# Patient Record
Sex: MALE | Race: WHITE | Employment: OTHER | URBAN - METROPOLITAN AREA
[De-identification: names, ages, dates, MRNs, and addresses within clinical notes are randomized per-mention and may not be internally consistent; named-entity substitution may affect disease eponyms.]

---

## 2017-03-03 RX ORDER — RANOLAZINE 500 MG/1
TABLET, FILM COATED, EXTENDED RELEASE ORAL
Qty: 60 TAB | Refills: 3 | Status: SHIPPED | OUTPATIENT
Start: 2017-03-03 | End: 2017-09-28 | Stop reason: SDUPTHER

## 2017-05-19 DIAGNOSIS — I10 ESSENTIAL HYPERTENSION, MALIGNANT: ICD-10-CM

## 2017-05-19 RX ORDER — CARVEDILOL 12.5 MG/1
TABLET ORAL
Qty: 90 TAB | Refills: 3 | Status: SHIPPED | OUTPATIENT
Start: 2017-05-19 | End: 2018-07-16 | Stop reason: SDUPTHER

## 2017-09-28 RX ORDER — RANOLAZINE 500 MG/1
TABLET, FILM COATED, EXTENDED RELEASE ORAL
Qty: 60 TAB | Refills: 3 | Status: SHIPPED | OUTPATIENT
Start: 2017-09-28 | End: 2017-12-02

## 2017-11-29 ENCOUNTER — HOSPITAL ENCOUNTER (INPATIENT)
Age: 67
LOS: 3 days | Discharge: HOME | DRG: 247 | End: 2017-12-02
Attending: EMERGENCY MEDICINE | Admitting: INTERNAL MEDICINE
Payer: MEDICARE

## 2017-11-29 ENCOUNTER — APPOINTMENT (OUTPATIENT)
Dept: GENERAL RADIOLOGY | Age: 67
DRG: 247 | End: 2017-11-29
Attending: EMERGENCY MEDICINE
Payer: MEDICARE

## 2017-11-29 DIAGNOSIS — R06.09 DYSPNEA ON EXERTION: ICD-10-CM

## 2017-11-29 DIAGNOSIS — R73.9 HYPERGLYCEMIA: ICD-10-CM

## 2017-11-29 DIAGNOSIS — R07.9 ACUTE CHEST PAIN: Primary | ICD-10-CM

## 2017-11-29 LAB
ALBUMIN SERPL-MCNC: 4.1 G/DL (ref 3.4–5)
ALBUMIN/GLOB SERPL: 1.2 {RATIO} (ref 0.8–1.7)
ALP SERPL-CCNC: 105 U/L (ref 45–117)
ALT SERPL-CCNC: 42 U/L (ref 16–61)
ANION GAP SERPL CALC-SCNC: 11 MMOL/L (ref 3–18)
AST SERPL-CCNC: 23 U/L (ref 15–37)
ATRIAL RATE: 80 BPM
ATRIAL RATE: 87 BPM
BASOPHILS # BLD: 0 K/UL (ref 0–0.06)
BASOPHILS NFR BLD: 1 % (ref 0–2)
BILIRUB SERPL-MCNC: 0.4 MG/DL (ref 0.2–1)
BUN SERPL-MCNC: 13 MG/DL (ref 7–18)
BUN/CREAT SERPL: 15 (ref 12–20)
CALCIUM SERPL-MCNC: 9.2 MG/DL (ref 8.5–10.1)
CALCULATED P AXIS, ECG09: 36 DEGREES
CALCULATED P AXIS, ECG09: 50 DEGREES
CALCULATED R AXIS, ECG10: 45 DEGREES
CALCULATED R AXIS, ECG10: 54 DEGREES
CALCULATED T AXIS, ECG11: 22 DEGREES
CALCULATED T AXIS, ECG11: 45 DEGREES
CHLORIDE SERPL-SCNC: 102 MMOL/L (ref 100–108)
CK MB CFR SERPL CALC: NORMAL % (ref 0–4)
CK MB SERPL-MCNC: <1 NG/ML (ref 5–25)
CK SERPL-CCNC: 51 U/L (ref 39–308)
CO2 SERPL-SCNC: 24 MMOL/L (ref 21–32)
CREAT SERPL-MCNC: 0.89 MG/DL (ref 0.6–1.3)
DIAGNOSIS, 93000: NORMAL
DIAGNOSIS, 93000: NORMAL
DIFFERENTIAL METHOD BLD: NORMAL
EOSINOPHIL # BLD: 0.1 K/UL (ref 0–0.4)
EOSINOPHIL NFR BLD: 1 % (ref 0–5)
ERYTHROCYTE [DISTWIDTH] IN BLOOD BY AUTOMATED COUNT: 13.8 % (ref 11.6–14.5)
GLOBULIN SER CALC-MCNC: 3.4 G/DL (ref 2–4)
GLUCOSE BLD STRIP.AUTO-MCNC: 280 MG/DL (ref 70–110)
GLUCOSE SERPL-MCNC: 286 MG/DL (ref 74–99)
HCT VFR BLD AUTO: 42.5 % (ref 36–48)
HGB BLD-MCNC: 14.9 G/DL (ref 13–16)
LYMPHOCYTES # BLD: 2.6 K/UL (ref 0.9–3.6)
LYMPHOCYTES NFR BLD: 33 % (ref 21–52)
MCH RBC QN AUTO: 30.4 PG (ref 24–34)
MCHC RBC AUTO-ENTMCNC: 35.1 G/DL (ref 31–37)
MCV RBC AUTO: 86.7 FL (ref 74–97)
MONOCYTES # BLD: 0.6 K/UL (ref 0.05–1.2)
MONOCYTES NFR BLD: 8 % (ref 3–10)
NEUTS SEG # BLD: 4.5 K/UL (ref 1.8–8)
NEUTS SEG NFR BLD: 57 % (ref 40–73)
P-R INTERVAL, ECG05: 144 MS
P-R INTERVAL, ECG05: 146 MS
PLATELET # BLD AUTO: 201 K/UL (ref 135–420)
PMV BLD AUTO: 9.8 FL (ref 9.2–11.8)
POTASSIUM SERPL-SCNC: 4.1 MMOL/L (ref 3.5–5.5)
PROT SERPL-MCNC: 7.5 G/DL (ref 6.4–8.2)
Q-T INTERVAL, ECG07: 396 MS
Q-T INTERVAL, ECG07: 408 MS
QRS DURATION, ECG06: 90 MS
QRS DURATION, ECG06: 92 MS
QTC CALCULATION (BEZET), ECG08: 456 MS
QTC CALCULATION (BEZET), ECG08: 490 MS
RBC # BLD AUTO: 4.9 M/UL (ref 4.7–5.5)
SODIUM SERPL-SCNC: 137 MMOL/L (ref 136–145)
TROPONIN I SERPL-MCNC: 0.06 NG/ML (ref 0–0.06)
VENTRICULAR RATE, ECG03: 80 BPM
VENTRICULAR RATE, ECG03: 87 BPM
WBC # BLD AUTO: 7.9 K/UL (ref 4.6–13.2)

## 2017-11-29 PROCEDURE — 74011250636 HC RX REV CODE- 250/636: Performed by: INTERNAL MEDICINE

## 2017-11-29 PROCEDURE — 85025 COMPLETE CBC W/AUTO DIFF WBC: CPT | Performed by: EMERGENCY MEDICINE

## 2017-11-29 PROCEDURE — 93005 ELECTROCARDIOGRAM TRACING: CPT

## 2017-11-29 PROCEDURE — 80053 COMPREHEN METABOLIC PANEL: CPT | Performed by: EMERGENCY MEDICINE

## 2017-11-29 PROCEDURE — 93017 CV STRESS TEST TRACING ONLY: CPT | Performed by: INTERNAL MEDICINE

## 2017-11-29 PROCEDURE — 78452 HT MUSCLE IMAGE SPECT MULT: CPT | Performed by: INTERNAL MEDICINE

## 2017-11-29 PROCEDURE — 71010 XR CHEST PORT: CPT

## 2017-11-29 PROCEDURE — 74011250637 HC RX REV CODE- 250/637: Performed by: INTERNAL MEDICINE

## 2017-11-29 PROCEDURE — 74011250636 HC RX REV CODE- 250/636: Performed by: EMERGENCY MEDICINE

## 2017-11-29 PROCEDURE — 65660000000 HC RM CCU STEPDOWN

## 2017-11-29 PROCEDURE — 82550 ASSAY OF CK (CPK): CPT | Performed by: EMERGENCY MEDICINE

## 2017-11-29 PROCEDURE — 74011636637 HC RX REV CODE- 636/637: Performed by: INTERNAL MEDICINE

## 2017-11-29 PROCEDURE — 99284 EMERGENCY DEPT VISIT MOD MDM: CPT

## 2017-11-29 PROCEDURE — 82962 GLUCOSE BLOOD TEST: CPT

## 2017-11-29 PROCEDURE — 74011250637 HC RX REV CODE- 250/637: Performed by: EMERGENCY MEDICINE

## 2017-11-29 RX ORDER — CARVEDILOL 6.25 MG/1
12.5 TABLET ORAL
Status: COMPLETED | OUTPATIENT
Start: 2017-11-29 | End: 2017-11-29

## 2017-11-29 RX ORDER — NITROGLYCERIN 0.4 MG/1
0.4 TABLET SUBLINGUAL
Status: COMPLETED | OUTPATIENT
Start: 2017-11-29 | End: 2017-11-29

## 2017-11-29 RX ORDER — RAMIPRIL 5 MG/1
10 CAPSULE ORAL DAILY
Status: DISCONTINUED | OUTPATIENT
Start: 2017-11-30 | End: 2017-12-02 | Stop reason: HOSPADM

## 2017-11-29 RX ORDER — ROSUVASTATIN CALCIUM 20 MG/1
10 TABLET, COATED ORAL
Status: DISCONTINUED | OUTPATIENT
Start: 2017-11-29 | End: 2017-12-02 | Stop reason: HOSPADM

## 2017-11-29 RX ORDER — HYDROCODONE BITARTRATE AND ACETAMINOPHEN 5; 325 MG/1; MG/1
1 TABLET ORAL
Status: DISCONTINUED | OUTPATIENT
Start: 2017-11-29 | End: 2017-12-02 | Stop reason: HOSPADM

## 2017-11-29 RX ORDER — INSULIN LISPRO 100 [IU]/ML
INJECTION, SOLUTION INTRAVENOUS; SUBCUTANEOUS
Status: DISCONTINUED | OUTPATIENT
Start: 2017-11-29 | End: 2017-12-02 | Stop reason: HOSPADM

## 2017-11-29 RX ORDER — RANOLAZINE 500 MG/1
500 TABLET, EXTENDED RELEASE ORAL EVERY 12 HOURS
Status: DISCONTINUED | OUTPATIENT
Start: 2017-11-29 | End: 2017-11-30

## 2017-11-29 RX ORDER — DEXTROSE MONOHYDRATE 25 G/50ML
25-50 INJECTION, SOLUTION INTRAVENOUS AS NEEDED
Status: DISCONTINUED | OUTPATIENT
Start: 2017-11-29 | End: 2017-12-02 | Stop reason: HOSPADM

## 2017-11-29 RX ORDER — AMLODIPINE BESYLATE 5 MG/1
5 TABLET ORAL
Status: COMPLETED | OUTPATIENT
Start: 2017-11-29 | End: 2017-11-29

## 2017-11-29 RX ORDER — ROSUVASTATIN CALCIUM 10 MG/1
10 TABLET, COATED ORAL
Status: DISCONTINUED | OUTPATIENT
Start: 2017-11-29 | End: 2017-11-29

## 2017-11-29 RX ORDER — ENOXAPARIN SODIUM 100 MG/ML
1 INJECTION SUBCUTANEOUS
Status: COMPLETED | OUTPATIENT
Start: 2017-11-29 | End: 2017-11-29

## 2017-11-29 RX ORDER — ASPIRIN 325 MG
325 TABLET ORAL
Status: COMPLETED | OUTPATIENT
Start: 2017-11-29 | End: 2017-11-29

## 2017-11-29 RX ORDER — MAGNESIUM SULFATE 100 %
16 CRYSTALS MISCELLANEOUS AS NEEDED
Status: DISCONTINUED | OUTPATIENT
Start: 2017-11-29 | End: 2017-12-02 | Stop reason: HOSPADM

## 2017-11-29 RX ORDER — CARVEDILOL 12.5 MG/1
12.5 TABLET ORAL 2 TIMES DAILY WITH MEALS
Status: DISCONTINUED | OUTPATIENT
Start: 2017-11-29 | End: 2017-12-02 | Stop reason: HOSPADM

## 2017-11-29 RX ORDER — PANTOPRAZOLE SODIUM 40 MG/1
40 TABLET, DELAYED RELEASE ORAL
Status: DISCONTINUED | OUTPATIENT
Start: 2017-11-30 | End: 2017-12-02 | Stop reason: HOSPADM

## 2017-11-29 RX ORDER — ROSUVASTATIN CALCIUM 5 MG/1
10 TABLET, COATED ORAL
Status: DISCONTINUED | OUTPATIENT
Start: 2017-11-29 | End: 2017-11-29

## 2017-11-29 RX ORDER — HEPARIN SODIUM 5000 [USP'U]/ML
5000 INJECTION, SOLUTION INTRAVENOUS; SUBCUTANEOUS EVERY 8 HOURS
Status: DISCONTINUED | OUTPATIENT
Start: 2017-11-29 | End: 2017-11-30

## 2017-11-29 RX ORDER — LORAZEPAM 1 MG/1
1 TABLET ORAL 2 TIMES DAILY
Status: DISCONTINUED | OUTPATIENT
Start: 2017-11-29 | End: 2017-12-02 | Stop reason: HOSPADM

## 2017-11-29 RX ORDER — ASPIRIN 81 MG/1
81 TABLET ORAL DAILY
Status: DISCONTINUED | OUTPATIENT
Start: 2017-11-30 | End: 2017-12-02 | Stop reason: HOSPADM

## 2017-11-29 RX ORDER — RANOLAZINE 500 MG/1
500 TABLET, EXTENDED RELEASE ORAL 2 TIMES DAILY
Status: DISCONTINUED | OUTPATIENT
Start: 2017-11-29 | End: 2017-11-29

## 2017-11-29 RX ORDER — AMLODIPINE BESYLATE 5 MG/1
5 TABLET ORAL DAILY
Status: DISCONTINUED | OUTPATIENT
Start: 2017-11-30 | End: 2017-12-02 | Stop reason: HOSPADM

## 2017-11-29 RX ORDER — ACETAMINOPHEN 325 MG/1
650 TABLET ORAL
Status: DISCONTINUED | OUTPATIENT
Start: 2017-11-29 | End: 2017-12-02 | Stop reason: HOSPADM

## 2017-11-29 RX ORDER — ONDANSETRON 2 MG/ML
4 INJECTION INTRAMUSCULAR; INTRAVENOUS
Status: DISCONTINUED | OUTPATIENT
Start: 2017-11-29 | End: 2017-12-02 | Stop reason: HOSPADM

## 2017-11-29 RX ADMIN — INSULIN LISPRO 6 UNITS: 100 INJECTION, SOLUTION INTRAVENOUS; SUBCUTANEOUS at 21:21

## 2017-11-29 RX ADMIN — CARVEDILOL 12.5 MG: 12.5 TABLET, FILM COATED ORAL at 18:30

## 2017-11-29 RX ADMIN — CARVEDILOL 12.5 MG: 6.25 TABLET, FILM COATED ORAL at 15:31

## 2017-11-29 RX ADMIN — RANOLAZINE 500 MG: 500 TABLET, FILM COATED, EXTENDED RELEASE ORAL at 20:33

## 2017-11-29 RX ADMIN — ASPIRIN 325 MG ORAL TABLET 325 MG: 325 PILL ORAL at 14:13

## 2017-11-29 RX ADMIN — NITROGLYCERIN 1 INCH: 20 OINTMENT TOPICAL at 15:35

## 2017-11-29 RX ADMIN — ENOXAPARIN SODIUM 90 MG: 100 INJECTION SUBCUTANEOUS at 15:33

## 2017-11-29 RX ADMIN — AMLODIPINE BESYLATE 5 MG: 5 TABLET ORAL at 15:31

## 2017-11-29 RX ADMIN — ROSUVASTATIN CALCIUM 10 MG: 20 TABLET ORAL at 21:21

## 2017-11-29 RX ADMIN — HEPARIN SODIUM 5000 UNITS: 5000 INJECTION, SOLUTION INTRAVENOUS; SUBCUTANEOUS at 18:28

## 2017-11-29 RX ADMIN — NITROGLYCERIN 0.4 MG: 0.4 TABLET SUBLINGUAL at 14:13

## 2017-11-29 RX ADMIN — LORAZEPAM 1 MG: 1 TABLET ORAL at 20:32

## 2017-11-29 NOTE — IP AVS SNAPSHOT
[address] [place] [address] 
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), Thyroid Goiter, Type 2 Diabetes Mellitus Without Complication (Hcc), Chronic Pain Syndrome, Cad (Coronary Artery Disease) Things You Need To Do (next 8 weeks) Follow up with Edd Saeed MD in 1 week(s) We will call you with an appointment on Monday Phone:  [phone] Where:  [address] [place], [place] [address]Henry[address], [address] [address] Follow up with Solomon Treviño MD in 2 week(s) We will call you with an appointment on Monday Phone:  [phone] Where:  [address] [place] [address] LillianSelena limon [address], [address] [address] Discharge Orders None A check stephanie indicates which time of day the medication should be taken. My Medications STOP taking these medications RANEXA 500 mg SR tablet Generic drug:  ranolazine ER  
   
  
  
TAKE these medications as instructed Instructions Each Dose to Equal  
 Morning Noon Evening Bedtime  
 acetaminophen 325 mg tablet Commonly known as:  TYLENOL Take  by mouth every four (4) hours as needed for Pain. aspirin delayed-release 325 mg tablet Notes to Patient:  81 mg (not 325mg) Take 1 Tab by mouth daily. 81 mg  
    
  
   
   
   
  
 ATIVAN 1 mg tablet Generic drug:  LORazepam  
   
 Take 1 mg by mouth two (2) times a day. 1 mg  
    
  
   
   
  
   
  
 carvedilol 12.5 mg tablet Commonly known as:  COREG  
   
 TAKE 1 TAB BY MOUTH TWO (2) TIMES DAILY (WITH MEALS). CRESTOR 10 mg tablet Generic drug:  rosuvastatin Take 10 mg by mouth nightly. 10 mg  
    
   
   
   
  
  
 fenofibrate nanocrystallized 48 mg tablet Commonly known as:  [employer] Take 1 Tab by mouth nightly. 48 mg JANUVIA 100 mg tablet Generic drug:  SITagliptin Take 100 mg by mouth daily. 100 mg  
    
  
   
   
   
  
 metFORMIN 500 mg tablet Commonly known as:  GLUCOPHAGE Notes to Patient:  48 hours after procedure. Next dose on Monday morning. Take  by mouth two (2) times daily (with meals). Start on Monday 12/4  
   
   
  
   
  
 nitroglycerin 0.4 mg SL tablet Commonly known as:  NITROSTAT  
   
 1 Tab by SubLINGual route every five (5) minutes as needed for Chest Pain for up to 3 doses. 0.4 mg  
    
   
   
   
  
 NORVASC 5 mg tablet Generic drug:  amLODIPine Take 5 mg by mouth daily. 5 mg Omeprazole delayed release 20 mg tablet Commonly known as:  PRILOSEC D/R Take 20 mg by mouth daily. 20 mg  
    
  
   
   
   
  
 promethazine 25 mg tablet Commonly known as:  PHENERGAN Take 25 mg by mouth every six (6) hours as needed for Nausea. 25 mg  
    
   
   
   
  
 ramipril 10 mg capsule Commonly known as:  ALTACE Take 10 mg by mouth daily. 10 mg  
    
  
   
   
   
  
 ticagrelor 90 mg tablet Commonly known as:  [employer] Take 1 Tab by mouth two (2) times a day. 90 mg  
    
  
   
   
  
   
  
 VICODIN 5-300 mg tablet Generic drug:  HYDROcodone-acetaminophen Take  by mouth. ASK your physician about these medications  Instructions Each Dose to Equal  
 Morning Noon Evening Bedtime VASCEPA 1 gram capsule Generic drug:  icosapent ethyl Take  by mouth two (2) times a day. Where to Get Your Medications Information on where to get these meds will be given to you by the nurse or doctor. ! Ask your nurse or doctor about these medications  
  aspirin delayed-release 325 mg tablet  
 fenofibrate nanocrystallized 48 mg tablet  
 nitroglycerin 0.4 mg SL tablet  
 ticagrelor 90 mg tablet Discharge Instructions Learning About Coronary Artery Disease (CAD) What is coronary artery disease? Coronary artery disease (CAD) occurs when plaque builds up in the arteries that bring oxygen-rich blood to your heart. Plaque is a fatty substance made of cholesterol, calcium, and other substances in the blood. This process is called hardening of the arteries, or atherosclerosis. What happens when you have coronary artery disease? · Plaque may narrow the coronary arteries. Narrowed arteries cause poor blood flow. This can lead to angina symptoms such as chest pain or discomfort. If blood flow is completely blocked, you could have a heart attack. · You can slow CAD and reduce the risk of future problems by making changes in your lifestyle. These include quitting smoking and eating heart-healthy foods. · Treatments for CAD, along with changes in your lifestyle, can help you live a longer and healthier life. How can you prevent coronary artery disease? · Do not smoke. It may be the best thing you can do to prevent heart disease. If you need help quitting, talk to your doctor about stop-smoking programs and medicines. These can increase your chances of quitting for good. · Be active. Get at least 30 minutes of exercise on most days of the week. Walking is a good choice. You also may want to do other activities, such as running, swimming, cycling, or playing tennis or team sports. · Eat heart-healthy foods. Eat more fruits and vegetables and less foods that contain saturated and trans fats. Limit alcohol, sodium, and sweets. · Stay at a healthy weight. Lose weight if you need to. · Manage other health problems such as diabetes, high blood pressure, and high cholesterol. · Manage stress. Stress can hurt your heart. To keep stress low, talk about your problems and feelings. Don't keep your feelings hidden. · If you have talked about it with your doctor, take a low-dose aspirin every day. Aspirin can help certain people lower their risk of a heart attack or stroke. But taking aspirin isn't right for everyone, because it can cause serious bleeding. Do not start taking daily aspirin unless your doctor knows about it. How is coronary artery disease treated? · Your doctor will suggest that you make lifestyle changes. For example, your doctor may ask you to eat healthy foods, quit smoking, lose extra weight, and be more active. · You will have to take medicines. · Your doctor may suggest a procedure to open narrowed or blocked arteries. This is called angioplasty. Or your doctor may suggest using healthy blood vessels to create detours around narrowed or blocked arteries. This is called bypass surgery. Follow-up care is a key part of your treatment and safety. Be sure to make and go to all appointments, and call your doctor if you are having problems. It's also a good idea to know your test results and keep a list of the medicines you take. Percutaneous Coronary Intervention: What to Expect at [facility] Your Recovery Percutaneous coronary intervention (PCI) is the name for procedures that are used to open a narrowed or blocked coronary artery. The two most common PCI procedures are coronary angioplasty and coronary stent placement. Your groin or arm may have a bruise and feel sore for a day or two after a percutaneous coronary intervention (PCI).  You can do light activities around the house, but nothing strenuous for several days. This care sheet gives you a general idea about how long it will take for you to recover. But each person recovers at a different pace. Follow the steps below to get better as quickly as possible. How can you care for yourself at home? Activity ? · If the doctor gave you a sedative: ¨ For 24 hours, don't do anything that requires attention to detail. It takes time for the medicine's effects to completely wear off. ¨ For your safety, do not drive or operate any machinery that could be dangerous. Wait until the medicine wears off and you can think clearly and react easily. ? · Do not do strenuous exercise and do not lift, pull, or push anything heavy until your doctor says it is okay. This may be for a day or two. You can walk around the house and do light activity, such as cooking. ? · If the catheter was placed in your groin, try not to walk up stairs for the first couple of days. ? · If the catheter was placed in your arm near your wrist, do not bend your wrist deeply for the first couple of days. Be careful using your hand to get into and out of a chair or bed. ? · Carry your stent identification card with you at all times. ? · If your doctor recommends it, get more exercise. Walking is a good choice. Bit by bit, increase the amount you walk every day. Try for at least 30 minutes on most days of the week. Diet ? · Drink plenty of fluids to help your body flush out the dye. If you have kidney, heart, or liver disease and have to limit fluids, talk with your doctor before you increase the amount of fluids you drink. ? · Keep eating a heart-healthy diet that has lots of fruits, vegetables, and whole grains. If you have not been eating this way, talk to your doctor. You also may want to talk to a dietitian. This expert can help you to learn about healthy foods and plan meals. Medicines ? · Your doctor will tell you if and when you can restart your medicines. He or she will also give you instructions about taking any new medicines. ? · If you take blood thinners, such as warfarin (Coumadin), clopidogrel (Plavix), or aspirin, be sure to talk to your doctor. He or she will tell you if and when to start taking those medicines again. Make sure that you understand exactly what your doctor wants you to do.  
? · Your doctor will prescribe blood-thinning medicines. You will likely take aspirin plus another antiplatelet, such as clopidogrel (Plavix). It is very important that you take these medicines exactly as directed. These medicines help keep the coronary artery open and reduce your risk of a heart attack. ? · Call your doctor if you think you are having a problem with your medicine. ?Care of the catheter site ? · For 1 or 2 days, keep a bandage over the spot where the catheter was inserted. The bandage probably will fall off in this time. ? · Put ice or a cold pack on the area for 10 to 20 minutes at a time to help with soreness or swelling. Put a thin cloth between the ice and your skin. ? · You may shower 24 to 48 hours after the procedure, if your doctor okays it. Pat the incision dry. ? · Do not soak the catheter site until it is healed. Don't take a bath for 1 week, or until your doctor tells you it isokay. Follow-up care is a key part of your treatment and safety. Be sure to make and go to all appointments, and call your doctor if you are having problems. It's also a good idea to know your test results and keep a list of the medicines you take. When should you call for help? Call 911 anytime you think you may need emergency care. For example, call if: 
? · You passed out (lost consciousness). ? · You have severe trouble breathing. ? · You have sudden chest pain and shortness of breath, or you cough up blood. ? · You have symptoms of a heart attack, such as: ¨ Chest pain or pressure. ¨ Sweating. ¨ Shortness of breath. ¨ Nausea or vomiting. ¨ Pain that spreads from the chest to the neck, jaw, or one or both shoulders or arms. ¨ Dizziness or lightheadedness. ¨ A fast or uneven pulse. After calling 911, chew 1 adult-strength aspirin. Wait for an ambulance. Do not try to drive yourself. ? · You have been diagnosed with angina, and you have angina symptoms that do not go away with rest or are not getting better within 5 minutes after you take one dose of nitroglycerin. ?Call your doctor now or seek immediate medical care if: 
? · You are bleeding from the area where the catheter was put in your artery. ? · You have a fast-growing, painful lump at the catheter site. ? · You have signs of infection, such as: 
¨ Increased pain, swelling, warmth, or redness. ¨ Red streaks leading from the catheter site. ¨ Pus draining from the catheter site. ¨ A fever. ? · Your leg or arm looks blue or feels cold, numb, or tingly. ? Watch closely for changes in your health, and be sure to contact your doctor if you have any problems. DISCHARGE SUMMARY from Nurse PATIENT INSTRUCTIONS: 
 
 
F-face looks uneven A-arms unable to move or move unevenly S-speech slurred or non-existent T-time-call 911 as soon as signs and symptoms begin-DO NOT go Back to bed or wait to see if you get better-TIME IS BRAIN. Warning Signs of HEART ATTACK Call 911 if you have these symptoms: 
? Chest discomfort. Most heart attacks involve discomfort in the center of the chest that lasts more than a few minutes, or that goes away and comes back. It can feel like uncomfortable pressure, squeezing, fullness, or pain. ? Discomfort in other areas of the upper body. Symptoms can include pain or discomfort in one or both arms, the back, neck, jaw, or stomach. ? Shortness of breath with or without chest discomfort. ? Other signs may include breaking out in a cold sweat, nausea, or lightheadedness. Don't wait more than five minutes to call [address]! Fast action can save your life. Calling 911 is almost always the fastest way to get lifesaving treatment. Emergency Medical Services staff can begin treatment when they arrive  up to an hour sooner than if someone gets to the hospital by car. The discharge information has been reviewed with the patient and spouse. The patient and spouse verbalized understanding. Discharge medications reviewed with the patient and spouse and appropriate educational materials and side effects teaching were provided. ___________________________________________________________________________________________________________________________________ Pin [employer]hart Announcement We are excited to announce that we are making your provider's discharge notes available to you in [employer]. You will see these notes when they are completed and signed by the physician that discharged you from your recent hospital stay. If you have any questions or concerns about any information you see in [employer], please call the Health Information Department where you were seen or reach out to your Primary Care Provider for more information about your plan of care. Introducing [facility]! Agnes Hernandez introduces [employer] patient portal. Now you can access parts of your medical record, email your doctor's office, and request medication refills online. 1. In your internet browser, go to [email or web address] 2. Click on the First Time User? Click Here link in the Sign In box. You will see the New Member Sign Up page. 3. Enter your [employer] Access Code exactly as it appears below. You will not need to use this code after youve completed the sign-up process. If you do not sign up before the expiration date, you must request a new code. · [employer] Access Code: [email or web address] Expires: 2/27/2018  1:21 PM 
 
4. Enter the last four digits of your Social Security Number (xxxx) and Date of Birth (mm/dd/yyyy) as indicated and click Submit. You will be taken to the next sign-up page. 5. Create a [employer] ID. This will be your [employer] login ID and cannot be changed, so think of one that is secure and easy to remember. 6. Create a [employer] password. You can change your password at any time. 7. Enter your Password Reset Question and Answer. This can be used at a later time if you forget your password. 8. Enter your e-mail address. You will receive e-mail notification when new information is available in [address] 9. Click Sign Up. You can now view and download portions of your medical record. 10. Click the Download Summary menu link to download a portable copy of your medical information. If you have questions, please visit the Frequently Asked Questions section of the [employer] website. Remember, [employer] is NOT to be used for urgent needs. For medical emergencies, dial 911. Now available from your iPhone and Android! Unresulted Labs-Please follow up with your PCP about these lab tests Order Current Status CARDIAC SPECT REST AND STRESS Preliminary result EKG, 12 LEAD, INITIAL Preliminary result NUCLEAR STRESS TEST Preliminary result Providers Seen During Your Hospitalization Provider Specialty Primary office phone Nisha Montano DO Emergency Medicine [phone] Cooper Enrique MD Internal Medicine [phone] Nani Campbell MD [facility] [phone] Adriano Perkins MD Internal Medicine [phone] Your Primary Care Physician (PCP) Primary Care Physician Office Phone Office Fax WINIFRED BARRY JR ** None ** ** None ** You are allergic to the following No active allergies Recent Documentation Height Weight BMI Smoking Status 1.803 m 90.1 kg 27.71 kg/m2 Never Smoker Emergency Contacts Name Discharge Info Relation Home Work Mobile [address]  CAREGIVER [3] Spouse [3] [phone] Patient Belongings The following personal items are in your possession at time of discharge: 
  Dental Appliances: None  Visual Aid: Glasses, With patient      Home Medications: None   Jewelry: None  Clothing: Undergarments, With patient    Other Valuables: None  Personal Items Sent to Safe: none Discharge Instructions Attachments/References TICAGRELOR (BY MOUTH) (ENGLISH) FENOFIBRATE (BY MOUTH) (ENGLISH) Patient Handouts Ticagrelor (By mouth) Ticagrelor (rdr-MI-flas-or) Helps prevent stroke, heart attack, and other heart problems. This medicine is a blood thinner. Brand Name(s): Frankie There may be other brand names for this medicine. When This Medicine Should Not Be Used: This medicine is not right for everyone. Do not use it if you had an allergic reaction to ticagrelor, or if you have bleeding problems (such as a bleeding stomach ulcer) or a history of bleeding in your brain. How to Use This Medicine:  
Tablet · Your doctor will tell you how much medicine to use. Do not use more than directed. Take this medicine at the same time every day. · Your doctor may tell you to take aspirin with this medicine. Do not use more than 100 milligrams of aspirin per day. Check the labels of other medicines to make sure they do not contain aspirin. · If you cannot swallow the tablet, you may do this: ¨ Crush the tablet and mix it in a glass of water. Drink it right away. Rinse the glass with more water and drink that too, so you get all the medicine. ¨ You may give the tablet and water mixture through a nasogastric tube. Flush the tube with more water so you receive all the medicine. · This medicine should come with a Medication Guide. Ask your pharmacist for a copy if you do not have one. · Missed dose: Skip the missed dose and take your next dose as usual. Do not take extra medicine to make up for a missed dose. · Store the medicine in a closed container at room temperature, away from heat, moisture, and direct light. Drugs and Foods to Avoid: Ask your doctor or pharmacist before using any other medicine, including over-the-counter medicines, vitamins, and herbal products. · Some medicines can affect how ticagrelor works. Tell your doctor if you are using any of the following: ¨ Atazanavir, carbamazepine, clarithromycin, digoxin, indinavir, itraconazole, ketoconazole, lovastatin, nefazodone, nelfinavir, phenobarbital, phenytoin, rifampin, ritonavir, saquinavir, simvastatin, telithromycin, or voriconazole ¨ Blood thinner (including warfarin or heparin) ¨ NSAID pain or arthritis medicine (including celecoxib, diclofenac, ibuprofen, naproxen) Warnings While Using This Medicine: · Tell your doctor if you are pregnant or breastfeeding, or if you have liver disease, heart rhythm problems (including slow heartbeat), lung or breathing problems (such as asthma or COPD), or a history of bleeding problems. · This medicine may cause you to bleed and bruise more easily, and it may take longer than usual for bleeding to stop. Be careful to avoid injuries. · Do not stop using this medicine unless your doctor tells you to. To stop it may increase your risk of a heart attack, blood clot, or other serious problem. · Tell any doctor or dentist who treats you that you are using this medicine.  With your doctor's permission, you may need to stop using this medicine several days before you have surgery to reduce the risk of bleeding problems. Follow your doctor's instructions carefully. · Your doctor will do lab tests at regular visits to check on the effects of this medicine. Keep all appointments. · Keep all medicine out of the reach of children. Never share your medicine with anyone. Possible Side Effects While Using This Medicine:  
Call your doctor right away if you notice any of these side effects: · Allergic reaction: Itching or hives, swelling in your face or hands, swelling or tingling in your mouth or throat, chest tightness, trouble breathing · Bloody or black, tarry stools, red or dark brown urine · Fast, slow, or pounding heartbeat · Trouble breathing · Unusual bleeding, bruising, or weakness · Vomiting of blood or material that looks like coffee grounds If you notice other side effects that you think are caused by this medicine, tell your doctor. Call your doctor for medical advice about side effects. You may report side effects to FDA at 8-594-FDA-9983 © 2017 [address]  Information is for End User's use only and may not be sold, redistributed or otherwise used for commercial purposes. The above information is an  only. It is not intended as medical advice for individual conditions or treatments. Talk to your doctor, nurse or pharmacist before following any medical regimen to see if it is safe and effective for you. Fenofibrate (By mouth) Fenofibrate (oxq-dn-ULS-brate) Lowers high cholesterol and triglyceride levels. Brand Name(s): Ani Nim, Fenoglide, Lipofen, Rozetta Paling, Triglide There may be other brand names for this medicine. When This Medicine Should Not Be Used: This medicine is not right for everyone. Do not use it if you had an allergic reaction to fenofibrate or fenofibric acid, or you are breastfeeding. Do not use it if you receive kidney dialysis or have active liver disease or gallbladder disease. How to Use This Medicine:  
Capsule, Tablet · Take your medicine as directed. Your dose may need to be changed several times to find what works best for you. · Take Fenoglide®, Lipofen®, or Tricor® with a meal. Antara® and Triglide® can be taken with or without a meal. 
· Swallow Antara® capsules, Triglide® tablets, or Tricor® tablets whole. Do not open, crush, break, chew, or dissolve them. · Missed dose: Take a dose as soon as you remember. If it is almost time for your next dose, wait until then and take a regular dose. Do not take extra medicine to make up for a missed dose. · Store the medicine in a closed container at room temperature, away from heat, moisture, and direct light. Drugs and Foods to Avoid: Ask your doctor or pharmacist before using any other medicine, including over-the-counter medicines, vitamins, and herbal products. · Some medicines can affect how fenofibrate works. Tell your doctor if you are using any of the following: ¨ Colchicine ¨ Cyclosporine ¨ Tacrolimus ¨ A blood thinner (such as warfarin) ¨ A statin medicine to lower cholesterol, such as atorvastatin, pravastatin, or simvastatin · If you are also using cholestyramine, colesevelam, or colestipol, you must take fenofibrate at least 1 hour before or 4 to 6 hours after you take these medicines. Warnings While Using This Medicine: · Tell your doctor if you are pregnant, or if you have kidney disease, blood clotting problems, diabetes, heart disease, or thyroid problems. · This medicine may cause the following problems: ¨ Myopathy or rhabdomyolysis (serious muscle problems) ¨ Pancreatitis ¨ Increased risk of gallstones · Your doctor will do lab tests at regular visits to check on the effects of this medicine. Keep all appointments. · Keep all medicine out of the reach of children. Never share your medicine with anyone. Possible Side Effects While Using This Medicine:  
Call your doctor right away if you notice any of these side effects: · Allergic reaction: Itching or hives, swelling in your face or hands, swelling or tingling in your mouth or throat, chest tightness, trouble breathing · Blistering, peeling, red skin rash · Chest pain, trouble breathing, coughing up blood · Dark urine or pale stools, yellow skin or eyes · Muscle pain, tenderness, or weakness · Sudden and severe stomach pain, nausea, vomiting, fever, or loss of appetite · Unusual bleeding, bruising, or weakness If you notice other side effects that you think are caused by this medicine, tell your doctor. Call your doctor for medical advice about side effects. You may report side effects to FDA at 9-408-FDA-8595 © 2017 [address] Information is for End User's use only and may not be sold, redistributed or otherwise used for commercial purposes. The above information is an  only. It is not intended as medical advice for individual conditions or treatments. Talk to your doctor, nurse or pharmacist before following any medical regimen to see if it is safe and effective for you. Please provide this summary of care documentation to your next provider. Signatures-by signing, you are acknowledging that this After Visit Summary has been reviewed with you and you have received a copy. Patient Signature:  ____________________________________________________________ Date:  ____________________________________________________________  
  
Althia Kras Provider Signature:  ____________________________________________________________ Date:  ____________________________________________________________

## 2017-11-29 NOTE — ED TRIAGE NOTES
Patient states chest pain and shortness of breath x one week. He states worsening of symptoms today. Advises of coronary blockage hx.

## 2017-11-29 NOTE — ROUTINE PROCESS
Patient admitted from [facility] ER  Awake alert still with chest pain 4 out of 10  easing off per patient hospitalist informed of admission came and saw patient no order at this time except for a diet and npo after MN tonight.

## 2017-11-29 NOTE — H&P
History and Physical      NAME:  Eva Barnett   :   1950   MRN:   [id number]     Date/Time:  2017     CHIEF COMPLAINT: chest pain     HISTORY OF PRESENT ILLNESS:     Mr. Alondra Miller is a 79 y.o.   male with a PMH of CAD 2 vessel disease, HTN, DM-2 who presents with c/c of chest pain. Hiss chest pain is located on retrosternal area, non radiating. Chest pain off and on going on for about a month. Occasionally associated with SOB and diaphoresis. He denies palpitation. Patient had cardiac cath in 2016 that showed 2 vessel CAD in the form of 80% osteal and 90% proximal RCA. He had no intervention and was decided to treat him medically. He is on ASA, statin, nitrate, BB and ranexa. He had episode of this chest pain today that was relieved by nitro. He went to [facility] ED. Cardiac enzymes and EKG were unremarkable. Cardiology has been consulted and recommended admission and stress test tommorow. Currently he is chest pain free. Past Medical History:   Diagnosis Date    Arthritis      vicodin    Diabetes ([address]  metformin    Hypertension  norvasc        History reviewed. No pertinent surgical history. Social History   Substance Use Topics    Smoking status: Never Smoker    Smokeless tobacco: Never Used    Alcohol use No        Family History   Problem Relation Age of Onset    Stroke Mother     Headache Mother     Hypertension Mother     Heart Disease Father     Heart Attack Father     Hypertension Father     Diabetes Father         No Known Allergies     Prior to Admission medications    Medication Sig Start Date End Date Taking? Authorizing Provider   RANEXA 500 mg SR tablet TAKE 1 TABLET BY MOUTH TWICE A DAY 17  Yes Mini Tinajero NP   carvedilol (COREG) 12.5 mg tablet TAKE 1 TAB BY MOUTH TWO (2) TIMES DAILY (WITH MEALS).  17  Yes Mini Tinajero NP   aspirin delayed-release 325 mg tablet Take 81 mg by mouth daily. Yes Historical Provider   Omeprazole delayed release (PRILOSEC D/R) 20 mg tablet Take 20 mg by mouth daily. Yes Historical Provider   nitroglycerin (NITROSTAT) 0.4 mg SL tablet by SubLINGual route every five (5) minutes as needed for Chest Pain. Yes Historical Provider   acetaminophen (TYLENOL) 325 mg tablet Take  by mouth every four (4) hours as needed for Pain. Yes Historical Provider   amLODIPine (NORVASC) 5 mg tablet Take 5 mg by mouth daily. Yes Historical Provider   rosuvastatin (CRESTOR) 10 mg tablet Take 10 mg by mouth nightly. Yes Historical Provider   metFORMIN (GLUCOPHAGE) 500 mg tablet Take  by mouth two (2) times daily (with meals). Yes Historical Provider   LORazepam (ATIVAN) 1 mg tablet Take 1 mg by mouth two (2) times a day. Yes Historical Provider   promethazine (PHENERGAN) 25 mg tablet Take 25 mg by mouth every six (6) hours as needed for Nausea. Yes Historical Provider   ramipril (ALTACE) 10 mg capsule Take 10 mg by mouth daily. Yes Historical Provider   sitaGLIPtin (JANUVIA) 100 mg tablet Take 100 mg by mouth daily. Yes Historical Provider   icosapent ethyl (VASCEPA) 1 gram capsule Take  by mouth two (2) times a day. Yes Historical Provider   HYDROcodone-acetaminophen (VICODIN) 5-300 mg tablet Take  by mouth.    Yes Historical Provider       REVIEW OF SYSTEMS:     CONSTITUTIONAL: No Fever, No chills, No weight loss, No Night sweats  HEENT:  No epistaxis, No diff in swallowing  CVS: No palpitations, No syncope, No peripheral edema, No PND, No orthopnea  RS: No shortness of breath, No cough, No hemoptysis, No pleuritic chest pain  GI: No abd pain, No vomitting, No diarrhea, No hematemesis, No rectal bleeding, No acid reflux or heartburn  NEURO: No focal weakness, No headaches, No seizures  PSYCH: No anxiety, No depression  MUSCULOSKLETAL: No joint pain or swelling  : No hematuria or dysuria  SKIN: No rash      Physical Exam:    VITALS:    Vital signs reviewed; most recent are:    Visit Vitals    /74 (BP 1 Location: Right arm, BP Patient Position: At rest)    Pulse 67    Temp 97.8 °F (36.6 °C)    Resp 18    Ht 5' 11\" (1.803 m)    Wt 91.9 kg (202 lb 9.6 oz)    SpO2 95%    BMI 28.26 kg/m2     SpO2 Readings from Last 6 Encounters:   11/29/17 95%        No intake or output data in the 24 hours ending 11/29/17 3499       GENERAL: Not in acute distress  HEENT: pink conjunctiva, un icteric sclera,   NECK: No lymphadenopthy or thyroid swelling, JVD not seen  LYMPH: No supraclavicular or cervical or axillary nodes on both sides  CVS: S1S2, No murmurs, No gallop or rub  RS: CTA, No wheezing or crackles  Abd: Soft, non tender, not distended, No guarding, No rigidity  NEURO:  No focal neurologic deficits   Extrm: no leg edema or swelling   Skin: No rash      Labs:  Recent Results (from the past 24 hour(s))   EKG, 12 LEAD, INITIAL    Collection Time: 11/29/17  1:02 PM   Result Value Ref Range    Ventricular Rate 80 BPM    Atrial Rate 80 BPM    P-R Interval 144 ms    QRS Duration 92 ms    Q-T Interval 396 ms    QTC Calculation (Bezet) 456 ms    Calculated P Axis 50 degrees    Calculated R Axis 54 degrees    Calculated T Axis 45 degrees    Diagnosis       Normal sinus rhythm  Nonspecific ST and T wave abnormality  Abnormal ECG  No previous ECGs available     CBC WITH AUTOMATED DIFF    Collection Time: 11/29/17  1:20 PM   Result Value Ref Range    WBC 7.9 4.6 - 13.2 K/uL    RBC 4.90 4.70 - 5.50 M/uL    HGB 14.9 13.0 - 16.0 g/dL    HCT 42.5 36.0 - 48.0 %    MCV 86.7 74.0 - 97.0 FL    MCH 30.4 24.0 - 34.0 PG    MCHC 35.1 31.0 - 37.0 g/dL    RDW 13.8 11.6 - 14.5 %    PLATELET 860 266 - 328 K/uL    MPV 9.8 9.2 - 11.8 FL    NEUTROPHILS 57 40 - 73 %    LYMPHOCYTES 33 21 - 52 %    MONOCYTES 8 3 - 10 %    EOSINOPHILS 1 0 - 5 %    BASOPHILS 1 0 - 2 %    ABS. NEUTROPHILS 4.5 1.8 - 8.0 K/UL    ABS. LYMPHOCYTES 2.6 0.9 - 3.6 K/UL    ABS. MONOCYTES 0.6 0.05 - 1.2 K/UL    ABS.  EOSINOPHILS 0.1 0.0 - 0.4 K/UL    ABS. BASOPHILS 0.0 0.0 - 0.06 K/UL    DF AUTOMATED     METABOLIC PANEL, COMPREHENSIVE    Collection Time: 11/29/17  1:20 PM   Result Value Ref Range    Sodium 137 136 - 145 mmol/L    Potassium 4.1 3.5 - 5.5 mmol/L    Chloride 102 100 - 108 mmol/L    CO2 24 21 - 32 mmol/L    Anion gap 11 3.0 - 18 mmol/L    Glucose 286 (H) 74 - 99 mg/dL    BUN 13 7.0 - 18 MG/DL    Creatinine 0.89 0.6 - 1.3 MG/DL    BUN/Creatinine ratio 15 12 - 20      GFR est AA >60 >60 ml/min/1.73m2    GFR est non-AA >60 >60 ml/min/1.73m2    Calcium 9.2 8.5 - 10.1 MG/DL    Bilirubin, total 0.4 0.2 - 1.0 MG/DL    ALT (SGPT) 42 16 - 61 U/L    AST (SGOT) 23 15 - 37 U/L    Alk. phosphatase 105 45 - 117 U/L    Protein, total 7.5 6.4 - 8.2 g/dL    Albumin 4.1 3.4 - 5.0 g/dL    Globulin 3.4 2.0 - 4.0 g/dL    A-G Ratio 1.2 0.8 - 1.7     CARDIAC PANEL,(CK, CKMB & TROPONIN)    Collection Time: 11/29/17  1:20 PM   Result Value Ref Range    CK 51 39 - 308 U/L    CK - MB <1.0 <3.6 ng/ml    CK-MB Index  0.0 - 4.0 %     CALCULATION NOT PERFORMED WHEN RESULT IS BELOW LINEAR LIMIT    Troponin-I, Qt. 0.06 0.00 - 0.06 NG/ML   EKG, 12 LEAD, SUBSEQUENT    Collection Time: 11/29/17  2:09 PM   Result Value Ref Range    Ventricular Rate 87 BPM    Atrial Rate 87 BPM    P-R Interval 146 ms    QRS Duration 90 ms    Q-T Interval 408 ms    QTC Calculation (Bezet) 490 ms    Calculated P Axis 36 degrees    Calculated R Axis 45 degrees    Calculated T Axis 22 degrees    Diagnosis       Normal sinus rhythm  ST abnormality, possible digitalis effect  Prolonged QT  Abnormal ECG  When compared with ECG of 29-NOV-2017 13:02,  No significant change was found           Active Problems:    Dyspnea on exertion (11/29/2017)      Hyperglycemia (11/29/2017)      Intermittent chest pain (11/29/2017)        Assessment:       1. Chest pain  2. CAD 2 vessel disease, 80% osteal and 90% proximal RCA. 3. HTN, controlled  4.  DM-2, uncontrolled with hyperglycemia    Plan: · Admit to telemetry unit  · Serial cardiac enzymes and EKG  · Continue PRN nitro  · Continue ASA, statin, BB, ranexa  · Cardiology already been consulted  · Will get NST in am  · Keep NPO  · Will hold off metformin and oral hypoglycemics  · Will monitor blood sugar, add SSI coverage  · Full code  · DVT prophylaxsis    Total time:  63 minutes             _______________________________________________________________________        Attending Physician:  Cynthia Jerome MD

## 2017-11-29 NOTE — ED PROVIDER NOTES
EMERGENCY DEPARTMENT HISTORY AND PHYSICAL EXAM    1:53 PM      Date: 11/29/2017  Patient Name: Rudy Appiah    History of Presenting Illness     Chief Complaint   Patient presents with    Chest Pain    Shortness of Breath         History Provided By: Patient    Chief Complaint: Chest pain   Duration:  Weeks  Timing:  Intermittent and Worsening  Location: Center of chest   Quality: Sharp  Severity: 4 out of 10  Modifying Factors: SOB on exertion   Associated Symptoms: SOB, chest tightness, diaphoresis, nausea and vomiting      Additional History (Context): Rudy Appiah is a 79 y.o. male with hx of DM and HTN presenting to the ED with c/o intermittent, worsening CP that began 1 week ago. Pt reports he has had episodes of CP throughout the week. Describes sx as sharp pains in the center of chest that radiates down his left arm. Pt denies headache, dizziness, diarrhea or any urinary sx. Associated sx include SOB, chest tightness, diaphoresis, nausea and vomiting. Reports SOB on exertion. Severity is 4/10. Notes he took a NTG today in the morning with relief. CP reoccurred during exam. Pt is followed by Dr. Gee Francisco, cardiology. States he did not take ASA. No other sx or complaints given at this time. PCP: Edd Saeed MD    Current Facility-Administered Medications   Medication Dose Route Frequency Provider Last Rate Last Dose    enoxaparin (LOVENOX) injection 90 mg  1 mg/kg SubCUTAneous NOW [employer], DO         Current Outpatient Prescriptions   Medication Sig Dispense Refill    RANEXA 500 mg SR tablet TAKE 1 TABLET BY MOUTH TWICE A DAY 60 Tab 3    carvedilol (COREG) 12.5 mg tablet TAKE 1 TAB BY MOUTH TWO (2) TIMES DAILY (WITH MEALS). 90 Tab 3    aspirin delayed-release 325 mg tablet Take 81 mg by mouth daily.  Omeprazole delayed release (PRILOSEC D/R) 20 mg tablet Take 20 mg by mouth daily.       nitroglycerin (NITROSTAT) 0.4 mg SL tablet by SubLINGual route every five (5) minutes as needed for Chest Pain.  acetaminophen (TYLENOL) 325 mg tablet Take  by mouth every four (4) hours as needed for Pain.  amLODIPine (NORVASC) 5 mg tablet Take 5 mg by mouth daily.  rosuvastatin (CRESTOR) 10 mg tablet Take 10 mg by mouth nightly.  metFORMIN (GLUCOPHAGE) 500 mg tablet Take  by mouth two (2) times daily (with meals).  LORazepam (ATIVAN) 1 mg tablet Take 1 mg by mouth two (2) times a day.  promethazine (PHENERGAN) 25 mg tablet Take 25 mg by mouth every six (6) hours as needed for Nausea.  ramipril (ALTACE) 10 mg capsule Take 10 mg by mouth daily.  sitaGLIPtin (JANUVIA) 100 mg tablet Take 100 mg by mouth daily.  icosapent ethyl (VASCEPA) 1 gram capsule Take  by mouth two (2) times a day.  HYDROcodone-acetaminophen (VICODIN) 5-300 mg tablet Take  by mouth. Past History     Past Medical History:  Past Medical History:   Diagnosis Date    Arthritis     1999 vicodin    Diabetes ([address] 1998 metformin    Hypertension 1996 norvasc       Past Surgical History:  History reviewed. No pertinent surgical history. Family History:  Family History   Problem Relation Age of Onset    Stroke Mother     Headache Mother     Hypertension Mother     Heart Disease Father     Heart Attack Father     Hypertension Father     Diabetes Father        Social History:  Social History   Substance Use Topics    Smoking status: Never Smoker    Smokeless tobacco: Never Used    Alcohol use No       Allergies:  No Known Allergies      Review of Systems       Review of Systems   Constitutional: Positive for diaphoresis. Negative for fever. HENT: Negative for sore throat. Eyes: Negative for redness and visual disturbance. Respiratory: Positive for chest tightness and shortness of breath. Negative for wheezing. Cardiovascular: Positive for chest pain. Negative for leg swelling. Gastrointestinal: Positive for nausea and vomiting.  Negative for abdominal pain and diarrhea. Endocrine: Negative for polyuria. Genitourinary: Negative for dysuria. Musculoskeletal: Negative for arthralgias and neck stiffness. Skin: Negative for rash. Neurological: Negative for dizziness and headaches. All other systems reviewed and are negative. Physical Exam     Visit Vitals    /87    Pulse 80    Temp 97.7 °F (36.5 °C)    Resp 15    Ht 5' 11\" (1.803 m)    Wt 91.9 kg (202 lb 9.6 oz)    SpO2 100%    BMI 28.26 kg/m2         Physical Exam   Constitutional: He is oriented to person, place, and time. He appears well-developed and well-nourished. No distress. HENT:   Head: Normocephalic and atraumatic. Mouth/Throat: Oropharynx is clear and moist.   Eyes: Conjunctivae are normal. Pupils are equal, round, and reactive to light. No scleral icterus. Neck: Normal range of motion. Neck supple. Cardiovascular: Normal rate and intact distal pulses. Capillary refill < 3 seconds   Pulmonary/Chest: Effort normal and breath sounds normal. No respiratory distress. He has no wheezes. He exhibits no tenderness. CP returned as we were doing exam    Abdominal: Soft. Bowel sounds are normal. He exhibits no distension. There is no tenderness. Musculoskeletal: Normal range of motion. He exhibits no edema. No bilal LE edema   No calf tenderness    Lymphadenopathy:     He has no cervical adenopathy. Neurological: He is alert and oriented to person, place, and time. No cranial nerve deficit. Skin: Skin is warm and dry. He is not diaphoretic. Nursing note and vitals reviewed.         Diagnostic Study Results     Labs -  Recent Results (from the past 12 hour(s))   EKG, 12 LEAD, INITIAL    Collection Time: 11/29/17  1:02 PM   Result Value Ref Range    Ventricular Rate 80 BPM    Atrial Rate 80 BPM    P-R Interval 144 ms    QRS Duration 92 ms    Q-T Interval 396 ms    QTC Calculation (Bezet) 456 ms    Calculated P Axis 50 degrees    Calculated R Axis 54 degrees    Calculated T Axis 45 degrees    Diagnosis       Normal sinus rhythm  Nonspecific ST and T wave abnormality  Abnormal ECG  No previous ECGs available     CBC WITH AUTOMATED DIFF    Collection Time: 11/29/17  1:20 PM   Result Value Ref Range    WBC 7.9 4.6 - 13.2 K/uL    RBC 4.90 4.70 - 5.50 M/uL    HGB 14.9 13.0 - 16.0 g/dL    HCT 42.5 36.0 - 48.0 %    MCV 86.7 74.0 - 97.0 FL    MCH 30.4 24.0 - 34.0 PG    MCHC 35.1 31.0 - 37.0 g/dL    RDW 13.8 11.6 - 14.5 %    PLATELET 567 691 - 839 K/uL    MPV 9.8 9.2 - 11.8 FL    NEUTROPHILS 57 40 - 73 %    LYMPHOCYTES 33 21 - 52 %    MONOCYTES 8 3 - 10 %    EOSINOPHILS 1 0 - 5 %    BASOPHILS 1 0 - 2 %    ABS. NEUTROPHILS 4.5 1.8 - 8.0 K/UL    ABS. LYMPHOCYTES 2.6 0.9 - 3.6 K/UL    ABS. MONOCYTES 0.6 0.05 - 1.2 K/UL    ABS. EOSINOPHILS 0.1 0.0 - 0.4 K/UL    ABS. BASOPHILS 0.0 0.0 - 0.06 K/UL    DF AUTOMATED     METABOLIC PANEL, COMPREHENSIVE    Collection Time: 11/29/17  1:20 PM   Result Value Ref Range    Sodium 137 136 - 145 mmol/L    Potassium 4.1 3.5 - 5.5 mmol/L    Chloride 102 100 - 108 mmol/L    CO2 24 21 - 32 mmol/L    Anion gap 11 3.0 - 18 mmol/L    Glucose 286 (H) 74 - 99 mg/dL    BUN 13 7.0 - 18 MG/DL    Creatinine 0.89 0.6 - 1.3 MG/DL    BUN/Creatinine ratio 15 12 - 20      GFR est AA >60 >60 ml/min/1.73m2    GFR est non-AA >60 >60 ml/min/1.73m2    Calcium 9.2 8.5 - 10.1 MG/DL    Bilirubin, total 0.4 0.2 - 1.0 MG/DL    ALT (SGPT) 42 16 - 61 U/L    AST (SGOT) 23 15 - 37 U/L    Alk.  phosphatase 105 45 - 117 U/L    Protein, total 7.5 6.4 - 8.2 g/dL    Albumin 4.1 3.4 - 5.0 g/dL    Globulin 3.4 2.0 - 4.0 g/dL    A-G Ratio 1.2 0.8 - 1.7     CARDIAC PANEL,(CK, CKMB & TROPONIN)    Collection Time: 11/29/17  1:20 PM   Result Value Ref Range    CK 51 39 - 308 U/L    CK - MB <1.0 <3.6 ng/ml    CK-MB Index  0.0 - 4.0 %     CALCULATION NOT PERFORMED WHEN RESULT IS BELOW LINEAR LIMIT    Troponin-I, Qt. 0.06 0.00 - 0.06 NG/ML   EKG, 12 LEAD, SUBSEQUENT    Collection Time: 11/29/17  2:09 PM Result Value Ref Range    Ventricular Rate 87 BPM    Atrial Rate 87 BPM    P-R Interval 146 ms    QRS Duration 90 ms    Q-T Interval 408 ms    QTC Calculation (Bezet) 490 ms    Calculated P Axis 36 degrees    Calculated R Axis 45 degrees    Calculated T Axis 22 degrees    Diagnosis       Normal sinus rhythm  ST abnormality, possible digitalis effect  Prolonged QT  Abnormal ECG  When compared with ECG of 2017 13:02,  No significant change was found         Radiologic Studies -   XR CHEST PORT   Impression:  1. No acute process. Medical Decision Making   I am the first provider for this patient. I reviewed the vital signs, available nursing notes, past medical history, past surgical history, family history and social history. Vital Signs-Reviewed the patient's vital signs. EK:06PM: NSR. Rate of 80 bpm. Normal QRS duration. No ST elevations or depressions. No STEMI. Repeat EK:09PM: NSR. Rate of 87 bpm. Normal QRS duration. Normal axis. No ST elevations or depressions. No STEMI. ED Course: Progress Notes, Reevaluation, and Consults:  Pt's cp resolved with ntg SL, will apply NTG paste. 2:50 PM Consult: I discussed care with Dr. Omar Patel (Cardiology). It was a standard discussion including patient history, chief complaint, available diagnostic results, and predicted treatment course. Agrees pt should be admitted to hospitalist. Will consult pt. Recommends a dose of Coreg, Crestor and Norvasc now in the ED. States he agrees pt should receive Lovenox dose now, 1 mg per kg, they can stop if second troponin is normal. Likely will perform stress test tomorrow in office. I discussed plan, dx and admission with pt and wife; pt and wife agree. 3:07 PM Consult: I discussed care with Dr. Dex Montenegro (Hospitalist). It was a standard discussion including patient history, chief complaint, available diagnostic results, and predicted treatment course. Agrees with admission. Critical care time:   I have spent 35 minutes of critical care time involved in lab review, consultations with specialist, family decision making, and documentation. During this entire length of time I was immediately available to the patient. Critical care: The reason for providing this level of medical care for this critically ill patient was due to a critical illness that impaired one or more vital organ systems such that there was a high probability of imminent or life threatening deterioration in the patients condition. This care involved high complexity decision making to assess, manipulate and support vital system functions, to treat this degree vital organ system failure and to prevent further life threatening deterioration of the patient's condition. Provider Notes (Medical Decision Making):     DDX: ACS, GERD, infectious, musculoskeletal, anxiety, metabolic. Check labs, XR, EKG, on monitor. Give ASA, NTG and will consult Dr. Robby Webb. For Hospitalized Patients:    1. Hospitalization Decision Time:  The decision to hospitalize the patient was made by Dr. Gildardo Bliss at [facility] on 11/29/2017    2. Aspirin: Aspirin was given    Diagnosis     Clinical Impression:   1. Acute chest pain    2. Dyspnea on exertion    3. Hyperglycemia        Disposition: Admit    Follow-up Information     None           _______________________________    Attestations:  [facility] [place] acting as a scribe for and in the presence of Akilah Reed DO      November 29, 2017 at 2:04 PM       Provider Attestation:      I personally performed the services described in the documentation, reviewed the documentation, as recorded by the scribe in my presence, and it accurately and completely records my words and actions.  November 29, 2017 at 2:04 PM - Akilah Reed DO    _______________________________

## 2017-11-29 NOTE — IP AVS SNAPSHOT
Kiran Prado 
 
 
 [address] [address] 
[phone] Patient: Romayne Putty MRN: [id number] ONX:3/73/[id number] My Medications STOP taking these medications RANEXA 500 mg SR tablet Generic drug:  ranolazine ER  
   
  
  
TAKE these medications as instructed Instructions Each Dose to Equal  
 Morning Noon Evening Bedtime  
 acetaminophen 325 mg tablet Commonly known as:  TYLENOL Take  by mouth every four (4) hours as needed for Pain. aspirin delayed-release 325 mg tablet Notes to Patient:  81 mg (not 325mg) Take 1 Tab by mouth daily. 81 mg  
    
  
   
   
   
  
 ATIVAN 1 mg tablet Generic drug:  LORazepam  
   
 Take 1 mg by mouth two (2) times a day. 1 mg  
    
  
   
   
  
   
  
 carvedilol 12.5 mg tablet Commonly known as:  COREG  
   
 TAKE 1 TAB BY MOUTH TWO (2) TIMES DAILY (WITH MEALS). CRESTOR 10 mg tablet Generic drug:  rosuvastatin Take 10 mg by mouth nightly. 10 mg  
    
   
   
   
  
  
 fenofibrate nanocrystallized 48 mg tablet Commonly known as:  [employer] Take 1 Tab by mouth nightly. 48 mg JANUVIA 100 mg tablet Generic drug:  SITagliptin Take 100 mg by mouth daily. 100 mg  
    
  
   
   
   
  
 metFORMIN 500 mg tablet Commonly known as:  GLUCOPHAGE Notes to Patient:  48 hours after procedure. Next dose on Monday morning. Take  by mouth two (2) times daily (with meals). Start on Monday 12/4  
   
   
  
   
  
 nitroglycerin 0.4 mg SL tablet Commonly known as:  NITROSTAT  
   
 1 Tab by SubLINGual route every five (5) minutes as needed for Chest Pain for up to 3 doses. 0.4 mg  
    
   
   
   
  
 NORVASC 5 mg tablet Generic drug:  amLODIPine Take 5 mg by mouth daily. 5 mg Omeprazole delayed release 20 mg tablet Commonly known as:  PRILOSEC D/R Take 20 mg by mouth daily. 20 mg  
    
  
   
   
   
  
 promethazine 25 mg tablet Commonly known as:  PHENERGAN Take 25 mg by mouth every six (6) hours as needed for Nausea. 25 mg  
    
   
   
   
  
 ramipril 10 mg capsule Commonly known as:  ALTACE Take 10 mg by mouth daily. 10 mg  
    
  
   
   
   
  
 ticagrelor 90 mg tablet Commonly known as:  [employer] Take 1 Tab by mouth two (2) times a day. 90 mg  
    
  
   
   
  
   
  
 VICODIN 5-300 mg tablet Generic drug:  HYDROcodone-acetaminophen Take  by mouth. ASK your physician about these medications Instructions Each Dose to Equal  
 Morning Noon Evening Bedtime VASCEPA 1 gram capsule Generic drug:  icosapent ethyl Take  by mouth two (2) times a day. Where to Get Your Medications Information on where to get these meds will be given to you by the nurse or doctor. ! Ask your nurse or doctor about these medications  
  aspirin delayed-release 325 mg tablet  
 fenofibrate nanocrystallized 48 mg tablet  
 nitroglycerin 0.4 mg SL tablet  
 ticagrelor 90 mg tablet

## 2017-11-30 LAB
ANION GAP SERPL CALC-SCNC: 9 MMOL/L (ref 3–18)
APTT PPP: 143.2 SEC (ref 23–36.4)
APTT PPP: 53.3 SEC (ref 23–36.4)
ATTENDING PHYSICIAN, CST07: NORMAL
BASOPHILS # BLD: 0 K/UL (ref 0–0.06)
BASOPHILS # BLD: 0 K/UL (ref 0–0.1)
BASOPHILS NFR BLD: 0 % (ref 0–2)
BASOPHILS NFR BLD: 0 % (ref 0–2)
BUN SERPL-MCNC: 15 MG/DL (ref 7–18)
BUN/CREAT SERPL: 17 (ref 12–20)
CALCIUM SERPL-MCNC: 8.5 MG/DL (ref 8.5–10.1)
CHLORIDE SERPL-SCNC: 103 MMOL/L (ref 100–108)
CHOLEST SERPL-MCNC: 153 MG/DL
CK MB CFR SERPL CALC: ABNORMAL % (ref 0–4)
CK MB SERPL-MCNC: <1 NG/ML (ref 5–25)
CK SERPL-CCNC: 45 U/L (ref 39–308)
CK SERPL-CCNC: 47 U/L (ref 39–308)
CK SERPL-CCNC: 49 U/L (ref 39–308)
CO2 SERPL-SCNC: 24 MMOL/L (ref 21–32)
CREAT SERPL-MCNC: 0.86 MG/DL (ref 0.6–1.3)
DIAGNOSIS, 93000: NORMAL
DIFFERENTIAL METHOD BLD: ABNORMAL
DIFFERENTIAL METHOD BLD: ABNORMAL
DUKE TM SCORE RESULT, CST14: NORMAL
DUKE TREADMILL SCORE, CST13: NORMAL
ECG INTERP BEFORE EX, CST11: NORMAL
ECG INTERP DURING EX, CST12: NORMAL
EOSINOPHIL # BLD: 0.1 K/UL (ref 0–0.4)
EOSINOPHIL # BLD: 0.1 K/UL (ref 0–0.4)
EOSINOPHIL NFR BLD: 1 % (ref 0–5)
EOSINOPHIL NFR BLD: 1 % (ref 0–5)
ERYTHROCYTE [DISTWIDTH] IN BLOOD BY AUTOMATED COUNT: 13.7 % (ref 11.6–14.5)
ERYTHROCYTE [DISTWIDTH] IN BLOOD BY AUTOMATED COUNT: 13.7 % (ref 11.6–14.5)
FUNCTIONAL CAPACITY, CST17: NORMAL
GLUCOSE BLD STRIP.AUTO-MCNC: 182 MG/DL (ref 70–110)
GLUCOSE BLD STRIP.AUTO-MCNC: 240 MG/DL (ref 70–110)
GLUCOSE BLD STRIP.AUTO-MCNC: 267 MG/DL (ref 70–110)
GLUCOSE BLD STRIP.AUTO-MCNC: 277 MG/DL (ref 70–110)
GLUCOSE SERPL-MCNC: 236 MG/DL (ref 74–99)
HBA1C MFR BLD: 8.2 % (ref 4.2–5.6)
HCT VFR BLD AUTO: 38.2 % (ref 36–48)
HCT VFR BLD AUTO: 38.4 % (ref 36–48)
HDLC SERPL-MCNC: 37 MG/DL (ref 40–60)
HDLC SERPL: 4.1 {RATIO} (ref 0–5)
HGB BLD-MCNC: 13.5 G/DL (ref 13–16)
HGB BLD-MCNC: 13.7 G/DL (ref 13–16)
KNOWN CARDIAC CONDITION, CST08: NORMAL
LDLC SERPL CALC-MCNC: ABNORMAL MG/DL (ref 0–100)
LIPID PROFILE,FLP: ABNORMAL
LYMPHOCYTES # BLD: 2.2 K/UL (ref 0.9–3.6)
LYMPHOCYTES # BLD: 2.5 K/UL (ref 0.9–3.6)
LYMPHOCYTES NFR BLD: 23 % (ref 21–52)
LYMPHOCYTES NFR BLD: 27 % (ref 21–52)
MAX. DIASTOLIC BP, CST04: 80 MMHG
MAX. HEART RATE, CST05: 107 BPM
MAX. SYSTOLIC BP, CST03: 144 MMHG
MCH RBC QN AUTO: 31 PG (ref 24–34)
MCH RBC QN AUTO: 31.6 PG (ref 24–34)
MCHC RBC AUTO-ENTMCNC: 35.2 G/DL (ref 31–37)
MCHC RBC AUTO-ENTMCNC: 35.9 G/DL (ref 31–37)
MCV RBC AUTO: 88 FL (ref 74–97)
MCV RBC AUTO: 88.1 FL (ref 74–97)
MONOCYTES # BLD: 0.7 K/UL (ref 0.05–1.2)
MONOCYTES # BLD: 0.7 K/UL (ref 0.05–1.2)
MONOCYTES NFR BLD: 7 % (ref 3–10)
MONOCYTES NFR BLD: 8 % (ref 3–10)
NEUTS SEG # BLD: 6 K/UL (ref 1.8–8)
NEUTS SEG # BLD: 6.6 K/UL (ref 1.8–8)
NEUTS SEG NFR BLD: 64 % (ref 40–73)
NEUTS SEG NFR BLD: 69 % (ref 40–73)
OVERALL BP RESPONSE TO EXERCISE, CST16: NORMAL
OVERALL HR RESPONSE TO EXERCISE, CST15: NORMAL
PEAK EX METS, CST10: 1 METS
PLATELET # BLD AUTO: 171 K/UL (ref 135–420)
PLATELET # BLD AUTO: 174 K/UL (ref 135–420)
PMV BLD AUTO: 10.5 FL (ref 9.2–11.8)
PMV BLD AUTO: 11 FL (ref 9.2–11.8)
POTASSIUM SERPL-SCNC: 4 MMOL/L (ref 3.5–5.5)
PROTOCOL NAME, CST01: NORMAL
RBC # BLD AUTO: 4.34 M/UL (ref 4.7–5.5)
RBC # BLD AUTO: 4.36 M/UL (ref 4.7–5.5)
SODIUM SERPL-SCNC: 136 MMOL/L (ref 136–145)
TEST INDICATION, CST09: NORMAL
TRIGL SERPL-MCNC: 473 MG/DL (ref ?–150)
TROPONIN I SERPL-MCNC: 0.05 NG/ML (ref 0–0.04)
TROPONIN I SERPL-MCNC: 0.07 NG/ML (ref 0–0.04)
TROPONIN I SERPL-MCNC: 0.07 NG/ML (ref 0–0.04)
VLDLC SERPL CALC-MCNC: ABNORMAL MG/DL
WBC # BLD AUTO: 9.2 K/UL (ref 4.6–13.2)
WBC # BLD AUTO: 9.5 K/UL (ref 4.6–13.2)

## 2017-11-30 PROCEDURE — 82550 ASSAY OF CK (CPK): CPT | Performed by: NURSE PRACTITIONER

## 2017-11-30 PROCEDURE — 74011636637 HC RX REV CODE- 636/637: Performed by: FAMILY MEDICINE

## 2017-11-30 PROCEDURE — 74011250637 HC RX REV CODE- 250/637: Performed by: NURSE PRACTITIONER

## 2017-11-30 PROCEDURE — 74011250636 HC RX REV CODE- 250/636: Performed by: INTERNAL MEDICINE

## 2017-11-30 PROCEDURE — 80061 LIPID PANEL: CPT | Performed by: NURSE PRACTITIONER

## 2017-11-30 PROCEDURE — 74011250637 HC RX REV CODE- 250/637: Performed by: INTERNAL MEDICINE

## 2017-11-30 PROCEDURE — 82962 GLUCOSE BLOOD TEST: CPT

## 2017-11-30 PROCEDURE — 36415 COLL VENOUS BLD VENIPUNCTURE: CPT | Performed by: INTERNAL MEDICINE

## 2017-11-30 PROCEDURE — 74011250636 HC RX REV CODE- 250/636

## 2017-11-30 PROCEDURE — 65660000000 HC RM CCU STEPDOWN

## 2017-11-30 PROCEDURE — 83036 HEMOGLOBIN GLYCOSYLATED A1C: CPT | Performed by: FAMILY MEDICINE

## 2017-11-30 PROCEDURE — A9500 TC99M SESTAMIBI: HCPCS

## 2017-11-30 PROCEDURE — 74011636637 HC RX REV CODE- 636/637: Performed by: INTERNAL MEDICINE

## 2017-11-30 PROCEDURE — 85730 THROMBOPLASTIN TIME PARTIAL: CPT | Performed by: INTERNAL MEDICINE

## 2017-11-30 PROCEDURE — 85730 THROMBOPLASTIN TIME PARTIAL: CPT | Performed by: NURSE PRACTITIONER

## 2017-11-30 PROCEDURE — 80048 BASIC METABOLIC PNL TOTAL CA: CPT | Performed by: INTERNAL MEDICINE

## 2017-11-30 PROCEDURE — 85025 COMPLETE CBC W/AUTO DIFF WBC: CPT | Performed by: INTERNAL MEDICINE

## 2017-11-30 PROCEDURE — 74011250636 HC RX REV CODE- 250/636: Performed by: NURSE PRACTITIONER

## 2017-11-30 RX ORDER — HEPARIN SODIUM 1000 [USP'U]/ML
44.8 INJECTION, SOLUTION INTRAVENOUS; SUBCUTANEOUS ONCE
Status: COMPLETED | OUTPATIENT
Start: 2017-11-30 | End: 2017-11-30

## 2017-11-30 RX ORDER — RANOLAZINE 500 MG/1
1000 TABLET, EXTENDED RELEASE ORAL EVERY 12 HOURS
Status: DISCONTINUED | OUTPATIENT
Start: 2017-11-30 | End: 2017-12-02 | Stop reason: HOSPADM

## 2017-11-30 RX ORDER — AMINOPHYLLINE 25 MG/ML
INJECTION, SOLUTION INTRAVENOUS
Status: COMPLETED
Start: 2017-11-30 | End: 2017-11-30

## 2017-11-30 RX ORDER — HEPARIN SODIUM 10000 [USP'U]/100ML
11-25 INJECTION, SOLUTION INTRAVENOUS
Status: DISCONTINUED | OUTPATIENT
Start: 2017-11-30 | End: 2017-12-01

## 2017-11-30 RX ORDER — FENOFIBRATE 48 MG/1
48 TABLET, COATED ORAL
Status: DISCONTINUED | OUTPATIENT
Start: 2017-11-30 | End: 2017-12-02 | Stop reason: HOSPADM

## 2017-11-30 RX ORDER — HEPARIN SODIUM 1000 [USP'U]/ML
3000 INJECTION, SOLUTION INTRAVENOUS; SUBCUTANEOUS ONCE
Status: COMPLETED | OUTPATIENT
Start: 2017-11-30 | End: 2017-11-30

## 2017-11-30 RX ADMIN — LORAZEPAM 1 MG: 1 TABLET ORAL at 17:52

## 2017-11-30 RX ADMIN — REGADENOSON 0.4 MG: 0.08 INJECTION, SOLUTION INTRAVENOUS at 09:30

## 2017-11-30 RX ADMIN — RAMIPRIL 10 MG: 5 CAPSULE ORAL at 12:55

## 2017-11-30 RX ADMIN — HEPARIN SODIUM 4000 UNITS: 1000 INJECTION, SOLUTION INTRAVENOUS; SUBCUTANEOUS at 12:56

## 2017-11-30 RX ADMIN — HYDROCODONE BITARTRATE AND ACETAMINOPHEN 1 TABLET: 5; 325 TABLET ORAL at 19:16

## 2017-11-30 RX ADMIN — HEPARIN SODIUM AND DEXTROSE 1182 UNITS/HR: 10000; 5 INJECTION INTRAVENOUS at 22:11

## 2017-11-30 RX ADMIN — AMLODIPINE BESYLATE 5 MG: 5 TABLET ORAL at 12:55

## 2017-11-30 RX ADMIN — NITROGLYCERIN 0.5 INCH: 20 OINTMENT TOPICAL at 22:07

## 2017-11-30 RX ADMIN — ROSUVASTATIN CALCIUM 10 MG: 20 TABLET ORAL at 22:01

## 2017-11-30 RX ADMIN — NITROGLYCERIN 0.5 INCH: 20 OINTMENT TOPICAL at 17:52

## 2017-11-30 RX ADMIN — FENOFIBRATE 48 MG: 48 TABLET, FILM COATED ORAL at 22:01

## 2017-11-30 RX ADMIN — INSULIN LISPRO 3 UNITS: 100 INJECTION, SOLUTION INTRAVENOUS; SUBCUTANEOUS at 22:03

## 2017-11-30 RX ADMIN — ASPIRIN 81 MG: 81 TABLET, COATED ORAL at 12:55

## 2017-11-30 RX ADMIN — CARVEDILOL 12.5 MG: 12.5 TABLET, FILM COATED ORAL at 17:52

## 2017-11-30 RX ADMIN — LORAZEPAM 1 MG: 1 TABLET ORAL at 12:56

## 2017-11-30 RX ADMIN — RANOLAZINE 1000 MG: 500 TABLET, FILM COATED, EXTENDED RELEASE ORAL at 22:01

## 2017-11-30 RX ADMIN — AMINOPHYLLINE 100 MG: 25 INJECTION, SOLUTION INTRAVENOUS at 09:30

## 2017-11-30 RX ADMIN — INSULIN LISPRO 4 UNITS: 100 INJECTION, SOLUTION INTRAVENOUS; SUBCUTANEOUS at 13:07

## 2017-11-30 RX ADMIN — HYDROCODONE BITARTRATE AND ACETAMINOPHEN 1 TABLET: 5; 325 TABLET ORAL at 23:35

## 2017-11-30 RX ADMIN — HEPARIN SODIUM 3000 UNITS: 1000 INJECTION, SOLUTION INTRAVENOUS; SUBCUTANEOUS at 22:18

## 2017-11-30 RX ADMIN — NITROGLYCERIN 0.5 INCH: 20 OINTMENT TOPICAL at 10:00

## 2017-11-30 RX ADMIN — RANOLAZINE 500 MG: 500 TABLET, FILM COATED, EXTENDED RELEASE ORAL at 09:40

## 2017-11-30 RX ADMIN — HEPARIN SODIUM AND DEXTROSE 11 UNITS/KG/HR: 10000; 5 INJECTION INTRAVENOUS at 12:58

## 2017-11-30 RX ADMIN — PANTOPRAZOLE SODIUM 40 MG: 40 TABLET, DELAYED RELEASE ORAL at 12:55

## 2017-11-30 RX ADMIN — INSULIN LISPRO 9 UNITS: 100 INJECTION, SOLUTION INTRAVENOUS; SUBCUTANEOUS at 17:55

## 2017-11-30 RX ADMIN — CARVEDILOL 12.5 MG: 12.5 TABLET, FILM COATED ORAL at 09:45

## 2017-11-30 NOTE — PROCEDURES
[address] STRESS    Name:  Marco A Calabrese  MR#:  [id number]  :  1950  Account #:  [de-identified]  Date of Adm:  2017  Date of Service:  2017      ORDERING PHYSICIAN: Dr. Camelia Garibay. INTERPRETING PHYSICIAN: Yoshi Bush MD.    INDICATION: Chest pain. INTRAVENOUS SITE: Right antecubital vein. BASELINE DATA: Baseline EKG reveals normal sinus rhythm with  mild ST-segment depression in inferolateral leads. Baseline heart rate  was 81 beats per minute. Baseline blood pressure was 130/77 mmHg. Following this, 0.4 mg of Lexiscan was injected over a 30-second  period. Heart rate increased to maximum of 100 beats per minute. Blood pressure dropped initially to 121/80 mmHg and subsequently  improved to 144/80 mmHg. The patient complained of chest pain with  ST-segment depression along the inferior and anterolateral leads. ST-  segment depression was more than 1-2 mm. Following this, the patient  was administered intravenous aminophylline, following which chest  pain resolved and EKG normalized. No ventricular arrhythmias were  noted. Test was stopped due to completion of protocol. INTERPRETATION  1. Positive EKG changes with 1-2 mm ST-segment depression in the  inferior and anterolateral leads. 2. Nuclear imaging report to follow. NUCLEAR IMAGING REPORT: Following IV Lexiscan, 32.1 mCi of  sestamibi was injected. Stress images were obtained. Rest images  were obtained using 10.59 mCi of sestamibi. Images were compared. FINDINGS  1. Post-stress images in short axis, horizontal, vertical long axis  reveals poor isotope uptake in the inferior wall. 2. Resting images show poor isotope uptake in the inferior wall. 3. Left ventricular cavity appears to be normal size with normal wall  motion and ejection fraction of 63%. CONCLUSIONS  1.  Moderate size fixed defect involving the inferior wall, likely from prior  infarction versus diaphragmatic attenuation in a male patient. 2. Normal ventricular cavity with normal wall motion and ejection  fraction of 63%. 3. Intermediate risk scan.     Thank you for this referral.        MD Neela Cabrera  D:  11/30/2017   11:37  T:  11/30/2017   14:08  Job #:  [id number]

## 2017-11-30 NOTE — PROGRESS NOTES
[facility]    Patient: Jess Cheema Age: 79 y.o. : 1950 MR#: [id number] SSN: [id number]  Date: 2017     Subjective:     Still with some L-sided CP, 4/10, better than before. No F/C, N/V, SOB. Seen with wife @ bedside. Case d/w cardiology. Assessment/Plan:   1. Unstable angina with hx PCI/CADz - continue current med tx with ASA, BBlocker, NTP, statin. Heparin gtt. Cath tomorrow. 2. HTN - BPs wnl.  3. Hyperlipidemia - statin, fibrate. 4. DM - SSI. Additional Notes:      Case discussed with:  [x]Patient  [x]Family  []Nursing  []Case Management  DVT Prophylaxis:  []Lovenox  []Hep SQ  []SCDs  []Coumadin   [x]On Heparin gtt    Objective:   VS:   Visit Vitals    /69 (BP 1 Location: Left arm, BP Patient Position: Sitting)    Pulse 94    Temp 98.5 °F (36.9 °C)    Resp 17    Ht 5' 11\" (1.803 m)    Wt 89.3 kg (196 lb 12.8 oz)    SpO2 94%    BMI 27.45 kg/m2      Tmax/24hrs: Temp (24hrs), Av.8 °F (36.6 °C), Min:97.2 °F (36.2 °C), Max:98.5 °F (36.9 °C)    Intake/Output Summary (Last 24 hours) at 17 1755  Last data filed at 17 0600   Gross per 24 hour   Intake              560 ml   Output                0 ml   Net              560 ml       General:  Awake, alert, NAD. Cardiovascular:  RRR. Pulmonary:  CTA B.  GI:  Soft, NT/ND, NABS. Extremities:  No CT or edema.    Additional:      Labs:    Recent Results (from the past 24 hour(s))   GLUCOSE, POC    Collection Time: 17  8:37 PM   Result Value Ref Range    Glucose (POC) 280 (H) 70 - 180 mg/dL   METABOLIC PANEL, BASIC    Collection Time: 17  6:00 AM   Result Value Ref Range    Sodium 136 136 - 145 mmol/L    Potassium 4.0 3.5 - 5.5 mmol/L    Chloride 103 100 - 108 mmol/L    CO2 24 21 - 32 mmol/L    Anion gap 9 3.0 - 18 mmol/L    Glucose 236 (H) 74 - 99 mg/dL    BUN 15 7.0 - 18 MG/DL    Creatinine 0.86 0.6 - 1.3 MG/DL    BUN/Creatinine ratio 17 12 - 20 GFR est AA >60 >60 ml/min/1.73m2    GFR est non-AA >60 >60 ml/min/1.73m2    Calcium 8.5 8.5 - 10.1 MG/DL   CBC WITH AUTOMATED DIFF    Collection Time: 11/30/17  6:00 AM   Result Value Ref Range    WBC 9.5 4.6 - 13.2 K/uL    RBC 4.36 (L) 4.70 - 5.50 M/uL    HGB 13.5 13.0 - 16.0 g/dL    HCT 38.4 36.0 - 48.0 %    MCV 88.1 74.0 - 97.0 FL    MCH 31.0 24.0 - 34.0 PG    MCHC 35.2 31.0 - 37.0 g/dL    RDW 13.7 11.6 - 14.5 %    PLATELET 471 983 - 152 K/uL    MPV 11.0 9.2 - 11.8 FL    NEUTROPHILS 69 40 - 73 %    LYMPHOCYTES 23 21 - 52 %    MONOCYTES 7 3 - 10 %    EOSINOPHILS 1 0 - 5 %    BASOPHILS 0 0 - 2 %    ABS. NEUTROPHILS 6.6 1.8 - 8.0 K/UL    ABS. LYMPHOCYTES 2.2 0.9 - 3.6 K/UL    ABS. MONOCYTES 0.7 0.05 - 1.2 K/UL    ABS. EOSINOPHILS 0.1 0.0 - 0.4 K/UL    ABS. BASOPHILS 0.0 0.0 - 0.1 K/UL    DF AUTOMATED     CARDIAC PANEL,(CK, CKMB & TROPONIN)    Collection Time: 11/30/17  6:00 AM   Result Value Ref Range    CK 47 39 - 308 U/L    CK - MB <1.0 <3.6 ng/ml    CK-MB Index  0.0 - 4.0 %     CALCULATION NOT PERFORMED WHEN RESULT IS BELOW LINEAR LIMIT    Troponin-I, Qt. 0.05 (H) 0.0 - 0.045 NG/ML   GLUCOSE, POC    Collection Time: 11/30/17  7:46 AM   Result Value Ref Range    Glucose (POC) 240 (H) 70 - 110 mg/dL   GLUCOSE, POC    Collection Time: 11/30/17 11:22 AM   Result Value Ref Range    Glucose (POC) 277 (H) 70 - 110 mg/dL   CBC WITH AUTOMATED DIFF    Collection Time: 11/30/17  1:10 PM   Result Value Ref Range    WBC 9.2 4.6 - 13.2 K/uL    RBC 4.34 (L) 4.70 - 5.50 M/uL    HGB 13.7 13.0 - 16.0 g/dL    HCT 38.2 36.0 - 48.0 %    MCV 88.0 74.0 - 97.0 FL    MCH 31.6 24.0 - 34.0 PG    MCHC 35.9 31.0 - 37.0 g/dL    RDW 13.7 11.6 - 14.5 %    PLATELET 550 458 - 211 K/uL    MPV 10.5 9.2 - 11.8 FL    NEUTROPHILS 64 40 - 73 %    LYMPHOCYTES 27 21 - 52 %    MONOCYTES 8 3 - 10 %    EOSINOPHILS 1 0 - 5 %    BASOPHILS 0 0 - 2 %    ABS. NEUTROPHILS 6.0 1.8 - 8.0 K/UL    ABS. LYMPHOCYTES 2.5 0.9 - 3.6 K/UL    ABS.  MONOCYTES 0.7 0.05 - 1.2 K/UL    ABS. EOSINOPHILS 0.1 0.0 - 0.4 K/UL    ABS. BASOPHILS 0.0 0.0 - 0.06 K/UL    DF AUTOMATED     PTT    Collection Time: 11/30/17  1:10 PM   Result Value Ref Range    aPTT 143.2 (H) 23.0 - 36.4 SEC   GLUCOSE, POC    Collection Time: 11/30/17  3:11 PM   Result Value Ref Range    Glucose (POC) 267 (H) 70 - 110 mg/dL   CARDIAC PANEL,(CK, CKMB & TROPONIN)    Collection Time: 11/30/17  4:24 PM   Result Value Ref Range    CK 45 39 - 308 U/L    CK - MB <1.0 <3.6 ng/ml    CK-MB Index  0.0 - 4.0 %     CALCULATION NOT PERFORMED WHEN RESULT IS BELOW LINEAR LIMIT    Troponin-I, Qt. 0.07 (H) 0.0 - 0.045 NG/ML   LIPID PANEL    Collection Time: 11/30/17  4:24 PM   Result Value Ref Range    LIPID PROFILE          Cholesterol, total 153 <200 MG/DL    Triglyceride 473 (H) <150 MG/DL    HDL Cholesterol 37 (L) 40 - 60 MG/DL    LDL, calculated  0 - 100 MG/DL     LDL AND VLDL CHOLESTEROL NOT CALCULATED WHEN TRIGLYCERIDES >400 MG/DL OR HDL CHOLESTEROL <20 MG/DL    VLDL, calculated  MG/DL     Calculation not valid with this patient's other Lipid values. CHOL/HDL Ratio 4.1 0 - 5.0     HEMOGLOBIN A1C W/O EAG    Collection Time: 11/30/17  4:24 PM   Result Value Ref Range    Hemoglobin A1c 8.2 (H) 4.2 - 5.6 %   NUCLEAR STRESS TEST    Collection Time: 11/30/17  8:38 PM   Result Value Ref Range    Diagnosis      Test indication Chest Discomfort     Functional capacity      ECG Interp. Before Exercise      ECG Interp. During Exercise      Overall HR response to exercise      Overall BP response to exercise      Max. Systolic  mmHg    Max. Diastolic BP 80 mmHg    Max.  Heart rate 107 BPM    [facility] treadmill score      Xavier TM score result      Peak Ex METs 1.0 METS    Protocol name Jose De Andaippo         Known cardiac condition      Attending physician         Signed By: Linsey Benton MD     November 30, 2017 5:55 PM

## 2017-11-30 NOTE — ROUTINE PROCESS
Bedside and Verbal shift change report given to Berenice Solis RN (oncoming nurse) by Kathy Espinoza (offgoing nurse). Report included the following information SBAR, Kardex, MAR and Recent Results.     SITUATION:  Code Status: Full Code  Reason for Admission: Intermittent chest pain  Dyspnea on exertion  Hyperglycemia  Hospital day: 1  Problem List:       Hospital Problems  Date Reviewed: 4/7/2016          Codes Class Noted POA    Dyspnea on exertion ICD-10-CM: R06.09  ICD-9-CM: 786.09  11/29/2017 Unknown        Hyperglycemia ICD-10-CM: R73.9  ICD-9-CM: 790.29  11/29/2017 Unknown        Intermittent chest pain ICD-10-CM: R07.9  ICD-9-CM: 786.50  11/29/2017 Unknown              BACKGROUND:   Past Medical History:   Past Medical History:   Diagnosis Date    Arthritis     1999 vicodin    Diabetes ([facility] [address] 1998 metformin    Hypertension 1996 norvasc      Patient taking anticoagulants yes    Patient has a defibrillator: no    History of shots YES for example, flu, pneumonia, tetanus   Isolation History NO for example, MRSA, CDiff    ASSESSMENT:  Changes in Assessment Throughout Shift: none  Significant Changes in 24 hours (for example, RR/code, fall)  Patient has Central Line: no   Patient has Boss Cath: no Reasons if yes: no     Pending Tests  Stress test  Last Vitals:  Vitals w/ MEWS Score (last day)     Date/Time MEWS Score Pulse Resp Temp BP Level of Consciousness SpO2    11/29/17 2327 2 76 20 98 °F (36.7 °C) 100/63 Alert 97 %    11/29/17 1925 1 93 18 97.6 °F (36.4 °C) 132/68 Alert 93 %    11/29/17 1701 1 67 18 97.8 °F (36.6 °C) 126/74 Alert 95 %    11/29/17 1531 -- 67 -- -- 147/76 -- --    11/29/17 1515 -- 70 11 -- 147/76 -- 99 %    11/29/17 1445 -- 69 18 -- 134/62 -- 99 %    11/29/17 1415 -- 77 11 -- 139/75 -- 97 %    11/29/17 1413 -- 80 -- -- 167/87 -- --    11/29/17 1345 -- 73 15 -- 152/74 -- 100 %    11/29/17 1306 1 90 18 97.7 °F (36.5 °C) 155/90 Alert 97 %            PAIN    Pain Assessment    Pain Intensity 1: 0 (11/30/17 0004)    Pain Location 1: Chest    Pain Intervention(s) 1: Repositioned, Rest    Patient Stated Pain Goal: 0    Last 3 Weights:  Last 3 Recorded Weights in this Encounter    11/29/17 1306   Weight: 91.9 kg (202 lb 9.6 oz)   Weight change:     INTAKE/OUPUT    Current Shift: 11/29 1901 - 11/30 0700  In: 320 [P.O.:320]  Out: -     Last three shifts:      RECOMMENDATIONS AND DISCHARGE PLANNING  Patient needs and requests: none    Pending tests/procedures: stress test     Discharge plan for patient: home    Discharge planning Needs or Barriers: none    Estimated Discharge Date: 12/2/17 Posted on Whiteboard in [facility]: yes       \"HEALS\" SAFETY CHECK  A safety check occurred in the patient's room between off going nurse and oncoming nurse listed above. The safety check included the below items:    H  High Alert Medications Verify all high alert medication drips (heparin, PCA, etc.)  E  Equipment Suction is set up for ALL patients (with vick)  Red plugs utilized for all equipment (IV pumps, etc.)  WOWs wiped down at end of shift. Room stocked with oxygen, suction, and other unit-specific supplies  A  Alarms Bed alarm is set for fall risk patients  Ensure chair alarm is in place and activated if patient is up in a chair  L  Lines Check IV for any infiltration  Boss bag is empty if patient has a Boss   Tubing and IV bags are labeled  S  Safety  Room is clean, patient is clean, and equipment is clean. Hallways are clear from equipment besides carts. Fall bracelet on for fall risk patients  Ensure room is clear and free of clutter  Suction is set up for ALL patients (with vick)  Hallways are clear from equipment besides carts.    Isolation precautions followed, supplies available outside room, sign posted    Kathy Espinoza

## 2017-11-30 NOTE — ROUTINE PROCESS
IDR/SLIDR Summary          Patient: German Mancia MRN: [id number]    Age: 79 y.o. YOB: 1950 Room/Bed: [facility]/   Admit Diagnosis: Intermittent chest pain  Dyspnea on exertion  Hyperglycemia  Principal Diagnosis: <principal problem not specified>   Goals: stress test today  Readmission: NO  Quality Measure: Not applicable  VTE Prophylaxis: Not needed  Influenza Vaccine screening completed? YES  Pneumococcal Vaccine screening completed? YES  Mobility needs: No   Nutrition plan:No  Consults:Case Management    Financial concerns:No  Escalated to CM? NO  RRAT Score: 8   Interventions:H2H  Testing due for pt today?  YES  LOS: 1 days Expected length of stay 3 days  Discharge plan: home   PCP: Loli Scott MD  Transportation needs: No    Days before discharge:two or more days before discharge   Discharge disposition: Home    Signed:     Scottie Foley  11/30/2017  1:00 AM

## 2017-11-30 NOTE — PROGRESS NOTES
Problem: Falls - Risk of  Goal: *Absence of Falls  Document Jamey Fall Risk and appropriate interventions in the flowsheet.    Outcome: Progressing Towards Goal  Fall Risk Interventions:            Medication Interventions: Teach patient to arise slowly

## 2017-11-30 NOTE — DIABETES MGMT
GLYCEMIC CONTROL PLAN OF CARE    Assessment:  Pt is 79 yr old male admitted on 11/29/17 for evaluation and treatment of chest pain. Pt with past medical history significant for CAD, T2DM, HTN, HLD, neuralgia. Recommendations:  Pt BG above target range. Recommend add 10 units lantus and advance to very insulin resistant scale lispro ACHS. Diabetic education. Will continue to monitor inpatient for intervention. Most recent blood glucose values:     Ref.  Range 11/29/2017 20:37 11/30/2017 07:46 11/30/2017 11:22 11/30/2017 15:11   GLUCOSE,FAST - POC Latest Ref Range: 70 - 110 mg/dL 280 (H) 240 (H) 277 (H) 267 (H)     Current A1C:  New one pending last one from October 2016 of 6%    Current hospital diabetes medications:  Correctional lispro ACHS- normal insulin sensitivity scale    Previous day Insulin TDD:  11/29: 6 units lispro    Diet:   Diabetic consistent carbohydrate, aha low chol fat    Home diabetes medications:  Pt reports taking 500 mg metformin two times daily with meals    Goals:  Pt BG will be within target range by _12/03/17____    Education:  _x__  Refer to Diabetes Education Record             ___  Education not indicated at this time    Valeria Mclean [address], [address] [place] [address], [facility]  Pager: [phone]

## 2017-11-30 NOTE — DIABETES MGMT
Diabetes Patient/Family Education Record    Factors That  May Influence Patients Ability  to Learn or  Comply with Recommendations   []   Language barrier    []   Cultural needs   [x]   Motivation    []   Cognitive limitation    []   Physical   [x]   Education    []   Physiological factors   []   Hearing/vision/speaking impairment   []   Evangelical beliefs    []   Financial factors   []  Other:   []  No factors identified at this time.      Person Instructed:   [x]   Patient   [x]   Family   []  Other     Preference for Learning:   [x]   Verbal   [x]   Written   []  Demonstration     Level of Comprehension & Competence:    []  Good                                      [x] Fair                                     []  Poor                             [x]  Needs Reinforcement   [x]  Teachback completed    Education Component:   [x]  Medication management,     [x]  Nutritional management    []  Exercise   []  Signs, symptoms, and treatment of hyperglycemia and hypoglycemia   [] Prevention, recognition and treatment of hyperglycemia and hypoglycemia   [x]  Importance of blood glucose monitoring and how to obtain a blood glucose meter    []  Instruction on use of the blood glucose meter   [x]  Discuss the importance of HbA1C monitoring    []  Sick day guidelines   []  Proper use and disposal of lancets, needles, syringes or insulin pens (if appropriate)   [x]  Potential long-term complications (retinopathy, kidney disease, neuropathy, foot care)   [x] Information about whom to contact in case of emergency or for more information    [x]  Goal:  Patient/family will demonstrate understanding of Diabetes Self Management Skills by: (date) _12/07/17______  Plan for post-discharge education or self-management support:    [x] Outpatient class schedule provided            [] Patient Declined    [] Scheduled for outpatient classes (date) _______     Hiren Victoria MS, [address]  Pager: [phone]

## 2017-11-30 NOTE — PROGRESS NOTES
Patient injected with 39.64 millicuries of [id number] for Nuclear resting images. Patient injected with 96.5 millicuries of [id number] for Nuclear stress images. Injected with 0.4mg Lexiscan. Due to complaints of chest pain/pressure following lexiscan injection, Ampi NP administered 100 mg of aminophylline. Patient's armband was left on.

## 2017-11-30 NOTE — ROUTINE PROCESS
Bedside and Verbal shift change report given to Shruti Ash (oncoming nurse) by Elda Fernandez (offgoing nurse). Report included the following information Kardex, Intake/Output and MAR.

## 2017-12-01 ENCOUNTER — APPOINTMENT (OUTPATIENT)
Dept: CARDIAC CATH/INVASIVE PROCEDURES | Age: 67
DRG: 247 | End: 2017-12-01
Payer: MEDICARE

## 2017-12-01 PROBLEM — I25.10 CAD (CORONARY ARTERY DISEASE): Status: ACTIVE | Noted: 2017-12-01

## 2017-12-01 LAB
ACT BLD: 312 SECS (ref 79–138)
ANION GAP SERPL CALC-SCNC: 5 MMOL/L (ref 3–18)
APTT PPP: 78.9 SEC (ref 23–36.4)
BASOPHILS # BLD: 0.1 K/UL (ref 0–0.1)
BASOPHILS NFR BLD: 1 % (ref 0–2)
BUN SERPL-MCNC: 25 MG/DL (ref 7–18)
BUN/CREAT SERPL: 24 (ref 12–20)
CALCIUM SERPL-MCNC: 8.7 MG/DL (ref 8.5–10.1)
CHLORIDE SERPL-SCNC: 102 MMOL/L (ref 100–108)
CO2 SERPL-SCNC: 27 MMOL/L (ref 21–32)
CREAT SERPL-MCNC: 1.06 MG/DL (ref 0.6–1.3)
DIFFERENTIAL METHOD BLD: ABNORMAL
EOSINOPHIL # BLD: 0.1 K/UL (ref 0–0.4)
EOSINOPHIL NFR BLD: 1 % (ref 0–5)
ERYTHROCYTE [DISTWIDTH] IN BLOOD BY AUTOMATED COUNT: 13.9 % (ref 11.6–14.5)
GLUCOSE BLD STRIP.AUTO-MCNC: 197 MG/DL (ref 70–110)
GLUCOSE BLD STRIP.AUTO-MCNC: 227 MG/DL (ref 70–110)
GLUCOSE BLD STRIP.AUTO-MCNC: 245 MG/DL (ref 70–110)
GLUCOSE SERPL-MCNC: 208 MG/DL (ref 74–99)
HCT VFR BLD AUTO: 38.4 % (ref 36–48)
HGB BLD-MCNC: 13.6 G/DL (ref 13–16)
LYMPHOCYTES # BLD: 3.6 K/UL (ref 0.9–3.6)
LYMPHOCYTES NFR BLD: 41 % (ref 21–52)
MCH RBC QN AUTO: 31.3 PG (ref 24–34)
MCHC RBC AUTO-ENTMCNC: 35.4 G/DL (ref 31–37)
MCV RBC AUTO: 88.5 FL (ref 74–97)
MONOCYTES # BLD: 0.7 K/UL (ref 0.05–1.2)
MONOCYTES NFR BLD: 8 % (ref 3–10)
NEUTS SEG # BLD: 4.2 K/UL (ref 1.8–8)
NEUTS SEG NFR BLD: 49 % (ref 40–73)
PLATELET # BLD AUTO: 177 K/UL (ref 135–420)
PMV BLD AUTO: 10.4 FL (ref 9.2–11.8)
POTASSIUM SERPL-SCNC: 3.8 MMOL/L (ref 3.5–5.5)
RBC # BLD AUTO: 4.34 M/UL (ref 4.7–5.5)
SODIUM SERPL-SCNC: 134 MMOL/L (ref 136–145)
WBC # BLD AUTO: 8.6 K/UL (ref 4.6–13.2)

## 2017-12-01 PROCEDURE — 99218 HC RM OBSERVATION: CPT

## 2017-12-01 PROCEDURE — 65660000004 HC RM CVT STEPDOWN

## 2017-12-01 PROCEDURE — 027035Z DILATION OF CORONARY ARTERY, ONE ARTERY WITH TWO DRUG-ELUTING INTRALUMINAL DEVICES, PERCUTANEOUS APPROACH: ICD-10-PCS | Performed by: INTERNAL MEDICINE

## 2017-12-01 PROCEDURE — 74011250636 HC RX REV CODE- 250/636: Performed by: INTERNAL MEDICINE

## 2017-12-01 PROCEDURE — 85025 COMPLETE CBC W/AUTO DIFF WBC: CPT | Performed by: FAMILY MEDICINE

## 2017-12-01 PROCEDURE — B2111ZZ FLUOROSCOPY OF MULTIPLE CORONARY ARTERIES USING LOW OSMOLAR CONTRAST: ICD-10-PCS | Performed by: INTERNAL MEDICINE

## 2017-12-01 PROCEDURE — 4A023N7 MEASUREMENT OF CARDIAC SAMPLING AND PRESSURE, LEFT HEART, PERCUTANEOUS APPROACH: ICD-10-PCS | Performed by: INTERNAL MEDICINE

## 2017-12-01 PROCEDURE — 74011250636 HC RX REV CODE- 250/636

## 2017-12-01 PROCEDURE — 93306 TTE W/DOPPLER COMPLETE: CPT

## 2017-12-01 PROCEDURE — 85730 THROMBOPLASTIN TIME PARTIAL: CPT | Performed by: FAMILY MEDICINE

## 2017-12-01 PROCEDURE — 82962 GLUCOSE BLOOD TEST: CPT

## 2017-12-01 PROCEDURE — 74011250637 HC RX REV CODE- 250/637: Performed by: INTERNAL MEDICINE

## 2017-12-01 PROCEDURE — 80048 BASIC METABOLIC PNL TOTAL CA: CPT | Performed by: FAMILY MEDICINE

## 2017-12-01 PROCEDURE — 74011636320 HC RX REV CODE- 636/320: Performed by: INTERNAL MEDICINE

## 2017-12-01 PROCEDURE — 74011636637 HC RX REV CODE- 636/637: Performed by: FAMILY MEDICINE

## 2017-12-01 PROCEDURE — 85347 COAGULATION TIME ACTIVATED: CPT

## 2017-12-01 PROCEDURE — 77030013797 CARDIAC CATHETERIZATION

## 2017-12-01 PROCEDURE — 74011250637 HC RX REV CODE- 250/637: Performed by: NURSE PRACTITIONER

## 2017-12-01 PROCEDURE — 36415 COLL VENOUS BLD VENIPUNCTURE: CPT | Performed by: FAMILY MEDICINE

## 2017-12-01 PROCEDURE — 93005 ELECTROCARDIOGRAM TRACING: CPT

## 2017-12-01 PROCEDURE — 74011000250 HC RX REV CODE- 250: Performed by: INTERNAL MEDICINE

## 2017-12-01 RX ORDER — SODIUM CHLORIDE 0.9 % (FLUSH) 0.9 %
5-10 SYRINGE (ML) INJECTION AS NEEDED
Status: DISCONTINUED | OUTPATIENT
Start: 2017-12-01 | End: 2017-12-02 | Stop reason: HOSPADM

## 2017-12-01 RX ORDER — NITROGLYCERIN 40 MG/100ML
INJECTION INTRAVENOUS
Status: DISCONTINUED
Start: 2017-12-01 | End: 2017-12-01

## 2017-12-01 RX ORDER — EPTIFIBATIDE 2 MG/ML
180 INJECTION, SOLUTION INTRAVENOUS ONCE
Status: COMPLETED | OUTPATIENT
Start: 2017-12-01 | End: 2017-12-01

## 2017-12-01 RX ORDER — NITROGLYCERIN 40 MG/100ML
5 INJECTION INTRAVENOUS
Status: DISCONTINUED | OUTPATIENT
Start: 2017-12-01 | End: 2017-12-01

## 2017-12-01 RX ORDER — HEPARIN SODIUM 200 [USP'U]/100ML
500 INJECTION, SOLUTION INTRAVENOUS ONCE
Status: COMPLETED | OUTPATIENT
Start: 2017-12-01 | End: 2017-12-01

## 2017-12-01 RX ORDER — HEPARIN SODIUM 1000 [USP'U]/ML
1000-10000 INJECTION, SOLUTION INTRAVENOUS; SUBCUTANEOUS
Status: DISCONTINUED | OUTPATIENT
Start: 2017-12-01 | End: 2017-12-01 | Stop reason: HOSPADM

## 2017-12-01 RX ORDER — VERAPAMIL HYDROCHLORIDE 2.5 MG/ML
2.5-5 INJECTION, SOLUTION INTRAVENOUS ONCE
Status: COMPLETED | OUTPATIENT
Start: 2017-12-01 | End: 2017-12-01

## 2017-12-01 RX ORDER — MIDAZOLAM HYDROCHLORIDE 1 MG/ML
.5-2 INJECTION, SOLUTION INTRAMUSCULAR; INTRAVENOUS
Status: DISCONTINUED | OUTPATIENT
Start: 2017-12-01 | End: 2017-12-01 | Stop reason: HOSPADM

## 2017-12-01 RX ORDER — LIDOCAINE HYDROCHLORIDE 10 MG/ML
1-30 INJECTION, SOLUTION EPIDURAL; INFILTRATION; INTRACAUDAL; PERINEURAL
Status: DISCONTINUED | OUTPATIENT
Start: 2017-12-01 | End: 2017-12-01 | Stop reason: HOSPADM

## 2017-12-01 RX ORDER — EPTIFIBATIDE 2 MG/ML
INJECTION, SOLUTION INTRAVENOUS
Status: COMPLETED
Start: 2017-12-01 | End: 2017-12-01

## 2017-12-01 RX ORDER — FENTANYL CITRATE 50 UG/ML
12.5-1 INJECTION, SOLUTION INTRAMUSCULAR; INTRAVENOUS
Status: DISCONTINUED | OUTPATIENT
Start: 2017-12-01 | End: 2017-12-01 | Stop reason: HOSPADM

## 2017-12-01 RX ORDER — INSULIN GLARGINE 100 [IU]/ML
10 INJECTION, SOLUTION SUBCUTANEOUS
Status: DISCONTINUED | OUTPATIENT
Start: 2017-12-01 | End: 2017-12-02 | Stop reason: HOSPADM

## 2017-12-01 RX ORDER — SODIUM CHLORIDE 0.9 % (FLUSH) 0.9 %
5-10 SYRINGE (ML) INJECTION EVERY 8 HOURS
Status: DISCONTINUED | OUTPATIENT
Start: 2017-12-01 | End: 2017-12-02 | Stop reason: HOSPADM

## 2017-12-01 RX ADMIN — AMLODIPINE BESYLATE 5 MG: 5 TABLET ORAL at 08:50

## 2017-12-01 RX ADMIN — INSULIN LISPRO 6 UNITS: 100 INJECTION, SOLUTION INTRAVENOUS; SUBCUTANEOUS at 08:53

## 2017-12-01 RX ADMIN — INSULIN LISPRO 6 UNITS: 100 INJECTION, SOLUTION INTRAVENOUS; SUBCUTANEOUS at 18:11

## 2017-12-01 RX ADMIN — LORAZEPAM 1 MG: 1 TABLET ORAL at 08:50

## 2017-12-01 RX ADMIN — PANTOPRAZOLE SODIUM 40 MG: 40 TABLET, DELAYED RELEASE ORAL at 08:49

## 2017-12-01 RX ADMIN — RANOLAZINE 1000 MG: 500 TABLET, FILM COATED, EXTENDED RELEASE ORAL at 08:49

## 2017-12-01 RX ADMIN — FENOFIBRATE 48 MG: 48 TABLET, FILM COATED ORAL at 22:21

## 2017-12-01 RX ADMIN — IOPAMIDOL 185 ML: 612 INJECTION, SOLUTION INTRAVENOUS at 14:33

## 2017-12-01 RX ADMIN — ASPIRIN 81 MG: 81 TABLET, COATED ORAL at 08:50

## 2017-12-01 RX ADMIN — INSULIN LISPRO 3 UNITS: 100 INJECTION, SOLUTION INTRAVENOUS; SUBCUTANEOUS at 22:22

## 2017-12-01 RX ADMIN — LIDOCAINE HYDROCHLORIDE 3 ML: 10 INJECTION, SOLUTION EPIDURAL; INFILTRATION; INTRACAUDAL; PERINEURAL at 13:20

## 2017-12-01 RX ADMIN — TICAGRELOR 90 MG: 90 TABLET ORAL at 22:21

## 2017-12-01 RX ADMIN — Medication 10 ML: at 20:42

## 2017-12-01 RX ADMIN — EPTIFIBATIDE 16 MG: 2 INJECTION, SOLUTION INTRAVENOUS at 13:49

## 2017-12-01 RX ADMIN — EPTIFIBATIDE 16 MG: 2 INJECTION, SOLUTION INTRAVENOUS at 13:42

## 2017-12-01 RX ADMIN — ROSUVASTATIN CALCIUM 10 MG: 20 TABLET ORAL at 22:21

## 2017-12-01 RX ADMIN — VERAPAMIL HYDROCHLORIDE 2.5 MG: 2.5 INJECTION INTRAVENOUS at 13:22

## 2017-12-01 RX ADMIN — RANOLAZINE 1000 MG: 500 TABLET, FILM COATED, EXTENDED RELEASE ORAL at 20:41

## 2017-12-01 RX ADMIN — FENTANYL CITRATE 25 MCG: 50 INJECTION INTRAMUSCULAR; INTRAVENOUS at 13:19

## 2017-12-01 RX ADMIN — LORAZEPAM 1 MG: 1 TABLET ORAL at 20:41

## 2017-12-01 RX ADMIN — RAMIPRIL 10 MG: 5 CAPSULE ORAL at 08:50

## 2017-12-01 RX ADMIN — HEPARIN SODIUM IN SODIUM CHLORIDE 1000 UNITS: 200 INJECTION INTRAVENOUS at 13:16

## 2017-12-01 RX ADMIN — HEPARIN SODIUM 1000 UNITS: 1000 INJECTION, SOLUTION INTRAVENOUS; SUBCUTANEOUS at 13:25

## 2017-12-01 RX ADMIN — NITROGLYCERIN 0.5 INCH: 20 OINTMENT TOPICAL at 08:53

## 2017-12-01 RX ADMIN — TICAGRELOR 180 MG: 90 TABLET ORAL at 14:30

## 2017-12-01 RX ADMIN — NITROGLYCERIN 200 MCG: 5 INJECTION, SOLUTION INTRAVENOUS at 13:22

## 2017-12-01 RX ADMIN — CARVEDILOL 12.5 MG: 12.5 TABLET, FILM COATED ORAL at 20:41

## 2017-12-01 RX ADMIN — MIDAZOLAM HYDROCHLORIDE 1 MG: 1 INJECTION, SOLUTION INTRAMUSCULAR; INTRAVENOUS at 13:19

## 2017-12-01 RX ADMIN — INSULIN GLARGINE 10 UNITS: 100 INJECTION, SOLUTION SUBCUTANEOUS at 22:22

## 2017-12-01 RX ADMIN — CARVEDILOL 12.5 MG: 12.5 TABLET, FILM COATED ORAL at 08:49

## 2017-12-01 RX ADMIN — NITROGLYCERIN 0.5 INCH: 20 OINTMENT TOPICAL at 22:24

## 2017-12-01 RX ADMIN — HEPARIN SODIUM 4000 UNITS: 1000 INJECTION, SOLUTION INTRAVENOUS; SUBCUTANEOUS at 13:35

## 2017-12-01 NOTE — ROUTINE PROCESS
Bedside and Verbal shift change report given to Debbie Blanco RN (oncoming nurse) by German Valencia RN   (offgoing nurse). Report included the following information SBAR, Kardex, Intake/Output and MAR.

## 2017-12-01 NOTE — PROGRESS NOTES
[facility]      CARDIOLOGY PROGRESS NOTE  RECS:    1. Unstable angina- still with ongoing chest discomfort no TURNER. Continue with Nitro paste and Heparin drip. Abnormal NUC stress test. withhad moderate sized fixed defect involving inferior wall with dynamic ekg changes and chest pain Plans for [facility] today to evaluate CAD - symptoms likely from mid LCX lesion  2. CAD s/p cardiac cath 1/16 that revealed two vessel CAD. Heart cath Today  3. Hypertension- stable continue with bb, ACEi and CCB  4. Hyperlipidemia- lipid Panel 10/17 elevated Triglycerides and low HDL. Continue with Crestor  20 mg and  Tricor. Follow Lipid panel today   5. Diabetes-  Medical team will follow.      The benefits and risks of cardiac catheterization have been discussed in detail with the patient present at the time. Patient understands risk of potential cath complications including but not limited to bleeding, infection, difficulty healing the arteriotomy access site(2 chances in 100) which may require surgical repair, potential thromboembolic complications which could result in stroke, myocardial infarction, loss of limb or organ function and/or death( 1 chance in 1000)and potential allergic reaction to contrast dye or other medication used during the procedure. Patient is also aware of the therapeutic implications for medical management vs coronary artery bypass surgery vs percutaneous coronary intervention in treatment of coronary artery disease. The additional risks for percutaneous coronary artery intervention include the need for emergent bypass surgery at 1 chance in 100 and for restenosis which may range from 35% for plain balloon angioplasty, 15% for bare metal stent, and 5% for drug eluting stent implants.  The need for mandatory uninterrupted dual antiplatelet therapy with lifelong Aspirin combined with Plavix for up to 12 months following drug eluting stents, and minimum 1 month following bare metal stents to prevent stent thrombosis which is the equivalent of acute heart attack has been reviewed in detail. No contraindications to use of drug eluting stents has been discovered.        NUC stress test 11/17  FINDINGS  1. Post-stress images in short axis, horizontal, vertical long axis  reveals poor isotope uptake in the inferior wall. 2. Resting images show poor isotope uptake in the inferior wall. 3. Left ventricular cavity appears to be normal size with normal wall  motion and ejection fraction of 63%.      CONCLUSIONS  1. Moderate size fixed defect involving the inferior wall, likely from prior  infarction versus diaphragmatic attenuation in a male patient. 2. Normal ventricular cavity with normal wall motion and ejection  fraction of 63%. 3. Intermediate risk scan.       IMPRESSION:cath:1/20161. TWO VESSEL CORONARY ARTERY DISEASE IN THE FORM OF 80% OSTIAL AND 90% PROXIMAL NON-DOMINANT RIGHT CORONARY STENOSIS WHICH IS A MODERATE SIZED VESSEL OF ABOUT 1.5 TO 2 MM IN DIAMETER, AND 40% PROXIMAL CIRCUMFLEX, AND 70% MID CIRCUMFLEX STENOSIS WHICH IS A DOMINANT VESSEL. THERE ARE DIFFUSE LUMINAL IRREGULARITIES SEEN THROUGHOUT THE CORONARIES. 2. NO NORMAL OVERALL LV EJECTION FRACTION AT REST. 3. EVIDENCE OF CHEST PAIN DURING THE CASE AS WELL AS BEFORE THE CASE WAS STARTED. PARTIAL RELIEF WITH SUBLINGUAL NITROGLYCERIN AND NO ACUTE EKG CHANGES SEEN IN THE SIX MONITORING LEADS IN THE CATH LAB. DISCUSSION AND RECOMMENDATIONS: PATIENT IS LIKELY HAVING CHEST PAIN FROM THE NON-DOMINANT RIGHT CORONARY ARTERY. THE LEFT CIRCUMFLEX IS DOMINANT AND APPEARS TO HAVE BORDERLINE MID STENOSIS WHICH DOES NOT HAVE ANY APPEARANCE OF ANY ACUTE UNSTABLE LESION. I THINK HE SHOULD BE TREATED MEDICALLY FOR NOW, AND IF THE SYMPTOMS PERSIST, LEFT CIRCUMFLEX ARTERY WILL NEED TO BE INTERROGATED BY INTRACORONARY DOPPLER WITH FFR DETERMINATION.  RIGHT CORONARY, THOUGH APPEARS TO BE CRITICAL IS A SMALL VESSEL OF ABOUT 1.5 TO A MAXIMUM OF 2 MM IN DIAMETER AND PROBABLY SHOULD BE LEFT TO MEDICAL TREATMENT.  AGGRESSIVE RISK FACTOR MODIFICATION SHOULD BE FOLLOWED    ASSESSMENT:  Hospital Problems  Date Reviewed: 4/7/2016          Codes Class Noted POA    Dyspnea on exertion ICD-10-CM: R06.09  ICD-9-CM: 786.09  11/29/2017 Unknown        Hyperglycemia ICD-10-CM: R73.9  ICD-9-CM: 790.29  11/29/2017 Unknown        Intermittent chest pain ICD-10-CM: R07.9  ICD-9-CM: 786.50  11/29/2017 Unknown        Coronary artery disease involving native coronary artery with unstable angina pectoris [facility]) ICD-10-CM: I25.110  ICD-9-CM: 414.01, 411.1  1/27/2016 Yes    Overview Signed 1/27/2016 11:41 AM by Mahesh Amaya MD     recent admission angina stable on medical managment  unable to tolerate isordil             Essential hypertension ICD-10-CM: I10  ICD-9-CM: 401.9  1/27/2016 Yes    Overview Signed 1/27/2016 11:41 AM by Mahesh Amaya MD     controlled             Hyperlipidemia ICD-10-CM: E78.5  ICD-9-CM: 272.4  1/27/2016 Yes    Overview Signed 1/27/2016 11:41 AM by Mahesh Amaya MD     follow up labs             Type 2 diabetes mellitus without complication ([facility] LEV-00-YL: E11.9  ICD-9-CM: 250.00  1/27/2016 Yes    Overview Signed 1/27/2016 11:41 AM by Mahesh Amaya MD     stable  f/u per pcp             Thyroid goiter ICD-10-CM: E04.9  ICD-9-CM: 240.9  1/27/2016 Yes    Overview Signed 1/27/2016 11:47 AM by Mahesh Amaya MD     Impression:Multinodular goiter with complex cystic nodules bilaterally measuring up to 1.6 cm on left and 1.5 cm on right.  1/2016             Left thigh pain ICD-10-CM: J82.969  ICD-9-CM: 729.5  11/17/2014 Yes        Chronic pain syndrome ICD-10-CM: G89.4  ICD-9-CM: 338.4  11/17/2014 Yes                SUBJECTIVE:    Intermittent  CP no diaphoresis no nausea  No SOB    OBJECTIVE:    VS:   Visit Vitals    /69 (BP 1 Location: Right arm, BP Patient Position: At rest)    Pulse 78    Temp 98.8 °F (37.1 °C)    Resp 18    Ht 5' 11\" (1.803 m)    Wt 89.4 kg (197 lb 3.2 oz)    SpO2 94%    BMI 27.5 kg/m2         Intake/Output Summary (Last 24 hours) at 12/01/17 0954  Last data filed at 12/01/17 0606   Gross per 24 hour   Intake                0 ml   Output             1000 ml   Net            -1000 ml     TELE: normal sinus rhythm    General: alert, well developed, pleasant and in no apparent distress  HENT: Normocephalic, atraumatic. Normal external eye.   Neck :  no bruit, no JVD  Cardiac:  regular rate and rhythm, S1, S2 normal, no click, no rub  Lungs: clear to auscultation bilaterally  Abdomen: Soft, nontender, no masses  Extremities:  No c/c/e, peripheral pulses present      Labs: Results:       Chemistry Recent Labs      12/01/17 0318 11/30/17   0600  11/29/17   1320   GLU  208*  236*  286*   NA  134*  136  137   K  3.8  4.0  4.1   CL  102  103  102   CO2  27  24  24   BUN  25*  15  13   CREA  1.06  0.86  0.89   CA  8.7  8.5  9.2   AGAP  5  9  11   BUCR  24*  17  15   AP   --    --   105   TP   --    --   7.5   ALB   --    --   4.1   GLOB   --    --   3.4   AGRAT   --    --   1.2      CBC w/Diff Recent Labs      12/01/17 0318 11/30/17   1310  11/30/17   0600   WBC  8.6  9.2  9.5   RBC  4.34*  4.34*  4.36*   HGB  13.6  13.7  13.5   HCT  38.4  38.2  38.4   PLT  177  174  171   GRANS  49  64  69   LYMPH  41  27  23   EOS  1  1  1      Cardiac Enzymes Recent Labs      11/30/17   2130  11/30/17   1624   CPK  49  45   CKND1  CALCULATION NOT PERFORMED WHEN RESULT IS BELOW LINEAR LIMIT  CALCULATION NOT PERFORMED WHEN RESULT IS BELOW LINEAR LIMIT      Coagulation Recent Labs      12/01/17 0318 11/30/17   1845   APTT  78.9*  53.3*       Lipid Panel Lab Results   Component Value Date/Time    Cholesterol, total 153 11/30/2017 04:24 PM    HDL Cholesterol 37 11/30/2017 04:24 PM    LDL, calculated  11/30/2017 04:24 PM     LDL AND VLDL CHOLESTEROL NOT CALCULATED WHEN TRIGLYCERIDES >400 MG/DL OR HDL CHOLESTEROL <20 MG/DL    VLDL, calculated  11/30/2017 04:24 PM     Calculation not valid with this patient's other Lipid values. Triglyceride 473 11/30/2017 04:24 PM    CHOL/HDL Ratio 4.1 11/30/2017 04:24 PM      BNP No results for input(s): BNPP in the last 72 hours. Liver Enzymes Recent Labs      11/29/17   1320   TP  7.5   ALB  4.1   AP  105   SGOT  23      Thyroid Studies No results found for: T4, T3U, TSH, TSHEXT           Mini Tinajero Verito:[phone] supervised. I have independently evaluated and examined the patient. All relevant labs and testing data's are reviewed. Care plan discussed and updated after review.     Nora Marie MD

## 2017-12-01 NOTE — ROUTINE PROCESS
TRANSFER - IN REPORT:    Verbal report received from [address] [place] [address],  [address], [facility]. (name) on Nadira Pierre  being received from [facility] ([facility]) for ordered procedure      Report consisted of patients Situation, Background, Assessment and   Recommendations(SBAR). Information from the following report(s) SBAR and MAR was reviewed with the receiving nurse. Opportunity for questions and clarification was provided. Assessment completed upon patients arrival to unit and care assumed.

## 2017-12-01 NOTE — PROGRESS NOTES
Bedside and Verbal shift change report given to Wyonia Ahumada (oncoming nurse) by adwoa (offgoing nurse). Report included the following information Kardex, Intake/Output and MAR.

## 2017-12-01 NOTE — ROUTINE PROCESS
TRANSFER - OUT REPORT:    Verbal report given to VINITA Garber(name) on Romayne Putty  being transferred to [facility](unit) for routine progression of care       Report consisted of patients Situation, Background, Assessment and   Recommendations(SBAR). Information from the following report(s) SBAR, Kardex, Procedure Summary and MAR was reviewed with the receiving nurse. Lines:   Peripheral IV 11/29/17 Right Antecubital (Active)   Site Assessment Clean, dry, & intact 11/29/2017  8:29 PM   Phlebitis Assessment 0 11/29/2017  8:29 PM   Infiltration Assessment 0 11/29/2017  8:29 PM   Dressing Status Clean, dry, & intact 11/29/2017  8:29 PM   Dressing Type Transparent 11/29/2017  8:29 PM   Hub Color/Line Status Pink 11/29/2017  8:29 PM        Opportunity for questions and clarification was provided.       Patient transported with:   Gamador

## 2017-12-01 NOTE — DIABETES MGMT
GLYCEMIC CONTROL PLAN OF CARE    Assessment/Recommendations:  Pt BG above target. Recommend begin 10 units lantus tonight. Adjust based on BG trends. Continue very insulin resistant scale lispro ACHS. Will continue to monitor inpatient for intervention. Most recent blood glucose values:     Ref.  Range 11/30/2017 11:22 11/30/2017 15:11 11/30/2017 21:11 12/1/2017 07:59   GLUCOSE,FAST - POC Latest Ref Range: 70 - 110 mg/dL 277 (H) 267 (H) 182 (H) 227 (H)     Current A1C:  8.2%-11/30/17 estimated average blood glucose of 189mg/dl over the past 2-3 months    Current hospital diabetes medications:  Correctional lispro ACHS- very insulin resistant scale    Previous day Insulin TDD:  11/30: 16 units lispro    Diet:   NPO except meds    Home diabetes medications:  Pt reports taking 500 mg metformin two times daily with meals    Goals:  Pt BG will be within target range by _12/4/17____    Education:  _x__  Refer to Diabetes Education Record             ___  Education not indicated at this time    Valeria Rincon [address], [address] [place] [address], [facility]  Pager: [phone]

## 2017-12-02 VITALS
BODY MASS INDEX: 27.82 KG/M2 | WEIGHT: 198.7 LBS | DIASTOLIC BLOOD PRESSURE: 68 MMHG | HEART RATE: 78 BPM | OXYGEN SATURATION: 95 % | RESPIRATION RATE: 16 BRPM | SYSTOLIC BLOOD PRESSURE: 119 MMHG | TEMPERATURE: 97.5 F | HEIGHT: 71 IN

## 2017-12-02 LAB
ATRIAL RATE: 75 BPM
ATRIAL RATE: 76 BPM
CALCULATED P AXIS, ECG09: 22 DEGREES
CALCULATED P AXIS, ECG09: 50 DEGREES
CALCULATED R AXIS, ECG10: 37 DEGREES
CALCULATED R AXIS, ECG10: 52 DEGREES
CALCULATED T AXIS, ECG11: 17 DEGREES
CALCULATED T AXIS, ECG11: 28 DEGREES
DIAGNOSIS, 93000: NORMAL
DIAGNOSIS, 93000: NORMAL
GLUCOSE BLD STRIP.AUTO-MCNC: 225 MG/DL (ref 70–110)
GLUCOSE BLD STRIP.AUTO-MCNC: 271 MG/DL (ref 70–110)
P-R INTERVAL, ECG05: 158 MS
P-R INTERVAL, ECG05: 164 MS
Q-T INTERVAL, ECG07: 400 MS
Q-T INTERVAL, ECG07: 410 MS
QRS DURATION, ECG06: 92 MS
QRS DURATION, ECG06: 94 MS
QTC CALCULATION (BEZET), ECG08: 450 MS
QTC CALCULATION (BEZET), ECG08: 457 MS
VENTRICULAR RATE, ECG03: 75 BPM
VENTRICULAR RATE, ECG03: 76 BPM

## 2017-12-02 PROCEDURE — 74011250637 HC RX REV CODE- 250/637: Performed by: INTERNAL MEDICINE

## 2017-12-02 PROCEDURE — 93005 ELECTROCARDIOGRAM TRACING: CPT

## 2017-12-02 PROCEDURE — 82962 GLUCOSE BLOOD TEST: CPT

## 2017-12-02 PROCEDURE — 74011636637 HC RX REV CODE- 636/637: Performed by: FAMILY MEDICINE

## 2017-12-02 PROCEDURE — 74011250637 HC RX REV CODE- 250/637: Performed by: NURSE PRACTITIONER

## 2017-12-02 RX ORDER — NITROGLYCERIN 0.4 MG/1
0.4 TABLET SUBLINGUAL
Qty: 25 TAB | Refills: 0 | Status: SHIPPED | OUTPATIENT
Start: 2017-12-02 | End: 2021-01-07 | Stop reason: SDUPTHER

## 2017-12-02 RX ORDER — FENOFIBRATE 48 MG/1
48 TABLET, COATED ORAL
Qty: 30 TAB | Refills: 3 | Status: SHIPPED | OUTPATIENT
Start: 2017-12-02 | End: 2018-04-28 | Stop reason: SDUPTHER

## 2017-12-02 RX ORDER — ASPIRIN 325 MG
81 TABLET, DELAYED RELEASE (ENTERIC COATED) ORAL DAILY
Qty: 30 TAB | Refills: 1 | Status: SHIPPED | OUTPATIENT
Start: 2017-12-02 | End: 2018-01-25 | Stop reason: SDUPTHER

## 2017-12-02 RX ADMIN — LORAZEPAM 1 MG: 1 TABLET ORAL at 09:19

## 2017-12-02 RX ADMIN — RAMIPRIL 10 MG: 5 CAPSULE ORAL at 09:19

## 2017-12-02 RX ADMIN — TICAGRELOR 90 MG: 90 TABLET ORAL at 09:19

## 2017-12-02 RX ADMIN — AMLODIPINE BESYLATE 5 MG: 5 TABLET ORAL at 09:20

## 2017-12-02 RX ADMIN — INSULIN LISPRO 9 UNITS: 100 INJECTION, SOLUTION INTRAVENOUS; SUBCUTANEOUS at 14:07

## 2017-12-02 RX ADMIN — RANOLAZINE 1000 MG: 500 TABLET, FILM COATED, EXTENDED RELEASE ORAL at 09:19

## 2017-12-02 RX ADMIN — PANTOPRAZOLE SODIUM 40 MG: 40 TABLET, DELAYED RELEASE ORAL at 09:20

## 2017-12-02 RX ADMIN — ASPIRIN 81 MG: 81 TABLET, COATED ORAL at 09:20

## 2017-12-02 RX ADMIN — CARVEDILOL 12.5 MG: 12.5 TABLET, FILM COATED ORAL at 09:20

## 2017-12-02 RX ADMIN — Medication 10 ML: at 14:08

## 2017-12-02 RX ADMIN — Medication 10 ML: at 09:27

## 2017-12-02 RX ADMIN — INSULIN LISPRO 6 UNITS: 100 INJECTION, SOLUTION INTRAVENOUS; SUBCUTANEOUS at 09:25

## 2017-12-02 NOTE — PROGRESS NOTES
I have reviewed discharge instructions and prescriptions with the patient and spouse. The patient and spouse verbalized understanding. PIV removed. Pt transported via wheelchair to private vehicle.

## 2017-12-02 NOTE — DISCHARGE INSTRUCTIONS
Learning About Coronary Artery Disease (CAD)  What is coronary artery disease? Coronary artery disease (CAD) occurs when plaque builds up in the arteries that bring oxygen-rich blood to your heart. Plaque is a fatty substance made of cholesterol, calcium, and other substances in the blood. This process is called hardening of the arteries, or atherosclerosis. What happens when you have coronary artery disease? · Plaque may narrow the coronary arteries. Narrowed arteries cause poor blood flow. This can lead to angina symptoms such as chest pain or discomfort. If blood flow is completely blocked, you could have a heart attack. · You can slow CAD and reduce the risk of future problems by making changes in your lifestyle. These include quitting smoking and eating heart-healthy foods. · Treatments for CAD, along with changes in your lifestyle, can help you live a longer and healthier life. How can you prevent coronary artery disease? · Do not smoke. It may be the best thing you can do to prevent heart disease. If you need help quitting, talk to your doctor about stop-smoking programs and medicines. These can increase your chances of quitting for good. · Be active. Get at least 30 minutes of exercise on most days of the week. Walking is a good choice. You also may want to do other activities, such as running, swimming, cycling, or playing tennis or team sports. · Eat heart-healthy foods. Eat more fruits and vegetables and less foods that contain saturated and trans fats. Limit alcohol, sodium, and sweets. · Stay at a healthy weight. Lose weight if you need to. · Manage other health problems such as diabetes, high blood pressure, and high cholesterol. · Manage stress. Stress can hurt your heart. To keep stress low, talk about your problems and feelings. Don't keep your feelings hidden. · If you have talked about it with your doctor, take a low-dose aspirin every day.  Aspirin can help certain people lower their risk of a heart attack or stroke. But taking aspirin isn't right for everyone, because it can cause serious bleeding. Do not start taking daily aspirin unless your doctor knows about it. How is coronary artery disease treated? · Your doctor will suggest that you make lifestyle changes. For example, your doctor may ask you to eat healthy foods, quit smoking, lose extra weight, and be more active. · You will have to take medicines. · Your doctor may suggest a procedure to open narrowed or blocked arteries. This is called angioplasty. Or your doctor may suggest using healthy blood vessels to create detours around narrowed or blocked arteries. This is called bypass surgery. Follow-up care is a key part of your treatment and safety. Be sure to make and go to all appointments, and call your doctor if you are having problems. It's also a good idea to know your test results and keep a list of the medicines you take. Percutaneous Coronary Intervention: What to Expect at [facility]    Percutaneous coronary intervention (PCI) is the name for procedures that are used to open a narrowed or blocked coronary artery. The two most common PCI procedures are coronary angioplasty and coronary stent placement. Your groin or arm may have a bruise and feel sore for a day or two after a percutaneous coronary intervention (PCI). You can do light activities around the house, but nothing strenuous for several days. This care sheet gives you a general idea about how long it will take for you to recover. But each person recovers at a different pace. Follow the steps below to get better as quickly as possible. How can you care for yourself at home? Activity  ? · If the doctor gave you a sedative:  ¨ For 24 hours, don't do anything that requires attention to detail. It takes time for the medicine's effects to completely wear off.   ¨ For your safety, do not drive or operate any machinery that could be dangerous. Wait until the medicine wears off and you can think clearly and react easily. ? · Do not do strenuous exercise and do not lift, pull, or push anything heavy until your doctor says it is okay. This may be for a day or two. You can walk around the house and do light activity, such as cooking. ? · If the catheter was placed in your groin, try not to walk up stairs for the first couple of days. ? · If the catheter was placed in your arm near your wrist, do not bend your wrist deeply for the first couple of days. Be careful using your hand to get into and out of a chair or bed. ? · Carry your stent identification card with you at all times. ? · If your doctor recommends it, get more exercise. Walking is a good choice. Bit by bit, increase the amount you walk every day. Try for at least 30 minutes on most days of the week. Diet  ? · Drink plenty of fluids to help your body flush out the dye. If you have kidney, heart, or liver disease and have to limit fluids, talk with your doctor before you increase the amount of fluids you drink. ? · Keep eating a heart-healthy diet that has lots of fruits, vegetables, and whole grains. If you have not been eating this way, talk to your doctor. You also may want to talk to a dietitian. This expert can help you to learn about healthy foods and plan meals. Medicines  ? · Your doctor will tell you if and when you can restart your medicines. He or she will also give you instructions about taking any new medicines. ? · If you take blood thinners, such as warfarin (Coumadin), clopidogrel (Plavix), or aspirin, be sure to talk to your doctor. He or she will tell you if and when to start taking those medicines again. Make sure that you understand exactly what your doctor wants you to do.   ? · Your doctor will prescribe blood-thinning medicines. You will likely take aspirin plus another antiplatelet, such as clopidogrel (Plavix).  It is very important that you take these medicines exactly as directed. These medicines help keep the coronary artery open and reduce your risk of a heart attack. ? · Call your doctor if you think you are having a problem with your medicine. ?Care of the catheter site  ? · For 1 or 2 days, keep a bandage over the spot where the catheter was inserted. The bandage probably will fall off in this time. ? · Put ice or a cold pack on the area for 10 to 20 minutes at a time to help with soreness or swelling. Put a thin cloth between the ice and your skin. ? · You may shower 24 to 48 hours after the procedure, if your doctor okays it. Pat the incision dry. ? · Do not soak the catheter site until it is healed. Don't take a bath for 1 week, or until your doctor tells you it isokay. Follow-up care is a key part of your treatment and safety. Be sure to make and go to all appointments, and call your doctor if you are having problems. It's also a good idea to know your test results and keep a list of the medicines you take. When should you call for help? Call 911 anytime you think you may need emergency care. For example, call if:  ? · You passed out (lost consciousness). ? · You have severe trouble breathing. ? · You have sudden chest pain and shortness of breath, or you cough up blood. ? · You have symptoms of a heart attack, such as:  ¨ Chest pain or pressure. ¨ Sweating. ¨ Shortness of breath. ¨ Nausea or vomiting. ¨ Pain that spreads from the chest to the neck, jaw, or one or both shoulders or arms. ¨ Dizziness or lightheadedness. ¨ A fast or uneven pulse. After calling 911, chew 1 adult-strength aspirin. Wait for an ambulance. Do not try to drive yourself. ? · You have been diagnosed with angina, and you have angina symptoms that do not go away with rest or are not getting better within 5 minutes after you take one dose of nitroglycerin.    ?Call your doctor now or seek immediate medical care if:  ? · You are bleeding from the area where the catheter was put in your artery. ? · You have a fast-growing, painful lump at the catheter site. ? · You have signs of infection, such as:  ¨ Increased pain, swelling, warmth, or redness. ¨ Red streaks leading from the catheter site. ¨ Pus draining from the catheter site. ¨ A fever. ? · Your leg or arm looks blue or feels cold, numb, or tingly. ? Watch closely for changes in your health, and be sure to contact your doctor if you have any problems. DISCHARGE SUMMARY from Nurse    PATIENT INSTRUCTIONS:    After general anesthesia or intravenous sedation, for 24 hours or while taking prescription Narcotics:  · Limit your activities  · Do not drive and operate hazardous machinery  · Do not make important personal or business decisions  · Do  not drink alcoholic beverages  · If you have not urinated within 8 hours after discharge, please contact your surgeon on call. Report the following to your surgeon:  · Excessive pain, swelling, redness or odor of or around the surgical area  · Temperature over 100.5  · Nausea and vomiting lasting longer than 4 hours or if unable to take medications  · Any signs of decreased circulation or nerve impairment to extremity: change in color, persistent  numbness, tingling, coldness or increase pain  · Any questions      What to do at Home:    Return to ED with worsening chest pain, shortness of breath or other concerns. Recommended activity: Activity as tolerated and No lifting, Driving, or Strenuous exercise for 2 days. *  Please give a list of your current medications to your Primary Care Provider. *  Please update this list whenever your medications are discontinued, doses are      changed, or new medications (including over-the-counter products) are added. *  Please carry medication information at all times in case of emergency situations.     These are general instructions for a healthy lifestyle:    No smoking/ No tobacco products/ Avoid exposure to second hand smoke  Surgeon General's Warning:  Quitting smoking now greatly reduces serious risk to your health. Obesity, smoking, and sedentary lifestyle greatly increases your risk for illness    A healthy diet, regular physical exercise & weight monitoring are important for maintaining a healthy lifestyle    You may be retaining fluid if you have a history of heart failure or if you experience any of the following symptoms:  Weight gain of 3 pounds or more overnight or 5 pounds in a week, increased swelling in our hands or feet or shortness of breath while lying flat in bed. Please call your doctor as soon as you notice any of these symptoms; do not wait until your next office visit. Recognize signs and symptoms of STROKE:    F-face looks uneven  A-arms unable to move or move unevenly  S-speech slurred or non-existent  T-time-call 911 as soon as signs and symptoms begin-DO NOT go       Back to bed or wait to see if you get better-TIME IS BRAIN. Warning Signs of HEART ATTACK     Call 911 if you have these symptoms:   Chest discomfort. Most heart attacks involve discomfort in the center of the chest that lasts more than a few minutes, or that goes away and comes back. It can feel like uncomfortable pressure, squeezing, fullness, or pain.  Discomfort in other areas of the upper body. Symptoms can include pain or discomfort in one or both arms, the back, neck, jaw, or stomach.  Shortness of breath with or without chest discomfort.  Other signs may include breaking out in a cold sweat, nausea, or lightheadedness. Don't wait more than five minutes to call 911 - MINUTES MATTER! Fast action can save your life. Calling 911 is almost always the fastest way to get lifesaving treatment. Emergency Medical Services staff can begin treatment when they arrive -- up to an hour sooner than if someone gets to the hospital by car.      The discharge information has been reviewed with the patient and spouse. The patient and spouse verbalized understanding. Discharge medications reviewed with the patient and spouse and appropriate educational materials and side effects teaching were provided.   ___________________________________________________________________________________________________________________________________

## 2017-12-02 NOTE — PROGRESS NOTES
[facility]      CARDIOLOGY PROGRESS NOTE  RECS:    1. Unstable angina-s/p LCx stent; stable. OK to d/c ranexa  2. Hypertension- stable continue with bb, ACEi and CCB  3. Hyperlipidemia- lipid Panel 10/17 elevated Triglycerides and low HDL. Continue with Crestor & tricor. Follow Lipid panel today   4. Diabetes-  Medical team will follow.            NUC stress test 11/17  FINDINGS  1. Post-stress images in short axis, horizontal, vertical long axis  reveals poor isotope uptake in the inferior wall. 2. Resting images show poor isotope uptake in the inferior wall. 3. Left ventricular cavity appears to be normal size with normal wall  motion and ejection fraction of 63%.      CONCLUSIONS  1. Moderate size fixed defect involving the inferior wall, likely from prior  infarction versus diaphragmatic attenuation in a male patient. 2. Normal ventricular cavity with normal wall motion and ejection  fraction of 63%. 3. Intermediate risk scan.       IMPRESSION:cath:1/20161. TWO VESSEL CORONARY ARTERY DISEASE IN THE FORM OF 80% OSTIAL AND 90% PROXIMAL NON-DOMINANT RIGHT CORONARY STENOSIS WHICH IS A MODERATE SIZED VESSEL OF ABOUT 1.5 TO 2 MM IN DIAMETER, AND 40% PROXIMAL CIRCUMFLEX, AND 70% MID CIRCUMFLEX STENOSIS WHICH IS A DOMINANT VESSEL. THERE ARE DIFFUSE LUMINAL IRREGULARITIES SEEN THROUGHOUT THE CORONARIES. 2. NO NORMAL OVERALL LV EJECTION FRACTION AT REST. 3. EVIDENCE OF CHEST PAIN DURING THE CASE AS WELL AS BEFORE THE CASE WAS STARTED. PARTIAL RELIEF WITH SUBLINGUAL NITROGLYCERIN AND NO ACUTE EKG CHANGES SEEN IN THE SIX MONITORING LEADS IN THE CATH LAB. DISCUSSION AND RECOMMENDATIONS: PATIENT IS LIKELY HAVING CHEST PAIN FROM THE NON-DOMINANT RIGHT CORONARY ARTERY. THE LEFT CIRCUMFLEX IS DOMINANT AND APPEARS TO HAVE BORDERLINE MID STENOSIS WHICH DOES NOT HAVE ANY APPEARANCE OF ANY ACUTE UNSTABLE LESION.  I THINK HE SHOULD BE TREATED MEDICALLY FOR NOW, AND IF THE SYMPTOMS PERSIST, LEFT CIRCUMFLEX ARTERY WILL NEED TO BE INTERROGATED BY INTRACORONARY DOPPLER WITH FFR DETERMINATION. RIGHT CORONARY, THOUGH APPEARS TO BE CRITICAL IS A SMALL VESSEL OF ABOUT 1.5 TO A MAXIMUM OF 2 MM IN DIAMETER AND PROBABLY SHOULD BE LEFT TO MEDICAL TREATMENT.  AGGRESSIVE RISK FACTOR MODIFICATION SHOULD BE FOLLOWED    ASSESSMENT:  Hospital Problems  Date Reviewed: 4/7/2016          Codes Class Noted POA    CAD (coronary artery disease) ICD-10-CM: I25.10  ICD-9-CM: 414.00  12/1/2017 Unknown        Dyspnea on exertion ICD-10-CM: R06.09  ICD-9-CM: 786.09  11/29/2017 Unknown        Hyperglycemia ICD-10-CM: R73.9  ICD-9-CM: 790.29  11/29/2017 Unknown        Intermittent chest pain ICD-10-CM: R07.9  ICD-9-CM: 786.50  11/29/2017 Unknown        Coronary artery disease involving native coronary artery with unstable angina pectoris ([facility] ICD-10-CM: I25.110  ICD-9-CM: 414.01, 411.1  1/27/2016 Yes    Overview Signed 1/27/2016 11:41 AM by Marcelo Villatroo MD     recent admission angina stable on medical managment  unable to tolerate isordil             Essential hypertension ICD-10-CM: I10  ICD-9-CM: 401.9  1/27/2016 Yes    Overview Signed 1/27/2016 11:41 AM by Marcelo Villatoro MD     controlled             Hyperlipidemia ICD-10-CM: E78.5  ICD-9-CM: 272.4  1/27/2016 Yes    Overview Signed 1/27/2016 11:41 AM by Marcelo Villatoro MD     follow up labs             Type 2 diabetes mellitus without complication ([facility] VML-51-PU: E11.9  ICD-9-CM: 250.00  1/27/2016 Yes    Overview Signed 1/27/2016 11:41 AM by Marcelo Villatoro MD     stable  f/u per pcp             Thyroid goiter ICD-10-CM: E04.9  ICD-9-CM: 240.9  1/27/2016 Yes    Overview Signed 1/27/2016 11:47 AM by Marcelo Villatoro MD     Impression:Multinodular goiter with complex cystic nodules bilaterally measuring up to 1.6 cm on left and 1.5 cm on right.  1/2016             Left thigh pain ICD-10-CM: Z55.707  ICD-9-CM: 729.5  11/17/2014 Yes        Chronic pain syndrome ICD-10-CM: G89.4  ICD-9-CM: 338.4  11/17/2014 Yes                SUBJECTIVE:    Intermittent  CP no diaphoresis no nausea  No SOB    OBJECTIVE:    VS:   Visit Vitals    /68    Pulse 78    Temp 97.5 °F (36.4 °C)    Resp 16    Ht 5' 11\" (1.803 m)    Wt 90.1 kg (198 lb 11.2 oz)    SpO2 95%    BMI 27.71 kg/m2         Intake/Output Summary (Last 24 hours) at 12/02/17 1252  Last data filed at 12/02/17 1152   Gross per 24 hour   Intake             1100 ml   Output             1575 ml   Net             -475 ml     TELE: normal sinus rhythm    General: alert, well developed, pleasant and in no apparent distress  HENT: Normocephalic, atraumatic. Normal external eye.   Neck :  no bruit, no JVD  Cardiac:  regular rate and rhythm, S1, S2 normal, no click, no rub  Lungs: clear to auscultation bilaterally  Abdomen: Soft, nontender, no masses  Extremities:  No c/c/e, peripheral pulses present      Labs: Results:       Chemistry Recent Labs      12/01/17 0318 11/30/17   0600  11/29/17   1320   GLU  208*  236*  286*   NA  134*  136  137   K  3.8  4.0  4.1   CL  102  103  102   CO2  27  24  24   BUN  25*  15  13   CREA  1.06  0.86  0.89   CA  8.7  8.5  9.2   AGAP  5  9  11   BUCR  24*  17  15   AP   --    --   105   TP   --    --   7.5   ALB   --    --   4.1   GLOB   --    --   3.4   AGRAT   --    --   1.2      CBC w/Diff Recent Labs      12/01/17 0318 11/30/17   1310  11/30/17   0600   WBC  8.6  9.2  9.5   RBC  4.34*  4.34*  4.36*   HGB  13.6  13.7  13.5   HCT  38.4  38.2  38.4   PLT  177  174  171   GRANS  49  64  69   LYMPH  41  27  23   EOS  1  1  1      Cardiac Enzymes Recent Labs      11/30/17   2130  11/30/17   1624   CPK  49  45   CKND1  CALCULATION NOT PERFORMED WHEN RESULT IS BELOW LINEAR LIMIT  CALCULATION NOT PERFORMED WHEN RESULT IS BELOW LINEAR LIMIT      Coagulation Recent Labs      12/01/17 0318 11/30/17   1845   APTT  78.9*  53.3*       Lipid Panel Lab Results   Component Value Date/Time    Cholesterol, total 153 11/30/2017 04:24 PM    HDL Cholesterol 37 11/30/2017 04:24 PM    LDL, calculated  11/30/2017 04:24 PM     LDL AND VLDL CHOLESTEROL NOT CALCULATED WHEN TRIGLYCERIDES >400 MG/DL OR HDL CHOLESTEROL <20 MG/DL    VLDL, calculated  11/30/2017 04:24 PM     Calculation not valid with this patient's other Lipid values. Triglyceride 473 11/30/2017 04:24 PM    CHOL/HDL Ratio 4.1 11/30/2017 04:24 PM      BNP No results for input(s): BNPP in the last 72 hours.    Liver Enzymes Recent Labs      11/29/17   1320   TP  7.5   ALB  4.1   AP  105   SGOT  23      Thyroid Studies No results found for: T4, T3U, TSH, TSHEXT, TSHEXT           Skip Harris MD

## 2017-12-02 NOTE — ROUTINE PROCESS
0600 Pt denies chest pain. Right radial dressing intact/dry and radial pulses palpable and present. No hematoma or bruising noted to right radial.    Bedside shift change report given to [address] [place] [address] (oncoming nurse) by David Ivory (offgoing nurse). Report given with SBAR, Kardex, Intake/Output, MAR and Recent Results.  Cardiac Rhythm NSR

## 2017-12-02 NOTE — PROCEDURES
[facility] CATH LAB    Name:  Faustino Gregg  MR#:  [id number]  :  1950  Account #:  [de-identified]  Date of Adm:  2017  Date of Service:  2017      ESTIMATED BLOOD LOSS: none    SPECIMENS REMOVED: none    PROCEDURE PERFORMED BY:  Elio Fernandez MD    NAME OF PROCEDURE:  Coronary angiogram. Status post  percutaneous transluminal coronary angioplasty/stent to proximal and  mid circumflex artery. ENTRY:  Right radial artery. COMPLICATIONS:  None. SEDATION:  Moderate sedation was obtained using 1 mg of Versed  and 25 mcg of fentanyl. Total sedation time was 68 minutes. PROCEDURE IN DETAIL:  [facility]. Subsequent stress test  revealed a moderate-sized fixed defect involving the inferior wall. The  patient continued to have ongoing rest pain. Due to recurrent  symptoms, we decided to proceed with intervention. Written witnessed  informed consent was obtained from the patient. The patient was  prepped and draped in a sterile fashion. Sedation obtained using IV  Versed and fentanyl. 1% lidocaine was injected around the right radial  artery and the right radial artery was accessed using 6-Maltese arterial  sheath without any complication. Following this, 2.5 mg of verapamil  and 200 mcg of nitroglycerin were administered intraarterial to relieve  vasospasm. A right coronary angiogram was performed using a JR  catheter. Left coronary angiogram was performed using an EBU 4  guide. Following diagnostic angiogram, we noted the patient to have  critical stenosis of proximal circumflex artery and high-grade stenosis  of mid circumflex artery. Due to recurrent symptoms, we decided to  proceed with intervention. At this time, heparin was administered to achieve ACT greater than  200. Two boluses of Integrilin were administered.  A Runthrough wire  was carefully wired across the lesion and the wire was positioned in  the distal portion of the left posterior descending artery. PTCA of the  proximal circumflex artery was performed using a Trek 1.5 x 12 mm  balloon. Appeared to be very calcific stenosis. Following this, further  PTCA was performed using a 2.5 mm x 12 mm balloon to the proximal  and midportion. We then deployed a Synergy 2.75 mm x 12 mm stent  to the mid circumflex artery at about 14 atmospheres. Post-PCI PTCA  was performed via Trek 3.0 mm x 8 mm balloon. Balloon was inflated  about 16 atmospheres. Lesion was reduced from 80% to 0%. Synergy  2.75 mm x 15 mm stent was deployed to the proximal portion of the  circumflex artery. Predilatation before stenting was performed using a  noncompliant balloon. The stent was deployed about 14 atmospheres. Post-PCI PTCA was performed using a noncompliant 3.0 mm x 8 mm  balloon. The balloon was inflated about 18 atmospheres. Lesion was  reduced from 99% to 10%. Following this, supporting catheter and wire  were removed. A Rad-Band was applied to right radial artery to  achieve hemostasis. FINDING  1. Left main is patent, bifurcates into left anterior descending artery  and circumflex artery. 2. Left anterior descending artery has ostial 30% to 40% stenosis  followed by mid diffuse 30-40% stenosis. The vessel appears to be  moderately calcified. Diagonal 1 artery has diffuse 30-40% stenosis. Mid to distal left anterior descending artery is patent. 3. Circumflex artery is dominant with proximal 95% calcific stenosis. Status post PTCA using a Trek 1.5 x 8 mm balloon followed by a 2.5  mm x 12 mm balloon. The stent was a Synergy 2.75 mm x 15 mm  stent was deployed at about 14 atmospheres. Post-PCI PTCA was  performed using a noncompliant 3.0 mm x 8 mm balloon inflated about  16-18 atmospheres. Lesion reduced to 10%. 4. Obtuse marginal 1 artery was a small-caliber vessel with ostial 70%  to 80% stenosis.   5. Obtuse marginal 2 artery is a small- to medium-caliber vessel with  diffuse 30-40% stenosis. Mid circumflex artery has focal 80% stenosis. Status post PTCA using a Trek 2.5 mm x 12 mm balloon followed by a  Synergy 2.75 mm x 12 mm stent deployed about 14 atmospheres. Post-PCI PTCA was performed using a 3.0 mm x 8 mm  balloon deployed about 16 atmospheres. Lesion reduced to 0%. 6. Left posterior descending artery is patent. 7. Right coronary artery is nondominant, has ostial calcific 90%  followed by mid 99% stenosis. CONCLUSION:  Triple-vessel coronary artery disease. Status post  percutaneous transluminal coronary angioplasty/stent to proximal and  mid circumflex artery using drug-eluting stents. The patient will be on  aspirin and Brilinta at this time. Intense medical management and risk  factor modification is advised.         MD PRESTON Brooks / Damien Hickman  D:  12/01/2017   14:43  T:  12/01/2017   19:30  Job #:  [id number]

## 2017-12-02 NOTE — PROGRESS NOTES
[facility]  Progress Note    Patient: Kris Day Age: 79 y.o. : 1950 MR#: [id number] SSN: [id number]  Date: 2017     Subjective:     Pt s/p cath, no reports of cp at the time of my eval.    Assessment/Plan:   1. Unstable angina with hx PCI/CADz - s/p cath and stent placement to proximal and mid circumflex artery using LIZ. 2. HTN - BPs wnl.  3. Hyperlipidemia - statin, fibrate. 4. DM - SSI. Dispo: dc tomorrow if okay with cardiology team.    Additional Notes:      Case discussed with:  [x]Patient  [x]Family  []Nursing  []Case Management  DVT Prophylaxis:  []Lovenox  []Hep SQ  []SCDs  []Coumadin   [x]On Heparin gtt    Objective:   VS:   Visit Vitals    /79 (BP 1 Location: Right arm, BP Patient Position: At rest)    Pulse 73    Temp 97.7 °F (36.5 °C)    Resp 18    Ht 5' 11\" (1.803 m)    Wt 89.1 kg (196 lb 6.4 oz)    SpO2 95%    BMI 27.39 kg/m2      Tmax/24hrs: Temp (24hrs), Av.4 °F (36.9 °C), Min:97.7 °F (36.5 °C), Max:99 °F (37.2 °C)      Intake/Output Summary (Last 24 hours) at 17  Last data filed at 17   Gross per 24 hour   Intake              380 ml   Output             1000 ml   Net             -620 ml       General:  Awake, alert, NAD. Cardiovascular:  RRR. Pulmonary:  CTA B.  GI:  Soft, NT/ND, NABS. Extremities:  No CT or edema. Additional:      Labs:    Recent Results (from the past 24 hour(s))   NUCLEAR STRESS TEST    Collection Time: 17  8:38 PM   Result Value Ref Range    Diagnosis      Test indication Chest Discomfort     Functional capacity      ECG Interp. Before Exercise      ECG Interp. During Exercise      Overall HR response to exercise      Overall BP response to exercise      Max. Systolic  mmHg    Max. Diastolic BP 80 mmHg    Max.  Heart rate 107 BPM    [facility] treadmill score      Xavier TM score result      Peak Ex METs 1.0 METS    Protocol name LEXISCAN N/E         Known cardiac condition      Attending physician     GLUCOSE, POC    Collection Time: 11/30/17  9:11 PM   Result Value Ref Range    Glucose (POC) 182 (H) 70 - 110 mg/dL   CARDIAC PANEL,(CK, CKMB & TROPONIN)    Collection Time: 11/30/17  9:30 PM   Result Value Ref Range    CK 49 39 - 308 U/L    CK - MB <1.0 <3.6 ng/ml    CK-MB Index  0.0 - 4.0 %     CALCULATION NOT PERFORMED WHEN RESULT IS BELOW LINEAR LIMIT    Troponin-I, Qt. 0.07 (H) 0.0 - 0.045 NG/ML   CBC WITH AUTOMATED DIFF    Collection Time: 12/01/17  3:18 AM   Result Value Ref Range    WBC 8.6 4.6 - 13.2 K/uL    RBC 4.34 (L) 4.70 - 5.50 M/uL    HGB 13.6 13.0 - 16.0 g/dL    HCT 38.4 36.0 - 48.0 %    MCV 88.5 74.0 - 97.0 FL    MCH 31.3 24.0 - 34.0 PG    MCHC 35.4 31.0 - 37.0 g/dL    RDW 13.9 11.6 - 14.5 %    PLATELET 476 592 - 750 K/uL    MPV 10.4 9.2 - 11.8 FL    NEUTROPHILS 49 40 - 73 %    LYMPHOCYTES 41 21 - 52 %    MONOCYTES 8 3 - 10 %    EOSINOPHILS 1 0 - 5 %    BASOPHILS 1 0 - 2 %    ABS. NEUTROPHILS 4.2 1.8 - 8.0 K/UL    ABS. LYMPHOCYTES 3.6 0.9 - 3.6 K/UL    ABS. MONOCYTES 0.7 0.05 - 1.2 K/UL    ABS. EOSINOPHILS 0.1 0.0 - 0.4 K/UL    ABS.  BASOPHILS 0.1 0.0 - 0.1 K/UL    DF AUTOMATED     METABOLIC PANEL, BASIC    Collection Time: 12/01/17  3:18 AM   Result Value Ref Range    Sodium 134 (L) 136 - 145 mmol/L    Potassium 3.8 3.5 - 5.5 mmol/L    Chloride 102 100 - 108 mmol/L    CO2 27 21 - 32 mmol/L    Anion gap 5 3.0 - 18 mmol/L    Glucose 208 (H) 74 - 99 mg/dL    BUN 25 (H) 7.0 - 18 MG/DL    Creatinine 1.06 0.6 - 1.3 MG/DL    BUN/Creatinine ratio 24 (H) 12 - 20      GFR est AA >60 >60 ml/min/1.73m2    GFR est non-AA >60 >60 ml/min/1.73m2    Calcium 8.7 8.5 - 10.1 MG/DL   PTT    Collection Time: 12/01/17  3:18 AM   Result Value Ref Range    aPTT 78.9 (H) 23.0 - 36.4 SEC   GLUCOSE, POC    Collection Time: 12/01/17  7:59 AM   Result Value Ref Range    Glucose (POC) 227 (H) 70 - 110 mg/dL   POC ACTIVATED CLOTTING TIME    Collection Time: 12/01/17  1:43 PM   Result Value Ref Range    Activated Clotting Time (POC) 312 (H) 79 - 138 SECS   GLUCOSE, POC    Collection Time: 12/01/17  6:02 PM   Result Value Ref Range    Glucose (POC) 245 (H) 70 - 110 mg/dL       Signed By: Cindy Mcclelland MD     December 1, 2017 5:55 PM

## 2017-12-04 ENCOUNTER — TELEPHONE (OUTPATIENT)
Dept: OTHER | Age: 67
End: 2017-12-04

## 2017-12-04 NOTE — DISCHARGE SUMMARY
[facility] [address] [place] [place] SUMMARY    Name:  Torey Kay  MR#:  [id number]  :  1950  Account #:  [de-identified]  Date of Adm:  2017  Date of Discharge:  2017      ADMISSION DIAGNOSES  1. Unstable angina. 2. Coronary artery disease. 3. Hypertension. 4. Dyslipidemia. 5. Diabetes mellitus. PROCEDURES DONE: Cardiac catheterization done on 2017. Conclusion - triple-vessel coronary artery disease, status post  percutaneous transluminal coronary angioplasty, stent to proximal and  mid circumflex artery using drug-eluting stents. The patient to be on  aspirin and Brilinta. CONSULTS: Dr. Yulisa Thomas from Cardiology. REASON FOR HOSPITALIZATION: The patient is a 54-year-old male  with history of coronary artery disease, who presented with complaints  of chest pain. HOSPITALIZATION COURSE: The patient was admitted and  monitored on a telemetry bed. His diagnosis was unstable angina. His  troponin peaked at 0.07. He underwent cardiac catheterization with  intervention described above. The patient tolerated the procedure well  and had no complaints of chest pain, in fact he reported having  improved ability to mobilize himself without shortness of breath, fatigue  or chest pain. The day after his procedure, he continued to report  improvement in his symptoms. Denies chest pain. He was seen by the  cardiologist who agreed with the discharge home. The patient will be  discharged home on dual antiplatelet therapy. DISCHARGE MEDICATIONS  1. Norvasc 5 mg daily. 2. Aspirin 81 mg daily. 3. Coreg 12.5 mg twice a day. 4. Tricor 48 mg every night. 5. Altace 10 mg daily. 6. Crestor 10 mg daily. NEW MEDICATIONS: Brilinta 90 mg twice a day. The patient has been given a card for free 1 month supply and if  eligible to get discount on his copayment. Stop taking Ranexa. Hold  metformin until 48 hours after his cardiac catheterization.  This has  been recommended to wife. PHYSICAL EXAMINATION  VITAL SIGNS: Date of discharge, he is afebrile with stable vital signs. GENERAL: He is alert, awake, oriented. He has appropriate affect. HEART: Regular rate and rhythm. He has a soft grade 1/6 systolic  ejection murmur. PULMONARY: Clear to auscultation bilaterally. ABDOMEN: Soft. EXTREMITIES: No peripheral edema. Followup with his primary care physician in 1 week. Followup with  cardiologist in 1-2 weeks. Time taken for discharge greater than 30 minutes.         MD VALENCIA Ely / Pilar Jenkins  D:  12/02/2017   14:21  T:  12/03/2017   15:36  Job #:  [id number]

## 2017-12-04 NOTE — TELEPHONE ENCOUNTER
Called pt to follow up on recent hospitalization here at [facility]. Patient was discharged home on Saturday and states he is doing well except that he has some dizziness when he is up walking and some very mild chest pain. He states he had both of these while in hospital and they have not changed. Instructed pt to make sure walking where he could hold on to wall or with someone as long as dizziness continues. Pt got all his Rx filled and is taking as directed. He is waiting on someone to call him with his appt's but states that if he doesn't hear from them today that he will call himself.     Lisandro Fung RN

## 2018-01-25 ENCOUNTER — OFFICE VISIT (OUTPATIENT)
Dept: CARDIOLOGY CLINIC | Age: 68
End: 2018-01-25

## 2018-01-25 VITALS
DIASTOLIC BLOOD PRESSURE: 74 MMHG | HEIGHT: 71 IN | HEART RATE: 77 BPM | BODY MASS INDEX: 28 KG/M2 | SYSTOLIC BLOOD PRESSURE: 143 MMHG | WEIGHT: 200 LBS

## 2018-01-25 DIAGNOSIS — Z79.4 TYPE 2 DIABETES MELLITUS WITHOUT COMPLICATION, WITH LONG-TERM CURRENT USE OF INSULIN (HCC): ICD-10-CM

## 2018-01-25 DIAGNOSIS — I10 ESSENTIAL HYPERTENSION: ICD-10-CM

## 2018-01-25 DIAGNOSIS — E78.5 HYPERLIPIDEMIA, UNSPECIFIED HYPERLIPIDEMIA TYPE: ICD-10-CM

## 2018-01-25 DIAGNOSIS — E11.9 TYPE 2 DIABETES MELLITUS WITHOUT COMPLICATION, WITH LONG-TERM CURRENT USE OF INSULIN (HCC): ICD-10-CM

## 2018-01-25 DIAGNOSIS — Z95.5 S/P CORONARY ARTERY STENT PLACEMENT: ICD-10-CM

## 2018-01-25 DIAGNOSIS — I25.10 CORONARY ARTERY DISEASE INVOLVING NATIVE CORONARY ARTERY OF NATIVE HEART WITHOUT ANGINA PECTORIS: Primary | ICD-10-CM

## 2018-01-25 RX ORDER — ASPIRIN 325 MG
325 TABLET, DELAYED RELEASE (ENTERIC COATED) ORAL DAILY
Qty: 100 TAB | Refills: 1 | Status: SHIPPED | OUTPATIENT
Start: 2018-01-25 | End: 2018-05-31 | Stop reason: SDUPTHER

## 2018-01-25 RX ORDER — CLOPIDOGREL BISULFATE 75 MG/1
75 TABLET ORAL DAILY
Qty: 90 TAB | Refills: 3 | Status: SHIPPED | OUTPATIENT
Start: 2018-01-25 | End: 2019-01-17 | Stop reason: SDUPTHER

## 2018-01-25 NOTE — MR AVS SNAPSHOT
[address] [facility] 
 
 
 [address] [facility], [address] [facility] [address] 
[phone] Patient: Jonathan Priest MRN: [id number]:[id number] Visit Information Date & Time Provider Department Dept. Phone Encounter #  
 1/25/2018  8:30 AM Garfield aMlik MD [facility] [address] [place] [address] [id number] Follow-up Instructions Return in about 3 months (around 4/25/2018). Your Appointments 5/2/2018  9:15 AM  
ESTABLISHED PATIENT with Garfield Malik MD  
[facility]) Appt Note: 3 months [address] [facility], [address] [facility] [address]  
[address], [address] Upcoming Health Maintenance Date Due Hepatitis C Screening 1950 FOOT EXAM Q1 1/28/1960 EYE EXAM RETINAL OR DILATED Q1 1/28/1960 DTaP/Tdap/Td series (1 - Tdap) 1/28/1971 FOBT Q 1 YEAR AGE 50-75 1/28/2000 ZOSTER VACCINE AGE 60> 11/28/2009 GLAUCOMA SCREENING Q2Y 1/28/2015 Pneumococcal 65+ Low/Medium Risk (1 of 2 - PCV13) 1/28/2015 MEDICARE YEARLY EXAM 1/28/2015 Influenza Age 5 to Adult 8/1/2017 MICROALBUMIN Q1 10/11/2017 HEMOGLOBIN A1C Q6M 5/30/2018 LIPID PANEL Q1 11/30/2018 Allergies as of 1/25/2018  Review Complete On: 1/25/2018 By: Garfield Malik MD  
 No Known Allergies Current Immunizations  Never Reviewed No immunizations on file. Not reviewed this visit You Were Diagnosed With   
  
 Codes Comments Coronary artery disease involving native coronary artery of native heart without angina pectoris    -  Primary ICD-10-CM: I25.10 ICD-9-CM: 414.01 stable Essential hypertension     ICD-10-CM: I10 
ICD-9-CM: 401.9 stable Hyperlipidemia, unspecified hyperlipidemia type     ICD-10-CM: E78.5 ICD-9-CM: 272.4 on statin 
stable  Type 2 diabetes mellitus without complication, with long-term current use of insulin ([facility] [address]     ICD-10-CM: E11.9, Z79.4 ICD-9-CM: 250.00, V58.67 S/P coronary artery stent placement     ICD-10-CM: Z95.5 ICD-9-CM: V45.82 lcx/om 12/2017 Vitals BP Pulse Height(growth percentile) Weight(growth percentile) BMI Smoking Status 143/74 77 5' 11\" (1.803 m) 200 lb (90.7 kg) 27.89 kg/m2 Never Smoker Vitals History BMI and BSA Data Body Mass Index Body Surface Area  
 27.89 kg/m 2 2.13 m 2 Preferred Pharmacy Pharmacy Name Phone [facility], [address] [place] [address] [phone] Your Updated Medication List  
  
   
This list is accurate as of: 1/25/18  8:53 AM.  Always use your most recent med list.  
  
  
  
  
 acetaminophen 325 mg tablet Commonly known as:  TYLENOL Take  by mouth every four (4) hours as needed for Pain. aspirin delayed-release 325 mg tablet Take 1 Tab by mouth daily. carvedilol 12.5 mg tablet Commonly known as:  COREG  
TAKE 1 TAB BY MOUTH TWO (2) TIMES DAILY (WITH MEALS). clopidogrel 75 mg Tab Commonly known as:  PLAVIX Take 1 Tab by mouth daily. CRESTOR 10 mg tablet Generic drug:  rosuvastatin Take 10 mg by mouth nightly. fenofibrate nanocrystallized 48 mg tablet Commonly known as:  [employer] Take 1 Tab by mouth nightly. metFORMIN 500 mg tablet Commonly known as:  GLUCOPHAGE Take  by mouth two (2) times daily (with meals). nitroglycerin 0.4 mg SL tablet Commonly known as:  NITROSTAT  
1 Tab by SubLINGual route every five (5) minutes as needed for Chest Pain for up to 3 doses. NORVASC 5 mg tablet Generic drug:  amLODIPine Take 5 mg by mouth daily. Omeprazole delayed release 20 mg tablet Commonly known as:  PRILOSEC D/R Take 20 mg by mouth daily. promethazine 25 mg tablet Commonly known as:  PHENERGAN Take 25 mg by mouth every six (6) hours as needed for Nausea. ramipril 10 mg capsule Commonly known as:  ALTACE Take 10 mg by mouth daily. Prescriptions Sent to Pharmacy Refills  
 clopidogrel (PLAVIX) 75 mg tab 3 Sig: Take 1 Tab by mouth daily. Class: Normal  
 Pharmacy: [address] #: [phone] Route: Oral  
 aspirin delayed-release 325 mg tablet 1 Sig: Take 1 Tab by mouth daily. Class: Normal  
 Pharmacy: [address] #: [phone] Route: Oral  
  
Follow-up Instructions Return in about 3 months (around 4/25/2018). Introducing [facility] SERVICES! [employer] introduces [employer] patient portal. Now you can access parts of your medical record, email your doctor's office, and request medication refills online. 1. In your internet browser, go to [email or web address] 2. Click on the First Time User? Click Here link in the Sign In box. You will see the New Member Sign Up page. 3. Enter your [employer] Access Code exactly as it appears below. You will not need to use this code after youve completed the sign-up process. If you do not sign up before the expiration date, you must request a new code. · [employer] Access Code: [email or web address] Expires: 2/27/2018  1:21 PM 
 
4. Enter the last four digits of your Social Security Number (xxxx) and Date of Birth (mm/dd/yyyy) as indicated and click Submit. You will be taken to the next sign-up page. 5. Create a Novastt ID. This will be your [employer] login ID and cannot be changed, so think of one that is secure and easy to remember. 6. Create a [employer] password. You can change your password at any time. 7. Enter your Password Reset Question and Answer. This can be used at a later time if you forget your password. 8. Enter your e-mail address. You will receive e-mail notification when new information is available in [address] 9. Click Sign Up. You can now view and download portions of your medical record. 10. Click the Download Summary menu link to download a portable copy of your medical information. If you have questions, please visit the Frequently Asked Questions section of the [email or web address] website. Remember, [email or web address] is NOT to be used for urgent needs. For medical emergencies, dial 911. Now available from your iPhone and Android! Please provide this summary of care documentation to your next provider. Your primary care clinician is listed as NONE. If you have any questions after today's visit, please call [phone].

## 2018-01-25 NOTE — PROGRESS NOTES
HISTORY OF PRESENT ILLNESS  Radha Dawn is a 79 y.o. male. HPI Comments: Patient with cad,htn,hyperlipidemia,dm. On follow up patient denies any chest pains,sob, palpitation or other significant symptoms. 12/2017  Admitted with unstable [facility] Follow Up   The history is provided by the patient. Pertinent negatives include no chest pain and no shortness of breath. Chest Pain (Angina)    The history is provided by the patient. This is a recurrent problem. The problem has been resolved. The problem occurs rarely. The pain is associated with exertion. The pain is present in the substernal region. The quality of the pain is described as pressure-like. The pain does not radiate. Pertinent negatives include no claudication, no cough, no dizziness, no fever, no hemoptysis, no nausea, no orthopnea, no palpitations, no PND, no shortness of breath, no sputum production, no vomiting and no weakness. Review of Systems   Constitutional: Negative for chills and fever. HENT: Negative for nosebleeds. Eyes: Negative for blurred vision and double vision. Respiratory: Negative for cough, hemoptysis, sputum production, shortness of breath and wheezing. Cardiovascular: Negative for chest pain, palpitations, orthopnea, claudication, leg swelling and PND. Gastrointestinal: Negative for heartburn, nausea and vomiting. Musculoskeletal: Negative for myalgias. Skin: Negative for rash. Neurological: Negative for dizziness and weakness. Endo/Heme/Allergies: Does not bruise/bleed easily. Family History   Problem Relation Age of Onset    Stroke Mother     Headache Mother     Hypertension Mother     Heart Disease Father     Heart Attack Father     Hypertension Father     Diabetes Father        Past Medical History:   Diagnosis Date    Arthritis     1999 vicodin    Diabetes ([address] 1998 metformin    Hypertension 1996 norvasc       No past surgical history on file.     Social History Substance Use Topics    Smoking status: Never Smoker    Smokeless tobacco: Never Used    Alcohol use No       No Known Allergies    Outpatient Prescriptions Marked as Taking for the 1/25/18 encounter (Office Visit) with Vida Ryan MD   Medication Sig Dispense Refill    clopidogrel (PLAVIX) 75 mg tab Take 1 Tab by mouth daily. 90 Tab 3    aspirin delayed-release 325 mg tablet Take 1 Tab by mouth daily. 100 Tab 1    nitroglycerin (NITROSTAT) 0.4 mg SL tablet 1 Tab by SubLINGual route every five (5) minutes as needed for Chest Pain for up to 3 doses. 25 Tab 0    fenofibrate nanocrystallized (TRICOR) 48 mg tablet Take 1 Tab by mouth nightly. 30 Tab 3    carvedilol (COREG) 12.5 mg tablet TAKE 1 TAB BY MOUTH TWO (2) TIMES DAILY (WITH MEALS). 90 Tab 3    Omeprazole delayed release (PRILOSEC D/R) 20 mg tablet Take 20 mg by mouth daily.  acetaminophen (TYLENOL) 325 mg tablet Take  by mouth every four (4) hours as needed for Pain.  amLODIPine (NORVASC) 5 mg tablet Take 5 mg by mouth daily.  rosuvastatin (CRESTOR) 10 mg tablet Take 10 mg by mouth nightly.  metFORMIN (GLUCOPHAGE) 500 mg tablet Take  by mouth two (2) times daily (with meals).  promethazine (PHENERGAN) 25 mg tablet Take 25 mg by mouth every six (6) hours as needed for Nausea.  ramipril (ALTACE) 10 mg capsule Take 10 mg by mouth daily. Visit Vitals    /74    Pulse 77    Ht 5' 11\" (1.803 m)    Wt 90.7 kg (200 lb)    BMI 27.89 kg/m2        Physical Exam   Constitutional: He is oriented to person, place, and time. He appears well-developed and well-nourished. HENT:   Head: Normocephalic and atraumatic. Eyes: Conjunctivae are normal.   Neck: Neck supple. No JVD present. No tracheal deviation present. No thyromegaly present. Cardiovascular: Normal rate, regular rhythm and normal heart sounds. Exam reveals no gallop and no friction rub. No murmur heard.   Pulmonary/Chest: Breath sounds normal. No respiratory distress. He has no wheezes. He has no rales. He exhibits no tenderness. Abdominal: Soft. There is no tenderness. Musculoskeletal: He exhibits no edema. Neurological: He is alert and oriented to person, place, and time. Skin: Skin is warm and dry. Psychiatric: He has a normal mood and affect. Mr. Kemal Marinelli has a reminder for a \"due or due soon\" health maintenance. I have asked that he contact his primary care provider for follow-up on this health maintenance. CONCLUSION:1/2016-  ABNORMAL STRESS ECHO WITH EKG AND WALL MOTION ABNORMALITIES SUGGESTIVE OF LAD  DISTRIBUTION DISEASE    IMPRESSION:cath:1/2016    1. TWO VESSEL CORONARY ARTERY DISEASE IN THE FORM OF 80% OSTIAL AND 90% PROXIMAL NON-DOMINANT RIGHT CORONARY STENOSIS WHICH IS A MODERATE SIZED VESSEL OF ABOUT 1.5 TO 2 MM IN DIAMETER, AND 40% PROXIMAL CIRCUMFLEX, AND 70% MID CIRCUMFLEX STENOSIS WHICH IS A DOMINANT VESSEL. THERE ARE DIFFUSE LUMINAL IRREGULARITIES SEEN THROUGHOUT THE CORONARIES. 2. NO NORMAL OVERALL LV EJECTION FRACTION AT REST. 3. EVIDENCE OF CHEST PAIN DURING THE CASE AS WELL AS BEFORE THE CASE WAS STARTED. PARTIAL RELIEF WITH SUBLINGUAL NITROGLYCERIN AND NO ACUTE EKG CHANGES SEEN IN THE SIX MONITORING LEADS IN THE CATH LAB. DISCUSSION AND RECOMMENDATIONS: PATIENT IS LIKELY HAVING CHEST PAIN FROM THE NON-DOMINANT RIGHT CORONARY ARTERY. THE LEFT CIRCUMFLEX IS DOMINANT AND APPEARS TO HAVE BORDERLINE MID STENOSIS WHICH DOES NOT HAVE ANY APPEARANCE OF ANY ACUTE UNSTABLE LESION. I THINK HE SHOULD BE TREATED MEDICALLY FOR NOW, AND IF THE SYMPTOMS PERSIST, LEFT CIRCUMFLEX ARTERY WILL NEED TO BE INTERROGATED BY INTRACORONARY DOPPLER WITH FFR DETERMINATION. RIGHT CORONARY, THOUGH APPEARS TO BE CRITICAL IS A SMALL VESSEL OF ABOUT 1.5 TO A MAXIMUM OF 2 MM IN DIAMETER AND PROBABLY SHOULD BE LEFT TO MEDICAL TREATMENT. AGGRESSIVE RISK FACTOR MODIFICATION SHOULD BE FOLLOWED.     SUMMARY:12/2017-echo  Left ventricle: Systolic function was normal. Ejection fraction was estimated   in the range of 55 % to 60 %. No obvious  wall motion abnormalities identified in the views obtained. Wall thickness   was mildly increased. Doppler parameters  were consistent with abnormal left ventricular relaxation (grade 1 diastolic   dysfunction). Right ventricle: The size was at the upper limits of normal.    Left atrium: The atrium was mildly to moderately dilated. FINDING-12/2017-cath  1. Left main is patent, bifurcates into left anterior descending artery  and circumflex artery. 2. Left anterior descending artery has ostial 30% to 40% stenosis  followed by mid diffuse 30-40% stenosis. The vessel appears to be  moderately calcified. Diagonal 1 artery has diffuse 30-40% stenosis. Mid to distal left anterior descending artery is patent. 3. Circumflex artery is dominant with proximal 95% calcific stenosis. Status post PTCA using a Trek 1.5 x 8 mm balloon followed by a 2.5  mm x 12 mm balloon. The stent was a Synergy 2.75 mm x 15 mm  stent was deployed at about 14 atmospheres. Post-PCI PTCA was  performed using a noncompliant 3.0 mm x 8 mm balloon inflated about  16-18 atmospheres. Lesion reduced to 10%. 4. Obtuse marginal 1 artery was a small-caliber vessel with ostial 70%  to 80% stenosis. 5. Obtuse marginal 2 artery is a small- to medium-caliber vessel with  diffuse 30-40% stenosis. Mid circumflex artery has focal 80% stenosis. Status post PTCA using a Trek 2.5 mm x 12 mm balloon followed by a  Synergy 2.75 mm x 12 mm stent deployed about 14 atmospheres. Post-PCI PTCA was performed using a 3.0 mm x 8 mm  balloon deployed about 16 atmospheres. Lesion reduced to 0%. 6. Left posterior descending artery is patent. 7. Right coronary artery is nondominant, has ostial calcific 90%  followed by mid 99% stenosis. CONCLUSION:  Triple-vessel coronary artery disease.  Status post  percutaneous transluminal coronary angioplasty/stent to proximal and  mid circumflex artery using drug-eluting stents. The patient will be on  aspirin and Brilinta at this time. Intense medical management and risk  factor modification is advised. Assessment         ICD-10-CM ICD-9-CM    1. Coronary artery disease involving native coronary artery of native heart without angina pectoris I25.10 414.01     stable     2. Essential hypertension I10 401.9     stable   3. Hyperlipidemia, unspecified hyperlipidemia type E78.5 272.4     on statin  stable   4. Type 2 diabetes mellitus without complication, with long-term current use of insulin (HCC) E11.9 250.00     Z79.4 V58.67    5. S/P coronary artery stent placement Z95.5 V45.82     lcx/om 12/2017 1/2018  Out of brillinta for 2 weeks-unable to afford  Changed to plavix-discussed compliance    Medications Discontinued During This Encounter   Medication Reason    HYDROcodone-acetaminophen (VICODIN) 5-300 mg tablet Not A Current Medication    icosapent ethyl (VASCEPA) 1 gram capsule Not A Current Medication    sitaGLIPtin (JANUVIA) 100 mg tablet Not A Current Medication    LORazepam (ATIVAN) 1 mg tablet Not A Current Medication    ticagrelor (BRILINTA) 90 mg tablet Alternate Therapy    aspirin delayed-release 325 mg tablet Reorder       Orders Placed This Encounter    clopidogrel (PLAVIX) 75 mg tab     Sig: Take 1 Tab by mouth daily. Dispense:  90 Tab     Refill:  3    aspirin delayed-release 325 mg tablet     Sig: Take 1 Tab by mouth daily. Dispense:  100 Tab     Refill:  1       Follow-up Disposition:  Return in about 3 months (around 4/25/2018).

## 2018-04-30 RX ORDER — FENOFIBRATE 48 MG/1
TABLET, COATED ORAL
Qty: 30 TAB | Refills: 3 | Status: SHIPPED | OUTPATIENT
Start: 2018-04-30 | End: 2018-05-02 | Stop reason: SDUPTHER

## 2018-05-02 ENCOUNTER — OFFICE VISIT (OUTPATIENT)
Dept: CARDIOLOGY CLINIC | Age: 68
End: 2018-05-02

## 2018-05-02 VITALS
DIASTOLIC BLOOD PRESSURE: 85 MMHG | BODY MASS INDEX: 28.56 KG/M2 | HEIGHT: 71 IN | HEART RATE: 75 BPM | WEIGHT: 204 LBS | SYSTOLIC BLOOD PRESSURE: 167 MMHG

## 2018-05-02 DIAGNOSIS — E78.5 HYPERLIPIDEMIA, UNSPECIFIED HYPERLIPIDEMIA TYPE: ICD-10-CM

## 2018-05-02 DIAGNOSIS — I10 ESSENTIAL HYPERTENSION: ICD-10-CM

## 2018-05-02 DIAGNOSIS — I25.119 CORONARY ARTERY DISEASE INVOLVING NATIVE CORONARY ARTERY OF NATIVE HEART WITH ANGINA PECTORIS (HCC): Primary | ICD-10-CM

## 2018-05-02 DIAGNOSIS — Z95.5 S/P CORONARY ARTERY STENT PLACEMENT: ICD-10-CM

## 2018-05-02 RX ORDER — FENOFIBRATE 48 MG/1
48 TABLET, COATED ORAL DAILY
Qty: 90 TAB | Refills: 2 | Status: SHIPPED | OUTPATIENT
Start: 2018-05-02 | End: 2018-11-03 | Stop reason: SDUPTHER

## 2018-05-02 RX ORDER — AMLODIPINE BESYLATE 5 MG/1
5 TABLET ORAL DAILY
Qty: 90 TAB | Refills: 2 | Status: SHIPPED | OUTPATIENT
Start: 2018-05-02 | End: 2018-07-23 | Stop reason: SDUPTHER

## 2018-05-02 RX ORDER — CETIRIZINE HCL 10 MG
10 TABLET ORAL
COMMUNITY
End: 2020-04-14

## 2018-05-02 RX ORDER — ROSUVASTATIN CALCIUM 10 MG/1
10 TABLET, COATED ORAL
Qty: 90 TAB | Refills: 2 | Status: SHIPPED | OUTPATIENT
Start: 2018-05-02 | End: 2019-01-26 | Stop reason: SDUPTHER

## 2018-05-02 NOTE — MR AVS SNAPSHOT
[address] [facility] 
 
 
 [address] [facility] [address] [facility] [address] 
[phone] Patient: Harlan Garrido MRN: [id number] MPN:[id number] Visit Information Date & Time Provider Department Dept. Phone Encounter #  
 5/2/2018  9:15 AM Jessica Rascon MD [facility] [address] [place] [address] [id number] Follow-up Instructions Return in about 6 months (around 11/2/2018). Your Appointments 11/7/2018  8:15 AM  
Office Visit with Jessica Rascon MD  
[facility]) Appt Note: 6 months post labs [address] [facility], [address] [facility] [address] [address], [address] Upcoming Health Maintenance Date Due Hepatitis C Screening 1950 FOOT EXAM Q1 1/28/1960 EYE EXAM RETINAL OR DILATED Q1 1/28/1960 DTaP/Tdap/Td series (1 - Tdap) 1/28/1971 FOBT Q 1 YEAR AGE 50-75 1/28/2000 ZOSTER VACCINE AGE 60> 11/28/2009 GLAUCOMA SCREENING Q2Y 1/28/2015 Pneumococcal 65+ Low/Medium Risk (1 of 2 - PCV13) 1/28/2015 MICROALBUMIN Q1 10/11/2017 MEDICARE YEARLY EXAM 3/14/2018 HEMOGLOBIN A1C Q6M 5/30/2018 Influenza Age 5 to Adult 8/1/2018 LIPID PANEL Q1 11/30/2018 Allergies as of 5/2/2018  Review Complete On: 5/2/2018 By: Jessica Rascon MD  
 No Known Allergies Current Immunizations  Never Reviewed No immunizations on file. Not reviewed this visit You Were Diagnosed With   
  
 Codes Comments Coronary artery disease involving native coronary artery of native heart with angina pectoris ([facility] [address]    -  Primary ICD-10-CM: I25.119 ICD-9-CM: 414.01, 413.9 occ episodes of chest pains 
? angina/chest wall Essential hypertension     ICD-10-CM: I10 
ICD-9-CM: 401.9 mildly elevated 
out of norvasc S/P coronary artery stent placement     ICD-10-CM: Z95.5 ICD-9-CM: V45.82 stable Hyperlipidemia, unspecified hyperlipidemia type     ICD-10-CM: E78.5 ICD-9-CM: 272.4 stable 
on statin Vitals BP Pulse Height(growth percentile) Weight(growth percentile) BMI Smoking Status 167/85 75 5' 11\" (1.803 m) 204 lb (92.5 kg) 28.45 kg/m2 Never Smoker Vitals History BMI and BSA Data Body Mass Index Body Surface Area  
 28.45 kg/m 2 2.15 m 2 Preferred Pharmacy Pharmacy Name Phone [facility], [address] [place] [address] [phone] Your Updated Medication List  
  
   
This list is accurate as of 5/2/18  9:38 AM.  Always use your most recent med list.  
  
  
  
  
 acetaminophen 325 mg tablet Commonly known as:  TYLENOL Take  by mouth every four (4) hours as needed for Pain. amLODIPine 5 mg tablet Commonly known as:  [employer] [place] Take 1 Tab by mouth daily. aspirin delayed-release 325 mg tablet Take 1 Tab by mouth daily. carvedilol 12.5 mg tablet Commonly known as:  COREG  
TAKE 1 TAB BY MOUTH TWO (2) TIMES DAILY (WITH MEALS). clopidogrel 75 mg Tab Commonly known as:  PLAVIX Take 1 Tab by mouth daily. fenofibrate nanocrystallized 48 mg tablet Commonly known as:  [employer] Take 1 Tab by mouth daily. metFORMIN 500 mg tablet Commonly known as:  GLUCOPHAGE Take  by mouth two (2) times daily (with meals). nitroglycerin 0.4 mg SL tablet Commonly known as:  NITROSTAT  
1 Tab by SubLINGual route every five (5) minutes as needed for Chest Pain for up to 3 doses. Omeprazole delayed release 20 mg tablet Commonly known as:  PRILOSEC D/R Take 20 mg by mouth daily. promethazine 25 mg tablet Commonly known as:  PHENERGAN Take 25 mg by mouth every six (6) hours as needed for Nausea. ramipril 10 mg capsule Commonly known as:  ALTACE Take 10 mg by mouth daily. rosuvastatin 10 mg tablet Commonly known as:  CRESTOR Take 1 Tab by mouth nightly. ZyrTEC 10 mg tablet Generic drug:  cetirizine Take  by mouth. Prescriptions Sent to Pharmacy Refills  
 fenofibrate nanocrystallized (TRICOR) 48 mg tablet 2 Sig: Take 1 Tab by mouth daily. Class: Normal  
 Pharmacy: [address] #: [phone] Route: Oral  
 rosuvastatin (CRESTOR) 10 mg tablet 2 Sig: Take 1 Tab by mouth nightly. Class: Normal  
 Pharmacy: [address] #: [phone] Route: Oral  
 amLODIPine (NORVASC) 5 mg tablet 2 Sig: Take 1 Tab by mouth daily. Class: Normal  
 Pharmacy: [address] #: [phone] Route: Oral  
  
Follow-up Instructions Return in about 6 months (around 11/2/2018). To-Do List   
 05/02/2018 Lab:  HEPATIC FUNCTION PANEL   
  
 05/02/2018 Lab:  LIPID PANEL   
  
 05/09/2018 Lab:  CBC WITH AUTOMATED DIFF   
  
 05/09/2018 Lab:  METABOLIC PANEL, BASIC Introducing [facility]! [employer] introduces [employer] patient portal. Now you can access parts of your medical record, email your doctor's office, and request medication refills online. 1. In your internet browser, go to [email or web address] 2. Click on the First Time User? Click Here link in the Sign In box. You will see the New Member Sign Up page. 3. Enter your [employer] Access Code exactly as it appears below. You will not need to use this code after youve completed the sign-up process. If you do not sign up before the expiration date, you must request a new code. · [employer] Access Code: [email or web address] Expires: 7/31/2018  9:13 AM 
 
4. Enter the last four digits of your Social Security Number (xxxx) and Date of Birth (mm/dd/yyyy) as indicated and click Submit. You will be taken to the next sign-up page. 5. Create a [employer] ID. This will be your [employer] login ID and cannot be changed, so think of one that is secure and easy to remember. 6. Create a [employer] password. You can change your password at any time. 7. Enter your Password Reset Question and Answer. This can be used at a later time if you forget your password. 8. Enter your e-mail address. You will receive e-mail notification when new information is available in [address] 9. Click Sign Up. You can now view and download portions of your medical record. 10. Click the Download Summary menu link to download a portable copy of your medical information. If you have questions, please visit the Frequently Asked Questions section of the [employer] website. Remember, [employer] is NOT to be used for urgent needs. For medical emergencies, dial 911. Now available from your iPhone and Android! Please provide this summary of care documentation to your next provider. Your primary care clinician is listed as NONE. If you have any questions after today's visit, please call [phone].

## 2018-05-02 NOTE — PROGRESS NOTES
HISTORY OF PRESENT ILLNESS  Santiago Cormier is a 76 y.o. male. HPI Comments: Patient with cad,htn,hyperlipidemia,dm. On follow up patient denies any chest pains,sob, palpitation or other significant symptoms. 12/2017  Admitted with unstable angina-had pci      Chest Pain (Angina)    The history is provided by the patient. This is a recurrent problem. The problem has not changed since onset. The problem occurs rarely. The pain is associated with exertion. The pain is present in the substernal region. The quality of the pain is described as pressure-like. The pain does not radiate. Pertinent negatives include no abdominal pain, no claudication, no cough, no dizziness, no fever, no headaches, no hemoptysis, no nausea, no orthopnea, no palpitations, no PND, no shortness of breath, no sputum production, no vomiting and no weakness. Review of Systems   Constitutional: Negative for chills and fever. HENT: Negative for nosebleeds. Eyes: Negative for blurred vision and double vision. Respiratory: Negative for cough, hemoptysis, sputum production, shortness of breath and wheezing. Cardiovascular: Positive for chest pain. Negative for palpitations, orthopnea, claudication, leg swelling and PND. Gastrointestinal: Negative for abdominal pain, heartburn, nausea and vomiting. Musculoskeletal: Negative for myalgias. Skin: Negative for rash. Neurological: Negative for dizziness, weakness and headaches. Endo/Heme/Allergies: Does not bruise/bleed easily. Family History   Problem Relation Age of Onset    Stroke Mother     Headache Mother     Hypertension Mother     Heart Disease Father     Heart Attack Father     Hypertension Father     Diabetes Father        Past Medical History:   Diagnosis Date    Arthritis     1999 vicodin    Diabetes ([facility] [address] 1998 metformin    Hypertension 1996 norvasc       No past surgical history on file.     Social History   Substance Use Topics    Smoking status: Never Smoker    Smokeless tobacco: Never Used    Alcohol use No       No Known Allergies    Outpatient Prescriptions Marked as Taking for the 5/2/18 encounter (Office Visit) with Yousif Marques MD   Medication Sig Dispense Refill    cetirizine (ZYRTEC) 10 mg tablet Take  by mouth.  fenofibrate nanocrystallized (TRICOR) 48 mg tablet Take 1 Tab by mouth daily. 90 Tab 2    rosuvastatin (CRESTOR) 10 mg tablet Take 1 Tab by mouth nightly. 90 Tab 2    amLODIPine (NORVASC) 5 mg tablet Take 1 Tab by mouth daily. 90 Tab 2    clopidogrel (PLAVIX) 75 mg tab Take 1 Tab by mouth daily. 90 Tab 3    aspirin delayed-release 325 mg tablet Take 1 Tab by mouth daily. 100 Tab 1    nitroglycerin (NITROSTAT) 0.4 mg SL tablet 1 Tab by SubLINGual route every five (5) minutes as needed for Chest Pain for up to 3 doses. 25 Tab 0    carvedilol (COREG) 12.5 mg tablet TAKE 1 TAB BY MOUTH TWO (2) TIMES DAILY (WITH MEALS). 90 Tab 3    Omeprazole delayed release (PRILOSEC D/R) 20 mg tablet Take 20 mg by mouth daily.  acetaminophen (TYLENOL) 325 mg tablet Take  by mouth every four (4) hours as needed for Pain.  metFORMIN (GLUCOPHAGE) 500 mg tablet Take  by mouth two (2) times daily (with meals).  ramipril (ALTACE) 10 mg capsule Take 10 mg by mouth daily. Visit Vitals    /85    Pulse 75    Ht 5' 11\" (1.803 m)    Wt 92.5 kg (204 lb)    BMI 28.45 kg/m2        Physical Exam   Constitutional: He is oriented to person, place, and time. He appears well-developed and well-nourished. HENT:   Head: Normocephalic and atraumatic. Eyes: Conjunctivae are normal.   Neck: Neck supple. No JVD present. No tracheal deviation present. No thyromegaly present. Cardiovascular: Normal rate, regular rhythm and normal heart sounds. Exam reveals no gallop and no friction rub. No murmur heard. Pulmonary/Chest: Breath sounds normal. No respiratory distress. He has no wheezes. He has no rales. He exhibits no tenderness. Abdominal: Soft. There is no tenderness. Musculoskeletal: He exhibits no edema. Neurological: He is alert and oriented to person, place, and time. Skin: Skin is warm and dry. Psychiatric: He has a normal mood and affect. Mr. Kevon Beckman has a reminder for a \"due or due soon\" health maintenance. I have asked that he contact his primary care provider for follow-up on this health maintenance. CONCLUSION:1/2016-  ABNORMAL STRESS ECHO WITH EKG AND WALL MOTION ABNORMALITIES SUGGESTIVE OF LAD  DISTRIBUTION DISEASE    IMPRESSION:cath:1/2016    1. TWO VESSEL CORONARY ARTERY DISEASE IN THE FORM OF 80% OSTIAL AND 90% PROXIMAL NON-DOMINANT RIGHT CORONARY STENOSIS WHICH IS A MODERATE SIZED VESSEL OF ABOUT 1.5 TO 2 MM IN DIAMETER, AND 40% PROXIMAL CIRCUMFLEX, AND 70% MID CIRCUMFLEX STENOSIS WHICH IS A DOMINANT VESSEL. THERE ARE DIFFUSE LUMINAL IRREGULARITIES SEEN THROUGHOUT THE CORONARIES. 2. NO NORMAL OVERALL LV EJECTION FRACTION AT REST. 3. EVIDENCE OF CHEST PAIN DURING THE CASE AS WELL AS BEFORE THE CASE WAS STARTED. PARTIAL RELIEF WITH SUBLINGUAL NITROGLYCERIN AND NO ACUTE EKG CHANGES SEEN IN THE SIX MONITORING LEADS IN THE CATH LAB. DISCUSSION AND RECOMMENDATIONS: PATIENT IS LIKELY HAVING CHEST PAIN FROM THE NON-DOMINANT RIGHT CORONARY ARTERY. THE LEFT CIRCUMFLEX IS DOMINANT AND APPEARS TO HAVE BORDERLINE MID STENOSIS WHICH DOES NOT HAVE ANY APPEARANCE OF ANY ACUTE UNSTABLE LESION. I THINK HE SHOULD BE TREATED MEDICALLY FOR NOW, AND IF THE SYMPTOMS PERSIST, LEFT CIRCUMFLEX ARTERY WILL NEED TO BE INTERROGATED BY INTRACORONARY DOPPLER WITH FFR DETERMINATION. RIGHT CORONARY, THOUGH APPEARS TO BE CRITICAL IS A SMALL VESSEL OF ABOUT 1.5 TO A MAXIMUM OF 2 MM IN DIAMETER AND PROBABLY SHOULD BE LEFT TO MEDICAL TREATMENT. AGGRESSIVE RISK FACTOR MODIFICATION SHOULD BE FOLLOWED. SUMMARY:12/2017-echo  Left ventricle: Systolic function was normal. Ejection fraction was estimated   in the range of 55 % to 60 %.  No obvious  wall motion abnormalities identified in the views obtained. Wall thickness   was mildly increased. Doppler parameters  were consistent with abnormal left ventricular relaxation (grade 1 diastolic   dysfunction). Right ventricle: The size was at the upper limits of normal.    Left atrium: The atrium was mildly to moderately dilated. FINDING-12/2017-cath  1. Left main is patent, bifurcates into left anterior descending artery  and circumflex artery. 2. Left anterior descending artery has ostial 30% to 40% stenosis  followed by mid diffuse 30-40% stenosis. The vessel appears to be  moderately calcified. Diagonal 1 artery has diffuse 30-40% stenosis. Mid to distal left anterior descending artery is patent. 3. Circumflex artery is dominant with proximal 95% calcific stenosis. Status post PTCA using a Trek 1.5 x 8 mm balloon followed by a 2.5  mm x 12 mm balloon. The stent was a Synergy 2.75 mm x 15 mm  stent was deployed at about 14 atmospheres. Post-PCI PTCA was  performed using a noncompliant 3.0 mm x 8 mm balloon inflated about  16-18 atmospheres. Lesion reduced to 10%. 4. Obtuse marginal 1 artery was a small-caliber vessel with ostial 70%  to 80% stenosis. 5. Obtuse marginal 2 artery is a small- to medium-caliber vessel with  diffuse 30-40% stenosis. Mid circumflex artery has focal 80% stenosis. Status post PTCA using a Trek 2.5 mm x 12 mm balloon followed by a  Synergy 2.75 mm x 12 mm stent deployed about 14 atmospheres. Post-PCI PTCA was performed using a 3.0 mm x 8 mm  balloon deployed about 16 atmospheres. Lesion reduced to 0%. 6. Left posterior descending artery is patent. 7. Right coronary artery is nondominant, has ostial calcific 90%  followed by mid 99% stenosis. CONCLUSION:  Triple-vessel coronary artery disease. Status post  percutaneous transluminal coronary angioplasty/stent to proximal and  mid circumflex artery using drug-eluting stents.  The patient will be on  aspirin and Brilinta at this time. Intense medical management and risk  factor modification is advised. Assessment         ICD-10-CM ICD-9-CM    1. Coronary artery disease involving native coronary artery of native heart with angina pectoris (HCC) F29.318 684.74 METABOLIC PANEL, BASIC     413.9 CBC WITH AUTOMATED DIFF      HEPATIC FUNCTION PANEL      LIPID PANEL    occ episodes of chest pains  ? angina/chest wall   2. Essential hypertension I10 401.9     mildly elevated  out of norvasc   3. S/P coronary artery stent placement V30.5 U52.04 METABOLIC PANEL, BASIC      CBC WITH AUTOMATED DIFF      HEPATIC FUNCTION PANEL      LIPID PANEL    stable   4. Hyperlipidemia, unspecified hyperlipidemia type E78.5 272.4     stable  on statin   1/2018  Out of brillinta for 2 weeks-unable to afford  Changed to plavix-discussed compliance  5/2018  Stable mild cp  Out of norvasc 3 months  refilled  Medications Discontinued During This Encounter   Medication Reason    fenofibrate nanocrystallized (TRICOR) 48 mg tablet Reorder    rosuvastatin (CRESTOR) 10 mg tablet Reorder    amLODIPine (NORVASC) 5 mg tablet Reorder       Orders Placed This Encounter    METABOLIC PANEL, BASIC     Standing Status:   Future     Standing Expiration Date:   6/1/2018    CBC WITH AUTOMATED DIFF     Standing Status:   Future     Standing Expiration Date:   6/1/2018    HEPATIC FUNCTION PANEL     Standing Status:   Future     Standing Expiration Date:   10/31/2018    LIPID PANEL     Standing Status:   Future     Standing Expiration Date:   10/31/2018    fenofibrate nanocrystallized (TRICOR) 48 mg tablet     Sig: Take 1 Tab by mouth daily. Dispense:  90 Tab     Refill:  2    rosuvastatin (CRESTOR) 10 mg tablet     Sig: Take 1 Tab by mouth nightly. Dispense:  90 Tab     Refill:  2    amLODIPine (NORVASC) 5 mg tablet     Sig: Take 1 Tab by mouth daily.      Dispense:  90 Tab     Refill:  2       Follow-up Disposition:  Return in about 6 months (around 11/2/2018).

## 2018-05-02 NOTE — PROGRESS NOTES
1. Have you been to the ER, urgent care clinic since your last visit? Hospitalized since your last visit? No    2. Have you seen or consulted any other health care providers outside of the [address] [place] [address] since your last visit? Include any pap smears or colon screening.  No

## 2018-05-21 ENCOUNTER — HOSPITAL ENCOUNTER (OUTPATIENT)
Dept: LAB | Age: 68
Discharge: HOME | End: 2018-05-21
Payer: MEDICARE

## 2018-05-21 ENCOUNTER — TELEPHONE (OUTPATIENT)
Dept: CARDIOLOGY CLINIC | Age: 68
End: 2018-05-21

## 2018-05-21 DIAGNOSIS — Z95.5 S/P CORONARY ARTERY STENT PLACEMENT: ICD-10-CM

## 2018-05-21 DIAGNOSIS — I25.119 CORONARY ARTERY DISEASE INVOLVING NATIVE CORONARY ARTERY OF NATIVE HEART WITH ANGINA PECTORIS (HCC): ICD-10-CM

## 2018-05-21 LAB
ALBUMIN SERPL-MCNC: 3.8 G/DL (ref 3.4–5)
ALBUMIN/GLOB SERPL: 1.2 {RATIO} (ref 0.8–1.7)
ALP SERPL-CCNC: 94 U/L (ref 45–117)
ALT SERPL-CCNC: 26 U/L (ref 16–61)
ANION GAP SERPL CALC-SCNC: 7 MMOL/L (ref 3–18)
AST SERPL-CCNC: 14 U/L (ref 15–37)
BASOPHILS # BLD: 0 K/UL (ref 0–0.1)
BASOPHILS NFR BLD: 1 % (ref 0–2)
BILIRUB DIRECT SERPL-MCNC: <0.1 MG/DL (ref 0–0.2)
BILIRUB SERPL-MCNC: 0.3 MG/DL (ref 0.2–1)
BUN SERPL-MCNC: 18 MG/DL (ref 7–18)
BUN/CREAT SERPL: 20 (ref 12–20)
CALCIUM SERPL-MCNC: 8.6 MG/DL (ref 8.5–10.1)
CHLORIDE SERPL-SCNC: 106 MMOL/L (ref 100–108)
CHOLEST SERPL-MCNC: 156 MG/DL
CO2 SERPL-SCNC: 27 MMOL/L (ref 21–32)
CREAT SERPL-MCNC: 0.88 MG/DL (ref 0.6–1.3)
DIFFERENTIAL METHOD BLD: ABNORMAL
EOSINOPHIL # BLD: 0.2 K/UL (ref 0–0.4)
EOSINOPHIL NFR BLD: 3 % (ref 0–5)
ERYTHROCYTE [DISTWIDTH] IN BLOOD BY AUTOMATED COUNT: 14.1 % (ref 11.6–14.5)
GLOBULIN SER CALC-MCNC: 3.2 G/DL (ref 2–4)
GLUCOSE SERPL-MCNC: 196 MG/DL (ref 74–99)
HCT VFR BLD AUTO: 39 % (ref 36–48)
HDLC SERPL-MCNC: 30 MG/DL (ref 40–60)
HDLC SERPL: 5.2 {RATIO} (ref 0–5)
HGB BLD-MCNC: 13.7 G/DL (ref 13–16)
LDLC SERPL CALC-MCNC: ABNORMAL MG/DL (ref 0–100)
LIPID PROFILE,FLP: ABNORMAL
LYMPHOCYTES # BLD: 2.2 K/UL (ref 0.9–3.6)
LYMPHOCYTES NFR BLD: 36 % (ref 21–52)
MCH RBC QN AUTO: 30.6 PG (ref 24–34)
MCHC RBC AUTO-ENTMCNC: 35.1 G/DL (ref 31–37)
MCV RBC AUTO: 87.2 FL (ref 74–97)
MONOCYTES # BLD: 0.6 K/UL (ref 0.05–1.2)
MONOCYTES NFR BLD: 10 % (ref 3–10)
NEUTS SEG # BLD: 3.1 K/UL (ref 1.8–8)
NEUTS SEG NFR BLD: 50 % (ref 40–73)
PLATELET # BLD AUTO: 189 K/UL (ref 135–420)
PMV BLD AUTO: 10.6 FL (ref 9.2–11.8)
POTASSIUM SERPL-SCNC: 3.7 MMOL/L (ref 3.5–5.5)
PROT SERPL-MCNC: 7 G/DL (ref 6.4–8.2)
RBC # BLD AUTO: 4.47 M/UL (ref 4.7–5.5)
SODIUM SERPL-SCNC: 140 MMOL/L (ref 136–145)
TRIGL SERPL-MCNC: 469 MG/DL (ref ?–150)
VLDLC SERPL CALC-MCNC: ABNORMAL MG/DL
WBC # BLD AUTO: 6.1 K/UL (ref 4.6–13.2)

## 2018-05-21 PROCEDURE — 80076 HEPATIC FUNCTION PANEL: CPT | Performed by: INTERNAL MEDICINE

## 2018-05-21 PROCEDURE — 80048 BASIC METABOLIC PNL TOTAL CA: CPT | Performed by: INTERNAL MEDICINE

## 2018-05-21 PROCEDURE — 80061 LIPID PANEL: CPT | Performed by: INTERNAL MEDICINE

## 2018-05-21 PROCEDURE — 85025 COMPLETE CBC W/AUTO DIFF WBC: CPT | Performed by: INTERNAL MEDICINE

## 2018-05-21 PROCEDURE — 36415 COLL VENOUS BLD VENIPUNCTURE: CPT | Performed by: INTERNAL MEDICINE

## 2018-05-21 NOTE — TELEPHONE ENCOUNTER
Called and left message with patient concerning lab/test results, Per Dr. Ju Polo High Tg, low carb diet and fish oil tripple strength dose. Patient returned call and understands instructions and He voices understanding and acceptance of this advice and will call back if any further questions or concerns.

## 2018-05-21 NOTE — TELEPHONE ENCOUNTER
----- Message from Marsha Villa MD sent at 5/21/2018 11:24 AM EDT -----  High tg  Low carb diet  Fish oil aljeandro edge

## 2018-05-31 RX ORDER — ASPIRIN 325 MG
325 TABLET, DELAYED RELEASE (ENTERIC COATED) ORAL DAILY
Qty: 100 TAB | Refills: 3 | Status: SHIPPED | OUTPATIENT
Start: 2018-05-31 | End: 2018-10-15

## 2018-06-20 ENCOUNTER — TELEPHONE (OUTPATIENT)
Dept: INTERNAL MEDICINE CLINIC | Age: 68
End: 2018-06-20

## 2018-06-20 ENCOUNTER — OFFICE VISIT (OUTPATIENT)
Dept: INTERNAL MEDICINE CLINIC | Age: 68
End: 2018-06-20

## 2018-06-20 VITALS
SYSTOLIC BLOOD PRESSURE: 130 MMHG | BODY MASS INDEX: 26.64 KG/M2 | TEMPERATURE: 97.7 F | WEIGHT: 196.7 LBS | HEART RATE: 66 BPM | DIASTOLIC BLOOD PRESSURE: 80 MMHG | HEIGHT: 72 IN | RESPIRATION RATE: 14 BRPM | OXYGEN SATURATION: 98 %

## 2018-06-20 DIAGNOSIS — E11.9 TYPE 2 DIABETES MELLITUS WITHOUT COMPLICATION, WITHOUT LONG-TERM CURRENT USE OF INSULIN (HCC): ICD-10-CM

## 2018-06-20 DIAGNOSIS — G47.00 INSOMNIA, UNSPECIFIED TYPE: Primary | ICD-10-CM

## 2018-06-20 DIAGNOSIS — R00.1 BRADYCARDIA: ICD-10-CM

## 2018-06-20 DIAGNOSIS — Z23 NEED FOR SHINGLES VACCINE: ICD-10-CM

## 2018-06-20 DIAGNOSIS — K21.9 GASTROESOPHAGEAL REFLUX DISEASE WITHOUT ESOPHAGITIS: ICD-10-CM

## 2018-06-20 RX ORDER — ALPRAZOLAM 0.5 MG/1
0.5 TABLET ORAL
Qty: 30 TAB | Refills: 0 | OUTPATIENT
Start: 2018-06-20 | End: 2018-07-19 | Stop reason: SDUPTHER

## 2018-06-20 RX ORDER — ONDANSETRON 4 MG/1
4 TABLET, ORALLY DISINTEGRATING ORAL
Qty: 30 TAB | Refills: 0 | Status: SHIPPED | OUTPATIENT
Start: 2018-06-20 | End: 2018-10-30

## 2018-06-20 RX ORDER — PHENOL/SODIUM PHENOLATE
20 AEROSOL, SPRAY (ML) MUCOUS MEMBRANE DAILY
Qty: 90 TAB | Refills: 3 | Status: SHIPPED | OUTPATIENT
Start: 2018-06-20 | End: 2019-06-07 | Stop reason: SDUPTHER

## 2018-06-20 RX ORDER — PROMETHAZINE HYDROCHLORIDE 25 MG/1
25 TABLET ORAL
Status: CANCELLED | OUTPATIENT
Start: 2018-06-20

## 2018-06-20 NOTE — MR AVS SNAPSHOT
[facility] 
 
 
 [address], [address] [facility] 
[phone] Patient: Domenica Martino MRN: [id number] KFP: Visit Information Date & Time Provider Department Dept. Phone Encounter #  
 2018  2:15 PM Roc Gilliland NP [facility]  Your Appointments 2018  8:15 AM  
Office Visit with Rommel Apple MD  
[facility]) Appt Note: 6 months post labs [address] [facility], [facility] [address] [id number]  
[address], [address] [facility] [address] Upcoming Health Maintenance Date Due Hepatitis C Screening 1950 FOOT EXAM Q1 1960 EYE EXAM RETINAL OR DILATED Q1 1960 DTaP/Tdap/Td series (1 - Tdap) 1971 FOBT Q 1 YEAR AGE 50-75 2000 ZOSTER VACCINE AGE 60> 2009 GLAUCOMA SCREENING Q2Y 2015 Pneumococcal 65+ Low/Medium Risk (1 of 2 - PCV13) 2015 MICROALBUMIN Q1 10/11/2017 MEDICARE YEARLY EXAM 3/14/2018 HEMOGLOBIN A1C Q6M 2018 Influenza Age 5 to Adult 2018 LIPID PANEL Q1 2019 Allergies as of 2018  Review Complete On: 2018 By: Christina Fonseca No Known Allergies Current Immunizations  Never Reviewed Name Date Influenza Vaccine 2015 12:00 AM  
 Pneumococcal Polysaccharide (PPSV-23) 2016 12:00 AM  
  
 Not reviewed this visit You Were Diagnosed With   
  
 Codes Comments Insomnia, unspecified type    -  Primary ICD-10-CM: G47.00 ICD-9-CM: 780.52 Type 2 diabetes mellitus without complication, without long-term current use of insulin (HCC)     ICD-10-CM: E11.9 ICD-9-CM: 250.00 Bradycardia     ICD-10-CM: R00.1 ICD-9-CM: 427.89 Need for shingles vaccine     ICD-10-CM: T63 ICD-9-CM: V04.89 Gastroesophageal reflux disease without esophagitis     ICD-10-CM: K21.9 ICD-9-CM: 530.81 Vitals BP Pulse Temp Resp Height(growth percentile) Weight(growth percentile) 140/80 (BP 1 Location: Left arm, BP Patient Position: Sitting) (!) 52 97.7 °F (36.5 °C) (Oral) 14 6' (1.829 m) 196 lb 11.2 oz (89.2 kg) SpO2 BMI Smoking Status 98% 26.68 kg/m2 Never Smoker Vitals History BMI and BSA Data Body Mass Index Body Surface Area  
 26.68 kg/m 2 2.13 m 2 Preferred Pharmacy Pharmacy Name Phone [facility], [address] [place] [address] [phone] Your Updated Medication List  
  
   
This list is accurate as of 6/20/18  3:46 PM.  Always use your most recent med list.  
  
  
  
  
 acetaminophen 325 mg tablet Commonly known as:  TYLENOL Take  by mouth every four (4) hours as needed for Pain. ALPRAZolam 0.5 mg tablet Commonly known as:  Maggie Peppers Take 1 Tab by mouth nightly as needed for Anxiety. Max Daily Amount: 0.5 mg.  
  
 amLODIPine 5 mg tablet Commonly known as:  Parul Merna Take 1 Tab by mouth daily. aspirin delayed-release 325 mg tablet Take 1 Tab by mouth daily. carvedilol 12.5 mg tablet Commonly known as:  COREG  
TAKE 1 TAB BY MOUTH TWO (2) TIMES DAILY (WITH MEALS). clopidogrel 75 mg Tab Commonly known as:  PLAVIX Take 1 Tab by mouth daily. fenofibrate nanocrystallized 48 mg tablet Commonly known as:  [employer] Take 1 Tab by mouth daily. metFORMIN 500 mg tablet Commonly known as:  GLUCOPHAGE Take  by mouth two (2) times daily (with meals). nitroglycerin 0.4 mg SL tablet Commonly known as:  NITROSTAT  
1 Tab by SubLINGual route every five (5) minutes as needed for Chest Pain for up to 3 doses. Omeprazole delayed release 20 mg tablet Commonly known as:  PRILOSEC D/R Take 1 Tab by mouth daily. Indications: gastroesophageal reflux disease  
  
 ondansetron 4 mg disintegrating tablet Commonly known as:  ZOFRAN ODT  
 Take 1 Tab by mouth every eight (8) hours as needed for Nausea. promethazine 25 mg tablet Commonly known as:  PHENERGAN Take 25 mg by mouth every six (6) hours as needed for Nausea. ramipril 10 mg capsule Commonly known as:  ALTACE Take 10 mg by mouth daily. rosuvastatin 10 mg tablet Commonly known as:  CRESTOR Take 1 Tab by mouth nightly. varicella-zoster recombinant (PF) 50 mcg/0.5 mL Susr injection Commonly known as:  SHINGRIX  
0.5 mL by IntraMUSCular route once for 1 dose. Repeat x 1 dose in 2-6 months  Indications: PREVENTION OF HERPES ZOSTER  
  
 ZyrTEC 10 mg tablet Generic drug:  cetirizine Take  by mouth. Prescriptions Sent to Pharmacy Refills Omeprazole delayed release (PRILOSEC D/R) 20 mg tablet 3 Sig: Take 1 Tab by mouth daily. Indications: gastroesophageal reflux disease Class: Normal  
 Pharmacy: [address] #: [phone] Route: Oral  
 ondansetron (ZOFRAN ODT) 4 mg disintegrating tablet 0 Sig: Take 1 Tab by mouth every eight (8) hours as needed for Nausea. Class: Normal  
 Pharmacy: [address] #: [phone] Route: Oral  
 varicella-zoster recombinant, PF, (SHINGRIX) 50 mcg/0.5 mL susr injection 1 Si.5 mL by IntraMUSCular route once for 1 dose. Repeat x 1 dose in 2-6 months  Indications: PREVENTION OF HERPES ZOSTER Class: Normal  
 Pharmacy: [address] #: [phone] Route: IntraMUSCular To-Do List   
 2018 Lab:  HEMOGLOBIN A1C WITH EAG   
  
 2018 Lab:  MICROALBUMIN, UR, RAND W/ MICROALB/CREAT RATIO Introducing [facility]! Meena Simpson introduces [employer] patient portal. Now you can access parts of your medical record, email your doctor's office, and request medication refills online. 1. In your internet browser, go to [email or web address] 2. Click on the First Time User? Click Here link in the Sign In box. You will see the New Member Sign Up page. 3. Enter your [employer] Access Code exactly as it appears below. You will not need to use this code after youve completed the sign-up process. If you do not sign up before the expiration date, you must request a new code. · [employer] Access Code: [email or web address] Expires: 7/31/2018  9:13 AM 
 
4. Enter the last four digits of your Social Security Number (xxxx) and Date of Birth (mm/dd/yyyy) as indicated and click Submit. You will be taken to the next sign-up page. 5. Create a [employer] ID. This will be your [employer] login ID and cannot be changed, so think of one that is secure and easy to remember. 6. Create a [employer] password. You can change your password at any time. 7. Enter your Password Reset Question and Answer. This can be used at a later time if you forget your password. 8. Enter your e-mail address. You will receive e-mail notification when new information is available in [address] 9. Click Sign Up. You can now view and download portions of your medical record. 10. Click the Download Summary menu link to download a portable copy of your medical information. If you have questions, please visit the Frequently Asked Questions section of the [employer] website. Remember, [employer] is NOT to be used for urgent needs. For medical emergencies, dial 911. Now available from your iPhone and Android! Please provide this summary of care documentation to your next provider. Your primary care clinician is listed as Cooper Steen. If you have any questions after today's visit, please call [phone].

## 2018-06-20 NOTE — PROGRESS NOTES
1. Have you been to the ER, urgent care clinic or hospitalized since your last visit? YES.     2. Have you seen or consulted any other health care providers outside of the [address] [place] [address] since your last visit (Include any pap smears or colon screening)? NO      Do you have an Advanced Directive? NO    Would you like information on Advanced Directives?  NO

## 2018-06-20 NOTE — PROGRESS NOTES
Vladimir Mata is a 76 y.o.  male and presents with    Chief Complaint   Patient presents with    Establish Care    Medication Refill    Request For New Medication     Patient requesting a new scrip for Lorazapam.  Patient reports having a hard time staying asleep. x 6 -8 months it has been increasing.  Back Pain     Patient reports lower back pain. Patient reports that it comes and goes and it is too the point where he cant even get out of bed. Patient would like a medication to have on hand for the pain. Subjective:  HPI   Mr. Ankush Rodríguez presents today to establish care. He has a history of hypertension, hyperlipidemia, Type 2 DM, CAD with angina pectoris with coronary stent placement, osteoarthritis of left knee, chronic low back pain. Mr. Ankush Rodríguez presents today to establish care. He needs medication refills for Lorazepam 1 mg BID, as he reports chronic history of insomnia. Currently with difficulty staying asleep over the last 6-8 months. He is also with complaints of chronic low back pain. The pain is described as intermittent and he reports unable to get out of bed. He would like to have medication on hand for pain, in the past he has used Vicodin with good relief. Mr. Ankush Rodríguez has a history of hyperlipidemia and hypertriglyceridemia, CAD with angina pectoris, and hypertension. He is followed by Dr. Michael Mota, cardiology. 1/2016 with abnormal stress echo with ekg and wall motion abnormalities suggestive of LAD disease. He underwent a nuclear stress test and cardiac catheterization and found 2 vessel disease. He was hospitalized 11/2018 for unstable angina. Cardiac catheterization done on 12/01/2017 and conclusion - triple-vessel coronary artery disease, status post percutaneous transluminal coronary angioplasty, stent to proximal and mid circumflex artery using drug-eluting stents. The patient to be onaspirin and Brilinta. He is currently on Plavix and  mg.   ECHO 2/2017 with EF of 55 % to 60 %. No obvious wall motion abnormalities, wall thickness was mildly increased. Side note Imdur in the past with hypotension. He is a Type 2 DM on Metformin BID. CTA was completed 2/2016 and incidentally found nontoxic multinodular goiter was found incidentally, measuring 1.6 on left and 1.5 cm on right. He was followed by Dr. Aron Romero, unsure if still following. He has a history of GERD without esophagitis. Relief with Omeprazole 20 mg. Additional Concerns: none     ROS   Review of Systems   Constitutional: Negative. HENT: Negative. Eyes: Negative. Respiratory: Negative. Cardiovascular: Negative. Gastrointestinal: Negative. Genitourinary: Negative. Musculoskeletal: Positive for back pain. Negative for falls. Skin: Negative. Neurological: Negative. Endo/Heme/Allergies: Negative for polydipsia. Psychiatric/Behavioral: The patient has insomnia. No Known Allergies    Current Outpatient Prescriptions   Medication Sig Dispense Refill    Omeprazole delayed release (PRILOSEC D/R) 20 mg tablet Take 1 Tab by mouth daily. Indications: gastroesophageal reflux disease 90 Tab 3    ondansetron (ZOFRAN ODT) 4 mg disintegrating tablet Take 1 Tab by mouth every eight (8) hours as needed for Nausea. 30 Tab 0    ALPRAZolam (XANAX) 0.5 mg tablet Take 1 Tab by mouth nightly as needed for Anxiety. Max Daily Amount: 0.5 mg. 30 Tab 0    aspirin delayed-release 325 mg tablet Take 1 Tab by mouth daily. 100 Tab 3    cetirizine (ZYRTEC) 10 mg tablet Take  by mouth.  fenofibrate nanocrystallized (TRICOR) 48 mg tablet Take 1 Tab by mouth daily. 90 Tab 2    rosuvastatin (CRESTOR) 10 mg tablet Take 1 Tab by mouth nightly. 90 Tab 2    amLODIPine (NORVASC) 5 mg tablet Take 1 Tab by mouth daily. 90 Tab 2    clopidogrel (PLAVIX) 75 mg tab Take 1 Tab by mouth daily.  90 Tab 3    nitroglycerin (NITROSTAT) 0.4 mg SL tablet 1 Tab by SubLINGual route every five (5) minutes as needed for Chest Pain for up to 3 doses. 25 Tab 0    carvedilol (COREG) 12.5 mg tablet TAKE 1 TAB BY MOUTH TWO (2) TIMES DAILY (WITH MEALS). 90 Tab 3    metFORMIN (GLUCOPHAGE) 500 mg tablet Take  by mouth two (2) times daily (with meals).  ramipril (ALTACE) 10 mg capsule Take 10 mg by mouth daily.  acetaminophen (TYLENOL) 325 mg tablet Take  by mouth every four (4) hours as needed for Pain.  promethazine (PHENERGAN) 25 mg tablet Take 25 mg by mouth every six (6) hours as needed for Nausea. Social History     Social History    Marital status:      Spouse name: N/A    Number of children: N/A    Years of education: N/A     Occupational History    Not on file.      Social History Main Topics    Smoking status: Never Smoker    Smokeless tobacco: Never Used    Alcohol use No    Drug use: No    Sexual activity: Yes     Partners: Female     Birth control/ protection: None     Other Topics Concern    Not on file     Social History Narrative       Past Medical History:   Diagnosis Date    Arthritis     1999 vicodin    Diabetes ([facility] [address] 1998 metformin    Hypercholesterolemia     Hypertension 1996 norvasc       Past Surgical History:   Procedure Laterality Date    CARDIAC SURG PROCEDURE UNLIST  12/2017    stints    HX GI  2010    gallbladder       Family History   Problem Relation Age of Onset    Stroke Mother     Headache Mother     Hypertension Mother    Bailey Rinne Mother     Heart Disease Father     Heart Attack Father     Hypertension Father     Diabetes Father     Lung Cancer Father     Ovarian Cancer Sister     Heart Attack Brother     Breast Cancer Sister     Diabetes Sister     Cancer Maternal Aunt      lung cancer    Cancer Maternal Uncle     Cancer Paternal Aunt     Cancer Paternal Uncle        Objective:  Vitals:    06/20/18 1357 06/20/18 1554   BP: 140/80 130/80   Pulse: (!) 52 66   Resp: 14    Temp: 97.7 °F (36.5 °C)    TempSrc: Oral    SpO2: 98% Weight: 196 lb 11.2 oz (89.2 kg)    Height: 6' (1.829 m)    PainSc:   0 - No pain        LABS   Results for orders placed or performed during the hospital encounter of 79/03/58   METABOLIC PANEL, BASIC   Result Value Ref Range    Sodium 140 136 - 145 mmol/L    Potassium 3.7 3.5 - 5.5 mmol/L    Chloride 106 100 - 108 mmol/L    CO2 27 21 - 32 mmol/L    Anion gap 7 3.0 - 18 mmol/L    Glucose 196 (H) 74 - 99 mg/dL    BUN 18 7.0 - 18 MG/DL    Creatinine 0.88 0.6 - 1.3 MG/DL    BUN/Creatinine ratio 20 12 - 20      GFR est AA >60 >60 ml/min/1.73m2    GFR est non-AA >60 >60 ml/min/1.73m2    Calcium 8.6 8.5 - 10.1 MG/DL   CBC WITH AUTOMATED DIFF   Result Value Ref Range    WBC 6.1 4.6 - 13.2 K/uL    RBC 4.47 (L) 4.70 - 5.50 M/uL    HGB 13.7 13.0 - 16.0 g/dL    HCT 39.0 36.0 - 48.0 %    MCV 87.2 74.0 - 97.0 FL    MCH 30.6 24.0 - 34.0 PG    MCHC 35.1 31.0 - 37.0 g/dL    RDW 14.1 11.6 - 14.5 %    PLATELET 003 076 - 298 K/uL    MPV 10.6 9.2 - 11.8 FL    NEUTROPHILS 50 40 - 73 %    LYMPHOCYTES 36 21 - 52 %    MONOCYTES 10 3 - 10 %    EOSINOPHILS 3 0 - 5 %    BASOPHILS 1 0 - 2 %    ABS. NEUTROPHILS 3.1 1.8 - 8.0 K/UL    ABS. LYMPHOCYTES 2.2 0.9 - 3.6 K/UL    ABS. MONOCYTES 0.6 0.05 - 1.2 K/UL    ABS. EOSINOPHILS 0.2 0.0 - 0.4 K/UL    ABS. BASOPHILS 0.0 0.0 - 0.1 K/UL    DF AUTOMATED     HEPATIC FUNCTION PANEL   Result Value Ref Range    Protein, total 7.0 6.4 - 8.2 g/dL    Albumin 3.8 3.4 - 5.0 g/dL    Globulin 3.2 2.0 - 4.0 g/dL    A-G Ratio 1.2 0.8 - 1.7      Bilirubin, total 0.3 0.2 - 1.0 MG/DL    Bilirubin, direct <0.1 0.0 - 0.2 MG/DL    Alk.  phosphatase 94 45 - 117 U/L    AST (SGOT) 14 (L) 15 - 37 U/L    ALT (SGPT) 26 16 - 61 U/L   LIPID PANEL   Result Value Ref Range    LIPID PROFILE          Cholesterol, total 156 <200 MG/DL    Triglyceride 469 (H) <150 MG/DL    HDL Cholesterol 30 (L) 40 - 60 MG/DL    LDL, calculated  0 - 100 MG/DL     LDL AND VLDL CHOLESTEROL NOT CALCULATED WHEN TRIGLYCERIDES >400 MG/DL OR HDL CHOLESTEROL <20 MG/DL    VLDL, calculated  MG/DL     Calculation not valid with this patient's other Lipid values. CHOL/HDL Ratio 5.2 (H) 0 - 5.0         TESTS  Cardiac catheterization done on 12/01/2017. Conclusion - triple-vessel coronary artery disease, status post  percutaneous transluminal coronary angioplasty, stent to proximal and  mid circumflex artery using drug-eluting stents. ECHO 2/2017  SUMMARY:  Left ventricle: Systolic function was normal. Ejection fraction was estimated   in the range of 55 % to 60 %. No obvious  wall motion abnormalities identified in the views obtained. Wall thickness   was mildly increased. Doppler parameters  were consistent with abnormal left ventricular relaxation (grade 1 diastolic   dysfunction). Right ventricle: The size was at the upper limits of normal.    Left atrium: The atrium was mildly to moderately dilated. INDICATIONS: Chest pain. HISTORY: Prior history: CAD/HLD Risk factors: hypertension and oral   hypoglycemic-treated diabetes. PROCEDURE: This was a routine study. The study included complete 2D imaging,   M-mode, complete spectral Doppler, and  color Doppler. The heart rate was 76 bpm, at the start of the study. Systolic   blood pressure was 122 mmHg, at the start  of the study. Diastolic blood pressure was 69 mmHg, at the start of the   study. Images were obtained from the  parasternal, apical, and subcostal acoustic windows. Image quality was   adequate. LEFT VENTRICLE: Size was normal. Systolic function was normal. Ejection   fraction was estimated in the range of 55 % to  60 %. No obvious wall motion abnormalities identified in the views obtained. Wall thickness was mildly increased. DOPPLER: Doppler parameters were consistent with abnormal left ventricular   relaxation (grade 1 diastolic dysfunction).     RIGHT VENTRICLE: The size was at the upper limits of normal. Systolic   function was normal. DOPPLER: Systolic pressure  was within the normal range. LEFT ATRIUM: The atrium was mildly to moderately dilated. LA volume index was   23 ml/m-sq. RIGHT ATRIUM: Size was normal.    MITRAL VALVE: Normal valve structure. DOPPLER: There was no evidence for   stenosis. There was no regurgitation. AORTIC VALVE: The valve was trileaflet. Leaflets exhibited normal thickness   and normal cuspal separation. DOPPLER:  There was no stenosis. There was no regurgitation. TRICUSPID VALVE: Normal valve structure. DOPPLER: There was no evidence for   tricuspid stenosis. There was no  significant regurgitation. PULMONIC VALVE: Leaflets exhibited normal thickness, no calcification, and   normal cuspal separation. DOPPLER: There was  no regurgitation. AORTA: The root exhibited normal size. PULMONARY ARTERY: The size was normal. DOPPLER: Systolic pressure was within   the normal range. SYSTEMIC VEINS: IVC: The inferior vena cava was normal in size. PERICARDIUM: There was no pericardial effusion. PE  Physical Exam   Constitutional: He is oriented to person, place, and time. He appears well-developed and well-nourished. No distress. HENT:   Head: Normocephalic and atraumatic. Eyes: Conjunctivae and EOM are normal.   Cardiovascular: Normal rate, regular rhythm, normal heart sounds and intact distal pulses. Pulmonary/Chest: Effort normal and breath sounds normal. No respiratory distress. He has no wheezes. He has no rales. He exhibits no tenderness. Abdominal: Soft. Bowel sounds are normal.   Musculoskeletal: Normal range of motion. Neurological: He is alert and oriented to person, place, and time. Skin: Skin is warm and dry. He is not diaphoretic. Psychiatric: He has a normal mood and affect. His behavior is normal. Judgment and thought content normal.   Vitals reviewed. Assessment/Plan:    1. Establish care- Labs today. Follow up in 2 weeks. 2. Insomnia- Xanax 0.5 mg PRN. GAD7 on follow up and PHQ2=0 today.     3. Type 2 DM- Continue Metformin. Labs today. 4. Bradycardia- rechecked and found in normal limits, asymptomatic. 5. GERD/nausea- Continue Prilosec, refilled today. Zofran prescribed. 6. Hypertension/Hyperlipidemia/CAD with angina- Continue Ramipril 10 mg daily, Amlodipine 10 mg daily, Coreg 12.5. Continue Crestor 10 mg, and Tricor, Plavix, ASA as prescribed. Continue to follow with Dr. Kat Patrick, cardiology. 7. Chronic low back pain- Discussed Vicodin. Did not refill script today. Will discuss further at the follow up and has UDS and pain contract signed. Lab review: orders written for new lab studies as appropriate; see orders    Today's Visit:   Diagnoses and all orders for this visit:    1. Insomnia, unspecified type  -     ALPRAZolam (XANAX) 0.5 mg tablet; Take 1 Tab by mouth nightly as needed for Anxiety. Max Daily Amount: 0.5 mg.    2. Type 2 diabetes mellitus without complication, without long-term current use of insulin ([facility])  -     HEMOGLOBIN A1C WITH EAG; Future  -     MICROALBUMIN, UR, RAND W/ MICROALB/CREAT RATIO; Future    3. Bradycardia    4. Need for shingles vaccine  -     varicella-zoster recombinant, PF, (SHINGRIX) 50 mcg/0.5 mL susr injection; 0.5 mL by IntraMUSCular route once for 1 dose. Repeat x 1 dose in 2-6 months  Indications: PREVENTION OF HERPES ZOSTER    5. Gastroesophageal reflux disease without esophagitis  -     Omeprazole delayed release (PRILOSEC D/R) 20 mg tablet; Take 1 Tab by mouth daily. Indications: gastroesophageal reflux disease    Other orders  -     ondansetron (ZOFRAN ODT) 4 mg disintegrating tablet; Take 1 Tab by mouth every eight (8) hours as needed for Nausea. Health Maintenance: To be discussed at follow up appointments. Shingrix vaccine script provided to patient today. I have discussed the diagnosis with the patient and the intended plan as seen in the above orders.   The patient has received an after-visit summary and questions were answered concerning future plans. I have discussed medication side effects and warnings with the patient as well. I have reviewed the plan of care with the patient, accepted their input and they are in agreement with the treatment goals. Follow-up Disposition: Not on File   More than 1/2 of this 60 minute visit was spent in counseling and coordination of care, as described above.     SUSAN Moreira  Internist of [address], [address] [place] [address].  Phone: [phone]  Fax: [phone]

## 2018-06-21 ENCOUNTER — HOSPITAL ENCOUNTER (OUTPATIENT)
Dept: LAB | Age: 68
Discharge: HOME | End: 2018-06-21
Payer: MEDICARE

## 2018-06-21 DIAGNOSIS — E11.9 TYPE 2 DIABETES MELLITUS WITHOUT COMPLICATION, WITHOUT LONG-TERM CURRENT USE OF INSULIN (HCC): ICD-10-CM

## 2018-06-21 LAB
CREAT UR-MCNC: 158 MG/DL (ref 30–125)
EST. AVERAGE GLUCOSE BLD GHB EST-MCNC: 154 MG/DL
HBA1C MFR BLD: 7 % (ref 4.2–5.6)
MICROALBUMIN UR-MCNC: 0.9 MG/DL (ref 0–3)
MICROALBUMIN/CREAT UR-RTO: 6 MG/G (ref 0–30)

## 2018-06-21 PROCEDURE — 82043 UR ALBUMIN QUANTITATIVE: CPT | Performed by: NURSE PRACTITIONER

## 2018-06-21 PROCEDURE — 83036 HEMOGLOBIN GLYCOSYLATED A1C: CPT | Performed by: NURSE PRACTITIONER

## 2018-06-21 PROCEDURE — 36415 COLL VENOUS BLD VENIPUNCTURE: CPT | Performed by: NURSE PRACTITIONER

## 2018-06-27 ENCOUNTER — TELEPHONE (OUTPATIENT)
Dept: INTERNAL MEDICINE CLINIC | Age: 68
End: 2018-06-27

## 2018-06-27 NOTE — TELEPHONE ENCOUNTER
----- Message from Piyush Galeana NP sent at 6/27/2018  1:51 PM EDT -----  HgbA1c is 7.0, this is good, continue to modify diet and try to walk 2-3 miles most days of the week. I know the low back pain can be limiting and we will discuss that further on follow up.

## 2018-06-27 NOTE — PROGRESS NOTES
HgbA1c is 7.0, this is good, continue to modify diet and try to walk 2-3 miles most days of the week. I know the low back pain can be limiting and we will discuss that further on follow up.

## 2018-07-16 DIAGNOSIS — I10 ESSENTIAL HYPERTENSION, MALIGNANT: ICD-10-CM

## 2018-07-16 RX ORDER — CARVEDILOL 12.5 MG/1
TABLET ORAL
Qty: 180 TAB | Refills: 1 | Status: SHIPPED | OUTPATIENT
Start: 2018-07-16 | End: 2019-01-06 | Stop reason: SDUPTHER

## 2018-07-19 ENCOUNTER — OFFICE VISIT (OUTPATIENT)
Dept: INTERNAL MEDICINE CLINIC | Age: 68
End: 2018-07-19

## 2018-07-19 ENCOUNTER — APPOINTMENT (OUTPATIENT)
Dept: GENERAL RADIOLOGY | Age: 68
End: 2018-07-19
Attending: EMERGENCY MEDICINE
Payer: MEDICARE

## 2018-07-19 ENCOUNTER — APPOINTMENT (OUTPATIENT)
Dept: CT IMAGING | Age: 68
End: 2018-07-19
Attending: EMERGENCY MEDICINE
Payer: MEDICARE

## 2018-07-19 ENCOUNTER — HOSPITAL ENCOUNTER (EMERGENCY)
Age: 68
Discharge: HOME | End: 2018-07-19
Attending: EMERGENCY MEDICINE | Admitting: EMERGENCY MEDICINE
Payer: MEDICARE

## 2018-07-19 ENCOUNTER — TELEPHONE (OUTPATIENT)
Dept: INTERNAL MEDICINE CLINIC | Age: 68
End: 2018-07-19

## 2018-07-19 VITALS
TEMPERATURE: 98.1 F | OXYGEN SATURATION: 100 % | SYSTOLIC BLOOD PRESSURE: 135 MMHG | HEART RATE: 69 BPM | BODY MASS INDEX: 26.01 KG/M2 | DIASTOLIC BLOOD PRESSURE: 70 MMHG | RESPIRATION RATE: 18 BRPM | WEIGHT: 192 LBS | HEIGHT: 72 IN

## 2018-07-19 VITALS
OXYGEN SATURATION: 95 % | TEMPERATURE: 98.4 F | HEIGHT: 72 IN | SYSTOLIC BLOOD PRESSURE: 130 MMHG | DIASTOLIC BLOOD PRESSURE: 66 MMHG | BODY MASS INDEX: 26.36 KG/M2 | RESPIRATION RATE: 14 BRPM | WEIGHT: 194.6 LBS | HEART RATE: 85 BPM

## 2018-07-19 DIAGNOSIS — E04.1 THYROID NODULE: ICD-10-CM

## 2018-07-19 DIAGNOSIS — E78.1 HIGH BLOOD TRIGLYCERIDES: ICD-10-CM

## 2018-07-19 DIAGNOSIS — G47.00 INSOMNIA, UNSPECIFIED TYPE: ICD-10-CM

## 2018-07-19 DIAGNOSIS — E04.9 THYROID GOITER: ICD-10-CM

## 2018-07-19 DIAGNOSIS — I10 ESSENTIAL HYPERTENSION: ICD-10-CM

## 2018-07-19 DIAGNOSIS — G89.4 CHRONIC PAIN SYNDROME: Primary | ICD-10-CM

## 2018-07-19 DIAGNOSIS — Z11.59 ENCOUNTER FOR HEPATITIS C SCREENING TEST FOR LOW RISK PATIENT: ICD-10-CM

## 2018-07-19 DIAGNOSIS — E11.9 TYPE 2 DIABETES MELLITUS WITHOUT COMPLICATION, WITHOUT LONG-TERM CURRENT USE OF INSULIN (HCC): ICD-10-CM

## 2018-07-19 DIAGNOSIS — R06.02 SHORTNESS OF BREATH: ICD-10-CM

## 2018-07-19 DIAGNOSIS — E78.2 MIXED HYPERLIPIDEMIA: ICD-10-CM

## 2018-07-19 DIAGNOSIS — Z12.5 PROSTATE CANCER SCREENING: ICD-10-CM

## 2018-07-19 DIAGNOSIS — R07.9 CHEST PAIN, UNSPECIFIED TYPE: Primary | ICD-10-CM

## 2018-07-19 DIAGNOSIS — R07.9 CHEST PAIN AT REST: ICD-10-CM

## 2018-07-19 DIAGNOSIS — Z12.11 SCREENING FOR COLON CANCER: ICD-10-CM

## 2018-07-19 LAB
ALBUMIN SERPL-MCNC: 4.6 G/DL (ref 3.4–5)
ALBUMIN/GLOB SERPL: 1.4 {RATIO} (ref 0.8–1.7)
ALP SERPL-CCNC: 84 U/L (ref 45–117)
ALT SERPL-CCNC: 36 U/L (ref 16–61)
ANION GAP SERPL CALC-SCNC: 5 MMOL/L (ref 3–18)
APTT PPP: 26.2 SEC (ref 23–36.4)
AST SERPL-CCNC: 26 U/L (ref 15–37)
ATRIAL RATE: 76 BPM
BASOPHILS # BLD: 0 K/UL (ref 0–0.1)
BASOPHILS NFR BLD: 0 % (ref 0–2)
BILIRUB SERPL-MCNC: 0.5 MG/DL (ref 0.2–1)
BUN SERPL-MCNC: 22 MG/DL (ref 7–18)
BUN/CREAT SERPL: 21 (ref 12–20)
CALCIUM SERPL-MCNC: 9.5 MG/DL (ref 8.5–10.1)
CALCULATED P AXIS, ECG09: 26 DEGREES
CALCULATED R AXIS, ECG10: 48 DEGREES
CALCULATED T AXIS, ECG11: 64 DEGREES
CHLORIDE SERPL-SCNC: 104 MMOL/L (ref 100–108)
CK MB CFR SERPL CALC: NORMAL % (ref 0–4)
CK MB CFR SERPL CALC: NORMAL % (ref 0–4)
CK MB SERPL-MCNC: <1 NG/ML (ref 5–25)
CK MB SERPL-MCNC: <1 NG/ML (ref 5–25)
CK SERPL-CCNC: 66 U/L (ref 39–308)
CK SERPL-CCNC: 72 U/L (ref 39–308)
CO2 SERPL-SCNC: 30 MMOL/L (ref 21–32)
CREAT SERPL-MCNC: 1.05 MG/DL (ref 0.6–1.3)
D DIMER PPP FEU-MCNC: 0.74 UG/ML(FEU)
DIAGNOSIS, 93000: NORMAL
DIFFERENTIAL METHOD BLD: ABNORMAL
EOSINOPHIL # BLD: 0.1 K/UL (ref 0–0.4)
EOSINOPHIL NFR BLD: 1 % (ref 0–5)
ERYTHROCYTE [DISTWIDTH] IN BLOOD BY AUTOMATED COUNT: 14.3 % (ref 11.6–14.5)
GLOBULIN SER CALC-MCNC: 3.3 G/DL (ref 2–4)
GLUCOSE SERPL-MCNC: 149 MG/DL (ref 74–99)
HCT VFR BLD AUTO: 41.4 % (ref 36–48)
HGB BLD-MCNC: 14.7 G/DL (ref 13–16)
INR PPP: 0.9 (ref 0.8–1.2)
LYMPHOCYTES # BLD: 2 K/UL (ref 0.9–3.6)
LYMPHOCYTES NFR BLD: 17 % (ref 21–52)
MCH RBC QN AUTO: 31.5 PG (ref 24–34)
MCHC RBC AUTO-ENTMCNC: 35.5 G/DL (ref 31–37)
MCV RBC AUTO: 88.8 FL (ref 74–97)
MONOCYTES # BLD: 1.1 K/UL (ref 0.05–1.2)
MONOCYTES NFR BLD: 10 % (ref 3–10)
NEUTS SEG # BLD: 8.6 K/UL (ref 1.8–8)
NEUTS SEG NFR BLD: 72 % (ref 40–73)
P-R INTERVAL, ECG05: 142 MS
PLATELET # BLD AUTO: 173 K/UL (ref 135–420)
PMV BLD AUTO: 10.6 FL (ref 9.2–11.8)
POTASSIUM SERPL-SCNC: 4.6 MMOL/L (ref 3.5–5.5)
PROT SERPL-MCNC: 7.9 G/DL (ref 6.4–8.2)
PROTHROMBIN TIME: 12 SEC (ref 11.5–15.2)
Q-T INTERVAL, ECG07: 372 MS
QRS DURATION, ECG06: 92 MS
QTC CALCULATION (BEZET), ECG08: 418 MS
RBC # BLD AUTO: 4.66 M/UL (ref 4.7–5.5)
SODIUM SERPL-SCNC: 139 MMOL/L (ref 136–145)
TROPONIN I SERPL-MCNC: <0.02 NG/ML (ref 0–0.06)
TROPONIN I SERPL-MCNC: <0.02 NG/ML (ref 0–0.06)
VENTRICULAR RATE, ECG03: 76 BPM
WBC # BLD AUTO: 11.9 K/UL (ref 4.6–13.2)

## 2018-07-19 PROCEDURE — 71275 CT ANGIOGRAPHY CHEST: CPT

## 2018-07-19 PROCEDURE — 99285 EMERGENCY DEPT VISIT HI MDM: CPT

## 2018-07-19 PROCEDURE — 71045 X-RAY EXAM CHEST 1 VIEW: CPT

## 2018-07-19 PROCEDURE — 80053 COMPREHEN METABOLIC PANEL: CPT | Performed by: EMERGENCY MEDICINE

## 2018-07-19 PROCEDURE — 85025 COMPLETE CBC W/AUTO DIFF WBC: CPT | Performed by: EMERGENCY MEDICINE

## 2018-07-19 PROCEDURE — 85730 THROMBOPLASTIN TIME PARTIAL: CPT | Performed by: EMERGENCY MEDICINE

## 2018-07-19 PROCEDURE — 74011250637 HC RX REV CODE- 250/637: Performed by: EMERGENCY MEDICINE

## 2018-07-19 PROCEDURE — 85610 PROTHROMBIN TIME: CPT | Performed by: EMERGENCY MEDICINE

## 2018-07-19 PROCEDURE — 93005 ELECTROCARDIOGRAM TRACING: CPT

## 2018-07-19 PROCEDURE — 85379 FIBRIN DEGRADATION QUANT: CPT | Performed by: EMERGENCY MEDICINE

## 2018-07-19 PROCEDURE — 74011636320 HC RX REV CODE- 636/320: Performed by: EMERGENCY MEDICINE

## 2018-07-19 PROCEDURE — 82550 ASSAY OF CK (CPK): CPT | Performed by: EMERGENCY MEDICINE

## 2018-07-19 RX ORDER — NITROGLYCERIN 0.4 MG/1
0.4 TABLET SUBLINGUAL
Status: COMPLETED | OUTPATIENT
Start: 2018-07-19 | End: 2018-07-19

## 2018-07-19 RX ORDER — ALPRAZOLAM 0.5 MG/1
0.5 TABLET ORAL
Qty: 30 TAB | Refills: 0 | Status: SHIPPED | OUTPATIENT
Start: 2018-07-19 | End: 2018-08-22 | Stop reason: SDUPTHER

## 2018-07-19 RX ADMIN — NITROGLYCERIN 0.4 MG: 0.4 TABLET SUBLINGUAL at 17:14

## 2018-07-19 RX ADMIN — IOPAMIDOL 100 ML: 755 INJECTION, SOLUTION INTRAVENOUS at 17:54

## 2018-07-19 NOTE — PROGRESS NOTES
Marylene Noon is a 76 y.o.  male and presents with    Chief Complaint   Patient presents with    Back Pain     Patient here for 1 month follow up. Subjective:  HPI   He has a history of hypertension, hyperlipidemia, Type 2 DM, CAD with angina pectoris with coronary stent placement, osteoarthritis of left knee, chronic low back pain. Mr. Negro Coon presents today for CPE and follow up to labs. He does report continuous chest pain at rest and with exertion felt over the left side of the chest with history of CAD. He denies nausea, vomiting, fatigue, palpitations. Present x 1 week, not reproducible. He reports chronic low back pain 4/10 which is tolerable and chooses not to retrieve script for Norco at this time. He reports will call if needed. He reports Xanax helped with insomnia at first as Ativan has helped in the past but states recently unable to have full nights sleep. He would like to continue the Xanax at current dose for a while longer. Mr. Negro Coon has a history of hyperlipidemia and hypertriglyceridemia, CAD with angina pectoris, and hypertension. He is followed by Dr. Anjali Quintero, cardiology. 1/2016 with abnormal stress echo with ekg and wall motion abnormalities suggestive of LAD disease. He underwent a nuclear stress test and cardiac catheterization and found 2 vessel disease. He was hospitalized 11/2018 for unstable angina. Cardiac catheterization done on 12/01/2017 and conclusion - triple-vessel coronary artery disease, status post percutaneous transluminal coronary angioplasty, stent to proximal and mid circumflex artery using drug-eluting stents. The patient to be onaspirin and Brilinta. He is currently on Plavix and  mg. ECHO 2/2017 with EF of 55 % to 60 %. No obvious wall motion abnormalities, wall thickness was mildly increased. Side note Imdur in the past with hypotension.      He is a Type 2 DM on Metformin BID.  CTA was completed 2/2016 and incidentally found nontoxic multinodular goiter was found incidentally, measuring 1.6 on left and 1.5 cm on right. He was followed by Dr. August Cruz, unsure if still following.      He has a history of GERD without esophagitis. Relief with Omeprazole 20 mg. Additional Concerns: none     ROS   Review of Systems   Constitutional: Negative. Eyes: Negative. Respiratory: Positive for shortness of breath. Cardiovascular: Positive for chest pain. Negative for palpitations, orthopnea, claudication, leg swelling and PND. Gastrointestinal: Negative. Genitourinary: Negative. Musculoskeletal: Positive for back pain. Neurological: Negative. Psychiatric/Behavioral: The patient has insomnia. No Known Allergies    Current Outpatient Prescriptions   Medication Sig Dispense Refill    ALPRAZolam (XANAX) 0.5 mg tablet Take 1 Tab by mouth nightly as needed for Anxiety. Max Daily Amount: 0.5 mg. 30 Tab 0    carvedilol (COREG) 12.5 mg tablet TAKE 1 TAB BY MOUTH TWO (2) TIMES DAILY (WITH MEALS). 180 Tab 01    Omeprazole delayed release (PRILOSEC D/R) 20 mg tablet Take 1 Tab by mouth daily. Indications: gastroesophageal reflux disease 90 Tab 3    ondansetron (ZOFRAN ODT) 4 mg disintegrating tablet Take 1 Tab by mouth every eight (8) hours as needed for Nausea. 30 Tab 0    aspirin delayed-release 325 mg tablet Take 1 Tab by mouth daily. 100 Tab 3    cetirizine (ZYRTEC) 10 mg tablet Take  by mouth.  fenofibrate nanocrystallized (TRICOR) 48 mg tablet Take 1 Tab by mouth daily. 90 Tab 2    rosuvastatin (CRESTOR) 10 mg tablet Take 1 Tab by mouth nightly. 90 Tab 2    amLODIPine (NORVASC) 5 mg tablet Take 1 Tab by mouth daily. 90 Tab 2    clopidogrel (PLAVIX) 75 mg tab Take 1 Tab by mouth daily. 90 Tab 3    nitroglycerin (NITROSTAT) 0.4 mg SL tablet 1 Tab by SubLINGual route every five (5) minutes as needed for Chest Pain for up to 3 doses.  25 Tab 0    acetaminophen (TYLENOL) 325 mg tablet Take  by mouth every four (4) hours as needed for Pain.  metFORMIN (GLUCOPHAGE) 500 mg tablet Take  by mouth two (2) times daily (with meals).  ramipril (ALTACE) 10 mg capsule Take 10 mg by mouth daily.  promethazine (PHENERGAN) 25 mg tablet Take 25 mg by mouth every six (6) hours as needed for Nausea. Social History     Social History    Marital status:      Spouse name: N/A    Number of children: N/A    Years of education: N/A     Occupational History    Not on file.      Social History Main Topics    Smoking status: Never Smoker    Smokeless tobacco: Never Used    Alcohol use No    Drug use: No    Sexual activity: Yes     Partners: Female     Birth control/ protection: None     Other Topics Concern    Not on file     Social History Narrative       Past Medical History:   Diagnosis Date    Arthritis     1999 vicodin    Diabetes ([address] 1998 metformin    Hypercholesterolemia     Hypertension 1996 norvasc       Past Surgical History:   Procedure Laterality Date    CARDIAC SURG PROCEDURE UNLIST  12/2017    stints    HX GI  2010    gallbladder       Family History   Problem Relation Age of Onset    Stroke Mother     Headache Mother     Hypertension Mother     Lung Cancer Mother     Heart Disease Father     Heart Attack Father     Hypertension Father     Diabetes Father     Lung Cancer Father     Ovarian Cancer Sister     Heart Attack Brother     Breast Cancer Sister     Diabetes Sister     Cancer Maternal Aunt      lung cancer    Cancer Maternal Uncle     Cancer Paternal Aunt     Cancer Paternal Uncle        Objective:  Vitals:    07/19/18 1346   BP: 130/66   Pulse: 85   Resp: 14   Temp: 98.4 °F (36.9 °C)   TempSrc: Oral   SpO2: 95%   Weight: 194 lb 9.6 oz (88.3 kg)   Height: 6' (1.829 m)   PainSc:   4   PainLoc: Back       LABS   Results for orders placed or performed during the hospital encounter of 06/21/18   HEMOGLOBIN A1C WITH EAG   Result Value Ref Range    Hemoglobin A1c 7.0 (H) 4.2 - 5.6 %    Est. average glucose 154 mg/dL   MICROALBUMIN, UR, RAND W/ MICROALB/CREAT RATIO   Result Value Ref Range    Microalbumin,urine random 0.90 0 - 3.0 MG/DL    Creatinine, urine 158.00 (H) 30 - 125 mg/dL    Microalbumin/Creat ratio (mg/g creat) 6 0 - 30 mg/g       TESTS  none    PE  Physical Exam   Constitutional: He is oriented to person, place, and time. He appears well-developed and well-nourished. No distress. HENT:   Head: Normocephalic and atraumatic. Nose: Nose normal.   Mouth/Throat: Oropharynx is clear and moist. No oropharyngeal exudate. Cerumen bilateral, unable to visualize TM   Eyes: Conjunctivae and EOM are normal. Pupils are equal, round, and reactive to light. Neck: Normal range of motion. Neck supple. No thyromegaly present. Cardiovascular: Normal rate, regular rhythm, normal heart sounds and intact distal pulses. Pulmonary/Chest: Effort normal and breath sounds normal. No respiratory distress. He has no wheezes. He has no rales. He exhibits no tenderness. Abdominal: Soft. Bowel sounds are normal. He exhibits no distension. There is no tenderness. Musculoskeletal: Normal range of motion. Lymphadenopathy:     He has no cervical adenopathy. Neurological: He is alert and oriented to person, place, and time. No cranial nerve deficit. Coordination normal.   Sensory exam of the foot is normal, tested with the monofilament. Good pulses, no lesions or ulcers, good peripheral pulses. With tinea pedis bilaterally     Skin: Skin is warm and dry. He is not diaphoretic. Psychiatric: He has a normal mood and affect. His behavior is normal. Judgment and thought content normal.   Vitals reviewed. Assessment/Plan:    1. Routine annual physical- CPE today, labs reviewed. Follow up in 3 months.     2. Chest pain- EKG- unremarkable, shows in sinus rhythm, old infarction, nonspecific T abnormality, VS unremarkable, with complaints of shortness of breath with exertion, continuous chest pain w/wo rest, sent to [facility] ED, wife with patient and drove to ED. He is followed by Dr. Cassandra Steiner, cardiology, instructed to be seen by Dr. Cassandra Steiner asa if no acute diagnosis found. 3. Overweight/ Type 2 DM- A1c 7.0, continue lifestyle management. He is golfing and walks the course. 4. Multinodular goiter- Labs, he is unsure if following with Dr. Queenie Bond. Will need to discuss repeat US thyroid next visit. Lab review: labs are reviewed, up to date and normal    Today's Visit:   Diagnoses and all orders for this visit:    1. Chronic pain syndrome    2. Insomnia, unspecified type  -     ALPRAZolam (XANAX) 0.5 mg tablet; Take 1 Tab by mouth nightly as needed for Anxiety. Max Daily Amount: 0.5 mg.    3. Type 2 diabetes mellitus without complication, without long-term current use of insulin (HCC)  -      DIABETES FOOT EXAM    4. Essential hypertension    5. Chest pain at rest  -     AMB POC EKG ROUTINE W/ 12 LEADS, INTER & REP    6. Shortness of breath  -     AMB POC EKG ROUTINE W/ 12 LEADS, INTER & REP    7. High blood triglycerides  -     HEPATIC FUNCTION PANEL; Future    8. Encounter for hepatitis C screening test for low risk patient  -     HEPATITIS C AB; Future    9. Prostate cancer screening  -     PROSTATE SPECIFIC AG; Future      Health Maintenance:   Hepatitis C screening  Referred to Dr. Karen Rausch as prior with him, every 5 year screen according to patient and overdue. Records requested. DM foot exam completed today. Eye Exam up to date, records requested  Shingrix script given last visit, patient waiting for [employer] to get supply. ACP discussed with wife prior and handout provided to her to discuss as a family. I have discussed the diagnosis with the patient and the intended plan as seen in the above orders. The patient has received an after-visit summary and questions were answered concerning future plans. I have discussed medication side effects and warnings with the patient as well.  I have reviewed the plan of care with the patient, accepted their input and they are in agreement with the treatment goals. Follow-up Disposition: Not on File   More than 1/2 of this 45 minute visit was spent in counseling and coordination of care, as described above.     SUSAN Swain  Internist of [address], [address] [place] [address].  Phone: [phone]  Fax: [phone]

## 2018-07-19 NOTE — PROGRESS NOTES
1. Have you been to the ER, urgent care clinic or hospitalized since your last visit? NO.     2. Have you seen or consulted any other health care providers outside of the [address] [place] [address] since your last visit (Include any pap smears or colon screening)? NO      Do you have an Advanced Directive? NO    Would you like information on Advanced Directives?  NO

## 2018-07-19 NOTE — ED PROVIDER NOTES
EMERGENCY DEPARTMENT HISTORY AND PHYSICAL EXAM    4:07 PM      Date: 7/19/2018  Patient Name: Jess Cheema    History of Presenting Illness     Chief Complaint   Patient presents with    Chest Pain         History Provided By: Patient    Chief Complaint: Chest pain  Duration:  1 week ago  Timing:  Intermittent  Location: left side  Quality: Sore  Severity: 4 out of 10  Modifying Factors: nothing makes the Sx better or worse   Associated Symptoms: Shortness of breath      Additional History (Context): 5:07 PM Jess Cheema is a 76 y.o. male with h/o HTN, MI and arthritis who presents to ED complaining of sore, constant left sided chest pain, rated 4/10 in severity, onset 1 week ago. The pain is non-radiating and pt states it \"stays in the same area\". He was in his PCP office Steffen Rivas NP) when pt developed diaphoresis and a skin color change, per pt wife. He also admits to playing golf today. Associated symptoms include shortness of breath. He has not recently traveled. Patient denies swelling in legs and smoking. He claims having heart problems, stents placed by Dr. Anika Kimbrough in x 8 months ago for cardiac blockages. Cardiologist is Dr. Harriet Manzano. Patient is regularly taking his medications and took Plavix today. No PMHx of blood clots. Denies nausea, dizziness, leg swelling or pain. Denies any further complaints or symptoms at the moment.        PCP: Unique Carson NP        Past History     Past Medical History:  Past Medical History:   Diagnosis Date    Arthritis     1999 vicodin    Diabetes ([address] 1998 metformin    Hypercholesterolemia     Hypertension 1996 norvasc    [address] ([address]) [address] [address]        Past Surgical History:  Past Surgical History:   Procedure Laterality Date    CARDIAC SURG PROCEDURE UNLIST  12/2017    stints    HX GI  2010    gallbladder       Family History:  Family History   Problem Relation Age of Onset    Stroke Mother     Headache Mother     Hypertension Mother  Lung Cancer Mother     Heart Disease Father     Heart Attack Father     Hypertension Father     Diabetes Father     Lung Cancer Father     Ovarian Cancer Sister     Heart Attack Brother     Breast Cancer Sister     Diabetes Sister     Cancer Maternal Aunt      lung cancer    Cancer Maternal Uncle     Cancer Paternal Aunt     Cancer Paternal Uncle        Social History:  Social History   Substance Use Topics    Smoking status: Never Smoker    Smokeless tobacco: Never Used    Alcohol use No       Allergies:  No Known Allergies      Review of Systems       Review of Systems   Constitutional: Negative for fever. HENT: Negative for sore throat. Eyes: Negative for redness and visual disturbance. Respiratory: Positive for shortness of breath. Negative for wheezing. Cardiovascular: Positive for chest pain. Negative for leg swelling. Gastrointestinal: Negative for nausea and vomiting. Endocrine: Negative for polyuria. Genitourinary: Negative for dysuria and flank pain. Musculoskeletal: Negative for arthralgias, neck pain and neck stiffness. Skin: Negative for rash. Neurological: Negative for dizziness, syncope, weakness, numbness and headaches. All other systems reviewed and are negative. Physical Exam     Visit Vitals    /69    Pulse 76    Temp 98.1 °F (36.7 °C)    Resp 16    Ht 6' (1.829 m)    Wt 87.1 kg (192 lb)    SpO2 100%    BMI 26.04 kg/m2         Physical Exam   Constitutional: He is oriented to person, place, and time. He appears well-developed and well-nourished. No distress. HENT:   Head: Normocephalic and atraumatic. Mouth/Throat: Oropharynx is clear and moist.   Eyes: Conjunctivae are normal. Pupils are equal, round, and reactive to light. No scleral icterus. Neck: Normal range of motion. Neck supple. Cardiovascular: Intact distal pulses. Exam reveals no gallop and no friction rub. No murmur heard.   Capillary refill < 3 seconds Pulmonary/Chest: Effort normal and breath sounds normal. No respiratory distress. He has no wheezes. He exhibits no tenderness. Abdominal: Soft. Bowel sounds are normal. He exhibits no distension. There is no tenderness. Musculoskeletal: Normal range of motion. He exhibits no edema. Negative for lower extremity edema  Negative for calf tenderness  Negative for leg edema   Lymphadenopathy:     He has no cervical adenopathy. Neurological: He is alert and oriented to person, place, and time. No cranial nerve deficit. He exhibits normal muscle tone. Coordination normal.   Skin: Skin is warm and dry. No rash noted. He is not diaphoretic. Psychiatric: His behavior is normal.   Nursing note and vitals reviewed. Diagnostic Study Results     Labs -  Recent Results (from the past 12 hour(s))   EKG, 12 LEAD, INITIAL    Collection Time: 07/19/18  4:01 PM   Result Value Ref Range    Ventricular Rate 76 BPM    Atrial Rate 76 BPM    P-R Interval 142 ms    QRS Duration 92 ms    Q-T Interval 372 ms    QTC Calculation (Bezet) 418 ms    Calculated P Axis 26 degrees    Calculated R Axis 48 degrees    Calculated T Axis 64 degrees    Diagnosis       Normal sinus rhythm  Nonspecific T wave abnormality  Abnormal ECG  When compared with ECG of 02-DEC-2017 04:45,  Nonspecific T wave abnormality, worse in Lateral leads     CBC WITH AUTOMATED DIFF    Collection Time: 07/19/18  4:10 PM   Result Value Ref Range    WBC 11.9 4.6 - 13.2 K/uL    RBC 4.66 (L) 4.70 - 5.50 M/uL    HGB 14.7 13.0 - 16.0 g/dL    HCT 41.4 36.0 - 48.0 %    MCV 88.8 74.0 - 97.0 FL    MCH 31.5 24.0 - 34.0 PG    MCHC 35.5 31.0 - 37.0 g/dL    RDW 14.3 11.6 - 14.5 %    PLATELET 397 370 - 955 K/uL    MPV 10.6 9.2 - 11.8 FL    NEUTROPHILS 72 40 - 73 %    LYMPHOCYTES 17 (L) 21 - 52 %    MONOCYTES 10 3 - 10 %    EOSINOPHILS 1 0 - 5 %    BASOPHILS 0 0 - 2 %    ABS. NEUTROPHILS 8.6 (H) 1.8 - 8.0 K/UL    ABS. LYMPHOCYTES 2.0 0.9 - 3.6 K/UL    ABS.  MONOCYTES 1.1 0.05 - 1.2 K/UL    ABS. EOSINOPHILS 0.1 0.0 - 0.4 K/UL    ABS. BASOPHILS 0.0 0.0 - 0.1 K/UL    DF AUTOMATED     METABOLIC PANEL, COMPREHENSIVE    Collection Time: 07/19/18  4:10 PM   Result Value Ref Range    Sodium 139 136 - 145 mmol/L    Potassium 4.6 3.5 - 5.5 mmol/L    Chloride 104 100 - 108 mmol/L    CO2 30 21 - 32 mmol/L    Anion gap 5 3.0 - 18 mmol/L    Glucose 149 (H) 74 - 99 mg/dL    BUN 22 (H) 7.0 - 18 MG/DL    Creatinine 1.05 0.6 - 1.3 MG/DL    BUN/Creatinine ratio 21 (H) 12 - 20      GFR est AA >60 >60 ml/min/1.73m2    GFR est non-AA >60 >60 ml/min/1.73m2    Calcium 9.5 8.5 - 10.1 MG/DL    Bilirubin, total 0.5 0.2 - 1.0 MG/DL    ALT (SGPT) 36 16 - 61 U/L    AST (SGOT) 26 15 - 37 U/L    Alk. phosphatase 84 45 - 117 U/L    Protein, total 7.9 6.4 - 8.2 g/dL    Albumin 4.6 3.4 - 5.0 g/dL    Globulin 3.3 2.0 - 4.0 g/dL    A-G Ratio 1.4 0.8 - 1.7     CARDIAC PANEL,(CK, CKMB & TROPONIN)    Collection Time: 07/19/18  4:10 PM   Result Value Ref Range    CK 72 39 - 308 U/L    CK - MB <1.0 <3.6 ng/ml    CK-MB Index  0.0 - 4.0 %     CALCULATION NOT PERFORMED WHEN RESULT IS BELOW LINEAR LIMIT    Troponin-I, Qt. <0.02 0.00 - 0.06 NG/ML   D DIMER    Collection Time: 07/19/18  4:10 PM   Result Value Ref Range    D DIMER 0.74 (H) <0.46 ug/ml(FEU)   PROTHROMBIN TIME + INR    Collection Time: 07/19/18  4:10 PM   Result Value Ref Range    Prothrombin time 12.0 11.5 - 15.2 sec    INR 0.9 0.8 - 1.2     PTT    Collection Time: 07/19/18  4:10 PM   Result Value Ref Range    aPTT 26.2 23.0 - 36.4 SEC   CARDIAC PANEL,(CK, CKMB & TROPONIN)    Collection Time: 07/19/18  7:00 PM   Result Value Ref Range    CK 66 39 - 308 U/L    CK - MB <1.0 <3.6 ng/ml    CK-MB Index  0.0 - 4.0 %     CALCULATION NOT PERFORMED WHEN RESULT IS BELOW LINEAR LIMIT    Troponin-I, Qt. <0.02 0.00 - 0.06 NG/ML       Radiologic Studies -   XR CHEST SNGL V   Final Result     IMPRESSION:    1. No clearly acute findings.  Nonspecific left basilar streaky density CTA CHEST W OR W WO CONT    (Results Pending)   IMPRESSION[de-identified]    1.  No evidence of pulmonary embolism or thoracic aortic dissection. No  alternative explanation for the patient's clinical symptoms. 2. Small left thyroid nodule. Consider nonemergent thyroid ultrasound follow-up. Medical Decision Making   I am the first provider for this patient. I reviewed the vital signs, available nursing notes, past medical history, past surgical history, family history and social history. Vital Signs-Reviewed the patient's vital signs. Pulse Oximetry Analysis -  95% on room air, normal     EKG: Interpreted by the EP. Time Interpreted: 4:05PM   Rate: 76 bpm   Rhythm: Normal Sinus Rhythm   Interpretation: Normal QTC, Normal Axis   Comparison: Non specific ST wave change, No ST elevation      Records Reviewed: Nursing Notes (Time of Review: 4:07 PM)    Provider Notes (Medical Decision Making): ddx gerd, musculoskeletal, cardiac, PE, metabolic, dissection    Labs, ekg, cxr    MDM  Number of Diagnoses or Management Options  Chest pain, unspecified type:   Thyroid nodule:       Medications   nitroglycerin (NITROSTAT) tablet 0.4 mg (0.4 mg SubLINGual Given 7/19/18 1714)   iopamidol (ISOVUE-370) 76 % injection 100 mL (100 mL IntraVENous Given 7/19/18 1754)       ED Course: Progress Notes, Reevaluation, and Consults:  WBC wnl  Cr wnl  Trop (-)    ddimer elevated, will get CTA    7:02 PM: Awaiting call back from patient's cardiologist. Also waiting for repeat troponin. Will determine dispo after talk with cardiologist.      WBC wnl  Initial trop (-)  ddimer elevated, will get CTA      CTA nothing acute    I plan to dc home is repeat trop (-).    7:14 PM Consult: I discussed care with Lori Henriquez (Cardiology). It was a standard discussion including patient history, chief complaint, available diagnostic results, and predicted treatment course.  States Dr. Garcia Watson is in a procedure and unable to come to the phone at this time, will call back. Repeat trop <0.02    I have reassessed the patient. I have discussed the workup, results and plan with the patient and patient is in agreement. Patient with no new complaint. Patient  Has not been taking his ntg as prescribed, told him to take for cp. Patient was discharge in stable condition. Patient was given outpatient follow up. Patient is to return to emergency department if any new or worsening condition. 8:01 PM July 19, 2018    Diagnosis     Clinical Impression:   1. Chest pain, unspecified type    2. Thyroid nodule        Disposition: d/c home    Follow-up Information     Follow up With Details Comments Contact Info    Karely Alvarez MD Schedule an appointment as soon as possible for a visit in 1 day  [address], NP Schedule an appointment as soon as possible for a visit in 3 days  [address] [facility] [address] [facility]      [address] [facility] EMERGENCY DEPT  As needed, If symptoms worsen [address] [facility]  [phone]           Patient's Medications   Start Taking    No medications on file   Continue Taking    ACETAMINOPHEN (TYLENOL) 325 MG TABLET    Take  by mouth every four (4) hours as needed for Pain. ALPRAZOLAM (XANAX) 0.5 MG TABLET    Take 1 Tab by mouth nightly as needed for Anxiety. Max Daily Amount: 0.5 mg.    AMLODIPINE (NORVASC) 5 MG TABLET    Take 1 Tab by mouth daily. ASPIRIN DELAYED-RELEASE 325 MG TABLET    Take 1 Tab by mouth daily. CARVEDILOL (COREG) 12.5 MG TABLET    TAKE 1 TAB BY MOUTH TWO (2) TIMES DAILY (WITH MEALS). CETIRIZINE (ZYRTEC) 10 MG TABLET    Take  by mouth. CLOPIDOGREL (PLAVIX) 75 MG TAB    Take 1 Tab by mouth daily. FENOFIBRATE NANOCRYSTALLIZED (TRICOR) 48 MG TABLET    Take 1 Tab by mouth daily. METFORMIN (GLUCOPHAGE) 500 MG TABLET    Take  by mouth two (2) times daily (with meals). NITROGLYCERIN (NITROSTAT) 0.4 MG SL TABLET    1 Tab by SubLINGual route every five (5) minutes as needed for Chest Pain for up to 3 doses. OMEPRAZOLE DELAYED RELEASE (PRILOSEC D/R) 20 MG TABLET    Take 1 Tab by mouth daily. Indications: gastroesophageal reflux disease    ONDANSETRON (ZOFRAN ODT) 4 MG DISINTEGRATING TABLET    Take 1 Tab by mouth every eight (8) hours as needed for Nausea. RAMIPRIL (ALTACE) 10 MG CAPSULE    Take 10 mg by mouth daily. ROSUVASTATIN (CRESTOR) 10 MG TABLET    Take 1 Tab by mouth nightly. These Medications have changed    No medications on file   Stop Taking    PROMETHAZINE (PHENERGAN) 25 MG TABLET    Take 25 mg by mouth every six (6) hours as needed for Nausea.     _______________________________    Attestations:  Scribe Attestation     Sixto Gray and Pipo Kang acting as a scribe for and in the presence of Jesenia Christy DO      July 19, 2018 at 4:07 PM       Provider Attestation:      I personally performed the services described in the documentation, reviewed the documentation, as recorded by the scribe in my presence, and it accurately and completely records my words and actions.  July 19, 2018 at 4:07 PM - Jesenia Christy DO    _______________________________

## 2018-07-19 NOTE — MR AVS SNAPSHOT
Bulmaro Meyers 
 
 
 [address], [address] [facility] 
[phone] Patient: Patrick Bey MRN: [id number] NCN:[id number] Visit Information Date & Time Provider Department Dept. Phone Encounter #  
 7/19/2018  2:15 PM Codie Stephen NP [facility] [phone] Your Appointments 10/25/2018 12:45 PM  
Office Visit with Codie Stephen NP [facility] ([facility]) Appt Note: 3 month follow up  
 [address], [address] [place] [address] [place]  
  
   
 [address], [address]  
  
    
 11/7/2018  8:15 AM  
Office Visit with Cooper Justin MD  
[facility]) Appt Note: 6 months post labs [address] [facility], [address] [place] [address]  
[address], [address] [facility] [address] [facility] Upcoming Health Maintenance Date Due Hepatitis C Screening 1950 EYE EXAM RETINAL OR DILATED Q1 1/28/1960 DTaP/Tdap/Td series (1 - Tdap) 1/28/1971 FOBT Q 1 YEAR AGE 50-75 1/28/2000 ZOSTER VACCINE AGE 60> 11/28/2009 GLAUCOMA SCREENING Q2Y 1/28/2015 Pneumococcal 65+ Low/Medium Risk (2 of 2 - PCV13) 1/18/2017 MEDICARE YEARLY EXAM 3/14/2018 Influenza Age 5 to Adult 8/1/2018 HEMOGLOBIN A1C Q6M 12/21/2018 LIPID PANEL Q1 5/21/2019 MICROALBUMIN Q1 6/21/2019 FOOT EXAM Q1 7/19/2019 Allergies as of 7/19/2018  Review Complete On: 7/19/2018 By: Shyanne Purvis No Known Allergies Current Immunizations  Never Reviewed Name Date Influenza Vaccine 9/30/2015 12:00 AM  
 Pneumococcal Polysaccharide (PPSV-23) 1/18/2016 12:00 AM  
  
 Not reviewed this visit You Were Diagnosed With   
  
 Codes Comments Chronic pain syndrome    -  Primary ICD-10-CM: G89.4 ICD-9-CM: 338.4 Insomnia, unspecified type     ICD-10-CM: G47.00 ICD-9-CM: 780.52   
 Type 2 diabetes mellitus without complication, without long-term current use of insulin (HCC)     ICD-10-CM: E11.9 ICD-9-CM: 250.00 Essential hypertension     ICD-10-CM: I10 
ICD-9-CM: 401.9 Chest pain at rest     ICD-10-CM: R07.9 ICD-9-CM: 786.50 Shortness of breath     ICD-10-CM: R06.02 
ICD-9-CM: 786.05 High blood triglycerides     ICD-10-CM: E78.1 ICD-9-CM: 272.1 Encounter for hepatitis C screening test for low risk patient     ICD-10-CM: Z11.59 
ICD-9-CM: V73.89 Prostate cancer screening     ICD-10-CM: Z12.5 ICD-9-CM: V76.44 Screening for colon cancer     ICD-10-CM: Z12.11 ICD-9-CM: V76.51 Vitals BP Pulse Temp Resp Height(growth percentile) Weight(growth percentile) 130/66 (BP 1 Location: Left arm, BP Patient Position: Sitting) 85 98.4 °F (36.9 °C) (Oral) 14 6' (1.829 m) 194 lb 9.6 oz (88.3 kg) SpO2 BMI Smoking Status 95% 26.39 kg/m2 Never Smoker Vitals History BMI and BSA Data Body Mass Index Body Surface Area  
 26.39 kg/m 2 2.12 m 2 Preferred Pharmacy Pharmacy Name Phone [facility], [address] [place] [address] [phone] Your Updated Medication List  
  
   
This list is accurate as of 7/19/18  3:46 PM.  Always use your most recent med list.  
  
  
  
  
 acetaminophen 325 mg tablet Commonly known as:  TYLENOL Take  by mouth every four (4) hours as needed for Pain. ALPRAZolam 0.5 mg tablet Commonly known as:  Patricia Guillermo Take 1 Tab by mouth nightly as needed for Anxiety. Max Daily Amount: 0.5 mg.  
  
 amLODIPine 5 mg tablet Commonly known as:  [place]o [place] Take 1 Tab by mouth daily. aspirin delayed-release 325 mg tablet Take 1 Tab by mouth daily. carvedilol 12.5 mg tablet Commonly known as:  COREG  
TAKE 1 TAB BY MOUTH TWO (2) TIMES DAILY (WITH MEALS). clopidogrel 75 mg Tab Commonly known as:  PLAVIX Take 1 Tab by mouth daily. fenofibrate nanocrystallized 48 mg tablet Commonly known as:  [employer] Take 1 Tab by mouth daily. metFORMIN 500 mg tablet Commonly known as:  GLUCOPHAGE Take  by mouth two (2) times daily (with meals). nitroglycerin 0.4 mg SL tablet Commonly known as:  NITROSTAT  
1 Tab by SubLINGual route every five (5) minutes as needed for Chest Pain for up to 3 doses. Omeprazole delayed release 20 mg tablet Commonly known as:  PRILOSEC D/R Take 1 Tab by mouth daily. Indications: gastroesophageal reflux disease  
  
 ondansetron 4 mg disintegrating tablet Commonly known as:  ZOFRAN ODT Take 1 Tab by mouth every eight (8) hours as needed for Nausea. promethazine 25 mg tablet Commonly known as:  PHENERGAN Take 25 mg by mouth every six (6) hours as needed for Nausea. ramipril 10 mg capsule Commonly known as:  ALTACE Take 10 mg by mouth daily. rosuvastatin 10 mg tablet Commonly known as:  CRESTOR Take 1 Tab by mouth nightly. ZyrTEC 10 mg tablet Generic drug:  cetirizine Take  by mouth. Prescriptions Printed Refills ALPRAZolam (XANAX) 0.5 mg tablet 0 Sig: Take 1 Tab by mouth nightly as needed for Anxiety. Max Daily Amount: 0.5 mg.  
 Class: Print Route: Oral  
  
We Performed the Following AMB POC EKG ROUTINE W/ 12 LEADS, INTER & REP [12588 CPT(R)]  DIABETES FOOT EXAM [HM7 Custom] REFERRAL TO GASTROENTEROLOGY [QFP44 Custom] Comments:  
 Screening colonoscopy, last completed with Dr. Dell Holcomb To-Do List   
 07/19/2018 Lab:  HEPATIC FUNCTION PANEL   
  
 07/19/2018 Lab:  HEPATITIS C AB   
  
 01/16/2019 Lab:  PSA, DIAGNOSTIC (PROSTATE SPECIFIC AG) Referral Information Referral ID Referred By Referred To  
  
 [id number] Petra Garret Birtha Schilder, MD   
   [address] [place] [address] Drive [address] [place], [facility] Phone: [phone] Fax: [phone] Visits Status Start Date End Date 1 New Request 7/19/18 7/19/19 If your referral has a status of pending review or denied, additional information will be sent to support the outcome of this decision. Please provide this summary of care documentation to your next provider. Your primary care clinician is listed as Seble Hutson. If you have any questions after today's visit, please call [phone].

## 2018-07-19 NOTE — ED NOTES
Received \"hand-off\" bedside report from off-going-shift RN (Sanna),and assumed care of patient. Patient greeted / introduced myself as their primary nurse. Encouraged to voice any concerns, and all questions/concerns addressed. Patient updated on all care, including any pending orders or procedures. Call bell with reach. Awaiting further MD orders at this time. Patient fall risk assessed, with prevention measures in place, to include bed rail up, bed in lowest position with casters locked,call bell within reach, frequent toileting in progress, lights on, pathway to bathroom free from obstacles,  and patient instructed to call for assist OOB at all times, floor dry, slip resistant socks offered if needed. Instructed that staff will make hourly rounds to provide reassessments in pain control, concerns, and any other updates in care. Hand hygiene maintained prior to and after patient/staff interaction.

## 2018-07-19 NOTE — DISCHARGE INSTRUCTIONS
Chest Pain: Care Instructions  Your Care Instructions    There are many things that can cause chest pain. Some are not serious and will get better on their own in a few days. But some kinds of chest pain need more testing and treatment. Your doctor may have recommended a follow-up visit in the next 8 to 12 hours. If you are not getting better, you may need more tests or treatment. Even though your doctor has released you, you still need to watch for any problems. The doctor carefully checked you, but sometimes problems can develop later. If you have new symptoms or if your symptoms do not get better, get medical care right away. If you have worse or different chest pain or pressure that lasts more than 5 minutes or you passed out (lost consciousness), call 911 or seek other emergency help right away. A medical visit is only one step in your treatment. Even if you feel better, you still need to do what your doctor recommends, such as going to all suggested follow-up appointments and taking medicines exactly as directed. This will help you recover and help prevent future problems. How can you care for yourself at home? · Rest until you feel better. · Take your medicine exactly as prescribed. Call your doctor if you think you are having a problem with your medicine. · Do not drive after taking a prescription pain medicine. When should you call for help? Call 911 if:    · You passed out (lost consciousness).     · You have severe difficulty breathing.     · You have symptoms of a heart attack. These may include:  ¨ Chest pain or pressure, or a strange feeling in your chest.  ¨ Sweating. ¨ Shortness of breath. ¨ Nausea or vomiting. ¨ Pain, pressure, or a strange feeling in your back, neck, jaw, or upper belly or in one or both shoulders or arms. ¨ Lightheadedness or sudden weakness. ¨ A fast or irregular heartbeat.   After you call 911, the  may tell you to chew 1 adult-strength or 2 to 4 low-dose aspirin. Wait for an ambulance. Do not try to drive yourself.    Call your doctor today if:    · You have any trouble breathing.     · Your chest pain gets worse.     · You are dizzy or lightheaded, or you feel like you may faint.     · You are not getting better as expected.     · You are having new or different chest pain. Where can you learn more? Go to http://kenneth-renuka.info/. Enter A120 in the search box to learn more about \"Chest Pain: Care Instructions. \"  Current as of: November 20, 2017  Content Version: 11.7  © 9058-6708 [employer]. Care instructions adapted under license by [employer] (which disclaims liability or warranty for this information). If you have questions about a medical condition or this instruction, always ask your healthcare professional. [address] any warranty or liability for your use of this information. Thyroid Nodules: Care Instructions  Your Care Instructions  Thyroid nodules are growths or lumps in the thyroid gland. Your thyroid is in the front of your neck. It controls how your body uses energy. You may have tests to see if the nodule is caused by cancer. Most nodules aren't cancer and don't cause problems. Many don't even need treatment. If you do have cancer, it can usually be cured. Treatment will probably include surgery. You may also get radioactive iodine treatment. If your thyroid can't make thyroid hormone after treatment, you can take a pill every day to replace the hormone. Follow-up care is a key part of your treatment and safety. Be sure to make and go to all appointments, and call your doctor if you are having problems. It's also a good idea to know your test results and keep a list of the medicines you take. How can you care for yourself at home? · Be safe with medicines. If you take thyroid hormone medicine:  ¨ Take it exactly as prescribed.  Call your doctor if you think you are having a problem with your medicine. If you take the right amount and don't skip doses, you probably won't have side effects. ¨ Do not take it with calcium, vitamins, or iron. ¨ Try not to miss a dose. ¨ Do not take extra doses. This will not help you get better any faster. It may also cause side effects. ¨ Tell your doctor about any medicines you take. This includes over-the-counter medicines. ¨ Wear a medical alert bracelet or necklace that says you take thyroid hormones. You can buy these at most [employer]. When should you call for help? Call 911 anytime you think you may need emergency care. For example, call if:    · You lose consciousness.    Call your doctor now or seek immediate medical care if:    · You have shortness of breath.    Watch closely for changes in your health, and be sure to contact your doctor if:    · You have pain in your neck, jaw, or ear.     · You have problems swallowing.     · You feel weak and tired.     · You have nervousness, a fast heartbeat, hand tremors, problems sleeping, increased sweating, and weight loss.     · You do not feel better even though you are taking your medicine. Where can you learn more? Go to http://kenneth-renuka.info/. Enter J799 in the search box to learn more about \"Thyroid Nodules: Care Instructions. \"  Current as of: June 22, 2017  Content Version: 11.7  © 7346-1861 [employer]. Care instructions adapted under license by [employer] (which disclaims liability or warranty for this information). If you have questions about a medical condition or this instruction, always ask your healthcare professional. [address] any warranty or liability for your use of this information.

## 2018-07-20 ENCOUNTER — PATIENT OUTREACH (OUTPATIENT)
Dept: INTERNAL MEDICINE CLINIC | Age: 68
End: 2018-07-20

## 2018-07-20 ENCOUNTER — TELEPHONE (OUTPATIENT)
Dept: INTERNAL MEDICINE CLINIC | Age: 68
End: 2018-07-20

## 2018-07-20 NOTE — PROGRESS NOTES
ED Discharge Follow-Up    Date/Time:  2018 1:43 PM    Patient was admitted to [address] [facility] ED on 2018 and discharged on 2018 for left sided chest pain. Presenting symptoms: left sided chest pain, sweating, and SOB    Nurse Navigator(NN) contacted the patient  by telephone to perform post ED discharge assessment. Verified name and  with patient as identifiers. Provided introduction to self, and explanation of the Nurse Navigator role. Patient contacted within 1 business day(s) of discharge. Patient denied the following:  SOB  Sweating  Dizziness  Numbness  Lightheadedness  Tingling  Palpitations  Fever  Chills  Vision changes  Nausea  Vomiting   Swelling     Patient reported the following:  Chest pain tolerable at 3/10 on a scale of 0 to 10  \"it does not prevent be from doing anything\"  Was given Nitro in the ED yesterday and pain became sharp and slight worse  Have Nitro on hand in needed  Follow up with Dr. Cristina Mendes on 2018  Never achieved total relief     Medication:   New medications at discharge include:  None  Taking medication(s) prescribed at discharge: N/A    Reviewed discharge instructions and red flags with  patient who provided teach back. Patient given an opportunity to ask questions and does not have any further questions or concerns at this time. The patient agrees to contact the PCP office for questions related to their healthcare. Patient reminded that there are physicians on call 24 hours a day / 7 days a week (M-F 5 pm to 8 am and from Friday 5 pm until Monday 8 am for the weekend) should the patient have questions or concerns. NN provided contact information for future reference.     Future Appointments  Date Time Provider Stephanie Terry   2018 9:30 AM Beka Goncalves MD [facility] [place] [address]   10/25/2018 12:45 PM Ariel Rachel NP [facility]   2018 8:15 AM Beka Goncalves MD  [address] ED     Coordinate Pain Management Plan for Patient. Maintain an acceptable pain level  Use Nitro  7/20/2018: reporting pain 3/10 on a scale of 0 to 10 as tolerable and Nitro on hand if needed       Reduce ED Utilization            No admissions within 30 days post discharge, 7/19/2018         Post Hospitalization     Attends follow-up appointments as directed.             Follow up with Cardiology  7/20/2018: appointment with Dr. Bertrand Saldaña 7/23/2018

## 2018-07-23 ENCOUNTER — PATIENT OUTREACH (OUTPATIENT)
Dept: INTERNAL MEDICINE CLINIC | Age: 68
End: 2018-07-23

## 2018-07-23 ENCOUNTER — OFFICE VISIT (OUTPATIENT)
Dept: CARDIOLOGY CLINIC | Age: 68
End: 2018-07-23

## 2018-07-23 VITALS
BODY MASS INDEX: 26.68 KG/M2 | WEIGHT: 197 LBS | HEIGHT: 72 IN | DIASTOLIC BLOOD PRESSURE: 85 MMHG | SYSTOLIC BLOOD PRESSURE: 152 MMHG | HEART RATE: 60 BPM

## 2018-07-23 DIAGNOSIS — Z95.5 S/P CORONARY ARTERY STENT PLACEMENT: ICD-10-CM

## 2018-07-23 DIAGNOSIS — E11.9 TYPE 2 DIABETES MELLITUS WITHOUT COMPLICATION, WITHOUT LONG-TERM CURRENT USE OF INSULIN (HCC): ICD-10-CM

## 2018-07-23 DIAGNOSIS — I10 ESSENTIAL HYPERTENSION: ICD-10-CM

## 2018-07-23 DIAGNOSIS — I25.10 CORONARY ARTERY DISEASE INVOLVING NATIVE CORONARY ARTERY OF NATIVE HEART WITHOUT ANGINA PECTORIS: Primary | ICD-10-CM

## 2018-07-23 DIAGNOSIS — E78.2 MIXED HYPERLIPIDEMIA: ICD-10-CM

## 2018-07-23 RX ORDER — ASPIRIN 325 MG
TABLET, DELAYED RELEASE (ENTERIC COATED) ORAL
Qty: 100 TAB | Refills: 0 | Status: SHIPPED | OUTPATIENT
Start: 2018-07-23 | End: 2018-10-13 | Stop reason: SDUPTHER

## 2018-07-23 RX ORDER — AMLODIPINE BESYLATE 10 MG/1
10 TABLET ORAL DAILY
Qty: 90 TAB | Refills: 3 | Status: SHIPPED | OUTPATIENT
Start: 2018-07-23 | End: 2019-07-12 | Stop reason: SDUPTHER

## 2018-07-23 NOTE — PROGRESS NOTES
1. Have you been to the ER, urgent care clinic since your last visit? Hospitalized since your last visit? Yes [facility]  2. Have you seen or consulted any other health care providers outside of the [facility] since your last visit? Include any pap smears or colon screening. Yes Where: pcp     3. Since your last visit, have you had any of the following symptoms? chest pains and shortness of breath.

## 2018-07-23 NOTE — PROGRESS NOTES
Goals Addressed             Most Recent       Post Hospitalization     COMPLETED: Attends follow-up appointments as directed. On track (7/23/2018)             Follow up with Cardiology  7/20/2018: appointment with Dr. Verena Vargas 7/23/2018 7/23/2018: Patient attended their post discharge follow up appointment with Dr. Verena Vargas as scheduled.

## 2018-07-23 NOTE — PROGRESS NOTES
HISTORY OF PRESENT ILLNESS  Marylene Noon is a 76 y.o. male. HPI Comments: Patient with cad,htn,hyperlipidemia,dm. On follow up patient denies any chest pains,sob, palpitation or other significant symptoms. 12/2017  Admitted with unstable angina-had pci  7/2018. Patient was in emergency room with atypical chest pain. No acute EKG changes cardiac enzymes negative CTA and chest x-ray reports reviewed. Labs are negative for cardiac workup    Hospital Follow Up   Associated symptoms include chest pain. Pertinent negatives include no abdominal pain, no headaches and no shortness of breath. Chest Pain (Angina)    The history is provided by the patient. This is a recurrent problem. The problem has not changed since onset. The problem occurs rarely. The pain is associated with exertion. The pain is present in the substernal region. The quality of the pain is described as pressure-like. The pain does not radiate. Pertinent negatives include no abdominal pain, no claudication, no cough, no dizziness, no fever, no headaches, no hemoptysis, no nausea, no orthopnea, no palpitations, no PND, no shortness of breath, no sputum production, no vomiting and no weakness. Review of Systems   Constitutional: Negative for chills and fever. HENT: Negative for nosebleeds. Eyes: Negative for blurred vision and double vision. Respiratory: Negative for cough, hemoptysis, sputum production, shortness of breath and wheezing. Cardiovascular: Positive for chest pain. Negative for palpitations, orthopnea, claudication, leg swelling and PND. Gastrointestinal: Negative for abdominal pain, heartburn, nausea and vomiting. Musculoskeletal: Negative for myalgias. Skin: Negative for rash. Neurological: Negative for dizziness, weakness and headaches. Endo/Heme/Allergies: Does not bruise/bleed easily.      Family History   Problem Relation Age of Onset    Stroke Mother     Headache Mother     Hypertension Mother    [facility] Lung Cancer Mother     Heart Disease Father     Heart Attack Father     Hypertension Father     Diabetes Father     Lung Cancer Father     Ovarian Cancer Sister     Heart Attack Brother     Breast Cancer Sister     Diabetes Sister     Cancer Maternal Aunt      lung cancer    Cancer Maternal Uncle     Cancer Paternal Aunt     Cancer Paternal Uncle        Past Medical History:   Diagnosis Date    Arthritis     1999 vicodin    Diabetes ([facility] [address] 1998 metformin    Hypercholesterolemia     Hypertension 1996 norvasc    MI (myocardial infarction) ([facility] [address]        Past Surgical History:   Procedure Laterality Date    CARDIAC SURG PROCEDURE UNLIST  12/2017    stints    HX GI  2010    gallbladder       Social History   Substance Use Topics    Smoking status: Never Smoker    Smokeless tobacco: Never Used    Alcohol use No       No Known Allergies    Outpatient Prescriptions Marked as Taking for the 7/23/18 encounter (Office Visit) with Jay Skaggs MD   Medication Sig Dispense Refill    amLODIPine (NORVASC) 10 mg tablet Take 1 Tab by mouth daily. 90 Tab 3    ALPRAZolam (XANAX) 0.5 mg tablet Take 1 Tab by mouth nightly as needed for Anxiety. Max Daily Amount: 0.5 mg. 30 Tab 0    carvedilol (COREG) 12.5 mg tablet TAKE 1 TAB BY MOUTH TWO (2) TIMES DAILY (WITH MEALS). 180 Tab 01    Omeprazole delayed release (PRILOSEC D/R) 20 mg tablet Take 1 Tab by mouth daily. Indications: gastroesophageal reflux disease 90 Tab 3    ondansetron (ZOFRAN ODT) 4 mg disintegrating tablet Take 1 Tab by mouth every eight (8) hours as needed for Nausea. 30 Tab 0    aspirin delayed-release 325 mg tablet Take 1 Tab by mouth daily. 100 Tab 3    cetirizine (ZYRTEC) 10 mg tablet Take  by mouth.  fenofibrate nanocrystallized (TRICOR) 48 mg tablet Take 1 Tab by mouth daily. 90 Tab 2    rosuvastatin (CRESTOR) 10 mg tablet Take 1 Tab by mouth nightly. 90 Tab 2    clopidogrel (PLAVIX) 75 mg tab Take 1 Tab by mouth daily.  90 Tab 3  nitroglycerin (NITROSTAT) 0.4 mg SL tablet 1 Tab by SubLINGual route every five (5) minutes as needed for Chest Pain for up to 3 doses. 25 Tab 0    acetaminophen (TYLENOL) 325 mg tablet Take  by mouth every four (4) hours as needed for Pain.  metFORMIN (GLUCOPHAGE) 500 mg tablet Take  by mouth two (2) times daily (with meals).  ramipril (ALTACE) 10 mg capsule Take 10 mg by mouth daily. Visit Vitals    /85    Pulse 60    Ht 6' (1.829 m)    Wt 89.4 kg (197 lb)    BMI 26.72 kg/m2        Physical Exam   Constitutional: He is oriented to person, place, and time. He appears well-developed and well-nourished. HENT:   Head: Normocephalic and atraumatic. Eyes: Conjunctivae are normal.   Neck: Neck supple. No JVD present. No tracheal deviation present. No thyromegaly present. Cardiovascular: Normal rate, regular rhythm and normal heart sounds. Exam reveals no gallop and no friction rub. No murmur heard. Pulmonary/Chest: Breath sounds normal. No respiratory distress. He has no wheezes. He has no rales. He exhibits no tenderness. Abdominal: Soft. There is no tenderness. Musculoskeletal: He exhibits no edema. Neurological: He is alert and oriented to person, place, and time. Skin: Skin is warm and dry. Psychiatric: He has a normal mood and affect. Mr. Rose Hernandez has a reminder for a \"due or due soon\" health maintenance. I have asked that he contact his primary care provider for follow-up on this health maintenance. CONCLUSION:1/2016-  ABNORMAL STRESS ECHO WITH EKG AND WALL MOTION ABNORMALITIES SUGGESTIVE OF LAD  DISTRIBUTION DISEASE    IMPRESSION:cath:1/2016    1. TWO VESSEL CORONARY ARTERY DISEASE IN THE FORM OF 80% OSTIAL AND 90% PROXIMAL NON-DOMINANT RIGHT CORONARY STENOSIS WHICH IS A MODERATE SIZED VESSEL OF ABOUT 1.5 TO 2 MM IN DIAMETER, AND 40% PROXIMAL CIRCUMFLEX, AND 70% MID CIRCUMFLEX STENOSIS WHICH IS A DOMINANT VESSEL.  THERE ARE DIFFUSE LUMINAL IRREGULARITIES SEEN THROUGHOUT THE CORONARIES. 2. NO NORMAL OVERALL LV EJECTION FRACTION AT REST. 3. EVIDENCE OF CHEST PAIN DURING THE CASE AS WELL AS BEFORE THE CASE WAS STARTED. PARTIAL RELIEF WITH SUBLINGUAL NITROGLYCERIN AND NO ACUTE EKG CHANGES SEEN IN THE SIX MONITORING LEADS IN THE CATH LAB. DISCUSSION AND RECOMMENDATIONS: PATIENT IS LIKELY HAVING CHEST PAIN FROM THE NON-DOMINANT RIGHT CORONARY ARTERY. THE LEFT CIRCUMFLEX IS DOMINANT AND APPEARS TO HAVE BORDERLINE MID STENOSIS WHICH DOES NOT HAVE ANY APPEARANCE OF ANY ACUTE UNSTABLE LESION. I THINK HE SHOULD BE TREATED MEDICALLY FOR NOW, AND IF THE SYMPTOMS PERSIST, LEFT CIRCUMFLEX ARTERY WILL NEED TO BE INTERROGATED BY INTRACORONARY DOPPLER WITH FFR DETERMINATION. RIGHT CORONARY, THOUGH APPEARS TO BE CRITICAL IS A SMALL VESSEL OF ABOUT 1.5 TO A MAXIMUM OF 2 MM IN DIAMETER AND PROBABLY SHOULD BE LEFT TO MEDICAL TREATMENT. AGGRESSIVE RISK FACTOR MODIFICATION SHOULD BE FOLLOWED. SUMMARY:12/2017-echo  Left ventricle: Systolic function was normal. Ejection fraction was estimated   in the range of 55 % to 60 %. No obvious  wall motion abnormalities identified in the views obtained. Wall thickness   was mildly increased. Doppler parameters  were consistent with abnormal left ventricular relaxation (grade 1 diastolic   dysfunction). Right ventricle: The size was at the upper limits of normal.    Left atrium: The atrium was mildly to moderately dilated. FINDING-12/2017-cath  1. Left main is patent, bifurcates into left anterior descending artery  and circumflex artery. 2. Left anterior descending artery has ostial 30% to 40% stenosis  followed by mid diffuse 30-40% stenosis. The vessel appears to be  moderately calcified. Diagonal 1 artery has diffuse 30-40% stenosis. Mid to distal left anterior descending artery is patent. 3. Circumflex artery is dominant with proximal 95% calcific stenosis.   Status post PTCA using a Trek 1.5 x 8 mm balloon followed by a 2.5  mm x 12 mm balloon. The stent was a Synergy 2.75 mm x 15 mm  stent was deployed at about 14 atmospheres. Post-PCI PTCA was  performed using a noncompliant 3.0 mm x 8 mm balloon inflated about  16-18 atmospheres. Lesion reduced to 10%. 4. Obtuse marginal 1 artery was a small-caliber vessel with ostial 70%  to 80% stenosis. 5. Obtuse marginal 2 artery is a small- to medium-caliber vessel with  diffuse 30-40% stenosis. Mid circumflex artery has focal 80% stenosis. Status post PTCA using a Trek 2.5 mm x 12 mm balloon followed by a  Synergy 2.75 mm x 12 mm stent deployed about 14 atmospheres. Post-PCI PTCA was performed using a 3.0 mm x 8 mm  balloon deployed about 16 atmospheres. Lesion reduced to 0%. 6. Left posterior descending artery is patent. 7. Right coronary artery is nondominant, has ostial calcific 90%  followed by mid 99% stenosis. CONCLUSION:  Triple-vessel coronary artery disease. Status post  percutaneous transluminal coronary angioplasty/stent to proximal and  mid circumflex artery using drug-eluting stents. The patient will be on  aspirin and Brilinta at this time. Intense medical management and risk  factor modification is advised. Assessment         ICD-10-CM ICD-9-CM    1. Coronary artery disease involving native coronary artery of native heart without angina pectoris I25.10 414.01     Typical chest pain. Recent emergency room visit. Atypical chest pain cardiac workup negative and ER   2. Essential hypertension I10 401.9     Elevated. Will increase amlodipine to 10 mg a day. 3. Mixed hyperlipidemia E78.2 272.2     Continue statin. Lab with PCP   4. S/P coronary artery stent placement Z95.5 V45.82     Stable   5. Type 2 diabetes mellitus without complication, without long-term current use of insulin (HCC) E11.9 250.00     Continue treatment.   Follow-up of lab with PCP   1/2018  Out of brillinta for 2 weeks-unable to afford  Changed to plavix-discussed compliance  5/2018  Stable mild cp  Out of norvasc 3 months  refilled  07/23/18  Atypical chest pain. Clinically noncardiac. Will continue dual antiplatelet therapy. Norvasc increased for hypertension  Medications Discontinued During This Encounter   Medication Reason    amLODIPine (NORVASC) 5 mg tablet Reorder       Orders Placed This Encounter    amLODIPine (NORVASC) 10 mg tablet     Sig: Take 1 Tab by mouth daily. Dispense:  90 Tab     Refill:  3       Follow-up Disposition:  Return in about 3 months (around 10/23/2018).

## 2018-07-23 NOTE — LETTER
[facility] 1950 7/23/2018 Dear Loman Phalen, NP I had the pleasure of evaluating  Mr. Gill Carrion in office today. Below are the relevant portions of my assessment and plan of care. ICD-10-CM ICD-9-CM 1. Coronary artery disease involving native coronary artery of native heart without angina pectoris I25.10 414.01 Typical chest pain. Recent emergency room visit. Atypical chest pain cardiac workup negative and ER 2. Essential hypertension I10 401.9 Elevated. Will increase amlodipine to 10 mg a day. 3. Mixed hyperlipidemia E78.2 272.2 Continue statin. Lab with PCP 4. S/P coronary artery stent placement Z95.5 V45.82 Stable 5. Type 2 diabetes mellitus without complication, without long-term current use of insulin (HCC) E11.9 250.00 Continue treatment. Follow-up of lab with PCP Current Outpatient Prescriptions Medication Sig Dispense Refill  amLODIPine (NORVASC) 10 mg tablet Take 1 Tab by mouth daily. 90 Tab 3  ALPRAZolam (XANAX) 0.5 mg tablet Take 1 Tab by mouth nightly as needed for Anxiety. Max Daily Amount: 0.5 mg. 30 Tab 0  carvedilol (COREG) 12.5 mg tablet TAKE 1 TAB BY MOUTH TWO (2) TIMES DAILY (WITH MEALS). 180 Tab 01  
 Omeprazole delayed release (PRILOSEC D/R) 20 mg tablet Take 1 Tab by mouth daily. Indications: gastroesophageal reflux disease 90 Tab 3  
 ondansetron (ZOFRAN ODT) 4 mg disintegrating tablet Take 1 Tab by mouth every eight (8) hours as needed for Nausea. 30 Tab 0  
 aspirin delayed-release 325 mg tablet Take 1 Tab by mouth daily. 100 Tab 3  cetirizine (ZYRTEC) 10 mg tablet Take  by mouth.  fenofibrate nanocrystallized (TRICOR) 48 mg tablet Take 1 Tab by mouth daily. 90 Tab 2  
 rosuvastatin (CRESTOR) 10 mg tablet Take 1 Tab by mouth nightly. 90 Tab 2  clopidogrel (PLAVIX) 75 mg tab Take 1 Tab by mouth daily.  90 Tab 3  
 nitroglycerin (NITROSTAT) 0.4 mg SL tablet 1 Tab by SubLINGual route every five (5) minutes as needed for Chest Pain for up to 3 doses. 25 Tab 0  
 acetaminophen (TYLENOL) 325 mg tablet Take  by mouth every four (4) hours as needed for Pain.  metFORMIN (GLUCOPHAGE) 500 mg tablet Take  by mouth two (2) times daily (with meals).  ramipril (ALTACE) 10 mg capsule Take 10 mg by mouth daily. Orders Placed This Encounter  amLODIPine (NORVASC) 10 mg tablet Sig: Take 1 Tab by mouth daily. Dispense:  90 Tab Refill:  3 If you have questions, please do not hesitate to call me. I look forward to following Mr. Kristan Herring along with you. Sincerely, Sharon Slater MD

## 2018-07-23 NOTE — MR AVS SNAPSHOT
[facility] 
 
 
 [address], [address] [facility] [address] 
[phone] Patient: Kris Day MRN: [id number] TJO:[id number] Visit Information Date & Time Provider Department Dept. Phone Encounter #  
 7/23/2018  9:30 AM Ajith Mejia MD [facility] [address] [place] [address] [id number] Follow-up Instructions Return in about 3 months (around 10/23/2018). Your Appointments 10/25/2018 12:45 PM  
Office Visit with Lisa Mcgee NP [facility] ([facility]) Appt Note: 3 month follow up  
 [address], [address] [address] [address]  
  
   
 [address] [facility]  
  
    
 10/29/2018  8:30 AM  
Office Visit with Ajith Mejia MD  
[facility]) Appt Note: 3 m [address], [address] [facility] [address]  
[address] [facility] [address]  [address] Upcoming Health Maintenance Date Due Hepatitis C Screening 1950 EYE EXAM RETINAL OR DILATED Q1 1/28/1960 ZOSTER VACCINE AGE 60> 11/28/2009 COLONOSCOPY 8/8/2014 GLAUCOMA SCREENING Q2Y 1/28/2015 Pneumococcal 65+ Low/Medium Risk (2 of 2 - PCV13) 1/18/2017 MEDICARE YEARLY EXAM 3/14/2018 Influenza Age 5 to Adult 8/1/2018 HEMOGLOBIN A1C Q6M 12/21/2018 LIPID PANEL Q1 5/21/2019 MICROALBUMIN Q1 6/21/2019 FOOT EXAM Q1 7/19/2019 DTaP/Tdap/Td series (2 - Td) 10/4/2024 Allergies as of 7/23/2018  Review Complete On: 7/23/2018 By: Meli Palacios No Known Allergies Current Immunizations  Never Reviewed Name Date Influenza Vaccine 9/30/2015 12:00 AM  
 Pneumococcal Polysaccharide (PPSV-23) 1/18/2016 12:00 AM  
 Tdap 10/4/2014 Not reviewed this visit You Were Diagnosed With   
  
 Codes Comments  Coronary artery disease involving native coronary artery of native heart without angina pectoris    -  Primary ICD-10-CM: I25.10 ICD-9-CM: 414.01 Typical chest pain. Recent emergency room visit. Atypical chest pain cardiac workup negative and ER Essential hypertension     ICD-10-CM: I10 
ICD-9-CM: 401.9 Elevated. Will increase amlodipine to 10 mg a day. Mixed hyperlipidemia     ICD-10-CM: E78.2 ICD-9-CM: 272.2 Continue statin. Lab with PCP  
 S/P coronary artery stent placement     ICD-10-CM: Z95.5 ICD-9-CM: V45.82 Stable Type 2 diabetes mellitus without complication, without long-term current use of insulin (HCC)     ICD-10-CM: E11.9 ICD-9-CM: 250.00 Continue treatment. Follow-up of lab with PCP Vitals BP Pulse Height(growth percentile) Weight(growth percentile) BMI Smoking Status 152/85 60 6' (1.829 m) 197 lb (89.4 kg) 26.72 kg/m2 Never Smoker Vitals History BMI and BSA Data Body Mass Index Body Surface Area  
 26.72 kg/m 2 2.13 m 2 Preferred Pharmacy Pharmacy Name Phone [facility], [address] [place] [address] [phone] Your Updated Medication List  
  
   
This list is accurate as of 7/23/18  9:34 AM.  Always use your most recent med list.  
  
  
  
  
 acetaminophen 325 mg tablet Commonly known as:  TYLENOL Take  by mouth every four (4) hours as needed for Pain. ALPRAZolam 0.5 mg tablet Commonly known as:  Volanda Pronto Take 1 Tab by mouth nightly as needed for Anxiety. Max Daily Amount: 0.5 mg.  
  
 amLODIPine 10 mg tablet Commonly known as:  Horris Bills Take 1 Tab by mouth daily. * aspirin delayed-release 325 mg tablet Take 1 Tab by mouth daily. * aspirin delayed-release 325 mg tablet TAKE 1 TABLET BY MOUTH EVERY DAY  
  
 carvedilol 12.5 mg tablet Commonly known as:  COREG  
TAKE 1 TAB BY MOUTH TWO (2) TIMES DAILY (WITH MEALS). clopidogrel 75 mg Tab Commonly known as:  PLAVIX Take 1 Tab by mouth daily. fenofibrate nanocrystallized 48 mg tablet Commonly known as:  [employer] Take 1 Tab by mouth daily. metFORMIN 500 mg tablet Commonly known as:  GLUCOPHAGE Take  by mouth two (2) times daily (with meals). nitroglycerin 0.4 mg SL tablet Commonly known as:  NITROSTAT  
1 Tab by SubLINGual route every five (5) minutes as needed for Chest Pain for up to 3 doses. Omeprazole delayed release 20 mg tablet Commonly known as:  PRILOSEC D/R Take 1 Tab by mouth daily. Indications: gastroesophageal reflux disease  
  
 ondansetron 4 mg disintegrating tablet Commonly known as:  ZOFRAN ODT Take 1 Tab by mouth every eight (8) hours as needed for Nausea. ramipril 10 mg capsule Commonly known as:  ALTACE Take 10 mg by mouth daily. rosuvastatin 10 mg tablet Commonly known as:  CRESTOR Take 1 Tab by mouth nightly. ZyrTEC 10 mg tablet Generic drug:  cetirizine Take  by mouth. * Notice: This list has 2 medication(s) that are the same as other medications prescribed for you. Read the directions carefully, and ask your doctor or other care provider to review them with you. Prescriptions Sent to Pharmacy Refills  
 amLODIPine (NORVASC) 10 mg tablet 3 Sig: Take 1 Tab by mouth daily. Class: Normal  
 Pharmacy: [address] #: [phone] Route: Oral  
  
Follow-up Instructions Return in about 3 months (around 10/23/2018). Please provide this summary of care documentation to your next provider. Your primary care clinician is listed as Roxane Dominguez. If you have any questions after today's visit, please call [phone].

## 2018-08-01 ENCOUNTER — TELEPHONE (OUTPATIENT)
Dept: INTERNAL MEDICINE CLINIC | Age: 68
End: 2018-08-01

## 2018-08-01 NOTE — TELEPHONE ENCOUNTER
Called patient spouse to advise we have received a fax from [facility] and they have been unsuccessful in reaching the patient. Advised Mrs. Rowan Hopkins to have Mr. Rowan Hopkins contact GI to schedule an appointment.

## 2018-08-20 ENCOUNTER — PATIENT OUTREACH (OUTPATIENT)
Dept: INTERNAL MEDICINE CLINIC | Age: 68
End: 2018-08-20

## 2018-08-20 NOTE — PROGRESS NOTES
Patient has graduated from the Transitions of Care Coordination  program on 8/20/2018. Patient's symptoms are stable at this time. Patient/family has the ability to self-manage. Care management goals have been completed at this time. No further nurse navigator follow up scheduled. Goals Addressed             Most Recent       Post ED     COMPLETED: Coordinate Pain Management Plan for Patient. On track (7/20/2018)             Maintain an acceptable pain level  Use Nitro  7/20/2018: reporting pain 3/10 on a scale of 0 to 10 as tolerable and Nitro on hand if needed       COMPLETED: Reduce ED Utilization   On track (8/20/2018)             No admissions within 30 days post discharge, 7/19/2018 8/20/2018: Episode closed: no hospitalization or ED admission post 30 days from discharge. Pt has nurse navigator's contact information for any further questions, concerns, or needs.   Patients upcoming visits:  Future Appointments  Date Time Provider Stephanie Terry   10/25/2018 12:45 PM Merline Benton NP [address]   10/29/2018 8:30 AM Alexandre Hinkle MD [facility].

## 2018-08-22 DIAGNOSIS — G47.00 INSOMNIA, UNSPECIFIED TYPE: ICD-10-CM

## 2018-08-22 RX ORDER — ALPRAZOLAM 0.5 MG/1
0.5 TABLET ORAL
Qty: 30 TAB | Refills: 0 | OUTPATIENT
Start: 2018-08-22 | End: 2018-09-21 | Stop reason: SDUPTHER

## 2018-08-22 NOTE — TELEPHONE ENCOUNTER
PHONE IN RX    Last Visit: 07/19/2018 with THUY Ardon    Next Appointment: 10/25/2018 with THUY Plascencia   Previous Refill Encounters: 07/19/2018 per THUY Ardon #[id number]     Requested Prescriptions     Pending Prescriptions Disp Refills    ALPRAZolam (XANAX) 0.5 mg tablet 30 Tab 0     Sig: Take 1 Tab by mouth nightly as needed for Anxiety.  Max Daily Amount: 0.5 mg.

## 2018-09-21 DIAGNOSIS — G47.00 INSOMNIA, UNSPECIFIED TYPE: ICD-10-CM

## 2018-09-21 RX ORDER — ALPRAZOLAM 0.5 MG/1
0.5 TABLET ORAL
Qty: 30 TAB | Refills: 0 | OUTPATIENT
Start: 2018-09-21 | End: 2018-10-24 | Stop reason: SDUPTHER

## 2018-09-21 NOTE — TELEPHONE ENCOUNTER
PHONE IN [facility] reports the last fill date for Xanax as 08/23/2018. There appears to be no inconsistencies in regards to the prescribing of this medication. Last Visit: 07/19/2018 with THUY Plascencia    Next Appointment: 10/25/2018 with THUY Plascencia   Previous Refill Encounters: 08/22/2018 per THUY Fuller Poll #30    Requested Prescriptions     Pending Prescriptions Disp Refills    ALPRAZolam (XANAX) 0.5 mg tablet 30 Tab 0     Sig: Take 1 Tab by mouth nightly as needed for Anxiety.  Max Daily Amount: 0.5 mg.

## 2018-10-15 RX ORDER — ASPIRIN 325 MG
TABLET, DELAYED RELEASE (ENTERIC COATED) ORAL
Qty: 100 TAB | Refills: 0 | Status: SHIPPED | OUTPATIENT
Start: 2018-10-15 | End: 2019-09-09 | Stop reason: SDUPTHER

## 2018-10-24 ENCOUNTER — OFFICE VISIT (OUTPATIENT)
Dept: CARDIOLOGY CLINIC | Age: 68
End: 2018-10-24

## 2018-10-24 VITALS
HEIGHT: 72 IN | DIASTOLIC BLOOD PRESSURE: 63 MMHG | SYSTOLIC BLOOD PRESSURE: 135 MMHG | BODY MASS INDEX: 27.36 KG/M2 | HEART RATE: 68 BPM | WEIGHT: 202 LBS

## 2018-10-24 DIAGNOSIS — E11.9 TYPE 2 DIABETES MELLITUS WITHOUT COMPLICATION, WITHOUT LONG-TERM CURRENT USE OF INSULIN (HCC): ICD-10-CM

## 2018-10-24 DIAGNOSIS — I25.10 CORONARY ARTERY DISEASE INVOLVING NATIVE CORONARY ARTERY OF NATIVE HEART WITHOUT ANGINA PECTORIS: Primary | ICD-10-CM

## 2018-10-24 DIAGNOSIS — G47.00 INSOMNIA, UNSPECIFIED TYPE: ICD-10-CM

## 2018-10-24 DIAGNOSIS — Z95.5 S/P CORONARY ARTERY STENT PLACEMENT: ICD-10-CM

## 2018-10-24 DIAGNOSIS — I10 ESSENTIAL HYPERTENSION: ICD-10-CM

## 2018-10-24 DIAGNOSIS — E78.2 MIXED HYPERLIPIDEMIA: ICD-10-CM

## 2018-10-24 RX ORDER — ALPRAZOLAM 0.5 MG/1
0.5 TABLET ORAL
Qty: 30 TAB | Refills: 0 | OUTPATIENT
Start: 2018-10-24 | End: 2018-11-28 | Stop reason: SDUPTHER

## 2018-10-24 RX ORDER — ALPRAZOLAM 0.5 MG/1
0.5 TABLET ORAL
Qty: 30 TAB | Refills: 0 | Status: CANCELLED | OUTPATIENT
Start: 2018-10-24

## 2018-10-24 NOTE — TELEPHONE ENCOUNTER
Pt needs refill. Was going to get tomorrow but we had to cancel appt since Trudiarmando Mason is out of office. He is having wife call to norma appt since they come together.

## 2018-10-24 NOTE — TELEPHONE ENCOUNTER
PHONE IN [facility] reports the last fill date for Xanax as 09/24/2018. There appears to be no inconsistencies in regards to the prescribing of this medication. Last Visit: 07/19/2018 with THUY Plascencia    Next Appointment: 10/25/2018 with THUY Plascencia   Previous Refill Encounters: 09/21/2018 per NP Paula Saeed #30    Requested Prescriptions     Pending Prescriptions Disp Refills    ALPRAZolam (XANAX) 0.5 mg tablet 30 Tab 0     Sig: Take 1 Tab by mouth nightly as needed for Anxiety.  Max Daily Amount: 0.5 mg.

## 2018-10-24 NOTE — PATIENT INSTRUCTIONS
Continue current medications    Follow up in 6 months or sooner if needed. Heart-Healthy Diet: Care Instructions  Your Care Instructions    A heart-healthy diet has lots of vegetables, fruits, nuts, beans, and whole grains, and is low in salt. It limits foods that are high in saturated fat, such as meats, cheeses, and fried foods. It may be hard to change your diet, but even small changes can lower your risk of heart attack and heart disease. Follow-up care is a key part of your treatment and safety. Be sure to make and go to all appointments, and call your doctor if you are having problems. It's also a good idea to know your test results and keep a list of the medicines you take. How can you care for yourself at home? Watch your portions  · Learn what a serving is. A \"serving\" and a \"portion\" are not always the same thing. Make sure that you are not eating larger portions than are recommended. For example, a serving of pasta is ½ cup. A serving size of meat is 2 to 3 ounces. A 3-ounce serving is about the size of a deck of cards. Measure serving sizes until you are good at [place]" them. Keep in mind that restaurants often serve portions that are 2 or 3 times the size of one serving. · To keep your energy level up and keep you from feeling hungry, eat often but in smaller portions. · Eat only the number of calories you need to stay at a healthy weight. If you need to lose weight, eat fewer calories than your body burns (through exercise and other physical activity). Eat more fruits and vegetables  · Eat a variety of fruit and vegetables every day. Dark green, deep orange, red, or yellow fruits and vegetables are especially good for you. Examples include spinach, carrots, peaches, and berries. · Keep carrots, celery, and other veggies handy for snacks. Buy fruit that is in season and store it where you can see it so that you will be tempted to eat it.   · Cook dishes that have a lot of veggies in them, such as stir-fries and soups. Limit saturated and trans fat  · Read food labels, and try to avoid saturated and trans fats. They increase your risk of heart disease. Trans fat is found in many processed foods such as cookies and crackers. · Use olive or canola oil when you cook. Try cholesterol-lowering spreads, such as Benecol or Take Control. · Bake, broil, grill, or steam foods instead of frying them. · Choose lean meats instead of high-fat meats such as hot dogs and sausages. Cut off all visible fat when you prepare meat. · Eat fish, skinless poultry, and meat alternatives such as soy products instead of high-fat meats. Soy products, such as tofu, may be especially good for your heart. · Choose low-fat or fat-free milk and dairy products. Eat fish  · Eat at least two servings of fish a week. Certain fish, such as salmon and tuna, contain omega-3 fatty acids, which may help reduce your risk of heart attack. Eat foods high in fiber  · Eat a variety of grain products every day. Include whole-grain foods that have lots of fiber and nutrients. Examples of whole-grain foods include oats, whole wheat bread, and brown rice. · Buy whole-grain breads and cereals, instead of white bread or pastries. Limit salt and sodium  · Limit how much salt and sodium you eat to help lower your blood pressure. · Taste food before you salt it. Add only a little salt when you think you need it. With time, your taste buds will adjust to less salt. · Eat fewer snack items, fast foods, and other high-salt, processed foods. Check food labels for the amount of sodium in packaged foods. · Choose low-sodium versions of canned goods (such as soups, vegetables, and beans). Limit sugar  · Limit drinks and foods with added sugar. These include candy, desserts, and soda pop. Limit alcohol  · Limit alcohol to no more than 2 drinks a day for men and 1 drink a day for women. Too much alcohol can cause health problems.   When should you call for help? Watch closely for changes in your health, and be sure to contact your doctor if:    · You would like help planning heart-healthy meals. Where can you learn more? Go to http://kenneth-renuka.info/. Enter V137 in the search box to learn more about \"Heart-Healthy Diet: Care Instructions. \"  Current as of: December 6, 2017  Content Version: 11.8  © 4393-5959 [employer]. Care instructions adapted under license by [employer] (which disclaims liability or warranty for this information). If you have questions about a medical condition or this instruction, always ask your healthcare professional. [address] any warranty or liability for your use of this information.

## 2018-10-24 NOTE — LETTER
10/24/2018 9:56 AM 
 
Luis Angel Gutierrez 1950 
 
10/24/2018 Dear Luba Estrada NP I had the pleasure of evaluating  Mr. Thai Castelan in office today. Below are the relevant portions of my assessment and plan of care. ICD-10-CM ICD-9-CM 1. Coronary artery disease involving native coronary artery of native heart without angina pectoris I25.10 414.01   
2. Essential hypertension I10 401.9 3. Mixed hyperlipidemia E78.2 272.2 4. S/P coronary artery stent placement Z95.5 V45.82   
5. Type 2 diabetes mellitus without complication, without long-term current use of insulin (HCC) E11.9 250.00 Current Outpatient Medications Medication Sig Dispense Refill  aspirin delayed-release 325 mg tablet TAKE 1 TABLET BY MOUTH EVERY  Tab 0  ALPRAZolam (XANAX) 0.5 mg tablet Take 1 Tab by mouth nightly as needed for Anxiety. Max Daily Amount: 0.5 mg. 30 Tab 0  
 amLODIPine (NORVASC) 10 mg tablet Take 1 Tab by mouth daily. 90 Tab 3  carvedilol (COREG) 12.5 mg tablet TAKE 1 TAB BY MOUTH TWO (2) TIMES DAILY (WITH MEALS). 180 Tab 01  
 Omeprazole delayed release (PRILOSEC D/R) 20 mg tablet Take 1 Tab by mouth daily. Indications: gastroesophageal reflux disease 90 Tab 3  
 ondansetron (ZOFRAN ODT) 4 mg disintegrating tablet Take 1 Tab by mouth every eight (8) hours as needed for Nausea. 30 Tab 0  
 cetirizine (ZYRTEC) 10 mg tablet Take  by mouth.  fenofibrate nanocrystallized (TRICOR) 48 mg tablet Take 1 Tab by mouth daily. 90 Tab 2  
 rosuvastatin (CRESTOR) 10 mg tablet Take 1 Tab by mouth nightly. 90 Tab 2  clopidogrel (PLAVIX) 75 mg tab Take 1 Tab by mouth daily. 90 Tab 3  
 nitroglycerin (NITROSTAT) 0.4 mg SL tablet 1 Tab by SubLINGual route every five (5) minutes as needed for Chest Pain for up to 3 doses. 25 Tab 0  
 acetaminophen (TYLENOL) 325 mg tablet Take  by mouth every four (4) hours as needed for Pain.  metFORMIN (GLUCOPHAGE) 500 mg tablet Take  by mouth two (2) times daily (with meals).  ramipril (ALTACE) 10 mg capsule Take 10 mg by mouth daily. No orders of the defined types were placed in this encounter. If you have questions, please do not hesitate to call me. I look forward to following Mr. Sharmin Gallegos along with you. Sincerely, Jessica Rodriguez MD 
 
 
 
 
 
 
Sincerely, Jessica Rodriguez MD

## 2018-10-24 NOTE — PROGRESS NOTES
Patient didn't bring medications, verbally reviewed    1. Have you been to the ER, urgent care clinic since your last visit? Hospitalized since your last visit? No    2. Have you seen or consulted any other health care providers outside of the [address] [place] [address] since your last visit? Include any pap smears or colon screening.  No

## 2018-10-24 NOTE — PROGRESS NOTES
HISTORY OF PRESENT ILLNESS  Carolyne Cushing is a 76 y.o. male. Patient with cad,htn,hyperlipidemia,dm. On follow up patient denies any chest pains,sob, palpitation or other significant symptoms. 12/2017  Admitted with unstable angina-had pci  7/2018. Patient was in emergency room with atypical chest pain. No acute EKG changes cardiac enzymes negative CTA and chest x-ray reports reviewed. Labs are negative for cardiac workup      Hospital Follow Up   Associated symptoms include chest pain. Pertinent negatives include no abdominal pain, no headaches and no shortness of breath. Chest Pain (Angina)    The history is provided by the patient. This is a recurrent problem. The problem has not changed since onset. The problem occurs rarely. The pain is associated with exertion. The pain is present in the substernal region. The quality of the pain is described as pressure-like. The pain does not radiate. Pertinent negatives include no abdominal pain, no claudication, no cough, no dizziness, no fever, no headaches, no hemoptysis, no malaise/fatigue, no nausea, no orthopnea, no palpitations, no PND, no shortness of breath, no sputum production, no vomiting and no weakness. Hypertension   Associated symptoms include chest pain. Pertinent negatives include no abdominal pain, no headaches and no shortness of breath. Review of Systems   Constitutional: Negative for chills, fever and malaise/fatigue. HENT: Negative for nosebleeds. Eyes: Negative for blurred vision and double vision. Respiratory: Negative for cough, hemoptysis, sputum production, shortness of breath and wheezing. Cardiovascular: Positive for chest pain. Negative for palpitations, orthopnea, claudication, leg swelling and PND. Gastrointestinal: Negative for abdominal pain, heartburn, nausea and vomiting. Musculoskeletal: Negative for falls and myalgias. Skin: Negative for rash.    Neurological: Negative for dizziness, weakness and headaches. Endo/Heme/Allergies: Does not bruise/bleed easily. Family History   Problem Relation Age of Onset    Stroke Mother     Headache Mother     Hypertension Mother    Bea Perking Mother     Heart Disease Father     Heart Attack Father     Hypertension Father     Diabetes Father     Lung Cancer Father     Ovarian Cancer Sister     Heart Attack Brother     Breast Cancer Sister     Diabetes Sister     Cancer Maternal Aunt         lung cancer    Cancer Maternal Uncle     Cancer Paternal Aunt     Cancer Paternal Uncle        Past Medical History:   Diagnosis Date    Arthritis     1999 vicodin    Diabetes ([facility] [address] 1998 metformin    Hypercholesterolemia     Hypertension 1996 norvasc    MI (myocardial infarction) ([facility] [address]        Past Surgical History:   Procedure Laterality Date    CARDIAC SURG PROCEDURE UNLIST  12/2017    stints    HX GI  2010    gallbladder       Social History     Tobacco Use    Smoking status: Never Smoker    Smokeless tobacco: Never Used   Substance Use Topics    Alcohol use: No       No Known Allergies    Outpatient Medications Marked as Taking for the 10/24/18 encounter (Office Visit) with Negrita Son MD   Medication Sig Dispense Refill    aspirin delayed-release 325 mg tablet TAKE 1 TABLET BY MOUTH EVERY  Tab 0    ALPRAZolam (XANAX) 0.5 mg tablet Take 1 Tab by mouth nightly as needed for Anxiety. Max Daily Amount: 0.5 mg. 30 Tab 0    amLODIPine (NORVASC) 10 mg tablet Take 1 Tab by mouth daily. 90 Tab 3    carvedilol (COREG) 12.5 mg tablet TAKE 1 TAB BY MOUTH TWO (2) TIMES DAILY (WITH MEALS). 180 Tab 01    Omeprazole delayed release (PRILOSEC D/R) 20 mg tablet Take 1 Tab by mouth daily. Indications: gastroesophageal reflux disease 90 Tab 3    ondansetron (ZOFRAN ODT) 4 mg disintegrating tablet Take 1 Tab by mouth every eight (8) hours as needed for Nausea. 30 Tab 0    cetirizine (ZYRTEC) 10 mg tablet Take  by mouth.       fenofibrate nanocrystallized (TRICOR) 48 mg tablet Take 1 Tab by mouth daily. 90 Tab 2    rosuvastatin (CRESTOR) 10 mg tablet Take 1 Tab by mouth nightly. 90 Tab 2    clopidogrel (PLAVIX) 75 mg tab Take 1 Tab by mouth daily. 90 Tab 3    nitroglycerin (NITROSTAT) 0.4 mg SL tablet 1 Tab by SubLINGual route every five (5) minutes as needed for Chest Pain for up to 3 doses. 25 Tab 0    acetaminophen (TYLENOL) 325 mg tablet Take  by mouth every four (4) hours as needed for Pain.  metFORMIN (GLUCOPHAGE) 500 mg tablet Take  by mouth two (2) times daily (with meals).  ramipril (ALTACE) 10 mg capsule Take 10 mg by mouth daily. Visit Vitals  /63   Pulse 68   Ht 6' (1.829 m)   Wt 91.6 kg (202 lb)   BMI 27.40 kg/m²        Physical Exam   Constitutional: He is oriented to person, place, and time. He appears well-developed and well-nourished. HENT:   Head: Normocephalic and atraumatic. Eyes: Conjunctivae are normal.   Neck: Neck supple. No JVD present. No tracheal deviation present. No thyromegaly present. Cardiovascular: Normal rate, regular rhythm and normal heart sounds. Exam reveals no gallop and no friction rub. No murmur heard. Pulmonary/Chest: Breath sounds normal. No respiratory distress. He has no wheezes. He has no rales. He exhibits no tenderness. Abdominal: Soft. There is no tenderness. Musculoskeletal: He exhibits no edema. Neurological: He is alert and oriented to person, place, and time. Skin: Skin is warm and dry. Psychiatric: He has a normal mood and affect. Mr. Nabil Calderon has a reminder for a \"due or due soon\" health maintenance. I have asked that he contact his primary care provider for follow-up on this health maintenance. CONCLUSION:1/2016-  ABNORMAL STRESS ECHO WITH EKG AND WALL MOTION ABNORMALITIES SUGGESTIVE OF LAD  DISTRIBUTION DISEASE    IMPRESSION:cath:1/2016    1.  TWO VESSEL CORONARY ARTERY DISEASE IN THE FORM OF 80% OSTIAL AND 90% PROXIMAL NON-DOMINANT RIGHT CORONARY STENOSIS WHICH IS A MODERATE SIZED VESSEL OF ABOUT 1.5 TO 2 MM IN DIAMETER, AND 40% PROXIMAL CIRCUMFLEX, AND 70% MID CIRCUMFLEX STENOSIS WHICH IS A DOMINANT VESSEL. THERE ARE DIFFUSE LUMINAL IRREGULARITIES SEEN THROUGHOUT THE CORONARIES. 2. NO NORMAL OVERALL LV EJECTION FRACTION AT REST. 3. EVIDENCE OF CHEST PAIN DURING THE CASE AS WELL AS BEFORE THE CASE WAS STARTED. PARTIAL RELIEF WITH SUBLINGUAL NITROGLYCERIN AND NO ACUTE EKG CHANGES SEEN IN THE SIX MONITORING LEADS IN THE CATH LAB. DISCUSSION AND RECOMMENDATIONS: PATIENT IS LIKELY HAVING CHEST PAIN FROM THE NON-DOMINANT RIGHT CORONARY ARTERY. THE LEFT CIRCUMFLEX IS DOMINANT AND APPEARS TO HAVE BORDERLINE MID STENOSIS WHICH DOES NOT HAVE ANY APPEARANCE OF ANY ACUTE UNSTABLE LESION. I THINK HE SHOULD BE TREATED MEDICALLY FOR NOW, AND IF THE SYMPTOMS PERSIST, LEFT CIRCUMFLEX ARTERY WILL NEED TO BE INTERROGATED BY INTRACORONARY DOPPLER WITH FFR DETERMINATION. RIGHT CORONARY, THOUGH APPEARS TO BE CRITICAL IS A SMALL VESSEL OF ABOUT 1.5 TO A MAXIMUM OF 2 MM IN DIAMETER AND PROBABLY SHOULD BE LEFT TO MEDICAL TREATMENT. AGGRESSIVE RISK FACTOR MODIFICATION SHOULD BE FOLLOWED. SUMMARY:12/2017-echo  Left ventricle: Systolic function was normal. Ejection fraction was estimated   in the range of 55 % to 60 %. No obvious  wall motion abnormalities identified in the views obtained. Wall thickness   was mildly increased. Doppler parameters  were consistent with abnormal left ventricular relaxation (grade 1 diastolic   dysfunction). Right ventricle: The size was at the upper limits of normal.    Left atrium: The atrium was mildly to moderately dilated. FINDING-12/2017-cath  1. Left main is patent, bifurcates into left anterior descending artery  and circumflex artery. 2. Left anterior descending artery has ostial 30% to 40% stenosis  followed by mid diffuse 30-40% stenosis. The vessel appears to be  moderately calcified.  Diagonal 1 artery has diffuse 30-40% stenosis. Mid to distal left anterior descending artery is patent. 3. Circumflex artery is dominant with proximal 95% calcific stenosis. Status post PTCA using a Trek 1.5 x 8 mm balloon followed by a 2.5  mm x 12 mm balloon. The stent was a Synergy 2.75 mm x 15 mm  stent was deployed at about 14 atmospheres. Post-PCI PTCA was  performed using a noncompliant 3.0 mm x 8 mm balloon inflated about  16-18 atmospheres. Lesion reduced to 10%. 4. Obtuse marginal 1 artery was a small-caliber vessel with ostial 70%  to 80% stenosis. 5. Obtuse marginal 2 artery is a small- to medium-caliber vessel with  diffuse 30-40% stenosis. Mid circumflex artery has focal 80% stenosis. Status post PTCA using a Trek 2.5 mm x 12 mm balloon followed by a  Synergy 2.75 mm x 12 mm stent deployed about 14 atmospheres. Post-PCI PTCA was performed using a 3.0 mm x 8 mm  balloon deployed about 16 atmospheres. Lesion reduced to 0%. 6. Left posterior descending artery is patent. 7. Right coronary artery is nondominant, has ostial calcific 90%  followed by mid 99% stenosis. CONCLUSION:  Triple-vessel coronary artery disease. Status post  percutaneous transluminal coronary angioplasty/stent to proximal and  mid circumflex artery using drug-eluting stents. The patient will be on  aspirin and Brilinta at this time. Intense medical management and risk  factor modification is advised. Assessment         ICD-10-CM ICD-9-CM    1. Coronary artery disease involving native coronary artery of native heart without angina pectoris I25.10 414.01    2. Essential hypertension I10 401.9    3. Mixed hyperlipidemia E78.2 272.2    4. S/P coronary artery stent placement Z95.5 V45.82    5.  Type 2 diabetes mellitus without complication, without long-term current use of insulin (HCC) E11.9 250.00    1/2018  Out of brillinta for 2 weeks-unable to afford  Changed to plavix-discussed compliance  5/2018  Stable mild cp  Out of norvasc 3 months  refilled  10/24/18  Atypical chest pain. Clinically noncardiac. Will continue dual antiplatelet therapy. Norvasc increased for hypertension  There are no discontinued medications. No orders of the defined types were placed in this encounter. Follow-up Disposition:  Return in about 6 months (around 4/24/2019), or if symptoms worsen or fail to improve. I have independently evaluated taken history and examined the patient. All relevant labs and testing data's are reviewed. Care plan discussed and updated after review.   Jakub Patterson MD

## 2018-11-01 ENCOUNTER — ANESTHESIA EVENT (OUTPATIENT)
Dept: ENDOSCOPY | Age: 68
End: 2018-11-01
Payer: MEDICARE

## 2018-11-02 ENCOUNTER — HOSPITAL ENCOUNTER (OUTPATIENT)
Age: 68
Setting detail: OUTPATIENT SURGERY
Discharge: HOME | End: 2018-11-02
Attending: INTERNAL MEDICINE | Admitting: INTERNAL MEDICINE
Payer: MEDICARE

## 2018-11-02 ENCOUNTER — ANESTHESIA (OUTPATIENT)
Dept: ENDOSCOPY | Age: 68
End: 2018-11-02
Payer: MEDICARE

## 2018-11-02 VITALS
HEIGHT: 72 IN | RESPIRATION RATE: 18 BRPM | WEIGHT: 196.2 LBS | BODY MASS INDEX: 26.57 KG/M2 | SYSTOLIC BLOOD PRESSURE: 123 MMHG | HEART RATE: 63 BPM | TEMPERATURE: 96.7 F | DIASTOLIC BLOOD PRESSURE: 75 MMHG | OXYGEN SATURATION: 99 %

## 2018-11-02 LAB
GLUCOSE BLD STRIP.AUTO-MCNC: 172 MG/DL (ref 70–110)
GLUCOSE BLD STRIP.AUTO-MCNC: 217 MG/DL (ref 70–110)

## 2018-11-02 PROCEDURE — 74011250636 HC RX REV CODE- 250/636: Performed by: NURSE ANESTHETIST, CERTIFIED REGISTERED

## 2018-11-02 PROCEDURE — 74011636637 HC RX REV CODE- 636/637: Performed by: NURSE ANESTHETIST, CERTIFIED REGISTERED

## 2018-11-02 PROCEDURE — 76040000019: Performed by: INTERNAL MEDICINE

## 2018-11-02 PROCEDURE — 77030038604 HC SNR ENDO EXACTO USEN -B: Performed by: INTERNAL MEDICINE

## 2018-11-02 PROCEDURE — 88305 TISSUE EXAM BY PATHOLOGIST: CPT | Performed by: INTERNAL MEDICINE

## 2018-11-02 PROCEDURE — 82962 GLUCOSE BLOOD TEST: CPT

## 2018-11-02 PROCEDURE — 76060000031 HC ANESTHESIA FIRST 0.5 HR: Performed by: INTERNAL MEDICINE

## 2018-11-02 PROCEDURE — 77030008565 HC TBNG SUC IRR ERBE -B: Performed by: INTERNAL MEDICINE

## 2018-11-02 PROCEDURE — 74011250636 HC RX REV CODE- 250/636

## 2018-11-02 PROCEDURE — 77030018846 HC SOL IRR STRL H20 ICUM -A: Performed by: INTERNAL MEDICINE

## 2018-11-02 PROCEDURE — 77030009426 HC FCPS BIOP ENDOSC BSC -B: Performed by: INTERNAL MEDICINE

## 2018-11-02 PROCEDURE — 77030013992 HC SNR POLYP ENDOSC BSC -B: Performed by: INTERNAL MEDICINE

## 2018-11-02 PROCEDURE — 74011250637 HC RX REV CODE- 250/637: Performed by: NURSE ANESTHETIST, CERTIFIED REGISTERED

## 2018-11-02 RX ORDER — MAGNESIUM SULFATE 100 %
4 CRYSTALS MISCELLANEOUS AS NEEDED
Status: DISCONTINUED | OUTPATIENT
Start: 2018-11-02 | End: 2018-11-02 | Stop reason: HOSPADM

## 2018-11-02 RX ORDER — INSULIN LISPRO 100 [IU]/ML
INJECTION, SOLUTION INTRAVENOUS; SUBCUTANEOUS ONCE
Status: DISCONTINUED | OUTPATIENT
Start: 2018-11-02 | End: 2018-11-02 | Stop reason: HOSPADM

## 2018-11-02 RX ORDER — LIDOCAINE HYDROCHLORIDE 10 MG/ML
0.1 INJECTION, SOLUTION EPIDURAL; INFILTRATION; INTRACAUDAL; PERINEURAL AS NEEDED
Status: DISCONTINUED | OUTPATIENT
Start: 2018-11-02 | End: 2018-11-02 | Stop reason: HOSPADM

## 2018-11-02 RX ORDER — INSULIN LISPRO 100 [IU]/ML
INJECTION, SOLUTION INTRAVENOUS; SUBCUTANEOUS ONCE
Status: COMPLETED | OUTPATIENT
Start: 2018-11-02 | End: 2018-11-02

## 2018-11-02 RX ORDER — PROPOFOL 10 MG/ML
INJECTION, EMULSION INTRAVENOUS AS NEEDED
Status: DISCONTINUED | OUTPATIENT
Start: 2018-11-02 | End: 2018-11-02 | Stop reason: HOSPADM

## 2018-11-02 RX ORDER — NALOXONE HYDROCHLORIDE 0.4 MG/ML
0.1 INJECTION, SOLUTION INTRAMUSCULAR; INTRAVENOUS; SUBCUTANEOUS ONCE
Status: DISCONTINUED | OUTPATIENT
Start: 2018-11-02 | End: 2018-11-02 | Stop reason: HOSPADM

## 2018-11-02 RX ORDER — SODIUM CHLORIDE, SODIUM LACTATE, POTASSIUM CHLORIDE, CALCIUM CHLORIDE 600; 310; 30; 20 MG/100ML; MG/100ML; MG/100ML; MG/100ML
50 INJECTION, SOLUTION INTRAVENOUS CONTINUOUS
Status: DISCONTINUED | OUTPATIENT
Start: 2018-11-02 | End: 2018-11-02 | Stop reason: HOSPADM

## 2018-11-02 RX ORDER — FAMOTIDINE 20 MG/1
20 TABLET, FILM COATED ORAL ONCE
Status: COMPLETED | OUTPATIENT
Start: 2018-11-02 | End: 2018-11-02

## 2018-11-02 RX ORDER — DEXTROSE 50 % IN WATER (D50W) INTRAVENOUS SYRINGE
25-50 AS NEEDED
Status: DISCONTINUED | OUTPATIENT
Start: 2018-11-02 | End: 2018-11-02 | Stop reason: HOSPADM

## 2018-11-02 RX ORDER — SODIUM CHLORIDE 0.9 % (FLUSH) 0.9 %
5-10 SYRINGE (ML) INJECTION AS NEEDED
Status: DISCONTINUED | OUTPATIENT
Start: 2018-11-02 | End: 2018-11-02 | Stop reason: HOSPADM

## 2018-11-02 RX ADMIN — PROPOFOL 50 MG: 10 INJECTION, EMULSION INTRAVENOUS at 08:20

## 2018-11-02 RX ADMIN — PROPOFOL 50 MG: 10 INJECTION, EMULSION INTRAVENOUS at 08:28

## 2018-11-02 RX ADMIN — INSULIN LISPRO 6 UNITS: 100 INJECTION, SOLUTION INTRAVENOUS; SUBCUTANEOUS at 07:27

## 2018-11-02 RX ADMIN — PROPOFOL 50 MG: 10 INJECTION, EMULSION INTRAVENOUS at 08:31

## 2018-11-02 RX ADMIN — SODIUM CHLORIDE, SODIUM LACTATE, POTASSIUM CHLORIDE, AND CALCIUM CHLORIDE: 600; 310; 30; 20 INJECTION, SOLUTION INTRAVENOUS at 08:15

## 2018-11-02 RX ADMIN — SODIUM CHLORIDE, SODIUM LACTATE, POTASSIUM CHLORIDE, AND CALCIUM CHLORIDE 50 ML/HR: 600; 310; 30; 20 INJECTION, SOLUTION INTRAVENOUS at 07:27

## 2018-11-02 RX ADMIN — FAMOTIDINE 20 MG: 20 TABLET ORAL at 07:27

## 2018-11-02 RX ADMIN — PROPOFOL 50 MG: 10 INJECTION, EMULSION INTRAVENOUS at 08:24

## 2018-11-02 NOTE — ANESTHESIA POSTPROCEDURE EVALUATION
Procedure(s): 
COLONOSCOPY with Polypectomies. Anesthesia Post Evaluation Multimodal analgesia: multimodal analgesia used between 6 hours prior to anesthesia start to PACU discharge Patient location during evaluation: PACU Patient participation: complete - patient participated Level of consciousness: awake Pain management: satisfactory to patient Airway patency: patent Anesthetic complications: no 
Cardiovascular status: acceptable Respiratory status: acceptable Hydration status: acceptable Visit Vitals /75 Pulse 63 Temp 35.9 °C (96.7 °F) Resp 18 Ht 6' (1.829 m) Wt 89 kg (196 lb 3.2 oz) SpO2 99% BMI 26.61 kg/m²

## 2018-11-02 NOTE — ANESTHESIA PREPROCEDURE EVALUATION
Anesthetic History No history of anesthetic complications Review of Systems / Medical History Patient summary reviewed and pertinent labs reviewed Pulmonary Within defined limits Neuro/Psych Within defined limits Cardiovascular Hypertension CAD Exercise tolerance: <4 METS 
  
GI/Hepatic/Renal 
  
GERD Endo/Other Diabetes Hyperthyroidism Arthritis Other Findings Physical Exam 
 
Airway Mallampati: II 
TM Distance: 4 - 6 cm Neck ROM: normal range of motion Mouth opening: Normal 
 
 Cardiovascular Regular rate and rhythm,  S1 and S2 normal,  no murmur, click, rub, or gallop Dental 
 
Dentition: Poor dentition Pulmonary Breath sounds clear to auscultation Abdominal 
GI exam deferred Other Findings Anesthetic Plan ASA: 3 Anesthesia type: MAC Induction: Intravenous Anesthetic plan and risks discussed with: Patient and Family

## 2018-11-02 NOTE — H&P
[facility]    [email or web address]      Impression:   1.hx of polyps      Plan:     1. [place] all rissks benfits and alt discussed     Chief Complaint: surveillance       HPI:  Carmen Araujo is a 76 y.o. male who is being seen on consult forhx polyps .     PMH:   Past Medical History:   Diagnosis Date    Arthritis     1999 vicodin    Diabetes ([facility] [address] 1998 metformin    GERD (gastroesophageal reflux disease)     Hypercholesterolemia     Hypertension 1996 norvasc    MI (myocardial infarction) ([facility] [address]        PSH:   Past Surgical History:   Procedure Laterality Date    CARDIAC SURG PROCEDURE UNLIST  12/2017    stints    HX GI  2010    gallbladder       Social HX:   Social History     Socioeconomic History    Marital status:      Spouse name: Not on file    Number of children: Not on file    Years of education: Not on file    Highest education level: Not on file   Social Needs    Financial resource strain: Not on file    Food insecurity - worry: Not on file    Food insecurity - inability: Not on file    Transportation needs - medical: Not on file   [employer] needs - non-medical: Not on file   Occupational History    Not on file   Tobacco Use    Smoking status: Never Smoker    Smokeless tobacco: Never Used   Substance and Sexual Activity    Alcohol use: No    Drug use: No    Sexual activity: Yes     Partners: Female     Birth control/protection: None   Other Topics Concern    Not on file   Social History Narrative    Not on file       FHX:   Family History   Problem Relation Age of Onset    Stroke Mother     Headache Mother     Hypertension Mother     Lung Cancer Mother     Heart Disease Father     Heart Attack Father     Hypertension Father     Diabetes Father    Connor Matas Father     Ovarian Cancer Sister     Heart Attack Brother     Breast Cancer Sister     Diabetes Sister     Cancer Maternal Aunt         lung cancer    Cancer Maternal Uncle     Cancer Paternal Aunt     Cancer Paternal Uncle        Allergy:   No Known Allergies    Home Medications:     Medications Prior to Admission   Medication Sig    ALPRAZolam (XANAX) 0.5 mg tablet Take 1 Tab by mouth nightly as needed for Anxiety. Max Daily Amount: 0.5 mg.    aspirin delayed-release 325 mg tablet TAKE 1 TABLET BY MOUTH EVERY DAY    amLODIPine (NORVASC) 10 mg tablet Take 1 Tab by mouth daily.  carvedilol (COREG) 12.5 mg tablet TAKE 1 TAB BY MOUTH TWO (2) TIMES DAILY (WITH MEALS).  Omeprazole delayed release (PRILOSEC D/R) 20 mg tablet Take 1 Tab by mouth daily. Indications: gastroesophageal reflux disease    cetirizine (ZYRTEC) 10 mg tablet Take  by mouth daily.  fenofibrate nanocrystallized (TRICOR) 48 mg tablet Take 1 Tab by mouth daily.  rosuvastatin (CRESTOR) 10 mg tablet Take 1 Tab by mouth nightly.  acetaminophen (TYLENOL) 325 mg tablet Take  by mouth every four (4) hours as needed for Pain.  metFORMIN (GLUCOPHAGE) 500 mg tablet Take  by mouth two (2) times daily (with meals).  ramipril (ALTACE) 10 mg capsule Take 10 mg by mouth daily.  clopidogrel (PLAVIX) 75 mg tab Take 1 Tab by mouth daily.  nitroglycerin (NITROSTAT) 0.4 mg SL tablet 1 Tab by SubLINGual route every five (5) minutes as needed for Chest Pain for up to 3 doses. Review of Systems:     Constitutional: No fevers, chills, weight loss, fatigue. Skin: No rashes, pruritis, jaundice, ulcerations, erythema. HENT: No headaches, nosebleeds, sinus pressure, rhinorrhea, sore throat. Eyes: No visual changes, blurred vision, eye pain, photophobia, jaundice. Cardiovascular: No chest pain, heart palpitations. Respiratory: No cough, SOB, wheezing, chest discomfort, orthopnea. Gastrointestinal: neg   Genitourinary: No dysuria, bleeding, discharge, pyuria. Musculoskeletal: No weakness, arthralgias, wasting. Endo: No sweats. Heme: No bruising, easy bleeding. Allergies: As noted. Neurological: Cranial nerves intact. Alert and oriented. Gait not assessed. Psychiatric:  No anxiety, depression, hallucinations. Visit Vitals  /78   Pulse 71   Temp 97.9 °F (36.6 °C)   Resp 18   Ht 6' (1.829 m)   Wt 89 kg (196 lb 3.2 oz)   SpO2 96%   BMI 26.61 kg/m²       Physical Assessment:     constitutional: appearance: well developed, well nourished, normal habitus, no deformities, in no acute distress. skin: inspection: no rashes, ulcers, icterus or other lesions; no clubbing or telangiectasias. palpation: no induration or subcutaneos nodules. eyes: inspection: normal conjunctivae and lids; no jaundice pupils: symmetrical, normoreactive to light, normal accommodation and size. ENMT: mouth: normal oral mucosa,lips and gums; good dentition. oropharynx: normal tongue, hard and soft palate; posterior pharynx without erythema, exudate or lesions. neck: no masses organomegaly or tenderness. respiratory: effort: normal chest excursion; no intercostal retraction or accessory muscle use. cardiovascular: abdominal aorta: normal size and position; no bruits. palpation: PMI of normal size and position; normal rhythm; no thrill or murmurs. abdominal: abdomen: normal consistency; no tenderness or masses. hernias: no hernias appreciated. liver: normal size and consistency. spleen: not palpable. rectal: hemoccult/guaiac: not performed. musculoskeletal: no deformities or muscle wasting   lymphatic: axilae: not palpable. groin: not palpable. neck: within normal limits. other: not palpable. neurologic: cranial nerves: II-XII normal.   psychiatric: judgement/insight: within normal limits. memory: within normal limits for recent and remote events. mood and affect: no evidence of depression, anxiety or agitation. orientation: oriented to time, space and person.         Basic Metabolic Profile   No results for input(s): NA, K, CL, CO2, BUN, GLU, CA, MG, PHOS in the last 72 hours. No lab exists for component: CREAT      CBC w/Diff    No results for input(s): WBC, RBC, HGB, HCT, MCV, MCH, MCHC, RDW, PLT, HGBEXT, HCTEXT, PLTEXT in the last 72 hours. No lab exists for component: MPV No results for input(s): GRANS, LYMPH, EOS, PRO, MYELO, METAS, BLAST in the last 72 hours. No lab exists for component: MONO, BASO     Hepatic Function   No results for input(s): ALB, TP, TBILI, GPT, SGOT, AP, AML, LPSE in the last 72 hours. No lab exists for component: Jaye Rehman MD, M.D. [facility] 1947, [address] [facility]  [email or web address]

## 2018-11-02 NOTE — DISCHARGE INSTRUCTIONS
Colon Polyps: Care Instructions  Your Care Instructions    Colon polyps are growths in the colon or the rectum. The cause of most colon polyps is not known, and most people who get them do not have any problems. But a certain kind can turn into cancer. For this reason, regular testing for colon polyps is important for people age 48 and older and anyone who has an increased risk for colon cancer. Polyps are usually found through routine colon cancer screening tests. Although most colon polyps are not cancerous, they are usually removed and then tested for cancer. Screening for colon cancer saves lives because the cancer can usually be cured if it is caught early. If you have a polyp that is the type that can turn into cancer, you may need more tests to examine your entire colon. The doctor will remove any other polyps that he or she finds, and you will be tested more often. Follow-up care is a key part of your treatment and safety. Be sure to make and go to all appointments, and call your doctor if you are having problems. It's also a good idea to know your test results and keep a list of the medicines you take. How can you care for yourself at home? Regular exams to look for colon polyps are the best way to prevent polyps from turning into colon cancer. These can include stool tests, sigmoidoscopy, colonoscopy, and CT colonography. Talk with your doctor about a testing schedule that is right for you. To prevent polyps  There is no home treatment that can prevent colon polyps. But these steps may help lower your risk for cancer. · Stay active. Being active can help you get to and stay at a healthy weight. Try to exercise on most days of the week. Walking is a good choice. · Eat well. Choose a variety of vegetables, fruits, legumes (such as peas and beans), fish, poultry, and whole grains. · Do not smoke. If you need help quitting, talk to your doctor about stop-smoking programs and medicines.  These can increase your chances of quitting for good. · If you drink alcohol, limit how much you drink. Limit alcohol to 2 drinks a day for men and 1 drink a day for women. When should you call for help? Call your doctor now or seek immediate medical care if:    · You have severe belly pain.     · Your stools are maroon or very bloody.    Watch closely for changes in your health, and be sure to contact your doctor if:    · You have a fever.     · You have nausea or vomiting.     · You have a change in bowel habits (new constipation or diarrhea).     · Your symptoms get worse or are not improving as expected. Where can you learn more? Go to http://kenneth-renuka.info/. Enter 95 [id number] in the search box to learn more about \"Colon Polyps: Care Instructions. \"  Current as of: March 28, 2018  Content Version: 11.8  © 5773-4951 [employer]. Care instructions adapted under license by [employer] (which disclaims liability or warranty for this information). If you have questions about a medical condition or this instruction, always ask your healthcare professional. [place] [address] any warranty or liability for your use of this information. DISCHARGE SUMMARY from Nurse    PATIENT INSTRUCTIONS:    After general anesthesia or intravenous sedation, for 24 hours or while taking prescription Narcotics:  · Limit your activities  · Do not drive and operate hazardous machinery  · Do not make important personal or business decisions  · Do  not drink alcoholic beverages  · If you have not urinated within 8 hours after discharge, please contact your surgeon on call.     Report the following to your surgeon:  · Excessive pain, swelling, redness or odor of or around the surgical area  · Temperature over 100.5  · Nausea and vomiting lasting longer than 4 hours or if unable to take medications  · Any signs of decreased circulation or nerve impairment to extremity: change in color, persistent  numbness, tingling, coldness or increase pain  · Any questions    What to do at Home:  Recommended activity: Activity as tolerated and no driving for today    These are general instructions for a healthy lifestyle:    No smoking/ No tobacco products/ Avoid exposure to second hand smoke  Surgeon General's Warning:  Quitting smoking now greatly reduces serious risk to your health. Obesity, smoking, and sedentary lifestyle greatly increases your risk for illness    A healthy diet, regular physical exercise & weight monitoring are important for maintaining a healthy lifestyle    You may be retaining fluid if you have a history of heart failure or if you experience any of the following symptoms:  Weight gain of 3 pounds or more overnight or 5 pounds in a week, increased swelling in our hands or feet or shortness of breath while lying flat in bed. Please call your doctor as soon as you notice any of these symptoms; do not wait until your next office visit. Recognize signs and symptoms of STROKE:    F-face looks uneven    A-arms unable to move or move unevenly    S-speech slurred or non-existent    T-time-call 911 as soon as signs and symptoms begin-DO NOT go       Back to bed or wait to see if you get better-TIME IS BRAIN. Warning Signs of HEART ATTACK     Call 911 if you have these symptoms:   Chest discomfort. Most heart attacks involve discomfort in the center of the chest that lasts more than a few minutes, or that goes away and comes back. It can feel like uncomfortable pressure, squeezing, fullness, or pain.  Discomfort in other areas of the upper body. Symptoms can include pain or discomfort in one or both arms, the back, neck, jaw, or stomach.  Shortness of breath with or without chest discomfort.  Other signs may include breaking out in a cold sweat, nausea, or lightheadedness. Don't wait more than five minutes to call 911 - MINUTES MATTER! Fast action can save your life.  Calling 911 is almost always the fastest way to get lifesaving treatment. Emergency Medical Services staff can begin treatment when they arrive -- up to an hour sooner than if someone gets to the hospital by car. The discharge information has been reviewed with the patient and spouse. The patient and spouse verbalized understanding. Discharge medications reviewed with the patient and spouse and appropriate educational materials and side effects teaching were provided.   ___________________________________________________________________________________________________________________________________

## 2018-11-05 RX ORDER — FENOFIBRATE 48 MG/1
TABLET, COATED ORAL
Qty: 90 TAB | Refills: 0 | Status: SHIPPED | OUTPATIENT
Start: 2018-11-05 | End: 2019-02-01 | Stop reason: SDUPTHER

## 2018-11-07 RX ORDER — ONDANSETRON 4 MG/1
TABLET, ORALLY DISINTEGRATING ORAL
Qty: 30 TAB | Refills: 0 | Status: SHIPPED | OUTPATIENT
Start: 2018-11-07 | End: 2019-05-16

## 2018-11-07 NOTE — TELEPHONE ENCOUNTER
Last Visit: 07/19/2018 with THUY Salcido  Next Appointment: noted to f/u in 3 months for labs, thyroid, dm  Previous Refill Encounters: 06/20/2018 per THUY Salcido #[id number]    Requested Prescriptions     Pending Prescriptions Disp Refills    ondansetron (ZOFRAN ODT) 4 mg disintegrating tablet 30 Tab 0     Sig: Dissolve 1 tablet on the tongue every 8 hours as needed for nausea.

## 2018-11-28 DIAGNOSIS — G47.00 INSOMNIA, UNSPECIFIED TYPE: ICD-10-CM

## 2018-11-28 RX ORDER — ALPRAZOLAM 0.5 MG/1
0.5 TABLET ORAL
Qty: 30 TAB | Refills: 0 | OUTPATIENT
Start: 2018-11-28 | End: 2018-12-28 | Stop reason: SDUPTHER

## 2018-11-28 NOTE — TELEPHONE ENCOUNTER
PHONE IN [facility] reports the last fill date for Xanax as 10/24/2018. There appears to be no inconsistencies in regards to the prescribing of this medication. Last Visit: 07/19/2018 with THUY Plascencia  Next Appointment: 11/29/2018 with THUY Plascencia  Previous Refill Encounter(s): 10/24/2018 per NP Stacey Kay #[id number]    Requested Prescriptions     Pending Prescriptions Disp Refills    ALPRAZolam (XANAX) 0.5 mg tablet 30 Tab 0     Sig: Take 1 Tab by mouth nightly as needed for Anxiety.  Max Daily Amount: 0.5 mg.

## 2018-11-29 ENCOUNTER — OFFICE VISIT (OUTPATIENT)
Dept: INTERNAL MEDICINE CLINIC | Age: 68
End: 2018-11-29

## 2018-11-29 VITALS
WEIGHT: 198 LBS | RESPIRATION RATE: 16 BRPM | DIASTOLIC BLOOD PRESSURE: 70 MMHG | HEART RATE: 69 BPM | HEIGHT: 72 IN | OXYGEN SATURATION: 98 % | TEMPERATURE: 97.5 F | SYSTOLIC BLOOD PRESSURE: 110 MMHG | BODY MASS INDEX: 26.82 KG/M2

## 2018-11-29 DIAGNOSIS — Z11.59 NEED FOR HEPATITIS C SCREENING TEST: ICD-10-CM

## 2018-11-29 DIAGNOSIS — Z79.899 CONTROLLED SUBSTANCE AGREEMENT SIGNED: ICD-10-CM

## 2018-11-29 DIAGNOSIS — G47.00 INSOMNIA, UNSPECIFIED TYPE: ICD-10-CM

## 2018-11-29 DIAGNOSIS — Z79.899 HIGH RISK MEDICATION USE: ICD-10-CM

## 2018-11-29 DIAGNOSIS — G89.4 CHRONIC PAIN SYNDROME: ICD-10-CM

## 2018-11-29 DIAGNOSIS — E04.9 THYROID GOITER: ICD-10-CM

## 2018-11-29 DIAGNOSIS — E78.2 MIXED HYPERLIPIDEMIA: ICD-10-CM

## 2018-11-29 DIAGNOSIS — Z12.5 SCREENING PSA (PROSTATE SPECIFIC ANTIGEN): ICD-10-CM

## 2018-11-29 DIAGNOSIS — I10 ESSENTIAL HYPERTENSION: Primary | ICD-10-CM

## 2018-11-29 DIAGNOSIS — E11.9 CONTROLLED TYPE 2 DIABETES MELLITUS WITHOUT COMPLICATION, WITHOUT LONG-TERM CURRENT USE OF INSULIN (HCC): ICD-10-CM

## 2018-11-29 RX ORDER — VENLAFAXINE HYDROCHLORIDE 37.5 MG/1
37.5 CAPSULE, EXTENDED RELEASE ORAL DAILY
Qty: 30 CAP | Refills: 3 | Status: SHIPPED | OUTPATIENT
Start: 2018-11-29 | End: 2019-03-20 | Stop reason: SDUPTHER

## 2018-11-29 NOTE — PROGRESS NOTES
Rusty Huitron is a 76 y.o.  male and presents with    Chief Complaint   Patient presents with    Follow-up       Subjective:  HPI   Mr. Eliud Tamayo presents today for routine follow up to hypertension, hyperlipidemia, type 2 DM, CAD, OA left knee, chronic low back pain, anxiety. He reports still with lack of sleep. He continues to use the Xanax every night as he reports intermittently unable to rest his mind. He reports able to get to sleep but wakes at different hours, lately 230 am and unable to go back to sleep. He has good sleep hygiene habits. He has a history of hypertension, hyperlipidemia, Type 2 DM, CAD with angina pectoris with coronary stent placement, osteoarthritis of left knee, chronic low back pain.     Mr. Eliud Tamayo presents today for CPE and follow up to labs. He does report continuous chest pain at rest and with exertion felt over the left side of the chest with history of CAD. He denies nausea, vomiting, fatigue, palpitations. Present x 1 week, not reproducible. He reports chronic low back pain 4/10 which is tolerable and chooses not to retrieve script for Norco at this time. He reports will call if needed. He reports Xanax helped with insomnia at first as Ativan has helped in the past but states recently unable to have full nights sleep. He would like to continue the Xanax at current dose for a while longer.      Mr. Eliud Tamayo has a history of hyperlipidemia and hypertriglyceridemia, CAD with angina pectoris, and hypertension. He is followed by Dr. Dewey Orta, cardiology. 1/2016 with abnormal stress echo with ekg and wall motion abnormalities suggestive of LAD disease. He underwent a nuclear stress test and cardiac catheterization and found 2 vessel disease, he was managed medically. He was hospitalized 11/2017 for unstable angina. ECHO 12/2017 with EF of 55 % to 60 %.  No obvious wall motion abnormalities, wall thickness was mildly increased, normal valve structure, left atrium mild to moderate dilation. Cardiac catheterization done on 12/01/2017 and conclusion - triple-vessel coronary artery disease, status post percutaneous transluminal coronary angioplasty, stent to proximal and mid circumflex artery using drug-eluting stents. The patient to be on aspirin and Brilinta. He was removed off Brilinta due cost,  is currently on Plavix and  mg.  Side note Imdur in the past with hypotension. He presented to the clinic 7/2018 with complaints of left sided chest pain at rest and exertion present x 1 week with accompanying symptoms of sob, back pain. He was sent to the ED and diagnosed atypical chest pain. D dimer elevated, CTA negative, Chest xray negative, EKG without acute changes, troponin negative, repeat <0.02. He is a Type 2 DM on Metformin BID, A1C 7.0 6/2018. CTA was completed 2/2016 and incidentally found nontoxic multinodular goiter was found incidentally, measuring 1.6 on left and 1.5 cm on right. He was followed by Dr. Jono Vega, unsure if still following.      He has a history of GERD without esophagitis. Relief with Omeprazole 20 mg. Additional Concerns: none     ROS   Review of Systems   Constitutional: Negative. HENT: Negative. Eyes: Negative. Respiratory: Negative. Cardiovascular: Negative. Gastrointestinal: Negative. Genitourinary: Negative. Musculoskeletal: Negative. Skin: Negative. Neurological: Negative. Endo/Heme/Allergies: Negative. Psychiatric/Behavioral: The patient has insomnia. No Known Allergies    Current Outpatient Medications   Medication Sig Dispense Refill    FLUZONE HIGH-DOSE 2018-19, PF, syrg injection TO BE ADMINISTERED BY PHARMACIST FOR IMMUNIZATION  0    ALPRAZolam (XANAX) 0.5 mg tablet Take 1 Tab by mouth nightly as needed for Anxiety.  Max Daily Amount: 0.5 mg. 30 Tab 0    fenofibrate nanocrystallized (TRICOR) 48 mg tablet TAKE 1 TABLET BY MOUTH EVERY NIGHT AT BEDTIME 90 Tab 0    aspirin delayed-release 325 mg tablet TAKE 1 TABLET BY MOUTH EVERY  Tab 0    amLODIPine (NORVASC) 10 mg tablet Take 1 Tab by mouth daily. 90 Tab 3    carvedilol (COREG) 12.5 mg tablet TAKE 1 TAB BY MOUTH TWO (2) TIMES DAILY (WITH MEALS). 180 Tab 01    Omeprazole delayed release (PRILOSEC D/R) 20 mg tablet Take 1 Tab by mouth daily. Indications: gastroesophageal reflux disease 90 Tab 3    cetirizine (ZYRTEC) 10 mg tablet Take  by mouth daily.  rosuvastatin (CRESTOR) 10 mg tablet Take 1 Tab by mouth nightly. 90 Tab 2    clopidogrel (PLAVIX) 75 mg tab Take 1 Tab by mouth daily. 90 Tab 3    acetaminophen (TYLENOL) 325 mg tablet Take  by mouth every four (4) hours as needed for Pain.  metFORMIN (GLUCOPHAGE) 500 mg tablet Take  by mouth two (2) times daily (with meals).  ramipril (ALTACE) 10 mg capsule Take 10 mg by mouth daily.  ondansetron (ZOFRAN ODT) 4 mg disintegrating tablet Dissolve 1 tablet on the tongue every 8 hours as needed for nausea. 30 Tab 0    nitroglycerin (NITROSTAT) 0.4 mg SL tablet 1 Tab by SubLINGual route every five (5) minutes as needed for Chest Pain for up to 3 doses.  25 Tab 0       Social History     Socioeconomic History    Marital status:      Spouse name: Not on file    Number of children: Not on file    Years of education: Not on file    Highest education level: Not on file   Social Needs    Financial resource strain: Not on file    Food insecurity - worry: Not on file    Food insecurity - inability: Not on file    Transportation needs - medical: Not on file   [place] needs - non-medical: Not on file   Occupational History    Not on file   Tobacco Use    Smoking status: Never Smoker    Smokeless tobacco: Never Used   Substance and Sexual Activity    Alcohol use: No    Drug use: No    Sexual activity: Yes     Partners: Female     Birth control/protection: None   Other Topics Concern    Not on file   Social History Narrative    Not on file       Past Medical History:   Diagnosis Date    Arthritis     1999 vicodin    Diabetes ([address] 1998 metformin    GERD (gastroesophageal reflux disease)     Hypercholesterolemia     Hypertension 1996 norvasc    [facility]        Past Surgical History:   Procedure Laterality Date    CARDIAC SURG PROCEDURE UNLIST  12/2017    stints    COLONOSCOPY N/A 11/2/2018    COLONOSCOPY with Polypectomies performed by Naomi An MD at [address] [facility]  2010    gallbladder       Family History   Problem Relation Age of Onset    Stroke Mother     Headache Mother     Hypertension Mother     Lung Cancer Mother     Heart Disease Father     Heart Attack Father     Hypertension Father     Diabetes Father     Lung Cancer Father     Ovarian Cancer Sister     Heart Attack Brother     Breast Cancer Sister     Diabetes Sister     Cancer Maternal Aunt         lung cancer    Cancer Maternal Uncle     Cancer Paternal Aunt     Cancer Paternal Uncle        Objective:  Vitals:    11/29/18 1434   BP: 110/70   Pulse: 69   Resp: 16   Temp: 97.5 °F (36.4 °C)   TempSrc: Oral   SpO2: 98%   Weight: 198 lb (89.8 kg)   Height: 6' (1.829 m)   PainSc:   0 - No pain       LABS   Results for orders placed or performed during the hospital encounter of 11/02/18   GLUCOSE, POC   Result Value Ref Range    Glucose (POC) 217 (H) 70 - 110 mg/dL   GLUCOSE, POC   Result Value Ref Range    Glucose (POC) 172 (H) 70 - 110 mg/dL       TESTS   CONCLUSION:1/2016-  ABNORMAL STRESS ECHO WITH EKG AND WALL MOTION ABNORMALITIES SUGGESTIVE OF LAD  DISTRIBUTION DISEASE     IMPRESSION:cath:1/2016    1. TWO VESSEL CORONARY ARTERY DISEASE IN THE FORM OF 80% OSTIAL AND 90% PROXIMAL NON-DOMINANT RIGHT CORONARY STENOSIS WHICH IS A MODERATE SIZED VESSEL OF ABOUT 1.5 TO 2 MM IN DIAMETER, AND 40% PROXIMAL CIRCUMFLEX, AND 70% MID CIRCUMFLEX STENOSIS WHICH IS A DOMINANT VESSEL.  THERE ARE DIFFUSE LUMINAL IRREGULARITIES SEEN THROUGHOUT THE CORONARIES. 2. NO NORMAL OVERALL LV EJECTION FRACTION AT REST. 3. EVIDENCE OF CHEST PAIN DURING THE CASE AS WELL AS BEFORE THE CASE WAS STARTED. PARTIAL RELIEF WITH SUBLINGUAL NITROGLYCERIN AND NO ACUTE EKG CHANGES SEEN IN THE SIX MONITORING LEADS IN THE CATH LAB. SUMMARY:12/2017-echo  Left ventricle: Systolic function was normal. Ejection fraction was estimated   in the range of 55 % to 60 %. No obvious  wall motion abnormalities identified in the views obtained. Wall thickness   was mildly increased. Doppler parameters  were consistent with abnormal left ventricular relaxation (grade 1 diastolic   dysfunction). Right ventricle: The size was at the upper limits of normal.    Left atrium: The atrium was mildly to moderately dilated. FINDING-12/2017-cath  1. Left main is patent, bifurcates into left anterior descending artery  and circumflex artery. 2. Left anterior descending artery has ostial 30% to 40% stenosis  followed by mid diffuse 30-40% stenosis. The vessel appears to be  moderately calcified. Diagonal 1 artery has diffuse 30-40% stenosis. Mid to distal left anterior descending artery is patent. 3. Circumflex artery is dominant with proximal 95% calcific stenosis. Status post PTCA using a Trek 1.5 x 8 mm balloon followed by a 2.5  mm x 12 mm balloon. The stent was a Synergy 2.75 mm x 15 mm  stent was deployed at about 14 atmospheres. Post-PCI PTCA was  performed using a noncompliant 3.0 mm x 8 mm balloon inflated about  16-18 atmospheres. Lesion reduced to 10%. 4. Obtuse marginal 1 artery was a small-caliber vessel with ostial 70%  to 80% stenosis. 5. Obtuse marginal 2 artery is a small- to medium-caliber vessel with  diffuse 30-40% stenosis. Mid circumflex artery has focal 80% stenosis. Status post PTCA using a Trek 2.5 mm x 12 mm balloon followed by a  Synergy 2.75 mm x 12 mm stent deployed about 14 atmospheres.   Post-PCI PTCA was performed using a 3.0 mm x 8 mm  balloon deployed about 16 atmospheres. Lesion reduced to 0%. 6. Left posterior descending artery is patent. 7. Right coronary artery is nondominant, has ostial calcific 90%  followed by mid 99% stenosis.     CONCLUSION:  Triple-vessel coronary artery disease. Status post  percutaneous transluminal coronary angioplasty/stent to proximal and  mid circumflex artery using drug-eluting stents. The patient will be on  aspirin and Brilinta at this time. Intense medical management and risk  factor modification is advised. PE  Physical Exam   Constitutional: He is oriented to person, place, and time. He appears well-developed and well-nourished. HENT:   Head: Normocephalic and atraumatic. Cardiovascular: Normal rate, regular rhythm and intact distal pulses. Murmur heard. Pulmonary/Chest: Effort normal and breath sounds normal.   Musculoskeletal: Normal range of motion. Neurological: He is alert and oriented to person, place, and time. Skin: Skin is warm and dry. Psychiatric: He has a normal mood and affect. His behavior is normal. Judgment and thought content normal.   Vitals reviewed. Assessment/Plan:    1. Insomnia/chronic pain syndrome- Start Effexor 37.5 mg QHS, Xanax PRN. Follow up in 2 months. 2. Hypertension/HPL/DM- labs ordered. Continue current medications and follow with Dr. Thompson Butler as recommended. Report any new or worsening symptoms. 3. Overweight- He is not currently exercising or dieting but is not sedentary, he golfs often and walks the course. Lab review: orders written for new lab studies as appropriate; see orders    Today's Visit:   Diagnoses and all orders for this visit:    1. Essential hypertension  -     METABOLIC PANEL, COMPREHENSIVE; Future  -     HEMOGLOBIN A1C WITH EAG; Future  -     LIPID PANEL; Future    2. Mixed hyperlipidemia  -     METABOLIC PANEL, COMPREHENSIVE; Future  -     HEMOGLOBIN A1C WITH EAG; Future  -     LIPID PANEL; Future    3.  Chronic pain syndrome  -     METABOLIC PANEL, COMPREHENSIVE; Future  -     HEMOGLOBIN A1C WITH EAG; Future  -     LIPID PANEL; Future    4. Insomnia, unspecified type  -     METABOLIC PANEL, COMPREHENSIVE; Future  -     HEMOGLOBIN A1C WITH EAG; Future  -     LIPID PANEL; Future    5. Need for hepatitis C screening test  -     HEPATITIS C AB; Future    6. Screening PSA (prostate specific antigen)  -     PSA SCREENING (SCREENING); Future    7. Controlled type 2 diabetes mellitus without complication, without long-term current use of insulin ([facility] [address]    8. Controlled substance agreement signed  -     PAIN MGMT PANEL W/REFL, UR; Future    9. High risk medication use  -     PAIN MGMT PANEL W/REFL, UR; Future      Health Maintenance: Influenza up to date received at pharmacy, needs pcv 13, shingrix, hep c screen, medicare wellness exam in 2 months. I have discussed the diagnosis with the patient and the intended plan as seen in the above orders. The patient has received an after-visit summary and questions were answered concerning future plans. I have discussed medication side effects and warnings with the patient as well. I have reviewed the plan of care with the patient, accepted their input and they are in agreement with the treatment goals. Follow-up Disposition: Not on File   More than 1/2 of this 30 minute visit was spent in counseling and coordination of care, as described above.     Unknown SUSAN Meier  Internist of [address], [facility]  Phone: [phone]  Fax: [phone]

## 2018-11-29 NOTE — PROGRESS NOTES
ROOM # 12    Silvia Nguyen presents today for   Chief Complaint   Patient presents with   [address] preferred language for health care discussion is english/other. Is someone accompanying this pt? No    Is the patient using any DME equipment during OV? No    Depression Screening:  PHQ over the last two weeks 11/29/2018 7/23/2018 7/19/2018 6/20/2018 1/25/2018   Little interest or pleasure in doing things Not at all Not at all Not at all Not at all Not at all   Feeling down, depressed, irritable, or hopeless Not at all Not at all Not at all Not at all Not at all   Total Score PHQ 2 0 0 0 0 0       Learning Assessment:  Learning Assessment 6/20/2018 1/25/2018   PRIMARY LEARNER Patient Patient   HIGHEST LEVEL OF EDUCATION - PRIMARY LEARNER  SOME COLLEGE -   BARRIERS PRIMARY LEARNER NONE NONE   CO-LEARNER CAREGIVER - No   PRIMARY LANGUAGE ENGLISH ENGLISH   LEARNER PREFERENCE PRIMARY LISTENING DEMONSTRATION   ANSWERED BY patient  patient   RELATIONSHIP SELF SELF       Abuse Screening:  Abuse Screening Questionnaire 6/20/2018   Do you ever feel afraid of your partner? N   Are you in a relationship with someone who physically or mentally threatens you? N   Is it safe for you to go home? Y       Fall Risk  Fall Risk Assessment, last 12 mths 11/29/2018 7/23/2018 7/20/2018 7/19/2018 6/20/2018 1/25/2018   Able to walk? Yes Yes Yes Yes Yes Yes   Fall in past 12 months? No No No No No No       Visit Vitals  /70 (BP 1 Location: Left arm, BP Patient Position: Sitting)   Pulse 69   Temp 97.5 °F (36.4 °C) (Oral)   Resp 16   Ht 6' (1.829 m)   Wt 198 lb (89.8 kg)   SpO2 98%   BMI 26.85 kg/m²       Health Maintenance reviewed and discussed per provider.  Yes    Silvia Nguyen is due for   Health Maintenance Due   Topic Date Due    Hepatitis C Screening  1950    Shingrix Vaccine Age 50> (1 of 2) 01/28/2000    Pneumococcal 65+ Low/Medium Risk (2 of 2 - PCV13) 01/18/2017    MEDICARE YEARLY EXAM 03/14/2018    Influenza Age 9 to Adult  08/01/2018    HEMOGLOBIN A1C Q6M  12/21/2018     Please order/place referral if appropriate. Advance Directive:  1. Do you have an advance directive in place? Patient Reply: No    2. If not, would you like material regarding how to put one in place? Patient Reply: No    Coordination of Care:  1. Have you been to the ER, urgent care clinic since your last visit? Hospitalized since your last visit? No    2. Have you seen or consulted any other health care providers outside of the [address] [place] [address] since your last visit? Include any pap smears or colon screening.  No

## 2018-12-19 ENCOUNTER — TELEPHONE (OUTPATIENT)
Dept: INTERNAL MEDICINE CLINIC | Age: 68
End: 2018-12-19

## 2018-12-19 RX ORDER — RAMIPRIL 10 MG/1
10 CAPSULE ORAL DAILY
Qty: 90 CAP | Refills: 3 | Status: SHIPPED | OUTPATIENT
Start: 2018-12-19 | End: 2020-02-27 | Stop reason: SDUPTHER

## 2018-12-19 NOTE — TELEPHONE ENCOUNTER
Last Visit: 11/29/2018 with THUY Chapman  Next Appointment: 01/31/2019 with THUY Plascencia    Requested Prescriptions     Pending Prescriptions Disp Refills    ramipril (ALTACE) 10 mg capsule       Sig: Take 1 Cap by mouth daily.

## 2018-12-28 DIAGNOSIS — G47.00 INSOMNIA, UNSPECIFIED TYPE: ICD-10-CM

## 2018-12-28 RX ORDER — ALPRAZOLAM 0.5 MG/1
0.5 TABLET ORAL
Qty: 30 TAB | Refills: 0 | OUTPATIENT
Start: 2018-12-28 | End: 2019-01-30 | Stop reason: SDUPTHER

## 2019-01-06 DIAGNOSIS — I10 ESSENTIAL HYPERTENSION, MALIGNANT: ICD-10-CM

## 2019-01-07 RX ORDER — CARVEDILOL 12.5 MG/1
TABLET ORAL
Qty: 180 TAB | Refills: 1 | Status: SHIPPED | OUTPATIENT
Start: 2019-01-07 | End: 2019-06-24 | Stop reason: SDUPTHER

## 2019-01-18 RX ORDER — CLOPIDOGREL BISULFATE 75 MG/1
TABLET ORAL
Qty: 90 TAB | Refills: 3 | Status: SHIPPED | OUTPATIENT
Start: 2019-01-18 | End: 2020-01-06

## 2019-01-26 RX ORDER — ROSUVASTATIN CALCIUM 10 MG/1
TABLET, COATED ORAL
Qty: 90 TAB | Refills: 2 | Status: SHIPPED | OUTPATIENT
Start: 2019-01-26 | End: 2019-10-19 | Stop reason: SDUPTHER

## 2019-01-30 ENCOUNTER — HOSPITAL ENCOUNTER (OUTPATIENT)
Dept: LAB | Age: 69
Discharge: HOME | End: 2019-01-30
Payer: MEDICARE

## 2019-01-30 DIAGNOSIS — Z11.59 NEED FOR HEPATITIS C SCREENING TEST: ICD-10-CM

## 2019-01-30 DIAGNOSIS — G47.00 INSOMNIA, UNSPECIFIED TYPE: ICD-10-CM

## 2019-01-30 DIAGNOSIS — Z12.5 SCREENING PSA (PROSTATE SPECIFIC ANTIGEN): ICD-10-CM

## 2019-01-30 DIAGNOSIS — I10 ESSENTIAL HYPERTENSION: ICD-10-CM

## 2019-01-30 DIAGNOSIS — E04.9 THYROID GOITER: ICD-10-CM

## 2019-01-30 DIAGNOSIS — G89.4 CHRONIC PAIN SYNDROME: ICD-10-CM

## 2019-01-30 DIAGNOSIS — E11.9 TYPE II DIABETES MELLITUS (HCC): ICD-10-CM

## 2019-01-30 DIAGNOSIS — E78.2 MIXED HYPERLIPIDEMIA: ICD-10-CM

## 2019-01-30 LAB
ALBUMIN SERPL-MCNC: 4.2 G/DL (ref 3.4–5)
ALBUMIN/GLOB SERPL: 1.4 {RATIO} (ref 0.8–1.7)
ALP SERPL-CCNC: 72 U/L (ref 45–117)
ALT SERPL-CCNC: 24 U/L (ref 16–61)
ANION GAP SERPL CALC-SCNC: 3 MMOL/L (ref 3–18)
AST SERPL-CCNC: 14 U/L (ref 15–37)
BILIRUB SERPL-MCNC: 0.3 MG/DL (ref 0.2–1)
BUN SERPL-MCNC: 22 MG/DL (ref 7–18)
BUN/CREAT SERPL: 25 (ref 12–20)
CALCIUM SERPL-MCNC: 9 MG/DL (ref 8.5–10.1)
CHLORIDE SERPL-SCNC: 109 MMOL/L (ref 100–108)
CHOLEST SERPL-MCNC: 177 MG/DL
CO2 SERPL-SCNC: 29 MMOL/L (ref 21–32)
CREAT SERPL-MCNC: 0.89 MG/DL (ref 0.6–1.3)
EST. AVERAGE GLUCOSE BLD GHB EST-MCNC: 126 MG/DL
GLOBULIN SER CALC-MCNC: 3.1 G/DL (ref 2–4)
GLUCOSE SERPL-MCNC: 142 MG/DL (ref 74–99)
HBA1C MFR BLD: 6 % (ref 4.2–5.6)
HCV AB SER IA-ACNC: 0.02 INDEX
HCV AB SERPL QL IA: NEGATIVE
HCV COMMENT,HCGAC: NORMAL
HDLC SERPL-MCNC: 37 MG/DL (ref 40–60)
HDLC SERPL: 4.8 {RATIO} (ref 0–5)
LDLC SERPL CALC-MCNC: 87.4 MG/DL (ref 0–100)
LIPID PROFILE,FLP: ABNORMAL
POTASSIUM SERPL-SCNC: 3.9 MMOL/L (ref 3.5–5.5)
PROT SERPL-MCNC: 7.3 G/DL (ref 6.4–8.2)
PSA SERPL-MCNC: 1.6 NG/ML (ref 0–4)
SODIUM SERPL-SCNC: 141 MMOL/L (ref 136–145)
T4 FREE SERPL-MCNC: 1 NG/DL (ref 0.7–1.5)
TRIGL SERPL-MCNC: 263 MG/DL (ref ?–150)
TSH SERPL DL<=0.05 MIU/L-ACNC: 2.29 UIU/ML (ref 0.36–3.74)
VLDLC SERPL CALC-MCNC: 52.6 MG/DL

## 2019-01-30 PROCEDURE — 86803 HEPATITIS C AB TEST: CPT

## 2019-01-30 PROCEDURE — 80061 LIPID PANEL: CPT

## 2019-01-30 PROCEDURE — 84439 ASSAY OF FREE THYROXINE: CPT

## 2019-01-30 PROCEDURE — 80053 COMPREHEN METABOLIC PANEL: CPT

## 2019-01-30 PROCEDURE — 84443 ASSAY THYROID STIM HORMONE: CPT

## 2019-01-30 PROCEDURE — 84480 ASSAY TRIIODOTHYRONINE (T3): CPT

## 2019-01-30 PROCEDURE — 83036 HEMOGLOBIN GLYCOSYLATED A1C: CPT

## 2019-01-30 PROCEDURE — 84153 ASSAY OF PSA TOTAL: CPT

## 2019-01-30 PROCEDURE — 36415 COLL VENOUS BLD VENIPUNCTURE: CPT

## 2019-01-30 RX ORDER — ALPRAZOLAM 0.5 MG/1
0.5 TABLET ORAL
Qty: 30 TAB | Refills: 0 | OUTPATIENT
Start: 2019-01-30 | End: 2019-03-01 | Stop reason: SDUPTHER

## 2019-01-30 NOTE — TELEPHONE ENCOUNTER
/Print    Last Visit: 11/29  Next Appt: 1/31  Previous Refill Encounter: 12/28-30+0    Requested Prescriptions     Pending Prescriptions Disp Refills    ALPRAZolam (XANAX) 0.5 mg tablet 30 Tab 0     Sig: Take 1 Tab by mouth nightly as needed for Anxiety.  Max Daily Amount: 0.5 mg.

## 2019-01-31 ENCOUNTER — OFFICE VISIT (OUTPATIENT)
Dept: INTERNAL MEDICINE CLINIC | Age: 69
End: 2019-01-31

## 2019-01-31 VITALS
OXYGEN SATURATION: 99 % | RESPIRATION RATE: 16 BRPM | HEART RATE: 69 BPM | DIASTOLIC BLOOD PRESSURE: 72 MMHG | BODY MASS INDEX: 26.68 KG/M2 | WEIGHT: 197 LBS | HEIGHT: 72 IN | SYSTOLIC BLOOD PRESSURE: 128 MMHG | TEMPERATURE: 97.8 F

## 2019-01-31 DIAGNOSIS — I25.10 CORONARY ARTERY DISEASE INVOLVING NATIVE CORONARY ARTERY OF NATIVE HEART WITHOUT ANGINA PECTORIS: Primary | ICD-10-CM

## 2019-01-31 DIAGNOSIS — Z79.899 HIGH RISK MEDICATION USE: ICD-10-CM

## 2019-01-31 DIAGNOSIS — E78.2 MIXED HYPERLIPIDEMIA: ICD-10-CM

## 2019-01-31 DIAGNOSIS — I10 ESSENTIAL HYPERTENSION: ICD-10-CM

## 2019-01-31 DIAGNOSIS — E11.9 TYPE 2 DIABETES MELLITUS WITHOUT COMPLICATION, WITHOUT LONG-TERM CURRENT USE OF INSULIN (HCC): ICD-10-CM

## 2019-01-31 LAB — T3 SERPL-MCNC: 122 NG/DL (ref 71–180)

## 2019-01-31 NOTE — PROGRESS NOTES
Bonnie Garza is a 71 y.o.  male and presents with Chief Complaint Patient presents with  Follow-up Subjective: HPI Mr. Esthela Neal presents today for routine follow up to hypertension, hyperlipidemia, type 2 DM, CAD, OA left knee, chronic low back pain, anxiety/insomnia. 
   
He has a history of hypertension, hyperlipidemia, Type 2 DM, CAD with angina pectoris with coronary stent placement, osteoarthritis of left knee, chronic low back pain, anxiety/insomnia. 
   
Mr. Esthela Neal has a history of hyperlipidemia and hypertriglyceridemia, CAD with angina pectoris, and hypertension. He is followed by Dr. Joyce Lieberman, cardiology. 1/2016 with abnormal stress echo with ekg and wall motion abnormalities suggestive of LAD disease. He underwent a nuclear stress test and cardiac catheterization and found 2 vessel disease, he was managed medically. He was hospitalized 11/2017 for unstable angina. ECHO 12/2017 with EF of 55 % to 60 %. No obvious wall motion abnormalities, wall thickness was mildly increased, normal valve structure, left atrium mild to moderate dilation. Cardiac catheterization done on 12/01/2017 and conclusion - triple-vessel coronary artery disease, status post percutaneous transluminal coronary angioplasty, stent to proximal and mid circumflex artery using drug-eluting stents. The patient to be on aspirin and Brilinta. He was removed off Brilinta due cost,  is currently on Plavix and  mg.  Side note Imdur in the past with hypotension. He presented to the clinic 7/2018 with complaints of left sided chest pain at rest and exertion present x 1 week with accompanying symptoms of sob, back pain. He was sent to the ED and diagnosed atypical chest pain. D dimer elevated, CTA negative, Chest xray negative, EKG without acute changes, troponin negative, repeat <0.02.  
  
He is a Type 2 DM on Metformin BID, A1C 7.0 6/2018.1/2019 6.0.  CTA was completed 1/2016 and incidentally found nontoxic multinodular goiter was found incidentally, measuring 1.6 on left and 1.5 cm on right. US thyroid completed 1/18/2016. He was followed by Dr. Abhijeet Jeong. 
  
He has a history of GERD without esophagitis. Relief with Omeprazole 20 mg.  He is with anxiety and insomnia. He has been taking Xanax nightly for a length of time now prescribed by prior provider. He was started on Effexor 37.5mg and reports today with improved sleep, no longer waking in the middle of the night and waking later in the AM. He continues to take the Xanax every night as well.  
  
Additional Concerns: none ROS Review of Systems Constitutional: Negative. HENT: Negative. Eyes: Negative. Respiratory: Negative. Cardiovascular: Negative. Gastrointestinal: Negative. Genitourinary: Negative. Musculoskeletal: Negative. Skin: Negative. Neurological: Negative. Endo/Heme/Allergies: Negative. Psychiatric/Behavioral: Negative. No Known Allergies Current Outpatient Medications Medication Sig Dispense Refill  ALPRAZolam (XANAX) 0.5 mg tablet Take 1 Tab by mouth nightly as needed for Anxiety. Max Daily Amount: 0.5 mg. 30 Tab 0  
 rosuvastatin (CRESTOR) 10 mg tablet TAKE 1 TABLET BY MOUTH AT BEDTIME 90 Tab 2  clopidogrel (PLAVIX) 75 mg tab TAKE 1 TABLET BY MOUTH EVERY DAY 90 Tab 3  carvedilol (COREG) 12.5 mg tablet TAKE 1 TABLET BY MOUTH TWICE A DAY WITH FOOD 180 Tab 1  ramipril (ALTACE) 10 mg capsule Take 1 Cap by mouth daily. [facility] 2018-19, PF, syrg injection TO BE ADMINISTERED BY PHARMACIST FOR IMMUNIZATION  0  
 venlafaxine-SR (EFFEXOR-XR) 37.5 mg capsule Take 1 Cap by mouth daily. 30 Cap 3  
 aspirin delayed-release 325 mg tablet TAKE 1 TABLET BY MOUTH EVERY  Tab 0  
 amLODIPine (NORVASC) 10 mg tablet Take 1 Tab by mouth daily.  90 Tab 3  
  Omeprazole delayed release (PRILOSEC D/R) 20 mg tablet Take 1 Tab by mouth daily. Indications: gastroesophageal reflux disease 90 Tab 3  cetirizine (ZYRTEC) 10 mg tablet Take  by mouth daily.  nitroglycerin (NITROSTAT) 0.4 mg SL tablet 1 Tab by SubLINGual route every five (5) minutes as needed for Chest Pain for up to 3 doses. 25 Tab 0  
 acetaminophen (TYLENOL) 325 mg tablet Take  by mouth every four (4) hours as needed for Pain.  metFORMIN (GLUCOPHAGE) 500 mg tablet Take  by mouth two (2) times daily (with meals).  fenofibrate nanocrystallized (TRICOR) 48 mg tablet TAKE 1 TABLET BY MOUTH EVERYDAY AT BEDTIME 90 Tab 0  
 ondansetron (ZOFRAN ODT) 4 mg disintegrating tablet Dissolve 1 tablet on the tongue every 8 hours as needed for nausea. 30 Tab 0 Social History Socioeconomic History  Marital status:  Spouse name: Not on file  Number of children: Not on file  Years of education: Not on file  Highest education level: Not on file Social Needs  Financial resource strain: Not on file  Food insecurity - worry: Not on file  Food insecurity - inability: Not on file  Transportation needs - medical: Not on file  Transportation needs - non-medical: Not on file Occupational History  Not on file Tobacco Use  Smoking status: Never Smoker  Smokeless tobacco: Never Used Substance and Sexual Activity  Alcohol use: No  
 Drug use: No  
 Sexual activity: Yes  
  Partners: Female Birth control/protection: None Other Topics Concern  Not on file Social History Narrative  Not on file Past Medical History:  
Diagnosis Date  Arthritis 1999 vicodin  Diabetes ([email or web address] 1998 metformin  GERD (gastroesophageal reflux disease)  Hypercholesterolemia  Hypertension 1996 norvasc  MI (myocardial infarction) ([facility] Past Surgical History:  
Procedure Laterality Date  CARDIAC SURG PROCEDURE UNLIST  12/2017 shreya  COLONOSCOPY N/A 11/2/2018 COLONOSCOPY with Polypectomies performed by Chago Brink MD at [address] [facility] GI  2010  
 gallbladder Family History Problem Relation Age of Onset  Stroke Mother  Headache Mother  Hypertension Mother  Lung Cancer Mother  Heart Disease Father  Heart Attack Father  Hypertension Father  Diabetes Father  Lung Cancer Father  Ovarian Cancer Sister  Heart Attack Brother  Breast Cancer Sister  Diabetes Sister  Cancer Maternal Aunt   
     lung cancer  Cancer Maternal Uncle  Cancer Paternal Aunt  Cancer Paternal Uncle Objective: 
Vitals:  
 01/31/19 0947 BP: 128/72 Pulse: 69 Resp: 16 Temp: 97.8 °F (36.6 °C) TempSrc: Oral  
SpO2: 99% Weight: 197 lb (89.4 kg) Height: 6' (1.829 m) PainSc:   0 - No pain LABS Results for orders placed or performed during the hospital encounter of 01/30/19 METABOLIC PANEL, COMPREHENSIVE Result Value Ref Range Sodium 141 136 - 145 mmol/L Potassium 3.9 3.5 - 5.5 mmol/L Chloride 109 (H) 100 - 108 mmol/L  
 CO2 29 21 - 32 mmol/L Anion gap 3 3.0 - 18 mmol/L Glucose 142 (H) 74 - 99 mg/dL BUN 22 (H) 7.0 - 18 MG/DL Creatinine 0.89 0.6 - 1.3 MG/DL  
 BUN/Creatinine ratio 25 (H) 12 - 20 GFR est AA >60 >60 ml/min/1.73m2 GFR est non-AA >60 >60 ml/min/1.73m2 Calcium 9.0 8.5 - 10.1 MG/DL Bilirubin, total 0.3 0.2 - 1.0 MG/DL  
 ALT (SGPT) 24 16 - 61 U/L  
 AST (SGOT) 14 (L) 15 - 37 U/L Alk. phosphatase 72 45 - 117 U/L Protein, total 7.3 6.4 - 8.2 g/dL Albumin 4.2 3.4 - 5.0 g/dL Globulin 3.1 2.0 - 4.0 g/dL A-G Ratio 1.4 0.8 - 1.7 LIPID PANEL Result Value Ref Range LIPID PROFILE Cholesterol, total 177 <200 MG/DL Triglyceride 263 (H) <150 MG/DL  
 HDL Cholesterol 37 (L) 40 - 60 MG/DL  
 LDL, calculated 87.4 0 - 100 MG/DL VLDL, calculated 52.6 MG/DL  
 CHOL/HDL Ratio 4.8 0 - 5.0 PSA SCREENING (SCREENING) Result Value Ref Range Prostate Specific Ag 1.6 0.0 - 4.0 ng/mL HEPATITIS C AB Result Value Ref Range Hepatitis C virus Ab 0.02 <0.80 Index Hep C  virus Ab Interp. NEGATIVE  NEG Hep C  virus Ab comment TSH 3RD GENERATION Result Value Ref Range TSH 2.29 0.36 - 3.74 uIU/mL  
T3 TOTAL Result Value Ref Range T3, total 122 71 - 180 ng/dL T4, FREE Result Value Ref Range T4, Free 1.0 0.7 - 1.5 NG/DL  
HEMOGLOBIN A1C WITH EAG Result Value Ref Range Hemoglobin A1c 6.0 (H) 4.2 - 5.6 % Est. average glucose 126 mg/dL TESTS Impression: 
 
Multinodular goiter with complex cystic nodules bilaterally measuring up to 1.6 cm on left and 1.5 cm on right. Result Narrative Ultrasound thyroid Indication: Thyroid nodules on CT. Findings: 
 
Right lobe: 1.6 cm x 2.4 cm x 4.3 cm.  There is a 8mm hyperechoic nodule interpolar region, interpolar complex 1.5 cm x 1.0 cm x 1.1 cm cystic nodule with septations and without significant vascularity or calcification, and 8mm nodule at the lower pole.  Additional smaller nodules noted in the right lobe. Left lobe measures 1.6 cm x 1.2 cm x 4.6 cm.  Dominant complex cystic nodule measuring 1.6 cm x 9 mm x 1.4 cm at the lower pole without significant internal color flow.  Midpole and upper pole hypoechoic nodules measuring up to 4 mm. Isthmus measures 3 mm in thickness. PE Physical Exam  
Constitutional: He is oriented to person, place, and time. He appears well-developed and well-nourished. No distress. HENT:  
Head: Normocephalic and atraumatic. Cardiovascular: Normal rate, regular rhythm, normal heart sounds and intact distal pulses. Pulmonary/Chest: Effort normal and breath sounds normal.  
Abdominal: Soft. Bowel sounds are normal.  
Musculoskeletal: Normal range of motion. Neurological: He is alert and oriented to person, place, and time. Skin: Skin is warm and dry. He is not diaphoretic. Psychiatric: He has a normal mood and affect. His behavior is normal. Judgment and thought content normal.  
Vitals reviewed. Assessment/Plan: 1. Hypertension- controlled. 2. Hyperlipidemia/type 2 DM- discussed lipid levels- LDL 87.4, HDL 37, Trig 263, total chol 177, A1C 7.0>6.0. He reports did not follow cardiac diet over holidays, reports working on improving diet. He sees cardiology 4/2019.  
 
3. Anxiety/Insomnia- He reports improved on Effexor 37.5 mg as he reports missed a few doses and noticed change in sleep pattern. He continues to use the Xanax however this seems more related to routine now instead of as needed, will continue to monitor and discuss on follow up to trial a week without taking to see if still felt if needed. 4. Follow up in June for CPE and lab recheck. Diet and exercise management. 5. Incidental multinodular thyroid nodules found 2016. TSH normal range 1/2019. I will discuss US thyroid on follow up to make sure no change. Need records. Lab review: labs reviewed, I note that glycosylated hemoglobin normal, lipids LDL result does not yet meet goal, comprehensive metabolic panel normal, PSA normal 
 
Today's Visit:  
Diagnoses and all orders for this visit: 1. Coronary artery disease involving native coronary artery of native heart without angina pectoris -     LIPID PANEL; Future 2. Type 2 diabetes mellitus without complication, without long-term current use of insulin (HCC) 
-     HEMOGLOBIN A1C WITH EAG; Future -     MICROALBUMIN, UR, RAND W/ MICROALB/CREAT RATIO; Future 3. Mixed hyperlipidemia -     LIPID PANEL; Future 4. Essential hypertension -     METABOLIC PANEL, BASIC; Future -     LIPID PANEL; Future 
-     HEMOGLOBIN A1C WITH EAG; Future -     MICROALBUMIN, UR, RAND W/ MICROALB/CREAT RATIO; Future 5. High risk medication use -     PAIN MGMT PANEL W/REFL, UR; Future Health Maintenance: Pneumococcal 13 to be given however patient checked out prior to receiving. Medicare Wellness examination completed today. Shingrix script- he is waiting to receive pharmacy out of stock. I have discussed the diagnosis with the patient and the intended plan as seen in the above orders. The patient has received an after-visit summary and questions were answered concerning future plans. I have discussed medication side effects and warnings with the patient as well. I have reviewed the plan of care with the patient, accepted their input and they are in agreement with the treatment goals. Follow-up Disposition: 
Return in about 5 months (around 6/30/2019). More than 1/2 of this 30 minute visit was spent in counseling and coordination of care, as described above. SUSAN Amaya Internist of [facility] [address] [place], [address] Phone: [phone] Fax: [phone] This is a \"Welcome to [employer]"  Initial Preventive Physical Examination (IPPE) providing Personalized Prevention Plan Services (Performed in the first 12 months of enrollment) Antwan Hawkins is a 71 y.o.  male and presents for a welcome to Medicare physical exam  
 
 
 
I have reviewed the patient's medical history in detail and updated the computerized patient record. History Past Medical History:  
Diagnosis Date  Arthritis 1999 vicodin  Diabetes ([address] 1998 metformin  GERD (gastroesophageal reflux disease)  Hypercholesterolemia  Hypertension 1996 norvasc  MI (myocardial infarction) ([facility] Past Surgical History:  
Procedure Laterality Date  CARDIAC SURG PROCEDURE UNLIST  12/2017  
 stints  COLONOSCOPY N/A 11/2/2018 COLONOSCOPY with Polypectomies performed by Julio C Almonte MD at [address] [facility] GI  2010  
 gallbladder Current Outpatient Medications Medication Sig Dispense Refill  ALPRAZolam (XANAX) 0.5 mg tablet Take 1 Tab by mouth nightly as needed for Anxiety. Max Daily Amount: 0.5 mg. 30 Tab 0  
 rosuvastatin (CRESTOR) 10 mg tablet TAKE 1 TABLET BY MOUTH AT BEDTIME 90 Tab 2  clopidogrel (PLAVIX) 75 mg tab TAKE 1 TABLET BY MOUTH EVERY DAY 90 Tab 3  carvedilol (COREG) 12.5 mg tablet TAKE 1 TABLET BY MOUTH TWICE A DAY WITH FOOD 180 Tab 1  ramipril (ALTACE) 10 mg capsule Take 1 Cap by mouth daily. [facility] 2018-19, PF, syrg injection TO BE ADMINISTERED BY PHARMACIST FOR IMMUNIZATION  0  
 venlafaxine-SR (EFFEXOR-XR) 37.5 mg capsule Take 1 Cap by mouth daily. 30 Cap 3  
 aspirin delayed-release 325 mg tablet TAKE 1 TABLET BY MOUTH EVERY  Tab 0  
 amLODIPine (NORVASC) 10 mg tablet Take 1 Tab by mouth daily. 90 Tab 3  
 Omeprazole delayed release (PRILOSEC D/R) 20 mg tablet Take 1 Tab by mouth daily. Indications: gastroesophageal reflux disease 90 Tab 3  cetirizine (ZYRTEC) 10 mg tablet Take  by mouth daily.  nitroglycerin (NITROSTAT) 0.4 mg SL tablet 1 Tab by SubLINGual route every five (5) minutes as needed for Chest Pain for up to 3 doses. 25 Tab 0  
 acetaminophen (TYLENOL) 325 mg tablet Take  by mouth every four (4) hours as needed for Pain.  metFORMIN (GLUCOPHAGE) 500 mg tablet Take  by mouth two (2) times daily (with meals).  fenofibrate nanocrystallized (TRICOR) 48 mg tablet TAKE 1 TABLET BY MOUTH EVERYDAY AT BEDTIME 90 Tab 0  
 ondansetron (ZOFRAN ODT) 4 mg disintegrating tablet Dissolve 1 tablet on the tongue every 8 hours as needed for nausea. 30 Tab 0 No Known Allergies Family History Problem Relation Age of Onset  Stroke Mother  Headache Mother  Hypertension Mother  Lung Cancer Mother  Heart Disease Father  Heart Attack Father  Hypertension Father  Diabetes Father  Lung Cancer Father  Ovarian Cancer Sister  Heart Attack Brother  Breast Cancer Sister  Diabetes Sister  Cancer Maternal Aunt   
     lung cancer  Cancer Maternal Uncle  Cancer Paternal Aunt  Cancer Paternal Uncle Social History Tobacco Use  Smoking status: Never Smoker  Smokeless tobacco: Never Used Substance Use Topics  Alcohol use: No  
 
 
generally follows a low sodium diet, does not rigorously follow a diabetic diet He is not exercising, he golfs and walks the golf course when weather is appropriate. Health Maintenance History Immunizations reviewed, dtap up to date , pneumovax needs 13, flu up to date, shingrix- pharmacy is out of stock currently Colonoscopy:up to date, Eye exam:up to date Mammo: N/A Dexascan: no indication to perform, no hx of pathological fractures or on medications that could decrease bone density Depression Risk Factor Screening:  
  
Patient Health Questionnaire (PHQ-2) Over the last 2 weeks, how often have you been bothered by any of the following problems? · Little interest or pleasure in doing things? · Not at all. [0] · Feeling down, depressed, or hopeless? · Not at all. [0] Total Score: 0/6 PHQ-2 Assessment Scoring: A score of 2 or more requires further screening with the PHQ-9 Alcohol Risk Factor Screening:  
 
Women: On any occasion during the past 3 months, have you had more than 3 drinks containing alcohol? Do you average more than 7 drinks per week? Men: On any occasion during the past 3 months, have you had more than 4 drinks containing alcohol? Do you average more than 14 drinks per week? Functional Ability and Level of Safety:  
 
Hearing Loss The patient needs further evaluation. Activities of Daily Living Self-care. Requires assistance with: no ADLs Fall Risk Secondary diagnoses (15 pts) Abuse Screen Patient is not abused None Review of Systems ROS History obtained from the patient All other systems reviewed and are negative. A comprehensive review of systems was negative except for that written in the HPI. Examination Physical Examination Vitals:  
 01/31/19 0287 BP: 128/72 Pulse: 69 Resp: 16 Temp: 97.8 °F (36.6 °C) TempSrc: Oral  
SpO2: 99% Weight: 197 lb (89.4 kg) Height: 6' (1.829 m) PainSc:   0 - No pain Body mass index is 26.72 kg/m². Visual Acuity: Not performed EKG Screening: He is followed by cardiology, he is without concerns/symptoms today. End-of-life planning Advanced Directive in the case than an injury or illness causes the patient to be unable to make health care decisions- wife LUDMILA [facility] Health Care Directive or Living Will: no 
 
Assessment/Plan:  
Education and counseling provided: 
Are appropriate based on today's review and evaluation 
lab results and schedule of future lab studies reviewed with patient 
reviewed diet, exercise and weight control 
specific diabetic recommendations: glycohemoglobin and other lab monitoring discussed. Brief written plan, checklist 
 
I have discussed the diagnosis with the patient and the intended plan as seen in the above orders. The patient has received an after-visit summary and questions were answered concerning future plans. I have discussed medication side effects and warnings with the patient as well. I have reviewed the plan of care with the patient, accepted their input and they are in agreement with the treatment goals.

## 2019-01-31 NOTE — PROGRESS NOTES
ROOM # 12 Emily Venegas presents today for Chief Complaint Patient presents with  Follow-up Emily Venegas preferred language for health care discussion is english/other. Depression Screening: PHQ over the last two weeks 1/31/2019 11/29/2018 7/23/2018 7/19/2018 6/20/2018 1/25/2018 Little interest or pleasure in doing things Not at all Not at all Not at all Not at all Not at all Not at all Feeling down, depressed, irritable, or hopeless Not at all Not at all Not at all Not at all Not at all Not at all Total Score PHQ 2 0 0 0 0 0 0 Learning Assessment: 
Learning Assessment 6/20/2018 1/25/2018 PRIMARY LEARNER Patient Patient HIGHEST LEVEL OF EDUCATION - PRIMARY LEARNER  SOME COLLEGE -  
BARRIERS PRIMARY LEARNER NONE NONE  
CO-LEARNER CAREGIVER - No  
PRIMARY LANGUAGE ENGLISH ENGLISH  
LEARNER PREFERENCE PRIMARY LISTENING DEMONSTRATION  
ANSWERED BY patient  patient RELATIONSHIP SELF SELF Abuse Screening: 
Abuse Screening Questionnaire 6/20/2018 Do you ever feel afraid of your partner? [facility] Are you in a relationship with someone who physically or mentally threatens you? Space Monkey Is it safe for you to go home? Sobia Mcgee Fall Risk Fall Risk Assessment, last 12 mths 1/31/2019 11/29/2018 7/23/2018 7/20/2018 7/19/2018 6/20/2018 1/25/2018 Able to walk? Yes Yes Yes Yes Yes Yes Yes Fall in past 12 months? No No No No No No No  
 
 
Visit Vitals /72 (BP 1 Location: Left arm, BP Patient Position: Sitting) Pulse 69 Temp 97.8 °F (36.6 °C) (Oral) Resp 16 Ht 6' (1.829 m) Wt 197 lb (89.4 kg) SpO2 99% BMI 26.72 kg/m² Health Maintenance reviewed and discussed per provider. Yes Emily Venegas is due for Health Maintenance Due Topic Date Due  Shingrix Vaccine Age 50> (1 of 2) 01/28/2000  Pneumococcal 65+ Low/Medium Risk (2 of 2 - PCV13) 01/18/2017  MEDICARE YEARLY EXAM  03/14/2018 Please order/place referral if appropriate. Advance Directive: 1. Do you have an advance directive in place? Patient Reply: No 
 
2. If not, would you like material regarding how to put one in place? Patient Reply: No 
 
Coordination of Care: 1. Have you been to the ER, urgent care clinic since your last visit? Hospitalized since your last visit?  No

## 2019-02-01 ENCOUNTER — TELEPHONE (OUTPATIENT)
Dept: INTERNAL MEDICINE CLINIC | Age: 69
End: 2019-02-01

## 2019-02-01 RX ORDER — FENOFIBRATE 48 MG/1
TABLET, COATED ORAL
Qty: 90 TAB | Refills: 0 | Status: SHIPPED | OUTPATIENT
Start: 2019-02-01 | End: 2019-04-28 | Stop reason: SDUPTHER

## 2019-03-01 DIAGNOSIS — G47.00 INSOMNIA, UNSPECIFIED TYPE: ICD-10-CM

## 2019-03-01 RX ORDER — ALPRAZOLAM 0.5 MG/1
0.5 TABLET ORAL
Qty: 30 TAB | Refills: 0 | OUTPATIENT
Start: 2019-03-01 | End: 2019-04-03 | Stop reason: SDUPTHER

## 2019-03-01 NOTE — TELEPHONE ENCOUNTER
Check /Phone in    Last Visit: 1/31/19  Next Appt: 6/24/19  Previous Refill Encounter: 1/30-30+0    Requested Prescriptions     Pending Prescriptions Disp Refills    ALPRAZolam (XANAX) 0.5 mg tablet 30 Tab 0     Sig: Take 1 Tab by mouth nightly as needed for Anxiety.  Max Daily Amount: 0.5 mg.

## 2019-03-05 DIAGNOSIS — G47.00 INSOMNIA, UNSPECIFIED TYPE: ICD-10-CM

## 2019-03-05 RX ORDER — ALPRAZOLAM 0.5 MG/1
0.5 TABLET ORAL
Qty: 30 TAB | Refills: 0 | Status: CANCELLED | OUTPATIENT
Start: 2019-03-05

## 2019-03-20 DIAGNOSIS — G89.4 CHRONIC PAIN SYNDROME: ICD-10-CM

## 2019-03-20 DIAGNOSIS — G47.00 INSOMNIA, UNSPECIFIED TYPE: ICD-10-CM

## 2019-03-20 RX ORDER — VENLAFAXINE HYDROCHLORIDE 37.5 MG/1
37.5 CAPSULE, EXTENDED RELEASE ORAL DAILY
Qty: 90 CAP | Refills: 3 | Status: SHIPPED | OUTPATIENT
Start: 2019-03-20 | End: 2020-04-14

## 2019-03-20 NOTE — TELEPHONE ENCOUNTER
Last visit:  1-31-19 with NP Todd Davis  Next appt:  6-24-19 with THUY Plascencia  Previous refill encounter:  11-29-18 #30 with 3 refills    Requested Prescriptions     Pending Prescriptions Disp Refills    venlafaxine-SR (EFFEXOR-XR) 37.5 mg capsule 90 Cap 3     Sig: Take 1 Cap by mouth daily.

## 2019-04-03 DIAGNOSIS — G47.00 INSOMNIA, UNSPECIFIED TYPE: ICD-10-CM

## 2019-04-03 RX ORDER — ALPRAZOLAM 0.5 MG/1
0.5 TABLET ORAL
Qty: 30 TAB | Refills: 0 | OUTPATIENT
Start: 2019-04-03 | End: 2019-05-06 | Stop reason: SDUPTHER

## 2019-04-03 NOTE — TELEPHONE ENCOUNTER
Last Visit: 1-31-19 with THUY Cam  Next Appointment: 6-24-19 with THUY Plascencia  Previous Refill Encounter(s): 3-1-19 #30    Requested Prescriptions     Pending Prescriptions Disp Refills    ALPRAZolam (XANAX) 0.5 mg tablet 30 Tab 0     Sig: Take 1 Tab by mouth nightly as needed for Anxiety.  Max Daily Amount: 0.5 mg.

## 2019-04-29 RX ORDER — FENOFIBRATE 48 MG/1
TABLET, COATED ORAL
Qty: 90 TAB | Refills: 0 | Status: SHIPPED | OUTPATIENT
Start: 2019-04-29 | End: 2019-07-24 | Stop reason: SDUPTHER

## 2019-04-30 DIAGNOSIS — Z79.899 HIGH RISK MEDICATION USE: ICD-10-CM

## 2019-05-06 DIAGNOSIS — G47.00 INSOMNIA, UNSPECIFIED TYPE: ICD-10-CM

## 2019-05-06 RX ORDER — ALPRAZOLAM 0.5 MG/1
0.5 TABLET ORAL
Qty: 30 TAB | Refills: 0 | Status: CANCELLED | OUTPATIENT
Start: 2019-05-06

## 2019-05-06 RX ORDER — ALPRAZOLAM 0.5 MG/1
0.5 TABLET ORAL
Qty: 30 TAB | Refills: 0 | OUTPATIENT
Start: 2019-05-06 | End: 2019-06-05 | Stop reason: SDUPTHER

## 2019-05-06 NOTE — TELEPHONE ENCOUNTER
[facility] date for X[facility]ax as 4/4/19 for a 30 d/s. Last Visit: 1/31/19 with THUY Méndez  Next Appointment: 6/24/19 with THUY Plascencia  Previous Refill Encounter(s): 4/3/19 #30    Requested Prescriptions     Pending Prescriptions Disp Refills    ALPRAZolam (XANAX) 0.5 mg tablet 30 Tab 0     Sig: Take 1 Tab by mouth nightly as needed for Anxiety.  Max Daily Amount: 0.5 mg.

## 2019-05-16 ENCOUNTER — APPOINTMENT (OUTPATIENT)
Dept: CT IMAGING | Age: 69
End: 2019-05-16
Attending: PHYSICIAN ASSISTANT
Payer: MEDICARE

## 2019-05-16 ENCOUNTER — HOSPITAL ENCOUNTER (EMERGENCY)
Age: 69
Discharge: HOME | End: 2019-05-16
Attending: EMERGENCY MEDICINE
Payer: MEDICARE

## 2019-05-16 VITALS
DIASTOLIC BLOOD PRESSURE: 70 MMHG | RESPIRATION RATE: 18 BRPM | OXYGEN SATURATION: 100 % | SYSTOLIC BLOOD PRESSURE: 118 MMHG | WEIGHT: 190 LBS | BODY MASS INDEX: 25.73 KG/M2 | TEMPERATURE: 97.8 F | HEART RATE: 72 BPM | HEIGHT: 72 IN

## 2019-05-16 DIAGNOSIS — S16.1XXA ACUTE STRAIN OF NECK MUSCLE, INITIAL ENCOUNTER: ICD-10-CM

## 2019-05-16 DIAGNOSIS — S06.0X1A CONCUSSION WITH LOSS OF CONSCIOUSNESS OF 30 MINUTES OR LESS, INITIAL ENCOUNTER: Primary | ICD-10-CM

## 2019-05-16 LAB
ALBUMIN SERPL-MCNC: 4.3 G/DL (ref 3.4–5)
ALBUMIN/GLOB SERPL: 1.3 {RATIO} (ref 0.8–1.7)
ALP SERPL-CCNC: 80 U/L (ref 45–117)
ALT SERPL-CCNC: 27 U/L (ref 16–61)
ANION GAP SERPL CALC-SCNC: 7 MMOL/L (ref 3–18)
AST SERPL-CCNC: 15 U/L (ref 15–37)
BASOPHILS # BLD: 0.1 K/UL (ref 0–0.1)
BASOPHILS NFR BLD: 1 % (ref 0–2)
BILIRUB SERPL-MCNC: 0.3 MG/DL (ref 0.2–1)
BUN SERPL-MCNC: 15 MG/DL (ref 7–18)
BUN/CREAT SERPL: 14 (ref 12–20)
CALCIUM SERPL-MCNC: 9.1 MG/DL (ref 8.5–10.1)
CHLORIDE SERPL-SCNC: 109 MMOL/L (ref 100–108)
CO2 SERPL-SCNC: 26 MMOL/L (ref 21–32)
CREAT SERPL-MCNC: 1.1 MG/DL (ref 0.6–1.3)
DIFFERENTIAL METHOD BLD: ABNORMAL
EOSINOPHIL # BLD: 0.2 K/UL (ref 0–0.4)
EOSINOPHIL NFR BLD: 2 % (ref 0–5)
ERYTHROCYTE [DISTWIDTH] IN BLOOD BY AUTOMATED COUNT: 14 % (ref 11.6–14.5)
GLOBULIN SER CALC-MCNC: 3.4 G/DL (ref 2–4)
GLUCOSE SERPL-MCNC: 127 MG/DL (ref 74–99)
HCT VFR BLD AUTO: 40.3 % (ref 36–48)
HGB BLD-MCNC: 14.3 G/DL (ref 13–16)
INR PPP: 0.9 (ref 0.8–1.2)
LYMPHOCYTES # BLD: 2.1 K/UL (ref 0.9–3.6)
LYMPHOCYTES NFR BLD: 23 % (ref 21–52)
MCH RBC QN AUTO: 31.4 PG (ref 24–34)
MCHC RBC AUTO-ENTMCNC: 35.5 G/DL (ref 31–37)
MCV RBC AUTO: 88.6 FL (ref 74–97)
MONOCYTES # BLD: 0.8 K/UL (ref 0.05–1.2)
MONOCYTES NFR BLD: 8 % (ref 3–10)
NEUTS SEG # BLD: 6 K/UL (ref 1.8–8)
NEUTS SEG NFR BLD: 66 % (ref 40–73)
PLATELET # BLD AUTO: 217 K/UL (ref 135–420)
PMV BLD AUTO: 10.1 FL (ref 9.2–11.8)
POTASSIUM SERPL-SCNC: 3.8 MMOL/L (ref 3.5–5.5)
PROT SERPL-MCNC: 7.7 G/DL (ref 6.4–8.2)
PROTHROMBIN TIME: 12.3 SEC (ref 11.5–15.2)
RBC # BLD AUTO: 4.55 M/UL (ref 4.7–5.5)
SODIUM SERPL-SCNC: 142 MMOL/L (ref 136–145)
WBC # BLD AUTO: 9.1 K/UL (ref 4.6–13.2)

## 2019-05-16 PROCEDURE — 72125 CT NECK SPINE W/O DYE: CPT

## 2019-05-16 PROCEDURE — 96375 TX/PRO/DX INJ NEW DRUG ADDON: CPT

## 2019-05-16 PROCEDURE — 80053 COMPREHEN METABOLIC PANEL: CPT

## 2019-05-16 PROCEDURE — 70450 CT HEAD/BRAIN W/O DYE: CPT

## 2019-05-16 PROCEDURE — 99283 EMERGENCY DEPT VISIT LOW MDM: CPT

## 2019-05-16 PROCEDURE — 85025 COMPLETE CBC W/AUTO DIFF WBC: CPT

## 2019-05-16 PROCEDURE — 74011250636 HC RX REV CODE- 250/636: Performed by: EMERGENCY MEDICINE

## 2019-05-16 PROCEDURE — 96374 THER/PROPH/DIAG INJ IV PUSH: CPT

## 2019-05-16 PROCEDURE — 85610 PROTHROMBIN TIME: CPT

## 2019-05-16 RX ORDER — PROCHLORPERAZINE MALEATE 5 MG
5 TABLET ORAL
Qty: 12 TAB | Refills: 0 | Status: SHIPPED | OUTPATIENT
Start: 2019-05-16 | End: 2019-05-23

## 2019-05-16 RX ORDER — ONDANSETRON 8 MG/1
8 TABLET, ORALLY DISINTEGRATING ORAL
Qty: 12 TAB | Refills: 0 | Status: SHIPPED | OUTPATIENT
Start: 2019-05-16 | End: 2019-06-24 | Stop reason: SDUPTHER

## 2019-05-16 RX ORDER — PROCHLORPERAZINE EDISYLATE 5 MG/ML
5 INJECTION INTRAMUSCULAR; INTRAVENOUS
Status: COMPLETED | OUTPATIENT
Start: 2019-05-16 | End: 2019-05-16

## 2019-05-16 RX ORDER — DIPHENHYDRAMINE HYDROCHLORIDE 50 MG/ML
12.5 INJECTION, SOLUTION INTRAMUSCULAR; INTRAVENOUS ONCE
Status: COMPLETED | OUTPATIENT
Start: 2019-05-16 | End: 2019-05-16

## 2019-05-16 RX ADMIN — DIPHENHYDRAMINE HYDROCHLORIDE 12.5 MG: 50 INJECTION INTRAMUSCULAR; INTRAVENOUS at 17:15

## 2019-05-16 RX ADMIN — PROCHLORPERAZINE EDISYLATE 5 MG: 5 INJECTION INTRAMUSCULAR; INTRAVENOUS at 17:15

## 2019-05-16 NOTE — DISCHARGE INSTRUCTIONS
Patient Education        Concussion: Care Instructions  Your Care Instructions    A concussion is a kind of injury to the brain. It happens when the head receives a hard blow. The impact can jar or shake the brain against the skull. This interrupts the brain's normal activities. Although you may have cuts or bruises on your head or face, you may have no other visible signs of a brain injury. In most cases, damage to the brain from a concussion can't be seen in tests such as a CT or MRI scan. For a few weeks, you may have low energy, dizziness, trouble sleeping, a headache, ringing in your ears, or nausea. You may also feel anxious, grumpy, or depressed. You may have problems with memory and concentration. These symptoms are common after a concussion. They should slowly improve over time. Sometimes this takes weeks or even months. Someone who lives with you should know how to care for you. Please share this and all information with a caregiver who will be available to help if needed. Follow-up care is a key part of your treatment and safety. Be sure to make and go to all appointments, and call your doctor if you are having problems. It's also a good idea to know your test results and keep a list of the medicines you take. How can you care for yourself at home? Pain control  · Put ice or a cold pack on the part of your head that hurts for 10 to 20 minutes at a time. Put a thin cloth between the ice and your skin. · Be safe with medicines. Read and follow all instructions on the label. ? If the doctor gave you a prescription medicine for pain, take it as prescribed. ? If you are not taking a prescription pain medicine, ask your doctor if you can take an over-the-counter medicine. Recovery  · Follow your doctor's instructions. He or she will tell you if you need someone to watch you closely for the next 24 hours or longer. · Rest is the best way to recover from a concussion.  You need to rest your body and your brain:  ? Get plenty of sleep at night. And take rest breaks during the day. ? Avoid activities that take a lot of physical or mental work. This includes housework, exercise, schoolwork, video games, text messaging, and using the computer. ? You may need to change your school or work schedule while you recover. ? Return to your normal activities slowly. Do not try to do too much at once. · Do not drink alcohol or use illegal drugs. Alcohol and illegal drugs can slow your recovery. And they can increase your risk of a second brain injury. · Avoid activities that could lead to another concussion. Follow your doctor's instructions for a gradual return to activity and sports. · Ask your doctor when it's okay for you to drive a car, ride a bike, or operate machinery. How should you return to activity? Your return to activity can begin after 1 to 2 days of physical and mental rest. After resting, you can gradually increase your activity as long as it does not cause new symptoms or worsen your symptoms. Doctors and concussion specialists suggest steps to follow for returning to sports after a concussion. Use these steps as a guide. You should slowly progress through the following levels of activity:  1. Limited activity. You can take part in daily activities as long as the activity doesn't increase your symptoms or cause new symptoms. 2. Light aerobic activity. This can include walking, swimming, or other exercise at less than 70% of maximum heart rate. No resistance training is included in this step. 3. Sport-specific exercise. This includes running drills or skating drills (depending on the sport), but no head impact. 4. Noncontact training drills. This includes more complex training drills such as passing. The athlete may also begin light resistance training. 5. Full-contact practice. The athlete can participate in normal training. 6. Return to normal game play.  This is the final step and allows the athlete to join in normal game play. Watch and keep track of your progress. It should take at least 6 days for you to go from light activity to normal game play. Make sure that you can stay at each new level of activity for at least 24 hours without symptoms, or as long as your doctor says, before doing more. If one or more symptoms come back, return to a lower level of activity for at least 24 hours. Don't move on until all symptoms are gone. When should you call for help? Call 911 anytime you think you may need emergency care. For example, call if:    · You have a seizure.     · You passed out (lost consciousness).     · You are confused or can't stay awake.    Call your doctor now or seek immediate medical care if:    · You have new or worse vomiting.     · You feel less alert.     · You have new weakness or numbness in any part of your body.    Watch closely for changes in your health, and be sure to contact your doctor if:    · You do not get better as expected.     · You have new symptoms, such as headaches, trouble concentrating, or changes in mood. Where can you learn more? Go to http://[employer].info/. Enter W527 in the search box to learn more about \"Concussion: Care Instructions. \"  Current as of: Thelma 3, 2018  Content Version: 11.9  © 6777-7067 [employer]. Care instructions adapted under license by [employer] (which disclaims liability or warranty for this information). If you have questions about a medical condition or this instruction, always ask your healthcare professional. [place] [address] any warranty or liability for your use of this information. Patient Education        Neck Strain: Care Instructions  Your Care Instructions    You have strained the muscles and ligaments in your neck. A sudden, awkward movement can strain the neck. This often occurs with falls or car accidents or during certain sports.  Everyday activities like working on a computer or sleeping can also cause neck strain if they force you to hold your neck in an awkward position for a long time. It is common for neck pain to get worse for a day or two after an injury, but it should start to feel better after that. You may have more pain and stiffness for several days before it gets better. This is expected. It may take a few weeks or longer for it to heal completely. Good home treatment can help you get better faster and avoid future neck problems. Follow-up care is a key part of your treatment and safety. Be sure to make and go to all appointments, and call your doctor if you are having problems. It's also a good idea to know your test results and keep a list of the medicines you take. How can you care for yourself at home? · If you were given a neck brace (cervical collar) to limit neck motion, wear it as instructed for as many days as your doctor tells you to. Do not wear it longer than you were told to. Wearing a brace for too long can make neck stiffness worse and weaken the neck muscles. · You can try using heat or ice to see if it helps. ? Try using a heating pad on a low or medium setting for 15 to 20 minutes every 2 to 3 hours. Try a warm shower in place of one session with the heating pad. You can also buy single-use heat wraps that last up to 8 hours. ? You can also try an ice pack for 10 to 15 minutes every 2 to 3 hours. · Take pain medicines exactly as directed. ? If the doctor gave you a prescription medicine for pain, take it as prescribed. ? If you are not taking a prescription pain medicine, ask your doctor if you can take an over-the-counter medicine. · Gently rub the area to relieve pain and help with blood flow. Do not massage the area if it hurts to do so. · Do not do anything that makes the pain worse. Take it easy for a couple of days.  You can do your usual activities if they do not hurt your neck or put it at risk for more stress or injury. · Try sleeping on a special neck pillow. Place it under your neck, not under your head. Placing a tightly rolled-up towel under your neck while you sleep will also work. If you use a neck pillow or rolled towel, do not use your regular pillow at the same time. · To prevent future neck pain, do exercises to stretch and strengthen your neck and back. Learn how to use good posture, safe lifting techniques, and proper body mechanics. When should you call for help? Call 911 anytime you think you may need emergency care. For example, call if:    · You are unable to move an arm or a leg at all.   [facility] your doctor now or seek immediate medical care if:    · You have new or worse symptoms in your arms, legs, chest, belly, or buttocks. Symptoms may include:  ? Numbness or tingling. ? Weakness. ? Pain.     · You lose bladder or bowel control.    Watch closely for changes in your health, and be sure to contact your doctor if:    · You are not getting better as expected. Where can you learn more? Go to http://kenneth-[employer]uka.info/. Enter M253 in the search box to learn more about \"Neck Strain: Care Instructions. \"  Current as of: September 20, 2018  Content Version: 11.9  © 4573-8153 [employer]. Care instructions adapted under license by [employer] (which disclaims liability or warranty for this information). If you have questions about a medical condition or this instruction, always ask your healthcare professional. [place] [address] any warranty or liability for your use of this information.

## 2019-05-16 NOTE — ED NOTES
Octavio Kramer is a 71 y.o. male that was discharged in stable. Pt was accompanied by spouse. Pt is not driving. The patients diagnosis, condition and treatment were explained to  patient and aftercare instructions were given. The patient verbalized understanding. Patient armband removed and shredded.

## 2019-05-16 NOTE — ED PROVIDER NOTES
EMERGENCY DEPARTMENT HISTORY AND PHYSICAL EXAM 
 
4:19 PM 
 
 
Date: 5/16/2019 Patient Name: Daquan Olivera History of Presenting Illness Chief Complaint Patient presents with  
 Head Injury  Fall History Provided By: Patient Additional History (Context): Daquan Olivera is a 71 y.o. male with hypertension, diabetes, MI, and hypercholesterolemia who presents with acute headache onset immediately PTA. Pt states he was roto-tilling and tripped on a block of wood and fell backwards, hitting his head on a cinder block. His wife reports syncopy. Pt states he still feels a throbbing pain in his head. He reports pain in his neck and back. He is currently taking Plavix. No other concerns or symptoms at this time. PCP: Ale Tidwell NP Chief Complaint: Headache Duration:  immediately PTA Timing:  Acute Location: back of head Quality: Throbbing Severity: N/A Modifying Factors: Fall, pt taking Plavix Associated Symptoms: syncopy, neck pain, back pain Current Outpatient Medications Medication Sig Dispense Refill  ondansetron (ZOFRAN ODT) 8 mg disintegrating tablet Take 1 Tab by mouth every eight (8) hours as needed for Nausea for up to 12 doses. 12 Tab 0  prochlorperazine (COMPAZINE) 5 mg tablet Take 1 Tab by mouth every eight (8) hours as needed for Nausea for up to 7 days. 12 Tab 0  ALPRAZolam (XANAX) 0.5 mg tablet Take 1 Tab by mouth nightly as needed for Anxiety. Max Daily Amount: 0.5 mg. 30 Tab 0  
 fenofibrate nanocrystallized (TRICOR) 48 mg tablet TAKE 1 TABLET BY MOUTH EVERYDAY AT BEDTIME 90 Tab 0  
 venlafaxine-SR (EFFEXOR-XR) 37.5 mg capsule Take 1 Cap by mouth daily. 90 Cap 3  
 rosuvastatin (CRESTOR) 10 mg tablet TAKE 1 TABLET BY MOUTH AT BEDTIME 90 Tab 2  clopidogrel (PLAVIX) 75 mg tab TAKE 1 TABLET BY MOUTH EVERY DAY 90 Tab 3  carvedilol (COREG) 12.5 mg tablet TAKE 1 TABLET BY MOUTH TWICE A DAY WITH FOOD 180 Tab 1  
  ramipril (ALTACE) 10 mg capsule Take 1 Cap by mouth daily. 90 Cap 3  
 aspirin delayed-release 325 mg tablet TAKE 1 TABLET BY MOUTH EVERY  Tab 0  
 amLODIPine (NORVASC) 10 mg tablet Take 1 Tab by mouth daily. 90 Tab 3  
 Omeprazole delayed release (PRILOSEC D/R) 20 mg tablet Take 1 Tab by mouth daily. Indications: gastroesophageal reflux disease 90 Tab 3  
 nitroglycerin (NITROSTAT) 0.4 mg SL tablet 1 Tab by SubLINGual route every five (5) minutes as needed for Chest Pain for up to 3 doses. 25 Tab 0  
 acetaminophen (TYLENOL) 325 mg tablet Take  by mouth every four (4) hours as needed for Pain.  metFORMIN (GLUCOPHAGE) 500 mg tablet Take  by mouth two (2) times daily (with meals).  FLUZONE HIGH-DOSE 2018-19, PF, syrg injection TO BE ADMINISTERED BY PHARMACIST FOR IMMUNIZATION  0  
 cetirizine (ZYRTEC) 10 mg tablet Take  by mouth daily. Past History Past Medical History: 
Past Medical History:  
Diagnosis Date  Arthritis 1999 vicodin  Diabetes ([facility] [address] 1998 metformin  GERD (gastroesophageal reflux disease)  Hypercholesterolemia  Hypertension 1996 norvasc  MI (myocardial infarction) ([facility] [address] Past Surgical History: 
Past Surgical History:  
Procedure Laterality Date  CARDIAC SURG PROCEDURE UNLIST  12/2017  
 stints  COLONOSCOPY N/A 11/2/2018 COLONOSCOPY with Polypectomies performed by Edison Colon MD at [facility] [place] [address] GI  2010  
 gallbladder Family History: 
Family History Problem Relation Age of Onset  Stroke Mother  Headache Mother  Hypertension Mother  Lung Cancer Mother  Heart Disease Father  Heart Attack Father  Hypertension Father  Diabetes Father  Lung Cancer Father  Ovarian Cancer Sister  Heart Attack Brother  Breast Cancer Sister  Diabetes Sister  Cancer Maternal Aunt   
     lung cancer  Cancer Maternal Uncle  Cancer Paternal Aunt  Cancer Paternal Uncle Social History: 
Social History Tobacco Use  Smoking status: Never Smoker  Smokeless tobacco: Never Used Substance Use Topics  Alcohol use: No  
 Drug use: No  
 
 
Allergies: 
No Known Allergies Review of Systems Review of Systems Constitutional: Negative for activity change, fatigue and fever. HENT: Negative for congestion and rhinorrhea. Eyes: Negative for visual disturbance. Respiratory: Negative for shortness of breath. Cardiovascular: Negative for chest pain and palpitations. Gastrointestinal: Negative for abdominal pain, diarrhea, nausea and vomiting. Genitourinary: Negative for dysuria and hematuria. Musculoskeletal: Positive for back pain and neck pain. Skin: Negative for rash. Neurological: Positive for syncope and headaches. Negative for dizziness, weakness and light-headedness. All other systems reviewed and are negative. Physical Exam  
 
Visit Vitals /71 (BP 1 Location: Left arm, BP Patient Position: Sitting) Pulse 73 Temp 97.8 °F (36.6 °C) Resp 18 Ht 6' (1.829 m) Wt 86.2 kg (190 lb) SpO2 96% BMI 25.77 kg/m² Physical Exam  
Constitutional: He is oriented to person, place, and time. He appears well-developed and well-nourished. No distress. HENT:  
Head: Normocephalic. Right Ear: External ear normal.  
Left Ear: External ear normal.  
Nose: Nose normal.  
Mouth/Throat: Oropharynx is clear and moist.  
TTP over posterior head. Eyes: Pupils are equal, round, and reactive to light. Conjunctivae and EOM are normal.  
Neck: Normal range of motion. Neck supple. No tracheal deviation present. Cardiovascular: Normal rate, regular rhythm and intact distal pulses. Pulmonary/Chest: Effort normal and breath sounds normal. He exhibits no tenderness. Abdominal: Soft. Bowel sounds are normal. He exhibits no distension. There is no tenderness. There is no rebound and no guarding. Musculoskeletal: Normal range of motion. He exhibits no edema. Paraspinal TTP over C1 - C7. Neurological: He is alert and oriented to person, place, and time. No cranial nerve deficit. Coordination normal.  
Skin: Skin is warm and dry. Psychiatric: He has a normal mood and affect. His behavior is normal. Judgment and thought content normal.  
Nursing note and vitals reviewed. Diagnostic Study Results Labs - Recent Results (from the past 12 hour(s)) CBC WITH AUTOMATED DIFF Collection Time: 05/16/19  4:25 PM  
Result Value Ref Range WBC 9.1 4.6 - 13.2 K/uL  
 RBC 4.55 (L) 4.70 - 5.50 M/uL  
 HGB 14.3 13.0 - 16.0 g/dL HCT 40.3 36.0 - 48.0 % MCV 88.6 74.0 - 97.0 FL  
 MCH 31.4 24.0 - 34.0 PG  
 MCHC 35.5 31.0 - 37.0 g/dL  
 RDW 14.0 11.6 - 14.5 % PLATELET 260 621 - 418 K/uL MPV 10.1 9.2 - 11.8 FL  
 NEUTROPHILS 66 40 - 73 % LYMPHOCYTES 23 21 - 52 % MONOCYTES 8 3 - 10 % EOSINOPHILS 2 0 - 5 % BASOPHILS 1 0 - 2 %  
 ABS. NEUTROPHILS 6.0 1.8 - 8.0 K/UL  
 ABS. LYMPHOCYTES 2.1 0.9 - 3.6 K/UL  
 ABS. MONOCYTES 0.8 0.05 - 1.2 K/UL  
 ABS. EOSINOPHILS 0.2 0.0 - 0.4 K/UL  
 ABS. BASOPHILS 0.1 0.0 - 0.1 K/UL  
 DF AUTOMATED PROTHROMBIN TIME + INR Collection Time: 05/16/19  4:25 PM  
Result Value Ref Range Prothrombin time 12.3 11.5 - 15.2 sec INR 0.9 0.8 - 1.2 METABOLIC PANEL, COMPREHENSIVE Collection Time: 05/16/19  4:25 PM  
Result Value Ref Range Sodium 142 136 - 145 mmol/L Potassium 3.8 3.5 - 5.5 mmol/L Chloride 109 (H) 100 - 108 mmol/L  
 CO2 26 21 - 32 mmol/L Anion gap 7 3.0 - 18 mmol/L Glucose 127 (H) 74 - 99 mg/dL BUN 15 7.0 - 18 MG/DL Creatinine 1.10 0.6 - 1.3 MG/DL  
 BUN/Creatinine ratio 14 12 - 20 GFR est AA >60 >60 ml/min/1.73m2 GFR est non-AA >60 >60 ml/min/1.73m2 Calcium 9.1 8.5 - 10.1 MG/DL  Bilirubin, total 0.3 0.2 - 1.0 MG/DL  
 ALT (SGPT) 27 16 - 61 U/L  
 AST (SGOT) 15 15 - 37 U/L  
 Alk. phosphatase 80 45 - 117 U/L Protein, total 7.7 6.4 - 8.2 g/dL Albumin 4.3 3.4 - 5.0 g/dL Globulin 3.4 2.0 - 4.0 g/dL A-G Ratio 1.3 0.8 - 1.7 Radiologic Studies -  
CT HEAD WO CONT Final Result IMPRESSION:  
  
Unremarkable head CT without contrast.  
  
CT SPINE CERV WO CONT Final Result IMPRESSION:   
  
1. No evidence of acute cervical spine fracture or subluxation. 2. Cervical spondylosis and mild multilevel degenerative disc disease. Medical Decision Making It should be noted that IEstefani DO will be the provider of record for this patient. I reviewed the vital signs, available nursing notes, past medical history, past surgical history, family history and social history. Vital Signs-Reviewed the patient's vital signs. Pulse Oximetry Analysis -  96% on room air , nml 
 
Cardiac Monitor: 
Rate: 73 BPM 
 
Records Reviewed: Nursing Notes and Old Medical Records (Time of Review: 4:19 PM) ED Course: Progress Notes, Reevaluation, and Consults: 
5:14 PM 
CT Head and CT neck are negative. 6:52 PM 
Patient feels better. Headache resolved patient is a 70-year-old male on Eliquis who hit his head Provider Notes (Medical Decision Making): Kodi Broach No acute findings on imaging. Patient with probable concussion. Feels better after Benadryl and Compazine. No further headache. Patient feels well would like to go home. Stable for discharge. Follow-up with PMD.  Return if any worsening concerning symptoms. Understands agrees with plan. Stable for discharge. Diagnosis Clinical Impression: 1. Concussion with loss of consciousness of 30 minutes or less, initial encounter 2. Acute strain of neck muscle, initial encounter Disposition:  
 
Follow-up Information Follow up With Specialties Details Why Contact Info  Kashif Osborne NP Internal Medicine Schedule an appointment as soon as possible for a visit  [place] [address] [facility] 
[phone] Patient's Medications Start Taking ONDANSETRON (ZOFRAN ODT) 8 MG DISINTEGRATING TABLET    Take 1 Tab by mouth every eight (8) hours as needed for Nausea for up to 12 doses. PROCHLORPERAZINE (COMPAZINE) 5 MG TABLET    Take 1 Tab by mouth every eight (8) hours as needed for Nausea for up to 7 days. Continue Taking ACETAMINOPHEN (TYLENOL) 325 MG TABLET    Take  by mouth every four (4) hours as needed for Pain. ALPRAZOLAM (XANAX) 0.5 MG TABLET    Take 1 Tab by mouth nightly as needed for Anxiety. Max Daily Amount: 0.5 mg.  
 AMLODIPINE (NORVASC) 10 MG TABLET    Take 1 Tab by mouth daily. ASPIRIN DELAYED-RELEASE 325 MG TABLET    TAKE 1 TABLET BY MOUTH EVERY DAY  
 CARVEDILOL (COREG) 12.5 MG TABLET    TAKE 1 TABLET BY MOUTH TWICE A DAY WITH FOOD CETIRIZINE (ZYRTEC) 10 MG TABLET    Take  by mouth daily. CLOPIDOGREL (PLAVIX) 75 MG TAB    TAKE 1 TABLET BY MOUTH EVERY DAY  
 FENOFIBRATE NANOCRYSTALLIZED (TRICOR) 48 MG TABLET    TAKE 1 TABLET BY MOUTH EVERYDAY AT BEDTIME FLUZONE HIGH-DOSE 2018-19, PF, SYRG INJECTION    TO BE ADMINISTERED BY PHARMACIST FOR IMMUNIZATION  
 METFORMIN (GLUCOPHAGE) 500 MG TABLET    Take  by mouth two (2) times daily (with meals). NITROGLYCERIN (NITROSTAT) 0.4 MG SL TABLET    1 Tab by SubLINGual route every five (5) minutes as needed for Chest Pain for up to 3 doses. OMEPRAZOLE DELAYED RELEASE (PRILOSEC D/R) 20 MG TABLET    Take 1 Tab by mouth daily. Indications: gastroesophageal reflux disease RAMIPRIL (ALTACE) 10 MG CAPSULE    Take 1 Cap by mouth daily. ROSUVASTATIN (CRESTOR) 10 MG TABLET    TAKE 1 TABLET BY MOUTH AT BEDTIME  
 VENLAFAXINE-SR (EFFEXOR-XR) 37.5 MG CAPSULE    Take 1 Cap by mouth daily. These Medications have changed No medications on file Stop Taking  ONDANSETRON (ZOFRAN ODT) 4 MG DISINTEGRATING TABLET    Dissolve 1 tablet on the tongue every 8 hours as needed for nausea.  
 
_______________________________ Scribe Attestation Shaheen Amaro acting as a scribe for and in the presence of [id number] [place] Anna Ash DO May 16, 2019 at 6:52 PM 
    
Provider Attestation:     
I personally performed the services described in the documentation, reviewed the documentation, as recorded by the scribe in my presence, and it accurately and completely records my words and actions. May 16, 2019 at 6:52 PM - Daniel ESPINOSA DO  
 
 
_______________________________

## 2019-05-16 NOTE — ED TRIAGE NOTES
Pt states that he tripped backwards and fell and hit his head on a cinder block approximately 1 hour PTA. Pt states that he did have a LOC for unknown amount of time. Pt states that he is currently on blood thinners. C/O HA, neck pain and back pain.

## 2019-05-22 ENCOUNTER — OFFICE VISIT (OUTPATIENT)
Dept: CARDIOLOGY CLINIC | Age: 69
End: 2019-05-22

## 2019-05-22 VITALS
HEART RATE: 73 BPM | HEIGHT: 72 IN | BODY MASS INDEX: 26.79 KG/M2 | SYSTOLIC BLOOD PRESSURE: 143 MMHG | DIASTOLIC BLOOD PRESSURE: 74 MMHG | WEIGHT: 197.8 LBS

## 2019-05-22 DIAGNOSIS — Z95.5 S/P CORONARY ARTERY STENT PLACEMENT: ICD-10-CM

## 2019-05-22 DIAGNOSIS — I10 ESSENTIAL HYPERTENSION: ICD-10-CM

## 2019-05-22 DIAGNOSIS — E78.2 MIXED HYPERLIPIDEMIA: ICD-10-CM

## 2019-05-22 DIAGNOSIS — I25.10 CORONARY ARTERY DISEASE INVOLVING NATIVE CORONARY ARTERY OF NATIVE HEART WITHOUT ANGINA PECTORIS: Primary | ICD-10-CM

## 2019-05-22 NOTE — PROGRESS NOTES
Patient didn't bring medications, verbally reviewed. 1. Have you been to the ER, urgent care clinic since your last visit? Hospitalized since your last visit? Yes When: 05/2019 Where: [facility] Reason for visit: Head Injury    2. Have you seen or consulted any other health care providers outside of the [address] [place] [address] since your last visit? Include any pap smears or colon screening.  No

## 2019-05-22 NOTE — PROGRESS NOTES
HISTORY OF PRESENT ILLNESS  Alban Luciano is a 71 y.o. male. Patient with cad,htn,hyperlipidemia,dm. On follow up patient denies any chest pains,sob, palpitation or other significant symptoms. 12/2017  Admitted with unstable angina-had pci  7/2018. Patient was in emergency room with atypical chest pain. No acute EKG changes cardiac enzymes negative CTA and chest x-ray reports reviewed. Labs are negative for cardiac workup  5/2019. Recent ER visit with fall and concussion. Records reviewed. Hypertension   Associated symptoms include chest pain. Pertinent negatives include no abdominal pain, no headaches and no shortness of breath. Hospital Follow Up   Associated symptoms include chest pain. Pertinent negatives include no abdominal pain, no headaches and no shortness of breath. Chest Pain (Angina)    The history is provided by the patient. This is a recurrent problem. The problem has not changed since onset. The problem occurs rarely. The pain is associated with exertion. The pain is present in the substernal region. The quality of the pain is described as pressure-like. The pain does not radiate. Pertinent negatives include no abdominal pain, no claudication, no cough, no dizziness, no fever, no headaches, no hemoptysis, no malaise/fatigue, no nausea, no orthopnea, no palpitations, no PND, no shortness of breath, no sputum production, no vomiting and no weakness. Review of Systems   Constitutional: Negative for chills, fever and malaise/fatigue. HENT: Negative for nosebleeds. Eyes: Negative for blurred vision and double vision. Respiratory: Negative for cough, hemoptysis, sputum production, shortness of breath and wheezing. Cardiovascular: Positive for chest pain. Negative for palpitations, orthopnea, claudication, leg swelling and PND. Gastrointestinal: Negative for abdominal pain, heartburn, nausea and vomiting. Musculoskeletal: Negative for falls and myalgias.    Skin: Negative for rash. Neurological: Negative for dizziness, weakness and headaches. Endo/Heme/Allergies: Does not bruise/bleed easily. Family History   Problem Relation Age of Onset    Stroke Mother     Headache Mother     Hypertension Mother    Jolena Ink Mother     Heart Disease Father     Heart Attack Father     Hypertension Father     Diabetes Father     Lung Cancer Father     Ovarian Cancer Sister     Heart Attack Brother     Breast Cancer Sister     Diabetes Sister     Cancer Maternal Aunt         lung cancer    Cancer Maternal Uncle     Cancer Paternal Aunt     Cancer Paternal Uncle        Past Medical History:   Diagnosis Date    Arthritis     1999 vicodin    Diabetes ([address] 1998 metformin    GERD (gastroesophageal reflux disease)     Hypercholesterolemia     Hypertension 1996 norvasc    MI (myocardial infarction) ([address]        Past Surgical History:   Procedure Laterality Date    CARDIAC SURG PROCEDURE UNLIST  12/2017    stints    COLONOSCOPY N/A 11/2/2018    COLONOSCOPY with Polypectomies performed by Kirit Andersen MD at [facility]COPY    HX GI  2010    gallbladder       Social History     Tobacco Use    Smoking status: Never Smoker    Smokeless tobacco: Never Used   Substance Use Topics    Alcohol use: No       No Known Allergies    Outpatient Medications Marked as Taking for the 5/22/19 encounter (Office Visit) with Camacho Russo MD   Medication Sig Dispense Refill    ALPRAZolam Lucho Feldman) 0.5 mg tablet Take 1 Tab by mouth nightly as needed for Anxiety. Max Daily Amount: 0.5 mg. 30 Tab 0    fenofibrate nanocrystallized (TRICOR) 48 mg tablet TAKE 1 TABLET BY MOUTH EVERYDAY AT BEDTIME 90 Tab 0    venlafaxine-SR (EFFEXOR-XR) 37.5 mg capsule Take 1 Cap by mouth daily.  90 Cap 3    rosuvastatin (CRESTOR) 10 mg tablet TAKE 1 TABLET BY MOUTH AT BEDTIME 90 Tab 2    clopidogrel (PLAVIX) 75 mg tab TAKE 1 TABLET BY MOUTH EVERY DAY 90 Tab 3    carvedilol (COREG) 12.5 mg tablet TAKE 1 TABLET BY MOUTH TWICE A DAY WITH FOOD 180 Tab 1    ramipril (ALTACE) 10 mg capsule Take 1 Cap by mouth daily. 90 Cap 3    aspirin delayed-release 325 mg tablet TAKE 1 TABLET BY MOUTH EVERY  Tab 0    amLODIPine (NORVASC) 10 mg tablet Take 1 Tab by mouth daily. 90 Tab 3    Omeprazole delayed release (PRILOSEC D/R) 20 mg tablet Take 1 Tab by mouth daily. Indications: gastroesophageal reflux disease 90 Tab 3    cetirizine (ZYRTEC) 10 mg tablet Take  by mouth daily.  nitroglycerin (NITROSTAT) 0.4 mg SL tablet 1 Tab by SubLINGual route every five (5) minutes as needed for Chest Pain for up to 3 doses. 25 Tab 0    acetaminophen (TYLENOL) 325 mg tablet Take  by mouth every four (4) hours as needed for Pain.  metFORMIN (GLUCOPHAGE) 500 mg tablet Take  by mouth two (2) times daily (with meals). Visit Vitals  /74   Pulse 73   Ht 6' (1.829 m)   Wt 89.7 kg (197 lb 12.8 oz)   BMI 26.83 kg/m²        Physical Exam   Constitutional: He is oriented to person, place, and time. He appears well-developed and well-nourished. HENT:   Head: Normocephalic and atraumatic. Eyes: Conjunctivae are normal.   Neck: Neck supple. No JVD present. No tracheal deviation present. No thyromegaly present. Cardiovascular: Normal rate, regular rhythm and normal heart sounds. Exam reveals no gallop and no friction rub. No murmur heard. Pulmonary/Chest: Breath sounds normal. No respiratory distress. He has no wheezes. He has no rales. He exhibits no tenderness. Abdominal: Soft. There is no tenderness. Musculoskeletal: He exhibits no edema. Neurological: He is alert and oriented to person, place, and time. Skin: Skin is warm and dry. Psychiatric: He has a normal mood and affect. Mr. Afia Light has a reminder for a \"due or due soon\" health maintenance. I have asked that he contact his primary care provider for follow-up on this health maintenance.   CONCLUSION:1/2016-  ABNORMAL STRESS ECHO WITH EKG AND WALL MOTION ABNORMALITIES SUGGESTIVE OF LAD  DISTRIBUTION DISEASE    IMPRESSION:cath:1/2016    1. TWO VESSEL CORONARY ARTERY DISEASE IN THE FORM OF 80% OSTIAL AND 90% PROXIMAL NON-DOMINANT RIGHT CORONARY STENOSIS WHICH IS A MODERATE SIZED VESSEL OF ABOUT 1.5 TO 2 MM IN DIAMETER, AND 40% PROXIMAL CIRCUMFLEX, AND 70% MID CIRCUMFLEX STENOSIS WHICH IS A DOMINANT VESSEL. THERE ARE DIFFUSE LUMINAL IRREGULARITIES SEEN THROUGHOUT THE CORONARIES. 2. NO NORMAL OVERALL LV EJECTION FRACTION AT REST. 3. EVIDENCE OF CHEST PAIN DURING THE CASE AS WELL AS BEFORE THE CASE WAS STARTED. PARTIAL RELIEF WITH SUBLINGUAL NITROGLYCERIN AND NO ACUTE EKG CHANGES SEEN IN THE SIX MONITORING LEADS IN THE CATH LAB. DISCUSSION AND RECOMMENDATIONS: PATIENT IS LIKELY HAVING CHEST PAIN FROM THE NON-DOMINANT RIGHT CORONARY ARTERY. THE LEFT CIRCUMFLEX IS DOMINANT AND APPEARS TO HAVE BORDERLINE MID STENOSIS WHICH DOES NOT HAVE ANY APPEARANCE OF ANY ACUTE UNSTABLE LESION. I THINK HE SHOULD BE TREATED MEDICALLY FOR NOW, AND IF THE SYMPTOMS PERSIST, LEFT CIRCUMFLEX ARTERY WILL NEED TO BE INTERROGATED BY INTRACORONARY DOPPLER WITH FFR DETERMINATION. RIGHT CORONARY, THOUGH APPEARS TO BE CRITICAL IS A SMALL VESSEL OF ABOUT 1.5 TO A MAXIMUM OF 2 MM IN DIAMETER AND PROBABLY SHOULD BE LEFT TO MEDICAL TREATMENT. AGGRESSIVE RISK FACTOR MODIFICATION SHOULD BE FOLLOWED. SUMMARY:12/2017-echo  Left ventricle: Systolic function was normal. Ejection fraction was estimated   in the range of 55 % to 60 %. No obvious  wall motion abnormalities identified in the views obtained. Wall thickness   was mildly increased. Doppler parameters  were consistent with abnormal left ventricular relaxation (grade 1 diastolic   dysfunction). Right ventricle: The size was at the upper limits of normal.    Left atrium: The atrium was mildly to moderately dilated. FINDING-12/2017-cath  1.  Left main is patent, bifurcates into left anterior descending artery  and circumflex artery. 2. Left anterior descending artery has ostial 30% to 40% stenosis  followed by mid diffuse 30-40% stenosis. The vessel appears to be  moderately calcified. Diagonal 1 artery has diffuse 30-40% stenosis. Mid to distal left anterior descending artery is patent. 3. Circumflex artery is dominant with proximal 95% calcific stenosis. Status post PTCA using a Trek 1.5 x 8 mm balloon followed by a 2.5  mm x 12 mm balloon. The stent was a Synergy 2.75 mm x 15 mm  stent was deployed at about 14 atmospheres. Post-PCI PTCA was  performed using a noncompliant 3.0 mm x 8 mm balloon inflated about  16-18 atmospheres. Lesion reduced to 10%. 4. Obtuse marginal 1 artery was a small-caliber vessel with ostial 70%  to 80% stenosis. 5. Obtuse marginal 2 artery is a small- to medium-caliber vessel with  diffuse 30-40% stenosis. Mid circumflex artery has focal 80% stenosis. Status post PTCA using a Trek 2.5 mm x 12 mm balloon followed by a  Synergy 2.75 mm x 12 mm stent deployed about 14 atmospheres. Post-PCI PTCA was performed using a 3.0 mm x 8 mm  balloon deployed about 16 atmospheres. Lesion reduced to 0%. 6. Left posterior descending artery is patent. 7. Right coronary artery is nondominant, has ostial calcific 90%  followed by mid 99% stenosis. CONCLUSION:  Triple-vessel coronary artery disease. Status post  percutaneous transluminal coronary angioplasty/stent to proximal and  mid circumflex artery using drug-eluting stents. The patient will be on  aspirin and Brilinta at this time. Intense medical management and risk  factor modification is advised. Assessment         ICD-10-CM ICD-9-CM    1. Coronary artery disease involving native coronary artery of native heart without angina pectoris I25.10 414.01     Stable continue current treatment   2. Mixed hyperlipidemia E78.2 272.2     On statin labs reviewed   3. S/P coronary artery stent placement Z95.5 V45.82     Stable   4.  Essential hypertension I10 401.9 Blood pressure stable continue medication   1/2018  Out of brillinta for 2 weeks-unable to afford  Changed to plavix-discussed compliance  5/2018  Stable mild cp  Out of norvasc 3 months  refilled  10/2018  Atypical chest pain. Clinically noncardiac. Will continue dual antiplatelet therapy. Norvasc increased for hypertension  There are no discontinued medications. No orders of the defined types were placed in this encounter. Follow-up and Dispositions    · Return in about 6 months (around 11/22/2019).          Emmett Springer MD

## 2019-06-05 DIAGNOSIS — G47.00 INSOMNIA, UNSPECIFIED TYPE: ICD-10-CM

## 2019-06-05 RX ORDER — ALPRAZOLAM 0.5 MG/1
0.5 TABLET ORAL
Qty: 30 TAB | Refills: 0 | OUTPATIENT
Start: 2019-06-05 | End: 2019-07-03 | Stop reason: SDUPTHER

## 2019-06-05 NOTE — TELEPHONE ENCOUNTER
[facility]  reports the last [facility] date for X[facility]ax as 5/6/19 for a 30 d/s. Last Visit: 1/31/19 with THUY Ashley  Next Appointment: 6/24/19 with THUY Plascencia  Previous Refill Encounter(s): 5/6/19 #30    Requested Prescriptions     Pending Prescriptions Disp Refills    ALPRAZolam (XANAX) 0.5 mg tablet 30 Tab 0     Sig: Take 1 Tab by mouth nightly as needed for Anxiety.  Max Daily Amount: 0.5 mg.

## 2019-06-07 DIAGNOSIS — K21.9 GASTROESOPHAGEAL REFLUX DISEASE WITHOUT ESOPHAGITIS: ICD-10-CM

## 2019-06-07 RX ORDER — PHENOL/SODIUM PHENOLATE
20 AEROSOL, SPRAY (ML) MUCOUS MEMBRANE DAILY
Qty: 90 TAB | Refills: 3 | Status: SHIPPED | OUTPATIENT
Start: 2019-06-07 | End: 2020-04-14 | Stop reason: SDUPTHER

## 2019-06-07 NOTE — TELEPHONE ENCOUNTER
Last Visit: 1/31/19 with THUY Thrasher  Next Appointment: 6/24/19 with THUY Plascencia  Previous Refill Encounter(s): 6/20/18 #90 with 3 refills    Requested Prescriptions     Pending Prescriptions Disp Refills    Omeprazole delayed release (PRILOSEC D/R) 20 mg tablet 90 Tab 3     Sig: Take 1 Tab by mouth daily.  Indications: gastroesophageal reflux disease

## 2019-06-24 ENCOUNTER — OFFICE VISIT (OUTPATIENT)
Dept: INTERNAL MEDICINE CLINIC | Age: 69
End: 2019-06-24

## 2019-06-24 ENCOUNTER — HOSPITAL ENCOUNTER (OUTPATIENT)
Dept: LAB | Age: 69
Discharge: HOME | End: 2019-06-24
Payer: MEDICARE

## 2019-06-24 ENCOUNTER — TELEPHONE (OUTPATIENT)
Dept: INTERNAL MEDICINE CLINIC | Age: 69
End: 2019-06-24

## 2019-06-24 VITALS
SYSTOLIC BLOOD PRESSURE: 126 MMHG | WEIGHT: 204 LBS | OXYGEN SATURATION: 97 % | BODY MASS INDEX: 27.63 KG/M2 | HEIGHT: 72 IN | HEART RATE: 62 BPM | TEMPERATURE: 97.6 F | DIASTOLIC BLOOD PRESSURE: 66 MMHG | RESPIRATION RATE: 14 BRPM

## 2019-06-24 DIAGNOSIS — E78.2 MIXED HYPERLIPIDEMIA: ICD-10-CM

## 2019-06-24 DIAGNOSIS — I10 ESSENTIAL HYPERTENSION, MALIGNANT: ICD-10-CM

## 2019-06-24 DIAGNOSIS — E11.9 CONTROLLED TYPE 2 DIABETES MELLITUS WITHOUT COMPLICATION, WITHOUT LONG-TERM CURRENT USE OF INSULIN (HCC): Primary | ICD-10-CM

## 2019-06-24 DIAGNOSIS — I10 ESSENTIAL HYPERTENSION: ICD-10-CM

## 2019-06-24 DIAGNOSIS — E11.9 TYPE 2 DIABETES MELLITUS WITHOUT COMPLICATION, WITHOUT LONG-TERM CURRENT USE OF INSULIN (HCC): ICD-10-CM

## 2019-06-24 DIAGNOSIS — I25.10 CORONARY ARTERY DISEASE INVOLVING NATIVE CORONARY ARTERY OF NATIVE HEART WITHOUT ANGINA PECTORIS: ICD-10-CM

## 2019-06-24 DIAGNOSIS — E04.9 THYROID GOITER: ICD-10-CM

## 2019-06-24 LAB
ANION GAP SERPL CALC-SCNC: 8 MMOL/L (ref 3–18)
BUN SERPL-MCNC: 20 MG/DL (ref 7–18)
BUN/CREAT SERPL: 22 (ref 12–20)
CALCIUM SERPL-MCNC: 8.9 MG/DL (ref 8.5–10.1)
CHLORIDE SERPL-SCNC: 104 MMOL/L (ref 100–108)
CHOLEST SERPL-MCNC: 180 MG/DL
CO2 SERPL-SCNC: 25 MMOL/L (ref 21–32)
CREAT SERPL-MCNC: 0.93 MG/DL (ref 0.6–1.3)
GLUCOSE SERPL-MCNC: 140 MG/DL (ref 74–99)
HDLC SERPL-MCNC: 43 MG/DL (ref 40–60)
HDLC SERPL: 4.2 {RATIO} (ref 0–5)
LDLC SERPL CALC-MCNC: 92.4 MG/DL (ref 0–100)
LIPID PROFILE,FLP: ABNORMAL
POTASSIUM SERPL-SCNC: 4 MMOL/L (ref 3.5–5.5)
SODIUM SERPL-SCNC: 137 MMOL/L (ref 136–145)
TRIGL SERPL-MCNC: 223 MG/DL (ref ?–150)
VLDLC SERPL CALC-MCNC: 44.6 MG/DL

## 2019-06-24 PROCEDURE — 80048 BASIC METABOLIC PNL TOTAL CA: CPT

## 2019-06-24 PROCEDURE — 80349 CANNABINOIDS NATURAL: CPT

## 2019-06-24 PROCEDURE — 82570 ASSAY OF URINE CREATININE: CPT

## 2019-06-24 PROCEDURE — 80061 LIPID PANEL: CPT

## 2019-06-24 PROCEDURE — 80307 DRUG TEST PRSMV CHEM ANLYZR: CPT

## 2019-06-24 PROCEDURE — 80346 BENZODIAZEPINES1-12: CPT

## 2019-06-24 RX ORDER — ONDANSETRON 8 MG/1
8 TABLET, ORALLY DISINTEGRATING ORAL
Qty: 12 TAB | Refills: 0 | Status: SHIPPED | OUTPATIENT
Start: 2019-06-24 | End: 2020-06-25

## 2019-06-24 RX ORDER — CARVEDILOL 12.5 MG/1
TABLET ORAL
Qty: 180 TAB | Refills: 1 | Status: SHIPPED | OUTPATIENT
Start: 2019-06-24 | End: 2019-11-13

## 2019-06-24 NOTE — PROGRESS NOTES
Patricia Monaco presents today for   Chief Complaint   Patient presents with    Follow-up     5 month follow up for hypertension, hyperlipidemia, type 2 DM, CAD, OA left knee, chronic low back pain, anxiety/insomnia              Depression Screening:  3 most recent PHQ Screens 1/31/2019   Little interest or pleasure in doing things Not at all   Feeling down, depressed, irritable, or hopeless Not at all   Total Score PHQ 2 0       Learning Assessment:  Learning Assessment 6/20/2018   PRIMARY LEARNER Patient   HIGHEST LEVEL OF EDUCATION - PRIMARY LEARNER  SOME COLLEGE   BARRIERS PRIMARY LEARNER NONE   CO-LEARNER CAREGIVER -   PRIMARY LANGUAGE ENGLISH   LEARNER PREFERENCE PRIMARY LISTENING   ANSWERED BY patient    RELATIONSHIP SELF       Abuse Screening:  Abuse Screening Questionnaire 6/24/2019   Do you ever feel afraid of your partner? N   Are you in a relationship with someone who physically or mentally threatens you? N   Is it safe for you to go home? Y       Fall Risk  Fall Risk Assessment, last 12 mths 1/31/2019   Able to walk? Yes   Fall in past 12 months? No           Coordination of Care:  1. Have you been to the ER, urgent care clinic since your last visit? YES 5/16/19 [facility] ED    Hospitalized since your last visit? no    2. Have you seen or consulted any other health care providers outside of the [address] [place] [address] since your last visit? Include any pap smears or colon screening.  NO

## 2019-06-24 NOTE — TELEPHONE ENCOUNTER
I would like to do a repeat US of the thyroid due to complex thyroid cysts if ok with him as last check was 2016.

## 2019-06-24 NOTE — PROGRESS NOTES
April Barkley is a 71 y.o.  male and presents with    Chief Complaint   Patient presents with    Follow-up     5 month follow up for hypertension, hyperlipidemia, type 2 DM, CAD, OA left knee, chronic low back pain, anxiety/insomnia       Subjective:  HPI  Mr. Kristan Herring presents today for routine follow up to hypertension, hyperlipidemia, type 2 DM, CAD, OA left knee, chronic low back pain, anxiety/insomnia. He had a fall on 5/16/19 and diagnosed with concussion. He reported tripped backwards and hit head on a cinder block. Assoc LOC x time. PT/INR check and normal    He was recently seen by cardiology and reports no change in therapy. He is reporting nausea and a one time episode this morning of vomiting. He reports family members with same symptoms.       He has a history of hypertension, hyperlipidemia, Type 2 DM, CAD with angina pectoris with coronary stent placement, osteoarthritis of left knee, chronic low back pain, anxiety/insomnia.      Mr. Kristan Herring has a history of hyperlipidemia and hypertriglyceridemia, CAD with angina pectoris, and hypertension. He is followed by Dr. Binnie Severance, cardiology. 1/2016 with abnormal stress echo with ekg and wall motion abnormalities suggestive of LAD disease. He underwent a nuclear stress test and cardiac catheterization and found 2 vessel disease, he was managed medically. He was hospitalized 11/2017 for unstable angina. ECHO 12/2017 with EF of 55 % to 60 %. No obvious wall motion abnormalities, wall thickness was mildly increased, normal valve structure, left atrium mild to moderate dilation. Cardiac catheterization done on 12/01/2017 and conclusion - triple-vessel coronary artery disease, status post percutaneous transluminal coronary angioplasty, stent to proximal and mid circumflex artery using drug-eluting stents. The patient to be on aspirin and Brilinta.  He was removed off Brilinta due cost,  is currently on Plavix and  mg.  Side note Imdur in the past with hypotension. He presented to the clinic 7/2018 with complaints of left sided chest pain at rest and exertion present x 1 week with accompanying symptoms of sob, back pain. He was sent to the ED and diagnosed atypical chest pain. D dimer elevated, CTA negative, Chest xray negative, EKG without acute changes, troponin negative, repeat <0.02.      He is a Type 2 DM on Metformin BID, A1C 7.0 6/2018.1/2019 6.0. CTA was completed 1/2016 and incidentally found nontoxic multinodular goiter was found incidentally, measuring 1.6 on left and 1.5 cm on right. US thyroid completed 1/18/2016. He was followed by Dr. Aj Chatman.     He has a history of GERD without esophagitis. Relief with Omeprazole 20 mg.     He is with anxiety and insomnia. He has been taking Xanax nightly for a length of time now prescribed by prior provider. He was started on Effexor 37.5mg and reports with improved sleep, no longer waking in the middle of the night and waking later in the AM. He continues to take the Xanax every night as well however.     Additional Concerns: none     ROS   Review of Systems   Constitutional: Negative. HENT: Negative. Eyes: Negative. Respiratory: Negative. Cardiovascular: Negative. Gastrointestinal: Negative. Genitourinary: Negative. Musculoskeletal: Negative. Skin: Negative. Neurological: Negative. Endo/Heme/Allergies: Negative. Psychiatric/Behavioral: Negative. No Known Allergies    Current Outpatient Medications   Medication Sig Dispense Refill    carvedilol (COREG) 12.5 mg tablet TAKE 1 TABLET BY MOUTH TWICE A DAY WITH FOOD 180 Tab 1    ondansetron (ZOFRAN ODT) 8 mg disintegrating tablet Take 1 Tab by mouth every eight (8) hours as needed for Nausea for up to 12 doses. 12 Tab 0    Omeprazole delayed release (PRILOSEC D/R) 20 mg tablet Take 1 Tab by mouth daily.  Indications: gastroesophageal reflux disease 90 Tab 3    ALPRAZolam (XANAX) 0.5 mg tablet Take 1 Tab by mouth nightly as needed for Anxiety. Max Daily Amount: 0.5 mg. 30 Tab 0    fenofibrate nanocrystallized (TRICOR) 48 mg tablet TAKE 1 TABLET BY MOUTH EVERYDAY AT BEDTIME 90 Tab 0    venlafaxine-SR (EFFEXOR-XR) 37.5 mg capsule Take 1 Cap by mouth daily. 90 Cap 3    rosuvastatin (CRESTOR) 10 mg tablet TAKE 1 TABLET BY MOUTH AT BEDTIME 90 Tab 2    clopidogrel (PLAVIX) 75 mg tab TAKE 1 TABLET BY MOUTH EVERY DAY 90 Tab 3    ramipril (ALTACE) 10 mg capsule Take 1 Cap by mouth daily. 90 Cap 3    aspirin delayed-release 325 mg tablet TAKE 1 TABLET BY MOUTH EVERY  Tab 0    amLODIPine (NORVASC) 10 mg tablet Take 1 Tab by mouth daily. 90 Tab 3    cetirizine (ZYRTEC) 10 mg tablet Take  by mouth daily.  nitroglycerin (NITROSTAT) 0.4 mg SL tablet 1 Tab by SubLINGual route every five (5) minutes as needed for Chest Pain for up to 3 doses. 25 Tab 0    acetaminophen (TYLENOL) 325 mg tablet Take  by mouth every four (4) hours as needed for Pain.  metFORMIN (GLUCOPHAGE) 500 mg tablet Take  by mouth two (2) times daily (with meals).       FLUZONE HIGH-DOSE 2018-19, PF, syrg injection TO BE ADMINISTERED BY PHARMACIST FOR IMMUNIZATION  0       Social History     Socioeconomic History    Marital status:      Spouse name: Not on file    Number of children: Not on file    Years of education: Not on file    Highest education level: Not on file   Occupational History    Not on file   Social Needs    Financial resource strain: Not on file    Food insecurity:     Worry: Not on file     Inability: Not on file    Transportation needs:     Medical: Not on file     Non-medical: Not on file   Tobacco Use    Smoking status: Never Smoker    Smokeless tobacco: Never Used   Substance and Sexual Activity    Alcohol use: No    Drug use: No    Sexual activity: Yes     Partners: Female     Birth control/protection: None   Lifestyle    Physical activity:     Days per week: Not on file     Minutes per session: Not on file    Stress: Not on file   Relationships    Social connections:     Talks on phone: Not on file     Gets together: Not on file     Attends Cheondoism service: Not on file     Active member of club or organization: Not on file     Attends meetings of clubs or organizations: Not on file     Relationship status: Not on file    Intimate partner violence:     Fear of current or ex partner: Not on file     Emotionally abused: Not on file     Physically abused: Not on file     Forced sexual activity: Not on file   Other Topics Concern    Not on file   Social History Narrative    Not on file       Past Medical History:   Diagnosis Date    Arthritis     1999 vicodin    Diabetes ([facility] 1998 metformin    GERD (gastroesophageal reflux disease)     Hypercholesterolemia     Hypertension 1996 norvasc    MI (myocardial infarction) ([facility]        Past Surgical History:   Procedure Laterality Date    CARDIAC SURG PROCEDURE UNLIST  12/2017    stints    COLONOSCOPY N/A 11/2/2018    COLONOSCOPY with Polypectomies performed by Rozina Colon MD at [address] [facility]  2010    gallbladder       Family History   Problem Relation Age of Onset    Stroke Mother     Headache Mother     Hypertension Mother    [place] Mother     Heart Disease Father     Heart Attack Father     Hypertension Father     Diabetes Father    [place] Father     Ovarian Cancer Sister     Heart Attack Brother     Breast Cancer Sister     Diabetes Sister     Cancer Maternal Aunt         lung cancer    Cancer Maternal Uncle     Cancer Paternal Aunt     Cancer Paternal Uncle        Objective:  Vitals:    06/24/19 1033   BP: 126/66   Pulse: 62   Resp: 14   Temp: 97.6 °F (36.4 °C)   TempSrc: Oral   SpO2: 97%   Weight: 204 lb (92.5 kg)   Height: 6' (1.829 m)   PainSc:   0 - No pain       LABS   Results for orders placed or performed during the hospital encounter of 05/16/19   CBC WITH AUTOMATED DIFF   Result Value Ref Range    WBC 9.1 4.6 - 13.2 K/uL    RBC 4.55 (L) 4.70 - 5.50 M/uL    HGB 14.3 13.0 - 16.0 g/dL    HCT 40.3 36.0 - 48.0 %    MCV 88.6 74.0 - 97.0 FL    MCH 31.4 24.0 - 34.0 PG    MCHC 35.5 31.0 - 37.0 g/dL    RDW 14.0 11.6 - 14.5 %    PLATELET 248 536 - 500 K/uL    MPV 10.1 9.2 - 11.8 FL    NEUTROPHILS 66 40 - 73 %    LYMPHOCYTES 23 21 - 52 %    MONOCYTES 8 3 - 10 %    EOSINOPHILS 2 0 - 5 %    BASOPHILS 1 0 - 2 %    ABS. NEUTROPHILS 6.0 1.8 - 8.0 K/UL    ABS. LYMPHOCYTES 2.1 0.9 - 3.6 K/UL    ABS. MONOCYTES 0.8 0.05 - 1.2 K/UL    ABS. EOSINOPHILS 0.2 0.0 - 0.4 K/UL    ABS. BASOPHILS 0.1 0.0 - 0.1 K/UL    DF AUTOMATED     PROTHROMBIN TIME + INR   Result Value Ref Range    Prothrombin time 12.3 11.5 - 15.2 sec    INR 0.9 0.8 - 1.2     METABOLIC PANEL, COMPREHENSIVE   Result Value Ref Range    Sodium 142 136 - 145 mmol/L    Potassium 3.8 3.5 - 5.5 mmol/L    Chloride 109 (H) 100 - 108 mmol/L    CO2 26 21 - 32 mmol/L    Anion gap 7 3.0 - 18 mmol/L    Glucose 127 (H) 74 - 99 mg/dL    BUN 15 7.0 - 18 MG/DL    Creatinine 1.10 0.6 - 1.3 MG/DL    BUN/Creatinine ratio 14 12 - 20      GFR est AA >60 >60 ml/min/1.73m2    GFR est non-AA >60 >60 ml/min/1.73m2    Calcium 9.1 8.5 - 10.1 MG/DL    Bilirubin, total 0.3 0.2 - 1.0 MG/DL    ALT (SGPT) 27 16 - 61 U/L    AST (SGOT) 15 15 - 37 U/L    Alk. phosphatase 80 45 - 117 U/L    Protein, total 7.7 6.4 - 8.2 g/dL    Albumin 4.3 3.4 - 5.0 g/dL    Globulin 3.4 2.0 - 4.0 g/dL    A-G Ratio 1.3 0.8 - 1.7         TESTS  Impression:    Multinodular goiter with complex cystic nodules bilaterally measuring up to 1.6 cm on left and 1.5 cm on right. Result Narrative   Ultrasound thyroid    Indication: Thyroid nodules on CT.     Findings:    Right lobe: 1.6 cm x 2.4 cm x 4.3 cm.  There is a 8mm hyperechoic nodule interpolar region, interpolar complex 1.5 cm x 1.0 cm x 1.1 cm cystic nodule with septations and without significant vascularity or calcification, and 8mm nodule at the lower pole.  Additional smaller nodules noted in the right lobe. Left lobe measures 1.6 cm x 1.2 cm x 4.6 cm.  Dominant complex cystic nodule measuring 1.6 cm x 9 mm x 1.4 cm at the lower pole without significant internal color flow.  Midpole and upper pole hypoechoic nodules measuring up to 4 mm. Isthmus measures 3 mm in thickness. PE  Physical Exam   Constitutional: He is oriented to person, place, and time. He appears well-developed and well-nourished. No distress. HENT:   Head: Normocephalic and atraumatic. Cardiovascular: Normal rate, regular rhythm, normal heart sounds and intact distal pulses. Pulmonary/Chest: Effort normal and breath sounds normal.   Abdominal: Soft. Bowel sounds are normal.   Musculoskeletal: Normal range of motion. Neurological: He is alert and oriented to person, place, and time. Skin: Skin is warm and dry. He is not diaphoretic. Psychiatric: He has a normal mood and affect. His behavior is normal. Judgment and thought content normal.   Vitals reviewed. Assessment/Plan:    1. Hypertension- controlled. 2. Hyperlipidemia/type 2 DM- discussed lipid levels- LDL 87.4, HDL 37, Trig 263, total chol 177, A1C 7.0>6.0. Labs ordered. 3. Anxiety/Insomnia- He reports improved on Effexor 37.5 mg and taking Xanax PRN mostly at night    4. Incidental multinodular thyroid nodules found 2016. TSH normal range 1/2019. Need records. Repeat thyroid US for comparison. Lab review: ordered. Today's Visit:   Diagnoses and all orders for this visit:    1. Controlled type 2 diabetes mellitus without complication, without long-term current use of insulin ([address]    2. Essential hypertension    3. Mixed hyperlipidemia    4. Thyroid goiter    Other orders  -     ondansetron (ZOFRAN ODT) 8 mg disintegrating tablet; Take 1 Tab by mouth every eight (8) hours as needed for Nausea for up to 12 doses. Health Maintenance: Eye exam overdue. Pneu 13 overdue.     I have discussed the diagnosis with the patient and the intended plan as seen in the above orders. The patient has received an after-visit summary and questions were answered concerning future plans. I have discussed medication side effects and warnings with the patient as well. I have reviewed the plan of care with the patient, accepted their input and they are in agreement with the treatment goals. Follow-up and Dispositions    · Return in about 6 months (around 12/24/2019) for htn, anxiety, hpl, dm, hx thyroid nodules. More than 1/2 of this 30 minute visit was spent in counseling and coordination of care, as described above. SUSAN Barth  Internist of [address], [facility] [address]  Phone: [phone]  Fax: [phone]    This is a \"Welcome to [employer]"  Initial Preventive Physical Examination (IPPE) providing Personalized Prevention Plan Services (Performed in the first 12 months of enrollment)    Yomi Flanagan is a 71 y.o.  male and presents for a welcome to Medicare physical exam         I have reviewed the patient's medical history in detail and updated the computerized patient record.      History     Past Medical History:   Diagnosis Date    Arthritis     1999 vicodin    Diabetes ([facility] [address] [facility] 1998 metformin    GERD (gastroesophageal reflux disease)     Hypercholesterolemia     Hypertension 1996 norvasc    MI (myocardial infarction) ([facility] [address]       Past Surgical History:   Procedure Laterality Date    CARDIAC SURG PROCEDURE UNLIST  12/2017    stints    COLONOSCOPY N/A 11/2/2018    COLONOSCOPY with Polypectomies performed by Jessica Yoon MD at [address] [facility]  2010    gallbladder     Current Outpatient Medications   Medication Sig Dispense Refill    carvedilol (COREG) 12.5 mg tablet TAKE 1 TABLET BY MOUTH TWICE A DAY WITH FOOD 180 Tab 1    ondansetron (ZOFRAN ODT) 8 mg disintegrating tablet Take 1 Tab by mouth every eight (8) hours as needed for Nausea for up to 12 doses. 12 Tab 0    Omeprazole delayed release (PRILOSEC D/R) 20 mg tablet Take 1 Tab by mouth daily. Indications: gastroesophageal reflux disease 90 Tab 3    ALPRAZolam (XANAX) 0.5 mg tablet Take 1 Tab by mouth nightly as needed for Anxiety. Max Daily Amount: 0.5 mg. 30 Tab 0    fenofibrate nanocrystallized (TRICOR) 48 mg tablet TAKE 1 TABLET BY MOUTH EVERYDAY AT BEDTIME 90 Tab 0    venlafaxine-SR (EFFEXOR-XR) 37.5 mg capsule Take 1 Cap by mouth daily. 90 Cap 3    rosuvastatin (CRESTOR) 10 mg tablet TAKE 1 TABLET BY MOUTH AT BEDTIME 90 Tab 2    clopidogrel (PLAVIX) 75 mg tab TAKE 1 TABLET BY MOUTH EVERY DAY 90 Tab 3    ramipril (ALTACE) 10 mg capsule Take 1 Cap by mouth daily. 90 Cap 3    aspirin delayed-release 325 mg tablet TAKE 1 TABLET BY MOUTH EVERY  Tab 0    amLODIPine (NORVASC) 10 mg tablet Take 1 Tab by mouth daily. 90 Tab 3    cetirizine (ZYRTEC) 10 mg tablet Take  by mouth daily.  nitroglycerin (NITROSTAT) 0.4 mg SL tablet 1 Tab by SubLINGual route every five (5) minutes as needed for Chest Pain for up to 3 doses. 25 Tab 0    acetaminophen (TYLENOL) 325 mg tablet Take  by mouth every four (4) hours as needed for Pain.  metFORMIN (GLUCOPHAGE) 500 mg tablet Take  by mouth two (2) times daily (with meals).       FLUZONE HIGH-DOSE 2018-19, PF, syrg injection TO BE ADMINISTERED BY PHARMACIST FOR IMMUNIZATION  0     No Known Allergies  Family History   Problem Relation Age of Onset    Stroke Mother     Headache Mother     Hypertension Mother    [place] Mother     Heart Disease Father     Heart Attack Father     Hypertension Father     Diabetes Father     Lung Cancer Father     Ovarian Cancer Sister     Heart Attack Brother     Breast Cancer Sister     Diabetes Sister     Cancer Maternal Aunt         lung cancer    Cancer Maternal Uncle     Cancer Paternal Aunt     Cancer Paternal Uncle      Social History     Tobacco Use    Smoking status: Never Smoker    Smokeless tobacco: Never Used   Substance Use Topics    Alcohol use: No       generally follows a low sodium diet, does not rigorously follow a diabetic diet    He is not exercising, he golfs and walks the golf course when weather is appropriate. Health Maintenance History  Immunizations reviewed, dtap up to date , pneumovax needs 15, flu up to date, shingrix- pharmacy is out of stock currently  Colonoscopy:up to date,   Eye exam:up to date  Mammo: N/A  Dexascan: no indication to perform, no hx of pathological fractures or on medications that could decrease bone density    Depression Risk Factor Screening:      Patient Health Questionnaire (PHQ-2)   Over the last 2 weeks, how often have you been bothered by any of the following problems? · Little interest or pleasure in doing things? · Not at all. [0]  · Feeling down, depressed, or hopeless? · Not at all. [0]    Total Score: 0/6  PHQ-2 Assessment Scoring:   A score of 2 or more requires further screening with the PHQ-9    Alcohol Risk Factor Screening:     Women: On any occasion during the past 3 months, have you had more than 3 drinks containing alcohol? Do you average more than 7 drinks per week? Men: On any occasion during the past 3 months, have you had more than 4 drinks containing alcohol? Do you average more than 14 drinks per week? Functional Ability and Level of Safety:     Hearing Loss    The patient needs further evaluation. Activities of Daily Living   Self-care. Requires assistance with: no ADLs    Fall Risk   Secondary diagnoses (15 pts)    Abuse Screen   Patient is not abused  None    Review of Systems   ROS   History obtained from the patient    All other systems reviewed and are negative. A comprehensive review of systems was negative except for that written in the HPI.         Examination   Physical Examination  Vitals:    06/24/19 1033   BP: 126/66   Pulse: 62   Resp: 14   Temp: 97.6 °F (36.4 °C)   TempSrc: Oral   SpO2: 97% Weight: 204 lb (92.5 kg)   Height: 6' (1.829 m)   PainSc:   0 - No pain      Body mass index is 27.67 kg/m². Visual Acuity: Not performed  EKG Screening: He is followed by cardiology, he is without concerns/symptoms today. End-of-life planning  Advanced Directive in the case than an injury or illness causes the patient to be unable to make health care decisions- wife [address] [facility] Directive or Living Will: no    Assessment/Plan:   Education and counseling provided:  Are appropriate based on today's review and evaluation  lab results and schedule of future lab studies reviewed with patient  reviewed diet, exercise and weight control  specific diabetic recommendations: glycohemoglobin and other lab monitoring discussed. Brief written plan, checklist    I have discussed the diagnosis with the patient and the intended plan as seen in the above orders. The patient has received an after-visit summary and questions were answered concerning future plans. I have discussed medication side effects and warnings with the patient as well. I have reviewed the plan of care with the patient, accepted their input and they are in agreement with the treatment goals.

## 2019-06-25 LAB
CREAT UR-MCNC: 95 MG/DL (ref 30–125)
MICROALBUMIN UR-MCNC: 0.81 MG/DL (ref 0–3)
MICROALBUMIN/CREAT UR-RTO: 9 MG/G (ref 0–30)

## 2019-06-28 ENCOUNTER — TELEPHONE (OUTPATIENT)
Dept: INTERNAL MEDICINE CLINIC | Age: 69
End: 2019-06-28

## 2019-06-28 ENCOUNTER — HOSPITAL ENCOUNTER (OUTPATIENT)
Dept: ULTRASOUND IMAGING | Age: 69
Discharge: HOME | End: 2019-06-28
Attending: NURSE PRACTITIONER
Payer: MEDICARE

## 2019-06-28 ENCOUNTER — HOSPITAL ENCOUNTER (OUTPATIENT)
Dept: LAB | Age: 69
Discharge: HOME | End: 2019-06-28
Attending: NURSE PRACTITIONER
Payer: MEDICARE

## 2019-06-28 DIAGNOSIS — E04.9 THYROID GOITER: ICD-10-CM

## 2019-06-28 DIAGNOSIS — E11.9 TYPE 2 DIABETES MELLITUS WITHOUT COMPLICATION, WITHOUT LONG-TERM CURRENT USE OF INSULIN (HCC): ICD-10-CM

## 2019-06-28 DIAGNOSIS — I10 ESSENTIAL HYPERTENSION: ICD-10-CM

## 2019-06-28 LAB
EST. AVERAGE GLUCOSE BLD GHB EST-MCNC: 134 MG/DL
HBA1C MFR BLD: 6.3 % (ref 4.2–5.6)

## 2019-06-28 PROCEDURE — 76536 US EXAM OF HEAD AND NECK: CPT

## 2019-06-28 PROCEDURE — 83036 HEMOGLOBIN GLYCOSYLATED A1C: CPT

## 2019-06-28 PROCEDURE — 36415 COLL VENOUS BLD VENIPUNCTURE: CPT

## 2019-06-28 NOTE — TELEPHONE ENCOUNTER
Pt aware of message below and verbalized understanding. Advised patient that the A1C was not done at last visit he stated he is going to [facility] later today for an MRI and will have the lab completed while he is there. No further questions or concerns from pt at this time.

## 2019-07-02 LAB
ALPRAZ UR QL: NEGATIVE
AMPHETAMINES UR QL SCN: NEGATIVE NG/ML
BARBITURATES UR QL SCN: NEGATIVE NG/ML
BENZODIAZ UR QL SCN: NORMAL NG/ML
BENZODIAZ UR QL: NEGATIVE NG/ML
BZE UR QL SCN: NEGATIVE NG/ML
CANNABINOID, 737736: POSITIVE
CANNABINOIDS UR QL SCN: NORMAL NG/ML
CARBOXY THC (GC/MS), 737737: >400 NG/ML
CLONAZEPAM UR QL: NEGATIVE
CREAT UR-MCNC: 96.1 MG/DL (ref 20–300)
FENTANYL+NORFENTANYL UR QL SCN: NEGATIVE PG/ML
FLURAZEPAM UR QL: NEGATIVE
LORAZEPAM UR QL: NEGATIVE
MEPERIDINE UR QL: NEGATIVE NG/ML
METHADONE UR QL SCN: NEGATIVE NG/ML
MIDAZOLAM UR QL CFM: NEGATIVE
NORDIAZEPAM UR QL: NEGATIVE
OPIATES UR QL SCN: NEGATIVE NG/ML
OXAZEPAM UR QL: NEGATIVE
OXYCODONE+OXYMORPHONE UR QL SCN: NEGATIVE NG/ML
PCP UR QL: NEGATIVE NG/ML
PH UR: 7.3 [PH] (ref 4.5–8.9)
PLEASE NOTE:, 733157: NORMAL
PROPOXYPH UR QL SCN: NEGATIVE NG/ML
SP GR UR: 1.02
TEMAZEPAM UR QL CFM: NEGATIVE
TRAMADOL UR QL SCN: NEGATIVE NG/ML
TRIAZOLAM UR QL: NEGATIVE

## 2019-07-03 DIAGNOSIS — G47.00 INSOMNIA, UNSPECIFIED TYPE: ICD-10-CM

## 2019-07-03 NOTE — TELEPHONE ENCOUNTER
[facility] date for X[facility]ax as 6/5/19 for a 30 d/s. Last Visit: 6/24/19 with NP Juvencio Luna  Next Appointment: return in 6 months  Previous Refill Encounter(s): 6/5/19 #30    Requested Prescriptions     Pending Prescriptions Disp Refills    ALPRAZolam (XANAX) 0.5 mg tablet 30 Tab 0     Sig: Take 1 Tab by mouth nightly as needed for Anxiety.  Max Daily Amount: 0.5 mg.

## 2019-07-05 RX ORDER — ALPRAZOLAM 0.5 MG/1
0.5 TABLET ORAL
Qty: 30 TAB | Refills: 0 | OUTPATIENT
Start: 2019-07-05 | End: 2019-08-05 | Stop reason: SDUPTHER

## 2019-07-12 RX ORDER — AMLODIPINE BESYLATE 10 MG/1
TABLET ORAL
Qty: 90 TAB | Refills: 3 | Status: SHIPPED | OUTPATIENT
Start: 2019-07-12 | End: 2020-07-20 | Stop reason: SDUPTHER

## 2019-07-23 DIAGNOSIS — E04.9 THYROID GOITER: Primary | ICD-10-CM

## 2019-07-23 NOTE — PROGRESS NOTES
I compared the last thyroid ultrasound from 2016 and there is growth in the nodules and size of the lobes so will send to endocrine for complete assessment of the growth.

## 2019-07-24 ENCOUNTER — TELEPHONE (OUTPATIENT)
Dept: INTERNAL MEDICINE CLINIC | Age: 69
End: 2019-07-24

## 2019-07-24 RX ORDER — FENOFIBRATE 48 MG/1
TABLET, COATED ORAL
Qty: 90 TAB | Refills: 0 | Status: SHIPPED | OUTPATIENT
Start: 2019-07-24 | End: 2019-10-19 | Stop reason: SDUPTHER

## 2019-07-24 NOTE — TELEPHONE ENCOUNTER
----- Message from Chencho Banegas NP sent at 7/23/2019  1:54 PM EDT -----  I compared the last thyroid ultrasound from 2016 and there is growth in the nodules and size of the lobes so will send to endocrine for complete assessment of the growth.

## 2019-08-05 DIAGNOSIS — G47.00 INSOMNIA, UNSPECIFIED TYPE: ICD-10-CM

## 2019-08-05 RX ORDER — ALPRAZOLAM 0.5 MG/1
0.5 TABLET ORAL
Qty: 30 TAB | Refills: 0 | OUTPATIENT
Start: 2019-08-05 | End: 2019-09-10 | Stop reason: SDUPTHER

## 2019-08-05 NOTE — TELEPHONE ENCOUNTER
Last Visit: 6/24/19 with THUY Plascencia  Next Appointment: 9/26/19 with MD Mildred Carson  Previous Refill Encounter(s): 7/5/19 #30    Requested Prescriptions     Pending Prescriptions Disp Refills    ALPRAZolam (XANAX) 0.5 mg tablet 30 Tab 0     Sig: Take 1 Tab by mouth nightly as needed for Anxiety.  Max Daily Amount: 0.5 mg.

## 2019-09-09 RX ORDER — ASPIRIN 325 MG
TABLET, DELAYED RELEASE (ENTERIC COATED) ORAL
Qty: 60 TAB | Refills: 1 | Status: SHIPPED | OUTPATIENT
Start: 2019-09-09 | End: 2020-02-27 | Stop reason: SDUPTHER

## 2019-09-10 DIAGNOSIS — G47.00 INSOMNIA, UNSPECIFIED TYPE: ICD-10-CM

## 2019-09-10 RX ORDER — ALPRAZOLAM 0.5 MG/1
0.5 TABLET ORAL
Qty: 30 TAB | Refills: 0 | Status: SHIPPED | OUTPATIENT
Start: 2019-09-10 | End: 2019-10-08

## 2019-09-10 NOTE — TELEPHONE ENCOUNTER
Last Visit: 6/24/19 with THUY Plascencia  Next Appointment: 9/26/19 with MD Usha Moore  Previous Refill Encounter(s): 8/5/19 #30    Requested Prescriptions     Pending Prescriptions Disp Refills    ALPRAZolam (XANAX) 0.5 mg tablet 30 Tab 0     Sig: Take 1 Tab by mouth nightly as needed for Anxiety.  Max Daily Amount: 0.5 mg.

## 2019-09-10 NOTE — TELEPHONE ENCOUNTER
Please let him know that I am referring him to Psychiatry for med management. I will refill his rx until his first apt with them. He has 3 months to schedule a f/u apt with them. My decision to refer him is 2/2 to him needing benzodiazepine therapy routinely in addition to Effexor. When patients require benzodiazepine therapy routinely with another medication, it is suggestive that we are under-treating him - as benzodiazepine therapy should not be needed on a daily basis.     Respectfully,  Dr. Maykel Moe  [facility], [address] [place] [address], [address] [place] [address] Str.  Phone: [phone]  Fax: [phone]

## 2019-09-11 ENCOUNTER — TELEPHONE (OUTPATIENT)
Dept: INTERNAL MEDICINE CLINIC | Age: 69
End: 2019-09-11

## 2019-09-11 NOTE — TELEPHONE ENCOUNTER
----- Message from Amena Rich MD sent at 9/10/2019  6:59 PM EDT -----  Regarding: Psychiatry referral  Please let him know that I am referring him to Psychiatry for med management. I will refill his rx until his first apt with them. He has 3 months to schedule a f/u apt with them. My decision to refer him is 2/2 to him needing benzodiazepine therapy routinely in addition to Effexor. When patients require benzodiazepine therapy routinely with another medication, it is suggestive that we are under-treating him - as benzodiazepine therapy should not be needed on a daily basis.     Respectfully,  Dr. April Proctor  [facility], [address] [place] [address], [address] [place] [address] Str.  Phone: [phone]  Fax: [phone]

## 2019-09-11 NOTE — TELEPHONE ENCOUNTER
Chief Complaint   Patient presents with    Referral Follow Up     per Dr Amaya Ashraf she will be referring the patient to Psychiatry for medication management    9-11-19 Patient reached and 2 identifiers were used: Full Name, and Date of Birth  verified. The patient understands to  the Xanax 0.5 mg, and a list of Psychiatry options we have available for him. The patient is going to be calling Psychiatrist Dr Rowan Felder today to see if he can make an appointment, due to his wife already goes to that facility. All understood.

## 2019-09-26 ENCOUNTER — OFFICE VISIT (OUTPATIENT)
Dept: INTERNAL MEDICINE CLINIC | Age: 69
End: 2019-09-26

## 2019-09-26 VITALS
OXYGEN SATURATION: 97 % | SYSTOLIC BLOOD PRESSURE: 122 MMHG | HEIGHT: 72 IN | DIASTOLIC BLOOD PRESSURE: 68 MMHG | BODY MASS INDEX: 26.3 KG/M2 | RESPIRATION RATE: 12 BRPM | HEART RATE: 71 BPM | WEIGHT: 194.2 LBS | TEMPERATURE: 97.2 F

## 2019-09-26 DIAGNOSIS — J00 COMMON COLD: Primary | ICD-10-CM

## 2019-09-26 PROBLEM — D12.6 TUBULAR ADENOMA OF COLON: Status: ACTIVE | Noted: 2019-09-26

## 2019-09-26 PROBLEM — K21.9 GERD (GASTROESOPHAGEAL REFLUX DISEASE): Status: ACTIVE | Noted: 2019-09-26

## 2019-09-26 PROBLEM — R06.09 DYSPNEA ON EXERTION: Status: RESOLVED | Noted: 2017-11-29 | Resolved: 2019-09-26

## 2019-09-26 PROBLEM — M50.90 CERVICAL DISC DISEASE: Status: ACTIVE | Noted: 2019-09-26

## 2019-09-26 PROBLEM — F12.90 CANNABIS USE, UNCOMPLICATED: Status: ACTIVE | Noted: 2019-09-26

## 2019-09-26 PROBLEM — R73.9 HYPERGLYCEMIA: Status: RESOLVED | Noted: 2017-11-29 | Resolved: 2019-09-26

## 2019-09-26 PROBLEM — R07.9 INTERMITTENT CHEST PAIN: Status: RESOLVED | Noted: 2017-11-29 | Resolved: 2019-09-26

## 2019-09-26 NOTE — PROGRESS NOTES
The patient stated that he had a cold today upon walking into the room. I asked him to wait as I grab the mask to protect myself. He had a mask in his hand. I asked him when I walked back into the room to place the mask on. The patient then stated that he is claustrophobic and will not place it on. I asked for him to try. 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 I also discussed how we can bring a \"cold\" to our families which can cause them to be sick - if we don't proceed with precautions. He then stated that he has 11 grandchildren and so he understands the importance of protecting family but then stated he will not put the mask on for our protection. I then stated that I understood his concerns but if I or a staff member ever became sick in taking care of him, I would not be able to continue seeing him - as his unwillingness to even try to wear a mask for our protection puts us at danger today and for any future infections. He then stated he was done with the appointment and did not want to establish care with me. He stormed out of the office, cursing under his breath.     Dr. Fortino Lord  [facility], [address] [place] [address], [facility] [address]  Phone: [phone]  Fax: [phone]

## 2019-09-26 NOTE — PATIENT INSTRUCTIONS
Health Maintenance Due Topic Date Due  
 EYE EXAM RETINAL OR DILATED  01/28/1960  
 FOOT EXAM Q1  07/19/2019

## 2019-09-26 NOTE — PROGRESS NOTES
[facility] [place] [facility] 
9/26/2019, MRN: [id number] Lester Mireles is a 71 y.o. male and presents to clinic for No chief complaint on file. Subjective: The patient is a 54-year-old male with history of CAD, tubular adenomatous colon polyps, cannabis use, cervical disc disease,  Type 2 DM, diverticulosis, hypertension,HLD, multinodular goiter, hip osteoarthritis, anxiety, and GERD. 
 
*** 
 
6/28/19 Thyroid ultrasound: Enlarged lobes of the thyroid gland. Heterogeneous echogenicity to the lobes and isthmus of the thyroid gland. Multiple nodules in both lobes of the thyroid gland. Patient Active Problem List  
 Diagnosis Date Noted  CAD (coronary artery disease) 12/01/2017 Priority: 1 - One  Tubular adenoma of colon 09/26/2019  Cannabis use, uncomplicated 17/73/4908  Cervical disc disease 09/26/2019  Controlled type 2 diabetes mellitus without complication, without long-term current use of insulin ([facility] 11/29/2018  Insomnia 11/29/2018  S/P coronary artery stent placement 01/25/2018  Essential hypertension 01/27/2016  Hyperlipidemia 01/27/2016  Thyroid goiter 01/27/2016  Chronic pain syndrome 11/17/2014 Current Outpatient Medications Medication Sig Dispense Refill  ALPRAZolam (XANAX) 0.5 mg tablet Take 1 Tab by mouth nightly as needed for Anxiety. Max Daily Amount: 0.5 mg. 30 Tab 0  
 aspirin delayed-release 325 mg tablet TAKE 1 TABLET BY MOUTH EVERY DAY 60 Tab 1  
 fenofibrate nanocrystallized (TRICOR) 48 mg tablet TAKE 1 TABLET BY MOUTH EVERYDAY AT BEDTIME 90 Tab 0  
 amLODIPine (NORVASC) 10 mg tablet TAKE 1 TABLET BY MOUTH EVERY DAY 90 Tab 3  carvedilol (COREG) 12.5 mg tablet TAKE 1 TABLET BY MOUTH TWICE A DAY WITH FOOD 180 Tab 1  
 ondansetron (ZOFRAN ODT) 8 mg disintegrating tablet Take 1 Tab by mouth every eight (8) hours as needed for Nausea for up to 12 doses.  12 Tab 0  
 Omeprazole delayed release (PRILOSEC D/R) 20 mg tablet Take 1 Tab by mouth daily. Indications: gastroesophageal reflux disease 90 Tab 3  
 venlafaxine-SR (EFFEXOR-XR) 37.5 mg capsule Take 1 Cap by mouth daily. 90 Cap 3  
 rosuvastatin (CRESTOR) 10 mg tablet TAKE 1 TABLET BY MOUTH AT BEDTIME 90 Tab 2  clopidogrel (PLAVIX) 75 mg tab TAKE 1 TABLET BY MOUTH EVERY DAY 90 Tab 3  
 ramipril (ALTACE) 10 mg capsule Take 1 Cap by mouth daily. [facility] 2018-19, PF, syrg injection TO BE ADMINISTERED BY PHARMACIST FOR IMMUNIZATION  0  
 cetirizine (ZYRTEC) 10 mg tablet Take  by mouth daily.  nitroglycerin (NITROSTAT) 0.4 mg SL tablet 1 Tab by SubLINGual route every five (5) minutes as needed for Chest Pain for up to 3 doses. 25 Tab 0  
 acetaminophen (TYLENOL) 325 mg tablet Take  by mouth every four (4) hours as needed for Pain.  metFORMIN (GLUCOPHAGE) 500 mg tablet Take  by mouth two (2) times daily (with meals). No Known Allergies Past Medical History:  
Diagnosis Date  Arthritis 1999 vicodin  Diabetes ([facility] 1998 metformin  GERD (gastroesophageal reflux disease)  Hypercholesterolemia  Hypertension 1996 norvasc  MI (myocardial infarction) ([facility] [facility] [address] Past Surgical History:  
Procedure Laterality Date  CARDIAC SURG PROCEDURE UNLIST  12/2017  
 stints  COLONOSCOPY N/A 11/2/2018 COLONOSCOPY with Polypectomies performed by Jordy Rai MD at [address] [place] [address] GI  2010  
 gallbladder Family History Problem Relation Age of Onset  Stroke Mother  Headache Mother  Hypertension Mother  Lung Cancer Mother  Heart Disease Father  Heart Attack Father  Hypertension Father  Diabetes Father  Lung Cancer Father  Ovarian Cancer Sister  Heart Attack Brother  Breast Cancer Sister  Diabetes Sister  Cancer Maternal Aunt   
     lung cancer  Cancer Maternal Uncle  Cancer Paternal Aunt  Cancer Paternal Uncle Social History Tobacco Use  
  Smoking status: Never Smoker  Smokeless tobacco: Never Used Substance Use Topics  Alcohol use: No  
 
 
ROS  
ROS Objective There were no vitals filed for this visit. Physical Exam 
 
LABS Data Review:  
Lab Results Component Value Date/Time WBC 9.1 05/16/2019 04:25 PM  
 HGB 14.3 05/16/2019 04:25 PM  
 HCT 40.3 05/16/2019 04:25 PM  
 PLATELET 482 20/38/4952 04:25 PM  
 MCV 88.6 05/16/2019 04:25 PM  
 
 
Lab Results Component Value Date/Time Sodium 137 06/24/2019 11:56 AM  
 Potassium 4.0 06/24/2019 11:56 AM  
 Chloride 104 06/24/2019 11:56 AM  
 CO2 25 06/24/2019 11:56 AM  
 Anion gap 8 06/24/2019 11:56 AM  
 Glucose 140 (H) 06/24/2019 11:56 AM  
 BUN 20 (H) 06/24/2019 11:56 AM  
 Creatinine 0.93 06/24/2019 11:56 AM  
 BUN/Creatinine ratio 22 (H) 06/24/2019 11:56 AM  
 GFR est AA >60 06/24/2019 11:56 AM  
 GFR est non-AA >60 06/24/2019 11:56 AM  
 Calcium 8.9 06/24/2019 11:56 AM  
 
 
Lab Results Component Value Date/Time Cholesterol, total 180 06/24/2019 11:56 AM  
 HDL Cholesterol 43 06/24/2019 11:56 AM  
 LDL, calculated 92.4 06/24/2019 11:56 AM  
 VLDL, calculated 44.6 06/24/2019 11:56 AM  
 Triglyceride 223 (H) 06/24/2019 11:56 AM  
 CHOL/HDL Ratio 4.2 06/24/2019 11:56 AM  
 
 
Lab Results Component Value Date/Time Hemoglobin A1c 6.3 (H) 06/28/2019 11:55 AM  
 
 
Assessment/Plan:  
There are no diagnoses linked to this encounter. Health Maintenance Due Topic Date Due  
 EYE EXAM RETINAL OR DILATED  01/28/1960  Pneumococcal 65+ years (2 of 2 - PCV13) 01/18/2017  
 FOOT EXAM Q1  07/19/2019  Influenza Age 5 to Adult  08/01/2019 Lab review: {lab reviewed:[id number]} I have discussed the diagnosis with the patient and the intended plan as seen in the above orders. The patient has received an after-visit summary and questions were answered concerning future plans. I have discussed medication side effects and warnings with the patient as well.  I have reviewed the plan of care with the patient, accepted their input and they are in agreement with the treatment goals. All questions were answered. The patient understands the plan of care. Handouts provided today with above information. ***Pt instructed if symptoms worsen to call the office or report to the ED for continued care. ***Greater than 50% of the visit time was spent in counseling and/or coordination of care. ***The patient was counseled on the dangers of tobacco use, and was {MU SMOKING CESSATION COUNSELIN::\"advised to quit\"}. Reviewed strategies to maximize success, including {techniques:}. 3-10 minutes were spent on smoking/tobacco cessation Voice recognition was used to generate this report, which may have resulted in some phonetic based errors in grammar and contents. Even though attempts were made to correct all the mistakes, some may have been missed, and remained in the body of the document.  
 
 
 
 
Davian Crabtree MD

## 2019-09-26 NOTE — PROGRESS NOTES
Chief Complaint   Patient presents with   [facility]  2    Hypertension    Diabetes    Cold Symptoms     x 3 weeks    Cough     light green phlegm     Patient states he is getting over his cold with recently vomiting at home, so bad his abdomen is hurting today 4/10 on the pain scale. The patient was very firm that he does not want to be seen for this today. The patient will not wear the mask I gave him, even after I explained that it would protect the staff, as well as himself from catching what he may have,  he feels it is not neccessary. 1. Have you been to the ER, urgent care clinic since your last visit? Hospitalized since your last visit? No    2. Have you seen or consulted any other health care providers outside of the [address] [place] [address] since your last visit? Include any pap smears or colon screening. No    Patient was given a copy of the Advanced Directive and understands to bring it in once completed.   Health Maintenance Due   Topic Date Due    EYE EXAM RETINAL OR DILATED  01/28/1960    FOOT EXAM Q1  07/19/2019

## 2019-09-27 ENCOUNTER — APPOINTMENT (OUTPATIENT)
Dept: GENERAL RADIOLOGY | Age: 69
End: 2019-09-27
Attending: EMERGENCY MEDICINE
Payer: MEDICARE

## 2019-09-27 ENCOUNTER — HOSPITAL ENCOUNTER (EMERGENCY)
Age: 69
Discharge: HOME | End: 2019-09-27
Attending: EMERGENCY MEDICINE
Payer: MEDICARE

## 2019-09-27 VITALS
DIASTOLIC BLOOD PRESSURE: 77 MMHG | SYSTOLIC BLOOD PRESSURE: 157 MMHG | HEART RATE: 89 BPM | BODY MASS INDEX: 26.28 KG/M2 | RESPIRATION RATE: 20 BRPM | OXYGEN SATURATION: 99 % | TEMPERATURE: 97.7 F | HEIGHT: 72 IN | WEIGHT: 194 LBS

## 2019-09-27 DIAGNOSIS — J20.9 ACUTE BRONCHITIS, UNSPECIFIED ORGANISM: Primary | ICD-10-CM

## 2019-09-27 DIAGNOSIS — J98.01 ACUTE BRONCHOSPASM: ICD-10-CM

## 2019-09-27 DIAGNOSIS — R11.10 POST-TUSSIVE EMESIS: ICD-10-CM

## 2019-09-27 LAB
ANION GAP SERPL CALC-SCNC: 7 MMOL/L (ref 3–18)
BASOPHILS # BLD: 0 K/UL (ref 0–0.1)
BASOPHILS NFR BLD: 0 % (ref 0–2)
BNP SERPL-MCNC: 84 PG/ML (ref 0–900)
BUN SERPL-MCNC: 14 MG/DL (ref 7–18)
BUN/CREAT SERPL: 18 (ref 12–20)
CALCIUM SERPL-MCNC: 9.4 MG/DL (ref 8.5–10.1)
CHLORIDE SERPL-SCNC: 109 MMOL/L (ref 100–111)
CK MB CFR SERPL CALC: 0.5 % (ref 0–4)
CK MB SERPL-MCNC: 1.1 NG/ML (ref 5–25)
CK SERPL-CCNC: 212 U/L (ref 39–308)
CO2 SERPL-SCNC: 26 MMOL/L (ref 21–32)
CREAT SERPL-MCNC: 0.79 MG/DL (ref 0.6–1.3)
DIFFERENTIAL METHOD BLD: ABNORMAL
EOSINOPHIL # BLD: 0.4 K/UL (ref 0–0.4)
EOSINOPHIL NFR BLD: 5 % (ref 0–5)
ERYTHROCYTE [DISTWIDTH] IN BLOOD BY AUTOMATED COUNT: 14.2 % (ref 11.6–14.5)
GLUCOSE SERPL-MCNC: 115 MG/DL (ref 74–99)
HCT VFR BLD AUTO: 40.8 % (ref 36–48)
HGB BLD-MCNC: 14.6 G/DL (ref 13–16)
LACTATE BLD-SCNC: 1.49 MMOL/L (ref 0.4–2)
LYMPHOCYTES # BLD: 2.7 K/UL (ref 0.9–3.6)
LYMPHOCYTES NFR BLD: 29 % (ref 21–52)
MCH RBC QN AUTO: 31.8 PG (ref 24–34)
MCHC RBC AUTO-ENTMCNC: 35.8 G/DL (ref 31–37)
MCV RBC AUTO: 88.9 FL (ref 74–97)
MONOCYTES # BLD: 0.9 K/UL (ref 0.05–1.2)
MONOCYTES NFR BLD: 9 % (ref 3–10)
NEUTS SEG # BLD: 5.2 K/UL (ref 1.8–8)
NEUTS SEG NFR BLD: 57 % (ref 40–73)
PLATELET # BLD AUTO: 234 K/UL (ref 135–420)
PMV BLD AUTO: 9.9 FL (ref 9.2–11.8)
POTASSIUM SERPL-SCNC: 3.8 MMOL/L (ref 3.5–5.5)
RBC # BLD AUTO: 4.59 M/UL (ref 4.7–5.5)
SODIUM SERPL-SCNC: 142 MMOL/L (ref 136–145)
TROPONIN I SERPL-MCNC: <0.02 NG/ML (ref 0–0.04)
WBC # BLD AUTO: 9.2 K/UL (ref 4.6–13.2)

## 2019-09-27 PROCEDURE — 80048 BASIC METABOLIC PNL TOTAL CA: CPT

## 2019-09-27 PROCEDURE — 96365 THER/PROPH/DIAG IV INF INIT: CPT

## 2019-09-27 PROCEDURE — 94640 AIRWAY INHALATION TREATMENT: CPT

## 2019-09-27 PROCEDURE — 83605 ASSAY OF LACTIC ACID: CPT

## 2019-09-27 PROCEDURE — 74011250637 HC RX REV CODE- 250/637: Performed by: EMERGENCY MEDICINE

## 2019-09-27 PROCEDURE — 96375 TX/PRO/DX INJ NEW DRUG ADDON: CPT

## 2019-09-27 PROCEDURE — 93005 ELECTROCARDIOGRAM TRACING: CPT

## 2019-09-27 PROCEDURE — 74011250636 HC RX REV CODE- 250/636: Performed by: EMERGENCY MEDICINE

## 2019-09-27 PROCEDURE — 74011000250 HC RX REV CODE- 250: Performed by: EMERGENCY MEDICINE

## 2019-09-27 PROCEDURE — 71045 X-RAY EXAM CHEST 1 VIEW: CPT

## 2019-09-27 PROCEDURE — 74011250636 HC RX REV CODE- 250/636

## 2019-09-27 PROCEDURE — 96374 THER/PROPH/DIAG INJ IV PUSH: CPT

## 2019-09-27 PROCEDURE — 99285 EMERGENCY DEPT VISIT HI MDM: CPT

## 2019-09-27 PROCEDURE — 83880 ASSAY OF NATRIURETIC PEPTIDE: CPT

## 2019-09-27 PROCEDURE — 85025 COMPLETE CBC W/AUTO DIFF WBC: CPT

## 2019-09-27 PROCEDURE — 82550 ASSAY OF CK (CPK): CPT

## 2019-09-27 RX ORDER — AZITHROMYCIN 250 MG/1
TABLET, FILM COATED ORAL
Qty: 6 TAB | Refills: 0 | Status: SHIPPED | OUTPATIENT
Start: 2019-09-27 | End: 2019-10-02

## 2019-09-27 RX ORDER — IPRATROPIUM BROMIDE AND ALBUTEROL SULFATE 2.5; .5 MG/3ML; MG/3ML
3 SOLUTION RESPIRATORY (INHALATION)
Status: COMPLETED | OUTPATIENT
Start: 2019-09-27 | End: 2019-09-27

## 2019-09-27 RX ORDER — ALBUTEROL SULFATE 0.83 MG/ML
2.5 SOLUTION RESPIRATORY (INHALATION)
Status: COMPLETED | OUTPATIENT
Start: 2019-09-27 | End: 2019-09-27

## 2019-09-27 RX ORDER — ALBUTEROL SULFATE 90 UG/1
2 AEROSOL, METERED RESPIRATORY (INHALATION)
Qty: 1 INHALER | Refills: 0 | Status: SHIPPED | OUTPATIENT
Start: 2019-09-27 | End: 2019-11-13

## 2019-09-27 RX ORDER — MAGNESIUM SULFATE HEPTAHYDRATE 40 MG/ML
2 INJECTION, SOLUTION INTRAVENOUS
Status: COMPLETED | OUTPATIENT
Start: 2019-09-27 | End: 2019-09-27

## 2019-09-27 RX ORDER — MAGNESIUM SULFATE HEPTAHYDRATE 40 MG/ML
INJECTION, SOLUTION INTRAVENOUS
Status: COMPLETED
Start: 2019-09-27 | End: 2019-09-27

## 2019-09-27 RX ORDER — PREDNISONE 20 MG/1
40 TABLET ORAL DAILY
Qty: 8 TAB | Refills: 0 | Status: SHIPPED | OUTPATIENT
Start: 2019-09-27 | End: 2019-10-01

## 2019-09-27 RX ORDER — CODEINE PHOSPHATE AND GUAIFENESIN 10; 100 MG/5ML; MG/5ML
10 SOLUTION ORAL
Status: COMPLETED | OUTPATIENT
Start: 2019-09-27 | End: 2019-09-27

## 2019-09-27 RX ORDER — PROMETHAZINE HYDROCHLORIDE AND DEXTROMETHORPHAN HYDROBROMIDE 6.25; 15 MG/5ML; MG/5ML
5 SYRUP ORAL
Qty: 118 ML | Refills: 0 | Status: SHIPPED | OUTPATIENT
Start: 2019-09-27 | End: 2019-10-04

## 2019-09-27 RX ADMIN — MAGNESIUM SULFATE 2 G: 2 INJECTION INTRAVENOUS at 18:43

## 2019-09-27 RX ADMIN — MAGNESIUM SULFATE 2 G: 2 INJECTION INTRAVENOUS at 18:45

## 2019-09-27 RX ADMIN — IPRATROPIUM BROMIDE AND ALBUTEROL SULFATE 3 ML: .5; 3 SOLUTION RESPIRATORY (INHALATION) at 20:14

## 2019-09-27 RX ADMIN — GUAIFENESIN AND CODEINE PHOSPHATE 10 ML: 100; 10 SOLUTION ORAL at 17:57

## 2019-09-27 RX ADMIN — METHYLPREDNISOLONE SODIUM SUCCINATE 125 MG: 125 INJECTION, POWDER, FOR SOLUTION INTRAMUSCULAR; INTRAVENOUS at 18:00

## 2019-09-27 RX ADMIN — IPRATROPIUM BROMIDE AND ALBUTEROL SULFATE 3 ML: .5; 3 SOLUTION RESPIRATORY (INHALATION) at 18:25

## 2019-09-27 RX ADMIN — ALBUTEROL SULFATE 2.5 MG: 2.5 SOLUTION RESPIRATORY (INHALATION) at 19:06

## 2019-09-27 RX ADMIN — IPRATROPIUM BROMIDE AND ALBUTEROL SULFATE 3 ML: .5; 3 SOLUTION RESPIRATORY (INHALATION) at 18:00

## 2019-09-27 NOTE — ED TRIAGE NOTES
Pt reports progressively worsening cough with shortness of breath and chest pain over past 2 weeks. Denies fever. Reports feeling shaky.

## 2019-09-27 NOTE — ED PROVIDER NOTES
EMERGENCY DEPARTMENT HISTORY AND PHYSICAL EXAM    5:20 PM      Date: 9/27/2019  Patient Name: Kathe Jenkins    History of Presenting Illness     Chief Complaint   Patient presents with    Chest Pain    Cough    Shortness of Breath         History Provided By: Patient and Patient's Wife    Additional History (Context): Kathe Jenkins is a 71 y.o. male with diabetes, hypertension and and CAD who presents with cc of severe cough for 2 weeks. Associated sx's are wheezing, sob, chest tightness from coughing, vomiting episodes occurring just after excessive coughing, and has some chills. Denies fever, dizziness, leg swelling or leg pain, abdominal pain, and no other complaint. Patient states that he has a follow-up with a new PCP, Dr. Jt San at [facility] on October 8. That will be his first appointment there.     PCP: Unknown, Provider        Past History     Past Medical History:  Past Medical History:   Diagnosis Date    Arthritis     1999 vicodin    Diabetes ([facility] 1998 metformin    GERD (gastroesophageal reflux disease)     Hypercholesterolemia     Hypertension 1996 norvasc    Kidney stones     MI (myocardial infarction) ([facility]        Past Surgical History:  Past Surgical History:   Procedure Laterality Date    CARDIAC SURG PROCEDURE UNLIST  12/2017    stints    COLONOSCOPY N/A 11/2/2018    COLONOSCOPY with Polypectomies performed by Crow Cantrell MD at [place] [address] HX GI  2010    gallbladder       Family History:  Family History   Problem Relation Age of Onset   [place] Stroke Mother     Headache Mother     Hypertension Mother    [place] Mother     Heart Disease Father     Heart Attack Father     Hypertension Father     Diabetes Father     Lung Cancer Father     Ovarian Cancer Sister     Heart Attack Brother     Breast Cancer Sister     Diabetes Sister     Cancer Maternal Aunt         lung cancer    Cancer Maternal Uncle     Cancer Paternal Aunt     Cancer Paternal Uncle        Social History:  Social History     Tobacco Use    Smoking status: Never Smoker    Smokeless tobacco: Never Used   Substance Use Topics    Alcohol use: No    Drug use: No       Allergies:  No Known Allergies      Review of Systems       Review of Systems   Constitutional: Positive for chills. Negative for fever. HENT: Negative for congestion, rhinorrhea, sore throat and trouble swallowing. Eyes: Negative for visual disturbance. Respiratory: Positive for cough, chest tightness, shortness of breath and wheezing. Cardiovascular: Negative for chest pain and leg swelling. Gastrointestinal: Negative for abdominal pain, nausea and vomiting. Endocrine: Negative for polyuria. Genitourinary: Negative for difficulty urinating and dysuria. Musculoskeletal: Negative for arthralgias and neck stiffness. Skin: Negative for rash. Neurological: Negative for dizziness, weakness, numbness and headaches. Hematological: Does not bruise/bleed easily. Psychiatric/Behavioral: Negative for confusion and dysphoric mood. All other systems reviewed and are negative. Physical Exam     Visit Vitals  /77 (BP 1 Location: Left arm, BP Patient Position: At rest)   Pulse 89   Temp 97.7 °F (36.5 °C)   Resp 20   Ht 6' (1.829 m)   Wt 88 kg (194 lb)   SpO2 99%   BMI 26.31 kg/m²         Physical Exam   Constitutional: He is oriented to person, place, and time. He appears well-developed and well-nourished. No distress. HENT:   Head: Normocephalic and atraumatic. Mouth/Throat: Oropharynx is clear and moist.   Eyes: Pupils are equal, round, and reactive to light. Conjunctivae are normal. No scleral icterus. Neck: Normal range of motion. Neck supple. No tracheal deviation present. Cardiovascular: Normal rate and intact distal pulses. Capillary refill < 3 seconds   Pulmonary/Chest: No stridor. He is in respiratory distress. He has wheezes.    Coughing at the bedside  Diffuse expiratory wheezes   Abdominal: Soft. Bowel sounds are normal. He exhibits no distension. There is no tenderness. Musculoskeletal: Normal range of motion. He exhibits no edema. Lymphadenopathy:     He has no cervical adenopathy. Neurological: He is alert and oriented to person, place, and time. No cranial nerve deficit. Coordination normal.   Skin: Skin is warm and dry. No rash noted. He is not diaphoretic. Psychiatric: He has a normal mood and affect. His behavior is normal.   Nursing note and vitals reviewed. Diagnostic Study Results     Labs -  Recent Results (from the past 12 hour(s))   EKG, 12 LEAD, INITIAL    Collection Time: 09/27/19  5:19 PM   Result Value Ref Range    Ventricular Rate 86 BPM    Atrial Rate 86 BPM    P-R Interval 136 ms    QRS Duration 82 ms    Q-T Interval 376 ms    QTC Calculation (Bezet) 449 ms    Calculated P Axis 89 degrees    Calculated R Axis 34 degrees    Calculated T Axis 65 degrees    Diagnosis       Normal sinus rhythm  Nonspecific T wave abnormality  Abnormal ECG  When compared with ECG of 19-JUL-2018 16:01,  No significant change was found     CBC WITH AUTOMATED DIFF    Collection Time: 09/27/19  5:40 PM   Result Value Ref Range    WBC 9.2 4.6 - 13.2 K/uL    RBC 4.59 (L) 4.70 - 5.50 M/uL    HGB 14.6 13.0 - 16.0 g/dL    HCT 40.8 36.0 - 48.0 %    MCV 88.9 74.0 - 97.0 FL    MCH 31.8 24.0 - 34.0 PG    MCHC 35.8 31.0 - 37.0 g/dL    RDW 14.2 11.6 - 14.5 %    PLATELET 164 734 - 866 K/uL    MPV 9.9 9.2 - 11.8 FL    NEUTROPHILS 57 40 - 73 %    LYMPHOCYTES 29 21 - 52 %    MONOCYTES 9 3 - 10 %    EOSINOPHILS 5 0 - 5 %    BASOPHILS 0 0 - 2 %    ABS. NEUTROPHILS 5.2 1.8 - 8.0 K/UL    ABS. LYMPHOCYTES 2.7 0.9 - 3.6 K/UL    ABS. MONOCYTES 0.9 0.05 - 1.2 K/UL    ABS. EOSINOPHILS 0.4 0.0 - 0.4 K/UL    ABS.  BASOPHILS 0.0 0.0 - 0.1 K/UL    DF AUTOMATED     METABOLIC PANEL, BASIC    Collection Time: 09/27/19  5:40 PM   Result Value Ref Range    Sodium 142 136 - 145 mmol/L    Potassium 3.8 3.5 - 5.5 mmol/L    Chloride 109 100 - 111 mmol/L    CO2 26 21 - 32 mmol/L    Anion gap 7 3.0 - 18 mmol/L    Glucose 115 (H) 74 - 99 mg/dL    BUN 14 7.0 - 18 MG/DL    Creatinine 0.79 0.6 - 1.3 MG/DL    BUN/Creatinine ratio 18 12 - 20      GFR est AA >60 >60 ml/min/1.73m2    GFR est non-AA >60 >60 ml/min/1.73m2    Calcium 9.4 8.5 - 10.1 MG/DL   CARDIAC PANEL,(CK, CKMB & TROPONIN)    Collection Time: 09/27/19  5:40 PM   Result Value Ref Range     39 - 308 U/L    CK - MB 1.1 <3.6 ng/ml    CK-MB Index 0.5 0.0 - 4.0 %    Troponin-I, QT <0.02 0.0 - 0.045 NG/ML   NT-PRO BNP    Collection Time: 09/27/19  5:40 PM   Result Value Ref Range    NT pro-BNP 84 0 - 900 PG/ML   POC LACTIC ACID    Collection Time: 09/27/19  6:10 PM   Result Value Ref Range    Lactic Acid (POC) 1.49 0.40 - 2.00 mmol/L       Radiologic Studies -   XR CHEST PORT   Final Result   IMPRESSION:       Minimal left-sided atelectasis. Lungs otherwise clear. .      Thank you for this referral.            Medical Decision Making   I am the first provider for this patient. I reviewed the vital signs, available nursing notes, past medical history, past surgical history, family history and social history. Vital Signs-Reviewed the patient's vital signs. Pulse Oximetry Analysis -  98 on room air (Interpretation)[facility]    Cardiac Monitor:  Rate: 81  Rhythm:  Normal Sinus Rhythm     EKG: Interpreted by the EP Dr Barbi Schultz. Time Interpreted: 5:20PM   Rate: 86   Rhythm: Normal Sinus Rhythm    Interpretation:Normal qrs duration, normal axis, normal qtc, no greg and no st depressions; there is some artifact       Records Reviewed: Nursing Notes and Old Medical Records (Time of Review: 5:20 PM)    Provider Notes (Medical Decision Making): DDX: COPD, acute bronchitis, bronchospasm, asthma, pneumonia, CHF    Patient with hacking cough and diffuse wheezes.     Labs, EKG, chest x-ray, DuoNeb's, steroids  MDM    Medications   albuterol-ipratropium (DUO-NEB) 2.5 MG-0.5 MG/3 ML (3 mL Nebulization Given 9/27/19 1800)   methylPREDNISolone (PF) (Solu-MEDROL) injection 125 mg (125 mg IntraVENous Given 9/27/19 1800)   albuterol-ipratropium (DUO-NEB) 2.5 MG-0.5 MG/3 ML (3 mL Nebulization Given 9/27/19 1825)   guaiFENesin-codeine (ROBITUSSIN AC) 100-10 mg/5 mL solution 10 mL (10 mL Oral Given 9/27/19 1757)   magnesium sulfate 2 g/50 ml IVPB (premix or compounded) (0 g IntraVENous IV Completed 9/27/19 1932)   albuterol (PROVENTIL VENTOLIN) nebulizer solution 2.5 mg (2.5 mg Nebulization Given 9/27/19 1906)   albuterol-ipratropium (DUO-NEB) 2.5 MG-0.5 MG/3 ML (3 mL Nebulization Given 9/27/19 2014)           ED Course: Progress Notes, Reevaluation, and Consults:  Labs reassuring    Reassessed patient still with diffuse wheezing and shortness of breath. Gave more nebs added emergent magnesium    After multiple duo nebs, steroids, magnesium, patient states he is feeling much better. On auscultation he still has some scattered wheezes but significantly improved. O2 99% on room air. I have reassessed the patient. I have discussed the workup, results and plan with the patient and patient is in agreement. Patient is feeling better. Patient will be prescribed Promethazine DM, prednisone, albuterol inhaler, Z-Sidney. Patient was discharge in stable condition. Patient was given outpatient follow up. Patient is to return to emergency department if any new or worsening condition. Diagnosis     Clinical Impression:   1. Acute bronchitis, unspecified organism    2. Acute bronchospasm    3. Post-tussive emesis        Disposition: Discharged    Follow-up Information     Follow up With Specialties Details Why Contact Info    Fara Arthur MD Family Practice  Keep your appointment on October 8 without fail.  [address] EMERGENCY DEPT Emergency Medicine  As needed, If symptoms worsen [facility] [address] [id number]  [phone]           Patient's Medications   Start Taking    ALBUTEROL (PROVENTIL HFA, VENTOLIN HFA, PROAIR HFA) 90 MCG/ACTUATION INHALER    Take 2 Puffs by inhalation every four (4) hours as needed for Wheezing or Shortness of Breath. Indications: bronchospasm prevention    AZITHROMYCIN (ZITHROMAX Z-BEE) 250 MG TABLET    Take as written    PREDNISONE (DELTASONE) 20 MG TABLET    Take 40 mg by mouth daily for 4 days. Start this medication on Saturday, 9/28/2019. Take with Breakfast    PROMETHAZINE-DEXTROMETHORPHAN (PROMETHAZINE-DM) 6.25-15 MG/5 ML SYRUP    Take 5 mL by mouth every four (4) hours as needed for Cough for up to 7 days. Indications: Cold Symptoms, cough   Continue Taking    ACETAMINOPHEN (TYLENOL) 325 MG TABLET    Take  by mouth every four (4) hours as needed for Pain. ALPRAZOLAM (XANAX) 0.5 MG TABLET    Take 1 Tab by mouth nightly as needed for Anxiety. Max Daily Amount: 0.5 mg.    AMLODIPINE (NORVASC) 10 MG TABLET    TAKE 1 TABLET BY MOUTH EVERY DAY    ASPIRIN DELAYED-RELEASE 325 MG TABLET    TAKE 1 TABLET BY MOUTH EVERY DAY    CARVEDILOL (COREG) 12.5 MG TABLET    TAKE 1 TABLET BY MOUTH TWICE A DAY WITH FOOD    CETIRIZINE (ZYRTEC) 10 MG TABLET    Take 10 mg by mouth daily as needed. CLOPIDOGREL (PLAVIX) 75 MG TAB    TAKE 1 TABLET BY MOUTH EVERY DAY    FENOFIBRATE NANOCRYSTALLIZED (TRICOR) 48 MG TABLET    TAKE 1 TABLET BY MOUTH EVERYDAY AT BEDTIME    METFORMIN (GLUCOPHAGE) 500 MG TABLET    Take  by mouth two (2) times daily (with meals). NITROGLYCERIN (NITROSTAT) 0.4 MG SL TABLET    1 Tab by SubLINGual route every five (5) minutes as needed for Chest Pain for up to 3 doses. OMEPRAZOLE DELAYED RELEASE (PRILOSEC D/R) 20 MG TABLET    Take 1 Tab by mouth daily. Indications: gastroesophageal reflux disease    ONDANSETRON (ZOFRAN ODT) 8 MG DISINTEGRATING TABLET    Take 1 Tab by mouth every eight (8) hours as needed for Nausea for up to 12 doses.     RAMIPRIL (ALTACE) 10 MG CAPSULE    Take 1 Cap by mouth daily. ROSUVASTATIN (CRESTOR) 10 MG TABLET    TAKE 1 TABLET BY MOUTH AT BEDTIME    VENLAFAXINE-SR (EFFEXOR-XR) 37.5 MG CAPSULE    Take 1 Cap by mouth daily. These Medications have changed    No medications on file   Stop Taking    No medications on file         DO Lobito Gillespie medical dictation software was used for portions of this report. Unintended transcription errors may occur. My signature above authenticates this document and my orders, the final    diagnosis (es), discharge prescription (s), and instructions in the Epic    record.

## 2019-09-28 LAB
ATRIAL RATE: 86 BPM
CALCULATED P AXIS, ECG09: 89 DEGREES
CALCULATED R AXIS, ECG10: 34 DEGREES
CALCULATED T AXIS, ECG11: 65 DEGREES
DIAGNOSIS, 93000: NORMAL
P-R INTERVAL, ECG05: 136 MS
Q-T INTERVAL, ECG07: 376 MS
QRS DURATION, ECG06: 82 MS
QTC CALCULATION (BEZET), ECG08: 449 MS
VENTRICULAR RATE, ECG03: 86 BPM

## 2019-09-28 NOTE — ED NOTES
Both written and verbal discharge instructions given to pt with verbalized understanding of home care and follow up. Prescriptions given. Pt has a ride home with his spouse.

## 2019-09-28 NOTE — ED NOTES
Pt states feeling much better. Able to speak in full complete sentences without cough or shortness of breath.

## 2019-10-08 ENCOUNTER — OFFICE VISIT (OUTPATIENT)
Dept: FAMILY MEDICINE CLINIC | Age: 69
End: 2019-10-08

## 2019-10-08 VITALS
RESPIRATION RATE: 16 BRPM | HEIGHT: 72 IN | DIASTOLIC BLOOD PRESSURE: 78 MMHG | BODY MASS INDEX: 26.28 KG/M2 | OXYGEN SATURATION: 96 % | TEMPERATURE: 98 F | SYSTOLIC BLOOD PRESSURE: 144 MMHG | HEART RATE: 68 BPM | WEIGHT: 194 LBS

## 2019-10-08 DIAGNOSIS — E04.9 THYROID GOITER: ICD-10-CM

## 2019-10-08 DIAGNOSIS — Z23 ENCOUNTER FOR IMMUNIZATION: ICD-10-CM

## 2019-10-08 DIAGNOSIS — E78.5 HYPERLIPIDEMIA, UNSPECIFIED HYPERLIPIDEMIA TYPE: ICD-10-CM

## 2019-10-08 DIAGNOSIS — Z95.5 S/P CORONARY ARTERY STENT PLACEMENT: ICD-10-CM

## 2019-10-08 DIAGNOSIS — D12.6 TUBULAR ADENOMA OF COLON: ICD-10-CM

## 2019-10-08 DIAGNOSIS — R05.9 COUGH: ICD-10-CM

## 2019-10-08 DIAGNOSIS — G47.9 SLEEP TROUBLE: ICD-10-CM

## 2019-10-08 DIAGNOSIS — E11.9 CONTROLLED TYPE 2 DIABETES MELLITUS WITHOUT COMPLICATION, WITHOUT LONG-TERM CURRENT USE OF INSULIN (HCC): Primary | ICD-10-CM

## 2019-10-08 DIAGNOSIS — I10 ESSENTIAL HYPERTENSION: ICD-10-CM

## 2019-10-08 DIAGNOSIS — B35.1 ONYCHOMYCOSIS: ICD-10-CM

## 2019-10-08 DIAGNOSIS — R09.89 CHEST CONGESTION: ICD-10-CM

## 2019-10-08 DIAGNOSIS — K21.9 GASTROESOPHAGEAL REFLUX DISEASE WITHOUT ESOPHAGITIS: ICD-10-CM

## 2019-10-08 DIAGNOSIS — G89.4 CHRONIC PAIN SYNDROME: ICD-10-CM

## 2019-10-08 DIAGNOSIS — G47.09 OTHER INSOMNIA: ICD-10-CM

## 2019-10-08 DIAGNOSIS — R06.2 WHEEZING: ICD-10-CM

## 2019-10-08 NOTE — PATIENT INSTRUCTIONS
Learning About Diabetes Food Guidelines Your Care Instructions Meal planning is important to manage diabetes. It helps keep your blood sugar at a target level (which you set with your doctor). You don't have to eat special foods. You can eat what your family eats, including sweets once in a while. But you do have to pay attention to how often you eat and how much you eat of certain foods. You may want to work with a dietitian or a certified diabetes educator (CDE) to help you plan meals and snacks. A dietitian or CDE can also help you lose weight if that is one of your goals. What should you know about eating carbs? Managing the amount of carbohydrate (carbs) you eat is an important part of healthy meals when you have diabetes. Carbohydrate is found in many foods. · Learn which foods have carbs. And learn the amounts of carbs in different foods. ? Bread, cereal, pasta, and rice have about 15 grams of carbs in a serving. A serving is 1 slice of bread (1 ounce), ½ cup of cooked cereal, or 1/3 cup of cooked pasta or rice. ? Fruits have 15 grams of carbs in a serving. A serving is 1 small fresh fruit, such as an apple or orange; ½ of a banana; ½ cup of cooked or canned fruit; ½ cup of fruit juice; 1 cup of melon or raspberries; or 2 tablespoons of dried fruit. ? Milk and no-sugar-added yogurt have 15 grams of carbs in a serving. A serving is 1 cup of milk or 2/3 cup of no-sugar-added yogurt. ? Starchy vegetables have 15 grams of carbs in a serving. A serving is ½ cup of mashed potatoes or sweet potato; 1 cup winter squash; ½ of a small baked potato; ½ cup of cooked beans; or ½ cup cooked corn or green peas. · Learn how much carbs to eat each day and at each meal. A dietitian or CDE can teach you how to keep track of the amount of carbs you eat. This is called carbohydrate counting.  
· If you are not sure how to count carbohydrate grams, use the Plate Method to plan meals. It is a good, quick way to make sure that you have a balanced meal. It also helps you spread carbs throughout the day. ? Divide your plate by types of foods. Put non-starchy vegetables on half the plate, meat or other protein food on one-quarter of the plate, and a grain or starchy vegetable in the final quarter of the plate. To this you can add a small piece of fruit and 1 cup of milk or yogurt, depending on how many carbs you are supposed to eat at a meal. 
· Try to eat about the same amount of carbs at each meal. Do not \"save up\" your daily allowance of carbs to eat at one meal. 
· Proteins have very little or no carbs per serving. Examples of proteins are beef, chicken, turkey, fish, eggs, tofu, cheese, cottage cheese, and peanut butter. A serving size of meat is 3 ounces, which is about the size of a deck of cards. Examples of meat substitute serving sizes (equal to 1 ounce of meat) are 1/4 cup of cottage cheese, 1 egg, 1 tablespoon of peanut butter, and ½ cup of tofu. How can you eat out and still eat healthy? · Learn to estimate the serving sizes of foods that have carbohydrate. If you measure food at home, it will be easier to estimate the amount in a serving of restaurant food. · If the meal you order has too much carbohydrate (such as potatoes, corn, or baked beans), ask to have a low-carbohydrate food instead. Ask for a salad or green vegetables. · If you use insulin, check your blood sugar before and after eating out to help you plan how much to eat in the future. · If you eat more carbohydrate at a meal than you had planned, take a walk or do other exercise. This will help lower your blood sugar. What else should you know? · Limit saturated fat, such as the fat from meat and dairy products. This is a healthy choice because people who have diabetes are at higher risk of heart disease.  So choose lean cuts of meat and nonfat or low-fat dairy products. Use olive or canola oil instead of butter or shortening when cooking. · Don't skip meals. Your blood sugar may drop too low if you skip meals and take insulin or certain medicines for diabetes. · Check with your doctor before you drink alcohol. Alcohol can cause your blood sugar to drop too low. Alcohol can also cause a bad reaction if you take certain diabetes medicines. Follow-up care is a key part of your treatment and safety. Be sure to make and go to all appointments, and call your doctor if you are having problems. It's also a good idea to know your test results and keep a list of the medicines you take. Where can you learn more? Go to http://kenneth-renuka.info/. Enter P707 in the search box to learn more about \"Learning About Diabetes Food Guidelines. \" Current as of: April 16, 2019 Content Version: 12.2 © 7898-8507 [employer]. Care instructions adapted under license by [employer] (which disclaims liability or warranty for this information). If you have questions about a medical condition or this instruction, always ask your healthcare professional. [address] any warranty or liability for your use of this information. Low Sodium Diet (2,000 Milligram): Care Instructions Your Care Instructions Too much sodium causes your body to hold on to extra water. This can raise your blood pressure and force your heart and kidneys to work harder. In very serious cases, this could cause you to be put in the hospital. It might even be life-threatening. By limiting sodium, you will feel better and lower your risk of serious problems. The most common source of sodium is salt. People get most of the salt in their diet from canned, prepared, and packaged foods. Fast food and restaurant meals also are very high in sodium. Your doctor will probably limit your sodium to less than 2,000 milligrams (mg) a day.  This limit counts all the sodium in prepared and packaged foods and any salt you add to your food. Follow-up care is a key part of your treatment and safety. Be sure to make and go to all appointments, and call your doctor if you are having problems. It's also a good idea to know your test results and keep a list of the medicines you take. How can you care for yourself at home? Read food labels · Read labels on cans and food packages. The labels tell you how much sodium is in each serving. Make sure that you look at the serving size. If you eat more than the serving size, you have eaten more sodium. · Food labels also tell you the Percent Daily Value for sodium. Choose products with low Percent Daily Values for sodium. · Be aware that sodium can come in forms other than salt, including monosodium glutamate (MSG), sodium citrate, and sodium bicarbonate (baking soda). MSG is often added to Asian food. When you eat out, you can sometimes ask for food without MSG or added salt. Buy low-sodium foods · Buy foods that are labeled \"unsalted\" (no salt added), \"sodium-free\" (less than 5 mg of sodium per serving), or \"low-sodium\" (less than 140 mg of sodium per serving). Foods labeled \"reduced-sodium\" and \"light sodium\" may still have too much sodium. Be sure to read the label to see how much sodium you are getting. · Buy fresh vegetables, or frozen vegetables without added sauces. Buy low-sodium versions of canned vegetables, soups, and other canned goods. Prepare low-sodium meals · Cut back on the amount of salt you use in cooking. This will help you adjust to the taste. Do not add salt after cooking. One teaspoon of salt has about 2,300 mg of sodium. · Take the salt shaker off the table. · Flavor your food with garlic, lemon juice, onion, vinegar, herbs, and spices. Do not use soy sauce, lite soy sauce, steak sauce, onion salt, garlic salt, celery salt, mustard, or ketchup on your food. · Use low-sodium salad dressings, sauces, and ketchup. Or make your own salad dressings and sauces without adding salt. · Use less salt (or none) when recipes call for it. You can often use half the salt a recipe calls for without losing flavor. Other foods such as rice, pasta, and grains do not need added salt. · Rinse canned vegetables, and cook them in fresh water. This removes somebut not allof the salt. · Avoid water that is naturally high in sodium or that has been treated with water softeners, which add sodium. Call your local water company to find out the sodium content of your water supply. If you buy bottled water, read the label and choose a sodium-free brand. Avoid high-sodium foods · Avoid eating: 
? Smoked, cured, salted, and canned meat, fish, and poultry. ? Ham, cadena, hot dogs, and luncheon meats. ? Regular, hard, and processed cheese and regular peanut butter. ? Crackers with salted tops, and other salted snack foods such as pretzels, chips, and salted popcorn. ? Frozen prepared meals, unless labeled low-sodium. ? Canned and dried soups, broths, and bouillon, unless labeled sodium-free or low-sodium. ? Canned vegetables, unless labeled sodium-free or low-sodium. ? [place] fries, pizza, tacos, and other fast foods. ? Pickles, olives, ketchup, and other condiments, especially soy sauce, unless labeled sodium-free or low-sodium. Where can you learn more? Go to http://kenneth-renuka.info/. Enter S953 in the search box to learn more about \"Low Sodium Diet (2,000 Milligram): Care Instructions. \" Current as of: November 7, 2018 Content Version: 12.2 © 8930-9903 [employer]. Care instructions adapted under license by [employer] (which disclaims liability or warranty for this information).  If you have questions about a medical condition or this instruction, always ask your healthcare professional. [employer] disclaims any warranty or liability for your use of this information. Learning About Sleeping Well What does sleeping well mean? Sleeping well means getting enough sleep. How much sleep is enough varies among people. The number of hours you sleep is not as important as how you feel when you wake up. If you do not feel refreshed, you probably need more sleep. Another sign of not getting enough sleep is feeling tired during the day. The average total nightly sleep time is 7½ to 8 hours. Healthy adults may need a little more or a little less than this. Why is getting enough sleep important? Getting enough quality sleep is a basic part of good health. When your sleep suffers, your mood and your thoughts can suffer too. You may find yourself feeling more grumpy or stressed. Not getting enough sleep also can lead to serious problems, including injury, accidents, anxiety, and depression. What might cause poor sleeping? Many things can cause sleep problems, including: · Stress. Stress can be caused by fear about a single event, such as giving a speech. Or you may have ongoing stress, such as worry about work or school. · Depression, anxiety, and other mental or emotional conditions. · Changes in your sleep habits or surroundings. This includes changes that happen where you sleep, such as noise, light, or sleeping in a different bed. It also includes changes in your sleep pattern, such as having jet lag or working a late shift. · Health problems, such as pain, breathing problems, and restless legs syndrome. · Lack of regular exercise. How can you help yourself? Here are some tips that may help you sleep more soundly and wake up feeling more refreshed. Your sleeping area · Use your bedroom only for sleeping and sex. A bit of light reading may help you fall asleep. But if it doesn't, do your reading elsewhere in the house. Don't watch TV in bed. · Be sure your bed is big enough to stretch out comfortably, especially if you have a sleep partner. · Keep your bedroom quiet, dark, and cool. Use curtains, blinds, or a sleep mask to block out light. To block out noise, use earplugs, soothing music, or a \"white noise\" machine. Your evening and bedtime routine · Create a relaxing bedtime routine. You might want to take a warm shower or bath, listen to soothing music, or drink a cup of noncaffeinated tea. · Go to bed at the same time every night. And get up at the same time every morning, even if you feel tired. What to avoid · Limit caffeine (coffee, tea, caffeinated sodas) during the day, and don't have any for at least 4 to 6 hours before bedtime. · Don't drink alcohol before bedtime. Alcohol can cause you to wake up more often during the night. · Don't smoke or use tobacco, especially in the evening. Nicotine can keep you awake. · Don't take naps during the day, especially close to bedtime. · Don't lie in bed awake for too long. If you can't fall asleep, or if you wake up in the middle of the night and can't get back to sleep within 15 minutes or so, get out of bed and go to another room until you feel sleepy. · Don't take medicine right before bed that may keep you awake or make you feel hyper or energized. Your doctor can tell you if your medicine may do this and if you can take it earlier in the day. If you can't sleep · Imagine yourself in a peaceful, pleasant scene. Focus on the details and feelings of being in a place that is relaxing. · Get up and do a quiet or boring activity until you feel sleepy. · Don't drink any liquids after 6 p.m. if you wake up often because you have to go to the bathroom. Where can you learn more? Go to http://kenneth-renuka.info/. Enter X104 in the search box to learn more about \"Learning About Sleeping Well. \" Current as of: May 28, 2019 Content Version: 12.2 © 8184-6262 [employer]. Care instructions adapted under license by [employer] (which disclaims liability or warranty for this information). If you have questions about a medical condition or this instruction, always ask your healthcare professional. [address] any warranty or liability for your use of this information. Vaccine Information Statement Pneumococcal Conjugate Vaccine (PCV13): What You Need to Know Many Vaccine Information Statements are available in Mongolian and other languages. See www.immunize.org/vis. Hojas de información Sobre Vacunas están disponibles en español y en muchos otros idiomas. Visite www.immunize.org/vis. 1. Why get vaccinated? Vaccination can protect both children and adults from pneumococcal disease. Pneumococcal disease is caused by bacteria that can spread from person to person through close contact. It can cause ear infections, and it can also lead to more serious infections of the: 
 Lungs (pneumonia),  Blood (bacteremia), and 
 Covering of the brain and spinal cord (meningitis). Pneumococcal pneumonia is most common among adults. Pneumococcal meningitis can cause deafness and brain damage, and it kills about 1 child in 10 who get it. Anyone can get pneumococcal disease, but children under 3years of age and adults 72 years and older, people with certain medical conditions, and cigarette smokers are at the highest risk. Before there was a vaccine, the [facility] saw: 
 more than 700 cases of meningitis, 
 about 13,000 blood infections, 
 about 5 million ear infections, and 
 about 200 deaths 
in children under 5 each year from pneumococcal disease. Since vaccine became available, severe pneumococcal disease in these children has fallen by 88%. About 18,000 older adults die of pneumococcal disease each year in the [place]. Treatment of pneumococcal infections with penicillin and other drugs is not as effective as it used to be, because some strains of the disease have become resistant to these drugs. This makes prevention of the disease, through vaccination, even more important. 2. PCV13 vaccine Pneumococcal conjugate vaccine (called PCV13) protects against 13 types of pneumococcal bacteria. PCV13 is routinely given to children at 2, 4, 6, and 1515 months of age. It is also recommended for children and adults 3to 59years of age with certain health conditions, and for all adults 72years of age and older. Your doctor can give you details. 3. Some people should not get this vaccine Anyone who has ever had a life-threatening allergic reaction to a dose of this vaccine, to an earlier pneumococcal vaccine called PCV7, or to any vaccine containing diphtheria toxoid (for example, DTaP), should not get PCV13. Anyone with a severe allergy to any component of PCV13 should not get the vaccine. Tell your doctor if the person being vaccinated has any severe allergies. If the person scheduled for vaccination is not feeling well, your healthcare provider might decide to reschedule the shot on another day. 4. Risks of a vaccine reaction With any medicine, including vaccines, there is a chance of reactions. These are usually mild and go away on their own, but serious reactions are also possible. Problems reported following PCV13 varied by age and dose in the series. The most common problems reported among children were:  About half became drowsy after the shot, had a temporary loss of appetite, or had redness or tenderness where the shot was given.  About 1 out of 3 had swelling where the shot was given.  About 1 out of 3 had a mild fever, and about 1 in 20 had a fever over 102.2°F. 
 Up to about 8 out of 10 became fussy or irritable. Adults have reported pain, redness, and swelling where the shot was given; also mild fever, fatigue, headache, chills, or muscle pain. Gurvinder Current children who get PCV13 along with inactivated flu vaccine at the same time may be at increased risk for seizures caused by fever. Ask your doctor for more information. Problems that could happen after any vaccine:  People sometimes faint after a medical procedure, including vaccination. Sitting or lying down for about 15 minutes can help prevent fainting, and injuries caused by a fall. Tell your doctor if you feel dizzy, or have vision changes or ringing in the ears.  Some older children and adults get severe pain in the shoulder and have difficulty moving the arm where a shot was given. This happens very rarely.  Any medication can cause a severe allergic reaction. Such reactions from a vaccine are very rare, estimated at about 1 in a million doses, and would happen within a few minutes to a few hours after the vaccination. As with any medicine, there is a very small chance of a vaccine causing a serious injury or death. The safety of vaccines is always being monitored. For more information, visit: www.cdc.gov/vaccinesafety/  
 
5. What if there is a serious reaction? What should I look for?  Look for anything that concerns you, such as signs of a severe allergic reaction, very high fever, or unusual behavior. Signs of a severe allergic reaction can include hives, swelling of the face and throat, difficulty breathing, a fast heartbeat, dizziness, and weakness  usually within a few minutes to a few hours after the vaccination. What should I do?  If you think it is a severe allergic reaction or other emergency that cant wait, call 9-1-1 or get the person to the nearest hospital. Otherwise, call your doctor.  
 
Reactions should be reported to the Vaccine Adverse Event Reporting System (VAERS). Your doctor should file this report, or you can do it yourself through the VAERS web site at www.vaers. [facility].gov, or by calling 7-561.664.3066. [facility] does not give medical advice. 6. The National Vaccine Injury Compensation Program 
 
The [facility] Vaccine Injury Compensation Program (VICP) is a federal program that was created to compensate people who may have been injured by certain vaccines. Persons who believe they may have been injured by a vaccine can learn about the program and about filing a claim by calling 0-485.614.6890 or visiting the [employer] website at [email or web address]/vaccinecompensation. There is a time limit to file a claim for compensation. 7. How can I learn more?  Ask your healthcare provider. He or she can give you the vaccine package insert or suggest other sources of information.  Call your local or state health department.  Contact the Centers for Disease Control and Prevention (CDC): 
- Call 0-296.106.4420 (1-800-CDC-INFO) or 
- Visit CDCs website at www.cdc.gov/vaccines Vaccine Information Statement PCV13 Vaccine 11/5/2015  
42 CHERYL Davis [id number] Department of Health and [employer] Centers for Disease Control and Prevention Office Use Only

## 2019-10-08 NOTE — PROGRESS NOTES
HISTORY OF PRESENT ILLNESS  Ashish An is a 71 y.o. male. HPI: New patient. Here to get establish care. Few medical problem. H/o CAD and prior stent. Following cardiology with compliance. No anginal symptoms. Noted mild elevated blood pressure. Repeat was improved some. Compliant with current medication and dosage. No side effects. Visit Vitals  /78 (BP 1 Location: Left arm, BP Patient Position: Sitting)   Pulse 68   Temp 98 °F (36.7 °C) (Oral)   Resp 16   Ht 6' (1.829 m)   Wt 194 lb (88 kg)   SpO2 96%   BMI 26.31 kg/m²     Denies any headache, dizziness, no chest pain or trouble breathing, no arm or leg weakness. No nausea or vomiting, no weight or appetite changes, no mood changes . No urine or bowel complains, no palpitation, no diaphoresis. No abdominal pain. No cold or cough. No leg swelling. No fever. Noted he is on xanax and Effexor. No mood changes. Said no h/o depression or anxiety. Was started for insomnia. Taking it at bedtime daily since long time. I have discussed that to take xanax for insomnia which is not slandered treatment. Pt also wants to come off. Discuss to taper it over 2 wks. Can consider Effexor to trazodone and also discussed sleep hygiene. He agrees with the plan. Also recent bronchitis. Went to Er and had antibiotic, oral steroid and albuterol. Now improved significantly. Mild cough, mainly dry. No wheezing or chest congestion. Will observe with albuterol as needed. Fritzi Dakins has not taken albuterol since last couple of days as it was not needed. H/o goiter. Thyroid nodule. Review ultrasound. He is following endocrinology. Dr. Segundo Van. Denies any trouble swallowing. No change in voice. No appetite or weight changes. No heat or cold intolerance. No mood changes. Has seen GI and repeat colonoscopy in 3 years. He is aware of recommendations. GERD symptoms stable on current medication and dose. No concern.    Lab Results   Component Value Date/Time    WBC 9.2 09/27/2019 05:40 PM    HGB 14.6 09/27/2019 05:40 PM    HCT 40.8 09/27/2019 05:40 PM    PLATELET 993 63/25/7162 05:40 PM    MCV 88.9 09/27/2019 05:40 PM     Lab Results   Component Value Date/Time    Sodium 142 09/27/2019 05:40 PM    Potassium 3.8 09/27/2019 05:40 PM    Chloride 109 09/27/2019 05:40 PM    CO2 26 09/27/2019 05:40 PM    Anion gap 7 09/27/2019 05:40 PM    Glucose 115 (H) 09/27/2019 05:40 PM    BUN 14 09/27/2019 05:40 PM    Creatinine 0.79 09/27/2019 05:40 PM    BUN/Creatinine ratio 18 09/27/2019 05:40 PM    GFR est AA >60 09/27/2019 05:40 PM    GFR est non-AA >60 09/27/2019 05:40 PM    Calcium 9.4 09/27/2019 05:40 PM    Bilirubin, total 0.3 05/16/2019 04:25 PM    AST (SGOT) 15 05/16/2019 04:25 PM    Alk. phosphatase 80 05/16/2019 04:25 PM    Protein, total 7.7 05/16/2019 04:25 PM    Albumin 4.3 05/16/2019 04:25 PM    Globulin 3.4 05/16/2019 04:25 PM    A-G Ratio 1.3 05/16/2019 04:25 PM    ALT (SGPT) 27 05/16/2019 04:25 PM     Lab Results   Component Value Date/Time    Cholesterol, total 180 06/24/2019 11:56 AM    HDL Cholesterol 43 06/24/2019 11:56 AM    LDL, calculated 92.4 06/24/2019 11:56 AM    VLDL, calculated 44.6 06/24/2019 11:56 AM    Triglyceride 223 (H) 06/24/2019 11:56 AM    CHOL/HDL Ratio 4.2 06/24/2019 11:56 AM     Lab Results   Component Value Date/Time    TSH 2.29 01/30/2019 07:32 AM     Lab Results   Component Value Date/Time    Hemoglobin A1c 6.3 (H) 06/28/2019 11:55 AM               ROS: see HPI     Physical Exam   Constitutional: He is oriented to person, place, and time. No distress. Neck: No thyromegaly present. Cardiovascular: Normal rate, regular rhythm and normal heart sounds. Pulmonary/Chest:   CTA   Abdominal: Soft. Bowel sounds are normal. There is no tenderness. Musculoskeletal: He exhibits no edema. Foot exam: no callus or open skin area,  Monofilament test normal.  Peripheral pulsations of dorsalis pedis palpable both lower ext.       Lymphadenopathy:     He has no cervical adenopathy. Neurological: He is oriented to person, place, and time. Skin:   Brittle nail. Dry and discoloration of toe nails bilaterally    Psychiatric: His behavior is normal.       ASSESSMENT and PLAN    ICD-10-CM ICD-9-CM    1. Controlled type 2 diabetes mellitus without complication, without long-term current use of insulin ([facility] [address]): well controlled. HBA1C at goal. Following endo. E11.9 250.00    2. Chronic pain syndrome: said currently no issue. G89.4 338.4     neck pain    3. Essential hypertension: .mild elevated. Will observe with low salt diet. I10 401.9    4. Thyroid goiter: following endo. Asymptomatic. E04.9 240.9    5. Hyperlipidemia, unspecified hyperlipidemia type: on statin. No side effects. E78.5 272.4    6. S/P coronary artery stent placement Z95.5 V45.82    7. Other insomnia: sleep hygiene discussed. He will taper himself with xanax. Might consider change effexor to trazodone and for now melatonin as needed. G47.09 780.52     taper xanax, continue effexor. start melatonin    8. Tubular adenoma of colon: follow up colonoscopy in 2020. D12.6 211.3    9. Gastroesophageal reflux disease without esophagitis: stable on current medication. K21.9 530.81    10. Sleep trouble: see above  G47.9 780.50    11. Chest congestion: for now observe. Albuterol as needed. Improving. R09.89 786.9     seen by ER. given azithromycin, prednisone and albuterol    12. Cough R05 786.2    13. Wheezing R06.2 786.07    14. Encounter for immunization Z23 V03.89 ADMIN PNEUMOCOCCAL VACCINE      PNEUMOCOCCAL CONJ VACCINE 13 VALENT IM   15. Onychomycosis: sending to podiatry. B35.1 110.1 REFERRAL TO PODIATRY       Pt understood and agree with the plan   Review HM   Will get an eye exam scheduled. Follow-up and Dispositions    · Return in about 1 month (around 11/8/2019).

## 2019-10-21 RX ORDER — ROSUVASTATIN CALCIUM 10 MG/1
TABLET, COATED ORAL
Qty: 90 TAB | Refills: 2 | Status: SHIPPED | OUTPATIENT
Start: 2019-10-21 | End: 2020-07-20 | Stop reason: SDUPTHER

## 2019-10-21 RX ORDER — FENOFIBRATE 48 MG/1
TABLET, COATED ORAL
Qty: 90 TAB | Refills: 0 | Status: SHIPPED | OUTPATIENT
Start: 2019-10-21 | End: 2020-01-14

## 2019-11-13 ENCOUNTER — OFFICE VISIT (OUTPATIENT)
Dept: CARDIOLOGY CLINIC | Age: 69
End: 2019-11-13

## 2019-11-13 VITALS
BODY MASS INDEX: 27.71 KG/M2 | WEIGHT: 204.6 LBS | SYSTOLIC BLOOD PRESSURE: 148 MMHG | HEART RATE: 73 BPM | DIASTOLIC BLOOD PRESSURE: 70 MMHG | HEIGHT: 72 IN

## 2019-11-13 DIAGNOSIS — I10 ESSENTIAL HYPERTENSION: ICD-10-CM

## 2019-11-13 DIAGNOSIS — I25.10 CORONARY ARTERY DISEASE INVOLVING NATIVE CORONARY ARTERY OF NATIVE HEART WITHOUT ANGINA PECTORIS: Primary | ICD-10-CM

## 2019-11-13 DIAGNOSIS — E78.2 MIXED HYPERLIPIDEMIA: ICD-10-CM

## 2019-11-13 DIAGNOSIS — Z95.5 S/P CORONARY ARTERY STENT PLACEMENT: ICD-10-CM

## 2019-11-13 RX ORDER — CARVEDILOL 25 MG/1
TABLET ORAL
Qty: 180 TAB | Refills: 3 | Status: SHIPPED | OUTPATIENT
Start: 2019-11-13 | End: 2020-10-30 | Stop reason: SDUPTHER

## 2019-11-13 NOTE — PROGRESS NOTES
1. Have you been to the ER, urgent care clinic since your last visit? Hospitalized since your last visit? Yes When: 09/2019 Where: [facility] Reason for visit: Bronchitis    2. Have you seen or consulted any other health care providers outside of the [address] [place] [address] since your last visit? Include any pap smears or colon screening.  No

## 2019-11-13 NOTE — PROGRESS NOTES
HISTORY OF PRESENT ILLNESS  Yareli Dowell is a 71 y.o. male. Patient with cad,htn,hyperlipidemia,dm. On follow up patient denies any chest pains,sob, palpitation or other significant symptoms. 12/2017  Admitted with unstable angina-had pci  7/2018. Patient was in emergency room with atypical chest pain. No acute EKG changes cardiac enzymes negative CTA and chest x-ray reports reviewed. Labs are negative for cardiac workup  5/2019. Recent ER visit with fall and concussion. Records reviewed. Hypertension   The history is provided by the patient. This is a chronic problem. The problem occurs constantly. The problem has not changed since onset. Pertinent negatives include no chest pain, no abdominal pain, no headaches and no shortness of breath. Chest Pain (Angina)    The history is provided by the patient. This is a recurrent problem. The problem has not changed since onset. The problem occurs rarely. The pain is associated with exertion. The pain is present in the substernal region. The quality of the pain is described as pressure-like. The pain does not radiate. Pertinent negatives include no abdominal pain, no claudication, no cough, no dizziness, no fever, no headaches, no hemoptysis, no malaise/fatigue, no nausea, no orthopnea, no palpitations, no PND, no shortness of breath, no sputum production, no vomiting and no weakness. Review of Systems   Constitutional: Negative for chills, fever and malaise/fatigue. HENT: Negative for nosebleeds. Eyes: Negative for blurred vision and double vision. Respiratory: Negative for cough, hemoptysis, sputum production, shortness of breath and wheezing. Cardiovascular: Negative for chest pain, palpitations, orthopnea, claudication, leg swelling and PND. Gastrointestinal: Negative for abdominal pain, heartburn, nausea and vomiting. Musculoskeletal: Negative for falls and myalgias. Skin: Negative for rash.    Neurological: Negative for dizziness, weakness and headaches. Endo/Heme/Allergies: Does not bruise/bleed easily. Family History   Problem Relation Age of Onset   [place] Stroke Mother     Headache Mother     Hypertension Mother    Janas Ashwini Mother     Heart Disease Father     Heart Attack Father     Hypertension Father     Diabetes Father     Lung Cancer Father     Ovarian Cancer Sister     Heart Attack Brother     Breast Cancer Sister     Diabetes Sister     Cancer Maternal Aunt         lung cancer    Cancer Maternal Uncle     Cancer Paternal Aunt     Cancer Paternal Uncle        Past Medical History:   Diagnosis Date    Arthritis     1999 vicodin    Diabetes ([facility] [address] 1998 metformin    GERD (gastroesophageal reflux disease)     Hypercholesterolemia     Hypertension 1996 norvasc    Kidney stones     MI (myocardial infarction) ([facility] [address]        Past Surgical History:   Procedure Laterality Date    CARDIAC SURG PROCEDURE UNLIST  12/2017    stints    COLONOSCOPY N/A 11/2/2018    COLONOSCOPY with Polypectomies performed by Ruben Anthony MD at [facility] ENDOSCOPY    HX CHOLECYSTECTOMY      HX GI  2010    gallbladder       Social History     Tobacco Use    Smoking status: Never Smoker    Smokeless tobacco: Never Used   Substance Use Topics    Alcohol use: No       No Known Allergies        Visit Vitals  /70   Pulse 73   Ht 6' (1.829 m)   Wt 92.8 kg (204 lb 9.6 oz)   BMI 27.75 kg/m²        Physical Exam   Constitutional: He is oriented to person, place, and time. He appears well-developed and well-nourished. HENT:   Head: Normocephalic and atraumatic. Eyes: Conjunctivae are normal.   Neck: Neck supple. No JVD present. No tracheal deviation present. No thyromegaly present. Cardiovascular: Normal rate, regular rhythm and normal heart sounds. Exam reveals no gallop and no friction rub. No murmur heard. Pulmonary/Chest: Breath sounds normal. No respiratory distress. He has no wheezes. He has no rales. He exhibits no tenderness. Abdominal: Soft. There is no tenderness. Musculoskeletal: He exhibits no edema. Neurological: He is alert and oriented to person, place, and time. Skin: Skin is warm and dry. Psychiatric: He has a normal mood and affect. Mr. Delicia Lam has a reminder for a \"due or due soon\" health maintenance. I have asked that he contact his primary care provider for follow-up on this health maintenance. CONCLUSION:1/2016-  ABNORMAL STRESS ECHO WITH EKG AND WALL MOTION ABNORMALITIES SUGGESTIVE OF LAD  DISTRIBUTION DISEASE    IMPRESSION:cath:1/2016    1. TWO VESSEL CORONARY ARTERY DISEASE IN THE FORM OF 80% OSTIAL AND 90% PROXIMAL NON-DOMINANT RIGHT CORONARY STENOSIS WHICH IS A MODERATE SIZED VESSEL OF ABOUT 1.5 TO 2 MM IN DIAMETER, AND 40% PROXIMAL CIRCUMFLEX, AND 70% MID CIRCUMFLEX STENOSIS WHICH IS A DOMINANT VESSEL. THERE ARE DIFFUSE LUMINAL IRREGULARITIES SEEN THROUGHOUT THE CORONARIES. 2. NO NORMAL OVERALL LV EJECTION FRACTION AT REST. 3. EVIDENCE OF CHEST PAIN DURING THE CASE AS WELL AS BEFORE THE CASE WAS STARTED. PARTIAL RELIEF WITH SUBLINGUAL NITROGLYCERIN AND NO ACUTE EKG CHANGES SEEN IN THE SIX MONITORING LEADS IN THE CATH LAB. DISCUSSION AND RECOMMENDATIONS: PATIENT IS LIKELY HAVING CHEST PAIN FROM THE NON-DOMINANT RIGHT CORONARY ARTERY. THE LEFT CIRCUMFLEX IS DOMINANT AND APPEARS TO HAVE BORDERLINE MID STENOSIS WHICH DOES NOT HAVE ANY APPEARANCE OF ANY ACUTE UNSTABLE LESION. I THINK HE SHOULD BE TREATED MEDICALLY FOR NOW, AND IF THE SYMPTOMS PERSIST, LEFT CIRCUMFLEX ARTERY WILL NEED TO BE INTERROGATED BY INTRACORONARY DOPPLER WITH FFR DETERMINATION. RIGHT CORONARY, THOUGH APPEARS TO BE CRITICAL IS A SMALL VESSEL OF ABOUT 1.5 TO A MAXIMUM OF 2 MM IN DIAMETER AND PROBABLY SHOULD BE LEFT TO MEDICAL TREATMENT. AGGRESSIVE RISK FACTOR MODIFICATION SHOULD BE FOLLOWED. SUMMARY:12/2017-echo  Left ventricle: Systolic function was normal. Ejection fraction was estimated   in the range of 55 % to 60 %.  No obvious  wall motion abnormalities identified in the views obtained. Wall thickness   was mildly increased. Doppler parameters  were consistent with abnormal left ventricular relaxation (grade 1 diastolic   dysfunction). Right ventricle: The size was at the upper limits of normal.    Left atrium: The atrium was mildly to moderately dilated. FINDING-12/2017-cath  1. Left main is patent, bifurcates into left anterior descending artery  and circumflex artery. 2. Left anterior descending artery has ostial 30% to 40% stenosis  followed by mid diffuse 30-40% stenosis. The vessel appears to be  moderately calcified. Diagonal 1 artery has diffuse 30-40% stenosis. Mid to distal left anterior descending artery is patent. 3. Circumflex artery is dominant with proximal 95% calcific stenosis. Status post PTCA using a Trek 1.5 x 8 mm balloon followed by a 2.5  mm x 12 mm balloon. The stent was a Synergy 2.75 mm x 15 mm  stent was deployed at about 14 atmospheres. Post-PCI PTCA was  performed using a noncompliant 3.0 mm x 8 mm balloon inflated about  16-18 atmospheres. Lesion reduced to 10%. 4. Obtuse marginal 1 artery was a small-caliber vessel with ostial 70%  to 80% stenosis. 5. Obtuse marginal 2 artery is a small- to medium-caliber vessel with  diffuse 30-40% stenosis. Mid circumflex artery has focal 80% stenosis. Status post PTCA using a Trek 2.5 mm x 12 mm balloon followed by a  Synergy 2.75 mm x 12 mm stent deployed about 14 atmospheres. Post-PCI PTCA was performed using a 3.0 mm x 8 mm  balloon deployed about 16 atmospheres. Lesion reduced to 0%. 6. Left posterior descending artery is patent. 7. Right coronary artery is nondominant, has ostial calcific 90%  followed by mid 99% stenosis. CONCLUSION:  Triple-vessel coronary artery disease. Status post  percutaneous transluminal coronary angioplasty/stent to proximal and  mid circumflex artery using drug-eluting stents.  The patient will be on  aspirin and Brilinta at this time. Intense medical management and risk  factor modification is advised. Assessment         ICD-10-CM ICD-9-CM    1. Coronary artery disease involving native coronary artery of native heart without angina pectoris I25.10 414.01     Stable continue current medical management   2. Mixed hyperlipidemia E78.2 272.2     Continues with PCP. Last LDL 92 in 6/2019   3. S/P coronary artery stent placement Z95.5 V45.82     Stable   4. Essential hypertension I10 401.9 carvedilol (COREG) 25 mg tablet    Stable continue current medication   1/2018  Out of brillinta for 2 weeks-unable to afford  Changed to plavix-discussed compliance  5/2018  Stable mild cp  Out of norvasc 3 months  refilled  10/2018  Atypical chest pain. Clinically noncardiac. Will continue dual antiplatelet therapy. Norvasc increased for hypertension  Medications Discontinued During This Encounter   Medication Reason    albuterol (PROVENTIL HFA, VENTOLIN HFA, PROAIR HFA) 90 mcg/actuation inhaler Not A Current Medication    carvedilol (COREG) 12.5 mg tablet        Orders Placed This Encounter    carvedilol (COREG) 25 mg tablet     Sig: TAKE 1 TABLET BY MOUTH TWICE A DAY WITH FOOD     Dispense:  180 Tab     Refill:  3       Follow-up and Dispositions    · Return in about 6 months (around 5/13/2020).          Amaya Soto MD

## 2020-01-06 RX ORDER — CLOPIDOGREL BISULFATE 75 MG/1
TABLET ORAL
Qty: 90 TAB | Refills: 3 | Status: SHIPPED | OUTPATIENT
Start: 2020-01-06 | End: 2020-12-18

## 2020-01-14 RX ORDER — FENOFIBRATE 48 MG/1
TABLET, COATED ORAL
Qty: 90 TAB | Refills: 0 | Status: SHIPPED | OUTPATIENT
Start: 2020-01-14 | End: 2020-04-07

## 2020-02-27 RX ORDER — ASPIRIN 325 MG
325 TABLET, DELAYED RELEASE (ENTERIC COATED) ORAL DAILY
Qty: 90 TAB | Refills: 3 | Status: SHIPPED | OUTPATIENT
Start: 2020-02-27 | End: 2021-01-07

## 2020-02-27 RX ORDER — RAMIPRIL 10 MG/1
10 CAPSULE ORAL DAILY
Qty: 90 CAP | Refills: 3 | Status: SHIPPED | OUTPATIENT
Start: 2020-02-27 | End: 2021-02-19

## 2020-04-07 RX ORDER — FENOFIBRATE 48 MG/1
TABLET, COATED ORAL
Qty: 90 TAB | Refills: 0 | Status: SHIPPED | OUTPATIENT
Start: 2020-04-07 | End: 2020-05-12 | Stop reason: ALTCHOICE

## 2020-04-14 ENCOUNTER — VIRTUAL VISIT (OUTPATIENT)
Dept: FAMILY MEDICINE CLINIC | Age: 70
End: 2020-04-14

## 2020-04-14 ENCOUNTER — TELEPHONE (OUTPATIENT)
Dept: FAMILY MEDICINE CLINIC | Age: 70
End: 2020-04-14

## 2020-04-14 VITALS — DIASTOLIC BLOOD PRESSURE: 60 MMHG | SYSTOLIC BLOOD PRESSURE: 124 MMHG

## 2020-04-14 DIAGNOSIS — Z12.5 SCREENING FOR PROSTATE CANCER: ICD-10-CM

## 2020-04-14 DIAGNOSIS — Z00.00 MEDICARE ANNUAL WELLNESS VISIT, SUBSEQUENT: ICD-10-CM

## 2020-04-14 DIAGNOSIS — E11.9 WELL CONTROLLED DIABETES MELLITUS (HCC): Primary | ICD-10-CM

## 2020-04-14 DIAGNOSIS — I10 ESSENTIAL HYPERTENSION: ICD-10-CM

## 2020-04-14 DIAGNOSIS — G47.9 SLEEP TROUBLE: ICD-10-CM

## 2020-04-14 DIAGNOSIS — E04.9 THYROID GOITER: ICD-10-CM

## 2020-04-14 DIAGNOSIS — K21.9 GASTROESOPHAGEAL REFLUX DISEASE WITHOUT ESOPHAGITIS: ICD-10-CM

## 2020-04-14 DIAGNOSIS — E78.5 HYPERLIPIDEMIA, UNSPECIFIED HYPERLIPIDEMIA TYPE: ICD-10-CM

## 2020-04-14 DIAGNOSIS — G89.4 CHRONIC PAIN SYNDROME: ICD-10-CM

## 2020-04-14 RX ORDER — LORATADINE 10 MG/1
10 TABLET ORAL
COMMUNITY
End: 2021-03-12

## 2020-04-14 RX ORDER — TRAZODONE HYDROCHLORIDE 50 MG/1
50 TABLET ORAL
Qty: 30 TAB | Refills: 0 | Status: SHIPPED | OUTPATIENT
Start: 2020-04-14 | End: 2020-05-11 | Stop reason: SDUPTHER

## 2020-04-14 RX ORDER — METFORMIN HYDROCHLORIDE 1000 MG/1
1000 TABLET ORAL 2 TIMES DAILY WITH MEALS
Qty: 180 TAB | Refills: 0 | Status: SHIPPED | OUTPATIENT
Start: 2020-04-14 | End: 2020-07-16

## 2020-04-14 RX ORDER — PHENOL/SODIUM PHENOLATE
20 AEROSOL, SPRAY (ML) MUCOUS MEMBRANE DAILY
Qty: 90 TAB | Refills: 0 | Status: SHIPPED | OUTPATIENT
Start: 2020-04-14 | End: 2020-08-03

## 2020-04-14 NOTE — PROGRESS NOTES
Consent: Belkis Cortes, who was seen by synchronous (real-time) audio-video technology, and/or his healthcare decision maker, is aware that this patient-initiated, Telehealth encounter on 4/14/2020 is a billable service, with coverage as determined by his insurance carrier. He is aware that he may receive a bill and has provided verbal consent to proceed: Yes. Assessment & Plan:     Diagnoses and all orders for this visit:    Medicare annual wellness visit, subsequent    Well controlled diabetes mellitus ([facility] [address]): elevated blood sugar above 200. Taking metformin 1000 mg bid. For now diet modification. Adding [place]. Also advised to keep blood sugar log. Discussed medication side effects. F/u next visit. Pt had an eye exam back in jan. Nurse to advise to obtain records. rpeeat labs once pandemic situation gets over. F/u with log.   -     HEMOGLOBIN A1C WITH EAG; Future  -     METABOLIC PANEL, COMPREHENSIVE; Future  -     SITagliptin (JANUVIA) 100 mg tablet; Take 1 Tab by mouth daily. , Normal, Disp-90 Tab, R-0  -     metFORMIN (GLUCOPHAGE) 1,000 mg tablet; Take 1 Tab by mouth two (2) times daily (with meals). , Normal, Disp-180 Tab, R-0    Essential hypertension: keeping home blood pressure log. It is average below 130/90./ compliant with taking medication. For now advised to keep log. Low salt diet and continue current dose of medication. Thyroid goiter: asymptomatic. Will observe. Ultrasound showed multiple nodule. Last TSH wnl. Pt has not seen endo yet. Will do a new referral.     Sleep trouble: sleep hygiene. Discussed in detail. Starting trazodone. Side effects discussed. F/u next visit . -     traZODone (DESYREL) 50 mg tablet; Take 1 Tab by mouth nightly., Normal, Disp-30 Tab, R-0    Gastroesophageal reflux disease without esophagitis: symptomatic. Diet modification.   -     Omeprazole delayed release (PRILOSEC D/R) 20 mg tablet; Take 1 Tab by mouth daily.  Indications: gastroesophageal reflux disease, Normal, Disp-90 Tab, R-0    Hyperlipidemia, unspecified hyperlipidemia type: on statin. No side effects. Chronic pain syndrome: stable. Following pain management. Comments:  neck     Screening for prostate cancer  -     PSA SCREENING (SCREENING); Future    Pt understood and agree with the plan. 712  Subjective:   Shirin Amezquita is a 79 y.o. male who was seen for No chief complaint on file. done visit with audio-video technology. Noncompliant with keeping follow-ups. Seen patient once. Discussed the importance of the compliance of the follow-up and instructions. History of diabetes. Said lately the blood sugar log at home showing elevated blood sugar around 200-300. No hypoglycemic symptoms. He was sitting comfortably during the video virtual visit and did not appear in any acute distress. Denies any nausea or vomiting, abdominal pain, urine frequency. Per wife 1 episode of blood sugar around 600s few months back. He has been taking metformin thousand milligrams 2 times a day. Denies any side effects. Said not any diet changes currently. History of hypertension. Checking blood pressure at home. Average blood pressure is under 130/90 mmHg. Recent 1 124/60 mmHg. He is asymptomatic. Taking medication with compliance. History of goiter, multiple thyroid nodules. Last ultrasound reviewed through the chart. Showed multiple thyroid nodules. His last TSH was within normal limit. Currently he denies any change in voice or trouble swallowing. No appetite or weight changes. No diaphoresis or palpitation. No unusual fatigue. Concerned with the sleep trouble. Said he tries to maintain the sleep hygiene but still making satisfactory sleep. Not taking any medication. Denies any depression or anxiety. On the depression  screening and it has been negative. Denies any snoring or any trouble breathing during sleep.   Discussed high BMI and advised him to be active and diet modification with the low-carb and low-fat diet. History of GERD. Been stable. Asking for PPI refill. Advised him to take it 30 minutes before the meal time. History of hyperlipidemia. On statin. no side effects. EXAM: Ultrasound of the thyroid gland.     CLINICAL HISTORY/INDICATION: History of thyroid nodules. .     COMPARISON: None.     TECHNIQUE: Multiple transverse and longitudinal ultrasonic planes. .     FINDINGS:     The right lobe of the thyroid gland measures 5.3 x 2.4 x 2.1 cm. The isthmus  measures 0.35 cm. The left lobe measures 5.3 x 2.5 x 2.3 cm. The lobes and  isthmus appear heterogeneous in echotexture. Multiple nodules are seen in both  lobes of the thyroid gland. In the right lobe of the thyroid gland the largest  nodule measures 1.98 cm the intermediate size nodule measures 1.54 cm and the  smallest nodule measures 1.09 cm. In the left lobe of the thyroid gland there  are 2 nodules the larger measures 1.87 cm and the smaller measures 0.69 cm. .     IMPRESSION  IMPRESSION:     Enlarged lobes of the thyroid gland. Heterogeneous echogenicity to the lobes and isthmus of the thyroid gland. Multiple nodules in both lobes of the thyroid gland. .    Reviewed labs.   Lab Results   Component Value Date/Time    WBC 9.2 09/27/2019 05:40 PM    HGB 14.6 09/27/2019 05:40 PM    HCT 40.8 09/27/2019 05:40 PM    PLATELET 012 65/12/6040 05:40 PM    MCV 88.9 09/27/2019 05:40 PM     Lab Results   Component Value Date/Time    Sodium 142 09/27/2019 05:40 PM    Potassium 3.8 09/27/2019 05:40 PM    Chloride 109 09/27/2019 05:40 PM    CO2 26 09/27/2019 05:40 PM    Anion gap 7 09/27/2019 05:40 PM    Glucose 115 (H) 09/27/2019 05:40 PM    BUN 14 09/27/2019 05:40 PM    Creatinine 0.79 09/27/2019 05:40 PM    BUN/Creatinine ratio 18 09/27/2019 05:40 PM    GFR est AA >60 09/27/2019 05:40 PM    GFR est non-AA >60 09/27/2019 05:40 PM    Calcium 9.4 09/27/2019 05:40 PM    Bilirubin, total 0.3 05/16/2019 04:25 PM    AST (SGOT) 15 05/16/2019 04:25 PM    Alk. phosphatase 80 05/16/2019 04:25 PM    Protein, total 7.7 05/16/2019 04:25 PM    Albumin 4.3 05/16/2019 04:25 PM    Globulin 3.4 05/16/2019 04:25 PM    A-G Ratio 1.3 05/16/2019 04:25 PM    ALT (SGPT) 27 05/16/2019 04:25 PM     Lab Results   Component Value Date/Time    Cholesterol, total 180 06/24/2019 11:56 AM    HDL Cholesterol 43 06/24/2019 11:56 AM    LDL, calculated 92.4 06/24/2019 11:56 AM    VLDL, calculated 44.6 06/24/2019 11:56 AM    Triglyceride 223 (H) 06/24/2019 11:56 AM    CHOL/HDL Ratio 4.2 06/24/2019 11:56 AM     Lab Results   Component Value Date/Time    TSH 2.29 01/30/2019 07:32 AM     Lab Results   Component Value Date/Time    Hemoglobin A1c 6.3 (H) 06/28/2019 11:55 AM     Lab Results   Component Value Date/Time    Microalbumin/Creat ratio (mg/g creat) 9 06/24/2019 11:56 AM    Microalbumin,urine random 0.81 06/24/2019 11:56 AM           Prior to Admission medications    Medication Sig Start Date End Date Taking? Authorizing Provider   loratadine (Claritin) 10 mg tablet Take 10 mg by mouth. Yes Provider, Historical   fenofibrate nanocrystallized (TRICOR) 48 mg tablet TAKE 1 TABLET BY MOUTH EVERYDAY AT BEDTIME 4/7/20  Yes Coby Mantilla NP   ramipril (ALTACE) 10 mg capsule Take 1 Cap by mouth daily. 2/27/20  Yes Coby Mantilla NP   aspirin delayed-release 325 mg tablet Take 1 Tab by mouth daily.  2/27/20  Yes Coby Mantilla NP   clopidogrel (PLAVIX) 75 mg tab TAKE 1 TABLET BY MOUTH EVERY DAY 1/6/20  Yes Coby Mantilla NP   carvedilol (COREG) 25 mg tablet TAKE 1 TABLET BY MOUTH TWICE A DAY WITH FOOD 11/13/19  Yes Chavez Stinson MD   rosuvastatin (CRESTOR) 10 mg tablet TAKE 1 TABLET BY MOUTH EVERYDAY AT BEDTIME 10/21/19  Yes Chavez Stinson MD   amLODIPine (NORVASC) 10 mg tablet TAKE 1 TABLET BY MOUTH EVERY DAY 7/12/19  Yes Chavez Stinson MD   ondansetron (ZOFRAN ODT) 8 mg disintegrating tablet Take 1 Tab by mouth every eight (8) hours as needed for Nausea for up to 12 doses. 6/24/19  Yes Roxi MORRIS NP   Omeprazole delayed release (PRILOSEC D/R) 20 mg tablet Take 1 Tab by mouth daily. Indications: gastroesophageal reflux disease 6/7/19  Yes Shad Plascencia NP   acetaminophen (TYLENOL) 325 mg tablet Take  by mouth every four (4) hours as needed for Pain. Yes Provider, Historical   metFORMIN (GLUCOPHAGE) 500 mg tablet Take 1,000 mg by mouth two (2) times daily (with meals). Yes Provider, Historical   venlafaxine-SR (EFFEXOR-XR) 37.5 mg capsule Take 1 Cap by mouth daily. 3/20/19 4/14/20  Shad Plascencia NP   cetirizine (ZYRTEC) 10 mg tablet Take 10 mg by mouth daily as needed. 4/14/20  Provider, Historical   nitroglycerin (NITROSTAT) 0.4 mg SL tablet 1 Tab by SubLINGual route every five (5) minutes as needed for Chest Pain for up to 3 doses. 12/2/17   Killian Quinones MD     No Known Allergies    Patient Active Problem List    Diagnosis Date Noted    CAD (coronary artery disease) 12/01/2017     Priority: 1 - One    Tubular adenoma of colon 09/26/2019    Cannabis use, uncomplicated 55/83/0963    Cervical disc disease 09/26/2019    GERD (gastroesophageal reflux disease) 09/26/2019    Controlled type 2 diabetes mellitus without complication, without long-term current use of insulin ([facility] [address] 11/29/2018    Insomnia 11/29/2018    S/P coronary artery stent placement 01/25/2018    Essential hypertension 01/27/2016    Hyperlipidemia 01/27/2016    Thyroid goiter 01/27/2016    Chronic pain syndrome 11/17/2014     Current Outpatient Medications   Medication Sig Dispense Refill    loratadine (Claritin) 10 mg tablet Take 10 mg by mouth.  SITagliptin (JANUVIA) 100 mg tablet Take 1 Tab by mouth daily. 90 Tab 0    metFORMIN (GLUCOPHAGE) 1,000 mg tablet Take 1 Tab by mouth two (2) times daily (with meals). 180 Tab 0    traZODone (DESYREL) 50 mg tablet Take 1 Tab by mouth nightly.  30 Tab 0    Omeprazole delayed release (PRILOSEC D/R) 20 mg tablet Take 1 Tab by mouth daily. Indications: gastroesophageal reflux disease 90 Tab 0    fenofibrate nanocrystallized (TRICOR) 48 mg tablet TAKE 1 TABLET BY MOUTH EVERYDAY AT BEDTIME 90 Tab 0    ramipril (ALTACE) 10 mg capsule Take 1 Cap by mouth daily. 90 Cap 3    aspirin delayed-release 325 mg tablet Take 1 Tab by mouth daily. 90 Tab 3    clopidogrel (PLAVIX) 75 mg tab TAKE 1 TABLET BY MOUTH EVERY DAY 90 Tab 3    carvedilol (COREG) 25 mg tablet TAKE 1 TABLET BY MOUTH TWICE A DAY WITH FOOD 180 Tab 3    rosuvastatin (CRESTOR) 10 mg tablet TAKE 1 TABLET BY MOUTH EVERYDAY AT BEDTIME 90 Tab 2    amLODIPine (NORVASC) 10 mg tablet TAKE 1 TABLET BY MOUTH EVERY DAY 90 Tab 3    ondansetron (ZOFRAN ODT) 8 mg disintegrating tablet Take 1 Tab by mouth every eight (8) hours as needed for Nausea for up to 12 doses. 12 Tab 0    acetaminophen (TYLENOL) 325 mg tablet Take  by mouth every four (4) hours as needed for Pain.  nitroglycerin (NITROSTAT) 0.4 mg SL tablet 1 Tab by SubLINGual route every five (5) minutes as needed for Chest Pain for up to 3 doses. 25 Tab 0     No Known Allergies  Past Medical History:   Diagnosis Date    Arthritis     1999 vicodin    Diabetes ([facility] [address] 1998 metformin    GERD (gastroesophageal reflux disease)     Hypercholesterolemia     Hypertension 1996 norvasc    Kidney stones     MI (myocardial infarction) ([facility] [address]        ROS: Denies any headache, dizziness, no chest pain or trouble breathing, no arm or leg weakness. No nausea or vomiting, no weight or appetite changes, no mood changes . No urine or bowel complains, no palpitation, no diaphoresis. No abdominal pain. No cold or cough. No leg swelling. No fever. Objective: There were no vitals taken for this visit.    General: alert, cooperative, no distress   Mental  status: normal mood, behavior, speech, dress, motor activity, and thought processes, able to follow commands   HENT: NCAT   Neck: no visualized mass   Resp: no respiratory distress   Neuro: no gross deficits   Skin: no discoloration or lesions of concern on visible areas   Psychiatric: normal affect, consistent with stated mood, no evidence of hallucinations     Additional exam findings: We discussed the expected course, resolution and complications of the diagnosis(es) in detail. Medication risks, benefits, costs, interactions, and alternatives were discussed as indicated. I advised him to contact the office if his condition worsens, changes or fails to improve as anticipated. He expressed understanding with the diagnosis(es) and plan. Eduardo Reyes is a 79 y.o. male being evaluated by a video visit encounter for concerns as above. A caregiver was present when appropriate. Due to this being a TeleHealth encounter ([facility]), evaluation of the following organ systems was limited: Vitals/Constitutional/EENT/Resp/CV/GI//MS/Neuro/Skin/Heme-Lymph-Imm. Pursuant to the emergency declaration under the [facility], [facility] waiver authority and the [employer] Act, this Virtual  Visit was conducted, with patient's (and/or legal guardian's) consent, to reduce the patient's risk of exposure to COVID-19 and provide necessary medical care. Services were provided through a video synchronous discussion virtually to substitute for in-person clinic visit. Patient and provider were located at their individual homes. Ian Zuluaga MD      This is the Subsequent Medicare Annual Wellness Exam, performed 12 months or more after the Initial AWV or the last Subsequent AWV    Consent: Eduardo Reyes, who was seen by synchronous (real-time) audio-video technology, and/or his healthcare decision maker, is aware that this patient-initiated, Telehealth encounter on 4/14/2020 is a billable service.  While AWVs are fully covered by Medicare, any services rendered on this date that are not included in an AWV are subject to additional billing, with coverage as determined by his insurance carrier. He is aware that he may receive a bill for any such additional services and has provided verbal consent to proceed: Yes. I have reviewed the patient's medical history in detail and updated the computerized patient record. History     Patient Active Problem List   Diagnosis Code    Chronic pain syndrome G89.4    Essential hypertension I10    Hyperlipidemia E78.5    Thyroid goiter E04.9    CAD (coronary artery disease) I25.10    S/P coronary artery stent placement Z95.5    Controlled type 2 diabetes mellitus without complication, without long-term current use of insulin (HCC) E11.9    Insomnia G47.00    Tubular adenoma of colon D12.6    Cannabis use, uncomplicated F85.69    Cervical disc disease M50.90    GERD (gastroesophageal reflux disease) K21.9     Past Medical History:   Diagnosis Date    Arthritis     1999 vicodin    Diabetes ([facility] [address] 1998 metformin    GERD (gastroesophageal reflux disease)     Hypercholesterolemia     Hypertension 1996 norvasc    Kidney stones     MI [facility] infarction[facility])       Past Surgical History:   Procedure Laterality Date    CARDIAC SURG PROCEDURE UNLIST  12/2017    stints    COLONOSCOPY N/A 11/2/2018    COLONOSCOPY with Polypectomies performed by Tremaine Kimble MD at [facility] ENDOSCOPY    HX CHOLECYSTECTOMY      HX GI  2010    gallbladder     Current Outpatient Medications   Medication Sig Dispense Refill    loratadine (Claritin) 10 mg tablet Take 10 mg by mouth.  SITagliptin (JANUVIA) 100 mg tablet Take 1 Tab by mouth daily. 90 Tab 0    metFORMIN (GLUCOPHAGE) 1,000 mg tablet Take 1 Tab by mouth two (2) times daily (with meals). 180 Tab 0    traZODone (DESYREL) 50 mg tablet Take 1 Tab by mouth nightly.  30 Tab 0    fenofibrate nanocrystallized (TRICOR) 48 mg tablet TAKE 1 TABLET BY MOUTH EVERYDAY AT BEDTIME 90 Tab 0    ramipril (ALTACE) 10 mg capsule Take 1 Cap by mouth daily. 90 Cap 3    aspirin delayed-release 325 mg tablet Take 1 Tab by mouth daily. 90 Tab 3    clopidogrel (PLAVIX) 75 mg tab TAKE 1 TABLET BY MOUTH EVERY DAY 90 Tab 3    carvedilol (COREG) 25 mg tablet TAKE 1 TABLET BY MOUTH TWICE A DAY WITH FOOD 180 Tab 3    rosuvastatin (CRESTOR) 10 mg tablet TAKE 1 TABLET BY MOUTH EVERYDAY AT BEDTIME 90 Tab 2    amLODIPine (NORVASC) 10 mg tablet TAKE 1 TABLET BY MOUTH EVERY DAY 90 Tab 3    ondansetron (ZOFRAN ODT) 8 mg disintegrating tablet Take 1 Tab by mouth every eight (8) hours as needed for Nausea for up to 12 doses. 12 Tab 0    Omeprazole delayed release (PRILOSEC D/R) 20 mg tablet Take 1 Tab by mouth daily. Indications: gastroesophageal reflux disease 90 Tab 3    acetaminophen (TYLENOL) 325 mg tablet Take  by mouth every four (4) hours as needed for Pain.  nitroglycerin (NITROSTAT) 0.4 mg SL tablet 1 Tab by SubLINGual route every five (5) minutes as needed for Chest Pain for up to 3 doses. 25 Tab 0     No Known Allergies    Family History   Problem Relation Age of Onset    Stroke Mother     Headache Mother     Hypertension Mother     Lung Cancer Mother     Heart Disease Father     Heart Attack Father     Hypertension Father     Diabetes Father     Lung Cancer Father     Ovarian Cancer Sister     Heart Attack Brother     Breast Cancer Sister     Diabetes Sister     Cancer Maternal Aunt         lung cancer    Cancer Maternal Uncle     Cancer Paternal Aunt     Cancer Paternal Uncle      Social History     Tobacco Use    Smoking status: Never Smoker    Smokeless tobacco: Never Used   Substance Use Topics    Alcohol use: No       Depression Risk Factor Screening:     3 most recent PHQ Screens 4/14/2020   Little interest or pleasure in doing things Not at all   Feeling down, depressed, irritable, or hopeless Not at all   Total Score PHQ 2 0       Alcohol Risk Factor Screening (MALE > 65):    Do you average more 1 drink per night or more than 7 drinks a week: No    In the past three months have you have had more than 4 drinks containing alcohol on one occasion: No      Functional Ability and Level of Safety:   Hearing: Hearing is good. Activities of Daily Living: The home contains: no safety equipment. Patient does total self care    Ambulation: with no difficulty    Fall Risk:  Fall Risk Assessment, last 12 mths 10/8/2019   Able to walk? Yes   Fall in past 12 months? Yes   Fall with injury? Yes   Number of falls in past 12 months 1   Fall Risk Score 2       Abuse Screen:  Patient is not abused    Cognitive Screening   Has your family/caregiver stated any concerns about your memory: no      Patient Care Team   Patient Care Team:  Taya Clark MD as PCP - General (Family Practice)  Taya Clark MD [facility] PCP - [facility]  Susanne Kennedy MD as Physician (Cardiology)  Paul Mena MD as Physician (Endocrinology)    Assessment/Plan   Education and counseling provided:  Are appropriate based on today's review and evaluation  Review nurses note and assessment. Agree with that. Discussed 5 years health plan and given copy in AVS.  Discussed advance directive. Given hand out with AVS for more information. Discussed DNR and DNI. Diagnoses and all orders for this visit:    1. Screening for prostate cancer  -     PSA SCREENING (SCREENING); Future    2. Medicare annual wellness visit, subsequent    Advance Care Planning       Advance Care Planning (ACP) Physician/NP/PA (Provider) Conversation      Date of ACP Conversation: 4/14/2020    Conversation Conducted with:   Patient with Decision Making Capacity    *If present, Decision Maker was asked to consider and make decisions based on patient values, known preferences, or best interests.        Current Designated Health Care Decision Maker:   (as entered in [address] field)    If no Decision Maker listed above or available through scanned documents, then:    [address] [place]:  Who do you trust to make healthcare decisions for you? Wife   Can this person be reached and be able to respond quickly, such as within a few minutes or hours? YES  Who would be your back-up decision maker? All children     Discussed advance care directive in details . He will think about it. Wants his wife first and then his children to make a decision for him. Currently recommended to complete advance directive. Conversation Outcomes / Follow-Up Plan:   Recommended completion of Advance Directive      Length of ACP Conversation in minutes:  <16 minutes (Non-Billable)    Coding Instructions:  ACP is eligible as a billable service through Medicare in conjunction with either an AWV or a problem-oriented E/M visit if it entails dedicated discussion >/=16 minutes. Other insurance carriers may or may not cover ACP services. 16-30 minutes: code [phone]  >30 minutes: code 70314  Add 25 modifier if reported with an E/M visit (deductible and co-pays may apply)  Add 33 modifier if reported with an AWV to waive patient cost-sharing            Health Maintenance Due   Topic Date Due    Eye Exam Retinal or Dilated  01/28/1960    Medicare Yearly Exam  02/01/2020       Aleksandar Contreras is a 79 y.o. male being evaluated by a video visit encounter for concerns as above. A caregiver was present when appropriate. Due to this being a TeleHealth encounter (During [facility]- public health emergency), evaluation of the following organ systems was limited: Vitals/Constitutional/EENT/Resp/CV/GI//MS/Neuro/Skin/Heme-Lymph-Imm.   Pursuant to the emergency declaration under the [facility] waiver authority and the [employer] Act, this Virtual  Visit was conducted, with patient's (and/or legal guardian's) consent, to reduce the patient's risk of exposure to COVID-19 and provide necessary medical care. Services were provided through a video synchronous discussion virtually to substitute for in-person clinic visit. Patient and provider were located at their individual homes.     Thai Whaley MD

## 2020-04-14 NOTE — TELEPHONE ENCOUNTER
Dr. Antonio Cameron, patient states [place] Rivera is too expensive (see message below). Please send alternate medication to patient's pharmacy. Thank you.

## 2020-04-14 NOTE — PATIENT INSTRUCTIONS
Medicare Wellness Visit, Male The best way to live healthy is to have a lifestyle where you eat a well-balanced diet, exercise regularly, limit alcohol use, and quit all forms of tobacco/nicotine, if applicable. Regular preventive services are another way to keep healthy. Preventive services (vaccines, screening tests, monitoring & exams) can help personalize your care plan, which helps you manage your own care. Screening tests can find health problems at the earliest stages, when they are easiest to treat. [facility] follows the current, evidence-based guidelines published by the [facility] ([facility]) when recommending preventive services for our patients. Because we follow these guidelines, sometimes recommendations change over time as research supports it. (For example, a prostate screening blood test is no longer routinely recommended for men with no symptoms). Of course, you and your doctor may decide to screen more often for some diseases, based on your risk and co-morbidities (chronic disease you are already diagnosed with). Preventive services for you include: - Medicare offers their members a free annual wellness visit, which is time for you and your primary care provider to discuss and plan for your preventive service needs. Take advantage of this benefit every year! 
-All adults over age 72 should receive the recommended pneumonia vaccines. Current USPSTF guidelines recommend a series of two vaccines for the best pneumonia protection.  
-All adults should have a flu vaccine yearly and tetanus vaccine every 10 years. 
-All adults age 48 and older should receive the shingles vaccines (series of two vaccines).       
-All adults age 38-68 who are overweight should have a diabetes screening test once every three years.  
-Other screening tests & preventive services for persons with diabetes include: an eye exam to screen for diabetic retinopathy, a kidney function test, a foot exam, and stricter control over your cholesterol.  
-Cardiovascular screening for adults with routine risk involves an electrocardiogram (ECG) at intervals determined by the provider.  
-Colorectal cancer screening should be done for adults age 54-65 with no increased risk factors for colorectal cancer. There are a number of acceptable methods of screening for this type of cancer. Each test has its own benefits and drawbacks. Discuss with your provider what is most appropriate for you during your annual wellness visit. The different tests include: colonoscopy (considered the best screening method), a fecal occult blood test, a fecal DNA test, and sigmoidoscopy. 
-All adults born between [facility] should be screened once for Hepatitis C. 
-An Abdominal Aortic Aneurysm (AAA) Screening is recommended for men age 73-68 who has ever smoked in their lifetime. Here is a list of your current Health Maintenance items (your personalized list of preventive services) with a due date: 
Health Maintenance Due Topic Date Due [facility] Eye Exam  01/28/1960 [facility] Annual Well Visit  02/01/2020

## 2020-04-15 RX ORDER — GLIPIZIDE 5 MG/1
5 TABLET ORAL 2 TIMES DAILY
Qty: 60 TAB | Refills: 1 | Status: SHIPPED | OUTPATIENT
Start: 2020-04-15 | End: 2020-04-15 | Stop reason: ALTCHOICE

## 2020-04-15 NOTE — TELEPHONE ENCOUNTER
Pt states that the pharmacy was able to get the price down on the Januvia to $14 and pt went ahead and picked it up. Thank you.

## 2020-04-15 NOTE — TELEPHONE ENCOUNTER
I will change it to glipizide and advise pt to keep blood sugar log and f/u in 1 month or sooner with virtual visit.

## 2020-04-15 NOTE — TELEPHONE ENCOUNTER
Spoke with patient (all identifiers verified) to advise Dr. He Ricardo has sent Glipizide to [facility]. Patient stated he will stick with using Januvia since the pharmacist was able to get medication cost down to $14.95. Patient will inform pharmacy to cancel Glipizide prescription. I will route message to Dr. He Ricardo, as well. Patient was asked to keep a blood sugar log and follow-up in 1 month or sooner for virtual visit with Dr. He Ricardo. Patient verbalized understanding.

## 2020-04-27 ENCOUNTER — HOSPITAL ENCOUNTER (OUTPATIENT)
Dept: LAB | Age: 70
Discharge: HOME | End: 2020-04-27
Payer: MEDICARE

## 2020-04-27 DIAGNOSIS — E11.9 WELL CONTROLLED DIABETES MELLITUS (HCC): ICD-10-CM

## 2020-04-27 DIAGNOSIS — Z12.5 SCREENING FOR PROSTATE CANCER: ICD-10-CM

## 2020-04-27 LAB
ALBUMIN SERPL-MCNC: 3.7 G/DL (ref 3.4–5)
ALBUMIN/GLOB SERPL: 1.2 {RATIO} (ref 0.8–1.7)
ALP SERPL-CCNC: 93 U/L (ref 45–117)
ALT SERPL-CCNC: 24 U/L (ref 16–61)
ANION GAP SERPL CALC-SCNC: 9 MMOL/L (ref 3–18)
AST SERPL-CCNC: 11 U/L (ref 10–38)
BILIRUB SERPL-MCNC: 0.2 MG/DL (ref 0.2–1)
BUN SERPL-MCNC: 16 MG/DL (ref 7–18)
BUN/CREAT SERPL: 19 (ref 12–20)
CALCIUM SERPL-MCNC: 8.9 MG/DL (ref 8.5–10.1)
CHLORIDE SERPL-SCNC: 110 MMOL/L (ref 100–111)
CO2 SERPL-SCNC: 25 MMOL/L (ref 21–32)
CREAT SERPL-MCNC: 0.83 MG/DL (ref 0.6–1.3)
EST. AVERAGE GLUCOSE BLD GHB EST-MCNC: 258 MG/DL
GLOBULIN SER CALC-MCNC: 3.2 G/DL (ref 2–4)
GLUCOSE SERPL-MCNC: 236 MG/DL (ref 74–99)
HBA1C MFR BLD: 10.6 % (ref 4.2–5.6)
POTASSIUM SERPL-SCNC: 3.7 MMOL/L (ref 3.5–5.5)
PROT SERPL-MCNC: 6.9 G/DL (ref 6.4–8.2)
PSA SERPL-MCNC: 1.6 NG/ML (ref 0–4)
SODIUM SERPL-SCNC: 144 MMOL/L (ref 136–145)

## 2020-04-27 PROCEDURE — 84153 ASSAY OF PSA TOTAL: CPT

## 2020-04-27 PROCEDURE — 83036 HEMOGLOBIN GLYCOSYLATED A1C: CPT

## 2020-04-27 PROCEDURE — 36415 COLL VENOUS BLD VENIPUNCTURE: CPT

## 2020-04-27 PROCEDURE — 80053 COMPREHEN METABOLIC PANEL: CPT

## 2020-04-27 NOTE — PROGRESS NOTES
Let pt know that diabetes test went up. A1C around 10. For now continue metformin and start [facility] as directed. Keep blood sugar log and keep f/u appt as recommended.

## 2020-04-27 NOTE — PROGRESS NOTES
Patient identifiers verified. Patient advised that diabetes test went up and his A1C is over 10. He was advised that per Dr. Anna Chester he should continue metformin and start januvia as directed. Keep blood sugar log and keep f/u appt as recommended. Patient voices understanding.

## 2020-04-30 ENCOUNTER — TELEPHONE (OUTPATIENT)
Dept: FAMILY MEDICINE CLINIC | Age: 70
End: 2020-04-30

## 2020-04-30 NOTE — TELEPHONE ENCOUNTER
This patient contacted office for the following prescriptions to be filled:    Medication requested : glipiZIDE (GLUCOTROL) 5 mg tablet     PCP: Roland Grossman or Print: [facility]  Mail order or Local pharmacy [facility] [phone]    Scheduled appointment if not seen by current providers in office: lov 11/13/2019 f/u 5/12/2020

## 2020-05-11 ENCOUNTER — TELEPHONE (OUTPATIENT)
Dept: FAMILY MEDICINE CLINIC | Age: 70
End: 2020-05-11

## 2020-05-11 DIAGNOSIS — G47.9 SLEEP TROUBLE: ICD-10-CM

## 2020-05-11 NOTE — TELEPHONE ENCOUNTER
This pharmacy faxed over request for the following prescriptions to be filled:    Medication requested :   Requested Prescriptions     Pending Prescriptions Disp Refills    traZODone (DESYREL) 50 mg tablet 30 Tab 0     Sig: Take 1 Tab by mouth nightly.      PCP: Roland Grossman or Print:  [facility]  Mail order or Local pharmacy [place] [id number]     Scheduled appointment if not seen by current providers in office:  LOV 4/14/2020 f/u 5/12/2020

## 2020-05-12 ENCOUNTER — VIRTUAL VISIT (OUTPATIENT)
Dept: FAMILY MEDICINE CLINIC | Age: 70
End: 2020-05-12

## 2020-05-12 DIAGNOSIS — E11.65 POORLY CONTROLLED DIABETES MELLITUS (HCC): ICD-10-CM

## 2020-05-12 DIAGNOSIS — I10 ESSENTIAL HYPERTENSION: Primary | ICD-10-CM

## 2020-05-12 DIAGNOSIS — I25.10 CORONARY ARTERY DISEASE INVOLVING NATIVE CORONARY ARTERY OF NATIVE HEART WITHOUT ANGINA PECTORIS: ICD-10-CM

## 2020-05-12 DIAGNOSIS — E78.1 HYPERTRIGLYCERIDEMIA: ICD-10-CM

## 2020-05-12 DIAGNOSIS — E04.9 GOITER: ICD-10-CM

## 2020-05-12 RX ORDER — TRAZODONE HYDROCHLORIDE 50 MG/1
50 TABLET ORAL
Qty: 30 TAB | Refills: 0 | Status: SHIPPED | OUTPATIENT
Start: 2020-05-12 | End: 2020-05-18 | Stop reason: SDUPTHER

## 2020-05-12 RX ORDER — GLIPIZIDE 5 MG/1
5 TABLET, FILM COATED, EXTENDED RELEASE ORAL DAILY
Qty: 90 TAB | Refills: 0 | Status: SHIPPED | OUTPATIENT
Start: 2020-05-12 | End: 2020-08-03

## 2020-05-12 RX ORDER — ACETAMINOPHEN 500 MG
TABLET ORAL
Qty: 1 KIT | Refills: 0 | Status: SHIPPED | OUTPATIENT
Start: 2020-05-12

## 2020-05-12 NOTE — PROGRESS NOTES
Yaya Frye is a 79 y.o. male who was seen by synchronous (real-time) audio-video technology on 5/12/2020. Consent: Yaya Frye, who was seen by synchronous (real-time) audio-video technology, and/or his healthcare decision maker, is aware that this patient-initiated, Telehealth encounter on 5/12/2020 is a billable service, with coverage as determined by his insurance carrier. He is aware that he may receive a bill and has provided verbal consent to proceed: Yes. Assessment & Plan:   Diagnoses and all orders for this visit:    Essential hypertension: asymptomatic. Given blood pressure monitor and advised him to keep log. Low salt diet. He is working on life style modification. Will continue current plan. -     Blood Pressure Monitor kit; Check once a day, Normal, Disp-1 Kit, R-0    Poorly controlled diabetes mellitus ([address]): A1C >10. He was taking metformin not with compliance. Stated home blood sugar was high last visit and added januvia and ordered labs. He is tolerating both medication well. Now adding glipizide and discussed medication side effects. Again discussed importance of life style modification and diet modification. Pending endo referral. Discussed hypoglycemia management. Current home blood sugar fasting is around 200 and sometimes under 200. Gradually improving.   -     glipiZIDE SR (GLUCOTROL XL) 5 mg CR tablet; Take 1 Tab by mouth daily. , Normal, Disp-90 Tab, R-0    Coronary artery disease involving native coronary artery of native heart without angina pectoris: no aginal symptoms. On risk factor management. Working on diabetes better control. Comments:  has stent placement x3. Goiter: also pending endo referral. Used to see Dr. Tulio Brown. Lost follow up . Asymptomatic. Ultrasound showed multiple nodules. Will observe. Hypertriglyceridemia: on statin. No side effects. Again working on life style modification. F/u next visit.        Pt understood and agree with the plan Review        712  Subjective:   Heydi Cuenca is a 79 y.o. male who was seen for Follow-up and Labs  done visit with audio-video technology. Review labs. A1C >10. He is sitting comfortable and did not appear in any acute distress. On and off nausea but on PPI and asymptomatic during visit. No appetite or weight changes. No hyper or hypoglycemic symptoms. No urinary complains. No excessive thirst . No nausea or vomiting. No abdominal pain. Today home blood sugar fasting was around 200. Also hypertension. Taking medication. Not checking blood pressure at home   working on life style modification. Pending endo referral.  Goiter. No trouble swallowing or change in voice. Recent TSH wnl. Also no unusual fatigue. No heat or cold intolerance. No bowel complains. No weight or appetite changes. Sleep is fair. Hypertriglyceridemia. On statin. No side effects. Again working on life style modification. Lab Results   Component Value Date/Time    WBC 9.2 09/27/2019 05:40 PM    HGB 14.6 09/27/2019 05:40 PM    HCT 40.8 09/27/2019 05:40 PM    PLATELET 474 39/48/3131 05:40 PM    MCV 88.9 09/27/2019 05:40 PM     Lab Results   Component Value Date/Time    Sodium 144 04/27/2020 07:06 AM    Potassium 3.7 04/27/2020 07:06 AM    Chloride 110 04/27/2020 07:06 AM    CO2 25 04/27/2020 07:06 AM    Anion gap 9 04/27/2020 07:06 AM    Glucose 236 (H) 04/27/2020 07:06 AM    BUN 16 04/27/2020 07:06 AM    Creatinine 0.83 04/27/2020 07:06 AM    BUN/Creatinine ratio 19 04/27/2020 07:06 AM    GFR est AA >60 04/27/2020 07:06 AM    GFR est non-AA >60 04/27/2020 07:06 AM    Calcium 8.9 04/27/2020 07:06 AM    Bilirubin, total 0.2 04/27/2020 07:06 AM    AST (SGOT) 11 04/27/2020 07:06 AM    Alk.  phosphatase 93 04/27/2020 07:06 AM    Protein, total 6.9 04/27/2020 07:06 AM    Albumin 3.7 04/27/2020 07:06 AM    Globulin 3.2 04/27/2020 07:06 AM    A-G Ratio 1.2 04/27/2020 07:06 AM    ALT (SGPT) 24 04/27/2020 07:06 AM     Lab Results Component Value Date/Time    Cholesterol, total 180 06/24/2019 11:56 AM    HDL Cholesterol 43 06/24/2019 11:56 AM    LDL, calculated 92.4 06/24/2019 11:56 AM    VLDL, calculated 44.6 06/24/2019 11:56 AM    Triglyceride 223 (H) 06/24/2019 11:56 AM    CHOL/HDL Ratio 4.2 06/24/2019 11:56 AM     Lab Results   Component Value Date/Time    TSH 2.29 01/30/2019 07:32 AM     Lab Results   Component Value Date/Time    Hemoglobin A1c 10.6 (H) 04/27/2020 07:06 AM         Lab Results   Component Value Date/Time    Microalbumin/Creat ratio (mg/g creat) 9 06/24/2019 11:56 AM    Microalbumin,urine random 0.81 06/24/2019 11:56 AM       Prior to Admission medications    Medication Sig Start Date End Date Taking? Authorizing Provider   Blood Pressure Monitor kit Check once a day 5/12/20  Yes Renita Sutherland MD   glipiZIDE SR (GLUCOTROL XL) 5 mg CR tablet Take 1 Tab by mouth daily. 5/12/20  Yes Renita Sutherland MD   loratadine (Claritin) 10 mg tablet Take 10 mg by mouth. Yes Provider, Historical   SITagliptin (JANUVIA) 100 mg tablet Take 1 Tab by mouth daily. 4/14/20  Yes Renita Sutherland MD   metFORMIN (GLUCOPHAGE) 1,000 mg tablet Take 1 Tab by mouth two (2) times daily (with meals). 4/14/20  Yes Renita Sutherland MD   traZODone (DESYREL) 50 mg tablet Take 1 Tab by mouth nightly. 4/14/20  Yes Renita Sutherland MD   Omeprazole delayed release (PRILOSEC D/R) 20 mg tablet Take 1 Tab by mouth daily. Indications: gastroesophageal reflux disease 4/14/20  Yes Renita Sutherland MD   ramipril (ALTACE) 10 mg capsule Take 1 Cap by mouth daily. 2/27/20  Yes Coby Mantilla NP   aspirin delayed-release 325 mg tablet Take 1 Tab by mouth daily.  2/27/20  Yes Coby Mantilla NP   clopidogrel (PLAVIX) 75 mg tab TAKE 1 TABLET BY MOUTH EVERY DAY 1/6/20  Yes Coby Mantilla NP   carvedilol (COREG) 25 mg tablet TAKE 1 TABLET BY MOUTH TWICE A DAY WITH FOOD 11/13/19  Yes Jaclyn Weaver MD   rosuvastatin (CRESTOR) 10 mg tablet TAKE 1 TABLET BY MOUTH EVERYDAY AT BEDTIME 10/21/19  Yes Chandler Primrose, MD   amLODIPine (NORVASC) 10 mg tablet TAKE 1 TABLET BY MOUTH EVERY DAY 7/12/19  Yes Chandler Primrose, MD   ondansetron (ZOFRAN ODT) 8 mg disintegrating tablet Take 1 Tab by mouth every eight (8) hours as needed for Nausea for up to 12 doses. 6/24/19  Yes Son Plascencia, NP   nitroglycerin (NITROSTAT) 0.4 mg SL tablet 1 Tab by SubLINGual route every five (5) minutes as needed for Chest Pain for up to 3 doses. 12/2/17  Yes Jesús Schwartz MD   acetaminophen (TYLENOL) 325 mg tablet Take  by mouth every four (4) hours as needed for Pain. Yes Provider, Historical   fenofibrate nanocrystallized (TRICOR) 48 mg tablet TAKE 1 TABLET BY MOUTH EVERYDAY AT BEDTIME 4/7/20 5/12/20  Coby Mantilla NP     No Known Allergies        ROS      Objective: There were no vitals taken for this visit. General: alert, cooperative, no distress   Mental  status: normal mood, behavior, speech, dress, motor activity, and thought processes, able to follow commands   HENT: NCAT   Neck: no visualized mass   Resp: no respiratory distress   Neuro: no gross deficits   Skin: no discoloration or lesions of concern on visible areas   Psychiatric: normal affect, consistent with stated mood, no evidence of hallucinations     Additional exam findings: We discussed the expected course, resolution and complications of the diagnosis(es) in detail. Medication risks, benefits, costs, interactions, and alternatives were discussed as indicated. I advised him to contact the office if his condition worsens, changes or fails to improve as anticipated. He expressed understanding with the diagnosis(es) and plan. Scottie Amezquita is a 79 y.o. male who was evaluated by a video visit encounter for concerns as above. Patient identification was verified prior to start of the visit. A caregiver was present when appropriate.  Due to this being a TeleHealth encounter (During NHOKL-08 public health emergency), evaluation of the following organ systems was limited: Vitals/Constitutional/EENT/Resp/CV/GI//MS/Neuro/Skin/Heme-Lymph-Imm. Pursuant to the emergency declaration under the [facility] waiver authority and the [employer] Act, this Virtual  Visit was conducted, with patient's (and/or legal guardian's) consent, to reduce the patient's risk of exposure to COVID-19 and provide necessary medical care. Services were provided through a video synchronous discussion virtually to substitute for in-person clinic visit. Patient and provider were located at their individual homes.       Charlotte Birmingham MD

## 2020-05-18 DIAGNOSIS — G47.9 SLEEP TROUBLE: ICD-10-CM

## 2020-05-18 RX ORDER — TRAZODONE HYDROCHLORIDE 50 MG/1
50 TABLET ORAL
Qty: 90 TAB | Refills: 0 | Status: SHIPPED | OUTPATIENT
Start: 2020-05-18 | End: 2020-11-02

## 2020-06-25 ENCOUNTER — OFFICE VISIT (OUTPATIENT)
Dept: CARDIOLOGY CLINIC | Age: 70
End: 2020-06-25

## 2020-06-25 VITALS
WEIGHT: 214 LBS | TEMPERATURE: 98 F | DIASTOLIC BLOOD PRESSURE: 76 MMHG | HEART RATE: 76 BPM | SYSTOLIC BLOOD PRESSURE: 132 MMHG | BODY MASS INDEX: 28.36 KG/M2 | HEIGHT: 73 IN

## 2020-06-25 DIAGNOSIS — Z95.5 S/P CORONARY ARTERY STENT PLACEMENT: ICD-10-CM

## 2020-06-25 DIAGNOSIS — I10 ESSENTIAL HYPERTENSION: ICD-10-CM

## 2020-06-25 DIAGNOSIS — E78.2 MIXED HYPERLIPIDEMIA: ICD-10-CM

## 2020-06-25 DIAGNOSIS — I25.118 CORONARY ARTERY DISEASE OF NATIVE ARTERY OF NATIVE HEART WITH STABLE ANGINA PECTORIS (HCC): Primary | ICD-10-CM

## 2020-06-25 NOTE — PROGRESS NOTES
1. Have you been to the ER, urgent care clinic since your last visit? Hospitalized since your last visit?    no  2. Have you seen or consulted any other health care providers outside of the [address] [place] [address] since your last visit? Include any pap smears or colon screening. No     3. Since your last visit, have you had any of the following symptoms?  no

## 2020-06-25 NOTE — PROGRESS NOTES
HISTORY OF PRESENT ILLNESS  Shirin Amezquita is a 79 y.o. male. Patient with cad,htn,hyperlipidemia,dm. On follow up patient denies any chest pains,sob, palpitation or other significant symptoms. 12/2017  Admitted with unstable angina-had pci  7/2018. Patient was in emergency room with atypical chest pain. No acute EKG changes cardiac enzymes negative CTA and chest x-ray reports reviewed. Labs are negative for cardiac workup  5/2019. Recent ER visit with fall and concussion. Records reviewed. 6/2020  Patient seen for follow-up. He has rare occasion of chest pain he has used 1 nitroglycerin since last evaluation. No other complaints    Hypertension   The history is provided by the patient. This is a chronic problem. The problem occurs constantly. The problem has not changed since onset. Pertinent negatives include no chest pain, no abdominal pain, no headaches and no shortness of breath. Chest Pain (Angina)    The history is provided by the patient. This is a recurrent problem. The problem has not changed since onset. The problem occurs rarely. The pain is associated with exertion. The pain is present in the substernal region. The quality of the pain is described as pressure-like. The pain does not radiate. Pertinent negatives include no abdominal pain, no claudication, no cough, no dizziness, no fever, no headaches, no hemoptysis, no malaise/fatigue, no nausea, no orthopnea, no palpitations, no PND, no shortness of breath, no sputum production, no vomiting and no weakness. Review of Systems   Constitutional: Negative for chills, fever and malaise/fatigue. HENT: Negative for nosebleeds. Eyes: Negative for blurred vision and double vision. Respiratory: Negative for cough, hemoptysis, sputum production, shortness of breath and wheezing. Cardiovascular: Negative for chest pain, palpitations, orthopnea, claudication, leg swelling and PND.    Gastrointestinal: Negative for abdominal pain, heartburn, nausea and vomiting. Musculoskeletal: Negative for falls and myalgias. Skin: Negative for rash. Neurological: Negative for dizziness, weakness and headaches. Endo/Heme/Allergies: Does not bruise/bleed easily. Family History   Problem Relation Age of Onset   [address] [facility] Stroke Mother     Headache Mother     Hypertension Mother    [place] Mother     Heart Disease Father     Heart Attack Father     Hypertension Father     Diabetes Father     Lung Cancer Father     Ovarian Cancer Sister     Heart Attack Brother     Breast Cancer Sister     Diabetes Sister     Cancer Maternal Aunt         lung cancer    Cancer Maternal Uncle     Cancer Paternal Aunt     Cancer Paternal Uncle        Past Medical History:   Diagnosis Date    Arthritis     1999 vicodin    Diabetes ([facility] [address] 1998 metformin    GERD (gastroesophageal reflux disease)     Hypercholesterolemia     Hypertension 1996 norvasc    Kidney stones     MI (myocardial infarction) ([facility] [address]        Past Surgical History:   Procedure Laterality Date    CARDIAC SURG PROCEDURE UNLIST  12/2017    stints    COLONOSCOPY N/A 11/2/2018    COLONOSCOPY with Polypectomies performed by Tremaine Kimble MD at [facility] ENDOSCOPY    HX CHOLECYSTECTOMY      HX GI  2010    gallbladder       Social History     Tobacco Use    Smoking status: Never Smoker    Smokeless tobacco: Never Used   Substance Use Topics    Alcohol use: No       No Known Allergies        Visit Vitals  /76   Pulse 76   Temp 98 °F (36.7 °C)   Ht 6' 1\" (1.854 m)   Wt 97.1 kg (214 lb)   BMI 28.23 kg/m²        Physical Exam   Constitutional: He is oriented to person, place, and time. He appears well-developed and well-nourished. HENT:   Head: Normocephalic and atraumatic. Eyes: Conjunctivae are normal.   Neck: Neck supple. No JVD present. No tracheal deviation present. No thyromegaly present. Cardiovascular: Normal rate, regular rhythm and normal heart sounds.  Exam reveals no gallop and no friction rub. No murmur heard. Pulmonary/Chest: Breath sounds normal. No respiratory distress. He has no wheezes. He has no rales. He exhibits no tenderness. Abdominal: Soft. There is no abdominal tenderness. Musculoskeletal:         General: No edema. Neurological: He is alert and oriented to person, place, and time. Skin: Skin is warm and dry. Psychiatric: He has a normal mood and affect. Mr. Yomi Hernández has a reminder for a \"due or due soon\" health maintenance. I have asked that he contact his primary care provider for follow-up on this health maintenance. CONCLUSION:1/2016-  ABNORMAL STRESS ECHO WITH EKG AND WALL MOTION ABNORMALITIES SUGGESTIVE OF LAD  DISTRIBUTION DISEASE    IMPRESSION:cath:1/2016    1. TWO VESSEL CORONARY ARTERY DISEASE IN THE FORM OF 80% OSTIAL AND 90% PROXIMAL NON-DOMINANT RIGHT CORONARY STENOSIS WHICH IS A MODERATE SIZED VESSEL OF ABOUT 1.5 TO 2 MM IN DIAMETER, AND 40% PROXIMAL CIRCUMFLEX, AND 70% MID CIRCUMFLEX STENOSIS WHICH IS A DOMINANT VESSEL. THERE ARE DIFFUSE LUMINAL IRREGULARITIES SEEN THROUGHOUT THE CORONARIES. 2. NO NORMAL OVERALL LV EJECTION FRACTION AT REST. 3. EVIDENCE OF CHEST PAIN DURING THE CASE AS WELL AS BEFORE THE CASE WAS STARTED. PARTIAL RELIEF WITH SUBLINGUAL NITROGLYCERIN AND NO ACUTE EKG CHANGES SEEN IN THE SIX MONITORING LEADS IN THE CATH LAB. DISCUSSION AND RECOMMENDATIONS: PATIENT IS LIKELY HAVING CHEST PAIN FROM THE NON-DOMINANT RIGHT CORONARY ARTERY. THE LEFT CIRCUMFLEX IS DOMINANT AND APPEARS TO HAVE BORDERLINE MID STENOSIS WHICH DOES NOT HAVE ANY APPEARANCE OF ANY ACUTE UNSTABLE LESION. I THINK HE SHOULD BE TREATED MEDICALLY FOR NOW, AND IF THE SYMPTOMS PERSIST, LEFT CIRCUMFLEX ARTERY WILL NEED TO BE INTERROGATED BY INTRACORONARY DOPPLER WITH FFR DETERMINATION. RIGHT CORONARY, THOUGH APPEARS TO BE CRITICAL IS A SMALL VESSEL OF ABOUT 1.5 TO A MAXIMUM OF 2 MM IN DIAMETER AND PROBABLY SHOULD BE LEFT TO MEDICAL TREATMENT. AGGRESSIVE RISK FACTOR MODIFICATION SHOULD BE FOLLOWED. SUMMARY:12/2017-echo  Left ventricle: Systolic function was normal. Ejection fraction was estimated   in the range of 55 % to 60 %. No obvious  wall motion abnormalities identified in the views obtained. Wall thickness   was mildly increased. Doppler parameters  were consistent with abnormal left ventricular relaxation (grade 1 diastolic   dysfunction). Right ventricle: The size was at the upper limits of normal.    Left atrium: The atrium was mildly to moderately dilated. FINDING-12/2017-cath  1. Left main is patent, bifurcates into left anterior descending artery  and circumflex artery. 2. Left anterior descending artery has ostial 30% to 40% stenosis  followed by mid diffuse 30-40% stenosis. The vessel appears to be  moderately calcified. Diagonal 1 artery has diffuse 30-40% stenosis. Mid to distal left anterior descending artery is patent. 3. Circumflex artery is dominant with proximal 95% calcific stenosis. Status post PTCA using a Trek 1.5 x 8 mm balloon followed by a 2.5  mm x 12 mm balloon. The stent was a Synergy 2.75 mm x 15 mm  stent was deployed at about 14 atmospheres. Post-PCI PTCA was  performed using a noncompliant 3.0 mm x 8 mm balloon inflated about  16-18 atmospheres. Lesion reduced to 10%. 4. Obtuse marginal 1 artery was a small-caliber vessel with ostial 70%  to 80% stenosis. 5. Obtuse marginal 2 artery is a small- to medium-caliber vessel with  diffuse 30-40% stenosis. Mid circumflex artery has focal 80% stenosis. Status post PTCA using a Trek 2.5 mm x 12 mm balloon followed by a  Synergy 2.75 mm x 12 mm stent deployed about 14 atmospheres. Post-PCI PTCA was performed using a 3.0 mm x 8 mm  balloon deployed about 16 atmospheres. Lesion reduced to 0%. 6. Left posterior descending artery is patent. 7. Right coronary artery is nondominant, has ostial calcific 90%  followed by mid 99% stenosis.      CONCLUSION:  Triple-vessel coronary artery disease. Status post  percutaneous transluminal coronary angioplasty/stent to proximal and  mid circumflex artery using drug-eluting stents. The patient will be on  aspirin and Brilinta at this time. Intense medical management and risk  factor modification is advised. Assessment         ICD-10-CM ICD-9-CM    1. Coronary artery disease of native artery of native heart with stable angina pectoris ([facility] [address] I25.118 414.01      413.9     Stable continue current medical management   2. Mixed hyperlipidemia E78.2 272.2 LIPID PANEL      HEPATIC FUNCTION PANEL    Continue treatment follow-up lab with PCP   3. S/P coronary artery stent placement Z95.5 V45.82     Stable continue treatment   4. Essential hypertension I10 401.9     Stable monitor   1/2018  Out of brillinta for 2 weeks-unable to afford  Changed to plavix-discussed compliance  5/2018  Stable mild cp  Out of norvasc 3 months  refilled  10/2018  Atypical chest pain. Clinically noncardiac. Will continue dual antiplatelet therapy. Norvasc increased for hypertension    6/2020  Cardiac status stable. Rare use of sublingual nitroglycerin stable pattern. Continue treatment. Check lipids    Medications Discontinued During This Encounter   Medication Reason    ondansetron (ZOFRAN ODT) 8 mg disintegrating tablet Not A Current Medication    acetaminophen (TYLENOL) 325 mg tablet Not A Current Medication       Orders Placed This Encounter    LIPID PANEL     Standing Status:   Future     Standing Expiration Date:   12/24/2020    HEPATIC FUNCTION PANEL     Standing Status:   Future     Standing Expiration Date:   12/24/2020       Follow-up and Dispositions    · Return in about 6 months (around 12/25/2020).          Alexandro Putnam MD

## 2020-07-10 DIAGNOSIS — E11.9 WELL CONTROLLED DIABETES MELLITUS (HCC): ICD-10-CM

## 2020-07-10 RX ORDER — SITAGLIPTIN 100 MG/1
TABLET, FILM COATED ORAL
Qty: 90 TAB | Refills: 0 | Status: SHIPPED | OUTPATIENT
Start: 2020-07-10 | End: 2020-10-02

## 2020-07-20 RX ORDER — AMLODIPINE BESYLATE 10 MG/1
10 TABLET ORAL DAILY
Qty: 90 TAB | Refills: 1 | Status: SHIPPED | OUTPATIENT
Start: 2020-07-20 | End: 2021-01-07 | Stop reason: SDUPTHER

## 2020-07-20 RX ORDER — ROSUVASTATIN CALCIUM 10 MG/1
10 TABLET, COATED ORAL DAILY
Qty: 90 TAB | Refills: 1 | Status: ON HOLD | OUTPATIENT
Start: 2020-07-20 | End: 2020-12-18 | Stop reason: SDUPTHER

## 2020-10-02 DIAGNOSIS — E11.9 WELL CONTROLLED DIABETES MELLITUS (HCC): ICD-10-CM

## 2020-10-02 RX ORDER — SITAGLIPTIN 100 MG/1
TABLET, FILM COATED ORAL
Qty: 90 TAB | Refills: 0 | Status: SHIPPED | OUTPATIENT
Start: 2020-10-02 | End: 2021-01-03

## 2020-10-30 DIAGNOSIS — I10 ESSENTIAL HYPERTENSION: ICD-10-CM

## 2020-10-30 RX ORDER — CARVEDILOL 25 MG/1
TABLET ORAL
Qty: 180 TAB | Refills: 3 | Status: SHIPPED | OUTPATIENT
Start: 2020-10-30 | End: 2021-10-29

## 2020-11-02 ENCOUNTER — VIRTUAL VISIT (OUTPATIENT)
Dept: FAMILY MEDICINE CLINIC | Age: 70
End: 2020-11-02
Payer: MEDICARE

## 2020-11-02 DIAGNOSIS — K21.9 GASTROESOPHAGEAL REFLUX DISEASE WITHOUT ESOPHAGITIS: ICD-10-CM

## 2020-11-02 DIAGNOSIS — E11.65 POORLY CONTROLLED DIABETES MELLITUS (HCC): Primary | ICD-10-CM

## 2020-11-02 DIAGNOSIS — I25.10 CORONARY ARTERY DISEASE INVOLVING NATIVE CORONARY ARTERY OF NATIVE HEART WITHOUT ANGINA PECTORIS: ICD-10-CM

## 2020-11-02 DIAGNOSIS — I10 ESSENTIAL HYPERTENSION: ICD-10-CM

## 2020-11-02 DIAGNOSIS — G47.09 OTHER INSOMNIA: ICD-10-CM

## 2020-11-02 DIAGNOSIS — M50.90 CERVICAL DISC DISEASE: ICD-10-CM

## 2020-11-02 DIAGNOSIS — E04.9 GOITER: ICD-10-CM

## 2020-11-02 DIAGNOSIS — E78.1 HYPERTRIGLYCERIDEMIA: ICD-10-CM

## 2020-11-02 PROCEDURE — 3046F HEMOGLOBIN A1C LEVEL >9.0%: CPT | Performed by: FAMILY MEDICINE

## 2020-11-02 PROCEDURE — 99214 OFFICE O/P EST MOD 30 MIN: CPT | Performed by: FAMILY MEDICINE

## 2020-11-02 PROCEDURE — G8432 DEP SCR NOT DOC, RNG: HCPCS | Performed by: FAMILY MEDICINE

## 2020-11-02 PROCEDURE — 3017F COLORECTAL CA SCREEN DOC REV: CPT | Performed by: FAMILY MEDICINE

## 2020-11-02 PROCEDURE — G0463 HOSPITAL OUTPT CLINIC VISIT: HCPCS | Performed by: FAMILY MEDICINE

## 2020-11-02 PROCEDURE — G8427 DOCREV CUR MEDS BY ELIG CLIN: HCPCS | Performed by: FAMILY MEDICINE

## 2020-11-02 PROCEDURE — G8756 NO BP MEASURE DOC: HCPCS | Performed by: FAMILY MEDICINE

## 2020-11-02 PROCEDURE — 1101F PT FALLS ASSESS-DOCD LE1/YR: CPT | Performed by: FAMILY MEDICINE

## 2020-11-02 PROCEDURE — 2022F DILAT RTA XM EVC RTNOPTHY: CPT | Performed by: FAMILY MEDICINE

## 2020-11-02 NOTE — ACP (ADVANCE CARE PLANNING)
Advance Care Planning       Advance Care Planning (ACP) Physician/NP/PA (Provider) Conversation        Date of ACP Conversation: 11/2/2020    Conversation Conducted with:   Patient with [address] Maker:    Current Designated Health Care Decision Maker:   Primary Decision Maker: Manuel Vasquez - [phone]  (If there is a [address] named in the [facility] Makers\" box in the ACP activity, but it is not visible above, be sure to open that field and then select the health care decision maker relationship (ie \"primary\") in the blank space to the right of the name.)      Care Preferences:    Hospitalization: \"If your health worsens and it becomes clear that your chance of recovery is unlikely, what would your preference be regarding hospitalization? \"  If the patient would want hospitalization, answer \"yes\". If the patient would prefer comfort-focused treatment without hospitalization, answer yes       Ventilation: \"If you were in your present state of health and suddenly became very ill and were unable to breathe on your own, what would your preference be about the use of a ventilator (breathing machine) if it was available to you? \"    If patient would desire the use of a ventilator (breathing machine), answer \"yes\", if not answer \"no\":no    \"If your health worsens and it becomes clear that your chance of recovery is unlikely, what would your preference be about the use of a ventilator (breathing machine) if it was available to you? \"   no      Resuscitation:  \"CPR works best to restart the heart when there is a sudden event, like a heart attack, in someone who is otherwise healthy. Unfortunately, CPR does not typically restart the heart for people who have serious health conditions or who are very sick. \"    \"In the event your heart stopped as a result of an underlying serious health condition, would you want attempts to be made to restart your heart (answer \"yes\" for attempt to resuscitate) or would you prefer a natural death (answer \"no\" for do not attempt to resuscitate)? \"   yes, pt was advised to complete advance directive. NOTE: If the patient has a valid advance directive AND provides care preference(s) that are inconsistent with that prior directive, advise the patient to consider either: creating a new advance directive that complies with state-specific requirements; or, if that is not possible, orally revoking that prior directive in accordance with state-specific requirements, which must be documented in the EHR.     Conversation Outcomes / Follow-Up Plan:   Recommended completion of Advance Directive      Length of Voluntary ACP Conversation in minutes:  <16 minutes (Non-Billable)      Anika Zapata MD

## 2020-11-02 NOTE — PROGRESS NOTES
Ash Lacey is a 79 y.o. male who was seen by synchronous (real-time) audio-video technology on 11/2/2020 for Follow-up        Assessment & Plan:   Diagnoses and all orders for this visit:    Poorly controlled diabetes mellitus ([address]): Last A1c around 10. Currently compliant with medication. Checking blood sugar at home which is around 1 20-1 60. No hypo or hyperglycemic symptoms. Sitting comfortable and did not appear in any acute distress. Noncompliant with diet modification. Said gained almost 10 pounds. I have advised him the lifestyle modification along with diet modification and exercise as tolerated. Advised him to complete the lab work. He has not made an appointment with Dr. Gwendolyn Isaac yet as said his office call to reschedule. I have encouraged him to have an appointment with the specialist.  Had an eye exam done in January but not sure had a diabetic eye exam.  He will schedule an appointment as well. Will do foot exam once he comes as in person visit  -     LIPID PANEL; Future  -     METABOLIC PANEL, COMPREHENSIVE; Future  -     CBC W/O DIFF; Future  -     TSH 3RD GENERATION; Future  -     HEMOGLOBIN A1C WITH EAG; Future  -     MICROALBUMIN, UR, RAND W/ MICROALB/CREAT RATIO; Future    Goiter: Asymptomatic. Review prior ultrasound. He was encouraged to make an appointment with Dr. Guerra/endocrinology. Hypertriglyceridemia: On statin. Last visit order the labs which he has not completed yet. Encourage him to complete the labs. Coronary artery disease involving native coronary artery of native heart without angina pectoris: Following cardiology. On risk factor management. Currently no anginal symptoms. Cervical disc disease: Asymptomatic and no concern at this time. Gastroesophageal reflux disease without esophagitis: Asymptomatic. Continue current plan    Other insomnia: Not taking trazodone. No concern with the sleep. Sleep hygiene.   Follow-up next time    Essential hypertension limitation in checking vitals due to virtual visit. Asymptomatic. We will continue current plan. Follow-up next visit:     Patient understood and agreed with the plan  Advised him to schedule the diabetic eye exam  Advised him to complete the labs          712  Subjective:     Done visit to doxy  Here for routine follow-up  History of diabetes. Last A1c around 10. Poorly controlled. Currently checking blood sugar at home which is around 1 20-1 60. During the virtual visit he was sitting comfortable and did not appear in any acute distress. Denies any hypo or hyperglycemic symptoms. History of hypertension. Not checking blood pressure at home. Limitation in virtual visit to check for vitals. He was asymptomatic during the visit. Taking medication with compliance and no side effects. History of coronary artery disease. Status post stent x3. Denies any anginal symptoms. On risk factor management. Following cardiology with compliance. Hyperlipidemia. Not much compliant with the diet. Said gained 10 pounds in the last few months. Discussed the importance of lifestyle modification and compliance. History of GERD. Fairly stable at this time. Insomnia. Not taking trazodone. Fairly stable. Discussed sleep hygiene  History of neck pain. Fairly stable at this time. No concern. Denies any headache, dizziness, no chest pain or trouble breathing, no arm or leg weakness. No nausea or vomiting, no weight or appetite changes, no mood changes . No urine or bowel complains, no palpitation, no diaphoresis. No abdominal pain. No cold or cough. No leg swelling. No fever. No sleep trouble. Has not done the labs yet. Also history of goiter. Denies any trouble swallowing or change in voice. Has not made an appointment with Dr. Berenice Stokes regarding diabetes and thyroid nodule management. I have encouraged him to  office and make a follow-up appointment. He has been noncompliant. I have discussed the importance of the compliance with their instructions. Lab Results   Component Value Date/Time    WBC 9.2 09/27/2019 05:40 PM    HGB 14.6 09/27/2019 05:40 PM    HCT 40.8 09/27/2019 05:40 PM    PLATELET 551 07/48/5271 05:40 PM    MCV 88.9 09/27/2019 05:40 PM     Lab Results   Component Value Date/Time    Sodium 144 04/27/2020 07:06 AM    Potassium 3.7 04/27/2020 07:06 AM    Chloride 110 04/27/2020 07:06 AM    CO2 25 04/27/2020 07:06 AM    Anion gap 9 04/27/2020 07:06 AM    Glucose 236 (H) 04/27/2020 07:06 AM    BUN 16 04/27/2020 07:06 AM    Creatinine 0.83 04/27/2020 07:06 AM    BUN/Creatinine ratio 19 04/27/2020 07:06 AM    GFR est AA >60 04/27/2020 07:06 AM    GFR est non-AA >60 04/27/2020 07:06 AM    Calcium 8.9 04/27/2020 07:06 AM    Bilirubin, total 0.2 04/27/2020 07:06 AM    Alk.  phosphatase 93 04/27/2020 07:06 AM    Protein, total 6.9 04/27/2020 07:06 AM    Albumin 3.7 04/27/2020 07:06 AM    Globulin 3.2 04/27/2020 07:06 AM    A-G Ratio 1.2 04/27/2020 07:06 AM    ALT (SGPT) 24 04/27/2020 07:06 AM    AST (SGOT) 11 04/27/2020 07:06 AM     Lab Results   Component Value Date/Time    Cholesterol, total 180 06/24/2019 11:56 AM    HDL Cholesterol 43 06/24/2019 11:56 AM    LDL, calculated 92.4 06/24/2019 11:56 AM    VLDL, calculated 44.6 06/24/2019 11:56 AM    Triglyceride 223 (H) 06/24/2019 11:56 AM    CHOL/HDL Ratio 4.2 06/24/2019 11:56 AM     Lab Results   Component Value Date/Time    TSH 2.29 01/30/2019 07:32 AM     Lab Results   Component Value Date/Time    Hemoglobin A1c 10.6 (H) 04/27/2020 07:06 AM         Lab Results   Component Value Date/Time    Microalbumin/Creat ratio (mg/g creat) 9 06/24/2019 11:56 AM    Microalbumin,urine random 0.81 06/24/2019 11:56 AM     Lab Results   Component Value Date/Time    Prostate Specific Ag 1.6 04/27/2020 07:06 AM    Prostate Specific Ag 1.6 01/30/2019 07:32 AM    Prostate Specific Ag 1.4 05/12/2016 09:07 AM       Prior to Admission medications Medication Sig Start Date End Date Taking? Authorizing Provider   carvediloL (COREG) 25 mg tablet TAKE 1 TABLET BY MOUTH TWICE A DAY WITH FOOD 10/30/20  Yes Mini Tinajero NP   Januvia 100 mg tablet TAKE 1 TABLET BY MOUTH EVERY DAY 10/2/20  Yes Gen Reyna MD   glipiZIDE SR (GLUCOTROL XL) 5 mg CR tablet TAKE 1 TABLET BY MOUTH EVERY DAY 8/3/20  Yes Gen Reyna MD   omeprazole (PRILOSEC) 20 mg capsule TAKE 1 CAPSULE BY MOUTH EVERY DAY 8/3/20  Yes Gen Reyna MD   amLODIPine (NORVASC) 10 mg tablet Take 1 Tab by mouth daily. 7/20/20  Yes Mini Tinajero NP   rosuvastatin (CRESTOR) 10 mg tablet Take 1 Tab by mouth daily. 7/20/20  Yes Mini Tinajero NP   metFORMIN (GLUCOPHAGE) 1,000 mg tablet TAKE 1 TABLET BY MOUTH TWICE A DAY WITH MEALS 7/16/20  Yes Gen Reyna MD   Blood Pressure Monitor kit Check once a day 5/12/20  Yes Gen Reyna MD   loratadine (Claritin) 10 mg tablet Take 10 mg by mouth. Yes Provider, Historical   ramipril (ALTACE) 10 mg capsule Take 1 Cap by mouth daily. 2/27/20  Yes Coby Mantilla NP   aspirin delayed-release 325 mg tablet Take 1 Tab by mouth daily. 2/27/20  Yes Coby Mantilla NP   clopidogrel (PLAVIX) 75 mg tab TAKE 1 TABLET BY MOUTH EVERY DAY 1/6/20  Yes Coby Mantilla NP   traZODone (DESYREL) 50 mg tablet Take 1 Tab by mouth nightly. 5/18/20 11/2/20  Gen Reyna MD   nitroglycerin (NITROSTAT) 0.4 mg SL tablet 1 Tab by SubLINGual route every five (5) minutes as needed for Chest Pain for up to 3 doses.  12/2/17   Yvette Little MD     Patient Active Problem List    Diagnosis Date Noted    CAD (coronary artery disease) 12/01/2017     Priority: 1 - One    Tubular adenoma of colon 09/26/2019    Cannabis use, uncomplicated 25/15/8805    Cervical disc disease 09/26/2019    GERD (gastroesophageal reflux disease) 09/26/2019    Controlled type 2 diabetes mellitus without complication, without long-term current use of insulin ([facility] [address] 11/29/2018    Insomnia 11/29/2018    S/P coronary artery stent placement 01/25/2018    Essential hypertension 01/27/2016    Hyperlipidemia 01/27/2016    Thyroid goiter 01/27/2016    Chronic pain syndrome 11/17/2014     Current Outpatient Medications   Medication Sig Dispense Refill    carvediloL (COREG) 25 mg tablet TAKE 1 TABLET BY MOUTH TWICE A DAY WITH FOOD 180 Tab 3    Januvia 100 mg tablet TAKE 1 TABLET BY MOUTH EVERY DAY 90 Tab 0    glipiZIDE SR (GLUCOTROL XL) 5 mg CR tablet TAKE 1 TABLET BY MOUTH EVERY DAY 90 Tab 1    omeprazole (PRILOSEC) 20 mg capsule TAKE 1 CAPSULE BY MOUTH EVERY DAY 90 Cap 1    amLODIPine (NORVASC) 10 mg tablet Take 1 Tab by mouth daily. 90 Tab 1    rosuvastatin (CRESTOR) 10 mg tablet Take 1 Tab by mouth daily. 90 Tab 1    metFORMIN (GLUCOPHAGE) 1,000 mg tablet TAKE 1 TABLET BY MOUTH TWICE A DAY WITH MEALS 180 Tab 1    Blood Pressure Monitor kit Check once a day 1 Kit 0    loratadine (Claritin) 10 mg tablet Take 10 mg by mouth.  ramipril (ALTACE) 10 mg capsule Take 1 Cap by mouth daily. 90 Cap 3    aspirin delayed-release 325 mg tablet Take 1 Tab by mouth daily. 90 Tab 3    clopidogrel (PLAVIX) 75 mg tab TAKE 1 TABLET BY MOUTH EVERY DAY 90 Tab 3    nitroglycerin (NITROSTAT) 0.4 mg SL tablet 1 Tab by SubLINGual route every five (5) minutes as needed for Chest Pain for up to 3 doses. 25 Tab 0     No Known Allergies  Past Medical History:   Diagnosis Date    Arthritis     1999 vicodin    Diabetes ([facility] [address] 1998 metformin    GERD (gastroesophageal reflux disease)     Hypercholesterolemia     Hypertension 1996 norvasc    Kidney stones     MI (myocardial infarction) ([facility] [address]      Social History     Tobacco Use    Smoking status: Never Smoker    Smokeless tobacco: Never Used   Substance Use Topics    Alcohol use: No       ROS: see HPI     Objective:   No flowsheet data found.    General: alert, cooperative, no distress   Mental  status: normal mood, behavior, speech, dress, motor activity, and thought processes, able to follow commands   HENT: NCAT   Neck: no visualized mass   Resp: no respiratory distress   Neuro: no gross deficits   Skin: no discoloration or lesions of concern on visible areas   Psychiatric: normal affect, consistent with stated mood, no evidence of hallucinations     Additional exam findings: We discussed the expected course, resolution and complications of the diagnosis(es) in detail. Medication risks, benefits, costs, interactions, and alternatives were discussed as indicated. I advised him to contact the office if his condition worsens, changes or fails to improve as anticipated. He expressed understanding with the diagnosis(es) and plan. Effie Carroll, who was evaluated through a patient-initiated, synchronous (real-time) audio-video encounter, and/or his healthcare decision maker, is aware that it is a billable service, with coverage as determined by his insurance carrier. He provided verbal consent to proceed: Yes, and patient identification was verified. It was conducted pursuant to the emergency declaration under the [address] [place] [id number] authority and the [employer] Act. A caregiver was present when appropriate. Ability to conduct physical exam was limited. I was in the office. The patient was at home.       Keturah Mancia MD

## 2020-11-23 ENCOUNTER — HOSPITAL ENCOUNTER (OUTPATIENT)
Dept: LAB | Age: 70
Discharge: HOME | End: 2020-11-23
Payer: MEDICARE

## 2020-11-23 DIAGNOSIS — E11.65 POORLY CONTROLLED DIABETES MELLITUS (HCC): ICD-10-CM

## 2020-11-23 DIAGNOSIS — E78.1 HYPERTRIGLYCERIDEMIA: Primary | ICD-10-CM

## 2020-11-23 LAB
ALBUMIN SERPL-MCNC: 3.7 G/DL (ref 3.4–5)
ALBUMIN/GLOB SERPL: 1.2 {RATIO} (ref 0.8–1.7)
ALP SERPL-CCNC: 80 U/L (ref 45–117)
ALT SERPL-CCNC: 22 U/L (ref 16–61)
ANION GAP SERPL CALC-SCNC: 4 MMOL/L (ref 3–18)
AST SERPL-CCNC: 14 U/L (ref 10–38)
BILIRUB SERPL-MCNC: 0.3 MG/DL (ref 0.2–1)
BUN SERPL-MCNC: 18 MG/DL (ref 7–18)
BUN/CREAT SERPL: 17 (ref 12–20)
CALCIUM SERPL-MCNC: 9.3 MG/DL (ref 8.5–10.1)
CHLORIDE SERPL-SCNC: 109 MMOL/L (ref 100–111)
CHOLEST SERPL-MCNC: 164 MG/DL
CO2 SERPL-SCNC: 27 MMOL/L (ref 21–32)
CREAT SERPL-MCNC: 1.05 MG/DL (ref 0.6–1.3)
CREAT UR-MCNC: 237 MG/DL (ref 30–125)
ERYTHROCYTE [DISTWIDTH] IN BLOOD BY AUTOMATED COUNT: 14.4 % (ref 11.6–14.5)
EST. AVERAGE GLUCOSE BLD GHB EST-MCNC: 123 MG/DL
GLOBULIN SER CALC-MCNC: 3.1 G/DL (ref 2–4)
GLUCOSE SERPL-MCNC: 142 MG/DL (ref 74–99)
HBA1C MFR BLD: 5.9 % (ref 4.2–5.6)
HCT VFR BLD AUTO: 39 % (ref 36–48)
HDLC SERPL-MCNC: 33 MG/DL (ref 40–60)
HDLC SERPL: 5 {RATIO} (ref 0–5)
HGB BLD-MCNC: 13.5 G/DL (ref 13–16)
LDLC SERPL CALC-MCNC: 74.8 MG/DL (ref 0–100)
LIPID PROFILE,FLP: ABNORMAL
MCH RBC QN AUTO: 31.1 PG (ref 24–34)
MCHC RBC AUTO-ENTMCNC: 34.6 G/DL (ref 31–37)
MCV RBC AUTO: 89.9 FL (ref 74–97)
MICROALBUMIN UR-MCNC: 1.2 MG/DL (ref 0–3)
MICROALBUMIN/CREAT UR-RTO: 5 MG/G (ref 0–30)
PLATELET # BLD AUTO: 164 K/UL (ref 135–420)
PMV BLD AUTO: 10 FL (ref 9.2–11.8)
POTASSIUM SERPL-SCNC: 4.5 MMOL/L (ref 3.5–5.5)
PROT SERPL-MCNC: 6.8 G/DL (ref 6.4–8.2)
RBC # BLD AUTO: 4.34 M/UL (ref 4.7–5.5)
SODIUM SERPL-SCNC: 140 MMOL/L (ref 136–145)
TRIGL SERPL-MCNC: 281 MG/DL (ref ?–150)
TSH SERPL DL<=0.05 MIU/L-ACNC: 1.33 UIU/ML (ref 0.36–3.74)
VLDLC SERPL CALC-MCNC: 56.2 MG/DL
WBC # BLD AUTO: 7.1 K/UL (ref 4.6–13.2)

## 2020-11-23 PROCEDURE — 85027 COMPLETE CBC AUTOMATED: CPT

## 2020-11-23 PROCEDURE — 83036 HEMOGLOBIN GLYCOSYLATED A1C: CPT

## 2020-11-23 PROCEDURE — 36415 COLL VENOUS BLD VENIPUNCTURE: CPT

## 2020-11-23 PROCEDURE — 82043 UR ALBUMIN QUANTITATIVE: CPT

## 2020-11-23 PROCEDURE — 80061 LIPID PANEL: CPT

## 2020-11-23 PROCEDURE — 84443 ASSAY THYROID STIM HORMONE: CPT

## 2020-11-23 PROCEDURE — 80053 COMPREHEN METABOLIC PANEL: CPT

## 2020-11-23 NOTE — PROGRESS NOTES
A1C has improved significantly and now in prediabetic range. Continue current plan. Elevated triglyceride. On statin. Please advise low fat diet and need to be compliant with it. Will recheck in couple of months and if still elevated will consider tricor.

## 2020-12-08 ENCOUNTER — APPOINTMENT (OUTPATIENT)
Dept: GENERAL RADIOLOGY | Age: 70
DRG: 246 | End: 2020-12-08
Attending: PHYSICIAN ASSISTANT
Payer: MEDICARE

## 2020-12-08 ENCOUNTER — TELEPHONE (OUTPATIENT)
Dept: CARDIOLOGY CLINIC | Age: 70
End: 2020-12-08

## 2020-12-08 ENCOUNTER — HOSPITAL ENCOUNTER (INPATIENT)
Age: 70
LOS: 10 days | Discharge: HOME | DRG: 246 | End: 2020-12-18
Attending: EMERGENCY MEDICINE | Admitting: FAMILY MEDICINE
Payer: MEDICARE

## 2020-12-08 DIAGNOSIS — Z95.5 S/P CORONARY ARTERY STENT PLACEMENT: ICD-10-CM

## 2020-12-08 DIAGNOSIS — I25.110 CORONARY ARTERY DISEASE INVOLVING NATIVE CORONARY ARTERY OF NATIVE HEART WITH UNSTABLE ANGINA PECTORIS (HCC): ICD-10-CM

## 2020-12-08 DIAGNOSIS — J81.0 ACUTE PULMONARY EDEMA (HCC): ICD-10-CM

## 2020-12-08 DIAGNOSIS — I24.9 ACS (ACUTE CORONARY SYNDROME) (HCC): Primary | ICD-10-CM

## 2020-12-08 DIAGNOSIS — I10 ESSENTIAL HYPERTENSION: ICD-10-CM

## 2020-12-08 DIAGNOSIS — R04.2 HEMOPTYSIS: ICD-10-CM

## 2020-12-08 DIAGNOSIS — E87.6 HYPOKALEMIA: ICD-10-CM

## 2020-12-08 DIAGNOSIS — N12 PYELONEPHRITIS: ICD-10-CM

## 2020-12-08 DIAGNOSIS — E11.8 CONTROLLED DIABETES MELLITUS TYPE 2 WITH COMPLICATIONS, UNSPECIFIED WHETHER LONG TERM INSULIN USE (HCC): ICD-10-CM

## 2020-12-08 DIAGNOSIS — I21.4 NSTEMI (NON-ST ELEVATED MYOCARDIAL INFARCTION) (HCC): ICD-10-CM

## 2020-12-08 DIAGNOSIS — E78.5 HYPERLIPIDEMIA, UNSPECIFIED HYPERLIPIDEMIA TYPE: ICD-10-CM

## 2020-12-08 DIAGNOSIS — J96.01 ACUTE RESPIRATORY FAILURE WITH HYPOXIA (HCC): ICD-10-CM

## 2020-12-08 DIAGNOSIS — I95.9 HYPOTENSION, UNSPECIFIED HYPOTENSION TYPE: ICD-10-CM

## 2020-12-08 DIAGNOSIS — K21.9 GASTROESOPHAGEAL REFLUX DISEASE, UNSPECIFIED WHETHER ESOPHAGITIS PRESENT: ICD-10-CM

## 2020-12-08 DIAGNOSIS — I44.7 LBBB (LEFT BUNDLE BRANCH BLOCK): ICD-10-CM

## 2020-12-08 LAB
ALBUMIN SERPL-MCNC: 3.7 G/DL (ref 3.4–5)
ALBUMIN/GLOB SERPL: 1 {RATIO} (ref 0.8–1.7)
ALP SERPL-CCNC: 71 U/L (ref 45–117)
ALT SERPL-CCNC: 25 U/L (ref 16–61)
ANION GAP SERPL CALC-SCNC: 3 MMOL/L (ref 3–18)
APTT PPP: 27.7 SEC (ref 23–36.4)
APTT PPP: 45.7 SEC (ref 23–36.4)
AST SERPL-CCNC: 16 U/L (ref 10–38)
ATRIAL RATE: 76 BPM
ATRIAL RATE: 79 BPM
BASOPHILS # BLD: 0 K/UL (ref 0–0.1)
BASOPHILS NFR BLD: 0 % (ref 0–2)
BILIRUB SERPL-MCNC: 0.3 MG/DL (ref 0.2–1)
BUN SERPL-MCNC: 13 MG/DL (ref 7–18)
BUN/CREAT SERPL: 15 (ref 12–20)
CALCIUM SERPL-MCNC: 9.1 MG/DL (ref 8.5–10.1)
CALCULATED P AXIS, ECG09: 41 DEGREES
CALCULATED P AXIS, ECG09: 46 DEGREES
CALCULATED R AXIS, ECG10: 54 DEGREES
CALCULATED R AXIS, ECG10: 68 DEGREES
CALCULATED T AXIS, ECG11: 128 DEGREES
CALCULATED T AXIS, ECG11: 129 DEGREES
CHLORIDE SERPL-SCNC: 107 MMOL/L (ref 100–111)
CK MB CFR SERPL CALC: NORMAL % (ref 0–4)
CK MB SERPL-MCNC: <1 NG/ML (ref 5–25)
CK SERPL-CCNC: 55 U/L (ref 39–308)
CO2 SERPL-SCNC: 28 MMOL/L (ref 21–32)
COVID-19 RAPID TEST, COVR: NOT DETECTED
CREAT SERPL-MCNC: 0.89 MG/DL (ref 0.6–1.3)
DIAGNOSIS, 93000: NORMAL
DIAGNOSIS, 93000: NORMAL
DIFFERENTIAL METHOD BLD: ABNORMAL
EOSINOPHIL # BLD: 0.2 K/UL (ref 0–0.4)
EOSINOPHIL NFR BLD: 2 % (ref 0–5)
ERYTHROCYTE [DISTWIDTH] IN BLOOD BY AUTOMATED COUNT: 14.5 % (ref 11.6–14.5)
GLOBULIN SER CALC-MCNC: 3.8 G/DL (ref 2–4)
GLUCOSE BLD STRIP.AUTO-MCNC: 89 MG/DL (ref 70–110)
GLUCOSE SERPL-MCNC: 90 MG/DL (ref 74–99)
HCT VFR BLD AUTO: 41.7 % (ref 36–48)
HGB BLD-MCNC: 14.6 G/DL (ref 13–16)
LYMPHOCYTES # BLD: 2.3 K/UL (ref 0.9–3.6)
LYMPHOCYTES NFR BLD: 26 % (ref 21–52)
MCH RBC QN AUTO: 31.3 PG (ref 24–34)
MCHC RBC AUTO-ENTMCNC: 35 G/DL (ref 31–37)
MCV RBC AUTO: 89.3 FL (ref 74–97)
MONOCYTES # BLD: 0.9 K/UL (ref 0.05–1.2)
MONOCYTES NFR BLD: 10 % (ref 3–10)
NEUTS SEG # BLD: 5.4 K/UL (ref 1.8–8)
NEUTS SEG NFR BLD: 62 % (ref 40–73)
P-R INTERVAL, ECG05: 164 MS
P-R INTERVAL, ECG05: 166 MS
PLATELET # BLD AUTO: 191 K/UL (ref 135–420)
PMV BLD AUTO: 10.1 FL (ref 9.2–11.8)
POTASSIUM SERPL-SCNC: 4 MMOL/L (ref 3.5–5.5)
PROT SERPL-MCNC: 7.5 G/DL (ref 6.4–8.2)
Q-T INTERVAL, ECG07: 408 MS
Q-T INTERVAL, ECG07: 412 MS
QRS DURATION, ECG06: 130 MS
QRS DURATION, ECG06: 134 MS
QTC CALCULATION (BEZET), ECG08: 459 MS
QTC CALCULATION (BEZET), ECG08: 472 MS
RBC # BLD AUTO: 4.67 M/UL (ref 4.7–5.5)
SODIUM SERPL-SCNC: 138 MMOL/L (ref 136–145)
SOURCE, COVRS: NORMAL
SPECIMEN TYPE, XMCV1T: NORMAL
TROPONIN I SERPL-MCNC: <0.02 NG/ML (ref 0–0.04)
VENTRICULAR RATE, ECG03: 76 BPM
VENTRICULAR RATE, ECG03: 79 BPM
WBC # BLD AUTO: 8.8 K/UL (ref 4.6–13.2)

## 2020-12-08 PROCEDURE — 85730 THROMBOPLASTIN TIME PARTIAL: CPT

## 2020-12-08 PROCEDURE — 74011000250 HC RX REV CODE- 250: Performed by: PHYSICIAN ASSISTANT

## 2020-12-08 PROCEDURE — 82550 ASSAY OF CK (CPK): CPT

## 2020-12-08 PROCEDURE — 74011250636 HC RX REV CODE- 250/636

## 2020-12-08 PROCEDURE — 87635 SARS-COV-2 COVID-19 AMP PRB: CPT

## 2020-12-08 PROCEDURE — 96374 THER/PROPH/DIAG INJ IV PUSH: CPT

## 2020-12-08 PROCEDURE — 74011250636 HC RX REV CODE- 250/636: Performed by: EMERGENCY MEDICINE

## 2020-12-08 PROCEDURE — 74011250637 HC RX REV CODE- 250/637: Performed by: PHYSICIAN ASSISTANT

## 2020-12-08 PROCEDURE — 71046 X-RAY EXAM CHEST 2 VIEWS: CPT

## 2020-12-08 PROCEDURE — 82962 GLUCOSE BLOOD TEST: CPT

## 2020-12-08 PROCEDURE — 84484 ASSAY OF TROPONIN QUANT: CPT

## 2020-12-08 PROCEDURE — 93005 ELECTROCARDIOGRAM TRACING: CPT

## 2020-12-08 PROCEDURE — 74011250636 HC RX REV CODE- 250/636: Performed by: NURSE PRACTITIONER

## 2020-12-08 PROCEDURE — 36415 COLL VENOUS BLD VENIPUNCTURE: CPT

## 2020-12-08 PROCEDURE — 74011250636 HC RX REV CODE- 250/636: Performed by: HOSPITALIST

## 2020-12-08 PROCEDURE — 80053 COMPREHEN METABOLIC PANEL: CPT

## 2020-12-08 PROCEDURE — 99285 EMERGENCY DEPT VISIT HI MDM: CPT

## 2020-12-08 PROCEDURE — 99223 1ST HOSP IP/OBS HIGH 75: CPT | Performed by: NURSE PRACTITIONER

## 2020-12-08 PROCEDURE — 85025 COMPLETE CBC W/AUTO DIFF WBC: CPT

## 2020-12-08 PROCEDURE — 74011250637 HC RX REV CODE- 250/637: Performed by: NURSE PRACTITIONER

## 2020-12-08 PROCEDURE — 74011000250 HC RX REV CODE- 250: Performed by: NURSE PRACTITIONER

## 2020-12-08 PROCEDURE — 96375 TX/PRO/DX INJ NEW DRUG ADDON: CPT

## 2020-12-08 PROCEDURE — 65620000000 HC RM CCU GENERAL

## 2020-12-08 PROCEDURE — 74011250636 HC RX REV CODE- 250/636: Performed by: PHYSICIAN ASSISTANT

## 2020-12-08 RX ORDER — SODIUM CHLORIDE 9 MG/ML
75 INJECTION, SOLUTION INTRAVENOUS CONTINUOUS
Status: DISCONTINUED | OUTPATIENT
Start: 2020-12-08 | End: 2020-12-10

## 2020-12-08 RX ORDER — CARVEDILOL 6.25 MG/1
6.25 TABLET ORAL EVERY 12 HOURS
Status: DISCONTINUED | OUTPATIENT
Start: 2020-12-08 | End: 2020-12-15

## 2020-12-08 RX ORDER — ROSUVASTATIN CALCIUM 10 MG/1
10 TABLET, COATED ORAL
Status: DISCONTINUED | OUTPATIENT
Start: 2020-12-08 | End: 2020-12-08

## 2020-12-08 RX ORDER — OXYCODONE AND ACETAMINOPHEN 7.5; 325 MG/1; MG/1
1 TABLET ORAL
Status: DISCONTINUED | OUTPATIENT
Start: 2020-12-08 | End: 2020-12-09

## 2020-12-08 RX ORDER — INSULIN LISPRO 100 [IU]/ML
INJECTION, SOLUTION INTRAVENOUS; SUBCUTANEOUS
Status: DISCONTINUED | OUTPATIENT
Start: 2020-12-08 | End: 2020-12-10

## 2020-12-08 RX ORDER — ONDANSETRON 2 MG/ML
4 INJECTION INTRAMUSCULAR; INTRAVENOUS
Status: COMPLETED | OUTPATIENT
Start: 2020-12-08 | End: 2020-12-08

## 2020-12-08 RX ORDER — ACETAMINOPHEN 325 MG/1
650 TABLET ORAL
Status: DISCONTINUED | OUTPATIENT
Start: 2020-12-08 | End: 2020-12-14

## 2020-12-08 RX ORDER — MORPHINE SULFATE 2 MG/ML
2 INJECTION, SOLUTION INTRAMUSCULAR; INTRAVENOUS
Status: COMPLETED | OUTPATIENT
Start: 2020-12-08 | End: 2020-12-08

## 2020-12-08 RX ORDER — HEPARIN SODIUM 1000 [USP'U]/ML
4000 INJECTION, SOLUTION INTRAVENOUS; SUBCUTANEOUS ONCE
Status: COMPLETED | OUTPATIENT
Start: 2020-12-08 | End: 2020-12-08

## 2020-12-08 RX ORDER — NITROGLYCERIN 0.4 MG/1
0.4 TABLET SUBLINGUAL
Status: COMPLETED | OUTPATIENT
Start: 2020-12-08 | End: 2020-12-08

## 2020-12-08 RX ORDER — MAGNESIUM SULFATE 100 %
4 CRYSTALS MISCELLANEOUS AS NEEDED
Status: DISCONTINUED | OUTPATIENT
Start: 2020-12-08 | End: 2020-12-18 | Stop reason: HOSPADM

## 2020-12-08 RX ORDER — GUAIFENESIN 100 MG/5ML
81 LIQUID (ML) ORAL DAILY
Status: DISCONTINUED | OUTPATIENT
Start: 2020-12-09 | End: 2020-12-18 | Stop reason: HOSPADM

## 2020-12-08 RX ORDER — CARVEDILOL 3.12 MG/1
3.12 TABLET ORAL EVERY 12 HOURS
Status: DISCONTINUED | OUTPATIENT
Start: 2020-12-08 | End: 2020-12-08

## 2020-12-08 RX ORDER — HEPARIN SODIUM 10000 [USP'U]/100ML
10-25 INJECTION, SOLUTION INTRAVENOUS
Status: DISCONTINUED | OUTPATIENT
Start: 2020-12-08 | End: 2020-12-09

## 2020-12-08 RX ORDER — HEPARIN SODIUM 1000 [USP'U]/ML
3000 INJECTION, SOLUTION INTRAVENOUS; SUBCUTANEOUS ONCE
Status: COMPLETED | OUTPATIENT
Start: 2020-12-09 | End: 2020-12-08

## 2020-12-08 RX ORDER — DEXTROSE 50 % IN WATER (D50W) INTRAVENOUS SYRINGE
25-50 AS NEEDED
Status: DISCONTINUED | OUTPATIENT
Start: 2020-12-08 | End: 2020-12-18 | Stop reason: HOSPADM

## 2020-12-08 RX ORDER — NITROGLYCERIN 40 MG/100ML
10 INJECTION INTRAVENOUS CONTINUOUS
Status: DISCONTINUED | OUTPATIENT
Start: 2020-12-08 | End: 2020-12-08

## 2020-12-08 RX ORDER — NITROGLYCERIN 40 MG/100ML
0-200 INJECTION INTRAVENOUS
Status: DISCONTINUED | OUTPATIENT
Start: 2020-12-08 | End: 2020-12-10

## 2020-12-08 RX ORDER — HYDROMORPHONE HYDROCHLORIDE 1 MG/ML
0.5 INJECTION, SOLUTION INTRAMUSCULAR; INTRAVENOUS; SUBCUTANEOUS ONCE
Status: COMPLETED | OUTPATIENT
Start: 2020-12-08 | End: 2020-12-08

## 2020-12-08 RX ORDER — GUAIFENESIN 100 MG/5ML
162 LIQUID (ML) ORAL
Status: COMPLETED | OUTPATIENT
Start: 2020-12-08 | End: 2020-12-08

## 2020-12-08 RX ORDER — ROSUVASTATIN CALCIUM 20 MG/1
20 TABLET, COATED ORAL
Status: DISCONTINUED | OUTPATIENT
Start: 2020-12-08 | End: 2020-12-09

## 2020-12-08 RX ORDER — ONDANSETRON 2 MG/ML
4 INJECTION INTRAMUSCULAR; INTRAVENOUS
Status: DISCONTINUED | OUTPATIENT
Start: 2020-12-08 | End: 2020-12-18 | Stop reason: HOSPADM

## 2020-12-08 RX ORDER — FENTANYL CITRATE 50 UG/ML
50 INJECTION, SOLUTION INTRAMUSCULAR; INTRAVENOUS
Status: COMPLETED | OUTPATIENT
Start: 2020-12-08 | End: 2020-12-08

## 2020-12-08 RX ORDER — GUAIFENESIN 100 MG/5ML
162 LIQUID (ML) ORAL
Status: DISCONTINUED | OUTPATIENT
Start: 2020-12-08 | End: 2020-12-08

## 2020-12-08 RX ADMIN — FENTANYL CITRATE 50 MCG: 50 INJECTION, SOLUTION INTRAMUSCULAR; INTRAVENOUS at 16:21

## 2020-12-08 RX ADMIN — NITROGLYCERIN 0.4 MG: 0.4 TABLET SUBLINGUAL at 16:05

## 2020-12-08 RX ADMIN — SODIUM CHLORIDE 75 ML/HR: 900 INJECTION, SOLUTION INTRAVENOUS at 17:44

## 2020-12-08 RX ADMIN — HEPARIN SODIUM AND DEXTROSE 9.5 ML/HR: 10000; 5 INJECTION INTRAVENOUS at 15:34

## 2020-12-08 RX ADMIN — NITROGLYCERIN 0.4 MG: 0.4 TABLET SUBLINGUAL at 16:10

## 2020-12-08 RX ADMIN — ASPIRIN 81 MG CHEWABLE TABLET 162 MG: 81 TABLET CHEWABLE at 14:45

## 2020-12-08 RX ADMIN — NITROGLYCERIN 20 MCG/MIN: 40 INJECTION INTRAVENOUS at 20:11

## 2020-12-08 RX ADMIN — ROSUVASTATIN CALCIUM 20 MG: 20 TABLET, COATED ORAL at 21:28

## 2020-12-08 RX ADMIN — CARVEDILOL 6.25 MG: 6.25 TABLET, FILM COATED ORAL at 21:28

## 2020-12-08 RX ADMIN — HYDROMORPHONE HYDROCHLORIDE 0.5 MG: 1 INJECTION, SOLUTION INTRAMUSCULAR; INTRAVENOUS; SUBCUTANEOUS at 21:28

## 2020-12-08 RX ADMIN — ONDANSETRON 4 MG: 2 INJECTION INTRAMUSCULAR; INTRAVENOUS at 23:59

## 2020-12-08 RX ADMIN — NITROGLYCERIN 10 MCG/MIN: 40 INJECTION INTRAVENOUS at 17:43

## 2020-12-08 RX ADMIN — NITROGLYCERIN 0.4 MG: 0.4 TABLET SUBLINGUAL at 16:00

## 2020-12-08 RX ADMIN — HEPARIN SODIUM 3000 UNITS: 1000 INJECTION, SOLUTION INTRAVENOUS; SUBCUTANEOUS at 23:59

## 2020-12-08 RX ADMIN — ONDANSETRON 4 MG: 2 INJECTION INTRAMUSCULAR; INTRAVENOUS at 14:45

## 2020-12-08 RX ADMIN — HEPARIN SODIUM 4000 UNITS: 1000 INJECTION INTRAVENOUS; SUBCUTANEOUS at 15:27

## 2020-12-08 RX ADMIN — MORPHINE SULFATE 2 MG: 2 INJECTION, SOLUTION INTRAMUSCULAR; INTRAVENOUS at 16:05

## 2020-12-08 RX ADMIN — HEPARIN SODIUM AND DEXTROSE 1150 UNITS/HR: 10000; 5 INJECTION INTRAVENOUS at 23:15

## 2020-12-08 RX ADMIN — NITROGLYCERIN 1 INCH: 20 OINTMENT TOPICAL at 15:05

## 2020-12-08 NOTE — Clinical Note
Contrast Dose Calculator:   Patient's age: 79.   Patient's sex: Male. Patient weight (kg) = 95.3. Creatinine level (mg/dL) = 0.86. Creatinine clearance (mL/min): 108. Contrast concentration (mg/mL) = 300. MACD = 300 mL. Max Contrast dose per Creatinine Cl calculator = 243 mL.

## 2020-12-08 NOTE — Clinical Note
Allergies reviewed, lab results reviewed, pre-procedure education provided, patient verbalized understanding of procedure, procedural consent signed and patient is NPO.

## 2020-12-08 NOTE — Clinical Note
TRANSFER - IN REPORT:     Verbal report received from: [facility]. Report consisted of patient's Situation, Background, Assessment and   Recommendations(SBAR). Opportunity for questions and clarification was provided. Assessment completed upon patient's arrival to unit and care assumed. Patient transported with a Cardiac Cath Tech / Patient Care Tech.

## 2020-12-08 NOTE — Clinical Note
TRANSFER - OUT REPORT:     Verbal report given to: Jozef Lackey. Report consisted of patient's Situation, Background, Assessment and   Recommendations(SBAR). Opportunity for questions and clarification was provided. Patient transported with a Cardiac Cath Tech / Patient Care Tech. Patient transported to: CVT ICU.

## 2020-12-08 NOTE — Clinical Note
TRANSFER - OUT REPORT:     Verbal report given to: DB Ramos. Report consisted of patient's Situation, Background, Assessment and   Recommendations(SBAR). Opportunity for questions and clarification was provided. Patient transported with a Cardiac Cath Tech / Patient Care Tech, Monitor, Oxygen and Respiratory Therapist.   Patient transported to: CVT ICU.

## 2020-12-08 NOTE — Clinical Note
TRANSFER - IN REPORT:     Verbal report received from: Diana Weaver CVT RN. Report consisted of patient's Situation, Background, Assessment and   Recommendations(SBAR). Opportunity for questions and clarification was provided. Assessment completed upon patient's arrival to unit and care assumed. Patient transported with a Cardiac Cath Tech / Patient Care Tech, Monitor and Oxygen.

## 2020-12-08 NOTE — Clinical Note
Bilateral groin prepped with ChloraPrep and draped. Wet prep solution applied at: 1319. Wet prep solution dried at: 1321. Wet prep elapsed drying time: 2 mins.

## 2020-12-08 NOTE — ED PROVIDER NOTES
[address] EMERGENCY DEPT    Date: 12/8/2020  Patient Name: Effie Carroll    History of Presenting Illness     Chief Complaint   Patient presents with    Chest Pain    Shortness of Breath     79 y.o. male with a past medical history of Diabetes, Hypercholesterolemia, Hypertension, and MI with stents presents the ED complaining of chest pain/shortness of breath intermittent over the past several weeks. He notes today at 6 AM he was taking out his trash when he developed pain/shortness of breath. He describes having a tightness in his mid and left anterior chest as well as persistent shortness of breath. Also notes having some slight nausea. He reports taking several nitro pills over the past few weeks, which always resolved his symptoms. He called his cardiologist, Dr. Maryam Kamara, this morning to refill his nitro. Patient took baby aspirin this morning. Patient denies any fever, chills, cough, leg pain or swelling, other symptoms at this time. Patient denies any other associated signs or symptoms. Patient denies any other complaints. Nursing notes regarding the HPI and triage nursing notes were reviewed. Prior medical records were reviewed. Current Facility-Administered Medications   Medication Dose Route Frequency Provider Last Rate Last Dose    heparin 25,000 units in D5W 250 ml infusion  10-25 Units/kg/hr IntraVENous TITRATE [facility]         Current Outpatient Medications   Medication Sig Dispense Refill    carvediloL (COREG) 25 mg tablet TAKE 1 TABLET BY MOUTH TWICE A DAY WITH FOOD 180 Tab 3    Januvia 100 mg tablet TAKE 1 TABLET BY MOUTH EVERY DAY 90 Tab 0    glipiZIDE SR (GLUCOTROL XL) 5 mg CR tablet TAKE 1 TABLET BY MOUTH EVERY DAY 90 Tab 1    omeprazole (PRILOSEC) 20 mg capsule TAKE 1 CAPSULE BY MOUTH EVERY DAY 90 Cap 1    amLODIPine (NORVASC) 10 mg tablet Take 1 Tab by mouth daily. 90 Tab 1    rosuvastatin (CRESTOR) 10 mg tablet Take 1 Tab by mouth daily.  [address] Tab 1    metFORMIN (GLUCOPHAGE) 1,000 mg tablet TAKE 1 TABLET BY MOUTH TWICE A DAY WITH MEALS 180 Tab 1    Blood Pressure Monitor kit Check once a day 1 Kit 0    loratadine (Claritin) 10 mg tablet Take 10 mg by mouth.  ramipril (ALTACE) 10 mg capsule Take 1 Cap by mouth daily. 90 Cap 3    aspirin delayed-release 325 mg tablet Take 1 Tab by mouth daily. 90 Tab 3    clopidogrel (PLAVIX) 75 mg tab TAKE 1 TABLET BY MOUTH EVERY DAY 90 Tab 3    nitroglycerin (NITROSTAT) 0.4 mg SL tablet 1 Tab by SubLINGual route every five (5) minutes as needed for Chest Pain for up to 3 doses.  25 Tab 0       Past History     Past Medical History:  Past Medical History:   Diagnosis Date    Arthritis     1999 vicodin    Diabetes ([facility] [address] 1998 metformin    GERD (gastroesophageal reflux disease)     Hypercholesterolemia     Hypertension 1996 norvasc    Kidney stones     MI (myocardial infarction) ([facility] [address]        Past Surgical History:  Past Surgical History:   Procedure Laterality Date    CARDIAC SURG PROCEDURE UNLIST  12/2017    stints    COLONOSCOPY N/A 11/2/2018    COLONOSCOPY with Polypectomies performed by Abiodun Aiken MD at [facility]COPY    HX CHOLECYSTECTOMY      HX GI  2010    gallbladder       Family History:  Family History   Problem Relation Age of Onset    Stroke Mother     Headache Mother     Hypertension Mother    Marina Reusing Mother     Heart Disease Father     Heart Attack Father     Hypertension Father     Diabetes Father     Lung Cancer Father     Ovarian Cancer Sister     Heart Attack Brother     Breast Cancer Sister     Diabetes Sister     Cancer Maternal Aunt         lung cancer    Cancer Maternal Uncle     Cancer Paternal Aunt     Cancer Paternal Uncle        Social History:  Social History     Tobacco Use    Smoking status: Never Smoker    Smokeless tobacco: Never Used   Substance Use Topics    Alcohol use: No    Drug use: No       Allergies:  No Known Allergies    Patient's primary care provider (as noted in [facility]):  Enzo Yap MD    Review of Systems   Constitutional:  Denies malaise, fever, chills. Neck:  Denies injury or pain. Chest:  Denies injury. Cardiac: + CP. Denies palpitations. Respiratory: + SOB. Denies cough, wheezing, difficulty breathing. GI/ABD: + nausea. Denies injury, pain, distention, vomiting, diarrhea. Back:  Denies injury or pain. Extremity/MS:  Denies injury or pain. Neuro:  Denies headache, LOC, dizziness, neurologic symptoms/deficits/paresthesias. Skin: Denies injury, rash, itching or skin changes. All other systems negative as reviewed. Visit Vitals  /68   Pulse 73   Temp 97.9 °F (36.6 °C)   Resp 12   Ht 6' (1.829 m)   Wt 95.3 kg (210 lb)   SpO2 100%   BMI 28.48 kg/m²       PHYSICAL EXAM:    CONSTITUTIONAL:  Alert, in no apparent distress;  well developed;  well nourished. HEAD:  Normocephalic, atraumatic. EYES:  EOMI. Non-icteric sclera. Normal conjunctiva. NECK:  Supple  RESPIRATORY:  Chest clear, equal breath sounds, good air movement. CARDIOVASCULAR:  Regular rate and rhythm. No murmurs, rubs, or gallops. Chest:  No rash, lesions, bruising. No reproducible tenderness to palpation. GI:  Normal bowel sounds, abdomen soft and non-tender. No rebound or guarding. BACK:  Non-tender. UPPER EXT:  Normal inspection. LOWER EXT:  No edema, no calf tenderness. NEURO:  Moves all four extremities, and grossly normal motor exam.  SKIN:  No rashes;  Normal for age. PSYCH:  Alert and normal affect. DIFFERENTIAL DIAGNOSES/ MEDICAL DECISION MAKING:  Chest pain etiologies include acute cardiac events to include possible acute myocardial infarction, acute coronary syndrome, pneumonia, chest wall pain (myofascial/ musculoskeletal etiology), acute bronchitis, upper respiratory infection, referred abdominal pain, other etiologies, versus combination of the above.       Recent Results (from the past 12 hour(s))   CBC WITH AUTOMATED DIFF    Collection Time: 12/08/20  1:49 PM   Result Value Ref Range    WBC 8.8 4.6 - 13.2 K/uL    RBC 4.67 (L) 4.70 - 5.50 M/uL    HGB 14.6 13.0 - 16.0 g/dL    HCT 41.7 36.0 - 48.0 %    MCV 89.3 74.0 - 97.0 FL    MCH 31.3 24.0 - 34.0 PG    MCHC 35.0 31.0 - 37.0 g/dL    RDW 14.5 11.6 - 14.5 %    PLATELET 344 250 - 900 K/uL    MPV 10.1 9.2 - 11.8 FL    NEUTROPHILS 62 40 - 73 %    LYMPHOCYTES 26 21 - 52 %    MONOCYTES 10 3 - 10 %    EOSINOPHILS 2 0 - 5 %    BASOPHILS 0 0 - 2 %    ABS. NEUTROPHILS 5.4 1.8 - 8.0 K/UL    ABS. LYMPHOCYTES 2.3 0.9 - 3.6 K/UL    ABS. MONOCYTES 0.9 0.05 - 1.2 K/UL    ABS. EOSINOPHILS 0.2 0.0 - 0.4 K/UL    ABS. BASOPHILS 0.0 0.0 - 0.1 K/UL    DF AUTOMATED     METABOLIC PANEL, COMPREHENSIVE    Collection Time: 12/08/20  1:49 PM   Result Value Ref Range    Sodium 138 136 - 145 mmol/L    Potassium 4.0 3.5 - 5.5 mmol/L    Chloride 107 100 - 111 mmol/L    CO2 28 21 - 32 mmol/L    Anion gap 3 3.0 - 18 mmol/L    Glucose 90 74 - 99 mg/dL    BUN 13 7.0 - 18 MG/DL    Creatinine 0.89 0.6 - 1.3 MG/DL    BUN/Creatinine ratio 15 12 - 20      GFR est AA >60 >60 ml/min/1.73m2    GFR est non-AA >60 >60 ml/min/1.73m2    Calcium 9.1 8.5 - 10.1 MG/DL    Bilirubin, total 0.3 0.2 - 1.0 MG/DL    ALT (SGPT) 25 16 - 61 U/L    AST (SGOT) 16 10 - 38 U/L    Alk. phosphatase 71 45 - 117 U/L    Protein, total 7.5 6.4 - 8.2 g/dL    Albumin 3.7 3.4 - 5.0 g/dL    Globulin 3.8 2.0 - 4.0 g/dL    A-G Ratio 1.0 0.8 - 1.7     TROPONIN I    Collection Time: 12/08/20  1:49 PM   Result Value Ref Range    Troponin-I, QT <0.02 0.0 - 0.045 NG/ML   PTT    Collection Time: 12/08/20  1:49 PM   Result Value Ref Range    aPTT 27.7 23.0 - 36.4 SEC      Xr Chest Pa Lat    Result Date: 12/8/2020  EXAMINATION: Chest 2 views INDICATION: Chest pain, shortness of breath COMPARISON: 9/27/2019 FINDINGS: Frontal and lateral views of the chest obtained. Mediastinal silhouette and pulmonary vasculature unremarkable. No confluent consolidation. No evidence of pneumothorax. No acute osseous findings. IMPRESSION: No acute findings. ED COURSE:     EKG: new LBBB compared to 9/2019. HEART SCORE 7. Consult with NP [facility] with cardiology. She directs for heparin, nitro, and admit. Consult with Dr. Ifeoma Darling who will accept the patient to tele floor. Nitro paste administered, /68. Critical Care Time:  The services I provided to this patient were to treat and/or prevent clinically significant deterioration that could result in the failure of one or more body systems and/or organ systems due to ACS. Pt had ambulated to the restroom and shortly after developed CP/SOB. Repeat EKG x2 unremarkable.    5:01 PM Patient was having persistent chest pain/SOB while resting despite 3 Nitro SL and nitro paste and morphine/fentayl. Consult with Dr. Génesis Grey who directs for Nitro 10 mcg/min and fluids at 75/hr as well as place patient in cardiac ICU. Hospitalist paged at this time to inform him of update. Services included the following:  -reviewing nursing notes and old charts  -vital sign assessments  -direct patient care  -medication orders and management  -interpreting and reviewing diagnostic studies/labs  -re-evaluations  -documentation time    Aggregate critical care time was 35 minutes, which includes only time during which I was engaged in work directly related to the patient's care as described above, whether I was at bedside or elsewhere in the Emergency Department. It did not include time spent performing other reported procedures or the services of students or nurses. Janet BAUTISTA  3:38 PM    Diagnosis:   1. ACS (acute coronary syndrome) ([facility] [address]    2. LBBB (left bundle branch block)      Disposition: Admission    Patient's Medications   Start Taking    No medications on file   Continue Taking    AMLODIPINE (NORVASC) 10 MG TABLET    Take 1 Tab by mouth daily.     ASPIRIN DELAYED-RELEASE 325 MG TABLET    Take 1 Tab by mouth daily.    BLOOD PRESSURE MONITOR KIT    Check once a day    CARVEDILOL (COREG) 25 MG TABLET    TAKE 1 TABLET BY MOUTH TWICE A DAY WITH FOOD    CLOPIDOGREL (PLAVIX) 75 MG TAB    TAKE 1 TABLET BY MOUTH EVERY DAY    GLIPIZIDE SR (GLUCOTROL XL) 5 MG CR TABLET    TAKE 1 TABLET BY MOUTH EVERY DAY    JANUVIA 100 MG TABLET    TAKE 1 TABLET BY MOUTH EVERY DAY    LORATADINE (CLARITIN) 10 MG TABLET    Take 10 mg by mouth. METFORMIN (GLUCOPHAGE) 1,000 MG TABLET    TAKE 1 TABLET BY MOUTH TWICE A DAY WITH MEALS    NITROGLYCERIN (NITROSTAT) 0.4 MG SL TABLET    1 Tab by SubLINGual route every five (5) minutes as needed for Chest Pain for up to 3 doses. OMEPRAZOLE (PRILOSEC) 20 MG CAPSULE    TAKE 1 CAPSULE BY MOUTH EVERY DAY    RAMIPRIL (ALTACE) 10 MG CAPSULE    Take 1 Cap by mouth daily. ROSUVASTATIN (CRESTOR) 10 MG TABLET    Take 1 Tab by mouth daily.    These Medications have changed    No medications on file   Stop Taking    No medications on file     LILY Powers

## 2020-12-08 NOTE — ED NOTES
[facility] transport at bedside for trans[port to [facility] for admission.

## 2020-12-08 NOTE — Clinical Note
Lesion located in the Proximal LAD. Balloon inserted. Balloon inflated using multiple inflations inflation technique. Lesion #1: Pressure = 14 janice; Duration = 9 sec. Inflation 2: Pressure = 16 janice; Duration = 9 sec. Inflation 3: Pressure = 14 janice; Duration = 4 sec.

## 2020-12-08 NOTE — ED TRIAGE NOTES
Pt presents with intermittent chest pain and shortness of breath x 2-3 weeks, today's episode started about 10 am, states worse with exertion, reports took 1 nitro this am with some relief, reports took 3 nitro's with yesterdays cp event and had relief then too. Reports some nausea, denies cough, fever, vomiting, diarrhea.

## 2020-12-09 ENCOUNTER — APPOINTMENT (OUTPATIENT)
Dept: NON INVASIVE DIAGNOSTICS | Age: 70
DRG: 246 | End: 2020-12-09
Attending: NURSE PRACTITIONER
Payer: MEDICARE

## 2020-12-09 LAB
ANION GAP SERPL CALC-SCNC: 6 MMOL/L (ref 3–18)
APTT PPP: 28.9 SEC (ref 23–36.4)
APTT PPP: 55.5 SEC (ref 23–36.4)
APTT PPP: 75.3 SEC (ref 23–36.4)
APTT PPP: 91.2 SEC (ref 23–36.4)
ATRIAL RATE: 68 BPM
ATRIAL RATE: 70 BPM
ATRIAL RATE: 72 BPM
ATRIAL RATE: 73 BPM
BUN SERPL-MCNC: 12 MG/DL (ref 7–18)
BUN/CREAT SERPL: 14 (ref 12–20)
CALCIUM SERPL-MCNC: 8.4 MG/DL (ref 8.5–10.1)
CALCULATED P AXIS, ECG09: 2 DEGREES
CALCULATED P AXIS, ECG09: 25 DEGREES
CALCULATED P AXIS, ECG09: 3 DEGREES
CALCULATED P AXIS, ECG09: 42 DEGREES
CALCULATED R AXIS, ECG10: 38 DEGREES
CALCULATED R AXIS, ECG10: 39 DEGREES
CALCULATED R AXIS, ECG10: 48 DEGREES
CALCULATED R AXIS, ECG10: 63 DEGREES
CALCULATED T AXIS, ECG11: 113 DEGREES
CALCULATED T AXIS, ECG11: 133 DEGREES
CALCULATED T AXIS, ECG11: 135 DEGREES
CALCULATED T AXIS, ECG11: 139 DEGREES
CHLORIDE SERPL-SCNC: 110 MMOL/L (ref 100–111)
CHOLEST SERPL-MCNC: 130 MG/DL
CK MB CFR SERPL CALC: NORMAL % (ref 0–4)
CK MB SERPL-MCNC: <1 NG/ML (ref 5–25)
CK SERPL-CCNC: 75 U/L (ref 39–308)
CO2 SERPL-SCNC: 26 MMOL/L (ref 21–32)
CREAT SERPL-MCNC: 0.86 MG/DL (ref 0.6–1.3)
DIAGNOSIS, 93000: NORMAL
ECHO AO ROOT DIAM: 3.73 CM
ECHO LA AREA 4C: 19.63 CM2
ECHO LA VOL 2C: 26.53 ML (ref 18–58)
ECHO LA VOL 4C: 55.14 ML (ref 18–58)
ECHO LA VOL BP: 44.23 ML (ref 18–58)
ECHO LA VOL/BSA BIPLANE: 20.34 ML/M2 (ref 16–28)
ECHO LA VOLUME INDEX A2C: 12.2 ML/M2 (ref 16–28)
ECHO LA VOLUME INDEX A4C: 25.35 ML/M2 (ref 16–28)
ECHO LV E' LATERAL VELOCITY: 11.58 CM/S
ECHO LV E' SEPTAL VELOCITY: 8.16 CM/S
ECHO LV INTERNAL DIMENSION DIASTOLIC: 4.59 CM (ref 4.2–5.9)
ECHO LV INTERNAL DIMENSION SYSTOLIC: 2.79 CM
ECHO LV IVSD: 1.45 CM (ref 0.6–1)
ECHO LV MASS 2D: 243.8 G (ref 88–224)
ECHO LV MASS INDEX 2D: 112.1 G/M2 (ref 49–115)
ECHO LV POSTERIOR WALL DIASTOLIC: 1.26 CM (ref 0.6–1)
ECHO LVOT CARDIAC OUTPUT: 4.47 LITER/MINUTE
ECHO LVOT DIAM: 1.88 CM
ECHO LVOT PEAK GRADIENT: 3.46 MMHG
ECHO LVOT PEAK VELOCITY: 92.96 CM/S
ECHO LVOT SV: 68.7 ML
ECHO LVOT VTI: 24.71 CM
ECHO MV A VELOCITY: 77.52 CM/S
ECHO MV E DECELERATION TIME (DT): 170.92 MS
ECHO MV E VELOCITY: 67.36 CM/S
ECHO MV E/A RATIO: 0.87
ECHO MV E/E' LATERAL: 5.82
ECHO MV E/E' RATIO (AVERAGED): 7.04
ECHO MV E/E' SEPTAL: 8.25
ECHO RV TAPSE: 2.13 CM (ref 1.5–2)
ECHO TV REGURGITANT MAX VELOCITY: 238.61 CM/S
ECHO TV REGURGITANT PEAK GRADIENT: 22.77 MMHG
ERYTHROCYTE [DISTWIDTH] IN BLOOD BY AUTOMATED COUNT: 14.3 % (ref 11.6–14.5)
GLUCOSE BLD STRIP.AUTO-MCNC: 133 MG/DL (ref 70–110)
GLUCOSE BLD STRIP.AUTO-MCNC: 167 MG/DL (ref 70–110)
GLUCOSE BLD STRIP.AUTO-MCNC: 167 MG/DL (ref 70–110)
GLUCOSE BLD STRIP.AUTO-MCNC: 170 MG/DL (ref 70–110)
GLUCOSE SERPL-MCNC: 138 MG/DL (ref 74–99)
HCT VFR BLD AUTO: 36 % (ref 36–48)
HDLC SERPL-MCNC: 31 MG/DL (ref 40–60)
HDLC SERPL: 4.2 {RATIO} (ref 0–5)
HGB BLD-MCNC: 12.4 G/DL (ref 13–16)
HISTORY CHECKED?,CKHIST: NORMAL
LDLC SERPL CALC-MCNC: 44.4 MG/DL (ref 0–100)
LIPID PROFILE,FLP: ABNORMAL
LVOT MG: 2.18 MMHG
MCH RBC QN AUTO: 30.7 PG (ref 24–34)
MCHC RBC AUTO-ENTMCNC: 34.4 G/DL (ref 31–37)
MCV RBC AUTO: 89.1 FL (ref 74–97)
P-R INTERVAL, ECG05: 132 MS
P-R INTERVAL, ECG05: 134 MS
P-R INTERVAL, ECG05: 168 MS
P-R INTERVAL, ECG05: 174 MS
PLATELET # BLD AUTO: 159 K/UL (ref 135–420)
PMV BLD AUTO: 10.2 FL (ref 9.2–11.8)
POTASSIUM SERPL-SCNC: 3.9 MMOL/L (ref 3.5–5.5)
Q-T INTERVAL, ECG07: 418 MS
Q-T INTERVAL, ECG07: 420 MS
Q-T INTERVAL, ECG07: 438 MS
Q-T INTERVAL, ECG07: 440 MS
QRS DURATION, ECG06: 122 MS
QRS DURATION, ECG06: 124 MS
QRS DURATION, ECG06: 130 MS
QRS DURATION, ECG06: 134 MS
QTC CALCULATION (BEZET), ECG08: 451 MS
QTC CALCULATION (BEZET), ECG08: 462 MS
QTC CALCULATION (BEZET), ECG08: 467 MS
QTC CALCULATION (BEZET), ECG08: 479 MS
RBC # BLD AUTO: 4.04 M/UL (ref 4.7–5.5)
SODIUM SERPL-SCNC: 142 MMOL/L (ref 136–145)
TRIGL SERPL-MCNC: 273 MG/DL (ref ?–150)
TROPONIN I SERPL-MCNC: 0.03 NG/ML (ref 0–0.04)
TROPONIN I SERPL-MCNC: 0.05 NG/ML (ref 0–0.04)
TROPONIN I SERPL-MCNC: <0.02 NG/ML (ref 0–0.04)
VENTRICULAR RATE, ECG03: 68 BPM
VENTRICULAR RATE, ECG03: 70 BPM
VENTRICULAR RATE, ECG03: 72 BPM
VENTRICULAR RATE, ECG03: 73 BPM
VLDLC SERPL CALC-MCNC: 54.6 MG/DL
WBC # BLD AUTO: 7.3 K/UL (ref 4.6–13.2)

## 2020-12-09 PROCEDURE — C1769 GUIDE WIRE: HCPCS | Performed by: INTERNAL MEDICINE

## 2020-12-09 PROCEDURE — 85730 THROMBOPLASTIN TIME PARTIAL: CPT

## 2020-12-09 PROCEDURE — 77030013797 HC KT TRNSDUC PRSSR EDWD -A: Performed by: INTERNAL MEDICINE

## 2020-12-09 PROCEDURE — 94762 N-INVAS EAR/PLS OXIMTRY CONT: CPT

## 2020-12-09 PROCEDURE — 74011250636 HC RX REV CODE- 250/636: Performed by: NURSE PRACTITIONER

## 2020-12-09 PROCEDURE — 74011000250 HC RX REV CODE- 250: Performed by: NURSE PRACTITIONER

## 2020-12-09 PROCEDURE — 74011000250 HC RX REV CODE- 250: Performed by: INTERNAL MEDICINE

## 2020-12-09 PROCEDURE — 80048 BASIC METABOLIC PNL TOTAL CA: CPT

## 2020-12-09 PROCEDURE — 77030016699 HC CATH ANGI DX INFN1 CARD -A: Performed by: INTERNAL MEDICINE

## 2020-12-09 PROCEDURE — 93458 L HRT ARTERY/VENTRICLE ANGIO: CPT | Performed by: INTERNAL MEDICINE

## 2020-12-09 PROCEDURE — 93306 TTE W/DOPPLER COMPLETE: CPT | Performed by: INTERNAL MEDICINE

## 2020-12-09 PROCEDURE — 65620000000 HC RM CCU GENERAL

## 2020-12-09 PROCEDURE — 82962 GLUCOSE BLOOD TEST: CPT

## 2020-12-09 PROCEDURE — 74011250636 HC RX REV CODE- 250/636: Performed by: INTERNAL MEDICINE

## 2020-12-09 PROCEDURE — C8929 TTE W OR WO FOL WCON,DOPPLER: HCPCS

## 2020-12-09 PROCEDURE — 99223 1ST HOSP IP/OBS HIGH 75: CPT | Performed by: INTERNAL MEDICINE

## 2020-12-09 PROCEDURE — C1894 INTRO/SHEATH, NON-LASER: HCPCS | Performed by: INTERNAL MEDICINE

## 2020-12-09 PROCEDURE — 74011250637 HC RX REV CODE- 250/637: Performed by: INTERNAL MEDICINE

## 2020-12-09 PROCEDURE — 4A023N7 MEASUREMENT OF CARDIAC SAMPLING AND PRESSURE, LEFT HEART, PERCUTANEOUS APPROACH: ICD-10-PCS | Performed by: INTERNAL MEDICINE

## 2020-12-09 PROCEDURE — 99153 MOD SED SAME PHYS/QHP EA: CPT | Performed by: INTERNAL MEDICINE

## 2020-12-09 PROCEDURE — 82550 ASSAY OF CK (CPK): CPT

## 2020-12-09 PROCEDURE — 74011250637 HC RX REV CODE- 250/637: Performed by: PHYSICIAN ASSISTANT

## 2020-12-09 PROCEDURE — 77030027845 HC BND COM RDL D-STAT TELE -B: Performed by: INTERNAL MEDICINE

## 2020-12-09 PROCEDURE — 99152 MOD SED SAME PHYS/QHP 5/>YRS: CPT | Performed by: INTERNAL MEDICINE

## 2020-12-09 PROCEDURE — 93005 ELECTROCARDIOGRAM TRACING: CPT

## 2020-12-09 PROCEDURE — 74011250636 HC RX REV CODE- 250/636: Performed by: EMERGENCY MEDICINE

## 2020-12-09 PROCEDURE — 99233 SBSQ HOSP IP/OBS HIGH 50: CPT | Performed by: EMERGENCY MEDICINE

## 2020-12-09 PROCEDURE — 36415 COLL VENOUS BLD VENIPUNCTURE: CPT

## 2020-12-09 PROCEDURE — 99223 1ST HOSP IP/OBS HIGH 75: CPT | Performed by: PHYSICIAN ASSISTANT

## 2020-12-09 PROCEDURE — B2111ZZ FLUOROSCOPY OF MULTIPLE CORONARY ARTERIES USING LOW OSMOLAR CONTRAST: ICD-10-PCS | Performed by: INTERNAL MEDICINE

## 2020-12-09 PROCEDURE — 74011636637 HC RX REV CODE- 636/637: Performed by: NURSE PRACTITIONER

## 2020-12-09 PROCEDURE — 84484 ASSAY OF TROPONIN QUANT: CPT

## 2020-12-09 PROCEDURE — 74011000636 HC RX REV CODE- 636: Performed by: INTERNAL MEDICINE

## 2020-12-09 PROCEDURE — 77010033678 HC OXYGEN DAILY

## 2020-12-09 PROCEDURE — 85027 COMPLETE CBC AUTOMATED: CPT

## 2020-12-09 PROCEDURE — 2709999900 HC NON-CHARGEABLE SUPPLY

## 2020-12-09 PROCEDURE — B2151ZZ FLUOROSCOPY OF LEFT HEART USING LOW OSMOLAR CONTRAST: ICD-10-PCS | Performed by: INTERNAL MEDICINE

## 2020-12-09 PROCEDURE — 80061 LIPID PANEL: CPT

## 2020-12-09 PROCEDURE — 77030015766: Performed by: INTERNAL MEDICINE

## 2020-12-09 PROCEDURE — 74011250637 HC RX REV CODE- 250/637: Performed by: NURSE PRACTITIONER

## 2020-12-09 RX ORDER — OXYCODONE AND ACETAMINOPHEN 7.5; 325 MG/1; MG/1
1 TABLET ORAL
Status: DISPENSED | OUTPATIENT
Start: 2020-12-09 | End: 2020-12-12

## 2020-12-09 RX ORDER — HEPARIN SODIUM 1000 [USP'U]/ML
INJECTION, SOLUTION INTRAVENOUS; SUBCUTANEOUS AS NEEDED
Status: DISCONTINUED | OUTPATIENT
Start: 2020-12-09 | End: 2020-12-09 | Stop reason: HOSPADM

## 2020-12-09 RX ORDER — SODIUM CHLORIDE 0.9 % (FLUSH) 0.9 %
5-40 SYRINGE (ML) INJECTION AS NEEDED
Status: DISCONTINUED | OUTPATIENT
Start: 2020-12-09 | End: 2020-12-18 | Stop reason: HOSPADM

## 2020-12-09 RX ORDER — AMIODARONE HYDROCHLORIDE 200 MG/1
400 TABLET ORAL 3 TIMES DAILY
Status: DISCONTINUED | OUTPATIENT
Start: 2020-12-09 | End: 2020-12-10

## 2020-12-09 RX ORDER — SODIUM CHLORIDE, SODIUM LACTATE, POTASSIUM CHLORIDE, CALCIUM CHLORIDE 600; 310; 30; 20 MG/100ML; MG/100ML; MG/100ML; MG/100ML
75 INJECTION, SOLUTION INTRAVENOUS CONTINUOUS
Status: DISCONTINUED | OUTPATIENT
Start: 2020-12-14 | End: 2020-12-10

## 2020-12-09 RX ORDER — HEPARIN SODIUM 1000 [USP'U]/ML
3000 INJECTION, SOLUTION INTRAVENOUS; SUBCUTANEOUS
Status: COMPLETED | OUTPATIENT
Start: 2020-12-09 | End: 2020-12-09

## 2020-12-09 RX ORDER — MUPIROCIN 20 MG/G
OINTMENT TOPICAL 2 TIMES DAILY
Status: DISCONTINUED | OUTPATIENT
Start: 2020-12-09 | End: 2020-12-18 | Stop reason: HOSPADM

## 2020-12-09 RX ORDER — FENTANYL CITRATE 50 UG/ML
INJECTION, SOLUTION INTRAMUSCULAR; INTRAVENOUS AS NEEDED
Status: DISCONTINUED | OUTPATIENT
Start: 2020-12-09 | End: 2020-12-09 | Stop reason: HOSPADM

## 2020-12-09 RX ORDER — NITROGLYCERIN 0.4 MG/1
0.4 TABLET SUBLINGUAL
Status: DISCONTINUED | OUTPATIENT
Start: 2020-12-09 | End: 2020-12-18 | Stop reason: HOSPADM

## 2020-12-09 RX ORDER — MIDAZOLAM HYDROCHLORIDE 1 MG/ML
INJECTION, SOLUTION INTRAMUSCULAR; INTRAVENOUS AS NEEDED
Status: DISCONTINUED | OUTPATIENT
Start: 2020-12-09 | End: 2020-12-09 | Stop reason: HOSPADM

## 2020-12-09 RX ORDER — TEMAZEPAM 15 MG/1
15 CAPSULE ORAL
Status: DISCONTINUED | OUTPATIENT
Start: 2020-12-09 | End: 2020-12-18 | Stop reason: HOSPADM

## 2020-12-09 RX ORDER — ROSUVASTATIN CALCIUM 20 MG/1
40 TABLET, COATED ORAL
Status: DISCONTINUED | OUTPATIENT
Start: 2020-12-09 | End: 2020-12-18 | Stop reason: HOSPADM

## 2020-12-09 RX ORDER — HYDRALAZINE HYDROCHLORIDE 20 MG/ML
20 INJECTION INTRAMUSCULAR; INTRAVENOUS
Status: DISCONTINUED | OUTPATIENT
Start: 2020-12-09 | End: 2020-12-14

## 2020-12-09 RX ORDER — CEFAZOLIN SODIUM 2 G/50ML
2 SOLUTION INTRAVENOUS ONCE
Status: DISCONTINUED | OUTPATIENT
Start: 2020-12-14 | End: 2020-12-13

## 2020-12-09 RX ORDER — LIDOCAINE HYDROCHLORIDE 10 MG/ML
INJECTION, SOLUTION EPIDURAL; INFILTRATION; INTRACAUDAL; PERINEURAL AS NEEDED
Status: DISCONTINUED | OUTPATIENT
Start: 2020-12-09 | End: 2020-12-09 | Stop reason: HOSPADM

## 2020-12-09 RX ORDER — HEPARIN SODIUM 10000 [USP'U]/100ML
10-25 INJECTION, SOLUTION INTRAVENOUS
Status: DISCONTINUED | OUTPATIENT
Start: 2020-12-09 | End: 2020-12-10

## 2020-12-09 RX ORDER — SODIUM CHLORIDE 0.9 % (FLUSH) 0.9 %
5-40 SYRINGE (ML) INJECTION EVERY 8 HOURS
Status: DISCONTINUED | OUTPATIENT
Start: 2020-12-09 | End: 2020-12-18 | Stop reason: HOSPADM

## 2020-12-09 RX ORDER — SODIUM CHLORIDE 9 MG/ML
100 INJECTION, SOLUTION INTRAVENOUS CONTINUOUS
Status: DISPENSED | OUTPATIENT
Start: 2020-12-09 | End: 2020-12-09

## 2020-12-09 RX ORDER — HEPARIN SODIUM 10000 [USP'U]/100ML
10-25 INJECTION, SOLUTION INTRAVENOUS
Status: DISCONTINUED | OUTPATIENT
Start: 2020-12-09 | End: 2020-12-09

## 2020-12-09 RX ORDER — METOPROLOL TARTRATE 5 MG/5ML
2.5 INJECTION INTRAVENOUS
Status: DISCONTINUED | OUTPATIENT
Start: 2020-12-09 | End: 2020-12-14

## 2020-12-09 RX ADMIN — PERFLUTREN 2 ML: 6.52 INJECTION, SUSPENSION INTRAVENOUS at 09:35

## 2020-12-09 RX ADMIN — HEPARIN SODIUM AND DEXTROSE 1150 UNITS/HR: 10000; 5 INJECTION INTRAVENOUS at 19:28

## 2020-12-09 RX ADMIN — AMIODARONE HYDROCHLORIDE 400 MG: 200 TABLET ORAL at 15:19

## 2020-12-09 RX ADMIN — NITROGLYCERIN 165 MCG/MIN: 40 INJECTION INTRAVENOUS at 15:18

## 2020-12-09 RX ADMIN — HEPARIN SODIUM 3000 UNITS: 1000 INJECTION, SOLUTION INTRAVENOUS; SUBCUTANEOUS at 19:24

## 2020-12-09 RX ADMIN — HEPARIN SODIUM AND DEXTROSE 953 UNITS/HR: 10000; 5 INJECTION INTRAVENOUS at 16:59

## 2020-12-09 RX ADMIN — AMIODARONE HYDROCHLORIDE 400 MG: 200 TABLET ORAL at 21:13

## 2020-12-09 RX ADMIN — Medication 10 ML: at 15:58

## 2020-12-09 RX ADMIN — NITROGLYCERIN 150 MCG/MIN: 40 INJECTION INTRAVENOUS at 15:10

## 2020-12-09 RX ADMIN — CARVEDILOL 6.25 MG: 6.25 TABLET, FILM COATED ORAL at 21:13

## 2020-12-09 RX ADMIN — MUPIROCIN: 20 OINTMENT TOPICAL at 18:45

## 2020-12-09 RX ADMIN — OXYCODONE HYDROCHLORIDE AND ACETAMINOPHEN 1 TABLET: 7.5; 325 TABLET ORAL at 15:18

## 2020-12-09 RX ADMIN — OXYCODONE HYDROCHLORIDE AND ACETAMINOPHEN 1 TABLET: 7.5; 325 TABLET ORAL at 06:45

## 2020-12-09 RX ADMIN — NITROGLYCERIN 165 MCG/MIN: 40 INJECTION INTRAVENOUS at 16:58

## 2020-12-09 RX ADMIN — ASPIRIN 81 MG CHEWABLE TABLET 81 MG: 81 TABLET CHEWABLE at 08:49

## 2020-12-09 RX ADMIN — ROSUVASTATIN 40 MG: 20 TABLET, FILM COATED ORAL at 21:13

## 2020-12-09 RX ADMIN — Medication 20 ML: at 22:00

## 2020-12-09 RX ADMIN — INSULIN LISPRO 2 UNITS: 100 INJECTION, SOLUTION INTRAVENOUS; SUBCUTANEOUS at 07:30

## 2020-12-09 RX ADMIN — SODIUM CHLORIDE 100 ML/HR: 900 INJECTION, SOLUTION INTRAVENOUS at 12:55

## 2020-12-09 NOTE — PROGRESS NOTES
[facility] Hospitalist Group  Progress Note    Patient: Bean Deal Age: 79 y.o. : 1950 MR#: [id number] SSN: [id number]  Date/Time: 2020    Subjective:     Patient is laying in bed in no apparent distress  Still has some residual chest pain  no sob, no fevers, no headache, no abdominal pain, no rash      Assessment/Plan:     Unstable angina  Coronary artery disease  Hypertension  Dyslipidemia  Diet-controlled type 2 diabetes   degenerative joint disease    Plan  Status post cardiac catheterizationpatient has three-vessel disease. Cardiology is recommending cardiothoracic evaluation. Cardiothoracic surgery has been consulted. Continue aspirin, beta-blocker, statin  On nitroglycerin infusion  On heparin infusion  Monitor sugars, sliding scale insulin  Rest depending on patient's further hospital course  Discussed with patient  Patient is a full code  Discussed with RN      Case discussed with:  [x]Patient  []Family  [x]Nursing  []Case Management  DVT Prophylaxis:  []Lovenox  []Hep SQ  []SCDs  []Coumadin   [x]On Heparin gtt    Objective:   VS:   Visit Vitals  /67   Pulse 89   Temp 98.3 °F (36.8 °C)   Resp 24   Ht 6' (1.829 m)   Wt 95.3 kg (210 lb)   SpO2 98%   BMI 28.48 kg/m²      Tmax/24hrs: Temp (24hrs), Av.1 °F (36.7 °C), Min:97.9 °F (36.6 °C), Max:98.3 °F (36.8 °C)    Input/Output:     Intake/Output Summary (Last 24 hours) at 2020 1603  Last data filed at 2020 1315  Gross per 24 hour   Intake 453.73 ml   Output 1175 ml   Net -721.27 ml       General:    Lying in bed in no acute distress. HEENT:  Pupils equal.  Sclera anicteric. Conjunctiva pink. Mucous membranes                           Moist, no ear or nasal discharge  Neck:  Supple. Trachea midline. No accessory muscle use. No thyromegaly. No jugular venous distention, no carotid bruit  CV:                  Regular rate and rhythm.  S1S2+  Lungs:   Clear to auscultation bilaterally. No Wheezing or Rhonchi. No rales. Abdomen:   Soft, non-tender. Not distended. Bowel sounds normal.   Extremities: No cyanosis. No edema. Pulses 1+ b/l  Neurologic: Alert and oriented X 3. Follows commands, responds appropriately. No focal neurological deficit was noted  Skin:                Warm and dry. No rashes.        Labs:    Recent Results (from the past 24 hour(s))   EKG, 12 LEAD, SUBSEQUENT    Collection Time: 12/08/20  4:06 PM   Result Value Ref Range    Ventricular Rate 79 BPM    Atrial Rate 79 BPM    P-R Interval 164 ms    QRS Duration 134 ms    Q-T Interval 412 ms    QTC Calculation (Bezet) 472 ms    Calculated P Axis 41 degrees    Calculated R Axis 54 degrees    Calculated T Axis 128 degrees    Diagnosis       Normal sinus rhythm  Left bundle branch block  Abnormal ECG  When compared with ECG of 27-SEP-2019 17:19,  Left bundle branch block is now present  Confirmed by Anne-Marie Silva ([id number]) on 12/8/2020 5:05:06 PM     EKG, 12 LEAD, SUBSEQUENT    Collection Time: 12/08/20  4:35 PM   Result Value Ref Range    Ventricular Rate 68 BPM    Atrial Rate 68 BPM    P-R Interval 168 ms    QRS Duration 130 ms    Q-T Interval 440 ms    QTC Calculation (Bezet) 467 ms    Calculated P Axis 42 degrees    Calculated R Axis 63 degrees    Calculated T Axis 113 degrees    Diagnosis       Normal sinus rhythm  Left bundle branch block  Abnormal ECG  When compared with ECG of 08-DEC-2020 16:06,  No significant change was found  Confirmed by Suzanne Wilson MD, ----- ([id number]) on 12/9/2020 9:16:48 AM     TROPONIN I    Collection Time: 12/08/20  4:55 PM   Result Value Ref Range    Troponin-I, QT <0.02 0.0 - 0.045 NG/ML   PTT    Collection Time: 12/08/20  8:04 PM   Result Value Ref Range    aPTT 45.7 (H) 23.0 - 36.4 SEC   CARDIAC PANEL,(CK, CKMB & TROPONIN)    Collection Time: 12/08/20  8:04 PM   Result Value Ref Range    CK - MB <1.0 <3.6 ng/ml    CK-MB Index  0.0 - 4.0 %     CALCULATION NOT PERFORMED WHEN RESULT IS BELOW LINEAR LIMIT    CK 55 39 - 308 U/L    Troponin-I, QT <0.02 0.0 - 0.045 NG/ML   EKG, 12 LEAD, SUBSEQUENT    Collection Time: 12/08/20  8:20 PM   Result Value Ref Range    Ventricular Rate 73 BPM    Atrial Rate 73 BPM    P-R Interval 132 ms    QRS Duration 124 ms    Q-T Interval 420 ms    QTC Calculation (Bezet) 462 ms    Calculated P Axis 2 degrees    Calculated R Axis 39 degrees    Calculated T Axis 139 degrees    Diagnosis       Normal sinus rhythm  Left bundle branch block  Abnormal ECG  When compared with ECG of 08-DEC-2020 16:35,  No significant change was found     EKG, 12 LEAD, SUBSEQUENT    Collection Time: 12/08/20  8:21 PM   Result Value Ref Range    Ventricular Rate 70 BPM    Atrial Rate 70 BPM    P-R Interval 134 ms    QRS Duration 122 ms    Q-T Interval 418 ms    QTC Calculation (Bezet) 451 ms    Calculated P Axis 3 degrees    Calculated R Axis 48 degrees    Calculated T Axis 135 degrees    Diagnosis       Normal sinus rhythm  Left bundle branch block  Abnormal ECG  When compared with ECG of 08-DEC-2020 20:20,  No significant change was found     GLUCOSE, POC    Collection Time: 12/08/20  9:03 PM   Result Value Ref Range    Glucose (POC) 89 70 - 110 mg/dL   SARS-COV-2    Collection Time: 12/08/20  9:30 PM   Result Value Ref Range    Specimen source Nasopharyngeal      COVID-19 rapid test Not detected NOTD      Specimen type NP Swab     PTT    Collection Time: 12/09/20  3:52 AM   Result Value Ref Range    aPTT 75.3 (H) 23.0 - 36.4 SEC   TROPONIN I    Collection Time: 12/09/20  3:52 AM   Result Value Ref Range    Troponin-I, QT <0.02 0.0 - 0.045 NG/ML   GLUCOSE, POC    Collection Time: 12/09/20  6:19 AM   Result Value Ref Range    Glucose (POC) 167 (H) 70 - 110 mg/dL   GLUCOSE, POC    Collection Time: 12/09/20  8:04 AM   Result Value Ref Range    Glucose (POC) 167 (H) 70 - 110 mg/dL   ECHO ADULT COMPLETE    Collection Time: 12/09/20  9:35 AM   Result Value Ref Range    IVSd 1.45 (A) 0.6 - 1.0 cm    LVIDd 4.59 4.2 - 5.9 cm    LVIDs 2.79 cm    LVOT d 1.88 cm    LVPWd 1.26 (A) 0.6 - 1.0 cm    LVOT Cardiac Output 4.47 liter/minute    LVOT Peak Gradient 3.46 mmHg    Left Ventricular Outflow Tract Mean Gradient 2.18 mmHg    LVOT SV 68.7 mL    LVOT Peak Velocity 92.96 cm/s    LVOT VTI 24.71 cm    LA Volume 44.23 18 - 58 mL    LA Area 4C 19.63 cm2    LA Vol 2C 26.53 18 - 58 mL    LA Vol 4C 55.14 18 - 58 mL    MV A Geraldo 77.52 cm/s    Mitral Valve E Wave Deceleration Time 170.92 ms    MV E Geraldo 67.36 cm/s    E/E' lateral 5.82     E/E' septal 8.25     LV E' Lateral Velocity 11.58 cm/s    LV E' Septal Velocity 8.16 cm/s    Tapse 2.13 (A) 1.5 - 2.0 cm    Triscuspid Valve Regurgitation Peak Gradient 22.77 mmHg    TR Max Velocity 238.61 cm/s    Ao Root D 3.73 cm    MV E/A 0.87     LV Mass .8 88 - 224 g    LV Mass AL Index 112.1 49 - 115 g/m2    E/E' ratio (averaged) 7.04     LA Vol Index 20.34 16 - 28 ml/m2    LA Vol Index 12.20 16 - 28 ml/m2    LA Vol Index 25.35 16 - 28 ml/m2   PTT    Collection Time: 12/09/20 10:36 AM   Result Value Ref Range    aPTT 55.5 (H) 23.0 - 36.4 SEC   LIPID PANEL    Collection Time: 12/09/20 10:36 AM   Result Value Ref Range    LIPID PROFILE          Cholesterol, total 130 <200 MG/DL    Triglyceride 273 (H) <150 MG/DL    HDL Cholesterol 31 (L) 40 - 60 MG/DL    LDL, calculated 44.4 0 - 100 MG/DL    VLDL, calculated 54.6 MG/DL    CHOL/HDL Ratio 4.2 0 - 5.0     CBC W/O DIFF    Collection Time: 12/09/20 10:36 AM   Result Value Ref Range    WBC 7.3 4.6 - 13.2 K/uL    RBC 4.04 (L) 4.70 - 5.50 M/uL    HGB 12.4 (L) 13.0 - 16.0 g/dL    HCT 36.0 36.0 - 48.0 %    MCV 89.1 74.0 - 97.0 FL    MCH 30.7 24.0 - 34.0 PG    MCHC 34.4 31.0 - 37.0 g/dL    RDW 14.3 11.6 - 14.5 %    PLATELET 283 283 - 693 K/uL    MPV 10.2 9.2 - 71.5 FL   METABOLIC PANEL, BASIC    Collection Time: 12/09/20 10:36 AM   Result Value Ref Range    Sodium 142 136 - 145 mmol/L    Potassium 3.9 3.5 - 5.5 mmol/L    Chloride 110 100 - 111 mmol/L    CO2 26 21 - 32 mmol/L    Anion gap 6 3.0 - 18 mmol/L    Glucose 138 (H) 74 - 99 mg/dL    BUN 12 7.0 - 18 MG/DL    Creatinine 0.86 0.6 - 1.3 MG/DL    BUN/Creatinine ratio 14 12 - 20      GFR est AA >60 >60 ml/min/1.73m2    GFR est non-AA >60 >60 ml/min/1.73m2    Calcium 8.4 (L) 8.5 - 10.1 MG/DL   EKG, 12 LEAD, INITIAL    Collection Time: 12/09/20  2:38 PM   Result Value Ref Range    Ventricular Rate 72 BPM    Atrial Rate 72 BPM    P-R Interval 174 ms    QRS Duration 134 ms    Q-T Interval 438 ms    QTC Calculation (Bezet) 479 ms    Calculated P Axis 25 degrees    Calculated R Axis 38 degrees    Calculated T Axis 133 degrees    Diagnosis       Normal sinus rhythm  Left bundle branch block  Abnormal ECG  When compared with ECG of 08-DEC-2020 20:21,  No significant change was found       I spent 35 minutes with the patient in face-to-face consultation, of which greater than 50% was spent in counseling and coordination of care as described above      Signed By: Nuzhat Soot MD     December 9, 2020

## 2020-12-09 NOTE — CONSULTS
[facility]  -  Consult      12/9/2020        Ayaan Maloney is a 79 y.o. male who is being seen on consult for CAD, at  Dr. Joy Huff request.      Assessment:        Progressive and severe unstable angina due to three-vessel coronary artery disease including greater than 50% left main disease, preserved ejection fraction   PCI for LIZ to circumflex, 12/17   New left bundle branch block   Hypertension   Hyperlipidemia   Diabetes mellitus, A1c 5.9.   Osteoarthritis, lower back and wrist   Gastroesophageal reflux disease   Thyroid nodule followed by Dr. Chelsie Leyva Acute bronchitis, 2020, treated with antibiotics and inhaler as outpatient   Status post cholecystectomy   Nephrolithiasis    Plan:     1. The pathophysiology of his coronary disease was discussed in detail and treatment options, including doing nothing, were discussed with the patient. Indications for coronary artery bypass, surgical specifics and anticipated outcome as well as STS data were reviewed with the patient and questions were answered. He agrees to stay in the hospital for a Plavix washout and CABG on 12/14/2020. Dr. Augustina Cardozo will see the patient to discuss further. 2.  Continue heparin and nitroglycerin per cardiology. Continue to hold Plavix and ACE inhibitor/ARB's.     He will need the additional preoperative tests, which I will order:  Type and cross for 2 units PRBC  CBC  BMP  Coagulation profile / INR  Liver function tests  HbA1c  Urinalysis  Chest CT scan to assess the size of the ascending aorta, to check for ascending aortic calcifications  Carotid duplex scanning given left main disease   COVID rule out  Will also need amiodarone afib prophylaxis for 5 days prior to surgery    Subjective:     Chief Complaint   Patient presents with    Chest Pain    Shortness of Breath       History of Present Illness:   70-year-old male patient admitted to the emergency room yesterday for increasing chest pain and shortness of breath over the last 3 to 4 weeks. On day of admission chest pain and shortness of breath occurred at rest.  Typically it has been with exertion and either rest and/or nitroglycerin would resolve the pain. Yesterday's occurrence was more intense and he had some diaphoresis and nausea. His coronary disease dates back to 2017 when he had a PCI to the proximal and mid circumflex artery and a drug-eluting stent was placed. He was maintained on Plavix and aspirin up until yesterday. His echocardiogram showed a normal EF with mild LVH. His troponins have been negative. He was maintained on a heparin and nitroglycerin drip. He had a cardiac catheterization today which revealed a progression of his three-vessel coronary disease with a 50% left main stenosis. He is currently seen in CV[facility] ICU with 3 out of 10 chest discomfort nitroglycerin drip. His family history of coronary disease involves his father and 3 of his 4 siblings. He is a well-controlled type II diabetic with hypertension and hyperlipidemia. He is normally very active retired man who walks the golf course does his own housework. Past Medical History:     Past Medical History:   Diagnosis Date    Arthritis     1999 vicodin    Diabetes ([facility] [address] 1998 metformin    GERD (gastroesophageal reflux disease)     Hypercholesterolemia     Hypertension 1996 norvasc    Kidney stones     MI (myocardial infarction) ([facility] [address]        Past Surgical History:     Past Surgical History:   Procedure Laterality Date    CARDIAC SURG PROCEDURE UNLIST  12/2017    stints    COLONOSCOPY N/A 11/2/2018    COLONOSCOPY with Polypectomies performed by Orvin Boas, MD at [facility]COPY    HX CHOLECYSTECTOMY      HX GI  2010    gallbladder       Social History:   He is  with 5 daughters. He is a retired . He is a lifelong non-smoker. No significant alcohol history. He denies vaping or illicit drugs.     Family History: Family History   Problem Relation Age of Onset   [address] [facility] Stroke Mother     Headache Mother     Hypertension Mother    Katja Avila Mother     Heart Disease Father     Heart Attack Father     Hypertension Father     Diabetes Father     Lung Cancer Father     Ovarian Cancer Sister     Heart Attack Brother     Breast Cancer Sister     Diabetes Sister     Cancer Maternal Aunt         lung cancer    Cancer Maternal Uncle     Cancer Paternal Aunt     Cancer Paternal Uncle        Allergies and Intolerances:   No Known Allergies    Home Medications:     Prior to Admission Medications   Prescriptions Last Dose Informant Patient Reported? Taking? Blood Pressure Monitor kit Unknown at Unknown time  No No   Sig: Check once a day   Januvia 100 mg tablet 2020 at Unknown time  No Yes   Sig: TAKE 1 TABLET BY MOUTH EVERY DAY   amLODIPine (NORVASC) 10 mg tablet 2020 at Unknown time  No Yes   Sig: Take 1 Tab by mouth daily. aspirin delayed-release 325 mg tablet 2020 at Unknown time  No Yes   Sig: Take 1 Tab by mouth daily. carvediloL (COREG) 25 mg tablet 2020 at Unknown time  No Yes   Sig: TAKE 1 TABLET BY MOUTH TWICE A DAY WITH FOOD   clopidogrel (PLAVIX) 75 mg tab 2020 at Unknown time  No Yes   Sig: TAKE 1 TABLET BY MOUTH EVERY DAY   glipiZIDE SR (GLUCOTROL XL) 5 mg CR tablet 2020 at Unknown time  No Yes   Sig: TAKE 1 TABLET BY MOUTH EVERY DAY   loratadine (Claritin) 10 mg tablet 2020 at Unknown time  Yes Yes   Sig: Take 10 mg by mouth daily as needed. metFORMIN (GLUCOPHAGE) 1,000 mg tablet 2020 at Unknown time  No Yes   Sig: TAKE 1 TABLET BY MOUTH TWICE A DAY WITH MEALS   nitroglycerin (NITROSTAT) 0.4 mg SL tablet 2020 at Unknown time  No Yes   Si Tab by SubLINGual route every five (5) minutes as needed for Chest Pain for up to 3 doses.    omeprazole (PRILOSEC) 20 mg capsule 2020 at Unknown time  No Yes   Sig: TAKE 1 CAPSULE BY MOUTH EVERY DAY   ramipril (ALTACE) 10 mg capsule 12/8/2020 at Unknown time  No Yes   Sig: Take 1 Cap by mouth daily. rosuvastatin (CRESTOR) 10 mg tablet 12/8/2020 at Unknown time  No Yes   Sig: Take 1 Tab by mouth daily. Facility-Administered Medications: None       Current Facility-Administered Medications   Medication Dose Route Frequency Provider Last Rate Last Dose    heparin 25,000 units in D5W 250 ml infusion  10-25 Units/kg/hr IntraVENous TITRATE Ritchie Garcia MD        rosuvastatin (CRESTOR) tablet 40 mg  40 mg Oral QHS Ritchie Garcia MD        0.9% sodium chloride infusion  75 mL/hr IntraVENous CONTINUOUS Cristina Nap L, PA 75 mL/hr at 12/08/20 1744 75 mL/hr at 12/08/20 1744    insulin lispro (HUMALOG) injection   SubCUTAneous AC&HS Jennifer Lomeli NP   2 Units at 12/09/20 0730    glucose chewable tablet 16 g  4 Tab Oral PRN Jennifer Lomeli NP        glucagon (GLUCAGEN) injection 1 mg  1 mg IntraMUSCular PRN Jennifer Lomeli NP        dextrose (D50W) injection syrg 12.5-25 g  25-50 mL IntraVENous PRN Jennifer Lomeli NP        carvediloL (COREG) tablet 6.25 mg  6.25 mg Oral Q12H Mini Tinajero NP   Stopped at 12/09/20 0900    aspirin chewable tablet 81 mg  81 mg Oral DAILY Mini Tinajero NP   81 mg at 12/09/20 0849    acetaminophen (TYLENOL) tablet 650 mg  650 mg Oral Q4H PRN Jennifer Lomeli NP        nitroglycerin (TRIDIL) 400 mcg/ml infusion  0-100 mcg/min IntraVENous TITRATE Mini Tinajero NP 13.5 mL/hr at 12/09/20 0805 90 mcg/min at 12/09/20 0805    oxyCODONE-acetaminophen (PERCOCET 7.5) 7.5-325 mg per tablet 1 Tab  1 Tab Oral Q6H PRN Mini Tinajero NP   1 Tab at 12/09/20 0645    ondansetron (ZOFRAN) injection 4 mg  4 mg IntraVENous Q6H PRN Ngozi Jara MD   4 mg at 12/08/20 6209   . Review of Systems:   As in  HPI including and:    Constitutional: No fever, unintentional weight loss or weight gain. Skin: No rashes, petechia or easy bruising. HENT: No headache, hearing loss or nosebleeds. No loose or infected teeth. He does have bad teeth   Eyes: No visual field cuts or double vision. yes contacts. No drainage from eyes. Respiratory: No frequent respiratory infections or pneumonia. No history of difficult intubation. No history of wheezing. No hemoptysis. Cardiovascular:  No chest pain or shortness of breath since his stent in 2017 at until this most recent episode resuming 3 to 4 weeks ago   Gastrointestinal: No constipation or diarrhea. No dark or bloody stools. No frequent nausea or vomiting. Genitourinary: No difficulty with urination. No urgency or frequency. No blood in urine. Muscularskeletal: No muscle or joint pain except his arthritic wrist and back. No difficulty walking or climbing. Endo/Heme/Allergy: yes diabetes. no skin rashes. No easy or severe bleeding history. He does tend to bleed longer now that he is on Plavix   Neurological: No loss of consciousness, one sided weakness, difficulty with speech or eating or drinking. No foot drop or difficulty with walking. All other systems reviewed and negative. Physical Examination:     Visit Vitals  BP (!) 100/47   Pulse 63   Temp (P) 98.2 °F (36.8 °C)   Resp 22   Ht 6' (1.829 m)   Wt 95.3 kg (210 lb)   SpO2 95%   BMI 28.48 kg/m²       General:  Well-appearing. No apparent distress. Nontoxic-appearing. Psych:  Appropriate with exam.   Skin  no rashes, ecchymosis or petechiae   HEENT:  Atraumatic. PERRLA. Poor dentition. Moist mucous membranes. Neck:  No JVD or tracheal deviation, Negative bruit. Back:   No CVAT. Chest:  Symmetrical   Lungs:  Clear to auscultation without wheezes, rales or rhonchi   Heart:  Quiet sounds. No murmur or rub. PMI not displaced   Abdominal:  Protuberant abdomen. Active sounds. Soft and nontender. No organomegaly. .   Extremities:  Warm and well-perfused. No edema or cyanosis. No signs of varicose veins. Right radial compression device in place   Neurological:  Moves all extremities x4 strong and equal   Pulses  3+ distal pulses (radial compression device on right wrist)     Laboratory Data:     Lab Results   Component Value Date/Time    WBC 7.3 12/09/2020 10:36 AM    HGB 12.4 (L) 12/09/2020 10:36 AM    HCT 36.0 12/09/2020 10:36 AM    PLATELET 222 78/82/9526 10:36 AM     Lab Results   Component Value Date/Time    Sodium 142 12/09/2020 10:36 AM    Potassium 3.9 12/09/2020 10:36 AM    Chloride 110 12/09/2020 10:36 AM    CO2 26 12/09/2020 10:36 AM    Glucose 138 (H) 12/09/2020 10:36 AM    BUN 12 12/09/2020 10:36 AM    Creatinine 0.86 12/09/2020 10:36 AM     Lab Results   Component Value Date/Time    Cholesterol, total 130 12/09/2020 10:36 AM    HDL Cholesterol 31 (L) 12/09/2020 10:36 AM    LDL, calculated 44.4 12/09/2020 10:36 AM    Triglyceride 273 (H) 12/09/2020 10:36 AM     Lab Results   Component Value Date/Time    Hemoglobin A1c 5.9 (H) 11/23/2020 07:34 AM     Recent Labs     12/09/20  1036 12/08/20  1349   CA 8.4* 9.1   ALB  --  3.7   TP  --  7.5   TBILI  --  0.3     Recent Labs     12/09/20  0352 12/08/20  2004 12/08/20  1655 12/08/20  1349   TROIQ <0.02 <0.02 <0.02 <0.02   CPK  --  55  --   --    CKMB  --  <1.0  --   --        EKG: (independently reviewed) 12/9/2020: Normal sinus rhythm 70. Left bundle branch block. ECHO: 12/9/2020: EF 55%. Mild LVH. Trace MR, trace TR. Aortic root 3.7 cm. CATHETERIZATION: 12/9/2020: Left main 50% stenosis, mid LAD 90% stenosis, distal circumflex 80% stenosis, 100% mid RCA stenosis with diffuse disease in small vessel.     RADIOLOGY DATA: (images independently reviewed) 12/8/2020, PA lateral chest x-ray: No pneumothorax or consolidation    STS RISK:    Risk of Mortality:  0.563%  Renal Failure:  0.514%  Permanent Stroke:  0.550%  Prolonged Ventilation:  2.446%  DSW Infection:  0.119%  Reoperation:  1.551%  Morbidity or Mortality:  4.358%  Short Length of Stay:  65.618%  Long Length of Stay:  1.405%    Smita Javier PA-C    PLEASE NOTE:  This document has been produced using voice recognition software. Unrecognized errors in transcription may be present.

## 2020-12-09 NOTE — CONSULTS
[facility] - Consult Note    Date of  Admission: 12/8/2020  1:42 PM     Primary Care Physician:  Brigida Elizabeth MD     Plan:     1. Unstable angina-  with ongoing chest pain/discomfort and intermittent SOB. Continue with NTG drip, asa,bb, Heparin drip. Troponin negative x3. Plans for cardiac cath today to assess CAD progression. Follow up echo today   2. CAD-cardiac cath 12/2017 showed  Triple-vessel coronary artery disease. S/p percutaneous transluminal coronary angioplasty/stent to proximal and mid circumflex artery with LIZ- patient was on Plavix and asa at home   3. Hypertension- stable continue to monitor closely on NTG drip. 4. Hyperlipidemia- LDL 74 11/20 Continue with Crestor  20 mg. Follow lipid panel. 5. Diabetes- well controlled with A1c 5.9   6. LBBB- new since 12/20     Atypical unstable angina which is similar to previous episode in 2017 when he got stented. EKG has new left bundle branch block. Fortunately cardiac enzymes are negative so far. Patient will need a cardiac catheterization to reevaluate the coronaries which is being scheduled today. The benefits and risks of cardiac catheterization have been discussed in detail with the patient present at the time. Patient understands risk of potential cath complications including but not limited to bleeding, infection, difficulty healing the arteriotomy access site(2 chances in 100) which may require surgical repair, potential thromboembolic complications which could result in stroke, myocardial infarction, loss of limb or organ function and/or death( 1 chance in 1000)and potential allergic reaction to contrast dye or other medication used during the procedure. Patient is also aware of the therapeutic implications for medical management vs coronary artery bypass surgery vs percutaneous coronary intervention in treatment of coronary artery disease.  The additional risks for percutaneous coronary artery intervention include the need for emergent bypass surgery at 1 chance in 100 and for restenosis which may range from 35% for plain balloon angioplasty, 15% for bare metal stent, and 5% for drug eluting stent implants. The need for mandatory uninterrupted dual antiplatelet therapy with lifelong Aspirin combined with Plavix for up to 12 months following drug eluting stents, and minimum 1 month following bare metal stents to prevent stent thrombosis which is the equivalent of acute heart attack has been reviewed in detail. No contraindications to use of drug eluting stents has been discovered.     XR Results (most recent):  Results from Hospital Encounter encounter on 12/08/20   XR CHEST PA LAT    Narrative EXAMINATION: Chest 2 views    INDICATION: Chest pain, shortness of breath    COMPARISON: 9/27/2019    FINDINGS: Frontal and lateral views of the chest obtained. Mediastinal  silhouette and pulmonary vasculature unremarkable. No confluent consolidation. No evidence of pneumothorax. No acute osseous findings. Impression IMPRESSION:    No acute findings. Assessment:     Hospital Problems  Date Reviewed: 11/13/2019          Codes Class Noted POA    ACS (acute coronary syndrome) ([facility] [address] ICD-10-CM: I24.9  ICD-9-CM: 411.1  12/8/2020 Unknown                   History of Present Illness: This is a 79 y.o. male admitted for ACS (acute coronary syndrome) ([facility] [address] [I24.9]ACS (acute coronary syndrome) ([address] [I24.9]. Patient with PMHx of CAD, hypertension, diabetes, GERD. Presented to [facility] ED with c/o worsening  chest pain and SOB symptoms onset 2 weeks ago. Patient reports some degree of transient chest pain for the past 2 months then with worsening in severity over the last 2 to 3 weeks. Patient states yesterday he had taken 3 nitroglycerin with partial relief and then today on walking upstairs has pain was not improving and appeared to get worse patient presented to [place] ED.    Patient describes pain as dull/pressure \"like a fist\" sitting to left side of chest. Chest pain radiates to left arm. Patient also endorses  intermittent shortness of breath and nausea without vomiting. He also also report some mild numbness to his left arm only. On my exam this am   patient states chest pain is currently 3-4 out of 10, denies nausea, initially with some shortness of breath but then improves. Patient denies fevers chills, no abdominal pain or joint pain, denies bleeding per rectum or hematuria. Patient reports compliance with medications including his cardiac and diabetes regimen.   Denies tobacco use, alcohol use or illicit drug use.       Past Medical History:     Past Medical History:   Diagnosis Date    Arthritis     1999 vicodin    [facility] 1998 metformin    GERD (gastroesophageal reflux disease)     Hypercholesterolemia     Hypertension 1996 norvasc    Kidney stones     MI (myocardial infarction) ([facility]          Social History:     Social History     Socioeconomic History    Marital status:      Spouse name: Not on file    Number of children: Not on file    Years of education: Not on file    Highest education level: Not on file   Tobacco Use    Smoking status: Never Smoker    Smokeless tobacco: Never Used   Substance and Sexual Activity    Alcohol use: No    Drug use: No    Sexual activity: Yes     Partners: Female     Birth control/protection: None        Family History:     Family History   Problem Relation Age of Onset    Stroke Mother     Headache Mother     Hypertension Mother    [place] Mother     Heart Disease Father     Heart Attack Father     Hypertension Father     Diabetes Father    [place] Father     Ovarian Cancer Sister     Heart Attack Brother     Breast Cancer Sister     Diabetes Sister     Cancer Maternal Aunt         lung cancer    Cancer Maternal Uncle     Cancer Paternal Aunt     Cancer Paternal Uncle         Medications:   No Known Allergies     Current Facility-Administered Medications   Medication Dose Route Frequency    heparin 25,000 units in D5W 250 ml infusion  10-25 Units/kg/hr IntraVENous TITRATE    0.9% sodium chloride infusion  75 mL/hr IntraVENous CONTINUOUS    insulin lispro (HUMALOG) injection   SubCUTAneous AC&HS    glucose chewable tablet 16 g  4 Tab Oral PRN    glucagon (GLUCAGEN) injection 1 mg  1 mg IntraMUSCular PRN    dextrose (D50W) injection syrg 12.5-25 g  25-50 mL IntraVENous PRN    carvediloL (COREG) tablet 6.25 mg  6.25 mg Oral Q12H    aspirin chewable tablet 81 mg  81 mg Oral DAILY    rosuvastatin (CRESTOR) tablet 20 mg  20 mg Oral QHS    acetaminophen (TYLENOL) tablet 650 mg  650 mg Oral Q4H PRN    nitroglycerin (TRIDIL) 400 mcg/ml infusion  0-100 mcg/min IntraVENous TITRATE    oxyCODONE-acetaminophen (PERCOCET 7.5) 7.5-325 mg per tablet 1 Tab  1 Tab Oral Q6H PRN    ondansetron (ZOFRAN) injection 4 mg  4 mg IntraVENous Q6H PRN        Review Of Systems:       Constitutional: No fever, no chills, no weight loss, no night sweats   HEENT: No epistaxis, no nasal drainage, no difficulty in swallowing, no redness in eyes  Respiratory:  dyspnea on exertion  Cardiovascular:  chest pain,  chest pressure, dyspnea  Gastrointestinal: no abd pain, no vomiting, no diarrhea, no bleeding symptoms  Genitourinary: No urinary symptoms or hematuria  Integument/breast: No ulcers or rashes  Musculoskeletal: no muscle pain, no weakness  Neurological: No focal weakness, no seizures, no headaches  Behvioral/Psych: No anxiety, no depression       Physical Exam:     Visit Vitals  /63   Pulse 70   Temp (P) 98.2 °F (36.8 °C)   Resp 16   Ht 6' (1.829 m)   Wt 95.3 kg (210 lb)   SpO2 94%   BMI 28.48 kg/m²     BP Readings from Last 3 Encounters:   12/09/20 118/63   06/25/20 132/76   04/14/20 124/60     Pulse Readings from Last 3 Encounters:   12/09/20 70   06/25/20 76   11/13/19 73     Wt Readings from Last 3 Encounters:   12/08/20 95.3 kg (210 lb) 06/25/20 97.1 kg (214 lb)   11/13/19 92.8 kg (204 lb 9.6 oz)       General:  alert, cooperative, no distress, appears stated age, moderately obese  Skin: Warm and dry, acyanotic, normal color. Head: Normocephalic, atraumatic. Eyes: Sclerae anicteric, conjunctivae without injection. Neck:  nontender, no nuchal rigidity, no masses, no stridor, no carotid bruit, no JVD  Lungs:  clear to auscultation bilaterally. Heart:  regular rate and rhythm, S1, S2 normal, no click, no rub  Abdomen:  abdomen is soft without significant tenderness, masses, organomegaly or guarding  Extremities:  extremities normal, atraumatic, no cyanosis or edema. Peripheral pulses present. Neurological: grossly intact. No focal abnormalities, moves all extremities well. Psychiatric Affect: The patient is awake, alert and oriented x3. Mariposa Dodd is interactive and appropriate.      Data Review:     Recent Results (from the past 48 hour(s))   EKG, 12 LEAD, INITIAL    Collection Time: 12/08/20  1:46 PM   Result Value Ref Range    Ventricular Rate 76 BPM    Atrial Rate 76 BPM    P-R Interval 166 ms    QRS Duration 130 ms    Q-T Interval 408 ms    QTC Calculation (Bezet) 459 ms    Calculated P Axis 46 degrees    Calculated R Axis 68 degrees    Calculated T Axis 129 degrees    Diagnosis       Normal sinus rhythm  Left bundle branch block  Abnormal ECG  When compared with ECG of 27-SEP-2019 17:19,  Left bundle branch block is now present  Confirmed by Bertha Temple [id number]) on 12/8/2020 5:04:33 PM     CBC WITH AUTOMATED DIFF    Collection Time: 12/08/20  1:49 PM   Result Value Ref Range    WBC 8.8 4.6 - 13.2 K/uL    RBC 4.67 (L) 4.70 - 5.50 M/uL    HGB 14.6 13.0 - 16.0 g/dL    HCT 41.7 36.0 - 48.0 %    MCV 89.3 74.0 - 97.0 FL    MCH 31.3 24.0 - 34.0 PG    MCHC 35.0 31.0 - 37.0 g/dL    RDW 14.5 11.6 - 14.5 %    PLATELET 699 254 - 655 K/uL    MPV 10.1 9.2 - 11.8 FL    NEUTROPHILS 62 40 - 73 %    LYMPHOCYTES 26 21 - 52 %    MONOCYTES 10 3 - 10 % EOSINOPHILS 2 0 - 5 %    BASOPHILS 0 0 - 2 %    ABS. NEUTROPHILS 5.4 1.8 - 8.0 K/UL    ABS. LYMPHOCYTES 2.3 0.9 - 3.6 K/UL    ABS. MONOCYTES 0.9 0.05 - 1.2 K/UL    ABS. EOSINOPHILS 0.2 0.0 - 0.4 K/UL    ABS. BASOPHILS 0.0 0.0 - 0.1 K/UL    DF AUTOMATED     METABOLIC PANEL, COMPREHENSIVE    Collection Time: 12/08/20  1:49 PM   Result Value Ref Range    Sodium 138 136 - 145 mmol/L    Potassium 4.0 3.5 - 5.5 mmol/L    Chloride 107 100 - 111 mmol/L    CO2 28 21 - 32 mmol/L    Anion gap 3 3.0 - 18 mmol/L    Glucose 90 74 - 99 mg/dL    BUN 13 7.0 - 18 MG/DL    Creatinine 0.89 0.6 - 1.3 MG/DL    BUN/Creatinine ratio 15 12 - 20      GFR est AA >60 >60 ml/min/1.73m2    GFR est non-AA >60 >60 ml/min/1.73m2    Calcium 9.1 8.5 - 10.1 MG/DL    Bilirubin, total 0.3 0.2 - 1.0 MG/DL    ALT (SGPT) 25 16 - 61 U/L    AST (SGOT) 16 10 - 38 U/L    Alk.  phosphatase 71 45 - 117 U/L    Protein, total 7.5 6.4 - 8.2 g/dL    Albumin 3.7 3.4 - 5.0 g/dL    Globulin 3.8 2.0 - 4.0 g/dL    A-G Ratio 1.0 0.8 - 1.7     TROPONIN I    Collection Time: 12/08/20  1:49 PM   Result Value Ref Range    Troponin-I, QT <0.02 0.0 - 0.045 NG/ML   PTT    Collection Time: 12/08/20  1:49 PM   Result Value Ref Range    aPTT 27.7 23.0 - 36.4 SEC   EKG, 12 LEAD, SUBSEQUENT    Collection Time: 12/08/20  4:06 PM   Result Value Ref Range    Ventricular Rate 79 BPM    Atrial Rate 79 BPM    P-R Interval 164 ms    QRS Duration 134 ms    Q-T Interval 412 ms    QTC Calculation (Bezet) 472 ms    Calculated P Axis 41 degrees    Calculated R Axis 54 degrees    Calculated T Axis 128 degrees    Diagnosis       Normal sinus rhythm  Left bundle branch block  Abnormal ECG  When compared with ECG of 27-SEP-2019 17:19,  Left bundle branch block is now present  Confirmed by Deisy Delgado ([id number]) on 12/8/2020 5:05:06 PM     EKG, 12 LEAD, SUBSEQUENT    Collection Time: 12/08/20  4:35 PM   Result Value Ref Range    Ventricular Rate 68 BPM    Atrial Rate 68 BPM    P-R Interval 168 ms QRS Duration 130 ms    Q-T Interval 440 ms    QTC Calculation (Bezet) 467 ms    Calculated P Axis 42 degrees    Calculated R Axis 63 degrees    Calculated T Axis 113 degrees    Diagnosis       Normal sinus rhythm  Left bundle branch block  Abnormal ECG  When compared with ECG of 08-DEC-2020 16:06,  No significant change was found     TROPONIN I    Collection Time: 12/08/20  4:55 PM   Result Value Ref Range    Troponin-I, QT <0.02 0.0 - 0.045 NG/ML   PTT    Collection Time: 12/08/20  8:04 PM   Result Value Ref Range    aPTT 45.7 (H) 23.0 - 36.4 SEC   CARDIAC PANEL,(CK, CKMB & TROPONIN)    Collection Time: 12/08/20  8:04 PM   Result Value Ref Range    CK - MB <1.0 <3.6 ng/ml    CK-MB Index  0.0 - 4.0 %     CALCULATION NOT PERFORMED WHEN RESULT IS BELOW LINEAR LIMIT    CK 55 39 - 308 U/L    Troponin-I, QT <0.02 0.0 - 0.045 NG/ML   EKG, 12 LEAD, SUBSEQUENT    Collection Time: 12/08/20  8:20 PM   Result Value Ref Range    Ventricular Rate 73 BPM    Atrial Rate 73 BPM    P-R Interval 132 ms    QRS Duration 124 ms    Q-T Interval 420 ms    QTC Calculation (Bezet) 462 ms    Calculated P Axis 2 degrees    Calculated R Axis 39 degrees    Calculated T Axis 139 degrees    Diagnosis       Normal sinus rhythm  Left bundle branch block  Abnormal ECG  When compared with ECG of 08-DEC-2020 16:35,  No significant change was found     EKG, 12 LEAD, SUBSEQUENT    Collection Time: 12/08/20  8:21 PM   Result Value Ref Range    Ventricular Rate 70 BPM    Atrial Rate 70 BPM    P-R Interval 134 ms    QRS Duration 122 ms    Q-T Interval 418 ms    QTC Calculation (Bezet) 451 ms    Calculated P Axis 3 degrees    Calculated R Axis 48 degrees    Calculated T Axis 135 degrees    Diagnosis       Normal sinus rhythm  Left bundle branch block  Abnormal ECG  When compared with ECG of 08-DEC-2020 20:20,  No significant change was found     GLUCOSE, POC    Collection Time: 12/08/20  9:03 PM   Result Value Ref Range    Glucose (POC) 89 70 - 110 mg/dL SARS-COV-2    Collection Time: 12/08/20  9:30 PM   Result Value Ref Range    Specimen source Nasopharyngeal      COVID-19 rapid test Not detected NOTD      Specimen type NP Swab     PTT    Collection Time: 12/09/20  3:52 AM   Result Value Ref Range    aPTT 75.3 (H) 23.0 - 36.4 SEC   TROPONIN I    Collection Time: 12/09/20  3:52 AM   Result Value Ref Range    Troponin-I, QT <0.02 0.0 - 0.045 NG/ML   GLUCOSE, POC    Collection Time: 12/09/20  6:19 AM   Result Value Ref Range    Glucose (POC) 167 (H) 70 - 110 mg/dL   GLUCOSE, POC    Collection Time: 12/09/20  8:04 AM   Result Value Ref Range    Glucose (POC) 167 (H) 70 - 110 mg/dL         Intake/Output Summary (Last 24 hours) at 12/9/2020 0839  Last data filed at 12/9/2020 0700  Gross per 24 hour   Intake 453.73 ml   Output 875 ml   Net -421.27 ml       Cardiographics:       EKG Results     Procedure 720 Value Units Date/Time    EKG, 12 LEAD, SUBSEQUENT [[id number]] Collected:  12/08/20 2020    Order Status:  Completed Updated:  12/09/20 0809     Ventricular Rate 73 BPM      Atrial Rate 73 BPM      P-R Interval 132 ms      QRS Duration 124 ms      Q-T Interval 420 ms      QTC Calculation (Bezet) 462 ms      Calculated P Axis 2 degrees      Calculated R Axis 39 degrees      Calculated T Axis 139 degrees      Diagnosis --     Normal sinus rhythm  Left bundle branch block  Abnormal ECG  When compared with ECG of 08-DEC-2020 16:35,  No significant change was found      EKG, 12 LEAD, SUBSEQUENT [[id number]] Collected:  12/08/20 2021    Order Status:  Completed Updated:  12/09/20 0809     Ventricular Rate 70 BPM      Atrial Rate 70 BPM      P-R Interval 134 ms      QRS Duration 122 ms      Q-T Interval 418 ms      QTC Calculation (Bezet) 451 ms      Calculated P Axis 3 degrees      Calculated R Axis 48 degrees      Calculated T Axis 135 degrees      Diagnosis --     Normal sinus rhythm  Left bundle branch block  Abnormal ECG  When compared with ECG of 08-DEC-2020 20:20,  No significant change was found      EKG, 12 LEAD, SUBSEQUENT [[id number]] Collected:  12/08/20 1635    Order Status:  Completed Updated:  12/09/20 0646     Ventricular Rate 68 BPM      Atrial Rate 68 BPM      P-R Interval 168 ms      QRS Duration 130 ms      Q-T Interval 440 ms      QTC Calculation (Bezet) 467 ms      Calculated P Axis 42 degrees      Calculated R Axis 63 degrees      Calculated T Axis 113 degrees      Diagnosis --     Normal sinus rhythm  Left bundle branch block  Abnormal ECG  When compared with ECG of 08-DEC-2020 16:06,  No significant change was found      EKG, 12 LEAD, SUBSEQUENT [[id number]] Collected:  12/08/20 1606    Order Status:  Completed Updated:  12/08/20 1705     Ventricular Rate 79 BPM      Atrial Rate 79 BPM      P-R Interval 164 ms      QRS Duration 134 ms      Q-T Interval 412 ms      QTC Calculation (Bezet) 472 ms      Calculated P Axis 41 degrees      Calculated R Axis 54 degrees      Calculated T Axis 128 degrees      Diagnosis --     Normal sinus rhythm  Left bundle branch block  Abnormal ECG  When compared with ECG of 27-SEP-2019 17:19,  Left bundle branch block is now present  Confirmed by Luciana Finney [id number]) on 12/8/2020 5:05:06 PM      EKG, 12 LEAD, INITIAL [[id number]] Collected:  12/08/20 1346    Order Status:  Completed Updated:  12/08/20 1704     Ventricular Rate 76 BPM      Atrial Rate 76 BPM      P-R Interval 166 ms      QRS Duration 130 ms      Q-T Interval 408 ms      QTC Calculation (Bezet) 459 ms      Calculated P Axis 46 degrees      Calculated R Axis 68 degrees      Calculated T Axis 129 degrees      Diagnosis --     Normal sinus rhythm  Left bundle branch block  Abnormal ECG  When compared with ECG of 27-SEP-2019 17:19,  Left bundle branch block is now present  Confirmed by Luciana Finney ([id number]) on 12/8/2020 5:04:33 PM                 Signed By: Lisandro LOPEZ Phone [phone]    December 9, 2020      I have independently evaluated and examined the patient. All relevant labs and testing data are reviewed. Care plan discussed and updated after review.   Giovanna Kate MD

## 2020-12-09 NOTE — PROGRESS NOTES
conducted an initial consultation and Spiritual Assessment for Aleena Guadalupe, who is a 79 y.o.,male. Patient's Primary Language is: [place]. According to the patient's EMR Confucianist Affiliation is: Benjie. The reason the Patient came to the hospital is:   Patient Active Problem List    Diagnosis Date Noted    CAD (coronary artery disease) 12/01/2017     Priority: 1 - One    ACS (acute coronary syndrome) ([facility] 12/08/2020    Tubular adenoma of colon 09/26/2019    Cannabis use, uncomplicated 93/97/4705    Cervical disc disease 09/26/2019    GERD (gastroesophageal reflux disease) 09/26/2019    Controlled type 2 diabetes mellitus without complication, without long-term current use of insulin ([facility] 11/29/2018    Insomnia 11/29/2018    S/P coronary artery stent placement 01/25/2018    Essential hypertension 01/27/2016    Hyperlipidemia 01/27/2016    Thyroid goiter 01/27/2016    Chronic pain syndrome 11/17/2014        The  provided the following Interventions:  Initiated a relationship of care and support. Explored issues of sumanth while hospitalized. Listened empathically. The following outcomes where achieved:  Patient shared limited information about both their medical narrative and spiritual beliefs. Patient processed feeling about current hospitalization. Patient expressed gratitude for 's visit. Assessment:  Patient does not have any Congregation/cultural needs that will affect patient's preferences in health care. There are no spiritual or Congregation issues which require intervention at this time. Plan:  Chaplains will continue to follow and will provide pastoral care on an as needed/requested basis.  recommends bedside caregivers page  on duty if patient shows signs of acute spiritual or emotional distress.     [address] [place] [address]   [phone]

## 2020-12-09 NOTE — H&P
Hospitalist Admission History and Physical    NAME:  Sarah Lee   :   1950   MRN:   [id number]     PCP:  Renetta Gibbs MD  Date/Time:  2020 8:46 PM    Subjective:   CHIEF COMPLAINT:  Chest pain    HISTORY OF PRESENT ILLNESS:     Inez Jane is a 79 y.o.  male who presents with persistent chest pain despite nitroglycerin use. Patient reports some degree of transient chest pain for the past 2 months then with worsening in severity over the last 2 to 3 weeks. Patient states yesterday he had taken 3 nitroglycerin with partial relief and then today on walking upstairs has pain was not improving and appeared to get worse patient presented to [facility] ED. Patient describes pain as dull, \"like a fist\" sitting to left side of chest, denies radiation but does report intermittent shortness of breath and nausea without vomiting. He also also report some mild numbness to his left arm only. At time of visit with this provider patient states chest pain is currently 3-4 out of 10, denies nausea, initially with some shortness of breath but then improves. He is c/o HA currently as well. Patient is otherwise without complaints including no fevers chills, no abdominal pain or joint pain, denies bleeding per rectum or hematuria. Patient reports compliance with medications including his cardiac and diabetes regimen. Denies tobacco use, alcohol use or illicit drug use. Patient notes prior asbestos exposure and occupational setting.     Past Medical History:   Diagnosis Date    Arthritis      vicodin    Diabetes ([address]  metformin    GERD (gastroesophageal reflux disease)     Hypercholesterolemia     Hypertension  norvasc    Kidney stones     MI (myocardial infarction) [facility])         Past Surgical History:   Procedure Laterality Date    CARDIAC SURG PROCEDURE UNLIST  2017    stints    COLONOSCOPY N/A 2018    COLONOSCOPY with Polypectomies performed by Eliezer Hurtado MD at [facility] ENDOSCOPY    HX CHOLECYSTECTOMY      HX GI  2010    gallbladder       Social History     Tobacco Use    Smoking status: Never Smoker    Smokeless tobacco: Never Used   Substance Use Topics    Alcohol use: No        Family History   Problem Relation Age of Onset    Stroke Mother     Headache Mother     Hypertension Mother     Lung Cancer Mother     Heart Disease Father     Heart Attack Father     Hypertension Father     Diabetes Father     Lung Cancer Father     Ovarian Cancer Sister     Heart Attack Brother     Breast Cancer Sister     Diabetes Sister     Cancer Maternal Aunt         lung cancer    Cancer Maternal Uncle     Cancer Paternal Aunt     Cancer Paternal Uncle         No Known Allergies     Prior to Admission Medications   Prescriptions Last Dose Informant Patient Reported? Taking? Blood Pressure Monitor kit Unknown at Unknown time  No No   Sig: Check once a day   Januvia 100 mg tablet 2020 at Unknown time  No Yes   Sig: TAKE 1 TABLET BY MOUTH EVERY DAY   amLODIPine (NORVASC) 10 mg tablet 2020 at Unknown time  No Yes   Sig: Take 1 Tab by mouth daily. aspirin delayed-release 325 mg tablet 2020 at Unknown time  No Yes   Sig: Take 1 Tab by mouth daily. carvediloL (COREG) 25 mg tablet 2020 at Unknown time  No Yes   Sig: TAKE 1 TABLET BY MOUTH TWICE A DAY WITH FOOD   clopidogrel (PLAVIX) 75 mg tab 2020 at Unknown time  No Yes   Sig: TAKE 1 TABLET BY MOUTH EVERY DAY   glipiZIDE SR (GLUCOTROL XL) 5 mg CR tablet 2020 at Unknown time  No Yes   Sig: TAKE 1 TABLET BY MOUTH EVERY DAY   loratadine (Claritin) 10 mg tablet 2020 at Unknown time  Yes Yes   Sig: Take 10 mg by mouth daily as needed.    metFORMIN (GLUCOPHAGE) 1,000 mg tablet 2020 at Unknown time  No Yes   Sig: TAKE 1 TABLET BY MOUTH TWICE A DAY WITH MEALS   nitroglycerin (NITROSTAT) 0.4 mg SL tablet 2020 at Unknown time  No Yes   Si Tab by SubLINGual route every five (5) minutes as needed for Chest Pain for up to 3 doses. omeprazole (PRILOSEC) 20 mg capsule 2020 at Unknown time  No Yes   Sig: TAKE 1 CAPSULE BY MOUTH EVERY DAY   ramipril (ALTACE) 10 mg capsule 2020 at Unknown time  No Yes   Sig: Take 1 Cap by mouth daily. rosuvastatin (CRESTOR) 10 mg tablet 2020 at Unknown time  No Yes   Sig: Take 1 Tab by mouth daily. Facility-Administered Medications: None     REVIEW OF SYSTEMS:     [] Unable to obtain  ROS due to  []mental status change  []sedated   []intubated   [x]Total of 12 systems reviewed as follows:    GENERAL: no fever or chills   HEENT: no sinus congestion / hearing or vision changes  NECK: No pain or stiffness. PULMONARY: intermittent shortness of breath (currently improved) no cough  Cardiovascular: + chest pain 3/10 current / denies palpitations; no lower extremity edema  GASTROINTESTINAL: Denies abdominal pain, constipation, diarrhea, nausea, vomiting or melena / bloody stools  GENITOURINARY: No urinary frequency, urgency, hesitancy or dysuria. MUSCULOSKELETAL: no back / joint or muscle pain, no recent trauma. DERMATOLOGIC: denies pruritis, rashes or open areas   ENDOCRINE: No polyuria, polydipsia, no heat or cold intolerance. HEMATOLOGICAL: No anemia or easy bruising or bleeding. NEUROLOGIC:  no focal weakness,  no speech changes     Objective:   VITALS:    Visit Vitals  BP (!) 141/81   Pulse 70   Temp 98.2 °F (36.8 °C)   Resp 12   Ht 6' (1.829 m)   Wt 95.3 kg (210 lb)   SpO2 100%   BMI 28.48 kg/m²     Temp (24hrs), Av °F (36.7 °C), Min:97.9 °F (36.6 °C), Max:98.2 °F (36.8 °C)    PHYSICAL EXAM:   General:          Alert, in NAD  HEENT:           Pupils equal.  Sclera anicteric. Conjunctiva pink. Mucous membranes Moist  Neck:               Supple. Trachea midline. No accessory muscle use. No jugular venous distention, no carotid bruit  CV:                  Regular rate and rhythm.  S1S2+  Lungs:             Clear to auscultation bilaterally. No Wheezing or Rhonchi. No rales. Abdomen:        Soft, non-tender. Not distended. Bowel sounds normal.   Extremities:     No cyanosis. No edema. Pulses 2+ b/l  Neurologic:      Alert and oriented X 4. Follows commands, responds appropriately. No focal neurological deficit was noted  Skin:                Warm and dry. No rashes. LAB DATA REVIEWED:    No components found for: [place]  Recent Labs     12/08/20  1349      K 4.0      CO2 28   BUN 13   CREA 0.89   GLU 90   CA 9.1   ALB 3.7   WBC 8.8   HGB 14.6   HCT 41.7        IMAGING RESULTS:     [x]  I have personally reviewed the actual   []CXR  []CT scan    Assessment/Plan:      Active Problems:    ACS (acute coronary syndrome) ([facility] [address] (12/8/2020)     ___________________________________________________  PLAN:    1. ACS - cont heparin and titratable ntg drip / discussed with cardiology NP FRANCISCA. Tennille Stagger / maintain NPO / trend enzymes / plan for cardiology intervention in AM - rapid COVID ordered in anticipation of surgical intervention / symptom control / ECHO  2. Type 2 DM, controlled - A1c now 5.9 / SSI only for now / hold oral meds  3. HTN - low dose BB / Ntg drip for now / further management per cardiology  4. Left bundle branch block   5. CAD s/p stents placed 12/2017 - cont asa / statin / BB per cardiology     Code status discussed with patient, he affirms full code at this time.      Risk of deterioration:  []Low    [x]Moderate  []High    Prophylaxis:  []Lovenox  []Coumadin  [x]Hep drip  []SCDs  []H2B/PPI    Disposition:  []Home w/ Family   [x]HH PT,OT,RN   []SNF/LTC   []SAH/Rehab    Discussed Code Status:    [x]Full Code      []DNR     ___________________________________________________    Care Plan discussed with:    [x]Patient   []Family    []ED Care Manager  []ED Doc   [x]Specialist : Cardiology   ___________________________________________________  Admitting Provider: Jna Sifuentes NP

## 2020-12-09 NOTE — PROGRESS NOTES
07:15  Received pt from [address] [place] [address]. Pt. In bed with CP 4/10 with Ntg drip infusing at 70 mcg/min. Heparin infusing at 1150 units/hr. Pt. A&O x 4. NPO for heart cath procedure. 07:44  Increased ntg to 80 mcg/min   08:05  Ntg increased to 90 mcg/min.   08:15  Pt. States easing of chest pain - now 3/10    09:35  Dr. Brittany Palomino at bedside. Cath lab scheduled at 10:30    11:10  Monique Pringle and Gifty uL, from cath lab and another nurse in pt room to take to cath lab.  11:15  Pt. Off floor  12:39  Received report from cath lab. Left heart cath with left main disease. No intervention. Right wrist access site with DSTAT. 12:55  Pt. Arrived from cath lab with D-stat to right wrist.  Fingers to right hand warm. Easily palpable ulnar pulse (right). 13:45  Gume Ulrich CVT PA at bedside - consulted. 14:53  Spoke with [address], [facility] regarding pt's increased chest pain to 5/10. (12-lead EKG obtained). Order received to increase max nitro gtt to 200 mcg/min. 14:59  Spoke with LILY Maravilla CVT - aware of pt's increased chest pain. Cardiology to handle chest pain but may call Dr. Ralph Samuels if needed. 15:00  Spoke with Shemar, relayed discussion with Spike Maravilla. Per Shemar, she alerted Dr. Brittany Palomino. Per Shemar, increase ntg gtt as BP allows. Will give percocet to see if pain is musculoskeletal in nature. Nitro increased to 120 mcg/min, Percocet  administered  15:10  Nitro increased to 150 mcg min for 5/10 chest pain  15:18  Nitro increased to 165 mcg/min 5/10 chest pain  15:30  Pt states reduction of pain 3/10. D-stat gradually loosened and removed. Site with no hematoma, bleeding. Easily palpable radial pulse. 16:00  Pt rates pain 1-2/10  16:20  Dr Brittany Palomino at bedside  17:00  Restarted heparin drip at lowest dose 953 units / hr - baseline aptt drawn by lab.   19:00  Bedside and Verbal shift change report given to Nunu Michaels RN (oncoming nurse) by Davis Jonas RN (offgoing nurse).  Report included the following information SBAR, Kardex, Procedure Summary, Intake/Output, MAR, Recent Results and Cardiac Rhythm NSR with BBB.

## 2020-12-09 NOTE — PROGRESS NOTES
2000-paged cardiology for admitting orders and notified of increased titration of Nitro gtt. EKG is in system and reviewed by the hospitalist team.    2330-paged hospitalist team and informed of continued CP and increased titration of of Nitro gtt. Informed of Nausea, zophran ordered. 0705-Spoke with Dr. Cal Freitas to update on continued CP and increased titration of Nitro gtt. Patient to stay NPO for cath this morning.

## 2020-12-10 ENCOUNTER — APPOINTMENT (OUTPATIENT)
Dept: VASCULAR SURGERY | Age: 70
DRG: 246 | End: 2020-12-10
Attending: PHYSICIAN ASSISTANT
Payer: MEDICARE

## 2020-12-10 ENCOUNTER — APPOINTMENT (OUTPATIENT)
Dept: GENERAL RADIOLOGY | Age: 70
DRG: 246 | End: 2020-12-10
Attending: EMERGENCY MEDICINE
Payer: MEDICARE

## 2020-12-10 LAB
ABO + RH BLD: NORMAL
ALBUMIN SERPL-MCNC: 3.1 G/DL (ref 3.4–5)
ALBUMIN/GLOB SERPL: 1 {RATIO} (ref 0.8–1.7)
ALP SERPL-CCNC: 55 U/L (ref 45–117)
ALT SERPL-CCNC: 15 U/L (ref 16–61)
ANION GAP SERPL CALC-SCNC: 11 MMOL/L (ref 3–18)
ANION GAP SERPL CALC-SCNC: 7 MMOL/L (ref 3–18)
APTT PPP: 103.3 SEC (ref 23–36.4)
APTT PPP: 29 SEC (ref 23–36.4)
APTT PPP: 47.4 SEC (ref 23–36.4)
APTT PPP: 67.9 SEC (ref 23–36.4)
ARTERIAL PATENCY WRIST A: ABNORMAL
AST SERPL-CCNC: 9 U/L (ref 10–38)
ATRIAL RATE: 89 BPM
BASE DEFICIT BLD-SCNC: 2 MMOL/L
BASE DEFICIT BLD-SCNC: 5 MMOL/L
BASE DEFICIT BLD-SCNC: 6 MMOL/L
BASE DEFICIT BLD-SCNC: 6 MMOL/L
BASE DEFICIT BLD-SCNC: 7 MMOL/L
BASE DEFICIT BLD-SCNC: 8 MMOL/L
BASOPHILS # BLD: 0 K/UL (ref 0–0.1)
BASOPHILS # BLD: 0 K/UL (ref 0–0.1)
BASOPHILS NFR BLD: 0 % (ref 0–2)
BASOPHILS NFR BLD: 0 % (ref 0–2)
BDY SITE: ABNORMAL
BILIRUB DIRECT SERPL-MCNC: 0.1 MG/DL (ref 0–0.2)
BILIRUB SERPL-MCNC: 0.4 MG/DL (ref 0.2–1)
BLOOD GROUP ANTIBODIES SERPL: NORMAL
BODY TEMPERATURE: 97
BODY TEMPERATURE: 98.1
BODY TEMPERATURE: 98.4
BODY TEMPERATURE: 98.5
BUN SERPL-MCNC: 10 MG/DL (ref 7–18)
BUN SERPL-MCNC: 14 MG/DL (ref 7–18)
BUN/CREAT SERPL: 13 (ref 12–20)
BUN/CREAT SERPL: 14 (ref 12–20)
CA-I BLD-MCNC: 1.1 MMOL/L (ref 1.12–1.32)
CA-I BLD-MCNC: 1.15 MMOL/L (ref 1.12–1.32)
CALCIUM SERPL-MCNC: 6.4 MG/DL (ref 8.5–10.1)
CALCIUM SERPL-MCNC: 7.9 MG/DL (ref 8.5–10.1)
CALCULATED P AXIS, ECG09: 14 DEGREES
CALCULATED R AXIS, ECG10: 22 DEGREES
CALCULATED T AXIS, ECG11: 127 DEGREES
CHLORIDE SERPL-SCNC: 109 MMOL/L (ref 100–111)
CHLORIDE SERPL-SCNC: 114 MMOL/L (ref 100–111)
CK MB CFR SERPL CALC: 1.4 % (ref 0–4)
CK MB SERPL-MCNC: 1.1 NG/ML (ref 5–25)
CK SERPL-CCNC: 80 U/L (ref 39–308)
CO2 SERPL-SCNC: 17 MMOL/L (ref 21–32)
CO2 SERPL-SCNC: 22 MMOL/L (ref 21–32)
CREAT SERPL-MCNC: 0.76 MG/DL (ref 0.6–1.3)
CREAT SERPL-MCNC: 1 MG/DL (ref 0.6–1.3)
DIAGNOSIS, 93000: NORMAL
DIFFERENTIAL METHOD BLD: ABNORMAL
DIFFERENTIAL METHOD BLD: ABNORMAL
EOSINOPHIL # BLD: 0.1 K/UL (ref 0–0.4)
EOSINOPHIL # BLD: 0.1 K/UL (ref 0–0.4)
EOSINOPHIL NFR BLD: 0 % (ref 0–5)
EOSINOPHIL NFR BLD: 1 % (ref 0–5)
ERYTHROCYTE [DISTWIDTH] IN BLOOD BY AUTOMATED COUNT: 14 % (ref 11.6–14.5)
ERYTHROCYTE [DISTWIDTH] IN BLOOD BY AUTOMATED COUNT: 14.1 % (ref 11.6–14.5)
GAS FLOW.O2 O2 DELIVERY SYS: ABNORMAL L/MIN
GAS FLOW.O2 SETTING OXYMISER: 16 BPM
GAS FLOW.O2 SETTING OXYMISER: 22 BPM
GLOBULIN SER CALC-MCNC: 3 G/DL (ref 2–4)
GLUCOSE BLD STRIP.AUTO-MCNC: 169 MG/DL (ref 70–110)
GLUCOSE BLD STRIP.AUTO-MCNC: 177 MG/DL (ref 70–110)
GLUCOSE BLD STRIP.AUTO-MCNC: 178 MG/DL (ref 70–110)
GLUCOSE BLD STRIP.AUTO-MCNC: 266 MG/DL (ref 74–106)
GLUCOSE BLD STRIP.AUTO-MCNC: 291 MG/DL (ref 74–106)
GLUCOSE SERPL-MCNC: 161 MG/DL (ref 74–99)
GLUCOSE SERPL-MCNC: 230 MG/DL (ref 74–99)
HBA1C MFR BLD: 5.9 % (ref 4.2–5.6)
HCO3 BLD-SCNC: 18.6 MMOL/L (ref 22–26)
HCO3 BLD-SCNC: 19.3 MMOL/L (ref 22–26)
HCO3 BLD-SCNC: 19.9 MMOL/L (ref 22–26)
HCO3 BLD-SCNC: 20.7 MMOL/L (ref 22–26)
HCO3 BLD-SCNC: 21.1 MMOL/L (ref 22–26)
HCO3 BLD-SCNC: 22 MMOL/L (ref 22–26)
HCT VFR BLD AUTO: 28.3 % (ref 36–48)
HCT VFR BLD AUTO: 32.9 % (ref 36–48)
HCT VFR BLD AUTO: 33.1 % (ref 36–48)
HCT VFR BLD CALC: 34 % (ref 36–49)
HCT VFR BLD CALC: 36 % (ref 36–49)
HGB BLD-MCNC: 10.1 G/DL (ref 13–16)
HGB BLD-MCNC: 11.5 G/DL (ref 13–16)
HGB BLD-MCNC: 11.6 G/DL (ref 12–16)
HGB BLD-MCNC: 11.7 G/DL (ref 13–16)
HGB BLD-MCNC: 12.2 G/DL (ref 12–16)
INR PPP: 1 (ref 0.8–1.2)
INR PPP: 1.1 (ref 0.8–1.2)
INSPIRATION.DURATION SETTING TIME VENT: 0.88 SEC
LYMPHOCYTES # BLD: 1.7 K/UL (ref 0.9–3.6)
LYMPHOCYTES # BLD: 2.2 K/UL (ref 0.9–3.6)
LYMPHOCYTES NFR BLD: 16 % (ref 21–52)
LYMPHOCYTES NFR BLD: 17 % (ref 21–52)
MAGNESIUM SERPL-MCNC: 1.5 MG/DL (ref 1.6–2.6)
MAGNESIUM SERPL-MCNC: 1.8 MG/DL (ref 1.6–2.6)
MAGNESIUM SERPL-MCNC: 2.3 MG/DL (ref 1.6–2.6)
MCH RBC QN AUTO: 30.6 PG (ref 24–34)
MCH RBC QN AUTO: 31.5 PG (ref 24–34)
MCHC RBC AUTO-ENTMCNC: 34.7 G/DL (ref 31–37)
MCHC RBC AUTO-ENTMCNC: 35.7 G/DL (ref 31–37)
MCV RBC AUTO: 88 FL (ref 74–97)
MCV RBC AUTO: 88.2 FL (ref 74–97)
MONOCYTES # BLD: 0.9 K/UL (ref 0.05–1.2)
MONOCYTES # BLD: 1.2 K/UL (ref 0.05–1.2)
MONOCYTES NFR BLD: 12 % (ref 3–10)
MONOCYTES NFR BLD: 7 % (ref 3–10)
NEUTS SEG # BLD: 10.3 K/UL (ref 1.8–8)
NEUTS SEG # BLD: 6.7 K/UL (ref 1.8–8)
NEUTS SEG NFR BLD: 70 % (ref 40–73)
NEUTS SEG NFR BLD: 77 % (ref 40–73)
O2/TOTAL GAS SETTING VFR VENT: 100 %
O2/TOTAL GAS SETTING VFR VENT: 90 %
P-R INTERVAL, ECG05: 160 MS
PCO2 BLD: 30.7 MMHG (ref 35–45)
PCO2 BLD: 31.9 MMHG (ref 35–45)
PCO2 BLD: 32.2 MMHG (ref 35–45)
PCO2 BLD: 37.1 MMHG (ref 35–45)
PCO2 BLD: 41.8 MMHG (ref 35–45)
PCO2 BLD: 57.3 MMHG (ref 35–45)
PEEP RESPIRATORY: 12 CMH2O
PEEP RESPIRATORY: 14 CMH2O
PH BLD: 7.17 [PH] (ref 7.35–7.45)
PH BLD: 7.31 [PH] (ref 7.35–7.45)
PH BLD: 7.34 [PH] (ref 7.35–7.45)
PH BLD: 7.37 [PH] (ref 7.35–7.45)
PH BLD: 7.38 [PH] (ref 7.35–7.45)
PH BLD: 7.46 [PH] (ref 7.35–7.45)
PHOSPHATE SERPL-MCNC: 2.7 MG/DL (ref 2.5–4.9)
PHOSPHATE SERPL-MCNC: 2.7 MG/DL (ref 2.5–4.9)
PIP ISTAT,IPIP: 28
PIP ISTAT,IPIP: 34
PLATELET # BLD AUTO: 136 K/UL (ref 135–420)
PLATELET # BLD AUTO: 166 K/UL (ref 135–420)
PMV BLD AUTO: 10 FL (ref 9.2–11.8)
PMV BLD AUTO: 10.3 FL (ref 9.2–11.8)
PO2 BLD: 115 MMHG (ref 80–100)
PO2 BLD: 241 MMHG (ref 80–100)
PO2 BLD: 43 MMHG (ref 80–100)
PO2 BLD: 47 MMHG (ref 80–100)
PO2 BLD: 49 MMHG (ref 80–100)
PO2 BLD: 60 MMHG (ref 80–100)
POTASSIUM BLD-SCNC: 3.6 MMOL/L (ref 3.5–5.5)
POTASSIUM BLD-SCNC: 3.7 MMOL/L (ref 3.5–5.5)
POTASSIUM SERPL-SCNC: 3.3 MMOL/L (ref 3.5–5.5)
POTASSIUM SERPL-SCNC: 3.9 MMOL/L (ref 3.5–5.5)
PROT SERPL-MCNC: 6.1 G/DL (ref 6.4–8.2)
PROTHROMBIN TIME: 13.4 SEC (ref 11.5–15.2)
PROTHROMBIN TIME: 13.7 SEC (ref 11.5–15.2)
Q-T INTERVAL, ECG07: 410 MS
QRS DURATION, ECG06: 128 MS
QTC CALCULATION (BEZET), ECG08: 498 MS
RBC # BLD AUTO: 3.21 M/UL (ref 4.7–5.5)
RBC # BLD AUTO: 3.76 M/UL (ref 4.7–5.5)
SAO2 % BLD: 100 % (ref 92–97)
SAO2 % BLD: 64 % (ref 92–97)
SAO2 % BLD: 80 % (ref 92–97)
SAO2 % BLD: 80 % (ref 92–97)
SAO2 % BLD: 91 % (ref 92–97)
SAO2 % BLD: 99 % (ref 92–97)
SERVICE CMNT-IMP: ABNORMAL
SODIUM BLD-SCNC: 136 MMOL/L (ref 136–145)
SODIUM BLD-SCNC: 137 MMOL/L (ref 136–145)
SODIUM SERPL-SCNC: 138 MMOL/L (ref 136–145)
SODIUM SERPL-SCNC: 142 MMOL/L (ref 136–145)
SPECIMEN EXP DATE BLD: NORMAL
SPECIMEN TYPE: ABNORMAL
TOTAL RESP. RATE, ITRR: 16
TOTAL RESP. RATE, ITRR: 16
TOTAL RESP. RATE, ITRR: 19
TOTAL RESP. RATE, ITRR: 19
TOTAL RESP. RATE, ITRR: 20
TOTAL RESP. RATE, ITRR: 26
TROPONIN I SERPL-MCNC: 0.12 NG/ML (ref 0–0.04)
VENTILATION MODE VENT: ABNORMAL
VENTRICULAR RATE, ECG03: 89 BPM
VT SETTING VENT: 608 ML
VT SETTING VENT: 664 ML
VT SETTING VENT: 668 ML
VT SETTING VENT: 702 ML
WBC # BLD AUTO: 13.5 K/UL (ref 4.6–13.2)
WBC # BLD AUTO: 9.7 K/UL (ref 4.6–13.2)

## 2020-12-10 PROCEDURE — C1769 GUIDE WIRE: HCPCS | Performed by: INTERNAL MEDICINE

## 2020-12-10 PROCEDURE — 99292 CRITICAL CARE ADDL 30 MIN: CPT | Performed by: INTERNAL MEDICINE

## 2020-12-10 PROCEDURE — 93005 ELECTROCARDIOGRAM TRACING: CPT

## 2020-12-10 PROCEDURE — 36415 COLL VENOUS BLD VENIPUNCTURE: CPT

## 2020-12-10 PROCEDURE — 027034Z DILATION OF CORONARY ARTERY, ONE ARTERY WITH DRUG-ELUTING INTRALUMINAL DEVICE, PERCUTANEOUS APPROACH: ICD-10-PCS | Performed by: INTERNAL MEDICINE

## 2020-12-10 PROCEDURE — 74011250637 HC RX REV CODE- 250/637: Performed by: NURSE PRACTITIONER

## 2020-12-10 PROCEDURE — 77030013744: Performed by: INTERNAL MEDICINE

## 2020-12-10 PROCEDURE — 86900 BLOOD TYPING SEROLOGIC ABO: CPT

## 2020-12-10 PROCEDURE — 74011250636 HC RX REV CODE- 250/636

## 2020-12-10 PROCEDURE — 99152 MOD SED SAME PHYS/QHP 5/>YRS: CPT | Performed by: INTERNAL MEDICINE

## 2020-12-10 PROCEDURE — 74011250637 HC RX REV CODE- 250/637: Performed by: PHYSICIAN ASSISTANT

## 2020-12-10 PROCEDURE — 31500 INSERT EMERGENCY AIRWAY: CPT

## 2020-12-10 PROCEDURE — 77030013519 HC DEV INFL BASIX MRTM -B: Performed by: INTERNAL MEDICINE

## 2020-12-10 PROCEDURE — 94002 VENT MGMT INPAT INIT DAY: CPT

## 2020-12-10 PROCEDURE — 99291 CRITICAL CARE FIRST HOUR: CPT | Performed by: INTERNAL MEDICINE

## 2020-12-10 PROCEDURE — 74011250636 HC RX REV CODE- 250/636: Performed by: HOSPITALIST

## 2020-12-10 PROCEDURE — 80048 BASIC METABOLIC PNL TOTAL CA: CPT

## 2020-12-10 PROCEDURE — 82947 ASSAY GLUCOSE BLOOD QUANT: CPT

## 2020-12-10 PROCEDURE — 84100 ASSAY OF PHOSPHORUS: CPT

## 2020-12-10 PROCEDURE — B2111ZZ FLUOROSCOPY OF MULTIPLE CORONARY ARTERIES USING LOW OSMOLAR CONTRAST: ICD-10-PCS | Performed by: INTERNAL MEDICINE

## 2020-12-10 PROCEDURE — C1874 STENT, COATED/COV W/DEL SYS: HCPCS | Performed by: INTERNAL MEDICINE

## 2020-12-10 PROCEDURE — 92928 PRQ TCAT PLMT NTRAC ST 1 LES: CPT | Performed by: INTERNAL MEDICINE

## 2020-12-10 PROCEDURE — 99232 SBSQ HOSP IP/OBS MODERATE 35: CPT | Performed by: EMERGENCY MEDICINE

## 2020-12-10 PROCEDURE — 85347 COAGULATION TIME ACTIVATED: CPT

## 2020-12-10 PROCEDURE — 77030004558 HC CATH ANGI DX SUPR TORQ CARD -A: Performed by: INTERNAL MEDICINE

## 2020-12-10 PROCEDURE — 5A1955Z RESPIRATORY VENTILATION, GREATER THAN 96 CONSECUTIVE HOURS: ICD-10-PCS | Performed by: INTERNAL MEDICINE

## 2020-12-10 PROCEDURE — 93880 EXTRACRANIAL BILAT STUDY: CPT

## 2020-12-10 PROCEDURE — 99153 MOD SED SAME PHYS/QHP EA: CPT | Performed by: INTERNAL MEDICINE

## 2020-12-10 PROCEDURE — C1725 CATH, TRANSLUMIN NON-LASER: HCPCS | Performed by: INTERNAL MEDICINE

## 2020-12-10 PROCEDURE — 74011636637 HC RX REV CODE- 636/637: Performed by: NURSE PRACTITIONER

## 2020-12-10 PROCEDURE — 94762 N-INVAS EAR/PLS OXIMTRY CONT: CPT

## 2020-12-10 PROCEDURE — 74011000250 HC RX REV CODE- 250: Performed by: NURSE PRACTITIONER

## 2020-12-10 PROCEDURE — 82550 ASSAY OF CK (CPK): CPT

## 2020-12-10 PROCEDURE — 83036 HEMOGLOBIN GLYCOSYLATED A1C: CPT

## 2020-12-10 PROCEDURE — 74011000258 HC RX REV CODE- 258: Performed by: EMERGENCY MEDICINE

## 2020-12-10 PROCEDURE — 74011250637 HC RX REV CODE- 250/637: Performed by: INTERNAL MEDICINE

## 2020-12-10 PROCEDURE — 93458 L HRT ARTERY/VENTRICLE ANGIO: CPT | Performed by: INTERNAL MEDICINE

## 2020-12-10 PROCEDURE — 83735 ASSAY OF MAGNESIUM: CPT

## 2020-12-10 PROCEDURE — 74011000250 HC RX REV CODE- 250: Performed by: INTERNAL MEDICINE

## 2020-12-10 PROCEDURE — 65620000000 HC RM CCU GENERAL

## 2020-12-10 PROCEDURE — 74011250636 HC RX REV CODE- 250/636: Performed by: INTERNAL MEDICINE

## 2020-12-10 PROCEDURE — 94640 AIRWAY INHALATION TREATMENT: CPT

## 2020-12-10 PROCEDURE — 77010033678 HC OXYGEN DAILY

## 2020-12-10 PROCEDURE — 85730 THROMBOPLASTIN TIME PARTIAL: CPT

## 2020-12-10 PROCEDURE — 82962 GLUCOSE BLOOD TEST: CPT

## 2020-12-10 PROCEDURE — 4A023N7 MEASUREMENT OF CARDIAC SAMPLING AND PRESSURE, LEFT HEART, PERCUTANEOUS APPROACH: ICD-10-PCS | Performed by: INTERNAL MEDICINE

## 2020-12-10 PROCEDURE — 85610 PROTHROMBIN TIME: CPT

## 2020-12-10 PROCEDURE — 74011000636 HC RX REV CODE- 636: Performed by: INTERNAL MEDICINE

## 2020-12-10 PROCEDURE — 85025 COMPLETE CBC W/AUTO DIFF WBC: CPT

## 2020-12-10 PROCEDURE — 74011636637 HC RX REV CODE- 636/637: Performed by: EMERGENCY MEDICINE

## 2020-12-10 PROCEDURE — C1894 INTRO/SHEATH, NON-LASER: HCPCS | Performed by: INTERNAL MEDICINE

## 2020-12-10 PROCEDURE — 82803 BLOOD GASES ANY COMBINATION: CPT

## 2020-12-10 PROCEDURE — 71045 X-RAY EXAM CHEST 1 VIEW: CPT

## 2020-12-10 PROCEDURE — 85018 HEMOGLOBIN: CPT

## 2020-12-10 PROCEDURE — 74011250636 HC RX REV CODE- 250/636: Performed by: EMERGENCY MEDICINE

## 2020-12-10 PROCEDURE — 99233 SBSQ HOSP IP/OBS HIGH 50: CPT | Performed by: INTERNAL MEDICINE

## 2020-12-10 PROCEDURE — 77030013797 HC KT TRNSDUC PRSSR EDWD -A: Performed by: INTERNAL MEDICINE

## 2020-12-10 PROCEDURE — C1887 CATHETER, GUIDING: HCPCS | Performed by: INTERNAL MEDICINE

## 2020-12-10 PROCEDURE — 80076 HEPATIC FUNCTION PANEL: CPT

## 2020-12-10 PROCEDURE — 74011000250 HC RX REV CODE- 250

## 2020-12-10 PROCEDURE — 77030018836 HC SOL IRR NACL ICUM -A

## 2020-12-10 DEVICE — XIENCE SIERRA™ EVEROLIMUS ELUTING CORONARY STENT SYSTEM 2.50 MM X 18 MM / RAPID-EXCHANGE
Type: IMPLANTABLE DEVICE | Status: FUNCTIONAL
Brand: XIENCE SIERRA™

## 2020-12-10 RX ORDER — EPTIFIBATIDE 0.75 MG/ML
INJECTION, SOLUTION INTRAVENOUS
Status: COMPLETED | OUTPATIENT
Start: 2020-12-10 | End: 2020-12-10

## 2020-12-10 RX ORDER — FUROSEMIDE 10 MG/ML
INJECTION INTRAMUSCULAR; INTRAVENOUS
Status: DISPENSED
Start: 2020-12-10 | End: 2020-12-10

## 2020-12-10 RX ORDER — CEFAZOLIN SODIUM 1 G/3ML
INJECTION, POWDER, FOR SOLUTION INTRAMUSCULAR; INTRAVENOUS AS NEEDED
Status: DISCONTINUED | OUTPATIENT
Start: 2020-12-10 | End: 2020-12-10 | Stop reason: HOSPADM

## 2020-12-10 RX ORDER — EPTIFIBATIDE 0.75 MG/ML
1 INJECTION, SOLUTION INTRAVENOUS CONTINUOUS
Status: ACTIVE | OUTPATIENT
Start: 2020-12-10 | End: 2020-12-11

## 2020-12-10 RX ORDER — SODIUM CHLORIDE 0.9 % (FLUSH) 0.9 %
5-40 SYRINGE (ML) INJECTION AS NEEDED
Status: DISCONTINUED | OUTPATIENT
Start: 2020-12-10 | End: 2020-12-14

## 2020-12-10 RX ORDER — MIDAZOLAM HYDROCHLORIDE 1 MG/ML
2 INJECTION, SOLUTION INTRAMUSCULAR; INTRAVENOUS ONCE
Status: COMPLETED | OUTPATIENT
Start: 2020-12-10 | End: 2020-12-10

## 2020-12-10 RX ORDER — TRANEXAMIC ACID 100 MG/ML
250 INJECTION, SOLUTION INTRAVENOUS
Status: COMPLETED | OUTPATIENT
Start: 2020-12-10 | End: 2020-12-10

## 2020-12-10 RX ORDER — CEFAZOLIN SODIUM 2 G/50ML
SOLUTION INTRAVENOUS
Status: DISPENSED
Start: 2020-12-10 | End: 2020-12-11

## 2020-12-10 RX ORDER — POTASSIUM CHLORIDE 7.45 MG/ML
INJECTION INTRAVENOUS
Status: COMPLETED
Start: 2020-12-10 | End: 2020-12-10

## 2020-12-10 RX ORDER — NOREPINEPHRINE BITARTRATE/D5W 8 MG/250ML
.5-5 PLASTIC BAG, INJECTION (ML) INTRAVENOUS
Status: DISCONTINUED | OUTPATIENT
Start: 2020-12-10 | End: 2020-12-15

## 2020-12-10 RX ORDER — LIDOCAINE HYDROCHLORIDE 10 MG/ML
INJECTION, SOLUTION EPIDURAL; INFILTRATION; INTRACAUDAL; PERINEURAL AS NEEDED
Status: DISCONTINUED | OUTPATIENT
Start: 2020-12-10 | End: 2020-12-10 | Stop reason: HOSPADM

## 2020-12-10 RX ORDER — HEPARIN SODIUM 1000 [USP'U]/ML
3000 INJECTION, SOLUTION INTRAVENOUS; SUBCUTANEOUS ONCE
Status: COMPLETED | OUTPATIENT
Start: 2020-12-10 | End: 2020-12-10

## 2020-12-10 RX ORDER — FUROSEMIDE 10 MG/ML
INJECTION INTRAMUSCULAR; INTRAVENOUS AS NEEDED
Status: DISCONTINUED | OUTPATIENT
Start: 2020-12-10 | End: 2020-12-10 | Stop reason: HOSPADM

## 2020-12-10 RX ORDER — POTASSIUM CHLORIDE 7.45 MG/ML
10 INJECTION INTRAVENOUS
Status: COMPLETED | OUTPATIENT
Start: 2020-12-10 | End: 2020-12-10

## 2020-12-10 RX ORDER — PROPOFOL 10 MG/ML
0-50 VIAL (ML) INTRAVENOUS
Status: DISCONTINUED | OUTPATIENT
Start: 2020-12-10 | End: 2020-12-11

## 2020-12-10 RX ORDER — SODIUM CHLORIDE 0.9 % (FLUSH) 0.9 %
5-40 SYRINGE (ML) INJECTION EVERY 8 HOURS
Status: DISCONTINUED | OUTPATIENT
Start: 2020-12-10 | End: 2020-12-18 | Stop reason: HOSPADM

## 2020-12-10 RX ORDER — FUROSEMIDE 10 MG/ML
20 INJECTION INTRAMUSCULAR; INTRAVENOUS ONCE
Status: COMPLETED | OUTPATIENT
Start: 2020-12-10 | End: 2020-12-10

## 2020-12-10 RX ORDER — NITROGLYCERIN 40 MG/100ML
INJECTION INTRAVENOUS
Status: DISPENSED
Start: 2020-12-10 | End: 2020-12-11

## 2020-12-10 RX ORDER — PHENYLEPHRINE HYDROCHLORIDE 10 MG/ML
INJECTION INTRAVENOUS
Status: COMPLETED
Start: 2020-12-10 | End: 2020-12-10

## 2020-12-10 RX ORDER — MIDAZOLAM HYDROCHLORIDE 1 MG/ML
INJECTION, SOLUTION INTRAMUSCULAR; INTRAVENOUS AS NEEDED
Status: DISCONTINUED | OUTPATIENT
Start: 2020-12-10 | End: 2020-12-10 | Stop reason: HOSPADM

## 2020-12-10 RX ORDER — MIDAZOLAM HYDROCHLORIDE 1 MG/ML
2 INJECTION, SOLUTION INTRAMUSCULAR; INTRAVENOUS
Status: COMPLETED | OUTPATIENT
Start: 2020-12-10 | End: 2020-12-10

## 2020-12-10 RX ORDER — MAGNESIUM SULFATE HEPTAHYDRATE 40 MG/ML
2 INJECTION, SOLUTION INTRAVENOUS ONCE
Status: COMPLETED | OUTPATIENT
Start: 2020-12-10 | End: 2020-12-10

## 2020-12-10 RX ORDER — FENTANYL CITRATE 50 UG/ML
INJECTION, SOLUTION INTRAMUSCULAR; INTRAVENOUS
Status: COMPLETED
Start: 2020-12-10 | End: 2020-12-10

## 2020-12-10 RX ORDER — MIDAZOLAM HYDROCHLORIDE 1 MG/ML
INJECTION, SOLUTION INTRAMUSCULAR; INTRAVENOUS
Status: COMPLETED
Start: 2020-12-10 | End: 2020-12-10

## 2020-12-10 RX ORDER — FENTANYL CITRATE 50 UG/ML
50 INJECTION, SOLUTION INTRAMUSCULAR; INTRAVENOUS ONCE
Status: COMPLETED | OUTPATIENT
Start: 2020-12-10 | End: 2020-12-10

## 2020-12-10 RX ORDER — PROPOFOL 10 MG/ML
INJECTION, EMULSION INTRAVENOUS
Status: COMPLETED
Start: 2020-12-10 | End: 2020-12-10

## 2020-12-10 RX ORDER — MAGNESIUM SULFATE HEPTAHYDRATE 40 MG/ML
INJECTION, SOLUTION INTRAVENOUS
Status: DISPENSED
Start: 2020-12-10 | End: 2020-12-11

## 2020-12-10 RX ORDER — HEPARIN SODIUM 1000 [USP'U]/ML
INJECTION, SOLUTION INTRAVENOUS; SUBCUTANEOUS AS NEEDED
Status: DISCONTINUED | OUTPATIENT
Start: 2020-12-10 | End: 2020-12-10 | Stop reason: HOSPADM

## 2020-12-10 RX ORDER — FENTANYL CITRATE 50 UG/ML
INJECTION, SOLUTION INTRAMUSCULAR; INTRAVENOUS AS NEEDED
Status: DISCONTINUED | OUTPATIENT
Start: 2020-12-10 | End: 2020-12-10 | Stop reason: HOSPADM

## 2020-12-10 RX ORDER — NOREPINEPHRINE BITARTRATE/D5W 8 MG/250ML
PLASTIC BAG, INJECTION (ML) INTRAVENOUS
Status: COMPLETED
Start: 2020-12-10 | End: 2020-12-10

## 2020-12-10 RX ORDER — INSULIN LISPRO 100 [IU]/ML
INJECTION, SOLUTION INTRAVENOUS; SUBCUTANEOUS EVERY 6 HOURS
Status: DISCONTINUED | OUTPATIENT
Start: 2020-12-10 | End: 2020-12-15

## 2020-12-10 RX ORDER — EPTIFIBATIDE 2 MG/ML
INJECTION, SOLUTION INTRAVENOUS AS NEEDED
Status: DISCONTINUED | OUTPATIENT
Start: 2020-12-10 | End: 2020-12-10 | Stop reason: HOSPADM

## 2020-12-10 RX ADMIN — INSULIN LISPRO 2 UNITS: 100 INJECTION, SOLUTION INTRAVENOUS; SUBCUTANEOUS at 08:51

## 2020-12-10 RX ADMIN — FENTANYL CITRATE 50 MCG: 50 INJECTION, SOLUTION INTRAMUSCULAR; INTRAVENOUS at 16:13

## 2020-12-10 RX ADMIN — NITROGLYCERIN 180 MCG/MIN: 40 INJECTION INTRAVENOUS at 02:31

## 2020-12-10 RX ADMIN — MIDAZOLAM 2 MG: 1 INJECTION INTRAMUSCULAR; INTRAVENOUS at 16:06

## 2020-12-10 RX ADMIN — POTASSIUM CHLORIDE 10 MEQ: 7.46 INJECTION, SOLUTION INTRAVENOUS at 19:21

## 2020-12-10 RX ADMIN — MIDAZOLAM 2 MG: 1 INJECTION INTRAMUSCULAR; INTRAVENOUS at 20:19

## 2020-12-10 RX ADMIN — POTASSIUM CHLORIDE 10 MEQ: 7.46 INJECTION, SOLUTION INTRAVENOUS at 17:30

## 2020-12-10 RX ADMIN — ROSUVASTATIN 40 MG: 20 TABLET, FILM COATED ORAL at 21:21

## 2020-12-10 RX ADMIN — PROPOFOL 35 MCG/KG/MIN: 10 INJECTION, EMULSION INTRAVENOUS at 21:59

## 2020-12-10 RX ADMIN — ONDANSETRON 4 MG: 2 INJECTION INTRAMUSCULAR; INTRAVENOUS at 00:00

## 2020-12-10 RX ADMIN — Medication 4 MCG/MIN: at 16:45

## 2020-12-10 RX ADMIN — NITROGLYCERIN 200 MCG/MIN: 40 INJECTION INTRAVENOUS at 12:12

## 2020-12-10 RX ADMIN — MUPIROCIN: 20 OINTMENT TOPICAL at 09:00

## 2020-12-10 RX ADMIN — MIDAZOLAM HYDROCHLORIDE 2 MG: 1 INJECTION, SOLUTION INTRAMUSCULAR; INTRAVENOUS at 17:15

## 2020-12-10 RX ADMIN — PIPERACILLIN AND TAZOBACTAM 3.38 G: 3; .375 INJECTION, POWDER, LYOPHILIZED, FOR SOLUTION INTRAVENOUS at 18:17

## 2020-12-10 RX ADMIN — CARVEDILOL 6.25 MG: 6.25 TABLET, FILM COATED ORAL at 08:51

## 2020-12-10 RX ADMIN — Medication 10 ML: at 07:30

## 2020-12-10 RX ADMIN — PROPOFOL 10 MCG/KG/MIN: 10 INJECTION, EMULSION INTRAVENOUS at 16:15

## 2020-12-10 RX ADMIN — Medication 10 ML: at 18:10

## 2020-12-10 RX ADMIN — INSULIN LISPRO 2 UNITS: 100 INJECTION, SOLUTION INTRAVENOUS; SUBCUTANEOUS at 21:27

## 2020-12-10 RX ADMIN — ASPIRIN 81 MG CHEWABLE TABLET 81 MG: 81 TABLET CHEWABLE at 08:51

## 2020-12-10 RX ADMIN — POTASSIUM CHLORIDE 10 MEQ: 7.46 INJECTION, SOLUTION INTRAVENOUS at 20:27

## 2020-12-10 RX ADMIN — PIPERACILLIN AND TAZOBACTAM 3.38 G: 3; .375 INJECTION, POWDER, LYOPHILIZED, FOR SOLUTION INTRAVENOUS at 23:58

## 2020-12-10 RX ADMIN — MIDAZOLAM HYDROCHLORIDE 2 MG: 1 INJECTION, SOLUTION INTRAMUSCULAR; INTRAVENOUS at 16:00

## 2020-12-10 RX ADMIN — MIDAZOLAM 2 MG: 1 INJECTION INTRAMUSCULAR; INTRAVENOUS at 17:15

## 2020-12-10 RX ADMIN — POTASSIUM CHLORIDE 10 MEQ: 7.46 INJECTION, SOLUTION INTRAVENOUS at 18:17

## 2020-12-10 RX ADMIN — MIDAZOLAM 2 MG: 1 INJECTION INTRAMUSCULAR; INTRAVENOUS at 16:00

## 2020-12-10 RX ADMIN — AMIODARONE HYDROCHLORIDE 400 MG: 200 TABLET ORAL at 08:51

## 2020-12-10 RX ADMIN — ONDANSETRON 4 MG: 2 INJECTION INTRAMUSCULAR; INTRAVENOUS at 08:51

## 2020-12-10 RX ADMIN — HEPARIN SODIUM 3000 UNITS: 1000 INJECTION, SOLUTION INTRAVENOUS; SUBCUTANEOUS at 04:04

## 2020-12-10 RX ADMIN — CARVEDILOL 6.25 MG: 6.25 TABLET, FILM COATED ORAL at 20:05

## 2020-12-10 RX ADMIN — TRANEXAMIC ACID 250 MG: 100 INJECTION, SOLUTION INTRAVENOUS at 16:38

## 2020-12-10 RX ADMIN — MIDAZOLAM HYDROCHLORIDE 2 MG: 1 INJECTION, SOLUTION INTRAMUSCULAR; INTRAVENOUS at 16:06

## 2020-12-10 RX ADMIN — PHENYLEPHRINE HYDROCHLORIDE 50 MCG: 10 INJECTION INTRAVENOUS at 16:15

## 2020-12-10 RX ADMIN — MIDAZOLAM 2 MG: 1 INJECTION INTRAMUSCULAR; INTRAVENOUS at 23:42

## 2020-12-10 RX ADMIN — MAGNESIUM SULFATE IN WATER 2 G: 40 INJECTION, SOLUTION INTRAVENOUS at 17:30

## 2020-12-10 RX ADMIN — FUROSEMIDE 20 MG: 10 INJECTION, SOLUTION INTRAMUSCULAR; INTRAVENOUS at 00:20

## 2020-12-10 NOTE — PROGRESS NOTES
[facility]  Progress Note    Patient: Ash Ped Age: 79 y.o. : 1950 MR#: [id number] SSN: [id number]  Date/Time: 12/10/2020    Subjective:     Patient intubated in cath lab, s/p PCI and stent. Now back in CVT ICU. On Vent.   Per RT had significant bleeding from ET tube      Assessment/Plan:     Acute Hypoxic respiratory failure  ?pulmonary edema  Unstable angina  Coronary artery disease  Hypertension  Dyslipidemia  Diet-controlled type 2 diabetes   degenerative joint disease    Plan  S/p PCI, stent  Vent support- I have consulted pulm  S/p IV lasix in cath lab  Monitor H&H  DAPT, statin  Monitor sugars, sliding scale insulin  D/w cardiologist, d/y pulmonologist  D/w RN  Patient is a full code  Cardiologist has called and d/w Wife regarding events    Addendum- Pulm and cardio at bedside  D/w Pulm and will add zosyn      Case discussed with:  [x]Patient  []Family  [x]Nursing  []Case Management  DVT Prophylaxis:  []Lovenox  []Hep SQ  []SCDs  []Coumadin   [x]On Heparin gtt    Objective:   VS:   Visit Vitals  /63 (BP 1 Location: Left arm, BP Patient Position: At rest)   Pulse 81   Temp 98 °F (36.7 °C)   Resp 27   Ht 6' (1.829 m)   Wt 95.3 kg (210 lb)   SpO2 95%   BMI 28.48 kg/m²      Tmax/24hrs: Temp (24hrs), Av.2 °F (36.8 °C), Min:98 °F (36.7 °C), Max:98.3 °F (36.8 °C)    Input/Output:     Intake/Output Summary (Last 24 hours) at 12/10/2020 1552  Last data filed at 12/10/2020 1300  Gross per 24 hour   Intake 2246.7 ml   Output 1200 ml   Net 1046.7 ml       General:  intubated  Cardiovascular:  S1S2+, RRR  Pulmonary:  Coarse BS b/l, some rales  GI:  Soft, BS+, NT, ND, obese  Extremities:  No edema      Labs:    Recent Results (from the past 24 hour(s))   GLUCOSE, POC    Collection Time: 20  3:53 PM   Result Value Ref Range    Glucose (POC) 133 (H) 70 - 110 mg/dL   PTT    Collection Time: 20  4:24 PM   Result Value Ref Range    aPTT 28.9 23.0 - 36.4 SEC   TROPONIN I    Collection Time: 12/09/20  4:24 PM   Result Value Ref Range    Troponin-I, QT 0.05 (H) 0.0 - 0.045 NG/ML   GLUCOSE, POC    Collection Time: 12/09/20  8:38 PM   Result Value Ref Range    Glucose (POC) 170 (H) 70 - 110 mg/dL   PTT    Collection Time: 12/09/20  8:55 PM   Result Value Ref Range    aPTT 91.2 (H) 23.0 - 36.4 SEC   CARDIAC PANEL,(CK, CKMB & TROPONIN)    Collection Time: 12/09/20  8:55 PM   Result Value Ref Range    CK - MB <1.0 <3.6 ng/ml    CK-MB Index  0.0 - 4.0 %     CALCULATION NOT PERFORMED WHEN RESULT IS BELOW LINEAR LIMIT    CK 75 39 - 308 U/L    Troponin-I, QT 0.03 0.0 - 0.045 NG/ML   EKG, 12 LEAD, SUBSEQUENT    Collection Time: 12/10/20 12:24 AM   Result Value Ref Range    Ventricular Rate 89 BPM    Atrial Rate 89 BPM    P-R Interval 160 ms    QRS Duration 128 ms    Q-T Interval 410 ms    QTC Calculation (Bezet) 498 ms    Calculated P Axis 14 degrees    Calculated R Axis 22 degrees    Calculated T Axis 127 degrees    Diagnosis       Normal sinus rhythm  Left bundle branch block  Abnormal ECG  When compared with ECG of 09-DEC-2020 14:38,  No significant change was found  Confirmed by Luciana Finney ([id number]) on 12/10/2020 10:29:37 AM     PTT    Collection Time: 12/10/20  3:00 AM   Result Value Ref Range    aPTT 47.4 (H) 23.0 - 36.4 SEC   CARDIAC PANEL,(CK, CKMB & TROPONIN)    Collection Time: 12/10/20  3:00 AM   Result Value Ref Range    CK - MB 1.1 <3.6 ng/ml    CK-MB Index 1.4 0.0 - 4.0 %    CK 80 39 - 308 U/L    Troponin-I, QT 0.12 (H) 0.0 - 2.941 NG/ML   METABOLIC PANEL, BASIC    Collection Time: 12/10/20  3:00 AM   Result Value Ref Range    Sodium 138 136 - 145 mmol/L    Potassium 3.9 3.5 - 5.5 mmol/L    Chloride 109 100 - 111 mmol/L    CO2 22 21 - 32 mmol/L    Anion gap 7 3.0 - 18 mmol/L    Glucose 161 (H) 74 - 99 mg/dL    BUN 14 7.0 - 18 MG/DL    Creatinine 1.00 0.6 - 1.3 MG/DL    BUN/Creatinine ratio 14 12 - 20      GFR est AA >60 >60 ml/min/1.73m2    GFR est non-AA >60 >60 ml/min/1.73m2    Calcium 7.9 (L) 8.5 - 10.1 MG/DL   CBC WITH AUTOMATED DIFF    Collection Time: 12/10/20  3:00 AM   Result Value Ref Range    WBC 9.7 4.6 - 13.2 K/uL    RBC 3.76 (L) 4.70 - 5.50 M/uL    HGB 11.5 (L) 13.0 - 16.0 g/dL    HCT 33.1 (L) 36.0 - 48.0 %    MCV 88.0 74.0 - 97.0 FL    MCH 30.6 24.0 - 34.0 PG    MCHC 34.7 31.0 - 37.0 g/dL    RDW 14.1 11.6 - 14.5 %    PLATELET 179 721 - 823 K/uL    MPV 10.0 9.2 - 11.8 FL    NEUTROPHILS 70 40 - 73 %    LYMPHOCYTES 17 (L) 21 - 52 %    MONOCYTES 12 (H) 3 - 10 %    EOSINOPHILS 1 0 - 5 %    BASOPHILS 0 0 - 2 %    ABS. NEUTROPHILS 6.7 1.8 - 8.0 K/UL    ABS. LYMPHOCYTES 1.7 0.9 - 3.6 K/UL    ABS. MONOCYTES 1.2 0.05 - 1.2 K/UL    ABS. EOSINOPHILS 0.1 0.0 - 0.4 K/UL    ABS. BASOPHILS 0.0 0.0 - 0.1 K/UL    DF AUTOMATED     PROTHROMBIN TIME + INR    Collection Time: 12/10/20  3:00 AM   Result Value Ref Range    Prothrombin time 13.7 11.5 - 15.2 sec    INR 1.1 0.8 - 1.2     HEMOGLOBIN A1C W/O EAG    Collection Time: 12/10/20  3:00 AM   Result Value Ref Range    Hemoglobin A1c 5.9 (H) 4.2 - 5.6 %   HEPATIC FUNCTION PANEL    Collection Time: 12/10/20  3:00 AM   Result Value Ref Range    Protein, total 6.1 (L) 6.4 - 8.2 g/dL    Albumin 3.1 (L) 3.4 - 5.0 g/dL    Globulin 3.0 2.0 - 4.0 g/dL    A-G Ratio 1.0 0.8 - 1.7      Bilirubin, total 0.4 0.2 - 1.0 MG/DL    Bilirubin, direct 0.1 0.0 - 0.2 MG/DL    Alk.  phosphatase 55 45 - 117 U/L    AST (SGOT) 9 (L) 10 - 38 U/L    ALT (SGPT) 15 (L) 16 - 61 U/L   MAGNESIUM    Collection Time: 12/10/20  3:00 AM   Result Value Ref Range    Magnesium 1.8 1.6 - 2.6 mg/dL   GLUCOSE, POC    Collection Time: 12/10/20  7:02 AM   Result Value Ref Range    Glucose (POC) 177 (H) 70 - 110 mg/dL   DUPLEX CAROTID BILATERAL    Collection Time: 12/10/20  8:47 AM   Result Value Ref Range    Right subclavian sys 72.9 cm/s    RIGHT SUBCLAVIAN ARTERY D 0.00 cm/s    Right cca dist sys 99.9 cm/s    Right CCA dist allen 21.7 cm/s    RIGHT COMMON CAROTID ARTERY MID S 96.00 cm/s    RIGHT COMMON CAROTID ARTERY MID D 17.74 cm/s    Right CCA prox sys 91.2 cm/s    Right CCA prox allen 14.2 cm/s    Right eca sys 153.0 cm/s    RIGHT EXTERNAL CAROTID ARTERY D 7.10 cm/s    Right ICA dist sys 94.7 cm/s    Right ICA dist allen 26.8 cm/s    Right ICA mid sys 118.9 cm/s    Right ICA mid allen 31.7 cm/s    Right ICA prox sys 86.6 cm/s    Right ICA prox allen 39.7 cm/s    Right vertebral sys 53.7 cm/s    RIGHT VERTEBRAL ARTERY D 21.46 cm/s    Right ICA/CCA sys 1.2     Left subclavian sys 85.0 cm/s    LEFT SUBCLAVIAN ARTERY D 0.00 cm/s    Left CCA dist sys 109.2 cm/s    Left CCA dist allen 22.0 cm/s    LEFT COMMON CAROTID ARTERY MID S 104.33 cm/s    LEFT COMMON CAROTID ARTERY MID D 20.35 cm/s    Left CCA prox sys 104.3 cm/s    Left CCA prox allen 22.0 cm/s    Left ECA sys 230.1 cm/s    LEFT EXTERNAL CAROTID ARTERY D 12.81 cm/s    Left ICA dist sys 93.0 cm/s    Left ICA dist allen 25.3 cm/s    Left ICA mid sys 105.2 cm/s    Left ICA mid allen 28.8 cm/s    Left ICA prox sys 171.6 cm/s    Left ICA prox allen 40.7 cm/s    Left vertebral sys 61.6 cm/s    LEFT VERTEBRAL ARTERY D 21.48 cm/s    Left ICA/CCA sys 1.57    PTT    Collection Time: 12/10/20  9:50 AM   Result Value Ref Range    aPTT 103.3 (H) 23.0 - 36.4 SEC   GLUCOSE, POC    Collection Time: 12/10/20 11:45 AM   Result Value Ref Range    Glucose (POC) 169 (H) 70 - 110 mg/dL   POC G3    Collection Time: 12/10/20  3:00 PM   Result Value Ref Range    Device: VENT      FIO2 (POC) 100 %    pH (POC) 7.17 (LL) 7.35 - 7.45      pCO2 (POC) 57.3 (H) 35.0 - 45.0 MMHG    pO2 (POC) 43 (LL) 80 - 100 MMHG    HCO3 (POC) 20.7 (L) 22 - 26 MMOL/L    sO2 (POC) 64 (L) 92 - 97 %    Base deficit (POC) 8 mmol/L    Allens test (POC) N/A      Total resp.  rate 20      Site DRAWN FROM ARTERIAL LINE      Specimen type (POC) ARTERIAL      Performed by Navi Soto        Signed By: Trent Menezes MD     December 10, 2020

## 2020-12-10 NOTE — PROGRESS NOTES
Patient independent prior to admission, plan for stent today. Patient lives at home with wife and 5 daughters live local, has lots of post-op support at home. No DME or oxygen at home. Reason for Admission:  ACS (acute coronary syndrome) ([facility]) [I24.9]  ACS (acute coronary syndrome) ([facility] [address] [I24.9]                 RUR Score:    13%            Plan for utilizing home health: If needed [facility]                       Likelihood of Readmission:   LOW                         Transition of Care Plan:              Initial assessment completed with patient. Cognitive status of patient: oriented to time, place, person and situation. Face sheet information confirmed:  yes. The patient designates wife to participate in his discharge plan and to receive any needed information. This patient lives in a single family home with wife. Patient is able to navigate steps as needed. Prior to hospitalization, patient was considered to be independent with ADLs/IADLS : yes . Patient has a current ACP document on file: no  The wife will be available to transport patient home upon discharge. The patient already has no medical equipment available in the home. Patient is not currently active with home health. Patient has not stayed in a skilled nursing facility or rehab. This patient is on dialysis :no      List of available Home Health agencies were provided and reviewed with the patient prior to discharge. Freedom of choice signed: yes, for [facility]. Currently, the discharge plan is Home with [address] [place] [address] Tim Hallalexiasya. The patient states that he can obtain his medications from the pharmacy, and take his medications as directed. Patient's current insurance is Medicare and [address] [place] [address] Management Interventions  PCP Verified by CM:  Yes  Palliative Care Criteria Met (RRAT>21 & CHF Dx)?: No  Mode of Transport at Discharge: Self  Transition of Care Consult (CM Consult): [address] [facility] Care: Yes  Physical Therapy Consult: Yes  Occupational Therapy Consult: Yes  Current Support Network: Lives with Spouse, Family Lives Nearby  Confirm Follow Up Transport: Family  The Patient and/or Patient Representative was Provided with a Choice of Provider and Agrees with the Discharge Plan?: Yes  Freedom of Choice List was Provided with Basic Dialogue that Supports the Patient's Individualized Plan of Care/Goals, Treatment Preferences and Shares the Quality Data Associated with the Providers?: Yes  Discharge Location  Discharge Placement: Home with home health        JAS Grullon  Case Management  [phone]

## 2020-12-10 NOTE — PROGRESS NOTES
0730:  Report received, care assumed. Complete assessment performed. AAOx4. \"Sharp stabbing pain mid sternal 3/10\" at rest in bed, bedrest status. IV Ntg 180cmg/min infusing, increased to 200mcg/min. IV Heparin infusing 1350unit/hr, next PTT check due this am at 1000. SR with BBB. VSS. 2L/min NC. Lungs clear, diminished. Will start Incentive Spirometry. Right wrist cardiac cath site c/d/i, no ooz, no hematoma. Call bell, cell phone and urinal at side. Monitor closely. 0830:  Talking on cell phone with wife. Vascular tech in for preop testing as ordered. 0900:  C/O nausea. Zofran 4mg IV given. CP persists at 3/10. Will inform cardiology provider. 0915Abel Farooq NP updated to status last night, current status including persistant CP with Ntg 200mcg/min and Heparin drips; Ampy, NP will discuss with Dr Roxann North. Await further orders. 0930:  Cecy in to see patient; discussed plan of care to include transfer to [facility] today, if delay in transferring to [facility] today, then cardiac stenting today. Patient verbalizes understanding all info and plan of care. NPO starts now, pt has only had water this morning. Wife on face time via cell; Covid pandemic prevents visitors from entering hospital; wife verbalizes understanding plan of care. Will keep pt and wife updated. 1100:  PTT =103.3 therapeutic range for ACS protocol, no changes, continue IV Heparin drip 1350 un/hr. 1300:  Verbal/Bedside report to Cardiac Cath Lab team. To CCL via bed with IV Ntg, Heparin, NS, cardiac monitor and RN for procedure. Patient called his wife via cell phone prior to leaving ICU.  1535:  Verbal report received from Aria Warner. [id number]:  Received from [facility] via bed into CVT ICU room 2353 s/p cardiac cath with stent to LAD. Integrillin drip is infusing via med pump at 14cc/hr in PIV. Orally intubated to ventilator, large bloody secretions noted via ETT, mod thick white blood tinged via mouth suction. Pulse ox sats 80-85%. SR w/ BBB. VSS. PIV x3 intact. 6fr Art Line intact to right groin, site c/d/i, no ooz, no hematoma, brisk blood return, placed to transducer for Art Line BP monitoring. NGT present, clamped per Dr Rosalino Lawrence orders until 8pm this evening. Abdomen distended, round, + bowel sounds. Stoll draining large amount clear yellow urine, 1,000ml emptied now after Lasix in CCL. Waking up from CCL sedation, agitated, attempts pull at lines/ tubes, resisting vent. Dr Fareed Gongora cardiology, Dr Anastasiia Wade ICU/Pulmonary and Dr Bianca Echevarria hospitalist at bedside. New orders received including sedation and restraints. Bilateral soft wrist See flowsheets and MAR for details. Dr Rosalino Lawrence states he has spoken to wife at length after procedure. 1600: Versed 2mg IV. ABG done, results to Venessa at bedside. 1606: Versed 2mg IV.  1610:  PCXR done, Dr Anastasiia Wade confirms placement of ETT and OGT.  1613:  Fentanyl 50mcg IV.  1615:  Propofol drip started for sedation, see MAR for titration. Neosynephrine 50mcg IV by Poly Graham RN per MD orders. 1625:  Ntg IV drip started 10mcg/min per orders. Labs drawn from right fem AL and hand delivered to lab. 1640:  IV Ntg OFF.  1645:  Levophed drip started for hypotension, see MAR for titrations. Pulse ox sats 90-93%. 1700: Integrilin drip to 1mcg/kg/min =7.5cc/hr via Guardrails med pump per Dr Rosalino Lawrence. 300cc UOP emptied from stoll bag.  1715:  Versed 2mg IV. ABG done, results to Dr Anastasiia Wade. 1730:  Lab values noted from specimens sent at 1625, results to Dr Anastasiia Wade and Dr Rosalino Lawrence; MDs have remained at bedside since received back into ICU. New orders for electrolyte replacement received. KCL 10meq IVPB given. MagSulfate 2gm IVPB given. Pulse ox sats 96-98%. 1735:  MDs leaving bedside now. 1800:  200cc UOP from stoll. 1817:  Zosyn 3.375gm IVPB given. 1830: KCL 10meq IVPB given. Partial bath, complete linen change done. Tolerated well. Reverse trendelenburg with HOB 30degree elevated per Dr Anastasiia Wade.    1845:  Routine 12lead EKG post stent placement performed. 1900:  RASS -2. SR w/ BBB. Propofol 20. Levo 2. Integrilin 1. MAP 70. Pulse ox sats 100%. Bedside and Verbal shift change report given to [address] (oncoming nurse) by Christi Larios RN(offgoing nurse). Report included the following information SBAR, Kardex, Procedure Summary, Intake/Output, MAR, Accordion, Recent Results, Cardiac Rhythm SR with BBB. and Alarm Parameters .

## 2020-12-10 NOTE — PROGRESS NOTES
[facility]      CARDIOLOGY PROGRESS NOTE  RECS:    1. Unstable angina- with ongoing chest pain/discomfort and intermittent SOB. Despite patient at maximum 200 mcg NTG drip. Continue  asa,bb, Heparin drip. Continue to trend Troponin. Cardiac cath 12/9/20 shows 3 vessel disease with 90% mid LAD stenosis 80% distal circumflex stenosis and 100% mid RCA stenosis after diffuse disease in proximal and ostial RCA. Patent previously deployed drug-eluting stents in mid circumflex and distal circumflex areas. Borderline critical left main stenosis which is approximately 50% and has progressed from previous catheterization in 2017. Plans for cardiac cath today and PCI LAD. discussed risks and benefits and patient wants to proceed    2. CAD-cardiac cath 12/2017 showed  Triple-vessel coronary artery disease. S/p percutaneous transluminal coronary angioplasty/stent to proximal and mid circumflex artery with LIZ- patient was on Plavix and asa at home   3. Hypertension- stable continue to monitor closely on NTG drip. 4. Hyperlipidemia- LDL 74 11/20 Continue with Crestor  20 mg.  5. Diabetes- well controlled with A1c 5.9   6. LBBB- new since 12/20      Patient awaiting CABG for distal LM and critical mid LAD stenosis-- now on NTG infusion at 200 mcg-- continues to have rest pain-- CTS service not available today-- discussed with patient regarding further management including emergent PCI vs transfer to [facility]-- however due to ongoing chest pain will proceed with emergent PCI of LAD. Distal LM is 50%. All questions answered. Cardiac cath 12/9/20  · Three-vessel coronary artery disease with 90% mid LAD stenosis 80% distal circumflex stenosis and 100% mid RCA stenosis after diffuse disease in proximal and ostial RCA. · Patent previously deployed drug-eluting stents in mid circumflex and distal circumflex areas.   · Borderline critical left main stenosis which is approximately 50% and has progressed from previous catheterization in 2017. · Procedure done via right radial artery without any complications. Given left main disease, CABG will be considered first.  We will take a surgical opinion with Dr. Meghna Foss. I discussed with him on phone and he/his team will be seeing the patient.     Echo 12/20  · Contrast used: DEFINITY. · LV: Calculated LVEF is 55%. Visually measured ejection fraction. Normal cavity size and systolic function (ejection fraction normal). Mild concentric hypertrophy. Age-appropriate left ventricular diastolic function. · PA: Pulmonary arterial systolic pressure is 26 mmHg. · Aorta: Dilated aortic root; diameter is 3.7 cm. ASSESSMENT:  Hospital Problems  Date Reviewed: 11/13/2019          Codes Class Noted POA    ACS (acute coronary syndrome) ([facility] ICD-10-CM: I24.9  ICD-9-CM: 411.1  12/8/2020 Unknown        Controlled type 2 diabetes mellitus without complication, without long-term current use of insulin ([facility] ICD-10-CM: E11.9  ICD-9-CM: 250.00  11/29/2018 Yes        Coronary artery disease involving native coronary artery with unstable angina pectoris ([facility] ICD-10-CM: I25.110  ICD-9-CM: 414.01, 411.1  1/27/2016 Yes    Overview Signed 1/27/2016 11:41 AM by Chanell Hurtado MD     recent admission angina stable on medical managment  unable to tolerate isordil             Essential hypertension ICD-10-CM: I10  ICD-9-CM: 401.9  1/27/2016 Yes    Overview Signed 1/27/2016 11:41 AM by Chanell Hurtado MD     controlled             Unstable angina pectoris ([facility] ICD-10-CM: I20.0  ICD-9-CM: 411.1  1/17/2016 Yes                SUBJECTIVE:  Chest pain and SOB worsening this am with nausea.  Chest pain level 4/10     OBJECTIVE:    VS:   Visit Vitals  BP (!) 111/57   Pulse 83   Temp 98.2 °F (36.8 °C)   Resp 20   Ht 6' (1.829 m)   Wt 95.3 kg (210 lb)   SpO2 95%   BMI 28.48 kg/m²         Intake/Output Summary (Last 24 hours) at 12/10/2020 1008  Last data filed at 12/10/2020 0705  Gross per 24 hour Intake 2492.15 ml   Output 1300 ml   Net 1192.15 ml     TELE: normal sinus rhythm    General: alert, well developed, pleasant and in no apparent distress  HENT: Normocephalic, atraumatic. Normal external eye. Neck :  no bruit, no JVD  Cardiac:  regular rate and rhythm, S1, S2 normal, no murmur, click, rub or gallop  Lungs: clear to auscultation bilaterally  Abdomen: Soft, nontender, no masses  Extremities:  No c/c/e, peripheral pulses present      Labs: Results:       Chemistry Recent Labs     12/10/20  0300 12/09/20  1036 12/08/20  1349   * 138* 90    142 138   K 3.9 3.9 4.0    110 107   CO2 22 26 28   BUN 14 12 13   CREA 1.00 0.86 0.89   CA 7.9* 8.4* 9.1   AGAP 7 6 3   BUCR 14 14 15   AP 55  --  71   TP 6.1*  --  7.5   ALB 3.1*  --  3.7   GLOB 3.0  --  3.8   AGRAT 1.0  --  1.0      CBC w/Diff Recent Labs     12/10/20  0300 12/09/20  1036 12/08/20  1349   WBC 9.7 7.3 8.8   RBC 3.76* 4.04* 4.67*   HGB 11.5* 12.4* 14.6   HCT 33.1* 36.0 41.7    159 191   GRANS 70  --  62   LYMPH 17*  --  26   EOS 1  --  2      Cardiac Enzymes Recent Labs     12/10/20  0300 12/09/20  2055   CPK 80 75   CKND1 1.4 CALCULATION NOT PERFORMED WHEN RESULT IS BELOW LINEAR LIMIT      Coagulation Recent Labs     12/10/20  0300 12/09/20  2055   PTP 13.7  --    INR 1.1  --    APTT 47.4* 91.2*       Lipid Panel Lab Results   Component Value Date/Time    Cholesterol, total 130 12/09/2020 10:36 AM    HDL Cholesterol 31 (L) 12/09/2020 10:36 AM    LDL, calculated 44.4 12/09/2020 10:36 AM    VLDL, calculated 54.6 12/09/2020 10:36 AM    Triglyceride 273 (H) 12/09/2020 10:36 AM    CHOL/HDL Ratio 4.2 12/09/2020 10:36 AM      BNP No results for input(s): BNPP in the last 72 hours.    Liver Enzymes Recent Labs     12/10/20  0300   TP 6.1*   ALB 3.1*   AP 55      Thyroid Studies Lab Results   Component Value Date/Time    TSH 1.33 11/23/2020 07:34 AM              Mini LOPEZ Verito:[phone] supervised    I have independently evaluated and examined the patient. All relevant labs and testing data's are reviewed. Care plan discussed and updated after review.     Airam Salmeron MD

## 2020-12-10 NOTE — PROGRESS NOTES
0000-Paged Coby Mantilla about continued CP, Nausea (4mg of zophran given), diaphoresis, and shortness of breath. Ran another EKG and increased the nitro gtt. Notified MD that patient had received about 1 Liter of NS post cath. New orders for EKG and 20mg Lasix.

## 2020-12-11 ENCOUNTER — APPOINTMENT (OUTPATIENT)
Dept: GENERAL RADIOLOGY | Age: 70
DRG: 246 | End: 2020-12-11
Attending: INTERNAL MEDICINE
Payer: MEDICARE

## 2020-12-11 LAB
ACT BLD: 147 SECS (ref 79–138)
ACT BLD: 158 SECS (ref 79–138)
ACT BLD: 334 SECS (ref 79–138)
ANION GAP SERPL CALC-SCNC: 8 MMOL/L (ref 3–18)
ANION GAP SERPL CALC-SCNC: 9 MMOL/L (ref 3–18)
APTT PPP: 32.9 SEC (ref 23–36.4)
ARTERIAL PATENCY WRIST A: ABNORMAL
BASE DEFICIT BLD-SCNC: 1 MMOL/L
BASE DEFICIT BLD-SCNC: 2 MMOL/L
BASE DEFICIT BLD-SCNC: 3 MMOL/L
BDY SITE: ABNORMAL
BODY TEMPERATURE: 100.1
BODY TEMPERATURE: 100.9
BODY TEMPERATURE: 99.3
BUN SERPL-MCNC: 14 MG/DL (ref 7–18)
BUN SERPL-MCNC: 14 MG/DL (ref 7–18)
BUN/CREAT SERPL: 12 (ref 12–20)
BUN/CREAT SERPL: 14 (ref 12–20)
CA-I BLD-MCNC: 1.13 MMOL/L (ref 1.12–1.32)
CA-I BLD-MCNC: 1.14 MMOL/L (ref 1.12–1.32)
CALCIUM SERPL-MCNC: 7.6 MG/DL (ref 8.5–10.1)
CALCIUM SERPL-MCNC: 8.1 MG/DL (ref 8.5–10.1)
CHLORIDE SERPL-SCNC: 108 MMOL/L (ref 100–111)
CHLORIDE SERPL-SCNC: 109 MMOL/L (ref 100–111)
CO2 SERPL-SCNC: 22 MMOL/L (ref 21–32)
CO2 SERPL-SCNC: 23 MMOL/L (ref 21–32)
CREAT SERPL-MCNC: 1 MG/DL (ref 0.6–1.3)
CREAT SERPL-MCNC: 1.16 MG/DL (ref 0.6–1.3)
ERYTHROCYTE [DISTWIDTH] IN BLOOD BY AUTOMATED COUNT: 14.3 % (ref 11.6–14.5)
GAS FLOW.O2 O2 DELIVERY SYS: ABNORMAL L/MIN
GAS FLOW.O2 SETTING OXYMISER: 16 BPM
GLUCOSE BLD STRIP.AUTO-MCNC: 135 MG/DL (ref 74–106)
GLUCOSE BLD STRIP.AUTO-MCNC: 146 MG/DL (ref 70–110)
GLUCOSE BLD STRIP.AUTO-MCNC: 154 MG/DL (ref 74–106)
GLUCOSE BLD STRIP.AUTO-MCNC: 165 MG/DL (ref 70–110)
GLUCOSE SERPL-MCNC: 145 MG/DL (ref 74–99)
GLUCOSE SERPL-MCNC: 150 MG/DL (ref 74–99)
HCO3 BLD-SCNC: 21.5 MMOL/L (ref 22–26)
HCO3 BLD-SCNC: 21.7 MMOL/L (ref 22–26)
HCO3 BLD-SCNC: 22 MMOL/L (ref 22–26)
HCT VFR BLD AUTO: 32.3 % (ref 36–48)
HCT VFR BLD AUTO: 32.8 % (ref 36–48)
HCT VFR BLD CALC: 30 % (ref 36–49)
HCT VFR BLD CALC: 30 % (ref 36–49)
HGB BLD-MCNC: 10.2 G/DL (ref 12–16)
HGB BLD-MCNC: 10.2 G/DL (ref 12–16)
HGB BLD-MCNC: 11.3 G/DL (ref 13–16)
HGB BLD-MCNC: 11.4 G/DL (ref 13–16)
INSPIRATION.DURATION SETTING TIME VENT: 1 SEC
LEFT CCA DIST DIAS: 22 CM/S
LEFT CCA DIST SYS: 109.2 CM/S
LEFT CCA MID DIAS: 20.35 CM/S
LEFT CCA MID SYS: 104.33 CM/S
LEFT CCA PROX DIAS: 22 CM/S
LEFT CCA PROX SYS: 104.3 CM/S
LEFT ECA DIAS: 12.81 CM/S
LEFT ECA SYS: 230.1 CM/S
LEFT ICA DIST DIAS: 25.3 CM/S
LEFT ICA DIST SYS: 93 CM/S
LEFT ICA MID DIAS: 28.8 CM/S
LEFT ICA MID SYS: 105.2 CM/S
LEFT ICA PROX DIAS: 40.7 CM/S
LEFT ICA PROX SYS: 171.6 CM/S
LEFT ICA/CCA SYS: 1.57
LEFT SUBCLAVIAN DIAS: 0 CM/S
LEFT SUBCLAVIAN SYS: 85 CM/S
LEFT VERTEBRAL DIAS: 21.48 CM/S
LEFT VERTEBRAL SYS: 61.6 CM/S
MCH RBC QN AUTO: 30.9 PG (ref 24–34)
MCHC RBC AUTO-ENTMCNC: 34.8 G/DL (ref 31–37)
MCV RBC AUTO: 88.9 FL (ref 74–97)
O2/TOTAL GAS SETTING VFR VENT: 0.35 %
O2/TOTAL GAS SETTING VFR VENT: 0.6 %
O2/TOTAL GAS SETTING VFR VENT: 60 %
PCO2 BLD: 29.6 MMHG (ref 35–45)
PCO2 BLD: 32.8 MMHG (ref 35–45)
PCO2 BLD: 34.3 MMHG (ref 35–45)
PEEP RESPIRATORY: 12 CMH2O
PEEP RESPIRATORY: 14 CMH2O
PEEP RESPIRATORY: 5 CMH2O
PH BLD: 7.41 [PH] (ref 7.35–7.45)
PH BLD: 7.43 [PH] (ref 7.35–7.45)
PH BLD: 7.48 [PH] (ref 7.35–7.45)
PIP ISTAT,IPIP: 23
PIP ISTAT,IPIP: 27
PLATELET # BLD AUTO: 174 K/UL (ref 135–420)
PMV BLD AUTO: 10.8 FL (ref 9.2–11.8)
PO2 BLD: 150 MMHG (ref 80–100)
PO2 BLD: 158 MMHG (ref 80–100)
PO2 BLD: 71 MMHG (ref 80–100)
POTASSIUM BLD-SCNC: 3.5 MMOL/L (ref 3.5–5.5)
POTASSIUM BLD-SCNC: 3.7 MMOL/L (ref 3.5–5.5)
POTASSIUM SERPL-SCNC: 3.7 MMOL/L (ref 3.5–5.5)
POTASSIUM SERPL-SCNC: 4 MMOL/L (ref 3.5–5.5)
RBC # BLD AUTO: 3.69 M/UL (ref 4.7–5.5)
RIGHT CCA DIST DIAS: 21.7 CM/S
RIGHT CCA DIST SYS: 99.9 CM/S
RIGHT CCA MID DIAS: 17.74 CM/S
RIGHT CCA MID SYS: 96 CM/S
RIGHT CCA PROX DIAS: 14.2 CM/S
RIGHT CCA PROX SYS: 91.2 CM/S
RIGHT ECA DIAS: 7.1 CM/S
RIGHT ECA SYS: 153 CM/S
RIGHT ICA DIST DIAS: 26.8 CM/S
RIGHT ICA DIST SYS: 94.7 CM/S
RIGHT ICA MID DIAS: 31.7 CM/S
RIGHT ICA MID SYS: 118.9 CM/S
RIGHT ICA PROX DIAS: 39.7 CM/S
RIGHT ICA PROX SYS: 86.6 CM/S
RIGHT ICA/CCA SYS: 1.2
RIGHT SUBCLAVIAN DIAS: 0 CM/S
RIGHT SUBCLAVIAN SYS: 72.9 CM/S
RIGHT VERTEBRAL DIAS: 21.46 CM/S
RIGHT VERTEBRAL SYS: 53.7 CM/S
SAO2 % BLD: 95 % (ref 92–97)
SAO2 % BLD: 99 % (ref 92–97)
SAO2 % BLD: 99 % (ref 92–97)
SERVICE CMNT-IMP: ABNORMAL
SODIUM BLD-SCNC: 138 MMOL/L (ref 136–145)
SODIUM BLD-SCNC: 140 MMOL/L (ref 136–145)
SODIUM SERPL-SCNC: 139 MMOL/L (ref 136–145)
SODIUM SERPL-SCNC: 140 MMOL/L (ref 136–145)
SPECIMEN TYPE: ABNORMAL
TOTAL RESP. RATE, ITRR: 16
VENTILATION MODE VENT: ABNORMAL
VT SETTING VENT: 653 ML
VT SETTING VENT: 664 ML
VT SETTING VENT: 665 ML
WBC # BLD AUTO: 11.3 K/UL (ref 4.6–13.2)

## 2020-12-11 PROCEDURE — 99291 CRITICAL CARE FIRST HOUR: CPT | Performed by: INTERNAL MEDICINE

## 2020-12-11 PROCEDURE — 74011000250 HC RX REV CODE- 250: Performed by: INTERNAL MEDICINE

## 2020-12-11 PROCEDURE — 94003 VENT MGMT INPAT SUBQ DAY: CPT

## 2020-12-11 PROCEDURE — 74011250636 HC RX REV CODE- 250/636: Performed by: PHYSICIAN ASSISTANT

## 2020-12-11 PROCEDURE — C9113 INJ PANTOPRAZOLE SODIUM, VIA: HCPCS | Performed by: INTERNAL MEDICINE

## 2020-12-11 PROCEDURE — 74011250636 HC RX REV CODE- 250/636: Performed by: INTERNAL MEDICINE

## 2020-12-11 PROCEDURE — 74011250637 HC RX REV CODE- 250/637: Performed by: NURSE PRACTITIONER

## 2020-12-11 PROCEDURE — 82803 BLOOD GASES ANY COMBINATION: CPT

## 2020-12-11 PROCEDURE — 74011250636 HC RX REV CODE- 250/636

## 2020-12-11 PROCEDURE — 74011250636 HC RX REV CODE- 250/636: Performed by: EMERGENCY MEDICINE

## 2020-12-11 PROCEDURE — 80048 BASIC METABOLIC PNL TOTAL CA: CPT

## 2020-12-11 PROCEDURE — 74011000258 HC RX REV CODE- 258: Performed by: PHYSICIAN ASSISTANT

## 2020-12-11 PROCEDURE — 74011250637 HC RX REV CODE- 250/637: Performed by: INTERNAL MEDICINE

## 2020-12-11 PROCEDURE — 71045 X-RAY EXAM CHEST 1 VIEW: CPT

## 2020-12-11 PROCEDURE — 74011000258 HC RX REV CODE- 258: Performed by: EMERGENCY MEDICINE

## 2020-12-11 PROCEDURE — 65620000000 HC RM CCU GENERAL

## 2020-12-11 PROCEDURE — 85018 HEMOGLOBIN: CPT

## 2020-12-11 PROCEDURE — 2709999900 HC NON-CHARGEABLE SUPPLY

## 2020-12-11 PROCEDURE — 74011636637 HC RX REV CODE- 636/637: Performed by: EMERGENCY MEDICINE

## 2020-12-11 PROCEDURE — 85027 COMPLETE CBC AUTOMATED: CPT

## 2020-12-11 PROCEDURE — 93005 ELECTROCARDIOGRAM TRACING: CPT

## 2020-12-11 PROCEDURE — 82962 GLUCOSE BLOOD TEST: CPT

## 2020-12-11 PROCEDURE — 85730 THROMBOPLASTIN TIME PARTIAL: CPT

## 2020-12-11 PROCEDURE — 99232 SBSQ HOSP IP/OBS MODERATE 35: CPT | Performed by: EMERGENCY MEDICINE

## 2020-12-11 PROCEDURE — 82947 ASSAY GLUCOSE BLOOD QUANT: CPT

## 2020-12-11 PROCEDURE — 99233 SBSQ HOSP IP/OBS HIGH 50: CPT | Performed by: INTERNAL MEDICINE

## 2020-12-11 RX ORDER — MIDAZOLAM HYDROCHLORIDE 1 MG/ML
1-2 INJECTION, SOLUTION INTRAMUSCULAR; INTRAVENOUS
Status: DISCONTINUED | OUTPATIENT
Start: 2020-12-11 | End: 2020-12-14

## 2020-12-11 RX ORDER — POTASSIUM CHLORIDE 7.45 MG/ML
10 INJECTION INTRAVENOUS ONCE
Status: DISCONTINUED | OUTPATIENT
Start: 2020-12-11 | End: 2020-12-11

## 2020-12-11 RX ORDER — POTASSIUM CHLORIDE 29.8 MG/ML
20 INJECTION INTRAVENOUS ONCE
Status: DISCONTINUED | OUTPATIENT
Start: 2020-12-11 | End: 2020-12-11

## 2020-12-11 RX ORDER — POTASSIUM CHLORIDE 7.45 MG/ML
10 INJECTION INTRAVENOUS
Status: DISCONTINUED | OUTPATIENT
Start: 2020-12-11 | End: 2020-12-11

## 2020-12-11 RX ORDER — POTASSIUM CHLORIDE 7.45 MG/ML
10 INJECTION INTRAVENOUS
Status: COMPLETED | OUTPATIENT
Start: 2020-12-11 | End: 2020-12-12

## 2020-12-11 RX ORDER — MIDAZOLAM HYDROCHLORIDE 1 MG/ML
2 INJECTION, SOLUTION INTRAMUSCULAR; INTRAVENOUS
Status: DISCONTINUED | OUTPATIENT
Start: 2020-12-11 | End: 2020-12-11

## 2020-12-11 RX ORDER — POTASSIUM CHLORIDE 20 MEQ/1
20 TABLET, EXTENDED RELEASE ORAL ONCE
Status: DISCONTINUED | OUTPATIENT
Start: 2020-12-11 | End: 2020-12-11

## 2020-12-11 RX ORDER — POTASSIUM CHLORIDE 7.45 MG/ML
10 INJECTION INTRAVENOUS ONCE
Status: COMPLETED | OUTPATIENT
Start: 2020-12-11 | End: 2020-12-11

## 2020-12-11 RX ORDER — PROPOFOL 10 MG/ML
0-50 VIAL (ML) INTRAVENOUS
Status: DISCONTINUED | OUTPATIENT
Start: 2020-12-11 | End: 2020-12-14

## 2020-12-11 RX ORDER — MIDAZOLAM HYDROCHLORIDE 1 MG/ML
2 INJECTION, SOLUTION INTRAMUSCULAR; INTRAVENOUS ONCE
Status: ACTIVE | OUTPATIENT
Start: 2020-12-11 | End: 2020-12-11

## 2020-12-11 RX ORDER — MIDAZOLAM HYDROCHLORIDE 1 MG/ML
INJECTION, SOLUTION INTRAMUSCULAR; INTRAVENOUS
Status: COMPLETED
Start: 2020-12-11 | End: 2020-12-11

## 2020-12-11 RX ORDER — POTASSIUM CHLORIDE 750 MG/1
10 TABLET, FILM COATED, EXTENDED RELEASE ORAL ONCE
Status: DISCONTINUED | OUTPATIENT
Start: 2020-12-11 | End: 2020-12-11

## 2020-12-11 RX ORDER — FENTANYL CITRATE 50 UG/ML
50-100 INJECTION, SOLUTION INTRAMUSCULAR; INTRAVENOUS
Status: DISCONTINUED | OUTPATIENT
Start: 2020-12-11 | End: 2020-12-17

## 2020-12-11 RX ORDER — FENTANYL CITRATE 50 UG/ML
INJECTION, SOLUTION INTRAMUSCULAR; INTRAVENOUS
Status: COMPLETED
Start: 2020-12-11 | End: 2020-12-11

## 2020-12-11 RX ADMIN — MIDAZOLAM 2 MG: 1 INJECTION INTRAMUSCULAR; INTRAVENOUS at 11:15

## 2020-12-11 RX ADMIN — TICAGRELOR 90 MG: 90 TABLET ORAL at 17:29

## 2020-12-11 RX ADMIN — MIDAZOLAM 2 MG: 1 INJECTION INTRAMUSCULAR; INTRAVENOUS at 20:51

## 2020-12-11 RX ADMIN — MIDAZOLAM 2 MG: 1 INJECTION INTRAMUSCULAR; INTRAVENOUS at 20:17

## 2020-12-11 RX ADMIN — POTASSIUM CHLORIDE 10 MEQ: 7.46 INJECTION, SOLUTION INTRAVENOUS at 20:47

## 2020-12-11 RX ADMIN — FENTANYL CITRATE 100 MCG: 50 INJECTION, SOLUTION INTRAMUSCULAR; INTRAVENOUS at 03:24

## 2020-12-11 RX ADMIN — FENTANYL CITRATE 50 MCG: 50 INJECTION, SOLUTION INTRAMUSCULAR; INTRAVENOUS at 06:33

## 2020-12-11 RX ADMIN — MIDAZOLAM 2 MG: 1 INJECTION INTRAMUSCULAR; INTRAVENOUS at 03:15

## 2020-12-11 RX ADMIN — PIPERACILLIN AND TAZOBACTAM 3.38 G: 3; .375 INJECTION, POWDER, LYOPHILIZED, FOR SOLUTION INTRAVENOUS at 17:29

## 2020-12-11 RX ADMIN — PROPOFOL 40 MCG/KG/MIN: 10 INJECTION, EMULSION INTRAVENOUS at 19:39

## 2020-12-11 RX ADMIN — ACETAMINOPHEN 650 MG: 325 TABLET ORAL at 09:21

## 2020-12-11 RX ADMIN — MIDAZOLAM 2 MG: 1 INJECTION INTRAMUSCULAR; INTRAVENOUS at 05:29

## 2020-12-11 RX ADMIN — PROPOFOL 50 MCG/KG/MIN: 10 INJECTION, EMULSION INTRAVENOUS at 11:29

## 2020-12-11 RX ADMIN — PROPOFOL 40 MCG/KG/MIN: 10 INJECTION, EMULSION INTRAVENOUS at 03:35

## 2020-12-11 RX ADMIN — PIPERACILLIN AND TAZOBACTAM 3.38 G: 3; .375 INJECTION, POWDER, LYOPHILIZED, FOR SOLUTION INTRAVENOUS at 05:56

## 2020-12-11 RX ADMIN — FENTANYL CITRATE 50 MCG/HR: 50 INJECTION, SOLUTION INTRAMUSCULAR; INTRAVENOUS at 23:57

## 2020-12-11 RX ADMIN — MIDAZOLAM 2 MG: 1 INJECTION INTRAMUSCULAR; INTRAVENOUS at 15:55

## 2020-12-11 RX ADMIN — CARVEDILOL 6.25 MG: 6.25 TABLET, FILM COATED ORAL at 09:21

## 2020-12-11 RX ADMIN — TICAGRELOR 90 MG: 90 TABLET ORAL at 03:30

## 2020-12-11 RX ADMIN — INSULIN LISPRO 2 UNITS: 100 INJECTION, SOLUTION INTRAVENOUS; SUBCUTANEOUS at 06:05

## 2020-12-11 RX ADMIN — SODIUM CHLORIDE 40 MG: 9 INJECTION, SOLUTION INTRAMUSCULAR; INTRAVENOUS; SUBCUTANEOUS at 09:21

## 2020-12-11 RX ADMIN — PIPERACILLIN AND TAZOBACTAM 3.38 G: 3; .375 INJECTION, POWDER, LYOPHILIZED, FOR SOLUTION INTRAVENOUS at 23:24

## 2020-12-11 RX ADMIN — Medication 10 ML: at 15:55

## 2020-12-11 RX ADMIN — MUPIROCIN: 20 OINTMENT TOPICAL at 17:29

## 2020-12-11 RX ADMIN — PIPERACILLIN AND TAZOBACTAM 3.38 G: 3; .375 INJECTION, POWDER, LYOPHILIZED, FOR SOLUTION INTRAVENOUS at 11:15

## 2020-12-11 RX ADMIN — MIDAZOLAM 2 MG: 1 INJECTION INTRAMUSCULAR; INTRAVENOUS at 18:45

## 2020-12-11 RX ADMIN — FENTANYL CITRATE 100 MCG: 50 INJECTION, SOLUTION INTRAMUSCULAR; INTRAVENOUS at 21:22

## 2020-12-11 RX ADMIN — FENTANYL CITRATE 100 MCG: 50 INJECTION, SOLUTION INTRAMUSCULAR; INTRAVENOUS at 23:15

## 2020-12-11 RX ADMIN — FENTANYL CITRATE 100 MCG: 50 INJECTION, SOLUTION INTRAMUSCULAR; INTRAVENOUS at 20:32

## 2020-12-11 RX ADMIN — ROSUVASTATIN 40 MG: 20 TABLET, FILM COATED ORAL at 22:00

## 2020-12-11 RX ADMIN — ASPIRIN 81 MG CHEWABLE TABLET 81 MG: 81 TABLET CHEWABLE at 09:21

## 2020-12-11 RX ADMIN — MIDAZOLAM 2 MG: 1 INJECTION INTRAMUSCULAR; INTRAVENOUS at 22:02

## 2020-12-11 RX ADMIN — MUPIROCIN: 20 OINTMENT TOPICAL at 09:27

## 2020-12-11 RX ADMIN — FENTANYL CITRATE 100 MCG: 50 INJECTION, SOLUTION INTRAMUSCULAR; INTRAVENOUS at 11:07

## 2020-12-11 RX ADMIN — POTASSIUM CHLORIDE 10 MEQ: 7.46 INJECTION, SOLUTION INTRAVENOUS at 23:15

## 2020-12-11 RX ADMIN — PROPOFOL 40 MCG/KG/MIN: 10 INJECTION, EMULSION INTRAVENOUS at 02:54

## 2020-12-11 RX ADMIN — POTASSIUM CHLORIDE 10 MEQ: 7.46 INJECTION, SOLUTION INTRAVENOUS at 06:39

## 2020-12-11 RX ADMIN — HYDRALAZINE HYDROCHLORIDE 20 MG: 20 INJECTION INTRAMUSCULAR; INTRAVENOUS at 21:01

## 2020-12-11 RX ADMIN — CARVEDILOL 6.25 MG: 6.25 TABLET, FILM COATED ORAL at 20:17

## 2020-12-11 NOTE — PROGRESS NOTES
12/10/20 2026   Oxygen Therapy   O2 Sat (%) 99 %   Pulse via Oximetry 93 beats per minute   O2 Device Ventilator   Pre-Treatment   Breathing Pattern Regular   Breath Sounds Bilateral Coarse   Post-Treatment   Breathing Pattern Regular   Breath Sounds Bilateral Coarse   Pulse 92   SpO2 99 %   Respirations 16   Treatment Tolerance Passively   Procedures   $$ Initial Procedures Aerosol   Delivery Source In-line spacer   Aerosolized Medications   (tranexamic Acid)

## 2020-12-11 NOTE — PROGRESS NOTES
12/11/20 0327   Patient Observations   Pulse (Heart Rate) 88   Resp Rate 16   O2 Sat (%) 96 %   Airway - Endotracheal Tube 12/10/20 Oral   Placement Date/Time: 12/10/20 1400   Number of Attempts: 3;2  Location: Oral  Placement Verified: Auscultation; Chest x-ray  Airway Tube Size: 8 mm  Anesthesia ETT Insertion Depth: 23 cm  Anesthesia ETT Line Dejuan: Lips   Insertion Depth (cm) 25 cm   Line Dejuan Lips   Side Secured Device; Right   Cuff Pressure   (MOV)   Respiratory   Patient on Vent Yes - If patient is on vent, add Doc Flowsheet Ventilator (). Respiratory Pattern Regular   Chest/Tracheal Assessment Chest expansion, symmetrical   Airway Clearance   Suction ET Tube   Suction Device Inline suction catheter   Sputum Method Obtained Endotracheal   Sputum Amount Moderate   Sputum Color/Odor Bloody   Sputum Consistency Thick   Pulmonary Toilet H. O.B elevated;Cough and deep breath;Suction   Skin Integumentary   Skin Color Appropriate for ethnicity   Vent Settings   FIO2 (%) 60 %   SpO2/FIO2 Ratio 160   CMV Rate Set 16   Back-Up Rate 16   Vt Set (ml) 550 ml   PEEP/VENT (cm H2O) 14 cm H20   Insp Time (sec) 1 sec   Insp Rise Time % 50 %   Flow Trigger 3   Ventilator Measurements   Resp Rate Observed 16   Vt Exhaled (Machine Breath) (ml) 667 ml   Ve Observed (l/min) 10.6 l/min   PIP Observed (cm H2O) 27 cm H2O   MAP (cm H2O) 18   I:E Ratio Actual 1:2.8   Safety & Alarms   Backup Mode Checked/Apnea Yes   Pressure Max 40 cm H2O   Ve Min 2   Ve Max 20   Vt Min 100 ml   Vt Max 1000 ml   RR Max 40   Ambu Bag Yes   Ambu Mask Yes   Age Specific Ventilator Associated Pneumonia Bundle   Patient Age Group Adult   Vent Method/Mode   Ventilation Method Conventional   Ventilator Mode Assist control;VC+   $$ Ventilator Subsequent Subsequent Vent Day       Check completed and tolerated well. Patient found on above settings with the above results. No resp distress noted.

## 2020-12-11 NOTE — DIABETES MGMT
.Glycemic Control Plan of Care    T2DM with A1c of 5.9% (12/10/2020)  Home diabetes medications: Unverified  Januvia 100 mg daily  Glipizide SR 5 mg daily  Metformin 1000 mg 2 times daily with meals    Patient seen in CVT ICU. Intubated in cath lab. Noted the following assessment:  Respiratory failure, vent, sedated  Pulm edema  CAD status post PCI with LIZ on 12/10/2020    Glycemic recommendation(s): sliding scale lispro insulin as ordered    Glycemic assessment:    POC BG range on 12/10: 169-291  POC BG 12/11 at time of review: 165, 154  Lab FBG 12/11: 145        Current A1C: 5.9% (12/10/2020) which is equivalent to estimated average blood glucose of 117 mg/dL during the past 2-3 months    Current hospital diabetes medications:  Correctional lispro insulin every 6 hours.  Normal sensitivity dose    Total daily dose insulin requirement previous day: 12/10:  Lispro: 4 units    Diet: NPO    Goals:  Blood glucose will be within target range of  mg/dL by 12/14/2020    Education:  ___  Refer to Diabetes Education Record             __X_  Education not indicated at this time    Sara Dang RN [facility]  Pager: [phone]

## 2020-12-11 NOTE — PROGRESS NOTES
Results for Wilbur Schwab (MRN [id number]) as of 12/10/2020 22:54   Ref. Range 12/10/2020 22:32   pH (POC) Latest Ref Range: 7.35 - 7.45   7.46 (H)   pCO2 (POC) Latest Ref Range: 35.0 - 45.0 MMHG 30.7 (L)   pO2 (POC) Latest Ref Range: 80 - 100 MMHG 241 (H)   HCO3 (POC) Latest Ref Range: 22 - 26 MMOL/L 22.0   sO2 (POC) Latest Ref Range: 92 - 97 % 100 (H)   Base deficit (POC) Latest Units: mmol/L 2   FIO2 (POC) Latest Units: % 90   Patient temp. Latest Units:   98.5   Specimen type (POC) Latest Units:   ARTERIAL   Set Rate Latest Units: bpm 16   Site Latest Units:   DRAWN FROM ARTERIAL LINE   Device: Latest Units:   VENT   Total resp.  rate Latest Units:   16   Mode Latest Units:   ASSIST CONTROL   Tidal volume Latest Units: ml 668   PEEP/CPAP (POC) Latest Units: cmH2O 14   Allens test (POC) Latest Units:   N/A   Inspiratory Time Latest Units: sec 0.88     FiO2 dropped to 60%

## 2020-12-11 NOTE — PROGRESS NOTES
ET tube advanced 2 cm to 25 @ the lip; per Khloe Frames. Suctioned out mouth prior to advancement. Someone put tape on the ET tube so I had to remove the bite block. Bilateral breath sounds present, RN at bedside assisting.   Pt tolerated well

## 2020-12-11 NOTE — CONSULTS
[facility]  Pulmonary, Critical Care, and Sleep Medicine    Name: Zeke Elizabeth MRN: [id number]   : 1950 Hospital: [facility]   Date: 12/10/2020        Pulmonary Medicine: Critical Care Initial Patient Consult      IMPRESSION:   · Respiratory Failure: Hypoxic: pulmonary edema and hemoptysis  · Hemoptysis: in the setting of antiplatlet therapy post cath and traumatic ETT tube echange and pulmonary edema  · CAD: S/P PCI, LAD for ongoing unstable angina  · Probable aspiration  · Hypotension: possible SIRs response, sedation. Shock cardiogenic and/or distributive- clinical picture still evolving- currently improved  · Hypokalemia- replacing  · Diabetes  · GERD  · Obesity: Body mass index is 28.48 kg/m². ·       RECOMMENDATIONS:   NEURO  · Keep patient sedated- propofol and PRN versed OK  · RASS goal -3 tongih  ·   CV  · MAP goal >64  · Wean IV pressor as able  · Diuresis  Per cardiology  · Integrelin and antiplatlet/anticoagulation therapy per Cardiology  PULM   · ETT is high-Keep patient sedated- want to avoid further airway trauma and/or bleeding- can advance later tonight if needed  · VAP bundle  · AC/VC+  · Gentle sution- monitor for rebleeding  · Have chilled sterile saline available if needed  · SpO2 goal >92%  · Tranexamic nebs if needed    GI  · NPO  · OGT- clamped per cardiology protocol  · Protonix- SUP    RENAL/Metabolic  · Electrolyte replacement protocol  · Boss  · Strict I&Os  ENDO  · Glycemic control per protocol and hospital team  ID  · Start Zosyn  · Monitor for signs of evolving SIRs/Aspiration pneumonitis and/or pneumonia  HEME  · Type and cross  · Trend CBC nd indicies  · Check H/H tonight with follow up electrolyte check after potassium I repleasted    Clinical picture still evolving. Further recommendations pending clinical course. I have dicussed case with Cardiology staff, hospitalist, RT, ICU nurse and on-call ICU team this evening. Subjective/History: This patient has been seen and evaluated at the request of Dr. Alejandra Lee for respiratory failure and hemoptysis. Patient is a 79 y.o. male with history of CAD, DM. Patient went to cath lab today for repeat cath due to unstable angina. PCI with LAD stent placed. Patient went into pulmonary edema. Anesthesia called to cath lab and patient intubated with 6.5 EET. Randene Hodgkins  made later in the case to change out to larger tube. ETT exchanged over Boogie which followed by hemoptysis. Suspect combination of local airway trauma with patient on antiplatelet therapy. I was called STAT to CVICU with report of hemoptysis and SpO2 into the 70-80's. Upon my arrival to the ICU, the patient was partially sedated, chomping on ETT, dark blood in ETT and circuit. Patient also coughing up fresher blood. On Fio2 100 and PEEP 14. AC/PC. Boss in placed and actively diruresing. Patient sedated with some modest improvement in SpO2 into the 80's. Initial Po2 in the mid to high 40's. Airway suctioned with some improvement. Patient with hypotension after sedation. Patient given dose if IV pressor with significant hypertension requiring starting Nitroglycerin drip and propofol which was weaned fairly quickly. Hypotension returned requiring low dose Nor Epi drip via 20G right AC with good blood return. Due to level of anticoagulation/platelet therapy opted not to pursue central line. Arterial sheath in right groin which we are using to transduce IBP monitoring. Bedside POCUS with bilateral sliding lung sign and b-lines    CXR with bilateral pumonary infiltrates, ETT , OGT in place and no findings on pneumothroax. ETT is a bit high but want to avoid further airway trauma so will hold on advancing at this time    The patient has been evaluated throughout the evening. Overall clinical status has been consistently improving. SpO2 has gradually improved into the high 90's.  Vent changed from AC/PC to Changed over the AC/VC+     Patient is awake and moving all extremities, He is a bit restless and will need a bit more sedation at this time    Good UOP. Currently off IV pressors.     Most recent Hb 10 will continue to trend for now    OGT currently clamped due to [address]      The patient is critically ill and can not provide additional history due to Unconsciousness and Ventilated.       Patient Vitals for the past 12 hrs:   Temp Pulse Resp BP SpO2   12/10/20 2005  90  120/61    12/10/20 1930 98.1 °F (36.7 °C) 85 16 125/64 100 %   12/10/20 1925  85 16  100 %   12/10/20 1920  85 16  100 %   12/10/20 1915  85 16  100 %   12/10/20 1910  87 16  100 %   12/10/20 1905  87 16  100 %   12/10/20 1900  88 16 99/63 99 %   12/10/20 1855  87 16  99 %   12/10/20 1850  90 16  98 %   12/10/20 1845  89 16  98 %   12/10/20 1840  89 21  98 %   12/10/20 1835  91 19  96 %   12/10/20 1830  94 23 125/67 97 %   12/10/20 1825  98 23  100 %   12/10/20 1820  86 17  99 %   12/10/20 1815  88 20 118/80 99 %   12/10/20 1810  86 15  99 %   12/10/20 1805  86 (!) 0  99 %   12/10/20 1800  86 (!) 6 97/68 98 %   12/10/20 1755  88 (!) 0  97 %   12/10/20 1750  89 (!) 0  96 %   12/10/20 1745  90 16 (!) 89/62 95 %   12/10/20 1740  90 18  93 %   12/10/20 1735  92 20  93 %   12/10/20 1730  95 21 128/74 92 %   12/10/20 1725  91 17  94 %   12/10/20 1720  94 19  92 %   12/10/20 1715  94 22 94/66 92 %   12/10/20 1710  96 21  93 %   12/10/20 1705  89 20  90 %   12/10/20 1700  85 23 (!) 88/61 91 %   12/10/20 1655  87 20  90 %   12/10/20 1650  88 21  92 %   12/10/20 1645  87 19 (!) 88/61 92 %   12/10/20 1640  95 20  (!) 88 %   12/10/20 1639  (!) 109 22  (!) 83 %   12/10/20 1630  93 18 (!) 156/88 (!) 81 %   12/10/20 1619  61 16 (!) 193/107 92 %   12/10/20 1615  93 23 (!) 85/57 (!) 89 %   12/10/20 1610  (!) 104 27 91/65 (!) 88 %   12/10/20 1600  (!) 108 23 (!) 141/73 (!) 81 %   12/10/20 1557  (!) 104 18  (!) 81 %   12/10/20 1554  (!) 103 22 126/71 (!) 81 %   12/10/20 1551  (!) 108 27 137/78 (!) 87 %   12/10/20 1547  (!) 116 23 (!) 153/76 97 %   12/10/20 1545  (!) 103 23  (!) 87 %   12/10/20 1200 98 °F (36.7 °C) 81 27 107/63 95 %   12/10/20 1100  79 17 (!) 94/47 96 %   12/10/20 1000  94 23 (!) 120/50 95 %   12/10/20 0900  88 17 96/66 94 %          Past Medical History:   Diagnosis Date    Arthritis     1999 vicodin    Diabetes ([address] 1998 metformin    GERD (gastroesophageal reflux disease)     Hypercholesterolemia     Hypertension 1996 norvasc    Kidney stones     MI [facility]myocardial infarction[facility])       Past Surgical History:   Procedure Laterality Date    CARDIAC SURG PROCEDURE UNLIST  12/2017    stints    COLONOSCOPY N/A 11/2/2018    COLONOSCOPY with Polypectomies performed by Mike Franz MD at [facility] ENDOSCOPY    HX CHOLECYSTECTOMY      HX GI  2010    gallbladder      Prior to Admission medications    Medication Sig Start Date End Date Taking? Authorizing Provider   carvediloL (COREG) 25 mg tablet TAKE 1 TABLET BY MOUTH TWICE A DAY WITH FOOD 10/30/20  Yes Mini Tinajero NP   Januvia 100 mg tablet TAKE 1 TABLET BY MOUTH EVERY DAY 10/2/20  Yes Yvette Che MD   glipiZIDE SR (GLUCOTROL XL) 5 mg CR tablet TAKE 1 TABLET BY MOUTH EVERY DAY 8/3/20  Yes Yvette Che MD   omeprazole (PRILOSEC) 20 mg capsule TAKE 1 CAPSULE BY MOUTH EVERY DAY 8/3/20  Yes Yvette Che MD   amLODIPine (NORVASC) 10 mg tablet Take 1 Tab by mouth daily. 7/20/20  Yes Mini Tinajero NP   rosuvastatin (CRESTOR) 10 mg tablet Take 1 Tab by mouth daily. 7/20/20  Yes Mini Tinajero NP   metFORMIN (GLUCOPHAGE) 1,000 mg tablet TAKE 1 TABLET BY MOUTH TWICE A DAY WITH MEALS 7/16/20  Yes Yvette Che MD   loratadine (Claritin) 10 mg tablet Take 10 mg by mouth daily as needed. Yes Provider, Historical   ramipril (ALTACE) 10 mg capsule Take 1 Cap by mouth daily.  2/27/20  Yes Tamra Mantilla NP aspirin delayed-release 325 mg tablet Take 1 Tab by mouth daily. 2/27/20  Yes Coby Mantilla, NP   clopidogrel (PLAVIX) 75 mg tab TAKE 1 TABLET BY MOUTH EVERY DAY 1/6/20  Yes Coby Mantilla, NP   nitroglycerin (NITROSTAT) 0.4 mg SL tablet 1 Tab by SubLINGual route every five (5) minutes as needed for Chest Pain for up to 3 doses. 12/2/17  Yes Maida Vallecillo MD   Blood Pressure Monitor kit Check once a day 5/12/20   Josep Villa MD     Current Facility-Administered Medications   Medication Dose Route Frequency    ceFAZolin in dextrose (iso-os) 2 gram/50 mL IVPB pgbk        sodium chloride (NS) flush 5-40 mL  5-40 mL IntraVENous Q8H    [START ON 12/11/2020] ticagrelor (BRILINTA) tablet 90 mg  90 mg Oral BID    eptifibatide (INTEGRILIN) 0.75 mg/mL infusion  1 mcg/kg/min IntraVENous CONTINUOUS    nitroglycerin in 5 % dextrose (TRIDIL) 100 mg/250 mL (400 mcg/mL) infusion        magnesium sulfate 2 g/50 ml 2 gram/50 mL (4 %) IVPB premix pgbk        piperacillin-tazobactam (ZOSYN) 3.375 g in 0.9% sodium chloride (MBP/ADV) 100 mL MBP  3.375 g IntraVENous Q6H    NOREPINephrine (LEVOPHED) 8 mg in 5% dextrose 250mL (32 mcg/mL) infusion  0.5-50 mcg/min IntraVENous TITRATE    propofol (DIPRIVAN) 10 mg/mL infusion  0-50 mcg/kg/min IntraVENous TITRATE    potassium chloride 10 mEq in 100 ml IVPB  10 mEq IntraVENous Q1H    sodium chloride (NS) flush 5-40 mL  5-40 mL IntraVENous Q8H    rosuvastatin (CRESTOR) tablet 40 mg  40 mg Oral QHS    [START ON 12/14/2020] ceFAZolin (ANCEF) 2g IVPB in 50 mL D5W  2 g IntraVENous ONCE    mupirocin (BACTROBAN) 2 % ointment   Both Nostrils BID    insulin lispro (HUMALOG) injection   SubCUTAneous AC&HS    carvediloL (COREG) tablet 6.25 mg  6.25 mg Oral Q12H    aspirin chewable tablet 81 mg  81 mg Oral DAILY     No Known Allergies   Social History     Tobacco Use    Smoking status: Never Smoker    Smokeless tobacco: Never Used   Substance Use Topics    Alcohol use:  No Family History   Problem Relation Age of Onset   Platinum.Jurist Stroke Mother     Headache Mother     Hypertension Mother    Pallavi Im Mother     Heart Disease Father     Heart Attack Father     Hypertension Father     Diabetes Father     Lung Cancer Father     Ovarian Cancer Sister     Heart Attack Brother     Breast Cancer Sister     Diabetes Sister     Cancer Maternal Aunt         lung cancer    Cancer Maternal Uncle     Cancer Paternal Aunt     Cancer Paternal Uncle         Review of Systems:  Review of systems not obtained due to patient factors. Objective:   Vital Signs:    Visit Vitals  BP 99/63   Pulse 85   Temp 98 °F (36.7 °C)   Resp 16   Ht 6' (1.829 m)   Wt 95.3 kg (210 lb)   SpO2 100%   BMI 28.48 kg/m²       O2 Device: Ventilator   O2 Flow Rate (L/min): 2 l/min   Temp (24hrs), Av.1 °F (36.7 °C), Min:98 °F (36.7 °C), Max:98.2 °F (36.8 °C)       Intake/Output:   Last shift:      No intake/output data recorded. Last 3 shifts:  07 - 12/10 190  In: 2908.3 [P.O.:930; I.V.:1978.3]  Out: 1500 [Urine:1500]    Intake/Output Summary (Last 24 hours) at 12/10/2020 1934  Last data filed at 12/10/2020 1300  Gross per 24 hour   Intake 1409.78 ml   Output 950 ml   Net 459.78 ml         Ventilator Settings:  Mode Rate Tidal Volume Pressure FiO2 PEEP   Assist control, VC+   550 ml    100 % 14 cm H20     Peak airway pressure: 33 cm H2O    Minute ventilation: 12 l/min           Physical Exam:    General:  Awake, restless, intubated cooperative   Head:  Normocephalic, without obvious abnormality, atraumatic. Eyes:  Conjunctivae/corneas clear. PERRL, EOMs intact. Nose: Nares normal. Septum midline. Mucosa normal. No drainage or sinus tenderness.    Throat: Lips, mucosa,normal, ETT in place- thin, watery, dark blood in vent circuit- circuit has been changed out several times this evening   Neck: Supple, symmetrical, trachea midline, no adenopathy, thyroid: no enlargment/tenderness/nodules   Back: Symmetric   Lungs:   Coarse breath sounds on the vent   Chest wall:  No tenderness or deformity. Heart:  Regular rate and rhythm, S1, S2 normal, no murmur, click, rub or gallop. Abdomen:   Soft, non-tender. Bowel sounds normal. No masses,  No organomegaly. Extremities: Extremities normal, atraumatic, no cyanosis or edema. Pulses: 2+ and symmetric all extremities.    Skin: Skin color, texture, turgor normal. No rashes or lesions   Lymph nodes:      Cervical, supraclavicular, and axillary nodes normal.   Neurologic: Grossly nonfocal       Data:     Recent Results (from the past 24 hour(s))   GLUCOSE, POC    Collection Time: 12/09/20  8:38 PM   Result Value Ref Range    Glucose (POC) 170 (H) 70 - 110 mg/dL   PTT    Collection Time: 12/09/20  8:55 PM   Result Value Ref Range    aPTT 91.2 (H) 23.0 - 36.4 SEC   CARDIAC PANEL,(CK, CKMB & TROPONIN)    Collection Time: 12/09/20  8:55 PM   Result Value Ref Range    CK - MB <1.0 <3.6 ng/ml    CK-MB Index  0.0 - 4.0 %     CALCULATION NOT PERFORMED WHEN RESULT IS BELOW LINEAR LIMIT    CK 75 39 - 308 U/L    Troponin-I, QT 0.03 0.0 - 0.045 NG/ML   EKG, 12 LEAD, SUBSEQUENT    Collection Time: 12/10/20 12:24 AM   Result Value Ref Range    Ventricular Rate 89 BPM    Atrial Rate 89 BPM    P-R Interval 160 ms    QRS Duration 128 ms    Q-T Interval 410 ms    QTC Calculation (Bezet) 498 ms    Calculated P Axis 14 degrees    Calculated R Axis 22 degrees    Calculated T Axis 127 degrees    Diagnosis       Normal sinus rhythm  Left bundle branch block  Abnormal ECG  When compared with ECG of 09-DEC-2020 14:38,  No significant change was found  Confirmed by Parviz Porter ([id number]) on 12/10/2020 10:29:37 AM     PTT    Collection Time: 12/10/20  3:00 AM   Result Value Ref Range    aPTT 47.4 (H) 23.0 - 36.4 SEC   CARDIAC PANEL,(CK, CKMB & TROPONIN)    Collection Time: 12/10/20  3:00 AM   Result Value Ref Range    CK - MB 1.1 <3.6 ng/ml    CK-MB Index 1.4 0.0 - 4.0 %    CK 80 39 - 308 U/L Troponin-I, QT 0.12 (H) 0.0 - 8.860 NG/ML   METABOLIC PANEL, BASIC    Collection Time: 12/10/20  3:00 AM   Result Value Ref Range    Sodium 138 136 - 145 mmol/L    Potassium 3.9 3.5 - 5.5 mmol/L    Chloride 109 100 - 111 mmol/L    CO2 22 21 - 32 mmol/L    Anion gap 7 3.0 - 18 mmol/L    Glucose 161 (H) 74 - 99 mg/dL    BUN 14 7.0 - 18 MG/DL    Creatinine 1.00 0.6 - 1.3 MG/DL    BUN/Creatinine ratio 14 12 - 20      GFR est AA >60 >60 ml/min/1.73m2    GFR est non-AA >60 >60 ml/min/1.73m2    Calcium 7.9 (L) 8.5 - 10.1 MG/DL   CBC WITH AUTOMATED DIFF    Collection Time: 12/10/20  3:00 AM   Result Value Ref Range    WBC 9.7 4.6 - 13.2 K/uL    RBC 3.76 (L) 4.70 - 5.50 M/uL    HGB 11.5 (L) 13.0 - 16.0 g/dL    HCT 33.1 (L) 36.0 - 48.0 %    MCV 88.0 74.0 - 97.0 FL    MCH 30.6 24.0 - 34.0 PG    MCHC 34.7 31.0 - 37.0 g/dL    RDW 14.1 11.6 - 14.5 %    PLATELET 828 596 - 169 K/uL    MPV 10.0 9.2 - 11.8 FL    NEUTROPHILS 70 40 - 73 %    LYMPHOCYTES 17 (L) 21 - 52 %    MONOCYTES 12 (H) 3 - 10 %    EOSINOPHILS 1 0 - 5 %    BASOPHILS 0 0 - 2 %    ABS. NEUTROPHILS 6.7 1.8 - 8.0 K/UL    ABS. LYMPHOCYTES 1.7 0.9 - 3.6 K/UL    ABS. MONOCYTES 1.2 0.05 - 1.2 K/UL    ABS. EOSINOPHILS 0.1 0.0 - 0.4 K/UL    ABS. BASOPHILS 0.0 0.0 - 0.1 K/UL    DF AUTOMATED     PROTHROMBIN TIME + INR    Collection Time: 12/10/20  3:00 AM   Result Value Ref Range    Prothrombin time 13.7 11.5 - 15.2 sec    INR 1.1 0.8 - 1.2     HEMOGLOBIN A1C W/O EAG    Collection Time: 12/10/20  3:00 AM   Result Value Ref Range    Hemoglobin A1c 5.9 (H) 4.2 - 5.6 %   HEPATIC FUNCTION PANEL    Collection Time: 12/10/20  3:00 AM   Result Value Ref Range    Protein, total 6.1 (L) 6.4 - 8.2 g/dL    Albumin 3.1 (L) 3.4 - 5.0 g/dL    Globulin 3.0 2.0 - 4.0 g/dL    A-G Ratio 1.0 0.8 - 1.7      Bilirubin, total 0.4 0.2 - 1.0 MG/DL    Bilirubin, direct 0.1 0.0 - 0.2 MG/DL    Alk.  phosphatase 55 45 - 117 U/L    AST (SGOT) 9 (L) 10 - 38 U/L    ALT (SGPT) 15 (L) 16 - 61 U/L   MAGNESIUM Collection Time: 12/10/20  3:00 AM   Result Value Ref Range    Magnesium 1.8 1.6 - 2.6 mg/dL   GLUCOSE, POC    Collection Time: 12/10/20  7:02 AM   Result Value Ref Range    Glucose (POC) 177 (H) 70 - 110 mg/dL   DUPLEX CAROTID BILATERAL    Collection Time: 12/10/20  8:47 AM   Result Value Ref Range    Right subclavian sys 72.9 cm/s    RIGHT SUBCLAVIAN ARTERY D 0.00 cm/s    Right cca dist sys 99.9 cm/s    Right CCA dist allen 21.7 cm/s    RIGHT COMMON CAROTID ARTERY MID S 96.00 cm/s    RIGHT COMMON CAROTID ARTERY MID D 17.74 cm/s    Right CCA prox sys 91.2 cm/s    Right CCA prox allen 14.2 cm/s    Right eca sys 153.0 cm/s    RIGHT EXTERNAL CAROTID ARTERY D 7.10 cm/s    Right ICA dist sys 94.7 cm/s    Right ICA dist allen 26.8 cm/s    Right ICA mid sys 118.9 cm/s    Right ICA mid allen 31.7 cm/s    Right ICA prox sys 86.6 cm/s    Right ICA prox allen 39.7 cm/s    Right vertebral sys 53.7 cm/s    RIGHT VERTEBRAL ARTERY D 21.46 cm/s    Right ICA/CCA sys 1.2     Left subclavian sys 85.0 cm/s    LEFT SUBCLAVIAN ARTERY D 0.00 cm/s    Left CCA dist sys 109.2 cm/s    Left CCA dist allen 22.0 cm/s    LEFT COMMON CAROTID ARTERY MID S 104.33 cm/s    LEFT COMMON CAROTID ARTERY MID D 20.35 cm/s    Left CCA prox sys 104.3 cm/s    Left CCA prox allen 22.0 cm/s    Left ECA sys 230.1 cm/s    LEFT EXTERNAL CAROTID ARTERY D 12.81 cm/s    Left ICA dist sys 93.0 cm/s    Left ICA dist allen 25.3 cm/s    Left ICA mid sys 105.2 cm/s    Left ICA mid allen 28.8 cm/s    Left ICA prox sys 171.6 cm/s    Left ICA prox allen 40.7 cm/s    Left vertebral sys 61.6 cm/s    LEFT VERTEBRAL ARTERY D 21.48 cm/s    Left ICA/CCA sys 1.57    PTT    Collection Time: 12/10/20  9:50 AM   Result Value Ref Range    aPTT 103.3 (H) 23.0 - 36.4 SEC   GLUCOSE, POC    Collection Time: 12/10/20 11:45 AM   Result Value Ref Range    Glucose (POC) 169 (H) 70 - 110 mg/dL   POC G3    Collection Time: 12/10/20  3:00 PM   Result Value Ref Range    Device: VENT      FIO2 (POC) 100 % pH (POC) 7.17 (LL) 7.35 - 7.45      pCO2 (POC) 57.3 (H) 35.0 - 45.0 MMHG    pO2 (POC) 43 (LL) 80 - 100 MMHG    HCO3 (POC) 20.7 (L) 22 - 26 MMOL/L    sO2 (POC) 64 (L) 92 - 97 %    Base deficit (POC) 8 mmol/L    Allens test (POC) N/A      Total resp. rate 20      Site DRAWN FROM ARTERIAL LINE      Specimen type (POC) ARTERIAL      Performed by Ted Lacey    POC CG8I    Collection Time: 12/10/20  4:02 PM   Result Value Ref Range    Device: VENT      FIO2 (POC) 100 %    pH (POC) 7.31 (L) 7.35 - 7.45      pCO2 (POC) 41.8 35.0 - 45.0 MMHG    pO2 (POC) 49 (LL) 80 - 100 MMHG    HCO3 (POC) 21.1 (L) 22 - 26 MMOL/L    sO2 (POC) 80 (L) 92 - 97 %    Base deficit (POC) 5 mmol/L    Mode ASSIST CONTROL      Tidal volume 608 ml    Set Rate 16 bpm    PEEP/CPAP (POC) 12 cmH2O    PIP (POC) 34      Allens test (POC) N/A      Inspiratory Time 0.88 sec    Total resp. rate 26      Site DRAWN FROM ARTERIAL LINE      Patient temp. 98.4      Specimen type (POC) ARTERIAL      Sodium,  136 - 145 MMOL/L    Potassium, POC 3.7 3.5 - 5.5 MMOL/L    Glucose,  (H) 74 - 106 MG/DL    Calcium, ionized (POC) 1.15 1.12 - 1.32 mmol/L    Hematocrit, POC 36 36 - 49 %    Hemoglobin, POC 12.2 12 - 16 G/DL    Performed by Diamond Jimenez    TYPE & SCREEN    Collection Time: 12/10/20  4:15 PM   Result Value Ref Range    Crossmatch Expiration 12/13/2020,2359     ABO/Rh(D) AB POSITIVE     Antibody screen NEG    CBC WITH AUTOMATED DIFF    Collection Time: 12/10/20  4:25 PM   Result Value Ref Range    WBC 13.5 (H) 4.6 - 13.2 K/uL    RBC 3.21 (L) 4.70 - 5.50 M/uL    HGB 10.1 (L) 13.0 - 16.0 g/dL    HCT 28.3 (L) 36.0 - 48.0 %    MCV 88.2 74.0 - 97.0 FL    MCH 31.5 24.0 - 34.0 PG    MCHC 35.7 31.0 - 37.0 g/dL    RDW 14.0 11.6 - 14.5 %    PLATELET 562 547 - 933 K/uL    MPV 10.3 9.2 - 11.8 FL    NEUTROPHILS 77 (H) 40 - 73 %    LYMPHOCYTES 16 (L) 21 - 52 %    MONOCYTES 7 3 - 10 %    EOSINOPHILS 0 0 - 5 %    BASOPHILS 0 0 - 2 %    ABS.  NEUTROPHILS 10.3 (H) 1.8 - 8.0 K/UL    ABS. LYMPHOCYTES 2.2 0.9 - 3.6 K/UL    ABS. MONOCYTES 0.9 0.05 - 1.2 K/UL    ABS. EOSINOPHILS 0.1 0.0 - 0.4 K/UL    ABS. BASOPHILS 0.0 0.0 - 0.1 K/UL    DF AUTOMATED     METABOLIC PANEL, BASIC    Collection Time: 12/10/20  4:25 PM   Result Value Ref Range    Sodium 142 136 - 145 mmol/L    Potassium 3.3 (L) 3.5 - 5.5 mmol/L    Chloride 114 (H) 100 - 111 mmol/L    CO2 17 (L) 21 - 32 mmol/L    Anion gap 11 3.0 - 18 mmol/L    Glucose 230 (H) 74 - 99 mg/dL    BUN 10 7.0 - 18 MG/DL    Creatinine 0.76 0.6 - 1.3 MG/DL    BUN/Creatinine ratio 13 12 - 20      GFR est AA >60 >60 ml/min/1.73m2    GFR est non-AA >60 >60 ml/min/1.73m2    Calcium 6.4 (L) 8.5 - 10.1 MG/DL   PTT    Collection Time: 12/10/20  4:25 PM   Result Value Ref Range    aPTT 67.9 (H) 23.0 - 36.4 SEC   PROTHROMBIN TIME + INR    Collection Time: 12/10/20  4:25 PM   Result Value Ref Range    Prothrombin time 13.4 11.5 - 15.2 sec    INR 1.0 0.8 - 1.2     MAGNESIUM    Collection Time: 12/10/20  4:25 PM   Result Value Ref Range    Magnesium 1.5 (L) 1.6 - 2.6 mg/dL   PHOSPHORUS    Collection Time: 12/10/20  4:25 PM   Result Value Ref Range    Phosphorus 2.7 2.5 - 4.9 MG/DL   POC G3    Collection Time: 12/10/20  4:35 PM   Result Value Ref Range    Device: VENT      FIO2 (POC) 100 %    pH (POC) 7.34 (L) 7.35 - 7.45      pCO2 (POC) 37.1 35.0 - 45.0 MMHG    pO2 (POC) 47 (LL) 80 - 100 MMHG    HCO3 (POC) 19.9 (L) 22 - 26 MMOL/L    sO2 (POC) 80 (L) 92 - 97 %    Base deficit (POC) 6 mmol/L    Mode ASSIST CONTROL      Set Rate 16 bpm    PEEP/CPAP (POC) 14 cmH2O    Allens test (POC) N/A      Total resp.  rate 19      Site DRAWN FROM ARTERIAL LINE      Specimen type (POC) ARTERIAL      Performed by Adriana Velazquez    POC CG8I    Collection Time: 12/10/20  5:17 PM   Result Value Ref Range    Device: VENT      FIO2 (POC) 100 %    pH (POC) 7.37 7.35 - 7.45      pCO2 (POC) 31.9 (L) 35.0 - 45.0 MMHG    pO2 (POC) 60 (L) 80 - 100 MMHG    HCO3 (POC) 18.6 (L) 22 - 26 MMOL/L    sO2 (POC) 91 (L) 92 - 97 %    Base deficit (POC) 7 mmol/L    Mode ASSIST CONTROL      Tidal volume 702 ml    Set Rate 22 bpm    PEEP/CPAP (POC) 14 cmH2O    PIP (POC) 28      Allens test (POC) N/A      Inspiratory Time 0.88 sec    Total resp. rate 19      Site DRAWN FROM ARTERIAL LINE      Patient temp. 97.0      Specimen type (POC) ARTERIAL      Sodium,  136 - 145 MMOL/L    Potassium, POC 3.6 3.5 - 5.5 MMOL/L    Glucose,  (H) 74 - 106 MG/DL    Calcium, ionized (POC) 1.10 (L) 1.12 - 1.32 mmol/L    Hematocrit, POC 34 (L) 36 - 49 %    Hemoglobin, POC 11.6 (L) 12 - 16 G/DL    Performed by Leandro Perkins            Lab Results   Component Value Date/Time    TSH 1.33 11/23/2020 07:34 AM    T4, Free 1.0 01/30/2019 07:32 AM       Recent Labs     12/10/20  1717 12/10/20  1635 12/10/20  1602   FIO2I 100 100 100   HCO3I 18.6* 19.9* 21.1*   PCO2I 31.9* 37.1 41.8   PHI 7.37 7.34* 7.31*   PO2I 60* 47* 49*     12/08/20   ECHO ADULT COMPLETE 12/09/2020 12/9/2020    Narrative · Contrast used: DEFINITY. · LV: Calculated LVEF is 55%. Visually measured ejection fraction. Normal   cavity size and systolic function (ejection fraction normal). Mild   concentric hypertrophy. Age-appropriate left ventricular diastolic   function. · PA: Pulmonary arterial systolic pressure is 26 mmHg. · Aorta: Dilated aortic root; diameter is 3.7 cm. Signed by: Emily Villanueva MD       Telemetry:normal sinus rhythm/ sinus tachycardia      Imaging:  I have personally reviewed the patients radiographs and have reviewed the reports:  CXR Results  (Last 48 hours)    None        CT Results  (Last 48 hours)    None           Best practice :  Core measures:    Glycemic control  [facility]. Ventilated patients- aim to keep peak plateau pressure 79-82OZ H2O.   Sress ulcer prophylaxis  DVT prophylaxis       Boss Bundle Followed and Vent Bundle Followed, Vent Day 1        Critical Care Time:  The services I provided to this patient were to treat and/or prevent clinically significant deterioration that could result in the failure of one or more body systems and/or organ systems due to respiratory distress, hypoxia, cardiac dysrhythmia. Services included the following:  -reviewing nursing notes and old charts  -vital sign assessments- reassessed BP at bedside  -direct patient care  -medication orders and management  -interpreting and reviewing diagnostic studies/labs  -re-evaluations  -documentation time  -Spoke with ICU nurse and Cardiology     Aggregate critical care time was 90   minutes, which includes only time during which I was engaged in work directly related to the patient's care as described above. During this entire length of time I was immediately available to the patient. The reason for providing this level of medical care for this critically ill patient was due a critical illness that impaired one or more vital organ systems such that there was a high probability of imminent or life threatening deterioration in the patients condition. This care involved high complexity decision making to assess, manipulate, and support vital system functions, to treat this degreee vital organ system failure and to prevent further life threatening deterioration of the patients condition      Complex decision making was made in the evaluation and management plans during this consultation. More than 50% of time was spent in counseling and coordination of care including review of data and discussion with other team members.     Li Canales, DO

## 2020-12-11 NOTE — CONSULTS
[facility]  Pulmonary, Critical Care, and Sleep Medicine    Name: Slime Balderas MRN: [id number]   : 1950 Hospital: [facility]   Date: 2020        Pulmonary Medicine: Critical Care Initial Patient Consult      IMPRESSION:   · Respiratory Failure: Hypoxic: pulmonary edema and hemoptysis- improved today  · Hemoptysis: in the setting of antiplatlet therapy post cath and traumatic ETT tube echange and pulmonary edema- appears resolved at this time- Remains on aggressive antiplatelet therapy due to LIZ placement  · CAD: S/P PCI with LIZ 12/10/20-, LAD for ongoing unstable angina  · Probable aspiration  · Hypotension: possible SIRs response, sedation. Shock cardiogenic and/or distributive- clinical picture still evolving- currently improved  · Hypokalemia- replacing  · Diabetes  · GERD  · Obesity: Body mass index is 30.87 kg/m². ·       RECOMMENDATIONS:   NEURO  · Keep patient sedated- propofol and PRN versed OK  · RASS goal 0 to-1  ·   CV  · MAP goal >64  · Currently of IV Pressors  · Diuresis  Per cardiology  · Integrelin and antiplatlet/anticoagulation therapy and cardiac care per Cardiology  PULM     · AC/VC+: Wean vent setting today  · Continue intubation for 24 hours for airway protection. · Check Cuff leak today  · VAP bundle  · Gentle airway suction- monitor for rebleeding  · Have chilled sterile saline available if needed  · SpO2 goal >92%  · Tranexamic nebs ordered if needed    GI  · NPO  · OGT- clamped per cardiology protocol for  Brilinta therapy  · Protonix- SUP  · Spoke with nutrition regarding recs    RENAL/Metabolic  · Electrolyte replacement protocol  · Gera  · Strict I&Os  ENDO  · Glycemic control per protocol and hospital team  ID  · Start Zosyn  · Monitor for signs of evolving SIRs/Aspiration pneumonitis and/or pneumonia  HEME  · Type and cross  · Trend CBC nd indicies          Subjective/History:      This patient has been seen and evaluated at the request of Dr. Hammad Lemons for respiratory failure and hemoptysis. Patient is a 79 y.o. male with history of CAD, DM. Patient went to cath lab today for repeat cath due to unstable angina. PCI with LAD stent placed. Patient went into pulmonary edema. Anesthesia called to cath lab and patient intubated with 6.5 EET. Bridger Mendoza  made later in the case to change out to larger tube. ETT exchanged over Boogie which followed by hemoptysis. Suspect combination of local airway trauma with patient on antiplatelet therapy. I was called STAT to CVICU with report of hemoptysis and SpO2 into the 70-80's. Upon my arrival to the ICU, the patient was partially sedated, chomping on ETT, dark blood in ETT and circuit. Patient also coughing up fresher blood. On Fio2 100 and PEEP 14. AC/PC. Boss in placed and actively diruresing. Patient sedated with some modest improvement in SpO2 into the 80's. Initial Po2 in the mid to high 40's. Airway suctioned with some improvement. Patient with hypotension after sedation. Patient given dose if IV pressor with significant hypertension requiring starting Nitroglycerin drip and propofol which was weaned fairly quickly. Hypotension returned requiring low dose Nor Epi drip via 20G right AC with good blood return. Due to level of anticoagulation/platelet therapy opted not to pursue central line. Arterial sheath in right groin which we are using to transduce IBP monitoring. Bedside POCUS with bilateral sliding lung sign and b-lines    CXR with bilateral pumonary infiltrates, ETT , OGT in place and no findings on pneumothroax. ETT is a bit high but want to avoid further airway trauma so will hold on advancing at this time    The patient has been evaluated throughout the evening. Overall clinical status has been consistently improving. SpO2 has gradually improved into the high 90's.  Vent changed from AC/PC to Changed over the AC/VC+     Patient is awake and moving all extremities, He is a bit restless and will need a bit more sedation at this time    Good UOP. Currently off IV pressors. Most recent Hb 10 will continue to trend for now    OGT currently clamped due to [place]      The patient is critically ill and can not provide additional history due to Unconsciousness and Ventilated.          12/11/20   LOS: 3    · Patient did well overnight  · ETT advanced last night  · Improved oxygenation- have been weaning down vent settings  · Mild low grade temp which is expected  · Integrelin stopped this morning  · Resolving hemoptysis, Hb stable  · Continue with arterial sheath right fem- extremities warm with pulses  · CXR this morning: OGT and ETT in proper place- improved pulmonary infiltrates- more like pulmonary edema with some pneumonitis- no david infiltrate  · Continues on Zosyn for possible aspiration event yesterday.    · Improved glycemic control     Patient Vitals for the past 12 hrs:   Temp Pulse Resp BP SpO2   12/11/20 0800 (!) 100.9 °F (38.3 °C) 88 16 99/63 99 %   12/11/20 0700  88 16 100/62 99 %   12/11/20 0600  91 24 91/64 96 %   12/11/20 0500  86 16 112/60 99 %   12/11/20 0400 100.1 °F (37.8 °C) 83 16 (!) 97/58 99 %   12/11/20 0327  88 16  96 %   12/11/20 0300  94 16 130/70 99 %   12/11/20 0200  87 18 102/61 99 %   12/11/20 0100  87 16 100/62 99 %   12/11/20 0000 97.7 °F (36.5 °C) 92 16 92/60 98 %   12/10/20 2350  91 16  98 %          Past Medical History:   Diagnosis Date    Arthritis     1999 vicodin    Diabetes ([facility] [address] 1998 metformin    GERD (gastroesophageal reflux disease)     Hypercholesterolemia     Hypertension 1996 norvasc    Kidney stones     MI ([facility] infarction[facility])       Past Surgical History:   Procedure Laterality Date    CARDIAC SURG PROCEDURE UNLIST  12/2017    stints    COLONOSCOPY N/A 11/2/2018    COLONOSCOPY with Polypectomies performed by Roc Marcum MD at [facility] 24 HX GI  2010    gallbladder Prior to Admission medications    Medication Sig Start Date End Date Taking? Authorizing Provider   carvediloL (COREG) 25 mg tablet TAKE 1 TABLET BY MOUTH TWICE A DAY WITH FOOD 10/30/20  Yes Mini Tinajero NP   Januvia 100 mg tablet TAKE 1 TABLET BY MOUTH EVERY DAY 10/2/20  Yes Renetta Gibbs MD   glipiZIDE SR (GLUCOTROL XL) 5 mg CR tablet TAKE 1 TABLET BY MOUTH EVERY DAY 8/3/20  Yes Renetta Gibbs MD   omeprazole (PRILOSEC) 20 mg capsule TAKE 1 CAPSULE BY MOUTH EVERY DAY 8/3/20  Yes Renetta Gibbs MD   amLODIPine (NORVASC) 10 mg tablet Take 1 Tab by mouth daily. 7/20/20  Yes Mini Tinajero NP   rosuvastatin (CRESTOR) 10 mg tablet Take 1 Tab by mouth daily. 7/20/20  Yes Mini Tinajero NP   metFORMIN (GLUCOPHAGE) 1,000 mg tablet TAKE 1 TABLET BY MOUTH TWICE A DAY WITH MEALS 7/16/20  Yes Renetta Gibbs MD   loratadine (Claritin) 10 mg tablet Take 10 mg by mouth daily as needed. Yes Provider, Historical   ramipril (ALTACE) 10 mg capsule Take 1 Cap by mouth daily. 2/27/20  Yes Coby Mantilla NP   aspirin delayed-release 325 mg tablet Take 1 Tab by mouth daily. 2/27/20  Yes Coby Mantilla NP   clopidogrel (PLAVIX) 75 mg tab TAKE 1 TABLET BY MOUTH EVERY DAY 1/6/20  Yes Coby Mantilla NP   nitroglycerin (NITROSTAT) 0.4 mg SL tablet 1 Tab by SubLINGual route every five (5) minutes as needed for Chest Pain for up to 3 doses.  12/2/17  Yes Genaro Barker MD   Blood Pressure Monitor kit Check once a day 5/12/20   Renetta Gibbs MD     Current Facility-Administered Medications   Medication Dose Route Frequency    midazolam (VERSED) injection 2 mg  2 mg IntraVENous ONCE    propofol (DIPRIVAN) 10 mg/mL infusion  0-50 mcg/kg/min IntraVENous TITRATE    sodium chloride (NS) flush 5-40 mL  5-40 mL IntraVENous Q8H    ticagrelor (BRILINTA) tablet 90 mg  90 mg Oral BID    piperacillin-tazobactam (ZOSYN) 3.375 g in 0.9% sodium chloride (MBP/ADV) 100 mL MBP  3.375 g IntraVENous Q6H    NOREPINephrine (LEVOPHED) 8 mg in 5% dextrose 250mL (32 mcg/mL) infusion  0.5-50 mcg/min IntraVENous TITRATE    pantoprazole (PROTONIX) 40 mg in 0.9% sodium chloride 10 mL injection  40 mg IntraVENous DAILY    insulin lispro (HUMALOG) injection   SubCUTAneous Q6H    sodium chloride (NS) flush 5-40 mL  5-40 mL IntraVENous Q8H    rosuvastatin (CRESTOR) tablet 40 mg  40 mg Oral QHS    [START ON 2020] ceFAZolin (ANCEF) 2g IVPB in 50 mL D5W  2 g IntraVENous ONCE    mupirocin (BACTROBAN) 2 % ointment   Both Nostrils BID    carvediloL (COREG) tablet 6.25 mg  6.25 mg Oral Q12H    aspirin chewable tablet 81 mg  81 mg Oral DAILY     No Known Allergies   Social History     Tobacco Use    Smoking status: Never Smoker    Smokeless tobacco: Never Used   Substance Use Topics    Alcohol use: No      Family History   Problem Relation Age of Onset    Stroke Mother     Headache Mother     Hypertension Mother     Lung Cancer Mother     Heart Disease Father     Heart Attack Father     Hypertension Father     Diabetes Father     Lung Cancer Father     Ovarian Cancer Sister     Heart Attack Brother     Breast Cancer Sister     Diabetes Sister     Cancer Maternal Aunt         lung cancer    Cancer Maternal Uncle     Cancer Paternal Aunt     Cancer Paternal Uncle         Review of Systems:  Review of systems not obtained due to patient factors. Objective:   Vital Signs:    Visit Vitals  BP 99/63 (BP 1 Location: Left arm, BP Patient Position: At rest)   Pulse 88   Temp (!) 100.9 °F (38.3 °C)   Resp 16   Ht 6' (1.829 m)   Wt 103.2 kg (227 lb 9.6 oz)   SpO2 99%   BMI 30.87 kg/m²       O2 Device: Ventilator, Endotracheal tube   O2 Flow Rate (L/min): 2 l/min   Temp (24hrs), Av.8 °F (37.1 °C), Min:97.7 °F (36.5 °C), Max:100.9 °F (38.3 °C)       Intake/Output:   Last shift:      701 - 1900  In: 28.1 [I.V.:28.1]  Out: -   Last 3 shifts: 1901 - 700  In: 2763.2 [P.O.:610;  I.V.:2083.2]  [facility] 3330 [facility]    Intake/Output Summary (Last 24 hours) at 12/11/2020 1136  Last data filed at 12/11/2020 0800  Gross per 24 hour   Intake 1381.47 ml   Output 2580 ml   Net -1198.53 ml         Ventilator Settings:  Mode Rate Tidal Volume Pressure FiO2 PEEP   Assist control, VC+   550 ml    60 % 12 cm H20     Peak airway pressure: 27 cm H2O    Minute ventilation: 10.6 l/min           Physical Exam:    General:  Awake, restless, intubated    Head:  Normocephalic, without obvious abnormality, atraumatic. Eyes:  Conjunctivae/corneas clear. PERRL, EOMs intact. Nose: Nares normal. Septum midline. Mucosa normal. No drainage or sinus tenderness. Throat: Lips, mucosa,normal, ETT - circuit clean   Neck: Supple, symmetrical, trachea midline, no adenopathy, thyroid: no enlargment/tenderness/nodules   Back:   Symmetric   Lungs:   Coarse breath sounds on the vent- improved   Chest wall:  No tenderness or deformity. Heart:  Regular rate and rhythm, S1, S2 normal, no murmur, click, rub or gallop. Abdomen:   Soft, non-tender. Bowel sounds normal. No masses,  No organomegaly. Extremities: Extremities normal, atraumatic, no cyanosis or edema. Pulses: 2+ and symmetric all extremities.    Skin: Skin color, texture, turgor normal. No rashes or lesions   Lymph nodes:      Cervical, supraclavicular nodes are unremarkable   Neurologic: Grossly nonfocal       Data:     Recent Results (from the past 24 hour(s))   GLUCOSE, POC    Collection Time: 12/10/20 11:45 AM   Result Value Ref Range    Glucose (POC) 169 (H) 70 - 110 mg/dL   POC ACTIVATED CLOTTING TIME    Collection Time: 12/10/20  1:37 PM   Result Value Ref Range    Activated Clotting Time (POC) 147 (H) 79 - 138 SECS   POC ACTIVATED CLOTTING TIME    Collection Time: 12/10/20  1:53 PM   Result Value Ref Range    Activated Clotting Time (POC) 334 (H) 79 - 138 SECS   POC G3    Collection Time: 12/10/20  3:00 PM   Result Value Ref Range    Device: VENT      FIO2 (POC) 100 %    pH (POC) 7.17 (LL) 7.35 - 7.45      pCO2 (POC) 57.3 (H) 35.0 - 45.0 MMHG    pO2 (POC) 43 (LL) 80 - 100 MMHG    HCO3 (POC) 20.7 (L) 22 - 26 MMOL/L    sO2 (POC) 64 (L) 92 - 97 %    Base deficit (POC) 8 mmol/L    Allens test (POC) N/A      Total resp. rate 20      Site DRAWN FROM ARTERIAL LINE      Specimen type (POC) ARTERIAL      Performed by Colin Cohen    POC ACTIVATED CLOTTING TIME    Collection Time: 12/10/20  3:00 PM   Result Value Ref Range    Activated Clotting Time (POC) 158 (H) 79 - 138 SECS   POC CG8I    Collection Time: 12/10/20  4:02 PM   Result Value Ref Range    Device: VENT      FIO2 (POC) 100 %    pH (POC) 7.31 (L) 7.35 - 7.45      pCO2 (POC) 41.8 35.0 - 45.0 MMHG    pO2 (POC) 49 (LL) 80 - 100 MMHG    HCO3 (POC) 21.1 (L) 22 - 26 MMOL/L    sO2 (POC) 80 (L) 92 - 97 %    Base deficit (POC) 5 mmol/L    Mode ASSIST CONTROL      Tidal volume 608 ml    Set Rate 16 bpm    PEEP/CPAP (POC) 12 cmH2O    PIP (POC) 34      Allens test (POC) N/A      Inspiratory Time 0.88 sec    Total resp.  rate 26      Site DRAWN FROM ARTERIAL LINE      Patient temp. 98.4      Specimen type (POC) ARTERIAL      Sodium,  136 - 145 MMOL/L    Potassium, POC 3.7 3.5 - 5.5 MMOL/L    Glucose,  (H) 74 - 106 MG/DL    Calcium, ionized (POC) 1.15 1.12 - 1.32 mmol/L    Hematocrit, POC 36 36 - 49 %    Hemoglobin, POC 12.2 12 - 16 G/DL    Performed by Paulina Jimenez    TYPE & SCREEN    Collection Time: 12/10/20  4:15 PM   Result Value Ref Range    Crossmatch Expiration 12/13/2020,2359     ABO/Rh(D) AB POSITIVE     Antibody screen NEG    CBC WITH AUTOMATED DIFF    Collection Time: 12/10/20  4:25 PM   Result Value Ref Range    WBC 13.5 (H) 4.6 - 13.2 K/uL    RBC 3.21 (L) 4.70 - 5.50 M/uL    HGB 10.1 (L) 13.0 - 16.0 g/dL    HCT 28.3 (L) 36.0 - 48.0 %    MCV 88.2 74.0 - 97.0 FL    MCH 31.5 24.0 - 34.0 PG    MCHC 35.7 31.0 - 37.0 g/dL    RDW 14.0 11.6 - 14.5 %    PLATELET 244 253 - 900 K/uL    MPV 10.3 9.2 - 11.8 FL    NEUTROPHILS 77 (H) 40 - 73 %    LYMPHOCYTES 16 (L) 21 - 52 %    MONOCYTES 7 3 - 10 %    EOSINOPHILS 0 0 - 5 %    BASOPHILS 0 0 - 2 %    ABS. NEUTROPHILS 10.3 (H) 1.8 - 8.0 K/UL    ABS. LYMPHOCYTES 2.2 0.9 - 3.6 K/UL    ABS. MONOCYTES 0.9 0.05 - 1.2 K/UL    ABS. EOSINOPHILS 0.1 0.0 - 0.4 K/UL    ABS. BASOPHILS 0.0 0.0 - 0.1 K/UL    DF AUTOMATED     METABOLIC PANEL, BASIC    Collection Time: 12/10/20  4:25 PM   Result Value Ref Range    Sodium 142 136 - 145 mmol/L    Potassium 3.3 (L) 3.5 - 5.5 mmol/L    Chloride 114 (H) 100 - 111 mmol/L    CO2 17 (L) 21 - 32 mmol/L    Anion gap 11 3.0 - 18 mmol/L    Glucose 230 (H) 74 - 99 mg/dL    BUN 10 7.0 - 18 MG/DL    Creatinine 0.76 0.6 - 1.3 MG/DL    BUN/Creatinine ratio 13 12 - 20      GFR est AA >60 >60 ml/min/1.73m2    GFR est non-AA >60 >60 ml/min/1.73m2    Calcium 6.4 (L) 8.5 - 10.1 MG/DL   PTT    Collection Time: 12/10/20  4:25 PM   Result Value Ref Range    aPTT 67.9 (H) 23.0 - 36.4 SEC   PROTHROMBIN TIME + INR    Collection Time: 12/10/20  4:25 PM   Result Value Ref Range    Prothrombin time 13.4 11.5 - 15.2 sec    INR 1.0 0.8 - 1.2     MAGNESIUM    Collection Time: 12/10/20  4:25 PM   Result Value Ref Range    Magnesium 1.5 (L) 1.6 - 2.6 mg/dL   PHOSPHORUS    Collection Time: 12/10/20  4:25 PM   Result Value Ref Range    Phosphorus 2.7 2.5 - 4.9 MG/DL   POC G3    Collection Time: 12/10/20  4:35 PM   Result Value Ref Range    Device: VENT      FIO2 (POC) 100 %    pH (POC) 7.34 (L) 7.35 - 7.45      pCO2 (POC) 37.1 35.0 - 45.0 MMHG    pO2 (POC) 47 (LL) 80 - 100 MMHG    HCO3 (POC) 19.9 (L) 22 - 26 MMOL/L    sO2 (POC) 80 (L) 92 - 97 %    Base deficit (POC) 6 mmol/L    Mode ASSIST CONTROL      Set Rate 16 bpm    PEEP/CPAP (POC) 14 cmH2O    Allens test (POC) N/A      Total resp.  rate 19      Site DRAWN FROM ARTERIAL LINE      Specimen type (POC) ARTERIAL      Performed by Bobby Gates    Collection Time: 12/10/20  5:17 PM   Result Value Ref Range    Device: VENT      FIO2 (POC) 100 %    pH (POC) 7.37 7.35 - 7.45      pCO2 (POC) 31.9 (L) 35.0 - 45.0 MMHG    pO2 (POC) 60 (L) 80 - 100 MMHG    HCO3 (POC) 18.6 (L) 22 - 26 MMOL/L    sO2 (POC) 91 (L) 92 - 97 %    Base deficit (POC) 7 mmol/L    Mode ASSIST CONTROL      Tidal volume 702 ml    Set Rate 22 bpm    PEEP/CPAP (POC) 14 cmH2O    PIP (POC) 28      Allens test (POC) N/A      Inspiratory Time 0.88 sec    Total resp. rate 19      Site DRAWN FROM ARTERIAL LINE      Patient temp. 97.0      Specimen type (POC) ARTERIAL      Sodium,  136 - 145 MMOL/L    Potassium, POC 3.6 3.5 - 5.5 MMOL/L    Glucose,  (H) 74 - 106 MG/DL    Calcium, ionized (POC) 1.10 (L) 1.12 - 1.32 mmol/L    Hematocrit, POC 34 (L) 36 - 49 %    Hemoglobin, POC 11.6 (L) 12 - 16 G/DL    Performed by Gilles Gould    EKG, 12 LEAD, SUBSEQUENT    Collection Time: 12/10/20  6:42 PM   Result Value Ref Range    Ventricular Rate 90 BPM    Atrial Rate 90 BPM    P-R Interval 170 ms    QRS Duration 136 ms    Q-T Interval 420 ms    QTC Calculation (Bezet) 513 ms    Calculated P Axis 37 degrees    Calculated R Axis 68 degrees    Calculated T Axis -144 degrees    Diagnosis       Normal sinus rhythm  Left bundle branch block  Abnormal ECG  When compared with ECG of 10-DEC-2020 00:24,  T wave inversion now evident in Inferior leads     POC G3    Collection Time: 12/10/20  8:07 PM   Result Value Ref Range    Device: VENT      FIO2 (POC) 100 %    pH (POC) 7.38 7.35 - 7.45      pCO2 (POC) 32.2 (L) 35.0 - 45.0 MMHG    pO2 (POC) 115 (H) 80 - 100 MMHG    HCO3 (POC) 19.3 (L) 22 - 26 MMOL/L    sO2 (POC) 99 (H) 92 - 97 %    Base deficit (POC) 6 mmol/L    Mode ASSIST CONTROL      Tidal volume 664 ml    Set Rate 16 bpm    PEEP/CPAP (POC) 14 cmH2O    Allens test (POC) N/A      Inspiratory Time 0.88 sec    Total resp.  rate 16      Site DRAWN FROM ARTERIAL LINE      Patient temp. 98.1      Specimen type (POC) ARTERIAL      Performed by Guero FAJARDO, POC    Collection Time: 12/10/20  9:18 PM   Result Value Ref Range    Glucose (POC) 178 (H) 70 - 110 mg/dL   POC G3    Collection Time: 12/10/20 10:32 PM   Result Value Ref Range    Device: VENT      FIO2 (POC) 90 %    pH (POC) 7.46 (H) 7.35 - 7.45      pCO2 (POC) 30.7 (L) 35.0 - 45.0 MMHG    pO2 (POC) 241 (H) 80 - 100 MMHG    HCO3 (POC) 22.0 22 - 26 MMOL/L    sO2 (POC) 100 (H) 92 - 97 %    Base deficit (POC) 2 mmol/L    Mode ASSIST CONTROL      Tidal volume 668 ml    Set Rate 16 bpm    PEEP/CPAP (POC) 14 cmH2O    Allens test (POC) N/A      Inspiratory Time 0.88 sec    Total resp. rate 16      Site DRAWN FROM ARTERIAL LINE      Patient temp.  98.5      Specimen type (POC) ARTERIAL      Performed by Anita Blevins    PTT    Collection Time: 12/10/20 10:35 PM   Result Value Ref Range    aPTT 29.0 23.0 - 36.4 SEC   HGB & HCT    Collection Time: 12/10/20 10:35 PM   Result Value Ref Range    HGB 11.7 (L) 13.0 - 16.0 g/dL    HCT 32.9 (L) 36.0 - 48.0 %   MAGNESIUM    Collection Time: 12/10/20 10:45 PM   Result Value Ref Range    Magnesium 2.3 1.6 - 2.6 mg/dL   PHOSPHORUS    Collection Time: 12/10/20 10:45 PM   Result Value Ref Range    Phosphorus 2.7 2.5 - 4.9 MG/DL   METABOLIC PANEL, BASIC    Collection Time: 12/10/20 10:45 PM   Result Value Ref Range    Sodium 140 136 - 145 mmol/L    Potassium 4.0 3.5 - 5.5 mmol/L    Chloride 109 100 - 111 mmol/L    CO2 23 21 - 32 mmol/L    Anion gap 8 3.0 - 18 mmol/L    Glucose 150 (H) 74 - 99 mg/dL    BUN 14 7.0 - 18 MG/DL    Creatinine 1.00 0.6 - 1.3 MG/DL    BUN/Creatinine ratio 14 12 - 20      GFR est AA >60 >60 ml/min/1.73m2    GFR est non-AA >60 >60 ml/min/1.73m2    Calcium 7.6 (L) 8.5 - 10.1 MG/DL   PTT    Collection Time: 12/11/20  4:04 AM   Result Value Ref Range    aPTT 32.9 23.0 - 36.4 SEC   CBC W/O DIFF    Collection Time: 12/11/20  4:04 AM   Result Value Ref Range    WBC 11.3 4.6 - 13.2 K/uL    RBC 3.69 (L) 4.70 - 5.50 M/uL    HGB 11.4 (L) 13.0 - 16.0 g/dL    HCT 32.8 (L) 36.0 - 48.0 %    MCV 88.9 74.0 - 97.0 FL    MCH 30.9 24.0 - 34.0 PG    MCHC 34.8 31.0 - 37.0 g/dL    RDW 14.3 11.6 - 14.5 %    PLATELET 765 216 - 506 K/uL    MPV 10.8 9.2 - 31.6 FL   METABOLIC PANEL, BASIC    Collection Time: 12/11/20  4:04 AM   Result Value Ref Range    Sodium 139 136 - 145 mmol/L    Potassium 3.7 3.5 - 5.5 mmol/L    Chloride 108 100 - 111 mmol/L    CO2 22 21 - 32 mmol/L    Anion gap 9 3.0 - 18 mmol/L    Glucose 145 (H) 74 - 99 mg/dL    BUN 14 7.0 - 18 MG/DL    Creatinine 1.16 0.6 - 1.3 MG/DL    BUN/Creatinine ratio 12 12 - 20      GFR est AA >60 >60 ml/min/1.73m2    GFR est non-AA >60 >60 ml/min/1.73m2    Calcium 8.1 (L) 8.5 - 10.1 MG/DL   POC G3    Collection Time: 12/11/20  4:04 AM   Result Value Ref Range    Device: VENT      FIO2 (POC) 60 %    pH (POC) 7.41 7.35 - 7.45      pCO2 (POC) 34.3 (L) 35.0 - 45.0 MMHG    pO2 (POC) 150 (H) 80 - 100 MMHG    HCO3 (POC) 21.5 (L) 22 - 26 MMOL/L    sO2 (POC) 99 (H) 92 - 97 %    Base deficit (POC) 3 mmol/L    Mode ASSIST CONTROL      Tidal volume 665 ml    Set Rate 16 bpm    PEEP/CPAP (POC) 14 cmH2O    Allens test (POC) N/A      Inspiratory Time 1.00 sec    Total resp.  rate 16      Site DRAWN FROM ARTERIAL LINE      Patient temp. 100.1      Specimen type (POC) ARTERIAL      Performed by Jorge Alberto Blevins    EKG, 12 LEAD, SUBSEQUENT    Collection Time: 12/11/20  5:01 AM   Result Value Ref Range    Ventricular Rate 86 BPM    Atrial Rate 86 BPM    P-R Interval 176 ms    QRS Duration 136 ms    Q-T Interval 452 ms    QTC Calculation (Bezet) 540 ms    Calculated P Axis 30 degrees    Calculated R Axis 59 degrees    Calculated T Axis 117 degrees    Diagnosis       Normal sinus rhythm  Left bundle branch block  Abnormal ECG  When compared with ECG of 10-DEC-2020 18:42,  T wave inversion no longer evident in Inferior leads     GLUCOSE, POC    Collection Time: 12/11/20  5:13 AM   Result Value Ref Range    Glucose (POC) 165 (H) 70 - 110 mg/dL   POC CG8I    Collection Time: 12/11/20  8:21 AM   Result Value Ref Range    Device: VENT      FIO2 (POC) 0.6 %    pH (POC) 7.43 7.35 - 7.45      pCO2 (POC) 32.8 (L) 35.0 - 45.0 MMHG    pO2 (POC) 158 (H) 80 - 100 MMHG    HCO3 (POC) 21.7 (L) 22 - 26 MMOL/L    sO2 (POC) 99 (H) 92 - 97 %    Base deficit (POC) 2 mmol/L    Mode ASSIST CONTROL      Tidal volume 664 ml    Set Rate 16 bpm    PEEP/CPAP (POC) 12 cmH2O    PIP (POC) 27      Allens test (POC) N/A      Inspiratory Time 1 sec    Total resp. rate 16      Site DRAWN FROM ARTERIAL LINE      Patient temp. 100.9      Specimen type (POC) ARTERIAL      Sodium,  136 - 145 MMOL/L    Potassium, POC 3.7 3.5 - 5.5 MMOL/L    Glucose,  (H) 74 - 106 MG/DL    Calcium, ionized (POC) 1.14 1.12 - 1.32 mmol/L    Hematocrit, POC 30 (L) 36 - 49 %    Hemoglobin, POC 10.2 (L) 12 - 16 G/DL    Performed by Zeviane Soda            Lab Results   Component Value Date/Time    TSH 1.33 11/23/2020 07:34 AM    T4, Free 1.0 01/30/2019 07:32 AM   Results for Werner Gordon (MRN [id number]) as of 12/11/2020 11:39   Ref. Range 12/10/2020 16:02 12/10/2020 17:17 12/10/2020 21:18 12/11/2020 05:13 12/11/2020 08:21   GLUCOSE,FAST - POC Latest Ref Range: 74 - 106 MG/ (H) 266 (H) 178 (H) 165 (H) 154 (H)         Recent Labs     12/11/20  0821 12/11/20  0404 12/10/20  2232   FIO2I 0.6 60 90   HCO3I 21.7* 21.5* 22.0   PCO2I 32.8* 34.3* 30.7*   PHI 7.43 7.41 7.46*   PO2I 158* 150* 241*     12/08/20   ECHO ADULT COMPLETE 12/09/2020 12/9/2020    Narrative · Contrast used: DEFINITY. · LV: Calculated LVEF is 55%. Visually measured ejection fraction. Normal   cavity size and systolic function (ejection fraction normal). Mild   concentric hypertrophy. Age-appropriate left ventricular diastolic   function. · PA: Pulmonary arterial systolic pressure is 26 mmHg. · Aorta: Dilated aortic root; diameter is 3.7 cm.         Signed by: Yamini Crawford MD       Telemetry:normal sinus rhythm        Imaging:  I have personally reviewed the patients radiographs and have reviewed the reports:  CXR Results  (Last 48 hours)               12/11/20 0921  XR CHEST PORT Final result    Impression:  IMPRESSION:   1. Tubes and lines without evidence of complication. 2.  Small effusion and improved likely pulmonary edema. Narrative:  EXAM: Chest X-Ray       INDICATION:  Intubated. TECHNIQUE: AP view of the chest       COMPARISON: 12/10/2020, 12/8/2020, 9/27/2019 and 7/19/2018       FINDINGS:    Tubes and Lines: ET tube and NG tube are unchanged. Pleura: No pneumothorax appreciated. Possible layering effusion on the left. Lungs:  Bilateral perihilar opacities are noted but improved since most recent   prior. Cardiac/Mediastinum/Aorta: Cardiac silhouette is prominent. Pulmonary Vascularity: Pulmonary vasculature is prominent. Chest Wall: No acute finding       Osseous Structures: Left clavicle nonunion. Upper Abdomen: No acute findings appreciated. 12/10/20 1621  XR CHEST PORT Final result    Impression:  IMPRESSION:   1. Tubes and lines without evidence of complication. 2.  Moderate pulmonary edema with small left pleural effusion. Narrative:  EXAM: Chest X-Ray       INDICATION:  hypoxia. TECHNIQUE: AP view of the chest       COMPARISON: 12/8/2020, 9/27/2019 and 7/19/2018       FINDINGS:    Tubes and Lines: Patient is intubated. The ET tube is 5 cm above the paxton. NG   tube is present with tip overlying stomach. Pleura: No pneumothorax appreciated. Small left pleural effusion is present. Lungs:  Prominent perihilar interstitial opacities are noted. Cardiac/Mediastinum/Aorta: Cardiac silhouette is prominent. Pulmonary Vascularity: Pulmonary vasculature is prominent. Chest Wall: No acute finding       Osseous Structures: Unremarkable       Upper Abdomen: No acute findings appreciated.                CT Results  (Last 48 hours)    None Best practice :  Core measures:    Glycemic control  [facility]. Ventilated patients- aim to keep peak plateau pressure 63-54AT H2O. Sress ulcer prophylaxis  DVT prophylaxis       Boss Bundle Followed and Vent Bundle Followed, Vent Day 1        Critical Care Time:  The services I provided to this patient were to treat and/or prevent clinically significant deterioration that could result in the failure of one or more body systems and/or organ systems due to respiratory distress, hypoxia, cardiac dysrhythmia. Services included the following:  -reviewing nursing notes and old charts  -vital sign assessments- reassessed BP at bedside  -direct patient care  -medication orders and management  -interpreting and reviewing diagnostic studies/labs  -re-evaluations  -documentation time  -Spoke with ICU nurse, Cardiology, nutrition     Aggregate critical care time was 35   minutes, which includes only time during which I was engaged in work directly related to the patient's care as described above. During this entire length of time I was immediately available to the patient. The reason for providing this level of medical care for this critically ill patient was due a critical illness that impaired one or more vital organ systems such that there was a high probability of imminent or life threatening deterioration in the patients condition. This care involved high complexity decision making to assess, manipulate, and support vital system functions, to treat this degreee vital organ system failure and to prevent further life threatening deterioration of the patients condition      Complex decision making was made in the evaluation and management plans during this consultation. More than 50% of time was spent in counseling and coordination of care including review of data and discussion with other team members.     Abby Youngblood, DO

## 2020-12-11 NOTE — PROGRESS NOTES
1930-Bedside and verbal report from LACHO Alonzo RN. Pt agitated when touched, began kicking his legs, coughing over vent, MD Zambrano at bedside. Propofol rate changed to assist with sedation. 2000-ABG noted, RT at bedside notified, changes made to Fio2, now at 90%. Assisted RT with changing watson. 2100-Pt resting, MAP went below 65 so Levophed was restarted at 4. No apparent pain. respiratory status stable. No ectopy. 2230-MAP currently 71. No apparent pain. RASS at goal. ABG noted, RT notified. Labs drawn from Right FEM ART line and taken down by SD RN, awaiting results. 2300-RT made vent changes, FIO2 now at 60%, will cont to monitor. No apparent pain. MAP stable. No bleeding in watson. Pt at goal RASS.     0000-Hgb stable, no apparent bleeding, scan amount of blood tinged, thick secretions from ETT when RT suctioned. Pt given PRN Versed for agitation after suctioning. Urine output noted, adequate. No apparent pain. NSR. Mag, phos, PTT all stable. Awaiting potassium results. 0100-Pt resting, NAD, NSR, MAP stable. Output adequate. Current vent settings, AC rate 16, PEEP 14, FIO2 60%, I time 0.88.     0230-Spoke with LILY Francis about need for PRN orders and updated on condition. 0315-Pt given 2 mg for anticipated agitation with ETT advancement. 0320-Rt advanced ETT by 2cm per orders from PA, became very agitated. Given tele order to given 100 mcg of Fentanyl now, goal RASS -3.     0330-Brilinta given thru NGT, clamped for 3 hours starting now per MD Hawthorne. 0400-Pt resting comfortably, RASS at goal -3, no apparent pain, ABG noted, RT notified. Labs drawn from right fem ART line and taken down by SD RN.      0430-RT came to bedside and change PEEP from 14 to 12, will cont to monitor. 0500-Pt resting, MAP at goal. RASS at goal. No apparent pain, Ekg Performed. NSR with LBBB. 02 sats greater than 95%. Tidal volumes appropriate. 0600-Pt given CHG bath. Gown and linen changed. Very agitated, given PRN's.     0700-Bedside and verbal report given to EMELY Lopez RN.

## 2020-12-11 NOTE — PROGRESS NOTES
[facility]      CARDIOLOGY PROGRESS NOTE  RECS:    1. Unstable angina- s/p Cardiac Cath that shows 40-45% distal LM stenosis. Critical mid LAD stenosis. PCI procedure uncomplicated. Patient developed acute pulmonary edema requiring intubation on mechanical ventilation. continue DAPT brilinta and asa. Integrilin d/c  2. Acute respiratory failure- in the setting acute decompensated CHF-requiring mechanical ventilation - [facility] following   3. Hypotension- likely cardiogenic shock-better on low dose levophed- continue to wean off and/dc as BP tolerates it. 4. CAD-cardiac cath 12/2017 showed  Triple-vessel coronary artery disease. 5. Hyperlipidemia- LDL 74 11/20 Continue with Crestor  20 mg.  6. Diabetes- well controlled with A1c 5.9   7. LBBB- new since 12/20   8. Hemoptysis- traumatic ETT tube echange and pulmonary edema- better     Respiratory status stable. Continue DAPT. Off pressor. CXR improving. Plan discussed with PCCM. Cardiac cath 12/9/20  · Three-vessel coronary artery disease with 90% mid LAD stenosis 80% distal circumflex stenosis and 100% mid RCA stenosis after diffuse disease in proximal and ostial RCA. · Patent previously deployed drug-eluting stents in mid circumflex and distal circumflex areas. · Borderline critical left main stenosis which is approximately 50% and has progressed from previous catheterization in 2017. · Procedure done via right radial artery without any complications. Given left main disease, CABG will be considered first.  We will take a surgical opinion with Dr. Emeterio Jenkins. I discussed with him on phone and he/his team will be seeing the patient.     Echo 12/20  · Contrast used: DEFINITY. · LV: Calculated LVEF is 55%. Visually measured ejection fraction. Normal cavity size and systolic function (ejection fraction normal). Mild concentric hypertrophy. Age-appropriate left ventricular diastolic function.   · PA: Pulmonary arterial systolic pressure is 26 mmHg. · Aorta: Dilated aortic root; diameter is 3.7 cm. ASSESSMENT:  Hospital Problems  Date Reviewed: 11/13/2019          Codes Class Noted POA    ACS (acute coronary syndrome) ([facility] ICD-10-CM: I24.9  ICD-9-CM: 411.1  12/8/2020 Unknown        Controlled type 2 diabetes mellitus without complication, without long-term current use of insulin ([facility] ICD-10-CM: E11.9  ICD-9-CM: 250.00  11/29/2018 Yes        Coronary artery disease involving native coronary artery with unstable angina pectoris ([facility] ICD-10-CM: I25.110  ICD-9-CM: 414.01, 411.1  1/27/2016 Yes    Overview Signed 1/27/2016 11:41 AM by Nam Sweeney MD     recent admission angina stable on medical managment  unable to tolerate isordil             Essential hypertension ICD-10-CM: I10  ICD-9-CM: 401.9  1/27/2016 Yes    Overview Signed 1/27/2016 11:41 AM by Nam Sweeney MD     controlled             Unstable angina pectoris ([facility] ICD-10-CM: I20.0  ICD-9-CM: 411.1  1/17/2016 Yes                SUBJECTIVE:  Intubated     OBJECTIVE:    VS:   Visit Vitals  BP 99/63 (BP 1 Location: Left arm, BP Patient Position: At rest)   Pulse 88   Temp (!) 100.9 °F (38.3 °C)   Resp 16   Ht 6' (1.829 m)   Wt 103.2 kg (227 lb 9.6 oz)   SpO2 99%   BMI 30.87 kg/m²         Intake/Output Summary (Last 24 hours) at 12/11/2020 0957  Last data filed at 12/11/2020 0800  Gross per 24 hour   Intake 1381.47 ml   Output 2580 ml   Net -1198.53 ml     TELE: normal sinus rhythm    General: intubated   HENT: Normocephalic, atraumatic. Normal external eye.   Neck :  no bruit, no JVD  Cardiac:  regular rate and rhythm, S1, S2 normal, no murmur, click, rub or gallop  Lungs: rales bilaterally  Abdomen: Soft, nontender, no masses  Extremities:  No c/c/e, peripheral pulses present      Labs: Results:       Chemistry Recent Labs     12/11/20  0404 12/10/20  2245 12/10/20  1625 12/10/20  0300  12/08/20  1349   * 150* 230* 161*   < > 90    140 142 138   < > 138   K 3.7 4.0 3. 3* 3.9   < > 4.0    109 114* 109   < > 107   CO2 22 23 17* 22   < > 28   BUN 14 14 10 14   < > 13   CREA 1.16 1.00 0.76 1.00   < > 0.89   CA 8.1* 7.6* 6.4* 7.9*   < > 9.1   AGAP 9 8 11 7   < > 3   BUCR 12 14 13 14   < > 15   AP  --   --   --  55  --  71   TP  --   --   --  6.1*  --  7.5   ALB  --   --   --  3.1*  --  3.7   GLOB  --   --   --  3.0  --  3.8   AGRAT  --   --   --  1.0  --  1.0    < > = values in this interval not displayed. CBC w/Diff Recent Labs     12/11/20  0404 12/10/20  2235 12/10/20  1625 12/10/20  0300  12/08/20  1349   WBC 11.3  --  13.5* 9.7   < > 8.8   RBC 3.69*  --  3.21* 3.76*   < > 4.67*   HGB 11.4* 11.7* 10.1* 11.5*   < > 14.6   HCT 32.8* 32.9* 28.3* 33.1*   < > 41.7     --  136 166   < > 191   GRANS  --   --  77* 70  --  62   LYMPH  --   --  16* 17*  --  26   EOS  --   --  0 1  --  2    < > = values in this interval not displayed. Cardiac Enzymes Recent Labs     12/10/20  0300 12/09/20  2055   CPK 80 75   CKND1 1.4 CALCULATION NOT PERFORMED WHEN RESULT IS BELOW LINEAR LIMIT      Coagulation Recent Labs     12/11/20  0404 12/10/20  2235 12/10/20  1625  12/10/20  0300   PTP  --   --  13.4  --  13.7   INR  --   --  1.0  --  1.1   APTT 32.9 29.0 67.9*   < > 47.4*    < > = values in this interval not displayed. Lipid Panel Lab Results   Component Value Date/Time    Cholesterol, total 130 12/09/2020 10:36 AM    HDL Cholesterol 31 (L) 12/09/2020 10:36 AM    LDL, calculated 44.4 12/09/2020 10:36 AM    VLDL, calculated 54.6 12/09/2020 10:36 AM    Triglyceride 273 (H) 12/09/2020 10:36 AM    CHOL/HDL Ratio 4.2 12/09/2020 10:36 AM      BNP No results for input(s): BNPP in the last 72 hours.    Liver Enzymes Recent Labs     12/10/20  0300   TP 6.1*   ALB 3.1*   AP 55      Thyroid Studies Lab Results   Component Value Date/Time    TSH 1.33 11/23/2020 07:34 AM              Mini LOPEZ supervised Verito:[phone]   I have independently evaluated and examined the patient. All relevant labs and testing data's are reviewed. Care plan discussed and updated after review.     Mone Booth MD

## 2020-12-11 NOTE — PROGRESS NOTES
12/10/20 2350   Patient Observations   Pulse (Heart Rate) 91   Resp Rate 16   O2 Sat (%) 98 %   Airway - Endotracheal Tube 12/10/20 Oral   Placement Date/Time: 12/10/20 1400   Number of Attempts: 3;2  Location: Oral  Placement Verified: Auscultation; Chest x-ray  Airway Tube Size: 8 mm  Anesthesia ETT Insertion Depth: 23 cm  Anesthesia ETT Line Dejuan: Lips   Insertion Depth (cm) 23 cm   Line Dejuan Lips   Side Secured Device; Right;Centered  (Teeth are in the way)   Cuff Pressure   (MOV)   Respiratory   Patient on Vent Yes - If patient is on vent, add Doc Flowsheet Ventilator (). Chest/Tracheal Assessment Chest expansion, symmetrical   Airway Clearance   Suction ET Tube   Suction Device Inline suction catheter   Sputum Method Obtained Endotracheal   Sputum Amount Moderate   Sputum Color/Odor Bloody   Sputum Consistency Thick   Pulmonary Toilet H. O.B elevated;Suction   Skin Integumentary   Skin Color Appropriate for ethnicity   Vent Settings   FIO2 (%) 60 %   SpO2/FIO2 Ratio 163.33   CMV Rate Set 16   Back-Up Rate 16   Vt Set (ml) 550 ml   PEEP/VENT (cm H2O) 14 cm H20   Insp Time (sec) 1 sec   Insp Rise Time % 50 %   Flow Trigger 3   Ventilator Measurements   Resp Rate Observed 16   Vt Exhaled (Machine Breath) (ml) 662 ml   Ve Observed (l/min) 10.6 l/min   PIP Observed (cm H2O) 27 cm H2O   Plateau Pressure (cm H2O) 25 cm H2O   MAP (cm H2O) 18   I:E Ratio Actual 1:2.8   Static Compliance (ml/cm H20) 61 ml/cm H20   Safety & Alarms   Circuit Temperature 98.6 °F (37 °C)   Backup Mode Checked/Apnea Yes   Pressure Max 40 cm H2O   Ve Min 2   Ve Max 20   Vt Min 1000 ml   Vt Max 200 ml   RR Max 40   Ambu Bag Yes   Ambu Mask Yes   Age Specific Ventilator Associated Pneumonia Bundle   Patient Age Group Adult   Vent Method/Mode   Ventilation Method Conventional   Ventilator Mode Assist control   Check completed and tolerated well. Patient found on above settings with the above results. No resp distress noted.

## 2020-12-11 NOTE — PROGRESS NOTES
12/10/20 2000   Patient Observations   Pulse (Heart Rate) 90   Resp Rate 16   O2 Sat (%) 99 %   Airway - Endotracheal Tube 12/10/20 Oral   Placement Date/Time: 12/10/20 1400   Number of Attempts: 3;2  Location: Oral  Placement Verified: Auscultation; Chest x-ray  Airway Tube Size: 8 mm  Anesthesia ETT Insertion Depth: 23 cm  Anesthesia ETT Line Dejuan: Lips   Insertion Depth (cm) 23 cm   Line Dejuan Lips   Side Secured Device; Right   Cuff Pressure   (MOV)   Site Assessment Clean, dry, & intact   Respiratory   Respiratory Pattern Regular   Chest/Tracheal Assessment Chest expansion, symmetrical   Airway Clearance   Suction ET Tube   Sputum Method Obtained Endotracheal   Sputum Amount Copious   Sputum Color/Odor Bloody   Sputum Consistency Thick   Skin Integumentary   Skin Color Appropriate for ethnicity   Ventilator Initiate/Discontinue   Bio-Med ID # [id number]   Vent Settings   FIO2 (%) 90 %  (weaned from blood gas)   SpO2/FIO2 Ratio 110   CMV Rate Set 16   Back-Up Rate 16   Vt Set (ml) 550 ml   PEEP/VENT (cm H2O) 14 cm H20   Insp Time (sec) 1 sec   Insp Rise Time % 50 %   Flow Trigger 3   Ventilator Measurements   Vt Exhaled (Machine Breath) (ml) 670 ml   Ve Observed (l/min) 11 l/min   PIP Observed (cm H2O) 30 cm H2O   Plateau Pressure (cm H2O) 26 cm H2O   MAP (cm H2O) 18   I:E Ratio Actual 1:2.8   Static Compliance (ml/cm H20) 58 ml/cm H20   Safety & Alarms   Circuit Temperature 98.6 °F (37 °C)   Backup Mode Checked/Apnea Yes   Pressure Max 40 cm H2O   Ve Min 2   Ve Max 20   Vt Min 1000 ml   Vt Max 200 ml   RR Max 40   Ambu Bag Yes   Ambu Mask Yes   Age Specific Ventilator Associated Pneumonia Bundle   Patient Age Group Adult   Adult Ventilator Associated Pneumonia Bundle   Elevation of Head to 30-45 Degrees (Unless Contraindicated) Yes   Vent Method/Mode   Ventilation Method Conventional   Ventilator Mode Assist control;VC+     Clamped ET tube to maintain PEEP and changes watson. Copious bloody secretions present. Wean Fio2 from ABG. Bite block placed. Check completed and tolerated well. Patient found on above settings with the above results. No resp distress noted.

## 2020-12-11 NOTE — PROGRESS NOTES
Comprehensive Nutrition Assessment    Type and Reason for Visit: Initial, NPO/clear liquid    Nutrition Recommendations/Plan:   - Monitor ability to tolerate oral diet versus need for enteral nutrition support. Enteral regimen if patient remains intubated- provide tube feeding of Vital High Protein at 20 mL/hr with prosource 4 times daily, daily multivitamin and 100 mL q 4 hour water flushes; advance to goal rate of 50 mL/hr once propofol is stopped (goal regimen to provide 1440 kcal, 165 gm protein, 1603 mL total free water, 84% RDIs). Nutrition Assessment:  Patient underwent repeat cardiac catheterization yesterday (12/10) due to unstable angina s/p PCI with LAD stent placement then went into pulmonary edema and intubated with hemoptysis after ETT exchange. Received oral diet x several meals and consumed 80% of dinner on 12/9 per nursing documentation. Remains intubated, sedated on propofol with OGT to intermittent suction, to be clamped for medication per MD and will hold off on starting enteral feeding with plan for weaning trial and possible extubation tomorrow per discussion with Dr Kittie Dubin. Malnutrition Assessment:  Malnutrition Status:  No malnutrition      Nutrition History and Allergies: Past medical history of history of CAD, DM, GERD, HTN, HLD and hx of cholecystectomy admitted for acute coronary syndrome with c/o chest pain and shortness of breath x past several days with some nausea without vomiting or diarrhea on admission. . Weight history per chart: 204 lb (11/13/19), 214 lb 6/25/20) with weight gain over the past year. NKFA.      Estimated Daily Nutrient Needs:  Energy (kcal): 6182-8870; Weight Used for Energy Requirements: Current(103 kg)  Protein (g): 162-203; Weight Used for Protein Requirements: Ideal(2-2.5)  Fluid (ml/day): 9544-2987; Method Used for Fluid Requirements: 1 ml/kcal    Nutrition Related Findings:  Last BM 12/8 with active bowel sounds, OGT to intermittent suction, replaced with 10 mEq KCl. Wounds:  None       Additional Caloric Sources: propofol at 50 mcg/kg/min (755 kcal per day)     Current Nutrition Therapies:  DIET NPO    Anthropometric Measures:  · Height:  6' (182.9 cm)  · Current Body Wt:  103.2 kg (227 lb 8.2 oz)   · Admission Body Wt:  210 lb 1.6 oz    · Usual Body Wt:  92.5 kg (204 lb)     · Ideal Body Wt:  178 lbs:  127.8 %   · BMI Category:  Obese class 1 (BMI 30.0-34. 9)       Nutrition Diagnosis:   · Inadequate oral intake related to cognitive or neurological impairment, impaired respiratory function as evidenced by NPO or clear liquid status due to medical condition, intubation      Nutrition Interventions:   Food and/or Nutrient Delivery: Continue NPO  Nutrition Education and Counseling: Education not indicated  Coordination of Nutrition Care: Continue to monitor while inpatient, Coordination of community care(recommendations discussed with Dr Rozina Martines)    Goals:  Nutritional needs will be met through adequate oral intake or nutrition support within the next 7 days. Nutrition Monitoring and Evaluation:   Behavioral-Environmental Outcomes: None identified  Food/Nutrient Intake Outcomes: Diet advancement/tolerance, Enteral nutrition intake/tolerance  Physical Signs/Symptoms Outcomes: Biochemical data, Chewing or swallowing, GI status, Fluid status or edema, Hemodynamic status, Nutrition focused physical findings    Discharge Planning:     Too soon to determine     Electronically signed by Carly Kwong RD, [address]  on 12/11/2020 at 2:27 PM    Contact: [phone]

## 2020-12-11 NOTE — PROGRESS NOTES
Chart reviewed. Noted events from yesterday. Pt now sedated and on the ventilator. CM will continue to follow along. Discharge disposition unable to be determined at this time.     Bladimir Coates RN BSN  Care Manager  [phone]

## 2020-12-11 NOTE — PROGRESS NOTES
Results for Estelle Ray (MRN [id number]) as of 12/11/2020 04:28   Ref. Range 12/11/2020 04:04   pH (POC) Latest Ref Range: 7.35 - 7.45   7.41   pCO2 (POC) Latest Ref Range: 35.0 - 45.0 MMHG 34.3 (L)   pO2 (POC) Latest Ref Range: 80 - 100 MMHG 150 (H)   HCO3 (POC) Latest Ref Range: 22 - 26 MMOL/L 21.5 (L)   sO2 (POC) Latest Ref Range: 92 - 97 % 99 (H)   Base deficit (POC) Latest Units: mmol/L 3   FIO2 (POC) Latest Units: % 60   Patient temp. Latest Units:   100.1   Specimen type (POC) Latest Units:   ARTERIAL   Set Rate Latest Units: bpm 16   Site Latest Units:   DRAWN FROM ARTERIAL LINE   Device: Latest Units:   VENT   Total resp. rate Latest Units:   16   Mode Latest Units:   ASSIST CONTROL   Tidal volume Latest Units: ml 665   PEEP/CPAP (POC) Latest Units: cmH2O 14   Allens test (POC) Latest Units:   N/A   Inspiratory Time Latest Units: sec 1.00     FiO2 at a good level to start weaning PEEP, RN agrees. Peep wean to 12 based off of most recent gas.

## 2020-12-12 LAB
ANION GAP SERPL CALC-SCNC: 6 MMOL/L (ref 3–18)
APTT PPP: 25.5 SEC (ref 23–36.4)
ARTERIAL PATENCY WRIST A: ABNORMAL
ATRIAL RATE: 86 BPM
ATRIAL RATE: 90 BPM
BASE DEFICIT BLD-SCNC: 3 MMOL/L
BASOPHILS # BLD: 0 K/UL (ref 0–0.1)
BASOPHILS NFR BLD: 0 % (ref 0–2)
BDY SITE: ABNORMAL
BODY TEMPERATURE: 97.9
BUN SERPL-MCNC: 15 MG/DL (ref 7–18)
BUN/CREAT SERPL: 17 (ref 12–20)
CA-I BLD-MCNC: 1.12 MMOL/L (ref 1.12–1.32)
CALCIUM SERPL-MCNC: 7.9 MG/DL (ref 8.5–10.1)
CALCULATED P AXIS, ECG09: 30 DEGREES
CALCULATED P AXIS, ECG09: 37 DEGREES
CALCULATED R AXIS, ECG10: 59 DEGREES
CALCULATED R AXIS, ECG10: 68 DEGREES
CALCULATED T AXIS, ECG11: -144 DEGREES
CALCULATED T AXIS, ECG11: 117 DEGREES
CHLORIDE SERPL-SCNC: 112 MMOL/L (ref 100–111)
CHOLEST SERPL-MCNC: 114 MG/DL
CO2 SERPL-SCNC: 23 MMOL/L (ref 21–32)
CREAT SERPL-MCNC: 0.86 MG/DL (ref 0.6–1.3)
DIAGNOSIS, 93000: NORMAL
DIAGNOSIS, 93000: NORMAL
DIFFERENTIAL METHOD BLD: ABNORMAL
EOSINOPHIL # BLD: 0.2 K/UL (ref 0–0.4)
EOSINOPHIL NFR BLD: 2 % (ref 0–5)
ERYTHROCYTE [DISTWIDTH] IN BLOOD BY AUTOMATED COUNT: 14.7 % (ref 11.6–14.5)
GAS FLOW.O2 O2 DELIVERY SYS: ABNORMAL L/MIN
GAS FLOW.O2 SETTING OXYMISER: 14 BPM
GLUCOSE BLD STRIP.AUTO-MCNC: 101 MG/DL (ref 70–110)
GLUCOSE BLD STRIP.AUTO-MCNC: 108 MG/DL (ref 70–110)
GLUCOSE BLD STRIP.AUTO-MCNC: 122 MG/DL (ref 70–110)
GLUCOSE BLD STRIP.AUTO-MCNC: 140 MG/DL (ref 74–106)
GLUCOSE BLD STRIP.AUTO-MCNC: 149 MG/DL (ref 70–110)
GLUCOSE SERPL-MCNC: 134 MG/DL (ref 74–99)
HCO3 BLD-SCNC: 20.4 MMOL/L (ref 22–26)
HCT VFR BLD AUTO: 30.8 % (ref 36–48)
HCT VFR BLD AUTO: 30.9 % (ref 36–48)
HCT VFR BLD AUTO: 31.5 % (ref 36–48)
HCT VFR BLD CALC: 28 % (ref 36–49)
HDLC SERPL-MCNC: 28 MG/DL (ref 40–60)
HDLC SERPL: 4.1 {RATIO} (ref 0–5)
HGB BLD-MCNC: 10.5 G/DL (ref 13–16)
HGB BLD-MCNC: 10.5 G/DL (ref 13–16)
HGB BLD-MCNC: 10.6 G/DL (ref 13–16)
HGB BLD-MCNC: 9.5 G/DL (ref 12–16)
INSPIRATION.DURATION SETTING TIME VENT: 1 SEC
LDLC SERPL CALC-MCNC: 38 MG/DL (ref 0–100)
LIPASE SERPL-CCNC: 78 U/L (ref 73–393)
LIPID PROFILE,FLP: ABNORMAL
LYMPHOCYTES # BLD: 1.5 K/UL (ref 0.9–3.6)
LYMPHOCYTES NFR BLD: 14 % (ref 21–52)
MAGNESIUM SERPL-MCNC: 2.2 MG/DL (ref 1.6–2.6)
MCH RBC QN AUTO: 30.5 PG (ref 24–34)
MCHC RBC AUTO-ENTMCNC: 33.7 G/DL (ref 31–37)
MCV RBC AUTO: 90.5 FL (ref 74–97)
MONOCYTES # BLD: 1.1 K/UL (ref 0.05–1.2)
MONOCYTES NFR BLD: 10 % (ref 3–10)
NEUTS SEG # BLD: 7.7 K/UL (ref 1.8–8)
NEUTS SEG NFR BLD: 74 % (ref 40–73)
O2/TOTAL GAS SETTING VFR VENT: 35 %
P-R INTERVAL, ECG05: 170 MS
P-R INTERVAL, ECG05: 176 MS
PCO2 BLD: 27.8 MMHG (ref 35–45)
PEEP RESPIRATORY: 5 CMH2O
PH BLD: 7.47 [PH] (ref 7.35–7.45)
PLATELET # BLD AUTO: 160 K/UL (ref 135–420)
PMV BLD AUTO: 10.4 FL (ref 9.2–11.8)
PO2 BLD: 108 MMHG (ref 80–100)
POTASSIUM BLD-SCNC: 3.5 MMOL/L (ref 3.5–5.5)
POTASSIUM SERPL-SCNC: 3.5 MMOL/L (ref 3.5–5.5)
Q-T INTERVAL, ECG07: 420 MS
Q-T INTERVAL, ECG07: 452 MS
QRS DURATION, ECG06: 136 MS
QRS DURATION, ECG06: 136 MS
QTC CALCULATION (BEZET), ECG08: 513 MS
QTC CALCULATION (BEZET), ECG08: 540 MS
RBC # BLD AUTO: 3.48 M/UL (ref 4.7–5.5)
SAO2 % BLD: 99 % (ref 92–97)
SERVICE CMNT-IMP: ABNORMAL
SODIUM BLD-SCNC: 140 MMOL/L (ref 136–145)
SODIUM SERPL-SCNC: 141 MMOL/L (ref 136–145)
SPECIMEN TYPE: ABNORMAL
TOTAL RESP. RATE, ITRR: 14
TRIGL SERPL-MCNC: 240 MG/DL (ref ?–150)
VENTILATION MODE VENT: ABNORMAL
VENTRICULAR RATE, ECG03: 86 BPM
VENTRICULAR RATE, ECG03: 90 BPM
VLDLC SERPL CALC-MCNC: 48 MG/DL
VT SETTING VENT: 667 ML
WBC # BLD AUTO: 10.5 K/UL (ref 4.6–13.2)

## 2020-12-12 PROCEDURE — 99233 SBSQ HOSP IP/OBS HIGH 50: CPT | Performed by: INTERNAL MEDICINE

## 2020-12-12 PROCEDURE — C9113 INJ PANTOPRAZOLE SODIUM, VIA: HCPCS | Performed by: INTERNAL MEDICINE

## 2020-12-12 PROCEDURE — 74011250636 HC RX REV CODE- 250/636: Performed by: PHYSICIAN ASSISTANT

## 2020-12-12 PROCEDURE — 94003 VENT MGMT INPAT SUBQ DAY: CPT

## 2020-12-12 PROCEDURE — 85018 HEMOGLOBIN: CPT

## 2020-12-12 PROCEDURE — 65620000000 HC RM CCU GENERAL

## 2020-12-12 PROCEDURE — 74011250636 HC RX REV CODE- 250/636: Performed by: INTERNAL MEDICINE

## 2020-12-12 PROCEDURE — 74011250637 HC RX REV CODE- 250/637: Performed by: INTERNAL MEDICINE

## 2020-12-12 PROCEDURE — 83690 ASSAY OF LIPASE: CPT

## 2020-12-12 PROCEDURE — 74011250637 HC RX REV CODE- 250/637: Performed by: NURSE PRACTITIONER

## 2020-12-12 PROCEDURE — 2709999900 HC NON-CHARGEABLE SUPPLY

## 2020-12-12 PROCEDURE — 74011000258 HC RX REV CODE- 258: Performed by: EMERGENCY MEDICINE

## 2020-12-12 PROCEDURE — 80061 LIPID PANEL: CPT

## 2020-12-12 PROCEDURE — 74011000250 HC RX REV CODE- 250: Performed by: PHYSICIAN ASSISTANT

## 2020-12-12 PROCEDURE — 85025 COMPLETE CBC W/AUTO DIFF WBC: CPT

## 2020-12-12 PROCEDURE — 85730 THROMBOPLASTIN TIME PARTIAL: CPT

## 2020-12-12 PROCEDURE — 82803 BLOOD GASES ANY COMBINATION: CPT

## 2020-12-12 PROCEDURE — 74011250637 HC RX REV CODE- 250/637: Performed by: EMERGENCY MEDICINE

## 2020-12-12 PROCEDURE — 74011000250 HC RX REV CODE- 250: Performed by: INTERNAL MEDICINE

## 2020-12-12 PROCEDURE — 82947 ASSAY GLUCOSE BLOOD QUANT: CPT

## 2020-12-12 PROCEDURE — 74011250636 HC RX REV CODE- 250/636: Performed by: EMERGENCY MEDICINE

## 2020-12-12 PROCEDURE — 82962 GLUCOSE BLOOD TEST: CPT

## 2020-12-12 PROCEDURE — 80048 BASIC METABOLIC PNL TOTAL CA: CPT

## 2020-12-12 PROCEDURE — 83735 ASSAY OF MAGNESIUM: CPT

## 2020-12-12 PROCEDURE — 74011000258 HC RX REV CODE- 258: Performed by: PHYSICIAN ASSISTANT

## 2020-12-12 PROCEDURE — 99232 SBSQ HOSP IP/OBS MODERATE 35: CPT | Performed by: EMERGENCY MEDICINE

## 2020-12-12 RX ORDER — POTASSIUM CHLORIDE 20 MEQ/1
20 TABLET, EXTENDED RELEASE ORAL ONCE
Status: COMPLETED | OUTPATIENT
Start: 2020-12-12 | End: 2020-12-12

## 2020-12-12 RX ORDER — CHLORHEXIDINE GLUCONATE 1.2 MG/ML
10 RINSE ORAL 2 TIMES DAILY
Status: COMPLETED | OUTPATIENT
Start: 2020-12-12 | End: 2020-12-15

## 2020-12-12 RX ORDER — CHLORHEXIDINE GLUCONATE 1.2 MG/ML
15 RINSE ORAL 2 TIMES DAILY
Status: DISCONTINUED | OUTPATIENT
Start: 2020-12-12 | End: 2020-12-12

## 2020-12-12 RX ADMIN — MUPIROCIN: 20 OINTMENT TOPICAL at 18:00

## 2020-12-12 RX ADMIN — SODIUM CHLORIDE 40 MG: 9 INJECTION, SOLUTION INTRAMUSCULAR; INTRAVENOUS; SUBCUTANEOUS at 08:06

## 2020-12-12 RX ADMIN — Medication 20 ML: at 22:00

## 2020-12-12 RX ADMIN — MIDAZOLAM 2 MG: 1 INJECTION INTRAMUSCULAR; INTRAVENOUS at 05:25

## 2020-12-12 RX ADMIN — PROPOFOL 40 MCG/KG/MIN: 10 INJECTION, EMULSION INTRAVENOUS at 03:53

## 2020-12-12 RX ADMIN — MUPIROCIN: 20 OINTMENT TOPICAL at 08:07

## 2020-12-12 RX ADMIN — CARVEDILOL 6.25 MG: 6.25 TABLET, FILM COATED ORAL at 21:21

## 2020-12-12 RX ADMIN — MIDAZOLAM 2 MG: 1 INJECTION INTRAMUSCULAR; INTRAVENOUS at 00:02

## 2020-12-12 RX ADMIN — PROPOFOL 45 MCG/KG/MIN: 10 INJECTION, EMULSION INTRAVENOUS at 18:11

## 2020-12-12 RX ADMIN — PIPERACILLIN AND TAZOBACTAM 3.38 G: 3; .375 INJECTION, POWDER, LYOPHILIZED, FOR SOLUTION INTRAVENOUS at 05:04

## 2020-12-12 RX ADMIN — Medication 10 ML: at 14:00

## 2020-12-12 RX ADMIN — PROPOFOL 45 MCG/KG/MIN: 10 INJECTION, EMULSION INTRAVENOUS at 07:34

## 2020-12-12 RX ADMIN — PIPERACILLIN AND TAZOBACTAM 3.38 G: 3; .375 INJECTION, POWDER, LYOPHILIZED, FOR SOLUTION INTRAVENOUS at 17:16

## 2020-12-12 RX ADMIN — MIDAZOLAM 2 MG: 1 INJECTION INTRAMUSCULAR; INTRAVENOUS at 13:59

## 2020-12-12 RX ADMIN — MIDAZOLAM 2 MG: 1 INJECTION INTRAMUSCULAR; INTRAVENOUS at 04:04

## 2020-12-12 RX ADMIN — PROPOFOL 50 MCG/KG/MIN: 10 INJECTION, EMULSION INTRAVENOUS at 00:02

## 2020-12-12 RX ADMIN — TICAGRELOR 90 MG: 90 TABLET ORAL at 15:54

## 2020-12-12 RX ADMIN — PROPOFOL 45 MCG/KG/MIN: 10 INJECTION, EMULSION INTRAVENOUS at 10:41

## 2020-12-12 RX ADMIN — PROPOFOL 50 MCG/KG/MIN: 10 INJECTION, EMULSION INTRAVENOUS at 21:28

## 2020-12-12 RX ADMIN — CHLORHEXIDINE GLUCONATE 0.12% ORAL RINSE 10 ML: 1.2 LIQUID ORAL at 21:22

## 2020-12-12 RX ADMIN — FENTANYL CITRATE 75 MCG/HR: 50 INJECTION, SOLUTION INTRAMUSCULAR; INTRAVENOUS at 16:11

## 2020-12-12 RX ADMIN — MIDAZOLAM 2 MG: 1 INJECTION INTRAMUSCULAR; INTRAVENOUS at 11:54

## 2020-12-12 RX ADMIN — PIPERACILLIN AND TAZOBACTAM 3.38 G: 3; .375 INJECTION, POWDER, LYOPHILIZED, FOR SOLUTION INTRAVENOUS at 11:54

## 2020-12-12 RX ADMIN — TICAGRELOR 90 MG: 90 TABLET ORAL at 03:56

## 2020-12-12 RX ADMIN — MIDAZOLAM 2 MG: 1 INJECTION INTRAMUSCULAR; INTRAVENOUS at 08:09

## 2020-12-12 RX ADMIN — CARVEDILOL 6.25 MG: 6.25 TABLET, FILM COATED ORAL at 08:06

## 2020-12-12 RX ADMIN — ROSUVASTATIN 40 MG: 20 TABLET, FILM COATED ORAL at 21:21

## 2020-12-12 RX ADMIN — ASPIRIN 81 MG CHEWABLE TABLET 81 MG: 81 TABLET CHEWABLE at 08:06

## 2020-12-12 RX ADMIN — POTASSIUM CHLORIDE 20 MEQ: 1500 TABLET, EXTENDED RELEASE ORAL at 05:32

## 2020-12-12 RX ADMIN — MIDAZOLAM 2 MG: 1 INJECTION INTRAMUSCULAR; INTRAVENOUS at 17:16

## 2020-12-12 RX ADMIN — PROPOFOL 45 MCG/KG/MIN: 10 INJECTION, EMULSION INTRAVENOUS at 13:59

## 2020-12-12 RX ADMIN — MIDAZOLAM 2 MG: 1 INJECTION INTRAMUSCULAR; INTRAVENOUS at 22:24

## 2020-12-12 RX ADMIN — PROPOFOL 45 MCG/KG/MIN: 10 INJECTION, EMULSION INTRAVENOUS at 17:23

## 2020-12-12 NOTE — PROGRESS NOTES
1930-Bedside and verbal report from EMELY Enamorado, RN. Pt resting in bed, NAD, NSR. MAP stable, off pressors. PRN's as needed. Pt goal RASS -1. Pt is alert, nods his head yes/no,  equal, pupils equal and reactive to light. No apparent pain. Pt nods head when asked if he needs suctioned. 2000-Pt given PRN's for asychronicity on the vent. Current settings are 35% FIO2 and PEEP of 5. Skin is warm, pulses are present on doppler, appears to be perfusing well. Getting K replacement now. 2100-PIV replaced in Right Forearm d/t phlebitis. No apparent pain. Afebrile. NSR. MAP stable. Urine output adequate. 2300-Spoke with LILY Nur regarding sedation needs, orders receiving and implemented. 0400-Pt given CHG bath, gown changed. No c/o pain. Alert to self, opens eyes and nods he yes/no. Follows commands but is very impulsive. Labs drawn from Right Fem, ART line and taken down by SD RN. Abg noted, no changes to vent settings. Plan for extubation today. Urine output adequate, brown. Will monitor    0645-RT at bedside, starting SBT.     0500-Pt experienced severe bradycardia, as low as 44 as soon as SBT was started. RT immediately stopped SBT. Pt remained responsive, BP went up, pt was hypertensive. Given PRN. Pt remains responsive to voice. 0700-Bedside and verbal report from [facility].

## 2020-12-12 NOTE — PROGRESS NOTES
[facility]  Progress Note    Patient: Anna Kirby Age: 79 y.o. : 1950 MR#: [id number] SSN: [id number]  Date/Time: 2020    Subjective:     Patient is laying in bed intubated, failed SBT earlier today.   Discussed with RN at bedside      Assessment/Plan:     Acute Hypoxic respiratory failure  ?pulmonary edema  Hemoptysis  Unstable angina  Coronary artery disease  Hypertension  Dyslipidemia  Diet-controlled type 2 diabetes   degenerative joint disease    Plan  Ventilator support, wean as per pulmonary  S/p PCI, stent   On dual antiplatelet therapy, statin  Empiric antibiotic  Sliding scale insulin, monitor sugars  Discussed with RN  Patient is a full code  Called patient's wife at phone #[phone] and updated her        Case discussed with:  [x]Patient  []Family  [x]Nursing  []Case Management  DVT Prophylaxis:  []Lovenox  []Hep SQ  [x]SCDs  []Coumadin   []On Heparin gtt    Objective:   VS:   Visit Vitals  /67   Pulse 70   Temp 98.2 °F (36.8 °C)   Resp 14   Ht 6' (1.829 m)   Wt 103.5 kg (228 lb 1.6 oz)   SpO2 98%   BMI 30.94 kg/m²      Tmax/24hrs: Temp (24hrs), Av.1 °F (36.7 °C), Min:97.7 °F (36.5 °C), Max:98.7 °F (37.1 °C)    Input/Output:     Intake/Output Summary (Last 24 hours) at 2020 1748  Last data filed at 2020 0800  Gross per 24 hour   Intake 997.26 ml   Output 885 ml   Net 112.26 ml       General: Intubated, opens eyes to loud commands  Cardiovascular:  S1S2+, RRR  Pulmonary: Coarse breath sounds bilaterally  GI:  Soft, BS+, NT, ND  Extremities:  No edema          Labs:    Recent Results (from the past 24 hour(s))   GLUCOSE, POC    Collection Time: 20 11:18 PM   Result Value Ref Range    Glucose (POC) 146 (H) 70 - 110 mg/dL   POC CG8I    Collection Time: 20  3:54 AM   Result Value Ref Range    Device: VENT      FIO2 (POC) 35 %    pH (POC) 7.47 (H) 7.35 - 7.45      pCO2 (POC) 27.8 (L) 35.0 - 45.0 MMHG    pO2 (POC) 108 (H) 80 - 100 MMHG    HCO3 (POC) 20.4 (L) 22 - 26 MMOL/L    sO2 (POC) 99 (H) 92 - 97 %    Base deficit (POC) 3 mmol/L    Mode ASSIST CONTROL      Tidal volume 667 ml    Set Rate 14 bpm    PEEP/CPAP (POC) 5 cmH2O    Allens test (POC) N/A      Inspiratory Time 1.00 sec    Total resp. rate 14      Site DRAWN FROM ARTERIAL LINE      Patient temp. 97.9      Specimen type (POC) ARTERIAL      Sodium,  136 - 145 MMOL/L    Potassium, POC 3.5 3.5 - 5.5 MMOL/L    Glucose,  (H) 74 - 106 MG/DL    Calcium, ionized (POC) 1.12 1.12 - 1.32 mmol/L    Hematocrit, POC 28 (L) 36 - 49 %    Hemoglobin, POC 9.5 (L) 12 - 16 G/DL    Performed by Anamaria Blevins    LIPID PANEL    Collection Time: 12/12/20  4:00 AM   Result Value Ref Range    LIPID PROFILE          Cholesterol, total 114 <200 MG/DL    Triglyceride 240 (H) <150 MG/DL    HDL Cholesterol 28 (L) 40 - 60 MG/DL    LDL, calculated 38 0 - 100 MG/DL    VLDL, calculated 48 MG/DL    CHOL/HDL Ratio 4.1 0 - 5.0     LIPASE    Collection Time: 12/12/20  4:00 AM   Result Value Ref Range    Lipase 78 73 - 393 U/L   CBC WITH AUTOMATED DIFF    Collection Time: 12/12/20  4:00 AM   Result Value Ref Range    WBC 10.5 4.6 - 13.2 K/uL    RBC 3.48 (L) 4.70 - 5.50 M/uL    HGB 10.6 (L) 13.0 - 16.0 g/dL    HCT 31.5 (L) 36.0 - 48.0 %    MCV 90.5 74.0 - 97.0 FL    MCH 30.5 24.0 - 34.0 PG    MCHC 33.7 31.0 - 37.0 g/dL    RDW 14.7 (H) 11.6 - 14.5 %    PLATELET 586 981 - 902 K/uL    MPV 10.4 9.2 - 11.8 FL    NEUTROPHILS 74 (H) 40 - 73 %    LYMPHOCYTES 14 (L) 21 - 52 %    MONOCYTES 10 3 - 10 %    EOSINOPHILS 2 0 - 5 %    BASOPHILS 0 0 - 2 %    ABS. NEUTROPHILS 7.7 1.8 - 8.0 K/UL    ABS. LYMPHOCYTES 1.5 0.9 - 3.6 K/UL    ABS. MONOCYTES 1.1 0.05 - 1.2 K/UL    ABS. EOSINOPHILS 0.2 0.0 - 0.4 K/UL    ABS.  BASOPHILS 0.0 0.0 - 0.1 K/UL    DF AUTOMATED     METABOLIC PANEL, BASIC    Collection Time: 12/12/20  4:00 AM   Result Value Ref Range    Sodium 141 136 - 145 mmol/L    Potassium 3.5 3.5 - 5.5 mmol/L    Chloride 112 (H) 100 - 111 mmol/L    CO2 23 21 - 32 mmol/L    Anion gap 6 3.0 - 18 mmol/L    Glucose 134 (H) 74 - 99 mg/dL    BUN 15 7.0 - 18 MG/DL    Creatinine 0.86 0.6 - 1.3 MG/DL    BUN/Creatinine ratio 17 12 - 20      GFR est AA >60 >60 ml/min/1.73m2    GFR est non-AA >60 >60 ml/min/1.73m2    Calcium 7.9 (L) 8.5 - 10.1 MG/DL   MAGNESIUM    Collection Time: 12/12/20  4:00 AM   Result Value Ref Range    Magnesium 2.2 1.6 - 2.6 mg/dL   GLUCOSE, POC    Collection Time: 12/12/20  4:59 AM   Result Value Ref Range    Glucose (POC) 149 (H) 70 - 110 mg/dL   PTT    Collection Time: 12/12/20 10:17 AM   Result Value Ref Range    aPTT 25.5 23.0 - 36.4 SEC   HGB & HCT    Collection Time: 12/12/20 10:17 AM   Result Value Ref Range    HGB 10.5 (L) 13.0 - 16.0 g/dL    HCT 30.8 (L) 36.0 - 48.0 %   GLUCOSE, POC    Collection Time: 12/12/20 11:37 AM   Result Value Ref Range    Glucose (POC) 122 (H) 70 - 110 mg/dL   GLUCOSE, POC    Collection Time: 12/12/20  5:17 PM   Result Value Ref Range    Glucose (POC) 101 70 - 110 mg/dL       Signed By: Wendy Correa MD     December 12, 2020

## 2020-12-12 NOTE — PROGRESS NOTES
[facility]    Patient: Ronda Boykin Age: 79 y.o. : 1950 MR#: [id number] SSN: [id number]  Date/Time: 2020    Subjective:     Patient is lying in bed intubated, sedated      Assessment/Plan:     Acute Hypoxic respiratory failure  ?pulmonary edema  Hemoptysis  Unstable angina  Coronary artery disease  Hypertension  Dyslipidemia  Diet-controlled type 2 diabetes   degenerative joint disease    Plan  Ventilator support, wean, as per pulmonary critical care medicine  S/p PCI, stent   Continue dual antiplatelet therapy, statin  Continue empiric antibiotics  Monitor sugars, sliding scale insulin  Discussed with cardiologist, discussed with pulmonary  Discussed with RN  Patient is a full code  I called and discussed with patient's wife over the phone        Case discussed with:  [x]Patient  []Family  [x]Nursing  []Case Management  DVT Prophylaxis:  []Lovenox  []Hep SQ  [x]SCDs  []Coumadin   []On Heparin gtt    Objective:   VS:   Visit Vitals  BP (!) 98/57   Pulse 75   Temp 99 °F (37.2 °C)   Resp 14   Ht 6' (1.829 m)   Wt 103.2 kg (227 lb 9.6 oz)   SpO2 95%   BMI 30.87 kg/m²      Tmax/24hrs: Temp (24hrs), Av.1 °F (37.3 °C), Min:97.7 °F (36.5 °C), Max:100.9 °F (38.3 °C)    Input/Output:     Intake/Output Summary (Last 24 hours) at 2020 1928  Last data filed at 2020 1800  Gross per 24 hour   Intake 842.39 ml   Output 1410 ml   Net -567.61 ml       General: Intubated, sedated  Cardiovascular:  S1S2+, RRR  Pulmonary: Coarse breath sounds bilaterally  GI:  Soft, BS+, NT, ND  Extremities:  No edema        Labs:    Recent Results (from the past 24 hour(s))   POC G3    Collection Time: 12/10/20  8:07 PM   Result Value Ref Range    Device: VENT      FIO2 (POC) 100 %    pH (POC) 7.38 7.35 - 7.45      pCO2 (POC) 32.2 (L) 35.0 - 45.0 MMHG    pO2 (POC) 115 (H) 80 - 100 MMHG    HCO3 (POC) 19.3 (L) 22 - 26 MMOL/L    sO2 (POC) 99 (H) 92 - 97 %    Base deficit (POC) 6 mmol/L    Mode ASSIST CONTROL      Tidal volume 664 ml    Set Rate 16 bpm    PEEP/CPAP (POC) 14 cmH2O    Allens test (POC) N/A      Inspiratory Time 0.88 sec    Total resp. rate 16      Site DRAWN FROM ARTERIAL LINE      Patient temp. 98.1      Specimen type (POC) ARTERIAL      Performed by Guero Blevins    GLUCOSE, POC    Collection Time: 12/10/20  9:18 PM   Result Value Ref Range    Glucose (POC) 178 (H) 70 - 110 mg/dL   POC G3    Collection Time: 12/10/20 10:32 PM   Result Value Ref Range    Device: VENT      FIO2 (POC) 90 %    pH (POC) 7.46 (H) 7.35 - 7.45      pCO2 (POC) 30.7 (L) 35.0 - 45.0 MMHG    pO2 (POC) 241 (H) 80 - 100 MMHG    HCO3 (POC) 22.0 22 - 26 MMOL/L    sO2 (POC) 100 (H) 92 - 97 %    Base deficit (POC) 2 mmol/L    Mode ASSIST CONTROL      Tidal volume 668 ml    Set Rate 16 bpm    PEEP/CPAP (POC) 14 cmH2O    Allens test (POC) N/A      Inspiratory Time 0.88 sec    Total resp. rate 16      Site DRAWN FROM ARTERIAL LINE      Patient temp.  98.5      Specimen type (POC) ARTERIAL      Performed by Guero Blevins    PTT    Collection Time: 12/10/20 10:35 PM   Result Value Ref Range    aPTT 29.0 23.0 - 36.4 SEC   HGB & HCT    Collection Time: 12/10/20 10:35 PM   Result Value Ref Range    HGB 11.7 (L) 13.0 - 16.0 g/dL    HCT 32.9 (L) 36.0 - 48.0 %   MAGNESIUM    Collection Time: 12/10/20 10:45 PM   Result Value Ref Range    Magnesium 2.3 1.6 - 2.6 mg/dL   PHOSPHORUS    Collection Time: 12/10/20 10:45 PM   Result Value Ref Range    Phosphorus 2.7 2.5 - 4.9 MG/DL   METABOLIC PANEL, BASIC    Collection Time: 12/10/20 10:45 PM   Result Value Ref Range    Sodium 140 136 - 145 mmol/L    Potassium 4.0 3.5 - 5.5 mmol/L    Chloride 109 100 - 111 mmol/L    CO2 23 21 - 32 mmol/L    Anion gap 8 3.0 - 18 mmol/L    Glucose 150 (H) 74 - 99 mg/dL    BUN 14 7.0 - 18 MG/DL    Creatinine 1.00 0.6 - 1.3 MG/DL    BUN/Creatinine ratio 14 12 - 20      GFR est AA >60 >60 ml/min/1.73m2    GFR est non-AA >60 >60 ml/min/1.73m2    Calcium 7.6 (L) 8.5 - 10.1 MG/DL   PTT    Collection Time: 12/11/20  4:04 AM   Result Value Ref Range    aPTT 32.9 23.0 - 36.4 SEC   CBC W/O DIFF    Collection Time: 12/11/20  4:04 AM   Result Value Ref Range    WBC 11.3 4.6 - 13.2 K/uL    RBC 3.69 (L) 4.70 - 5.50 M/uL    HGB 11.4 (L) 13.0 - 16.0 g/dL    HCT 32.8 (L) 36.0 - 48.0 %    MCV 88.9 74.0 - 97.0 FL    MCH 30.9 24.0 - 34.0 PG    MCHC 34.8 31.0 - 37.0 g/dL    RDW 14.3 11.6 - 14.5 %    PLATELET 483 851 - 701 K/uL    MPV 10.8 9.2 - 88.9 FL   METABOLIC PANEL, BASIC    Collection Time: 12/11/20  4:04 AM   Result Value Ref Range    Sodium 139 136 - 145 mmol/L    Potassium 3.7 3.5 - 5.5 mmol/L    Chloride 108 100 - 111 mmol/L    CO2 22 21 - 32 mmol/L    Anion gap 9 3.0 - 18 mmol/L    Glucose 145 (H) 74 - 99 mg/dL    BUN 14 7.0 - 18 MG/DL    Creatinine 1.16 0.6 - 1.3 MG/DL    BUN/Creatinine ratio 12 12 - 20      GFR est AA >60 >60 ml/min/1.73m2    GFR est non-AA >60 >60 ml/min/1.73m2    Calcium 8.1 (L) 8.5 - 10.1 MG/DL   POC G3    Collection Time: 12/11/20  4:04 AM   Result Value Ref Range    Device: VENT      FIO2 (POC) 60 %    pH (POC) 7.41 7.35 - 7.45      pCO2 (POC) 34.3 (L) 35.0 - 45.0 MMHG    pO2 (POC) 150 (H) 80 - 100 MMHG    HCO3 (POC) 21.5 (L) 22 - 26 MMOL/L    sO2 (POC) 99 (H) 92 - 97 %    Base deficit (POC) 3 mmol/L    Mode ASSIST CONTROL      Tidal volume 665 ml    Set Rate 16 bpm    PEEP/CPAP (POC) 14 cmH2O    Allens test (POC) N/A      Inspiratory Time 1.00 sec    Total resp.  rate 16      Site DRAWN FROM ARTERIAL LINE      Patient temp. 100.1      Specimen type (POC) ARTERIAL      Performed by Deanne Blevins    EKG, 12 LEAD, SUBSEQUENT    Collection Time: 12/11/20  5:01 AM   Result Value Ref Range    Ventricular Rate 86 BPM    Atrial Rate 86 BPM    P-R Interval 176 ms    QRS Duration 136 ms    Q-T Interval 452 ms    QTC Calculation (Bezet) 540 ms    Calculated P Axis 30 degrees    Calculated R Axis 59 degrees Calculated T Axis 117 degrees    Diagnosis       Normal sinus rhythm  Left bundle branch block  Abnormal ECG  When compared with ECG of 10-DEC-2020 18:42,  T wave inversion no longer evident in Inferior leads     GLUCOSE, POC    Collection Time: 12/11/20  5:13 AM   Result Value Ref Range    Glucose (POC) 165 (H) 70 - 110 mg/dL   POC CG8I    Collection Time: 12/11/20  8:21 AM   Result Value Ref Range    Device: VENT      FIO2 (POC) 0.6 %    pH (POC) 7.43 7.35 - 7.45      pCO2 (POC) 32.8 (L) 35.0 - 45.0 MMHG    pO2 (POC) 158 (H) 80 - 100 MMHG    HCO3 (POC) 21.7 (L) 22 - 26 MMOL/L    sO2 (POC) 99 (H) 92 - 97 %    Base deficit (POC) 2 mmol/L    Mode ASSIST CONTROL      Tidal volume 664 ml    Set Rate 16 bpm    PEEP/CPAP (POC) 12 cmH2O    PIP (POC) 27      Allens test (POC) N/A      Inspiratory Time 1 sec    Total resp. rate 16      Site DRAWN FROM ARTERIAL LINE      Patient temp. 100.9      Specimen type (POC) ARTERIAL      Sodium,  136 - 145 MMOL/L    Potassium, POC 3.7 3.5 - 5.5 MMOL/L    Glucose,  (H) 74 - 106 MG/DL    Calcium, ionized (POC) 1.14 1.12 - 1.32 mmol/L    Hematocrit, POC 30 (L) 36 - 49 %    Hemoglobin, POC 10.2 (L) 12 - 16 G/DL    Performed by Brenita Miracle    HGB & HCT    Collection Time: 12/11/20  2:15 PM   Result Value Ref Range    HGB 11.3 (L) 13.0 - 16.0 g/dL    HCT 32.3 (L) 36.0 - 48.0 %   POC CG8I    Collection Time: 12/11/20  4:53 PM   Result Value Ref Range    Device: VENT      FIO2 (POC) 0.35 %    pH (POC) 7.48 (H) 7.35 - 7.45      pCO2 (POC) 29.6 (L) 35.0 - 45.0 MMHG    pO2 (POC) 71 (L) 80 - 100 MMHG    HCO3 (POC) 22.0 22 - 26 MMOL/L    sO2 (POC) 95 92 - 97 %    Base deficit (POC) 1 mmol/L    Mode ASSIST CONTROL      Tidal volume 653 ml    Set Rate 16 bpm    PEEP/CPAP (POC) 5 cmH2O    PIP (POC) 23      Allens test (POC) N/A      Inspiratory Time 1 sec    Total resp.  rate 16      Site DRAWN FROM ARTERIAL LINE      Patient temp. 99.3      Specimen type (POC) ARTERIAL      Sodium,  136 - 145 MMOL/L    Potassium, POC 3.5 3.5 - 5.5 MMOL/L    Glucose,  (H) 74 - 106 MG/DL    Calcium, ionized (POC) 1.13 1.12 - 1.32 mmol/L    Hematocrit, POC 30 (L) 36 - 49 %    Hemoglobin, POC 10.2 (L) 12 - 16 G/DL    Performed by Eleazar Yin        Signed By: Krishan Grove MD     December 11, 2020

## 2020-12-12 NOTE — PROGRESS NOTES
[facility]      CARDIOLOGY PROGRESS NOTE  RECS:    1. Unstable angina- s/p Cardiac Cath that shows 40-45% distal LM stenosis. Critical mid LAD stenosis. PCI procedure uncomplicated. Patient developed acute pulmonary edema requiring intubation on mechanical ventilation. continue DAPT brilinta and asa. Integrilin d/c  2. Acute respiratory failure- in the setting acute decompensated CHF-requiring mechanical ventilation - [address] following - unable to extubated today due to bradycardia   3. Hypotension- likely cardiogenic shock-better off pressors   4. CAD-cardiac cath 12/2017 showed  Triple-vessel coronary artery disease. 5. Hyperlipidemia- LDL 74 11/20 Continue with Crestor  20 mg.  6. Diabetes- well controlled with A1c 5.9   7. LBBB- new since 12/20   8. Hemoptysis- traumatic ETT tube echange and pulmonary edema- better     Respiratory status stable. Continue DAPT. Extubate when possible. Responds appropriately. Will f/u    Cardiac cath 12/9/20  · Three-vessel coronary artery disease with 90% mid LAD stenosis 80% distal circumflex stenosis and 100% mid RCA stenosis after diffuse disease in proximal and ostial RCA. · Patent previously deployed drug-eluting stents in mid circumflex and distal circumflex areas. · Borderline critical left main stenosis which is approximately 50% and has progressed from previous catheterization in 2017. · Procedure done via right radial artery without any complications. Given left main disease, CABG will be considered first.  We will take a surgical opinion with Dr. Ralph Samuels. I discussed with him on phone and he/his team will be seeing the patient.     Echo 12/20  · Contrast used: DEFINITY. · LV: Calculated LVEF is 55%. Visually measured ejection fraction. Normal cavity size and systolic function (ejection fraction normal). Mild concentric hypertrophy. Age-appropriate left ventricular diastolic function.   · PA: Pulmonary arterial systolic pressure is 26 mmHg.  · Aorta: Dilated aortic root; diameter is 3.7 cm. ASSESSMENT:  Hospital Problems  Date Reviewed: 11/13/2019          Codes Class Noted POA    ACS (acute coronary syndrome) ([facility] ICD-10-CM: I24.9  ICD-9-CM: 411.1  12/8/2020 Unknown        Controlled type 2 diabetes mellitus without complication, without long-term current use of insulin ([facility] ICD-10-CM: E11.9  ICD-9-CM: 250.00  11/29/2018 Yes        Coronary artery disease involving native coronary artery with unstable angina pectoris ([facility] ICD-10-CM: I25.110  ICD-9-CM: 414.01, 411.1  1/27/2016 Yes    Overview Signed 1/27/2016 11:41 AM by Lee Hrae MD     recent admission angina stable on medical managment  unable to tolerate isordil             Essential hypertension ICD-10-CM: I10  ICD-9-CM: 401.9  1/27/2016 Yes    Overview Signed 1/27/2016 11:41 AM by Lee Hare MD     controlled             Unstable angina pectoris ([facility] ICD-10-CM: I20.0  ICD-9-CM: 411.1  1/17/2016 Yes                SUBJECTIVE:  Intubated     OBJECTIVE:    VS:   Visit Vitals  BP (!) 91/58   Pulse 74   Temp 97.9 °F (36.6 °C)   Resp 13   Ht 6' (1.829 m)   Wt 103.5 kg (228 lb 1.6 oz)   SpO2 95%   BMI 30.94 kg/m²         Intake/Output Summary (Last 24 hours) at 12/12/2020 0951  Last data filed at 12/12/2020 0600  Gross per 24 hour   Intake 1148 ml   Output 1305 ml   Net -157 ml     TELE: normal sinus rhythm    General: intubated   HENT: Normocephalic, atraumatic. Normal external eye.   Neck :  no bruit, no JVD  Cardiac:  regular rate and rhythm, S1, S2 normal, no murmur, click, rub or gallop  Lungs: few expiratory  rales bilaterally  Abdomen: Soft, nontender, no masses  Extremities:  No c/c/e, peripheral pulses present      Labs: Results:       Chemistry Recent Labs     12/12/20  0400 12/11/20  0404 12/10/20  2245  12/10/20  0300   * 145* 150*   < > 161*    139 140   < > 138   K 3.5 3.7 4.0   < > 3.9   * 108 109   < > 109   CO2 23 22 23   < > 22   BUN 15 14 14   < > 14   CREA 0.86 1.16 1.00   < > 1.00   CA 7.9* 8.1* 7.6*   < > 7.9*   AGAP 6 9 8   < > 7   BUCR 17 12 14   < > 14   AP  --   --   --   --  55   TP  --   --   --   --  6.1*   ALB  --   --   --   --  3.1*   GLOB  --   --   --   --  3.0   AGRAT  --   --   --   --  1.0    < > = values in this interval not displayed. CBC w/Diff Recent Labs     12/12/20  0400 12/11/20  1415 12/11/20  0404  12/10/20  1625 12/10/20  0300   WBC 10.5  --  11.3  --  13.5* 9.7   RBC 3.48*  --  3.69*  --  3.21* 3.76*   HGB 10.6* 11.3* 11.4*   < > 10.1* 11.5*   HCT 31.5* 32.3* 32.8*   < > 28.3* 33.1*     --  174  --  136 166   GRANS 74*  --   --   --  77* 70   LYMPH 14*  --   --   --  16* 17*   EOS 2  --   --   --  0 1    < > = values in this interval not displayed. Cardiac Enzymes Recent Labs     12/10/20  0300 12/09/20  2055   CPK 80 75   CKND1 1.4 CALCULATION NOT PERFORMED WHEN RESULT IS BELOW LINEAR LIMIT      Coagulation Recent Labs     12/11/20  0404 12/10/20  2235 12/10/20  1625  12/10/20  0300   PTP  --   --  13.4  --  13.7   INR  --   --  1.0  --  1.1   APTT 32.9 29.0 67.9*   < > 47.4*    < > = values in this interval not displayed. Lipid Panel Lab Results   Component Value Date/Time    Cholesterol, total 114 12/12/2020 04:00 AM    HDL Cholesterol 28 (L) 12/12/2020 04:00 AM    LDL, calculated 38 12/12/2020 04:00 AM    VLDL, calculated 48 12/12/2020 04:00 AM    Triglyceride 240 (H) 12/12/2020 04:00 AM    CHOL/HDL Ratio 4.1 12/12/2020 04:00 AM      BNP No results for input(s): BNPP in the last 72 hours. Liver Enzymes Recent Labs     12/10/20  0300   TP 6.1*   ALB 3.1*   AP 55      Thyroid Studies Lab Results   Component Value Date/Time    TSH 1.33 11/23/2020 07:34 AM              Mini LOPEZ supervised [place]:[phone]   I have independently evaluated and examined the patient. All relevant labs and testing data's are reviewed. Care plan discussed and updated after review.     Melissa Nelson MD

## 2020-12-12 NOTE — PROGRESS NOTES
0700- Received report from Enrike Jara RN and assumed care of patient. SBT attempt, per RT, not successful d/t sudden bradycardia with HR dropping to upper 30's. Placed back on previous vent settings. 18- Called patients wife on Face Time using patients personal cell phone. Patient drowsy but opens eyes to voice and follows commands. Increased Fentanyl gtt to 75 mc/hr per patient report of increased pain. Wife states patient has chronic back pain. 26- Dr. Quinn Arrieta at bedside. No new orders at this time. 1645- Patients spouse called again for update. Informed her no changes in patient condition. 1800- Hospitalist at bedside. No new orders at this time. States he will call patients spouse with update. 200- Called patients wife on Face Time again using patients personal cell phone. Patient more drowsy this time but attempts to open his eyes.

## 2020-12-12 NOTE — CONSULTS
[facility]  Pulmonary, Critical Care, and Sleep Medicine    Name: Aida Pinto MRN: [id number]   : 1950 Hospital: [facility]   Date: 2020        Pulmonary Medicine: Critical Care Initial Patient Consult      IMPRESSION:   · Respiratory Failure: Hypoxic: pulmonary edema and hemoptysis- improved today  · Hemoptysis: in the setting of antiplatlet therapy post cath and traumatic ETT tube echange and pulmonary edema- appears resolved at this time- Remains on aggressive antiplatelet therapy due to LIZ placement  · CAD: S/P PCI with LIZ 12/10/20-, LAD for ongoing unstable angina  · Probable aspiration  · Hypotension: possible SIRs response, sedation. Shock cardiogenic and/or distributive- clinical picture still evolving- currently improved  · Hypokalemia- replacing  · Diabetes  · GERD  · Obesity: Body mass index is 30.94 kg/m². ·       RECOMMENDATIONS:   NEURO  · Keep patient sedated- propofol and PRN versed OK  · RASS goal 0 to-1  ·   CV  · MAP goal >65  · Diuresis  Per cardiology  · Integrelin and antiplatlet/anticoagulation therapy and cardiac care per Cardiology  PULM     · AC/VC+: Wean vent setting today  · Continue on mechanical ventilation - failed SBT this am.  Daily breathing trials  · VAP bundle  · Gentle airway suction- monitor for rebleeding  · Have chilled sterile saline available if needed  · SpO2 goal >92%  · Tranexamic nebs ordered if needed    GI  · NPO  · OGT- clamped per cardiology protocol for  Brilinta therapy  · Protonix- SUP    RENAL/Metabolic  · Electrolyte replacement protocol  · Boss  · Strict I&Os  ENDO  · Glycemic control per protocol and hospital team  ID  · Continue Zosyn  HEME  · Stable H/H  · Type and cross and continue to trend CBC          Subjective/History: This patient has been seen and evaluated at the request of Dr. Rosie Foreman for respiratory failure and hemoptysis. Patient is a 79 y.o. male with history of CAD, DM.  Patient went to cath lab today for repeat cath due to unstable angina. PCI with LAD stent placed. Patient went into pulmonary edema. Anesthesia called to cath lab and patient intubated with 6.5 EET. Adrienne Blackwell  made later in the case to change out to larger tube. ETT exchanged over Boogie which followed by hemoptysis. Suspect combination of local airway trauma with patient on antiplatelet therapy. I was called STAT to CVICU with report of hemoptysis and SpO2 into the 70-80's. Upon my arrival to the ICU, the patient was partially sedated, chomping on ETT, dark blood in ETT and circuit. Patient also coughing up fresher blood. On Fio2 100 and PEEP 14. AC/PC. Boss in placed and actively diruresing. Patient sedated with some modest improvement in SpO2 into the 80's. Initial Po2 in the mid to high 40's. Airway suctioned with some improvement. Patient with hypotension after sedation. Patient given dose if IV pressor with significant hypertension requiring starting Nitroglycerin drip and propofol which was weaned fairly quickly. Hypotension returned requiring low dose Nor Epi drip via 20G right AC with good blood return. Due to level of anticoagulation/platelet therapy opted not to pursue central line. Arterial sheath in right groin which we are using to transduce IBP monitoring. Bedside POCUS with bilateral sliding lung sign and b-lines    CXR with bilateral pumonary infiltrates, ETT , OGT in place and no findings on pneumothroax. ETT is a bit high but want to avoid further airway trauma so will hold on advancing at this time    The patient has been evaluated throughout the evening. Overall clinical status has been consistently improving. SpO2 has gradually improved into the high 90's. Vent changed from AC/PC to Changed over the AC/VC+     Patient is awake and moving all extremities, He is a bit restless and will need a bit more sedation at this time    Good UOP. Currently off IV pressors.     Most recent Hb 10 will continue to trend for now    OGT currently clamped due to [facility]      The patient is critically ill and can not provide additional history due to Unconsciousness and Ventilated.          12/12/20   LOS: 4    · Patient did well overnight  · Failed SBT this am due to bradycardia. · Improved oxygenation- have been weaning down vent settings  · Resolving hemoptysis, Hb stable  · Continues on Zosyn for possible aspiration event  · Off IV pressors this am       Patient Vitals for the past 12 hrs:   Temp Pulse Resp BP SpO2   12/12/20 0705  74 13  95 %   12/12/20 0600  71 14 (!) 91/58 96 %   12/12/20 0500  73 14 109/60 97 %   12/12/20 0400 97.9 °F (36.6 °C) 83 25 116/73 98 %   12/12/20 0331  72 14  96 %   12/12/20 0300  76 14 (!) 99/54 96 %   12/12/20 0200  75 14 94/60 96 %   12/12/20 0100  79 21 (!) 114/59 96 %   12/12/20 0000 98.7 °F (37.1 °C) 93 14 129/67 96 %   12/11/20 2334  80 14  93 %   12/11/20 2300  88 14 121/65 95 %   12/11/20 2200  90 16 121/72 95 %   12/11/20 2101  82  (!) 153/72    12/11/20 2100  84 15 135/72 93 %          Past Medical History:   Diagnosis Date    Arthritis     1999 vicodin    Diabetes ([facility] [address] 1998 metformin    GERD (gastroesophageal reflux disease)     Hypercholesterolemia     Hypertension 1996 norvasc    Kidney stones     MI (myocardial infarction) [facility])       Past Surgical History:   Procedure Laterality Date    CARDIAC SURG PROCEDURE UNLIST  12/2017    stints    COLONOSCOPY N/A 11/2/2018    COLONOSCOPY with Polypectomies performed by Nargis Sibley MD at [facility] ENDOSCOPY    HX CHOLECYSTECTOMY      HX GI  2010    gallbladder      Prior to Admission medications    Medication Sig Start Date End Date Taking?  Authorizing Provider   carvediloL (COREG) 25 mg tablet TAKE 1 TABLET BY MOUTH TWICE A DAY WITH FOOD 10/30/20  Yes Mini Tinajero NP   Januvia 100 mg tablet TAKE 1 TABLET BY MOUTH EVERY DAY 10/2/20  Yes Brigida Elizabeth MD   glipiZIDE SR (GLUCOTROL XL) 5 mg CR tablet TAKE 1 TABLET BY MOUTH EVERY DAY 8/3/20  Yes Yvette Che MD   omeprazole (PRILOSEC) 20 mg capsule TAKE 1 CAPSULE BY MOUTH EVERY DAY 8/3/20  Yes Yvette Che MD   amLODIPine (NORVASC) 10 mg tablet Take 1 Tab by mouth daily. 7/20/20  Yes Mini Tinajero NP   rosuvastatin (CRESTOR) 10 mg tablet Take 1 Tab by mouth daily. 7/20/20  Yes Mini Tinajero NP   metFORMIN (GLUCOPHAGE) 1,000 mg tablet TAKE 1 TABLET BY MOUTH TWICE A DAY WITH MEALS 7/16/20  Yes Yvette Che MD   loratadine (Claritin) 10 mg tablet Take 10 mg by mouth daily as needed. Yes Provider, Ruddy   ramipril (ALTACE) 10 mg capsule Take 1 Cap by mouth daily. 2/27/20  Yes Coby Mantilla NP   aspirin delayed-release 325 mg tablet Take 1 Tab by mouth daily. 2/27/20  Yes Coby Mantilla NP   clopidogrel (PLAVIX) 75 mg tab TAKE 1 TABLET BY MOUTH EVERY DAY 1/6/20  Yes Coby Mantilla NP   nitroglycerin (NITROSTAT) 0.4 mg SL tablet 1 Tab by SubLINGual route every five (5) minutes as needed for Chest Pain for up to 3 doses.  12/2/17  Yes Patricia Reyna MD   Blood Pressure Monitor kit Check once a day 5/12/20   Yvette Che MD     Current Facility-Administered Medications   Medication Dose Route Frequency    propofol (DIPRIVAN) 10 mg/mL infusion  0-50 mcg/kg/min IntraVENous TITRATE    fentaNYL (PF) 900 mcg/30 ml infusion soln  0-200 mcg/hr IntraVENous TITRATE    sodium chloride (NS) flush 5-40 mL  5-40 mL IntraVENous Q8H    ticagrelor (BRILINTA) tablet 90 mg  90 mg Oral BID    piperacillin-tazobactam (ZOSYN) 3.375 g in 0.9% sodium chloride (MBP/ADV) 100 mL MBP  3.375 g IntraVENous Q6H    NOREPINephrine (LEVOPHED) 8 mg in 5% dextrose 250mL (32 mcg/mL) infusion  0.5-50 mcg/min IntraVENous TITRATE    pantoprazole (PROTONIX) 40 mg in 0.9% sodium chloride 10 mL injection  40 mg IntraVENous DAILY    insulin lispro (HUMALOG) injection   SubCUTAneous Q6H    sodium chloride (NS) flush 5-40 mL  5-40 mL IntraVENous Q8H    rosuvastatin (CRESTOR) tablet 40 mg  40 mg Oral QHS    [START ON 2020] ceFAZolin (ANCEF) 2g IVPB in 50 mL D5W  2 g IntraVENous ONCE    mupirocin (BACTROBAN) 2 % ointment   Both Nostrils BID    carvediloL (COREG) tablet 6.25 mg  6.25 mg Oral Q12H    aspirin chewable tablet 81 mg  81 mg Oral DAILY     No Known Allergies   Social History     Tobacco Use    Smoking status: Never Smoker    Smokeless tobacco: Never Used   Substance Use Topics    Alcohol use: No      Family History   Problem Relation Age of Onset    Stroke Mother     Headache Mother     Hypertension Mother     Lung Cancer Mother     Heart Disease Father     Heart Attack Father     Hypertension Father     Diabetes Father     Lung Cancer Father     Ovarian Cancer Sister     Heart Attack Brother     Breast Cancer Sister     Diabetes Sister     Cancer Maternal Aunt         lung cancer    Cancer Maternal Uncle     Cancer Paternal Aunt     Cancer Paternal Uncle         Review of Systems:  Review of systems not obtained due to patient factors. Objective:   Vital Signs:    Visit Vitals  BP (!) 91/58   Pulse 74   Temp 97.9 °F (36.6 °C)   Resp 13   Ht 6' (1.829 m)   Wt 103.5 kg (228 lb 1.6 oz)   SpO2 95%   BMI 30.94 kg/m²       O2 Device: Ventilator   O2 Flow Rate (L/min): 2 l/min   Temp (24hrs), Av.5 °F (36.9 °C), Min:97.7 °F (36.5 °C), Max:99.3 °F (37.4 °C)       Intake/Output:   Last shift:      No intake/output data recorded.   Last 3 shifts: 12/10 1901 -  0700  In: 2090.4 [I.V.:1870.4]  Out: 2195 [Urine:1845]    Intake/Output Summary (Last 24 hours) at 2020 0826  Last data filed at 2020 0600  Gross per 24 hour   Intake 1148 ml   Output 1340 ml   Net -192 ml         Ventilator Settings:  Mode Rate Tidal Volume Pressure FiO2 PEEP   VC+   550 ml  8 cm H2O(SBT PS8) 35 % 5 cm H20     Peak airway pressure: 31 cm H2O    Minute ventilation: 9.2 l/min           Physical Exam:    General:  Awake, restless, intubated Head:  Normocephalic, without obvious abnormality, atraumatic. Eyes:  Conjunctivae/corneas clear. PERRL, EOMs intact. Nose: Nares normal. Septum midline. Mucosa normal. No drainage or sinus tenderness. Throat: Lips, mucosa,normal, ETT - circuit clean   Neck: Supple, symmetrical, trachea midline, no adenopathy, thyroid: no enlargment/tenderness/nodules   Back:   Symmetric   Lungs:   Coarse breath sounds on the vent- improved   Chest wall:  No tenderness or deformity. Heart:  Regular rate and rhythm, S1, S2 normal, no murmur, click, rub or gallop. Abdomen:   Soft, non-tender. Bowel sounds normal. No masses,  No organomegaly. Extremities: Extremities normal, atraumatic, no cyanosis or edema. Pulses: 2+ and symmetric all extremities. Skin: Skin color, texture, turgor normal. No rashes or lesions   Lymph nodes:      Cervical, supraclavicular nodes are unremarkable   Neurologic: Grossly nonfocal       Data:     Recent Results (from the past 24 hour(s))   HGB & HCT    Collection Time: 12/11/20  2:15 PM   Result Value Ref Range    HGB 11.3 (L) 13.0 - 16.0 g/dL    HCT 32.3 (L) 36.0 - 48.0 %   POC CG8I    Collection Time: 12/11/20  4:53 PM   Result Value Ref Range    Device: VENT      FIO2 (POC) 0.35 %    pH (POC) 7.48 (H) 7.35 - 7.45      pCO2 (POC) 29.6 (L) 35.0 - 45.0 MMHG    pO2 (POC) 71 (L) 80 - 100 MMHG    HCO3 (POC) 22.0 22 - 26 MMOL/L    sO2 (POC) 95 92 - 97 %    Base deficit (POC) 1 mmol/L    Mode ASSIST CONTROL      Tidal volume 653 ml    Set Rate 16 bpm    PEEP/CPAP (POC) 5 cmH2O    PIP (POC) 23      Allens test (POC) N/A      Inspiratory Time 1 sec    Total resp.  rate 16      Site DRAWN FROM ARTERIAL LINE      Patient temp. 99.3      Specimen type (POC) ARTERIAL      Sodium,  136 - 145 MMOL/L    Potassium, POC 3.5 3.5 - 5.5 MMOL/L    Glucose,  (H) 74 - 106 MG/DL    Calcium, ionized (POC) 1.13 1.12 - 1.32 mmol/L    Hematocrit, POC 30 (L) 36 - 49 %    Hemoglobin, POC 10.2 (L) 12 - 16 G/DL    Performed by Wing Soto POC    Collection Time: 12/11/20 11:18 PM   Result Value Ref Range    Glucose (POC) 146 (H) 70 - 110 mg/dL   POC CG8I    Collection Time: 12/12/20  3:54 AM   Result Value Ref Range    Device: VENT      FIO2 (POC) 35 %    pH (POC) 7.47 (H) 7.35 - 7.45      pCO2 (POC) 27.8 (L) 35.0 - 45.0 MMHG    pO2 (POC) 108 (H) 80 - 100 MMHG    HCO3 (POC) 20.4 (L) 22 - 26 MMOL/L    sO2 (POC) 99 (H) 92 - 97 %    Base deficit (POC) 3 mmol/L    Mode ASSIST CONTROL      Tidal volume 667 ml    Set Rate 14 bpm    PEEP/CPAP (POC) 5 cmH2O    Allens test (POC) N/A      Inspiratory Time 1.00 sec    Total resp. rate 14      Site DRAWN FROM ARTERIAL LINE      Patient temp.  97.9      Specimen type (POC) ARTERIAL      Sodium,  136 - 145 MMOL/L    Potassium, POC 3.5 3.5 - 5.5 MMOL/L    Glucose,  (H) 74 - 106 MG/DL    Calcium, ionized (POC) 1.12 1.12 - 1.32 mmol/L    Hematocrit, POC 28 (L) 36 - 49 %    Hemoglobin, POC 9.5 (L) 12 - 16 G/DL    Performed by Mp Blevins    LIPID PANEL    Collection Time: 12/12/20  4:00 AM   Result Value Ref Range    LIPID PROFILE          Cholesterol, total 114 <200 MG/DL    Triglyceride 240 (H) <150 MG/DL    HDL Cholesterol 28 (L) 40 - 60 MG/DL    LDL, calculated 38 0 - 100 MG/DL    VLDL, calculated 48 MG/DL    CHOL/HDL Ratio 4.1 0 - 5.0     LIPASE    Collection Time: 12/12/20  4:00 AM   Result Value Ref Range    Lipase 78 73 - 393 U/L   CBC WITH AUTOMATED DIFF    Collection Time: 12/12/20  4:00 AM   Result Value Ref Range    WBC 10.5 4.6 - 13.2 K/uL    RBC 3.48 (L) 4.70 - 5.50 M/uL    HGB 10.6 (L) 13.0 - 16.0 g/dL    HCT 31.5 (L) 36.0 - 48.0 %    MCV 90.5 74.0 - 97.0 FL    MCH 30.5 24.0 - 34.0 PG    MCHC 33.7 31.0 - 37.0 g/dL    RDW 14.7 (H) 11.6 - 14.5 %    PLATELET 371 105 - 259 K/uL    MPV 10.4 9.2 - 11.8 FL    NEUTROPHILS 74 (H) 40 - 73 %    LYMPHOCYTES 14 (L) 21 - 52 %    MONOCYTES 10 3 - 10 %    EOSINOPHILS 2 0 - 5 %    BASOPHILS 0 0 - 2 % ABS. NEUTROPHILS 7.7 1.8 - 8.0 K/UL    ABS. LYMPHOCYTES 1.5 0.9 - 3.6 K/UL    ABS. MONOCYTES 1.1 0.05 - 1.2 K/UL    ABS. EOSINOPHILS 0.2 0.0 - 0.4 K/UL    ABS. BASOPHILS 0.0 0.0 - 0.1 K/UL    DF AUTOMATED     METABOLIC PANEL, BASIC    Collection Time: 12/12/20  4:00 AM   Result Value Ref Range    Sodium 141 136 - 145 mmol/L    Potassium 3.5 3.5 - 5.5 mmol/L    Chloride 112 (H) 100 - 111 mmol/L    CO2 23 21 - 32 mmol/L    Anion gap 6 3.0 - 18 mmol/L    Glucose 134 (H) 74 - 99 mg/dL    BUN 15 7.0 - 18 MG/DL    Creatinine 0.86 0.6 - 1.3 MG/DL    BUN/Creatinine ratio 17 12 - 20      GFR est AA >60 >60 ml/min/1.73m2    GFR est non-AA >60 >60 ml/min/1.73m2    Calcium 7.9 (L) 8.5 - 10.1 MG/DL   MAGNESIUM    Collection Time: 12/12/20  4:00 AM   Result Value Ref Range    Magnesium 2.2 1.6 - 2.6 mg/dL   GLUCOSE, POC    Collection Time: 12/12/20  4:59 AM   Result Value Ref Range    Glucose (POC) 149 (H) 70 - 110 mg/dL           Lab Results   Component Value Date/Time    TSH 1.33 11/23/2020 07:34 AM    T4, Free 1.0 01/30/2019 07:32 AM   Results for Ananya Encinas (MRN [id number]) as of 12/11/2020 11:39   Ref. Range 12/10/2020 16:02 12/10/2020 17:17 12/10/2020 21:18 12/11/2020 05:13 12/11/2020 08:21   GLUCOSE,FAST - POC Latest Ref Range: 74 - 106 MG/ (H) 266 (H) 178 (H) 165 (H) 154 (H)         Recent Labs     12/12/20  0354 12/11/20  1653 12/11/20  0821   FIO2I 35 0.35 0.6   HCO3I 20.4* 22.0 21.7*   PCO2I 27.8* 29.6* 32.8*   PHI 7.47* 7.48* 7.43   PO2I 108* 71* 158*     12/08/20   ECHO ADULT COMPLETE 12/09/2020 12/9/2020    Narrative · Contrast used: DEFINITY. · LV: Calculated LVEF is 55%. Visually measured ejection fraction. Normal   cavity size and systolic function (ejection fraction normal). Mild   concentric hypertrophy. Age-appropriate left ventricular diastolic   function. · PA: Pulmonary arterial systolic pressure is 26 mmHg. · Aorta: Dilated aortic root; diameter is 3.7 cm.         Signed by: Diane Birmingham MD ARTURO       Telemetry:normal sinus rhythm        Imaging:  I have personally reviewed the patients radiographs and have reviewed the reports:  CXR Results  (Last 48 hours)               12/11/20 0921  XR CHEST PORT Final result    Impression:  IMPRESSION:   1. Tubes and lines without evidence of complication. 2.  Small effusion and improved likely pulmonary edema. Narrative:  EXAM: Chest X-Ray       INDICATION:  Intubated. TECHNIQUE: AP view of the chest       COMPARISON: 12/10/2020, 12/8/2020, 9/27/2019 and 7/19/2018       FINDINGS:    Tubes and Lines: ET tube and NG tube are unchanged. Pleura: No pneumothorax appreciated. Possible layering effusion on the left. Lungs:  Bilateral perihilar opacities are noted but improved since most recent   prior. Cardiac/Mediastinum/Aorta: Cardiac silhouette is prominent. Pulmonary Vascularity: Pulmonary vasculature is prominent. Chest Wall: No acute finding       Osseous Structures: Left clavicle nonunion. Upper Abdomen: No acute findings appreciated. 12/10/20 1621  XR CHEST PORT Final result    Impression:  IMPRESSION:   1. Tubes and lines without evidence of complication. 2.  Moderate pulmonary edema with small left pleural effusion. Narrative:  EXAM: Chest X-Ray       INDICATION:  hypoxia. TECHNIQUE: AP view of the chest       COMPARISON: 12/8/2020, 9/27/2019 and 7/19/2018       FINDINGS:    Tubes and Lines: Patient is intubated. The ET tube is 5 cm above the paxton. NG   tube is present with tip overlying stomach. Pleura: No pneumothorax appreciated. Small left pleural effusion is present. Lungs:  Prominent perihilar interstitial opacities are noted. Cardiac/Mediastinum/Aorta: Cardiac silhouette is prominent. Pulmonary Vascularity: Pulmonary vasculature is prominent.        Chest Wall: No acute finding       Osseous Structures: Unremarkable       Upper Abdomen: No acute findings appreciated. CT Results  (Last 48 hours)    None           Best practice :  Core measures:    Glycemic control  [facility]. Ventilated patients- aim to keep peak plateau pressure 73-69PA H2O. Sress ulcer prophylaxis  DVT prophylaxis       Boss Bundle Followed and Vent Bundle Followed, Vent Day 1        Critical Care Time:  The services I provided to this patient were to treat and/or prevent clinically significant deterioration that could result in the failure of one or more body systems and/or organ systems due to respiratory distress, hypoxia, cardiac dysrhythmia. Services included the following:  -reviewing nursing notes and old charts  -vital sign assessments- reassessed BP at bedside  -direct patient care  -medication orders and management  -interpreting and reviewing diagnostic studies/labs  -re-evaluations  -documentation time  -Spoke with ICU nurse, Cardiology, nutrition     Aggregate critical care time was 35   minutes, which includes only time during which I was engaged in work directly related to the patient's care as described above. During this entire length of time I was immediately available to the patient. The reason for providing this level of medical care for this critically ill patient was due a critical illness that impaired one or more vital organ systems such that there was a high probability of imminent or life threatening deterioration in the patients condition. This care involved high complexity decision making to assess, manipulate, and support vital system functions, to treat this degreee vital organ system failure and to prevent further life threatening deterioration of the patients condition      Complex decision making was made in the evaluation and management plans during this consultation. More than 50% of time was spent in counseling and coordination of care including review of data and discussion with other team members.     Alexis Garcia MD

## 2020-12-12 NOTE — PROGRESS NOTES
12/12/20 0705   Weaning Parameters   Spontaneous Breathing Trial Complete Yes  (Fail Due to Bradycardia (44))    Returned to previous settings

## 2020-12-13 LAB
ALBUMIN SERPL-MCNC: 2.6 G/DL (ref 3.4–5)
ALBUMIN/GLOB SERPL: 0.8 {RATIO} (ref 0.8–1.7)
ALP SERPL-CCNC: 56 U/L (ref 45–117)
ALT SERPL-CCNC: 21 U/L (ref 16–61)
ANION GAP SERPL CALC-SCNC: 5 MMOL/L (ref 3–18)
ARTERIAL PATENCY WRIST A: ABNORMAL
AST SERPL-CCNC: 28 U/L (ref 10–38)
BASE DEFICIT BLD-SCNC: 5 MMOL/L
BASOPHILS # BLD: 0 K/UL (ref 0–0.1)
BASOPHILS NFR BLD: 0 % (ref 0–2)
BDY SITE: ABNORMAL
BILIRUB SERPL-MCNC: 0.4 MG/DL (ref 0.2–1)
BUN SERPL-MCNC: 17 MG/DL (ref 7–18)
BUN/CREAT SERPL: 19 (ref 12–20)
CALCIUM SERPL-MCNC: 8.4 MG/DL (ref 8.5–10.1)
CHLORIDE SERPL-SCNC: 111 MMOL/L (ref 100–111)
CO2 SERPL-SCNC: 23 MMOL/L (ref 21–32)
CREAT SERPL-MCNC: 0.9 MG/DL (ref 0.6–1.3)
DIFFERENTIAL METHOD BLD: ABNORMAL
EOSINOPHIL # BLD: 0.3 K/UL (ref 0–0.4)
EOSINOPHIL NFR BLD: 4 % (ref 0–5)
ERYTHROCYTE [DISTWIDTH] IN BLOOD BY AUTOMATED COUNT: 14.8 % (ref 11.6–14.5)
GAS FLOW.O2 O2 DELIVERY SYS: ABNORMAL L/MIN
GLOBULIN SER CALC-MCNC: 3.4 G/DL (ref 2–4)
GLUCOSE BLD STRIP.AUTO-MCNC: 113 MG/DL (ref 70–110)
GLUCOSE BLD STRIP.AUTO-MCNC: 117 MG/DL (ref 70–110)
GLUCOSE BLD STRIP.AUTO-MCNC: 90 MG/DL (ref 70–110)
GLUCOSE BLD STRIP.AUTO-MCNC: 99 MG/DL (ref 70–110)
GLUCOSE SERPL-MCNC: 102 MG/DL (ref 74–99)
HCO3 BLD-SCNC: 20.3 MMOL/L (ref 22–26)
HCT VFR BLD AUTO: 30.5 % (ref 36–48)
HCT VFR BLD AUTO: 30.9 % (ref 36–48)
HCT VFR BLD AUTO: 32.3 % (ref 36–48)
HGB BLD-MCNC: 10.5 G/DL (ref 13–16)
HGB BLD-MCNC: 10.5 G/DL (ref 13–16)
HGB BLD-MCNC: 11.2 G/DL (ref 13–16)
LYMPHOCYTES # BLD: 1.5 K/UL (ref 0.9–3.6)
LYMPHOCYTES NFR BLD: 20 % (ref 21–52)
MAGNESIUM SERPL-MCNC: 2.2 MG/DL (ref 1.6–2.6)
MCH RBC QN AUTO: 31 PG (ref 24–34)
MCHC RBC AUTO-ENTMCNC: 34 G/DL (ref 31–37)
MCV RBC AUTO: 91.2 FL (ref 74–97)
MONOCYTES # BLD: 0.8 K/UL (ref 0.05–1.2)
MONOCYTES NFR BLD: 11 % (ref 3–10)
NEUTS SEG # BLD: 4.9 K/UL (ref 1.8–8)
NEUTS SEG NFR BLD: 65 % (ref 40–73)
PCO2 BLD: 34.7 MMHG (ref 35–45)
PH BLD: 7.38 [PH] (ref 7.35–7.45)
PLATELET # BLD AUTO: 156 K/UL (ref 135–420)
PMV BLD AUTO: 10.4 FL (ref 9.2–11.8)
PO2 BLD: 76 MMHG (ref 80–100)
POTASSIUM SERPL-SCNC: 3.7 MMOL/L (ref 3.5–5.5)
PROT SERPL-MCNC: 6 G/DL (ref 6.4–8.2)
RBC # BLD AUTO: 3.39 M/UL (ref 4.7–5.5)
SAO2 % BLD: 95 % (ref 92–97)
SERVICE CMNT-IMP: ABNORMAL
SODIUM SERPL-SCNC: 139 MMOL/L (ref 136–145)
SPECIMEN TYPE: ABNORMAL
VENTILATION MODE VENT: ABNORMAL
WBC # BLD AUTO: 7.5 K/UL (ref 4.6–13.2)

## 2020-12-13 PROCEDURE — 86923 COMPATIBILITY TEST ELECTRIC: CPT

## 2020-12-13 PROCEDURE — 74011250636 HC RX REV CODE- 250/636: Performed by: INTERNAL MEDICINE

## 2020-12-13 PROCEDURE — 94762 N-INVAS EAR/PLS OXIMTRY CONT: CPT

## 2020-12-13 PROCEDURE — 99233 SBSQ HOSP IP/OBS HIGH 50: CPT | Performed by: INTERNAL MEDICINE

## 2020-12-13 PROCEDURE — 80053 COMPREHEN METABOLIC PANEL: CPT

## 2020-12-13 PROCEDURE — 99232 SBSQ HOSP IP/OBS MODERATE 35: CPT | Performed by: HOSPITALIST

## 2020-12-13 PROCEDURE — 85018 HEMOGLOBIN: CPT

## 2020-12-13 PROCEDURE — C9113 INJ PANTOPRAZOLE SODIUM, VIA: HCPCS | Performed by: INTERNAL MEDICINE

## 2020-12-13 PROCEDURE — 74011250637 HC RX REV CODE- 250/637: Performed by: INTERNAL MEDICINE

## 2020-12-13 PROCEDURE — 74011250637 HC RX REV CODE- 250/637: Performed by: NURSE PRACTITIONER

## 2020-12-13 PROCEDURE — 82962 GLUCOSE BLOOD TEST: CPT

## 2020-12-13 PROCEDURE — 74011000250 HC RX REV CODE- 250: Performed by: INTERNAL MEDICINE

## 2020-12-13 PROCEDURE — 94003 VENT MGMT INPAT SUBQ DAY: CPT

## 2020-12-13 PROCEDURE — 74011000258 HC RX REV CODE- 258: Performed by: PHYSICIAN ASSISTANT

## 2020-12-13 PROCEDURE — 74011250636 HC RX REV CODE- 250/636: Performed by: PHYSICIAN ASSISTANT

## 2020-12-13 PROCEDURE — 65620000000 HC RM CCU GENERAL

## 2020-12-13 PROCEDURE — 74011250636 HC RX REV CODE- 250/636: Performed by: NURSE PRACTITIONER

## 2020-12-13 PROCEDURE — 83735 ASSAY OF MAGNESIUM: CPT

## 2020-12-13 PROCEDURE — 85025 COMPLETE CBC W/AUTO DIFF WBC: CPT

## 2020-12-13 PROCEDURE — 74011250636 HC RX REV CODE- 250/636: Performed by: EMERGENCY MEDICINE

## 2020-12-13 PROCEDURE — 86900 BLOOD TYPING SEROLOGIC ABO: CPT

## 2020-12-13 PROCEDURE — 82803 BLOOD GASES ANY COMBINATION: CPT

## 2020-12-13 PROCEDURE — 74011000258 HC RX REV CODE- 258: Performed by: EMERGENCY MEDICINE

## 2020-12-13 PROCEDURE — 74011000250 HC RX REV CODE- 250: Performed by: PHYSICIAN ASSISTANT

## 2020-12-13 PROCEDURE — 2709999900 HC NON-CHARGEABLE SUPPLY

## 2020-12-13 RX ORDER — FUROSEMIDE 10 MG/ML
40 INJECTION INTRAMUSCULAR; INTRAVENOUS ONCE
Status: COMPLETED | OUTPATIENT
Start: 2020-12-13 | End: 2020-12-13

## 2020-12-13 RX ADMIN — CARVEDILOL 6.25 MG: 6.25 TABLET, FILM COATED ORAL at 20:44

## 2020-12-13 RX ADMIN — Medication 40 ML: at 06:00

## 2020-12-13 RX ADMIN — PROPOFOL 50 MCG/KG/MIN: 10 INJECTION, EMULSION INTRAVENOUS at 03:58

## 2020-12-13 RX ADMIN — PROPOFOL 50 MCG/KG/MIN: 10 INJECTION, EMULSION INTRAVENOUS at 15:51

## 2020-12-13 RX ADMIN — FENTANYL CITRATE 125 MCG/HR: 50 INJECTION, SOLUTION INTRAMUSCULAR; INTRAVENOUS at 21:41

## 2020-12-13 RX ADMIN — MUPIROCIN: 20 OINTMENT TOPICAL at 08:20

## 2020-12-13 RX ADMIN — Medication 10 ML: at 14:37

## 2020-12-13 RX ADMIN — PIPERACILLIN AND TAZOBACTAM 3.38 G: 3; .375 INJECTION, POWDER, LYOPHILIZED, FOR SOLUTION INTRAVENOUS at 17:06

## 2020-12-13 RX ADMIN — MIDAZOLAM 2 MG: 1 INJECTION INTRAMUSCULAR; INTRAVENOUS at 10:41

## 2020-12-13 RX ADMIN — PROPOFOL 50 MCG/KG/MIN: 10 INJECTION, EMULSION INTRAVENOUS at 11:24

## 2020-12-13 RX ADMIN — CHLORHEXIDINE GLUCONATE 0.12% ORAL RINSE 10 ML: 1.2 LIQUID ORAL at 20:45

## 2020-12-13 RX ADMIN — PIPERACILLIN AND TAZOBACTAM 3.38 G: 3; .375 INJECTION, POWDER, LYOPHILIZED, FOR SOLUTION INTRAVENOUS at 11:41

## 2020-12-13 RX ADMIN — SODIUM CHLORIDE 40 MG: 9 INJECTION, SOLUTION INTRAMUSCULAR; INTRAVENOUS; SUBCUTANEOUS at 08:20

## 2020-12-13 RX ADMIN — MIDAZOLAM 2 MG: 1 INJECTION INTRAMUSCULAR; INTRAVENOUS at 09:13

## 2020-12-13 RX ADMIN — FENTANYL CITRATE 100 MCG/HR: 50 INJECTION, SOLUTION INTRAMUSCULAR; INTRAVENOUS at 13:08

## 2020-12-13 RX ADMIN — MIDAZOLAM 2 MG: 1 INJECTION INTRAMUSCULAR; INTRAVENOUS at 22:55

## 2020-12-13 RX ADMIN — PROPOFOL 50 MCG/KG/MIN: 10 INJECTION, EMULSION INTRAVENOUS at 07:53

## 2020-12-13 RX ADMIN — Medication 40 ML: at 21:05

## 2020-12-13 RX ADMIN — MIDAZOLAM 2 MG: 1 INJECTION INTRAMUSCULAR; INTRAVENOUS at 06:10

## 2020-12-13 RX ADMIN — PIPERACILLIN AND TAZOBACTAM 3.38 G: 3; .375 INJECTION, POWDER, LYOPHILIZED, FOR SOLUTION INTRAVENOUS at 06:08

## 2020-12-13 RX ADMIN — FENTANYL CITRATE 100 MCG/HR: 50 INJECTION, SOLUTION INTRAMUSCULAR; INTRAVENOUS at 02:28

## 2020-12-13 RX ADMIN — PROPOFOL 50 MCG/KG/MIN: 10 INJECTION, EMULSION INTRAVENOUS at 18:26

## 2020-12-13 RX ADMIN — FUROSEMIDE 40 MG: 10 INJECTION, SOLUTION INTRAMUSCULAR; INTRAVENOUS at 10:42

## 2020-12-13 RX ADMIN — ROSUVASTATIN 40 MG: 20 TABLET, FILM COATED ORAL at 20:45

## 2020-12-13 RX ADMIN — TICAGRELOR 90 MG: 90 TABLET ORAL at 03:59

## 2020-12-13 RX ADMIN — MIDAZOLAM 2 MG: 1 INJECTION INTRAMUSCULAR; INTRAVENOUS at 01:09

## 2020-12-13 RX ADMIN — TICAGRELOR 90 MG: 90 TABLET ORAL at 15:37

## 2020-12-13 RX ADMIN — MIDAZOLAM 2 MG: 1 INJECTION INTRAMUSCULAR; INTRAVENOUS at 17:26

## 2020-12-13 RX ADMIN — CARVEDILOL 6.25 MG: 6.25 TABLET, FILM COATED ORAL at 08:19

## 2020-12-13 RX ADMIN — PIPERACILLIN AND TAZOBACTAM 3.38 G: 3; .375 INJECTION, POWDER, LYOPHILIZED, FOR SOLUTION INTRAVENOUS at 00:09

## 2020-12-13 RX ADMIN — CHLORHEXIDINE GLUCONATE 0.12% ORAL RINSE 10 ML: 1.2 LIQUID ORAL at 08:20

## 2020-12-13 RX ADMIN — PROPOFOL 50 MCG/KG/MIN: 10 INJECTION, EMULSION INTRAVENOUS at 21:09

## 2020-12-13 RX ADMIN — Medication 20 ML: at 22:00

## 2020-12-13 RX ADMIN — PROPOFOL 50 MCG/KG/MIN: 10 INJECTION, EMULSION INTRAVENOUS at 13:11

## 2020-12-13 RX ADMIN — MUPIROCIN: 20 OINTMENT TOPICAL at 17:06

## 2020-12-13 RX ADMIN — MIDAZOLAM 2 MG: 1 INJECTION INTRAMUSCULAR; INTRAVENOUS at 07:53

## 2020-12-13 RX ADMIN — ASPIRIN 81 MG CHEWABLE TABLET 81 MG: 81 TABLET CHEWABLE at 08:19

## 2020-12-13 RX ADMIN — PROPOFOL 50 MCG/KG/MIN: 10 INJECTION, EMULSION INTRAVENOUS at 00:39

## 2020-12-13 NOTE — CONSULTS
[facility]  Pulmonary, Critical Care, and Sleep Medicine    Name: Calvin Cruz MRN: [id number]   : 1950 Hospital: [address] [place] [address]    Date: 2020        Pulmonary Medicine: Critical Care Initial Patient Consult      IMPRESSION:   · Respiratory Failure: Hypoxic: pulmonary edema and hemoptysis- improved today  · Hemoptysis: in the setting of antiplatlet therapy post cath and traumatic ETT tube echange and pulmonary edema- appears resolved at this time- Remains on aggressive antiplatelet therapy due to LIZ placement  · CAD: S/P PCI with LIZ 12/10/20-, LAD for ongoing unstable angina  · Probable aspiration  · Hypotension: possible SIRs response, sedation. Shock cardiogenic and/or distributive- clinical picture still evolving- currently improved  · Hypokalemia- replacing  · Diabetes  · GERD  · Obesity: Body mass index is 30.2 kg/m². ·       RECOMMENDATIONS:   NEURO  · Keep patient sedated- propofol and PRN versed OK  · RASS goal 0 to-1  ·   CV  · MAP goal >65  · Diuresis  Per cardiology  · Integrelin and antiplatlet/anticoagulation therapy and cardiac care per Cardiology  PULM     · Plan for SBT this am and extubate if reassuring breathing trial.    GI  · NPO  · OGT- clamped per cardiology protocol for  Brilinta therapy  · Protonix- SUP    RENAL/Metabolic  · Electrolyte replacement protocol  · Boss  · Strict I&Os  ENDO  · Glycemic control per protocol and hospital team  ID  · Continue Zosyn  HEME  · Stable H/H  · Type and cross and continue to trend CBC          Subjective/History: This patient has been seen and evaluated at the request of Dr. Mike Cuba for respiratory failure and hemoptysis. Patient is a 79 y.o. male with history of CAD, DM. Patient went to cath lab today for repeat cath due to unstable angina. PCI with LAD stent placed. Patient went into pulmonary edema. Anesthesia called to cath lab and patient intubated with 6.5 EET.   Jacob Zhong  made later in the case to change out to larger tube. ETT exchanged over Boogie which followed by hemoptysis. Suspect combination of local airway trauma with patient on antiplatelet therapy. I was called STAT to CVICU with report of hemoptysis and SpO2 into the 70-80's. Upon my arrival to the ICU, the patient was partially sedated, chomping on ETT, dark blood in ETT and circuit. Patient also coughing up fresher blood. On Fio2 100 and PEEP 14. AC/PC. Boss in placed and actively diruresing. Patient sedated with some modest improvement in SpO2 into the 80's. Initial Po2 in the mid to high 40's. Airway suctioned with some improvement. Patient with hypotension after sedation. Patient given dose if IV pressor with significant hypertension requiring starting Nitroglycerin drip and propofol which was weaned fairly quickly. Hypotension returned requiring low dose Nor Epi drip via 20G right AC with good blood return. Due to level of anticoagulation/platelet therapy opted not to pursue central line. Arterial sheath in right groin which we are using to transduce IBP monitoring. Bedside POCUS with bilateral sliding lung sign and b-lines    CXR with bilateral pumonary infiltrates, ETT , OGT in place and no findings on pneumothroax. ETT is a bit high but want to avoid further airway trauma so will hold on advancing at this time    The patient has been evaluated throughout the evening. Overall clinical status has been consistently improving. SpO2 has gradually improved into the high 90's. Vent changed from AC/PC to Changed over the AC/VC+     Patient is awake and moving all extremities, He is a bit restless and will need a bit more sedation at this time    Good UOP. Currently off IV pressors.     Most recent Hb 10 will continue to trend for now    OGT currently clamped due to [address]      The patient is critically ill and can not provide additional history due to Unconsciousness and Ventilated.          12/13/20   LOS: 5    · Patient did well overnight  · Improved oxygenation- have been weaning down vent settings  · Resolving hemoptysis, Hb stable  · Continues on Zosyn for possible aspiration event  · Continues to be off pressors       Patient Vitals for the past 12 hrs:   Temp Pulse Resp BP SpO2   12/13/20 0843  70 14  98 %   12/13/20 0800 97.6 °F (36.4 °C) 73 14 129/73 97 %   12/13/20 0700  63 14 (!) 96/56 96 %   12/13/20 0600  79 20 123/69 96 %   12/13/20 0500  78 18 (!) 123/56 94 %   12/13/20 0400 98.1 °F (36.7 °C) 70 14 127/70 99 %   12/13/20 0352  61 14  98 %   12/13/20 0300  61 14 (!) 92/55 97 %   12/13/20 0200  66 14 (!) 96/55 97 %   12/13/20 0100  74 14 111/63 97 %   12/13/20 0000 98.2 °F (36.8 °C) 71 14 115/65 96 %   12/12/20 2328  74 14  96 %   12/12/20 2300  64 14 (!) 95/57 95 %          Past Medical History:   Diagnosis Date    Arthritis     1999 vicodin    Diabetes ([address] 1998 metformin    GERD (gastroesophageal reflux disease)     Hypercholesterolemia     Hypertension 1996 norvasc    Kidney stones     MI [facility]ocardial infarction[facility])       Past Surgical History:   Procedure Laterality Date    CARDIAC SURG PROCEDURE UNLIST  12/2017    stints    COLONOSCOPY N/A 11/2/2018    COLONOSCOPY with Polypectomies performed by Keshav Hughes MD at [facility] END[facility]COPY    HX CHOLECYSTECTOMY      HX GI  2010    gallbladder      Prior to Admission medications    Medication Sig Start Date End Date Taking? Authorizing Provider   carvediloL (COREG) 25 mg tablet TAKE 1 TABLET BY MOUTH TWICE A DAY WITH FOOD 10/30/20  Yes Mini Tinajero NP   Januvia 100 mg tablet TAKE 1 TABLET BY MOUTH EVERY DAY 10/2/20  Yes Boni Ball MD   glipiZIDE SR (GLUCOTROL XL) 5 mg CR tablet TAKE 1 TABLET BY MOUTH EVERY DAY 8/3/20  Yes Boni Ball MD   omeprazole (PRILOSEC) 20 mg capsule TAKE 1 CAPSULE BY MOUTH EVERY DAY 8/3/20  Yes Boni Ball MD   amLODIPine (NORVASC) 10 mg tablet Take 1 Tab by mouth daily.  7/20/20  Yes Mini Tinajero NP   rosuvastatin (CRESTOR) 10 mg tablet Take 1 Tab by mouth daily. 7/20/20  Yes Mini Tinajero NP   metFORMIN (GLUCOPHAGE) 1,000 mg tablet TAKE 1 TABLET BY MOUTH TWICE A DAY WITH MEALS 7/16/20  Yes Afua Cardenas MD   loratadine (Claritin) 10 mg tablet Take 10 mg by mouth daily as needed. Yes Provider, Historical   ramipril (ALTACE) 10 mg capsule Take 1 Cap by mouth daily. 2/27/20  Yes Coby Mantilla NP   aspirin delayed-release 325 mg tablet Take 1 Tab by mouth daily. 2/27/20  Yes Coby Mantilla NP   clopidogrel (PLAVIX) 75 mg tab TAKE 1 TABLET BY MOUTH EVERY DAY 1/6/20  Yes Coby Mantilla NP   nitroglycerin (NITROSTAT) 0.4 mg SL tablet 1 Tab by SubLINGual route every five (5) minutes as needed for Chest Pain for up to 3 doses.  12/2/17  Yes Zoey Nixon MD   Blood Pressure Monitor kit Check once a day 5/12/20   Afua Cardenas MD     Current Facility-Administered Medications   Medication Dose Route Frequency    furosemide (LASIX) injection 40 mg  40 mg IntraVENous ONCE    chlorhexidine (PERIDEX) 0.12 % mouthwash 10 mL  10 mL Oral BID    propofol (DIPRIVAN) 10 mg/mL infusion  0-50 mcg/kg/min IntraVENous TITRATE    fentaNYL (PF) 900 mcg/30 ml infusion soln  0-200 mcg/hr IntraVENous TITRATE    sodium chloride (NS) flush 5-40 mL  5-40 mL IntraVENous Q8H    ticagrelor (BRILINTA) tablet 90 mg  90 mg Oral BID    piperacillin-tazobactam (ZOSYN) 3.375 g in 0.9% sodium chloride (MBP/ADV) 100 mL MBP  3.375 g IntraVENous Q6H    NOREPINephrine (LEVOPHED) 8 mg in 5% dextrose 250mL (32 mcg/mL) infusion  0.5-50 mcg/min IntraVENous TITRATE    pantoprazole (PROTONIX) 40 mg in 0.9% sodium chloride 10 mL injection  40 mg IntraVENous DAILY    insulin lispro (HUMALOG) injection   SubCUTAneous Q6H    sodium chloride (NS) flush 5-40 mL  5-40 mL IntraVENous Q8H    rosuvastatin (CRESTOR) tablet 40 mg  40 mg Oral QHS    [START ON 12/14/2020] ceFAZolin (ANCEF) 2g IVPB in 50 mL D5W  2 g IntraVENous ONCE    mupirocin (BACTROBAN) 2 % ointment   Both Nostrils BID    carvediloL (COREG) tablet 6.25 mg  6.25 mg Oral Q12H    aspirin chewable tablet 81 mg  81 mg Oral DAILY     No Known Allergies   Social History     Tobacco Use    Smoking status: Never Smoker    Smokeless tobacco: Never Used   Substance Use Topics    Alcohol use: No      Family History   Problem Relation Age of Onset    Stroke Mother     Headache Mother     Hypertension Mother     Lung Cancer Mother     Heart Disease Father     Heart Attack Father     Hypertension Father     Diabetes Father     Lung Cancer Father     Ovarian Cancer Sister     Heart Attack Brother     Breast Cancer Sister     Diabetes Sister     Cancer Maternal Aunt         lung cancer    Cancer Maternal Uncle     Cancer Paternal Aunt     Cancer Paternal Uncle         Review of Systems:  Review of systems not obtained due to patient factors. Objective:   Vital Signs:    Visit Vitals  /73 (BP 1 Location: Left arm, BP Patient Position: At rest)   Pulse 70   Temp 97.6 °F (36.4 °C)   Resp 14   Ht 6' (1.829 m)   Wt 101 kg (222 lb 10.6 oz)   SpO2 98%   BMI 30.20 kg/m²       O2 Device: Endotracheal tube, Ventilator   O2 Flow Rate (L/min): 2 l/min   Temp (24hrs), Av °F (36.7 °C), Min:97.6 °F (36.4 °C), Max:98.2 °F (36.8 °C)       Intake/Output:   Last shift:      No intake/output data recorded.   Last 3 shifts:  1901 -  0700  In: 1711.7 [I.V.:1476.7]  Out: 1460 [Urine:1260]    Intake/Output Summary (Last 24 hours) at 2020 1027  Last data filed at 2020 0608  Gross per 24 hour   Intake 1049.22 ml   Output 775 ml   Net 274.22 ml         Ventilator Settings:  Mode Rate Tidal Volume Pressure FiO2 PEEP   Assist control   550 ml  8 cm H2O(SBT PS8) 35 % 5 cm H20     Peak airway pressure: 28 cm H2O    Minute ventilation: 9.4 l/min           Physical Exam:    General:  Awake, restless, intubated    Head:  Normocephalic, without obvious abnormality, atraumatic. Eyes:  Conjunctivae/corneas clear. PERRL, EOMs intact. Nose: Nares normal. Septum midline. Mucosa normal. No drainage or sinus tenderness. Throat: Lips, mucosa,normal, ETT - circuit clean   Neck: Supple, symmetrical, trachea midline, no adenopathy, thyroid: no enlargment/tenderness/nodules   Back:   Symmetric   Lungs:   Coarse breath sounds on the vent- improved   Chest wall:  No tenderness or deformity. Heart:  Regular rate and rhythm, S1, S2 normal, no murmur, click, rub or gallop. Abdomen:   Soft, non-tender. Bowel sounds normal. No masses,  No organomegaly. Extremities: Extremities normal, atraumatic, no cyanosis or edema. Pulses: 2+ and symmetric all extremities.    Skin: Skin color, texture, turgor normal. No rashes or lesions   Lymph nodes:      Cervical, supraclavicular nodes are unremarkable   Neurologic: Grossly nonfocal       Data:     Recent Results (from the past 24 hour(s))   GLUCOSE, POC    Collection Time: 12/12/20 11:37 AM   Result Value Ref Range    Glucose (POC) 122 (H) 70 - 110 mg/dL   GLUCOSE, POC    Collection Time: 12/12/20  5:17 PM   Result Value Ref Range    Glucose (POC) 101 70 - 110 mg/dL   HGB & HCT    Collection Time: 12/12/20  8:34 PM   Result Value Ref Range    HGB 10.5 (L) 13.0 - 16.0 g/dL    HCT 30.9 (L) 36.0 - 48.0 %   GLUCOSE, POC    Collection Time: 12/12/20 11:52 PM   Result Value Ref Range    Glucose (POC) 108 70 - 110 mg/dL   CBC WITH AUTOMATED DIFF    Collection Time: 12/13/20  4:14 AM   Result Value Ref Range    WBC 7.5 4.6 - 13.2 K/uL    RBC 3.39 (L) 4.70 - 5.50 M/uL    HGB 10.5 (L) 13.0 - 16.0 g/dL    HCT 30.9 (L) 36.0 - 48.0 %    MCV 91.2 74.0 - 97.0 FL    MCH 31.0 24.0 - 34.0 PG    MCHC 34.0 31.0 - 37.0 g/dL    RDW 14.8 (H) 11.6 - 14.5 %    PLATELET 503 628 - 114 K/uL    MPV 10.4 9.2 - 11.8 FL    NEUTROPHILS 65 40 - 73 %    LYMPHOCYTES 20 (L) 21 - 52 %    MONOCYTES 11 (H) 3 - 10 %    EOSINOPHILS 4 0 - 5 %    BASOPHILS 0 0 - 2 % ABS. NEUTROPHILS 4.9 1.8 - 8.0 K/UL    ABS. LYMPHOCYTES 1.5 0.9 - 3.6 K/UL    ABS. MONOCYTES 0.8 0.05 - 1.2 K/UL    ABS. EOSINOPHILS 0.3 0.0 - 0.4 K/UL    ABS. BASOPHILS 0.0 0.0 - 0.1 K/UL    DF AUTOMATED     METABOLIC PANEL, COMPREHENSIVE    Collection Time: 12/13/20  4:14 AM   Result Value Ref Range    Sodium 139 136 - 145 mmol/L    Potassium 3.7 3.5 - 5.5 mmol/L    Chloride 111 100 - 111 mmol/L    CO2 23 21 - 32 mmol/L    Anion gap 5 3.0 - 18 mmol/L    Glucose 102 (H) 74 - 99 mg/dL    BUN 17 7.0 - 18 MG/DL    Creatinine 0.90 0.6 - 1.3 MG/DL    BUN/Creatinine ratio 19 12 - 20      GFR est AA >60 >60 ml/min/1.73m2    GFR est non-AA >60 >60 ml/min/1.73m2    Calcium 8.4 (L) 8.5 - 10.1 MG/DL    Bilirubin, total 0.4 0.2 - 1.0 MG/DL    ALT (SGPT) 21 16 - 61 U/L    AST (SGOT) 28 10 - 38 U/L    Alk. phosphatase 56 45 - 117 U/L    Protein, total 6.0 (L) 6.4 - 8.2 g/dL    Albumin 2.6 (L) 3.4 - 5.0 g/dL    Globulin 3.4 2.0 - 4.0 g/dL    A-G Ratio 0.8 0.8 - 1.7     MAGNESIUM    Collection Time: 12/13/20  4:14 AM   Result Value Ref Range    Magnesium 2.2 1.6 - 2.6 mg/dL   GLUCOSE, POC    Collection Time: 12/13/20  5:56 AM   Result Value Ref Range    Glucose (POC) 113 (H) 70 - 110 mg/dL   POC G3    Collection Time: 12/13/20  5:59 AM   Result Value Ref Range    Device: VENT      pH (POC) 7.38 7.35 - 7.45      pCO2 (POC) 34.7 (L) 35.0 - 45.0 MMHG    pO2 (POC) 76 (L) 80 - 100 MMHG    HCO3 (POC) 20.3 (L) 22 - 26 MMOL/L    sO2 (POC) 95 92 - 97 %    Base deficit (POC) 5 mmol/L    Mode ASSIST CONTROL      Allens test (POC) N/A      Site DRAWN FROM ARTERIAL LINE      Specimen type (POC) ARTERIAL      Performed by Alphonso Fontana            Lab Results   Component Value Date/Time    TSH 1.33 11/23/2020 07:34 AM    T4, Free 1.0 01/30/2019 07:32 AM   Results for Evelyne Russ (MRN [id number]) as of 12/11/2020 11:39   Ref.  Range 12/10/2020 16:02 12/10/2020 17:17 12/10/2020 21:18 12/11/2020 05:13 12/11/2020 08:21   GLUCOSE,FAST - POC Latest Ref Range: 74 - 106 MG/ (H) 266 (H) 178 (H) 165 (H) 154 (H)         Recent Labs     12/13/20  0559 12/12/20  0354 12/11/20  1653 12/11/20  0821   FIO2I  --  35 0.35 0.6   HCO3I 20.3* 20.4* 22.0 21.7*   PCO2I 34.7* 27.8* 29.6* 32.8*   PHI 7.38 7.47* 7.48* 7.43   PO2I 76* 108* 71* 158*     12/08/20   ECHO ADULT COMPLETE 12/09/2020 12/9/2020    Narrative · Contrast used: DEFINITY. · LV: Calculated LVEF is 55%. Visually measured ejection fraction. Normal   cavity size and systolic function (ejection fraction normal). Mild   concentric hypertrophy. Age-appropriate left ventricular diastolic   function. · PA: Pulmonary arterial systolic pressure is 26 mmHg. · Aorta: Dilated aortic root; diameter is 3.7 cm. Signed by: Ramírez Cohen MD       Telemetry:normal sinus rhythm        Imaging:  I have personally reviewed the patients radiographs and have reviewed the reports:  CXR Results  (Last 48 hours)    None        CT Results  (Last 48 hours)    None           Best practice :  Core measures:    Glycemic control  [facility]. Ventilated patients- aim to keep peak plateau pressure 34-62JV H2O. Sress ulcer prophylaxis  DVT prophylaxis       Boss Bundle Followed and Vent Bundle Followed, Vent Day 1        Critical Care Time:  The services I provided to this patient were to treat and/or prevent clinically significant deterioration that could result in the failure of one or more body systems and/or organ systems due to respiratory distress, hypoxia, cardiac dysrhythmia.      Services included the following:  -reviewing nursing notes and old charts  -vital sign assessments- reassessed BP at bedside  -direct patient care  -medication orders and management  -interpreting and reviewing diagnostic studies/labs  -re-evaluations  -documentation time  -Spoke with ICU nurse, Cardiology, nutrition     Aggregate critical care time was 35   minutes, which includes only time during which I was engaged in work directly related to the patient's care as described above. During this entire length of time I was immediately available to the patient. The reason for providing this level of medical care for this critically ill patient was due a critical illness that impaired one or more vital organ systems such that there was a high probability of imminent or life threatening deterioration in the patients condition. This care involved high complexity decision making to assess, manipulate, and support vital system functions, to treat this degreee vital organ system failure and to prevent further life threatening deterioration of the patients condition      Complex decision making was made in the evaluation and management plans during this consultation. More than 50% of time was spent in counseling and coordination of care including review of data and discussion with other team members.     Mike Hebert MD

## 2020-12-13 NOTE — PROGRESS NOTES
0700- Received report from Judy Chiu RN and assumed care. Patient remains intubated and sedated, however, awakens frequently, moving legs about and lifting head. 5005- Administered PRN versed d/t increased agitation. 7829- Administered PRN versed d/t increased agitation. 8687- Attempting to wean off of propofol but patient becomes extremely restless, breathing against ventilator. 5717- Administered PRN versed d/t increased agitation. 1041- Administered PRN versed d/t increased agitation. 1400- Patient remains intubated/sedated. Awakens easily but becomes anxious and needs to be redirected. Has received multiple doses of PRN versed. Currently on propofol and fentanyl gtt; see MAR. Spouse has called unit several times today for updates. Advised no changes in condition at this time. 34 Julia Ktaz patients wife on Face Time using patients personal cell phone.

## 2020-12-13 NOTE — PROGRESS NOTES
[facility]      CARDIOLOGY PROGRESS NOTE  RECS:    1. Unstable angina- s/p Cardiac Cath that shows 40-45% distal LM stenosis. Critical mid LAD stenosis. PCI procedure uncomplicated. Patient developed acute pulmonary edema requiring intubation on mechanical ventilation. continue DAPT brilinta and asa. Integrilin d/c  2. Acute respiratory failure- in the setting acute decompensated CHF-ordered Lasix 40 mg once. For coarse breath sounds and low urine output- on mechanical ventilation plans- for extubation today   3. Hypotension- likely cardiogenic shock-better off pressors. Will reseume medications when BP rises. 4. CAD-cardiac cath 12/2017 showed  Triple-vessel coronary artery disease. 5. Hyperlipidemia- LDL 74 11/20 Continue with Crestor  20 mg.  6. Diabetes- well controlled with A1c 5.9   7. LBBB- new since 12/20   8. Hemoptysis- traumatic ETT tube echange and pulmonary edema- better     Continue supportive care. CXR reviewed-- CHF improving. Lasix 40 mg ivp prior to extubation. Continue DAPT. Above plan discussed with PCCM. Cardiac cath 12/9/20  · Three-vessel coronary artery disease with 90% mid LAD stenosis 80% distal circumflex stenosis and 100% mid RCA stenosis after diffuse disease in proximal and ostial RCA. · Patent previously deployed drug-eluting stents in mid circumflex and distal circumflex areas. · Borderline critical left main stenosis which is approximately 50% and has progressed from previous catheterization in 2017. · Procedure done via right radial artery without any complications. Given left main disease, CABG will be considered first.  We will take a surgical opinion with Dr. Slime Gonzalez. I discussed with him on phone and he/his team will be seeing the patient.     Echo 12/20  · Contrast used: DEFINITY. · LV: Calculated LVEF is 55%. Visually measured ejection fraction. Normal cavity size and systolic function (ejection fraction normal).  Mild concentric hypertrophy. Age-appropriate left ventricular diastolic function. · PA: Pulmonary arterial systolic pressure is 26 mmHg. · Aorta: Dilated aortic root; diameter is 3.7 cm. ASSESSMENT:  Hospital Problems  Date Reviewed: 11/13/2019          Codes Class Noted POA    ACS (acute coronary syndrome) ([facility] ICD-10-CM: I24.9  ICD-9-CM: 411.1  12/8/2020 Unknown        Controlled type 2 diabetes mellitus without complication, without long-term current use of insulin ([facility] ICD-10-CM: E11.9  ICD-9-CM: 250.00  11/29/2018 Yes        Coronary artery disease involving native coronary artery with unstable angina pectoris ([facility] ICD-10-CM: I25.110  ICD-9-CM: 414.01, 411.1  1/27/2016 Yes    Overview Signed 1/27/2016 11:41 AM by Mandy Vazquez MD     recent admission angina stable on medical managment  unable to tolerate isordil             Essential hypertension ICD-10-CM: I10  ICD-9-CM: 401.9  1/27/2016 Yes    Overview Signed 1/27/2016 11:41 AM by Mandy Vazquez MD     controlled             Unstable angina pectoris ([facility] ICD-10-CM: I20.0  ICD-9-CM: 411.1  1/17/2016 Yes                SUBJECTIVE:  Intubated     OBJECTIVE:    VS:   Visit Vitals  /73 (BP 1 Location: Left arm, BP Patient Position: At rest)   Pulse 70   Temp 97.6 °F (36.4 °C)   Resp 14   Ht 6' (1.829 m)   Wt 101 kg (222 lb 10.6 oz)   SpO2 98%   BMI 30.20 kg/m²         Intake/Output Summary (Last 24 hours) at 12/13/2020 0902  Last data filed at 12/13/2020 1556  Gross per 24 hour   Intake 1049.22 ml   Output 775 ml   Net 274.22 ml     TELE: normal sinus rhythm    General: intubated   HENT: Normocephalic, atraumatic. Normal external eye.   Neck :  no bruit, no JVD  Cardiac:  regular rate and rhythm, S1, S2 normal, no murmur, click, rub or gallop  Lungs: Bilaterally  rales   Abdomen: Soft, nontender, no masses  Extremities:  No c/c/e, peripheral pulses present      Labs: Results:       Chemistry Recent Labs     12/13/20  0414 12/12/20  0400 12/11/20  0404   GLU 102* 134* 145*    141 139   K 3.7 3.5 3.7    112* 108   CO2 23 23 22   BUN 17 15 14   CREA 0.90 0.86 1.16   CA 8.4* 7.9* 8.1*   AGAP 5 6 9   BUCR 19 17 12   AP 56  --   --    TP 6.0*  --   --    ALB 2.6*  --   --    GLOB 3.4  --   --    AGRAT 0.8  --   --       CBC w/Diff Recent Labs     12/13/20  0414 12/12/20 2034 12/12/20  1017 12/12/20  0400  12/11/20  0404  12/10/20  1625   WBC 7.5  --   --  10.5  --  11.3  --  13.5*   RBC 3.39*  --   --  3.48*  --  3.69*  --  3.21*   HGB 10.5* 10.5* 10.5* 10.6*   < > 11.4*   < > 10.1*   HCT 30.9* 30.9* 30.8* 31.5*   < > 32.8*   < > 28.3*     --   --  160  --  174  --  136   GRANS 65  --   --  74*  --   --   --  77*   LYMPH 20*  --   --  14*  --   --   --  16*   EOS 4  --   --  2  --   --   --  0    < > = values in this interval not displayed. Cardiac Enzymes No results for input(s): CPK, CKND1, AMERICA in the last 72 hours. No lab exists for component: CKRMB, TROIP   Coagulation Recent Labs     12/12/20  1017 12/11/20 0404  12/10/20  1625   PTP  --   --   --  13.4   INR  --   --   --  1.0   APTT 25.5 32.9   < > 67.9*    < > = values in this interval not displayed. Lipid Panel Lab Results   Component Value Date/Time    Cholesterol, total 114 12/12/2020 04:00 AM    HDL Cholesterol 28 (L) 12/12/2020 04:00 AM    LDL, calculated 38 12/12/2020 04:00 AM    VLDL, calculated 48 12/12/2020 04:00 AM    Triglyceride 240 (H) 12/12/2020 04:00 AM    CHOL/HDL Ratio 4.1 12/12/2020 04:00 AM      BNP No results for input(s): BNPP in the last 72 hours. Liver Enzymes Recent Labs     12/13/20  0414   TP 6.0*   ALB 2.6*   AP 56      Thyroid Studies Lab Results   Component Value Date/Time    TSH 1.33 11/23/2020 07:34 AM              Mini LOPEZ supervised Verito:[phone]     I have independently evaluated and examined the patient. All relevant labs and testing data's are reviewed. Care plan discussed and updated after review.     Becki Nava MD

## 2020-12-13 NOTE — PROGRESS NOTES
[facility]  Progress Note    Patient: Melvina Lesches Age: 79 y.o. : 1950 MR#: [id number] SSN: [id number]  Date/Time: 2020    Subjective:     Patient is laying in bed intubated. No response to voice or light touch. Has been off levophed since Friday evening. Discussed with RN and RT at bedside. Got lasix 40 mg iv x 1 this am, uop 1500 mL. Assessment/Plan:     1. Unstable angina. 40-45% distal LM stenosis, critical mid LAD stenosis. S/p celi to mid LAD lesion. DAPT, statin. Coreg. 2. acute Hypoxic respiratory failure requiring intubation on mechanical ventilation. d/t acute pulmonary edema. Wean vent as per pulmonary. 3. Hemoptysis in the setting of DAPT post cath and traumatic ETT tube exchange and pulmonary edema- appears resolved at this time  4. Hypertension controlled  5. Dyslipidemia on statin  6. Diet-controlled type 2 diabetes  7. degenerative joint disease  8. Obesity Body mass index is 30.2 kg/m². 9. Volume overload. Daily dosing of lasix as needed. 10. Full code. cvt icu. wife at phone #[phone]. Case discussed with:  [x]Patient  []Family  [x]Nursing  []Case Management  DVT Prophylaxis:  []Lovenox  []Hep SQ  [x]SCDs  []Coumadin   []On Heparin gtt    Objective:   VS:   Visit Vitals  BP (!) 103/59   Pulse 68   Temp 98.3 °F (36.8 °C)   Resp 14   Ht 6' (1.829 m)   Wt 101 kg (222 lb 10.6 oz)   SpO2 97%   BMI 30.20 kg/m²      Tmax/24hrs: Temp (24hrs), Av.1 °F (36.7 °C), Min:97.6 °F (36.4 °C), Max:98.3 °F (36.8 °C)    Input/Output:     Intake/Output Summary (Last 24 hours) at 2020 1603  Last data filed at 2020 1430  Gross per 24 hour   Intake 1372.22 ml   Output 2300 ml   Net -927.78 ml       General: Intubated, sedated. Not opening eyes to light touch or voice. Heent: ncat. Perrl. +ET tube  Cardiovascular:  S1S2+, RRR  Pulmonary: cta b.l anterior and infraaxillary fields  GI:  Soft, NT, ND nabs.   Extremities:  No edema. dp 2+ b.l  Neuro: sedated  Skin: no rash      Labs:    Recent Results (from the past 24 hour(s))   GLUCOSE, POC    Collection Time: 12/12/20  5:17 PM   Result Value Ref Range    Glucose (POC) 101 70 - 110 mg/dL   HGB & HCT    Collection Time: 12/12/20  8:34 PM   Result Value Ref Range    HGB 10.5 (L) 13.0 - 16.0 g/dL    HCT 30.9 (L) 36.0 - 48.0 %   GLUCOSE, POC    Collection Time: 12/12/20 11:52 PM   Result Value Ref Range    Glucose (POC) 108 70 - 110 mg/dL   CBC WITH AUTOMATED DIFF    Collection Time: 12/13/20  4:14 AM   Result Value Ref Range    WBC 7.5 4.6 - 13.2 K/uL    RBC 3.39 (L) 4.70 - 5.50 M/uL    HGB 10.5 (L) 13.0 - 16.0 g/dL    HCT 30.9 (L) 36.0 - 48.0 %    MCV 91.2 74.0 - 97.0 FL    MCH 31.0 24.0 - 34.0 PG    MCHC 34.0 31.0 - 37.0 g/dL    RDW 14.8 (H) 11.6 - 14.5 %    PLATELET 944 716 - 724 K/uL    MPV 10.4 9.2 - 11.8 FL    NEUTROPHILS 65 40 - 73 %    LYMPHOCYTES 20 (L) 21 - 52 %    MONOCYTES 11 (H) 3 - 10 %    EOSINOPHILS 4 0 - 5 %    BASOPHILS 0 0 - 2 %    ABS. NEUTROPHILS 4.9 1.8 - 8.0 K/UL    ABS. LYMPHOCYTES 1.5 0.9 - 3.6 K/UL    ABS. MONOCYTES 0.8 0.05 - 1.2 K/UL    ABS. EOSINOPHILS 0.3 0.0 - 0.4 K/UL    ABS. BASOPHILS 0.0 0.0 - 0.1 K/UL    DF AUTOMATED     METABOLIC PANEL, COMPREHENSIVE    Collection Time: 12/13/20  4:14 AM   Result Value Ref Range    Sodium 139 136 - 145 mmol/L    Potassium 3.7 3.5 - 5.5 mmol/L    Chloride 111 100 - 111 mmol/L    CO2 23 21 - 32 mmol/L    Anion gap 5 3.0 - 18 mmol/L    Glucose 102 (H) 74 - 99 mg/dL    BUN 17 7.0 - 18 MG/DL    Creatinine 0.90 0.6 - 1.3 MG/DL    BUN/Creatinine ratio 19 12 - 20      GFR est AA >60 >60 ml/min/1.73m2    GFR est non-AA >60 >60 ml/min/1.73m2    Calcium 8.4 (L) 8.5 - 10.1 MG/DL    Bilirubin, total 0.4 0.2 - 1.0 MG/DL    ALT (SGPT) 21 16 - 61 U/L    AST (SGOT) 28 10 - 38 U/L    Alk.  phosphatase 56 45 - 117 U/L    Protein, total 6.0 (L) 6.4 - 8.2 g/dL    Albumin 2.6 (L) 3.4 - 5.0 g/dL    Globulin 3.4 2.0 - 4.0 g/dL    A-G Ratio 0.8 0.8 - 1.7     MAGNESIUM    Collection Time: 12/13/20  4:14 AM   Result Value Ref Range    Magnesium 2.2 1.6 - 2.6 mg/dL   GLUCOSE, POC    Collection Time: 12/13/20  5:56 AM   Result Value Ref Range    Glucose (POC) 113 (H) 70 - 110 mg/dL   POC G3    Collection Time: 12/13/20  5:59 AM   Result Value Ref Range    Device: VENT      pH (POC) 7.38 7.35 - 7.45      pCO2 (POC) 34.7 (L) 35.0 - 45.0 MMHG    pO2 (POC) 76 (L) 80 - 100 MMHG    HCO3 (POC) 20.3 (L) 22 - 26 MMOL/L    sO2 (POC) 95 92 - 97 %    Base deficit (POC) 5 mmol/L    Mode ASSIST CONTROL      Allens test (POC) N/A      Site DRAWN FROM ARTERIAL LINE      Specimen type (POC) ARTERIAL      Performed by Demetra Nunez    HGB & HCT    Collection Time: 12/13/20 11:26 AM   Result Value Ref Range    HGB 11.2 (L) 13.0 - 16.0 g/dL    HCT 32.3 (L) 36.0 - 48.0 %   TYPE & SCREEN    Collection Time: 12/13/20 12:15 PM   Result Value Ref Range    Crossmatch Expiration 12/16/2020,2359     ABO/Rh(D) AB POSITIVE     Antibody screen NEG     Unit number [id number]     Blood component type  LR     Unit division 00     Status of unit REL FROM [facility]     Crossmatch result Compatible     Unit number [id number]     Blood component type  LR     Unit division 00     Status of unit REL FROM [facility]     Crossmatch result Compatible    GLUCOSE, POC    Collection Time: 12/13/20 12:15 PM   Result Value Ref Range    Glucose (POC) 117 (H) 70 - 110 mg/dL       Signed By: Saad Liz MD     December 13, 2020

## 2020-12-14 ENCOUNTER — APPOINTMENT (OUTPATIENT)
Dept: GENERAL RADIOLOGY | Age: 70
DRG: 246 | End: 2020-12-14
Attending: INTERNAL MEDICINE
Payer: MEDICARE

## 2020-12-14 LAB
ANION GAP SERPL CALC-SCNC: 9 MMOL/L (ref 3–18)
BASOPHILS # BLD: 0 K/UL (ref 0–0.1)
BASOPHILS NFR BLD: 0 % (ref 0–2)
BUN SERPL-MCNC: 17 MG/DL (ref 7–18)
BUN/CREAT SERPL: 23 (ref 12–20)
CALCIUM SERPL-MCNC: 7.9 MG/DL (ref 8.5–10.1)
CHLORIDE SERPL-SCNC: 111 MMOL/L (ref 100–111)
CO2 SERPL-SCNC: 21 MMOL/L (ref 21–32)
CREAT SERPL-MCNC: 0.74 MG/DL (ref 0.6–1.3)
DIFFERENTIAL METHOD BLD: ABNORMAL
EOSINOPHIL # BLD: 0.4 K/UL (ref 0–0.4)
EOSINOPHIL NFR BLD: 5 % (ref 0–5)
ERYTHROCYTE [DISTWIDTH] IN BLOOD BY AUTOMATED COUNT: 14.6 % (ref 11.6–14.5)
GLUCOSE BLD STRIP.AUTO-MCNC: 118 MG/DL (ref 70–110)
GLUCOSE SERPL-MCNC: 102 MG/DL (ref 74–99)
HCT VFR BLD AUTO: 32.3 % (ref 36–48)
HGB BLD-MCNC: 11 G/DL (ref 13–16)
LYMPHOCYTES # BLD: 1.2 K/UL (ref 0.9–3.6)
LYMPHOCYTES NFR BLD: 15 % (ref 21–52)
MAGNESIUM SERPL-MCNC: 1.9 MG/DL (ref 1.6–2.6)
MCH RBC QN AUTO: 30.7 PG (ref 24–34)
MCHC RBC AUTO-ENTMCNC: 34.1 G/DL (ref 31–37)
MCV RBC AUTO: 90.2 FL (ref 74–97)
MONOCYTES # BLD: 0.7 K/UL (ref 0.05–1.2)
MONOCYTES NFR BLD: 10 % (ref 3–10)
NEUTS SEG # BLD: 5.4 K/UL (ref 1.8–8)
NEUTS SEG NFR BLD: 70 % (ref 40–73)
PHOSPHATE SERPL-MCNC: 2.9 MG/DL (ref 2.5–4.9)
PLATELET # BLD AUTO: 181 K/UL (ref 135–420)
PMV BLD AUTO: 10.2 FL (ref 9.2–11.8)
POTASSIUM SERPL-SCNC: 3.6 MMOL/L (ref 3.5–5.5)
RBC # BLD AUTO: 3.58 M/UL (ref 4.7–5.5)
SODIUM SERPL-SCNC: 141 MMOL/L (ref 136–145)
WBC # BLD AUTO: 7.7 K/UL (ref 4.6–13.2)

## 2020-12-14 PROCEDURE — 94003 VENT MGMT INPAT SUBQ DAY: CPT

## 2020-12-14 PROCEDURE — 74011250636 HC RX REV CODE- 250/636: Performed by: PHYSICIAN ASSISTANT

## 2020-12-14 PROCEDURE — 77010033678 HC OXYGEN DAILY

## 2020-12-14 PROCEDURE — 82962 GLUCOSE BLOOD TEST: CPT

## 2020-12-14 PROCEDURE — 74011250636 HC RX REV CODE- 250/636: Performed by: INTERNAL MEDICINE

## 2020-12-14 PROCEDURE — 71045 X-RAY EXAM CHEST 1 VIEW: CPT

## 2020-12-14 PROCEDURE — 99232 SBSQ HOSP IP/OBS MODERATE 35: CPT | Performed by: HOSPITALIST

## 2020-12-14 PROCEDURE — 85025 COMPLETE CBC W/AUTO DIFF WBC: CPT

## 2020-12-14 PROCEDURE — 74011250637 HC RX REV CODE- 250/637: Performed by: HOSPITALIST

## 2020-12-14 PROCEDURE — 74011250636 HC RX REV CODE- 250/636: Performed by: EMERGENCY MEDICINE

## 2020-12-14 PROCEDURE — 80048 BASIC METABOLIC PNL TOTAL CA: CPT

## 2020-12-14 PROCEDURE — C9113 INJ PANTOPRAZOLE SODIUM, VIA: HCPCS | Performed by: INTERNAL MEDICINE

## 2020-12-14 PROCEDURE — 74011000258 HC RX REV CODE- 258: Performed by: PHYSICIAN ASSISTANT

## 2020-12-14 PROCEDURE — 94762 N-INVAS EAR/PLS OXIMTRY CONT: CPT

## 2020-12-14 PROCEDURE — 74011000250 HC RX REV CODE- 250: Performed by: PHYSICIAN ASSISTANT

## 2020-12-14 PROCEDURE — 83735 ASSAY OF MAGNESIUM: CPT

## 2020-12-14 PROCEDURE — 74011000250 HC RX REV CODE- 250

## 2020-12-14 PROCEDURE — 74011250636 HC RX REV CODE- 250/636: Performed by: HOSPITALIST

## 2020-12-14 PROCEDURE — 74011000258 HC RX REV CODE- 258: Performed by: EMERGENCY MEDICINE

## 2020-12-14 PROCEDURE — 65620000000 HC RM CCU GENERAL

## 2020-12-14 PROCEDURE — 74011000250 HC RX REV CODE- 250: Performed by: INTERNAL MEDICINE

## 2020-12-14 PROCEDURE — 74011250637 HC RX REV CODE- 250/637: Performed by: INTERNAL MEDICINE

## 2020-12-14 PROCEDURE — 99232 SBSQ HOSP IP/OBS MODERATE 35: CPT | Performed by: INTERNAL MEDICINE

## 2020-12-14 PROCEDURE — 84100 ASSAY OF PHOSPHORUS: CPT

## 2020-12-14 PROCEDURE — 2709999900 HC NON-CHARGEABLE SUPPLY

## 2020-12-14 PROCEDURE — 74011250637 HC RX REV CODE- 250/637: Performed by: NURSE PRACTITIONER

## 2020-12-14 RX ORDER — ACETAMINOPHEN 500 MG
500 TABLET ORAL
Status: DISCONTINUED | OUTPATIENT
Start: 2020-12-14 | End: 2020-12-18 | Stop reason: HOSPADM

## 2020-12-14 RX ORDER — FACIAL-BODY WIPES
10 EACH TOPICAL
Status: COMPLETED | OUTPATIENT
Start: 2020-12-14 | End: 2020-12-14

## 2020-12-14 RX ORDER — FAMOTIDINE 10 MG/ML
20 INJECTION INTRAVENOUS EVERY 12 HOURS
Status: DISCONTINUED | OUTPATIENT
Start: 2020-12-14 | End: 2020-12-18 | Stop reason: HOSPADM

## 2020-12-14 RX ORDER — IPRATROPIUM BROMIDE AND ALBUTEROL SULFATE 2.5; .5 MG/3ML; MG/3ML
SOLUTION RESPIRATORY (INHALATION)
Status: COMPLETED
Start: 2020-12-14 | End: 2020-12-14

## 2020-12-14 RX ORDER — IPRATROPIUM BROMIDE AND ALBUTEROL SULFATE 2.5; .5 MG/3ML; MG/3ML
3 SOLUTION RESPIRATORY (INHALATION)
Status: DISCONTINUED | OUTPATIENT
Start: 2020-12-14 | End: 2020-12-18 | Stop reason: HOSPADM

## 2020-12-14 RX ORDER — HYDRALAZINE HYDROCHLORIDE 25 MG/1
25 TABLET, FILM COATED ORAL
Status: DISCONTINUED | OUTPATIENT
Start: 2020-12-14 | End: 2020-12-18 | Stop reason: HOSPADM

## 2020-12-14 RX ADMIN — IPRATROPIUM BROMIDE AND ALBUTEROL SULFATE 3 ML: .5; 3 SOLUTION RESPIRATORY (INHALATION) at 10:02

## 2020-12-14 RX ADMIN — TICAGRELOR 90 MG: 90 TABLET ORAL at 04:45

## 2020-12-14 RX ADMIN — SODIUM CHLORIDE 40 MG: 9 INJECTION, SOLUTION INTRAMUSCULAR; INTRAVENOUS; SUBCUTANEOUS at 08:04

## 2020-12-14 RX ADMIN — FAMOTIDINE 20 MG: 10 INJECTION INTRAVENOUS at 21:30

## 2020-12-14 RX ADMIN — CHLORHEXIDINE GLUCONATE 0.12% ORAL RINSE 10 ML: 1.2 LIQUID ORAL at 08:03

## 2020-12-14 RX ADMIN — FENTANYL CITRATE 125 MCG/HR: 50 INJECTION, SOLUTION INTRAMUSCULAR; INTRAVENOUS at 04:43

## 2020-12-14 RX ADMIN — ASPIRIN 81 MG CHEWABLE TABLET 81 MG: 81 TABLET CHEWABLE at 08:04

## 2020-12-14 RX ADMIN — PROPOFOL 50 MCG/KG/MIN: 10 INJECTION, EMULSION INTRAVENOUS at 03:55

## 2020-12-14 RX ADMIN — CHLORHEXIDINE GLUCONATE 0.12% ORAL RINSE 15 ML: 1.2 LIQUID ORAL at 21:29

## 2020-12-14 RX ADMIN — HYDRALAZINE HYDROCHLORIDE 25 MG: 25 TABLET, FILM COATED ORAL at 10:55

## 2020-12-14 RX ADMIN — MIDAZOLAM 2 MG: 1 INJECTION INTRAMUSCULAR; INTRAVENOUS at 06:44

## 2020-12-14 RX ADMIN — Medication 10 ML: at 14:10

## 2020-12-14 RX ADMIN — Medication 10 ML: at 14:00

## 2020-12-14 RX ADMIN — Medication 10 ML: at 22:00

## 2020-12-14 RX ADMIN — PROPOFOL 50 MCG/KG/MIN: 10 INJECTION, EMULSION INTRAVENOUS at 06:37

## 2020-12-14 RX ADMIN — PIPERACILLIN AND TAZOBACTAM 3.38 G: 3; .375 INJECTION, POWDER, LYOPHILIZED, FOR SOLUTION INTRAVENOUS at 12:00

## 2020-12-14 RX ADMIN — PIPERACILLIN AND TAZOBACTAM 3.38 G: 3; .375 INJECTION, POWDER, LYOPHILIZED, FOR SOLUTION INTRAVENOUS at 06:07

## 2020-12-14 RX ADMIN — MUPIROCIN: 20 OINTMENT TOPICAL at 08:04

## 2020-12-14 RX ADMIN — PIPERACILLIN AND TAZOBACTAM 3.38 G: 3; .375 INJECTION, POWDER, LYOPHILIZED, FOR SOLUTION INTRAVENOUS at 18:44

## 2020-12-14 RX ADMIN — PIPERACILLIN AND TAZOBACTAM 3.38 G: 3; .375 INJECTION, POWDER, LYOPHILIZED, FOR SOLUTION INTRAVENOUS at 00:19

## 2020-12-14 RX ADMIN — TICAGRELOR 90 MG: 90 TABLET ORAL at 16:12

## 2020-12-14 RX ADMIN — CARVEDILOL 6.25 MG: 6.25 TABLET, FILM COATED ORAL at 08:06

## 2020-12-14 RX ADMIN — CARVEDILOL 6.25 MG: 6.25 TABLET, FILM COATED ORAL at 21:29

## 2020-12-14 RX ADMIN — MIDAZOLAM 2 MG: 1 INJECTION INTRAMUSCULAR; INTRAVENOUS at 08:07

## 2020-12-14 RX ADMIN — Medication 10 ML: at 07:25

## 2020-12-14 RX ADMIN — BISACODYL 10 MG: 10 SUPPOSITORY RECTAL at 19:10

## 2020-12-14 RX ADMIN — HYDRALAZINE HYDROCHLORIDE 25 MG: 25 TABLET, FILM COATED ORAL at 21:29

## 2020-12-14 RX ADMIN — MIDAZOLAM 2 MG: 1 INJECTION INTRAMUSCULAR; INTRAVENOUS at 01:37

## 2020-12-14 RX ADMIN — Medication 10 ML: at 16:18

## 2020-12-14 RX ADMIN — ROSUVASTATIN 40 MG: 20 TABLET, FILM COATED ORAL at 21:29

## 2020-12-14 RX ADMIN — PROPOFOL 50 MCG/KG/MIN: 10 INJECTION, EMULSION INTRAVENOUS at 00:19

## 2020-12-14 NOTE — PROGRESS NOTES
SLP orders received; however, upon completion of chart review, patient    [ ] Tolerating current po diet (per RN report)  [ ] Tolerating current po diet; however, poor po intake (per RN report)  [ ] Receiving nutrition via tube feeding   [ ] Lethargic, somnolent or difficult to arouse for safe po trials  [ ] Unable to manage secretions  [ ] Receiving intervention for respiratory distress (excluding O2 via nasal cannula)  [x] <6 hours status post extubation   [ ] Other:     Will address as appropriate.       Thank you for this referral,   Alma Carbajal M.S., [address] [facility]  Speech-Language Pathologist

## 2020-12-14 NOTE — PROGRESS NOTES
Pt extubated to a 4 liter nasal cannula without incident     12/14/20 1023   Weaning Parameters   Spontaneous Breathing Trial Complete Yes   Resp Rate Observed 20   Ve 10      RSBI 22

## 2020-12-14 NOTE — PROGRESS NOTES
[facility]  Progress Note    Patient: Ayaan Maloney Age: 79 y.o. : 1950 MR#: [id number] SSN: [id number]  Date/Time: 2020    Subjective:     Extubated to a 4 liter nasal cannula without incident this am.     Patient states he does not recall any of the events leading up to today, but with redirection he is able to recall what the nurse has explained to him. NG tube has been continued. Patient denies chest pain, shortness of breath. States he is not hungry. Also he states his abdomen normally looks the way it does today, states he is not distended. Asks if he can go home tomorrow, extensive discussion held regarding the plans for the remainder of his hospital course. He states he is already spoken to his wife today and there is no need for our service to call. Assessment/Plan:     1. Unstable angina. 40-45% distal LM stenosis, critical mid LAD stenosis. S/p celi to mid LAD lesion. DAPT, statin. Coreg. 2. acute Hypoxic respiratory failure requiring intubation on mechanical ventilation. d/t acute pulmonary edema. Extubated 2020.  +2  3. Hemoptysis in the setting of DAPT post cath and traumatic ETT tube exchange and pulmonary edema- appears resolved at this time  4. Hypertension controlled  5. Dyslipidemia on statin  6. Diet-controlled type 2 diabetes. Sliding scale insulin   7. degenerative joint disease  8. Obesity Body mass index is 30.2 kg/m². 9. Volume overload. Daily dosing of lasix as needed. euvolemic today. 10.  Constipation . last BM PTA,  per nursing documentation. Dulcolax now. 11. Full code. Monitor cvt icu. I discussed the case with Dr. Briana Calabrese. wife at phone #[phone].       Case discussed with:  [x]Patient  []Family  [x]Nursing  []Case Management  DVT Prophylaxis:  []Lovenox  []Hep SQ  [x]SCDs  []Coumadin   []On Heparin gtt    Objective:   VS:   Visit Vitals  BP (!) 96/56   Pulse 76   Temp 98.1 °F (36.7 °C) Resp 14   Ht 6' (1.829 m)   Wt 101 kg (222 lb 10.6 oz)   SpO2 96%   BMI 30.20 kg/m²      Tmax/24hrs: Temp (24hrs), Av.2 °F (36.8 °C), Min:97.9 °F (36.6 °C), Max:98.4 °F (36.9 °C)    Input/Output:     Intake/Output Summary (Last 24 hours) at 2020 0809  Last data filed at 2020 2499  Gross per 24 hour   Intake 1009.08 ml   Output 2100 ml   Net -1090.92 ml       General: awake alert and oriented x 3 -4 with redirection. Speaking in full sentences on supplemental oxygen. Heent: ncat. Perrl. Oropharynx clear. Cardiovascular:  S1S2+, RRR  Pulmonary: cta b.l anterior and infraaxillary fields  GI:  Soft, NT, ND nabs. Extremities:  No edema. dp 2+ b.l  Neuro: no focal deficit. Moving all 4s.  Toes downgoing b.l  Skin: no rash      Labs:    Recent Results (from the past 24 hour(s))   HGB & HCT    Collection Time: 20 11:26 AM   Result Value Ref Range    HGB 11.2 (L) 13.0 - 16.0 g/dL    HCT 32.3 (L) 36.0 - 48.0 %   TYPE & SCREEN    Collection Time: 20 12:15 PM   Result Value Ref Range    Crossmatch Expiration 2020,2359     ABO/Rh(D) AB POSITIVE     Antibody screen NEG     Unit number [id number]     Blood component type RC LR     Unit division 00     Status of unit REL FROM [facility]     Crossmatch result Compatible     Unit number [id number]     Blood component type RC LR     Unit division 00     Status of unit REL FROM [facility]     Crossmatch result Compatible     Unit number [id number]     Blood component type RC LR     Unit division 00     Status of unit ALLOCATED     Crossmatch result Compatible     Unit number [id number]     Blood component type RC LR     Unit division 00     Status of unit ALLOCATED     Crossmatch result Compatible    GLUCOSE, POC    Collection Time: 20 12:15 PM   Result Value Ref Range    Glucose (POC) 117 (H) 70 - 110 mg/dL   HGB & HCT    Collection Time: 20  3:47 PM   Result Value Ref Range    HGB 10.5 (L) 13.0 - 16.0 g/dL    HCT 30.5 (L) 36.0 - 48.0 %   GLUCOSE, POC    Collection Time: 12/13/20  4:54 PM   Result Value Ref Range    Glucose (POC) 90 70 - 110 mg/dL   GLUCOSE, POC    Collection Time: 12/13/20 11:02 PM   Result Value Ref Range    Glucose (POC) 99 70 - 110 mg/dL   CBC WITH AUTOMATED DIFF    Collection Time: 12/14/20  6:04 AM   Result Value Ref Range    WBC 7.7 4.6 - 13.2 K/uL    RBC 3.58 (L) 4.70 - 5.50 M/uL    HGB 11.0 (L) 13.0 - 16.0 g/dL    HCT 32.3 (L) 36.0 - 48.0 %    MCV 90.2 74.0 - 97.0 FL    MCH 30.7 24.0 - 34.0 PG    MCHC 34.1 31.0 - 37.0 g/dL    RDW 14.6 (H) 11.6 - 14.5 %    PLATELET 815 016 - 889 K/uL    MPV 10.2 9.2 - 11.8 FL    NEUTROPHILS 70 40 - 73 %    LYMPHOCYTES 15 (L) 21 - 52 %    MONOCYTES 10 3 - 10 %    EOSINOPHILS 5 0 - 5 %    BASOPHILS 0 0 - 2 %    ABS. NEUTROPHILS 5.4 1.8 - 8.0 K/UL    ABS. LYMPHOCYTES 1.2 0.9 - 3.6 K/UL    ABS. MONOCYTES 0.7 0.05 - 1.2 K/UL    ABS. EOSINOPHILS 0.4 0.0 - 0.4 K/UL    ABS.  BASOPHILS 0.0 0.0 - 0.1 K/UL    DF AUTOMATED         Signed By: Maynor Cobos MD     December 14, 2020

## 2020-12-14 NOTE — PROGRESS NOTES
[facility]      CARDIOLOGY PROGRESS NOTE  RECS:    1. Unstable angina- s/p Cardiac Cath that shows 40-45% distal LM stenosis. Critical mid LAD stenosis. PCI procedure uncomplicated. Patient developed acute pulmonary edema requiring intubation on mechanical ventilation. continue DAPT brilinta and asa. Integrilin d/c  2. Acute respiratory failure- extubated   3. Hypotension- likely cardiogenic shock-better off pressors. Will reseume medications when BP rises. 4. CAD-cardiac cath 12/2017 showed  Triple-vessel coronary artery disease. 5. Hyperlipidemia- LDL 74 11/20 Continue with Crestor  20 mg.  6. Diabetes- well controlled with A1c 5.9   7. LBBB- new since 12/20   8. Hemoptysis- traumatic ETT tube echange and pulmonary edema- better     Extubated. Responds appropriately. Continue DAPT. Lasix as needed. Will f/u      Cardiac cath 12/9/20  · Three-vessel coronary artery disease with 90% mid LAD stenosis 80% distal circumflex stenosis and 100% mid RCA stenosis after diffuse disease in proximal and ostial RCA. · Patent previously deployed drug-eluting stents in mid circumflex and distal circumflex areas. · Borderline critical left main stenosis which is approximately 50% and has progressed from previous catheterization in 2017. · Procedure done via right radial artery without any complications. Given left main disease, CABG will be considered first.  We will take a surgical opinion with Dr. Jaden Randhawa. I discussed with him on phone and he/his team will be seeing the patient.     Echo 12/20  · Contrast used: DEFINITY. · LV: Calculated LVEF is 55%. Visually measured ejection fraction. Normal cavity size and systolic function (ejection fraction normal). Mild concentric hypertrophy. Age-appropriate left ventricular diastolic function. · PA: Pulmonary arterial systolic pressure is 26 mmHg. · Aorta: Dilated aortic root; diameter is 3.7 cm.     ASSESSMENT:  Hospital Problems  Date Reviewed: 11/13/2019          Codes Class Noted POA    ACS (acute coronary syndrome) ([facility] ICD-10-CM: I24.9  ICD-9-CM: 411.1  12/8/2020 Unknown        Controlled type 2 diabetes mellitus without complication, without long-term current use of insulin ([facility] ICD-10-CM: E11.9  ICD-9-CM: 250.00  11/29/2018 Yes        Coronary artery disease involving native coronary artery with unstable angina pectoris [facility]) ICD-10-CM: I25.110  ICD-9-CM: 414.01, 411.1  1/27/2016 Yes    Overview Signed 1/27/2016 11:41 AM by Teresa Linda MD     recent admission angina stable on medical managment  unable to tolerate isordil             Essential hypertension ICD-10-CM: I10  ICD-9-CM: 401.9  1/27/2016 Yes    Overview Signed 1/27/2016 11:41 AM by Teresa Linda MD     controlled             Unstable angina pectoris ([facility] ICD-10-CM: I20.0  ICD-9-CM: 411.1  1/17/2016 Yes                SUBJECTIVE:  Extubated. No chest pain    OBJECTIVE:    VS:   Visit Vitals  BP (!) 144/69 (BP 1 Location: Left arm, BP Patient Position: At rest)   Pulse 86   Temp 98.7 °F (37.1 °C)   Resp 19   Ht 6' (1.829 m)   Wt 101 kg (222 lb 10.6 oz)   SpO2 96%   BMI 30.20 kg/m²         Intake/Output Summary (Last 24 hours) at 12/14/2020 1545  Last data filed at 12/14/2020 1500  Gross per 24 hour   Intake 1039.03 ml   Output 895 ml   Net 144.03 ml     TELE: normal sinus rhythm    General: in no apparent distress  HENT: Normocephalic, atraumatic. Normal external eye.   Neck :  no bruit, no JVD  Cardiac:  regular rate and rhythm, S1, S2 normal, no murmur, click, rub or gallop  Lungs: b/l air entry  Abdomen: Soft, nontender, no masses  Extremities:  No c/c/e, peripheral pulses present      Labs: Results:       Chemistry Recent Labs     12/14/20  0604 12/13/20  0414 12/12/20  0400   * 102* 134*    139 141   K 3.6 3.7 3.5    111 112*   CO2 21 23 23   BUN 17 17 15   CREA 0.74 0.90 0.86   CA 7.9* 8.4* 7.9*   AGAP 9 5 6   BUCR 23* 19 17   AP  --  56  --    TP  --  6.0* --    ALB  --  2.6*  --    GLOB  --  3.4  --    AGRAT  --  0.8  --       CBC w/Diff Recent Labs     12/14/20  0604 12/13/20  1547 12/13/20  1126 12/13/20  0414  12/12/20  0400   WBC 7.7  --   --  7.5  --  10.5   RBC 3.58*  --   --  3.39*  --  3.48*   HGB 11.0* 10.5* 11.2* 10.5*   < > 10.6*   HCT 32.3* 30.5* 32.3* 30.9*   < > 31.5*     --   --  156  --  160   GRANS 70  --   --  65  --  74*   LYMPH 15*  --   --  20*  --  14*   EOS 5  --   --  4  --  2    < > = values in this interval not displayed. Cardiac Enzymes No results for input(s): CPK, CKND1, AMERICA in the last 72 hours. No lab exists for component: CKRMB, TROIP   Coagulation Recent Labs     12/12/20  1017   APTT 25.5       Lipid Panel Lab Results   Component Value Date/Time    Cholesterol, total 114 12/12/2020 04:00 AM    HDL Cholesterol 28 (L) 12/12/2020 04:00 AM    LDL, calculated 38 12/12/2020 04:00 AM    VLDL, calculated 48 12/12/2020 04:00 AM    Triglyceride 240 (H) 12/12/2020 04:00 AM    CHOL/HDL Ratio 4.1 12/12/2020 04:00 AM      BNP No results for input(s): BNPP in the last 72 hours.    Liver Enzymes Recent Labs     12/13/20  0414   TP 6.0*   ALB 2.6*   AP 56      Thyroid Studies Lab Results   Component Value Date/Time    TSH 1.33 11/23/2020 07:34 AM                Mone Booth MD

## 2020-12-14 NOTE — PROGRESS NOTES
0700: Bedside and Verbal shift change report given to writer by Magui Harry Report included the following information OR Summary, Procedure Summary, Intake/Output, MAR, Accordion, Recent Results, Med Rec Status, Cardiac Rhythm ., Alarm Parameters , Quality Measures and Dual Neuro Assessment. 0945: Diprivan infusion stopped per Dr. Jaeck Sanders order. 1020: Fentanyl infusion discontinued. 1023: Pt extubated by MellisaRT with Dr. Jacek Sanders nearby, pt placed on 6LPM nasal cannula with O2 saturation. Pt recently received DuoNeb treatment for forced expiratory wheezing, remains with t upper airway wheezes present. 1045: Dr. Andrey Mendoza paged regarding -110mmHG post extubation, IV Diprivan and IV Fentanyl off post extubation of patient. OGT remains in place with bilious to brown mucoid drainage; Abdomen obese, distended and tympanic to palpation. 1052: Dr. Fernandez Gonzalez returned page, pt condition update and vital signs reviewed- new orders received at this time. 1055: Hydralazine via NGT, see MAR.  1145: Pt spoke with his spouse over phone, via [employer]. Pt stated that he recognized his spouse- but didn't know where he was/ what was going on. Pt reoriented to time, place and situation, pts' spouse addressed his admission to current day with patient. 1900: Bedside and Verbal shift change report given to Kerline Maldonado by Kolby Gilbert . Report included the following information ED Summary, Procedure Summary, Intake/Output, MAR, Accordion, Recent Results, Med Rec Status, Cardiac Rhythm ., Alarm Parameters , Procedure Verification, Quality Measures and Dual Neuro Assessment.

## 2020-12-14 NOTE — PROGRESS NOTES
Pt not medically ready for discharge. Pt was independent prior to admission. Pt currently intubated. CM continues to follow along for discharge planning.       Nae Toribio RN BSN  Care Manager  [phone]

## 2020-12-14 NOTE — PROGRESS NOTES
Nutrition Assessment     Type and Reason for Visit: Reassess, NPO/clear liquid    Nutrition Recommendations/Plan:   - Monitor ability to tolerate oral diet versus need for enteral nutrition support. If patient remains NPO, recommend starting tube feeding of Glucerna 1.5 at 10 mL/hr and advance as tolerated by 10 mL q 8 hours to goal rate of 60 mL/hr with 150 mL q 4 hour water flushes (goal regimen to provide 2160 kcal, 119 gm protein, 1993 mL total free water, 100% RDIs). - Recommend starting bowel regimen, consider KUB to evaluate abdomen/rule out bowel obstruction (last BM PTA, 12/8 per nursing documentation). Nutrition Assessment:  Patient extubated to nasal cannula this morning and remains NPO, not appropriate for swallow evaluation at this time given recent extubation per SLP. NGT remains in place with dark brown bilious output, clamped after enteral medication administration. Malnutrition Assessment:  Malnutrition Status: No malnutrition     Estimated Daily Nutrient Needs:  Energy (kcal):  4408-6884  Protein (g):         Fluid (ml/day):  8191-7988    Nutrition Related Findings:  Last BM 12/8 (abdomen distended, active bowel sounds). NGT clamped, previously with bilious drainage.       Current Nutrition Therapies:  DIET NPO    Anthropometric Measures:  · Height:  6' (182.9 cm)  · Current Body Wt:  101 kg (222 lb 10.6 oz)  · BMI: 30.2    Nutrition Diagnosis:   · Inadequate oral intake related to cognitive or neurological impairment, impaired respiratory function, swallowing difficulty as evidenced by NPO or clear liquid status due to medical condition(recently extubated)      Nutrition Intervention:  Food and/or Nutrient Delivery: (oral diet as tolerated per SLP)  Nutrition Education and Counseling: Education not indicated  Coordination of Nutrition Care: Continue to monitor while inpatient    Goals:  Nutritional needs will be met through adequate oral intake or nutrition support within the next 7 days. Nutrition Monitoring and Evaluation:   Behavioral-Environmental Outcomes: None identified  Food/Nutrient Intake Outcomes: Diet advancement/tolerance  Physical Signs/Symptoms Outcomes: Biochemical data, Constipation, Chewing or swallowing, GI status, Fluid status or edema, Meal time behavior, Nutrition focused physical findings    Discharge Planning:     Too soon to determine     Electronically signed by Marisela Silver RD, [address]  on 12/14/2020 at 2:54 PM    Contact Number: [phone]

## 2020-12-15 LAB
ANION GAP SERPL CALC-SCNC: 11 MMOL/L (ref 3–18)
BUN SERPL-MCNC: 17 MG/DL (ref 7–18)
BUN/CREAT SERPL: 25 (ref 12–20)
CALCIUM SERPL-MCNC: 8.5 MG/DL (ref 8.5–10.1)
CHLORIDE SERPL-SCNC: 111 MMOL/L (ref 100–111)
CO2 SERPL-SCNC: 21 MMOL/L (ref 21–32)
CREAT SERPL-MCNC: 0.69 MG/DL (ref 0.6–1.3)
ERYTHROCYTE [DISTWIDTH] IN BLOOD BY AUTOMATED COUNT: 14.1 % (ref 11.6–14.5)
GLUCOSE BLD STRIP.AUTO-MCNC: 108 MG/DL (ref 70–110)
GLUCOSE BLD STRIP.AUTO-MCNC: 127 MG/DL (ref 70–110)
GLUCOSE BLD STRIP.AUTO-MCNC: 129 MG/DL (ref 70–110)
GLUCOSE BLD STRIP.AUTO-MCNC: 130 MG/DL (ref 70–110)
GLUCOSE BLD STRIP.AUTO-MCNC: 137 MG/DL (ref 70–110)
GLUCOSE SERPL-MCNC: 114 MG/DL (ref 74–99)
HCT VFR BLD AUTO: 30.8 % (ref 36–48)
HGB BLD-MCNC: 10.7 G/DL (ref 13–16)
MAGNESIUM SERPL-MCNC: 2 MG/DL (ref 1.6–2.6)
MCH RBC QN AUTO: 30.8 PG (ref 24–34)
MCHC RBC AUTO-ENTMCNC: 34.7 G/DL (ref 31–37)
MCV RBC AUTO: 88.8 FL (ref 74–97)
PHOSPHATE SERPL-MCNC: 2.7 MG/DL (ref 2.5–4.9)
PLATELET # BLD AUTO: 200 K/UL (ref 135–420)
PMV BLD AUTO: 9.8 FL (ref 9.2–11.8)
POTASSIUM SERPL-SCNC: 3.9 MMOL/L (ref 3.5–5.5)
RBC # BLD AUTO: 3.47 M/UL (ref 4.7–5.5)
SODIUM SERPL-SCNC: 143 MMOL/L (ref 136–145)
WBC # BLD AUTO: 8.1 K/UL (ref 4.6–13.2)

## 2020-12-15 PROCEDURE — 92526 ORAL FUNCTION THERAPY: CPT

## 2020-12-15 PROCEDURE — 99232 SBSQ HOSP IP/OBS MODERATE 35: CPT | Performed by: HOSPITALIST

## 2020-12-15 PROCEDURE — 94762 N-INVAS EAR/PLS OXIMTRY CONT: CPT

## 2020-12-15 PROCEDURE — 80048 BASIC METABOLIC PNL TOTAL CA: CPT

## 2020-12-15 PROCEDURE — 77030018836 HC SOL IRR NACL ICUM -A

## 2020-12-15 PROCEDURE — 74011000258 HC RX REV CODE- 258: Performed by: EMERGENCY MEDICINE

## 2020-12-15 PROCEDURE — 99232 SBSQ HOSP IP/OBS MODERATE 35: CPT | Performed by: INTERNAL MEDICINE

## 2020-12-15 PROCEDURE — 92610 EVALUATE SWALLOWING FUNCTION: CPT

## 2020-12-15 PROCEDURE — 74011250637 HC RX REV CODE- 250/637: Performed by: INTERNAL MEDICINE

## 2020-12-15 PROCEDURE — 84100 ASSAY OF PHOSPHORUS: CPT

## 2020-12-15 PROCEDURE — 74011000250 HC RX REV CODE- 250: Performed by: PHYSICIAN ASSISTANT

## 2020-12-15 PROCEDURE — 74011250636 HC RX REV CODE- 250/636: Performed by: HOSPITALIST

## 2020-12-15 PROCEDURE — 2709999900 HC NON-CHARGEABLE SUPPLY

## 2020-12-15 PROCEDURE — 99233 SBSQ HOSP IP/OBS HIGH 50: CPT | Performed by: INTERNAL MEDICINE

## 2020-12-15 PROCEDURE — 83735 ASSAY OF MAGNESIUM: CPT

## 2020-12-15 PROCEDURE — 74011250637 HC RX REV CODE- 250/637: Performed by: NURSE PRACTITIONER

## 2020-12-15 PROCEDURE — 85027 COMPLETE CBC AUTOMATED: CPT

## 2020-12-15 PROCEDURE — 74011250637 HC RX REV CODE- 250/637: Performed by: HOSPITALIST

## 2020-12-15 PROCEDURE — 65660000004 HC RM CVT STEPDOWN

## 2020-12-15 PROCEDURE — 82962 GLUCOSE BLOOD TEST: CPT

## 2020-12-15 PROCEDURE — 74011250636 HC RX REV CODE- 250/636: Performed by: EMERGENCY MEDICINE

## 2020-12-15 RX ORDER — INSULIN LISPRO 100 [IU]/ML
INJECTION, SOLUTION INTRAVENOUS; SUBCUTANEOUS
Status: DISCONTINUED | OUTPATIENT
Start: 2020-12-15 | End: 2020-12-18 | Stop reason: HOSPADM

## 2020-12-15 RX ORDER — CARVEDILOL 12.5 MG/1
12.5 TABLET ORAL EVERY 12 HOURS
Status: DISCONTINUED | OUTPATIENT
Start: 2020-12-15 | End: 2020-12-18 | Stop reason: HOSPADM

## 2020-12-15 RX ORDER — POLYETHYLENE GLYCOL 3350 17 G/17G
17 POWDER, FOR SOLUTION ORAL DAILY
Status: DISCONTINUED | OUTPATIENT
Start: 2020-12-15 | End: 2020-12-18 | Stop reason: HOSPADM

## 2020-12-15 RX ADMIN — ROSUVASTATIN 40 MG: 20 TABLET, FILM COATED ORAL at 21:36

## 2020-12-15 RX ADMIN — Medication 10 ML: at 14:16

## 2020-12-15 RX ADMIN — Medication 10 ML: at 22:00

## 2020-12-15 RX ADMIN — POLYETHYLENE GLYCOL 3350 17 G: 17 POWDER, FOR SOLUTION ORAL at 09:12

## 2020-12-15 RX ADMIN — TICAGRELOR 90 MG: 90 TABLET ORAL at 04:00

## 2020-12-15 RX ADMIN — Medication 10 ML: at 06:00

## 2020-12-15 RX ADMIN — CARVEDILOL 12.5 MG: 12.5 TABLET, FILM COATED ORAL at 09:12

## 2020-12-15 RX ADMIN — CARVEDILOL 12.5 MG: 12.5 TABLET, FILM COATED ORAL at 21:36

## 2020-12-15 RX ADMIN — Medication 10 ML: at 16:52

## 2020-12-15 RX ADMIN — Medication 10 ML: at 14:00

## 2020-12-15 RX ADMIN — TICAGRELOR 90 MG: 90 TABLET ORAL at 16:54

## 2020-12-15 RX ADMIN — CHLORHEXIDINE GLUCONATE 0.12% ORAL RINSE 10 ML: 1.2 LIQUID ORAL at 09:11

## 2020-12-15 RX ADMIN — ASPIRIN 81 MG CHEWABLE TABLET 81 MG: 81 TABLET CHEWABLE at 09:11

## 2020-12-15 RX ADMIN — PIPERACILLIN AND TAZOBACTAM 3.38 G: 3; .375 INJECTION, POWDER, LYOPHILIZED, FOR SOLUTION INTRAVENOUS at 06:00

## 2020-12-15 RX ADMIN — FAMOTIDINE 20 MG: 10 INJECTION INTRAVENOUS at 21:36

## 2020-12-15 RX ADMIN — PIPERACILLIN AND TAZOBACTAM 3.38 G: 3; .375 INJECTION, POWDER, LYOPHILIZED, FOR SOLUTION INTRAVENOUS at 17:08

## 2020-12-15 RX ADMIN — MUPIROCIN: 20 OINTMENT TOPICAL at 17:04

## 2020-12-15 RX ADMIN — FAMOTIDINE 20 MG: 10 INJECTION INTRAVENOUS at 09:11

## 2020-12-15 RX ADMIN — MUPIROCIN: 20 OINTMENT TOPICAL at 11:41

## 2020-12-15 RX ADMIN — PIPERACILLIN AND TAZOBACTAM 3.38 G: 3; .375 INJECTION, POWDER, LYOPHILIZED, FOR SOLUTION INTRAVENOUS at 00:37

## 2020-12-15 RX ADMIN — PIPERACILLIN AND TAZOBACTAM 3.38 G: 3; .375 INJECTION, POWDER, LYOPHILIZED, FOR SOLUTION INTRAVENOUS at 11:43

## 2020-12-15 NOTE — PROGRESS NOTES
Problem: Dysphagia (Adult)  Goal: *Acute Goals and Plan of Care (Insert Text)  Description: Patient will:  1. Tolerate PO trials with 0 s/s overt distress in 4/5 trials  2. Participate in training and education related to continued aspiration risk, diet recs and compensatory strategies     Recommend:   Safe for regular diet with thin liquids (RN requesting clear liquids due to abdominal distension; MD made aware)   Meds as tolerated   Aspiration precautions  HOB >45 degrees during all intake and for at least 30 min after po   Small bites/sips, Slow rate of intake with rest breaks as needed   Oral care three times daily     Outcome: Progressing Towards Goal    SPEECH LANGUAGE PATHOLOGY BEDSIDE SWALLOW EVALUATION/TREATMENT    Patient: Melvina Lesches (55 y.o. male)  Date: 12/15/2020  Primary Diagnosis: ACS (acute coronary syndrome) (HCC) [I24.9]  ACS (acute coronary syndrome) (HCC) [I24.9]  Procedure(s) (LRB):  LEFT HEART CATH (N/A)  Insert Stent Kingsley Coronary (N/A)  Coronary Angiography (N/A) 5 Days Post-Op   Precautions: Aspiration     PLOF: Regular diet with thin liquids     ASSESSMENT :  Based on the objective data described below, the patient presents with suspected mild pharyngeal dysphagia s/p extubation 12/14/20. Pt alert and oriented x4, following commands and denying hx of dysphagia. Speech/voice within functional limits. Oral mech exam unremarkable. Pt demo timely swallow initiation, adequate laryngeal elevation to palpation and no changes to vitals across ~6 oz of thin liquids. Pt with throat clear noted on >3 consecutive swallows, resolved with min cues for single sips. Pt with positive rotary chew and thorough oral clearance with trials of regular solids. Recommend initiate regular diet with thin liquids with use of the above mentioned compensatory strategies/aspiration precautions. RN requesting clear liquids due to abdominal distention (MD made aware).       TREATMENT :  Skilled therapy initiated; Educated pt on aspiration precautions and importance of compensatory swallow techniques to decrease aspiration risk (single sips, decrease rate of intake & sip/bite size, upright @HOB for all po intake and ~30 minutes after po); verbalized comprehension. Will follow for further dysphagia management as indicated. Patient will benefit from skilled intervention to address the above impairments. Patient's rehabilitation potential is considered to be Excellent  Factors which may influence rehabilitation potential include:   []            None noted  []            Mental ability/status  [x]            Medical condition  []            Home/family situation and support systems  []            Safety awareness  []            Pain tolerance/management  []            Other:      PLAN :  Recommendations and Planned Interventions:  As above   Frequency/Duration: Patient will be followed by speech-language pathology 1-2 times per day/4-7 days per week to address goals. Discharge Recommendations:  To Be Determined     SUBJECTIVE:   Patient stated I have 5 girls    OBJECTIVE:     Past Medical History:   Diagnosis Date    Arthritis     1999 vicodin    Diabetes ([facility] 1998 metformin    GERD (gastroesophageal reflux disease)     Hypercholesterolemia     Hypertension 1996 norvasc    Kidney stones     MI (myocardial infarction) [facility])      Past Surgical History:   Procedure Laterality Date    CARDIAC SURG PROCEDURE UNLIST  12/2017    stints    COLONOSCOPY N/A 11/2/2018    COLONOSCOPY with Polypectomies performed by Sherrill Harp MD at [facility]    HX CHOLECYSTECTOMY      HX GI  2010    gallbladder     Prior Level of Function/Home Situation:   Home Situation  Home Environment: Private residence  One/Two Story Residence: One story  Living Alone: No  Support Systems: Family member(s), Friends \ neighbors  Patient Expects to be Discharged to[de-identified] Private residence  Current DME Used/Available at Home: None  Diet prior to admission: Regular/thin liquids  Current Diet:  NPO; safe for regular/thin liquids  Cognitive and Communication Status:  Neurologic State: Alert, Eyes open spontaneously  Orientation Level: Oriented X4  Cognition: Appropriate decision making, Appropriate for age attention/concentration, Appropriate safety awareness, Follows commands, Recognition of people/places  Oral Assessment:  Oral Assessment  Labial: No impairment  Dentition: Natural  Oral Hygiene: Good  Lingual: No impairment  Velum: No impairment  Mandible: No impairment  P.O. Trials:  Patient Position: [facility]  Vocal quality prior to P.O.: No impairment  Consistency Presented: Thin liquid; Solid  How Presented: Self-fed/presented;Cup/sip;Spoon;Straw;Successive swallows  Bolus Acceptance: No impairment  Bolus Formation/Control: No impairment  Propulsion: No impairment  Oral Residue: None  Initiation of Swallow: No impairment  Laryngeal Elevation: Functional  Aspiration Signs/Symptoms: Clear throat(With successive swallows)  Pharyngeal Phase Characteristics: Suspected pharyngeal residue;Poor endurance  Effective Modifications: Small sips and bites(Slow rate )  Cues for Modifications: Minimal  Oral Phase Severity: No impairment  Pharyngeal Phase Severity : Mild    PAIN:  Start of Eval: 0  End of Eval: 0     After treatment:   []            Patient left in no apparent distress sitting up in chair  [x]            Patient left in no apparent distress in bed  [x]            Call bell left within reach  [x]            Nursing notified  []            Family present  []            Caregiver present  []            Bed alarm activated    COMMUNICATION/EDUCATION:   [x]            Aspiration precautions; swallow safety; compensatory techniques. [x]            Patient/family have participated as able in goal setting and plan of care. []            Patient/family agree to work toward stated goals and plan of care.   []            Patient understands intent and goals of therapy; neutral about participation. []            Patient unable to participate in goal setting/plan of care; educ ongoing with interdisciplinary staff  []         Posted safety precautions in patient's room.     Thank you for this referral,  Lewayne Sicard, M.S., [id number] [facility]  Speech-Language Pathologist

## 2020-12-15 NOTE — PROGRESS NOTES
0715: Bedside and Verbal shift change report given to writer by Meliza Linares. Nita Rust Report included the following information ED Summary, OR Summary, Procedure Summary, Intake/Output, MAR, Accordion, Recent Results, Med Rec Status, Cardiac Rhythm ., Alarm Parameters , Quality Measures and Dual Neuro Assessment. 1015: Right femoral sheath removed with tip of catheter intact, direct pressure applied to right femoral site until hemostasis achieved. QuikClot and tegaderm dressing applied to right groin puncture site, no bleeding, no hematoma noted at right groin site. Per Dr. Cheryl Lloyd, pt to remain on bedrest, keeping right leg straight, until 1630 12-. Pts' callbell within reach. 1130: Speech therapist cleared pt for starting clear liquid diet, pt tolerated well. Pt denies nausea. 1300: Pt ingested clear liquids, denies nausea. Pts' callbell within reach. 1330: Order received to remove pts' NGT, pt  Sleeping- will remove NGT when pt wakens. Order received to advance pts' diet to Regular, nutrition tech aware. Pts' callbell within reach. 1615: NGT removed with tip intact. Pt able to eat 50% of cardiac diet, pt stated that he \"had not eaten for a while, I do not have much of an appetite\". 1820: pt walked to bathroom accompanied by staff RN, callbell within pts' reach. 1824: Pt moved bowelsx1, passing flatus. Pt voided 450cc brown hazy urine, also voided approximately 100ml on floor in front of toilet. Pt ambulated back to recliner, sitting in recliner and talking with his spouse on the phone. Pt callbell within reach. 1910: Bedside and Verbal shift change report given to Meliza Linares by Kwame Headley. Report included the following information OR Summary, Procedure Summary, Intake/Output, MAR, Accordion, Recent Results, Med Rec Status, Cardiac Rhythm ., Alarm Parameters , Quality Measures and Dual Neuro Assessment.

## 2020-12-15 NOTE — PROGRESS NOTES
[facility]      CARDIOLOGY PROGRESS NOTE  RECS:    1. Unstable angina- s/p PCI of the LAD. Status post mechanical ventilation. continue DAPT brilinta and asa. Chest pain has resolved. 2. Acute respiratory failure- extubated   3. Hypotension-resolved. Off pressors. 4. CAD-cardiac cath 12/2017 showed  Triple-vessel coronary artery disease. 5. Hyperlipidemia- LDL 74 11/20 Continue with Crestor  20 mg.  6. Diabetes- well controlled with A1c 5.9   7. LBBB- new since 12/20   8. Hemoptysis- traumatic ETT tube echange and pulmonary edema- better     Out of bed after 6 hours of sheath removal.      Cardiac cath 12/9/20  · Three-vessel coronary artery disease with 90% mid LAD stenosis 80% distal circumflex stenosis and 100% mid RCA stenosis after diffuse disease in proximal and ostial RCA. · Patent previously deployed drug-eluting stents in mid circumflex and distal circumflex areas. · Borderline critical left main stenosis which is approximately 50% and has progressed from previous catheterization in 2017. · Procedure done via right radial artery without any complications. Given left main disease, CABG will be considered first.  We will take a surgical opinion with Dr. Augustina Cardozo. I discussed with him on phone and he/his team will be seeing the patient.     Echo 12/20  · Contrast used: DEFINITY. · LV: Calculated LVEF is 55%. Visually measured ejection fraction. Normal cavity size and systolic function (ejection fraction normal). Mild concentric hypertrophy. Age-appropriate left ventricular diastolic function. · PA: Pulmonary arterial systolic pressure is 26 mmHg. · Aorta: Dilated aortic root; diameter is 3.7 cm.     ASSESSMENT:  Hospital Problems  Date Reviewed: 11/13/2019          Codes Class Noted POA    ACS (acute coronary syndrome) ([facility] ICD-10-CM: I24.9  ICD-9-CM: 411.1  12/8/2020 Unknown        Controlled type 2 diabetes mellitus without complication, without long-term current use of insulin ([facility] ICD-10-CM: E11.9  ICD-9-CM: 250.00  11/29/2018 Yes        Coronary artery disease involving native coronary artery with unstable angina pectoris [facility]) ICD-10-CM: I25.110  ICD-9-CM: 414.01, 411.1  1/27/2016 Yes    Overview Signed 1/27/2016 11:41 AM by Ramírez Cohen MD     recent admission angina stable on medical managment  unable to tolerate isordil             Essential hypertension ICD-10-CM: I10  ICD-9-CM: 401.9  1/27/2016 Yes    Overview Signed 1/27/2016 11:41 AM by Ramírez Cohen MD     controlled             Unstable angina pectoris ([facility] ICD-10-CM: I20.0  ICD-9-CM: 411.1  1/17/2016 Yes                SUBJECTIVE:  Extubated. No chest pain  Gradually seems improving. P.o. fluids started today    OBJECTIVE:    VS:   Visit Vitals  BP (!) 155/54   Pulse 81   Temp 98.4 °F (36.9 °C)   Resp 12   Ht 6' (1.829 m)   Wt 101 kg (222 lb 10.6 oz)   SpO2 95%   BMI 30.20 kg/m²         Intake/Output Summary (Last 24 hours) at 12/15/2020 1135  Last data filed at 12/15/2020 1000  Gross per 24 hour   Intake 900 ml   Output 1025 ml   Net -125 ml     TELE: normal sinus rhythm    General: in no apparent distress  HENT: Normocephalic, atraumatic. Normal external eye. Neck :  no bruit, no JVD  Cardiac:  regular rate and rhythm, S1, S2 normal, no murmur, click, rub or gallop  Lungs: Clear without any rales or rhonchi  Abdomen: Soft, nontender, no masses  Extremities:  No c/c/e, peripheral pulses present. Groin exam does not reveal any hematoma or bruits post cath.  Distal pulse is present        Labs: Results:       Chemistry Recent Labs     12/15/20  0420 12/14/20  0604 12/13/20  0414   * 102* 102*    141 139   K 3.9 3.6 3.7    111 111   CO2 21 21 23   BUN 17 17 17   CREA 0.69 0.74 0.90   CA 8.5 7.9* 8.4*   AGAP 11 9 5   BUCR 25* 23* 19   AP  --   --  56   TP  --   --  6.0*   ALB  --   --  2.6*   GLOB  --   --  3.4   AGRAT  --   --  0.8      CBC w/Diff Recent Labs     12/15/20  6859 12/14/20  0604 12/13/20  1547  12/13/20  0414   WBC 8.1 7.7  --   --  7.5   RBC 3.47* 3.58*  --   --  3.39*   HGB 10.7* 11.0* 10.5*   < > 10.5*   HCT 30.8* 32.3* 30.5*   < > 30.9*    181  --   --  156   GRANS  --  70  --   --  65   LYMPH  --  15*  --   --  20*   EOS  --  5  --   --  4    < > = values in this interval not displayed. Cardiac Enzymes No results for input(s): CPK, CKND1, AMERICA in the last 72 hours. No lab exists for component: CKRMB, TROIP   Coagulation No results for input(s): PTP, INR, APTT, INREXT, INREXT in the last 72 hours. Lipid Panel Lab Results   Component Value Date/Time    Cholesterol, total 114 12/12/2020 04:00 AM    HDL Cholesterol 28 (L) 12/12/2020 04:00 AM    LDL, calculated 38 12/12/2020 04:00 AM    VLDL, calculated 48 12/12/2020 04:00 AM    Triglyceride 240 (H) 12/12/2020 04:00 AM    CHOL/HDL Ratio 4.1 12/12/2020 04:00 AM      BNP No results for input(s): BNPP in the last 72 hours.    Liver Enzymes Recent Labs     12/13/20  0414   TP 6.0*   ALB 2.6*   AP 56      Thyroid Studies Lab Results   Component Value Date/Time    TSH 1.33 11/23/2020 07:34 AM            Miguelito Fierro MD

## 2020-12-15 NOTE — PROGRESS NOTES
[facility]  Pulmonary, Critical Care, and Sleep Medicine    Pulmonary Medicine Progress Note    Name: Teo Gutierrez MRN: [id number]  : 1950 Hospital: [facility]  Date: 12/15/2020       Subjective:  Doing well post extubation, failed swallow eval. Vitals stable. Patient Active Problem List   Diagnosis Code    Chronic pain syndrome G89.4    Coronary artery disease involving native coronary artery with unstable angina pectoris (HCC) I25.110    Essential hypertension I10    Hyperlipidemia E78.5    Thyroid goiter E04.9    CAD (coronary artery disease) I25.10    S/P coronary artery stent placement Z95.5    Unstable angina pectoris (HCC) I20.0    Controlled type 2 diabetes mellitus without complication, without long-term current use of insulin ([facility]) E11.9    Insomnia G47.00    Tubular adenoma of colon D12.6    Cannabis use, uncomplicated W91.88    Cervical disc disease M50.90    GERD (gastroesophageal reflux disease) K21.9    ACS (acute coronary syndrome) ([facility]) I24.9       Assessment:  · Respiratory Failure: Hypoxic: pulmonary edema and hemoptysis- improved today  · Hemoptysis: in the setting of antiplatlet therapy post cath and traumatic ETT tube echange and pulmonary edema- appears resolved at this time- Remains on aggressive antiplatelet therapy due to LIZ placement  · CAD: S/P PCI with LIZ 12/10/20-, LAD for ongoing unstable angina  · Probable aspiration  · Hypotension: resolved  · Hypokalemia-   · Diabetes  · GERD  · Obesity: Body mass index is 30.2 kg/m². Impression/Plan:  - Monitor ileus, laxatives  - H and H stable  - Pulmonary toilet, IS  - May need f/u chest imaging as an outpatient    Okay to transfer to the floor. Will follow. Other issues management by primary team and respective consultants. Events and notes from last 24 hours reviewed. Discussed with patient and family, answered all questions to their satisfaction.    Care plan discussed with nursing.      Labs and images personally seen and available reports reviewed  All current medicines are reviewed       Medications- Current:  Current Facility-Administered Medications   Medication Dose Route Frequency    carvediloL (COREG) tablet 12.5 mg  12.5 mg Oral Q12H    polyethylene glycol (MIRALAX) packet 17 g  17 g Oral DAILY    albuterol-ipratropium (DUO-NEB) 2.5 MG-0.5 MG/3 ML  3 mL Nebulization Q4H PRN    hydrALAZINE (APRESOLINE) tablet 25 mg  25 mg Per NG tube Q6H PRN    acetaminophen (TYLENOL) tablet 500 mg  500 mg Oral Q6H PRN    famotidine (PF) (PEPCID) injection 20 mg  20 mg IntraVENous Q12H    fentaNYL citrate (PF) injection  mcg   mcg IntraVENous Q30MIN PRN    ELECTROLYTE REPLACEMENT PROTOCOL - Potassium Renal Dosing  1 Each Other PRN    sodium chloride (NS) flush 5-40 mL  5-40 mL IntraVENous Q8H    ticagrelor (BRILINTA) tablet 90 mg  90 mg Oral BID    piperacillin-tazobactam (ZOSYN) 3.375 g in 0.9% sodium chloride (MBP/ADV) 100 mL MBP  3.375 g IntraVENous Q6H    insulin lispro (HUMALOG) injection   SubCUTAneous Q6H    sodium chloride (NS) flush 5-40 mL  5-40 mL IntraVENous Q8H    sodium chloride (NS) flush 5-40 mL  5-40 mL IntraVENous PRN    temazepam (RESTORIL) capsule 15 mg  15 mg Oral QHS PRN    nitroglycerin (NITROSTAT) tablet 0.4 mg  0.4 mg SubLINGual Q5MIN PRN    rosuvastatin (CRESTOR) tablet 40 mg  40 mg Oral QHS    mupirocin (BACTROBAN) 2 % ointment   Both Nostrils BID    glucose chewable tablet 16 g  4 Tab Oral PRN    glucagon (GLUCAGEN) injection 1 mg  1 mg IntraMUSCular PRN    dextrose (D50W) injection syrg 12.5-25 g  25-50 mL IntraVENous PRN    aspirin chewable tablet 81 mg  81 mg Oral DAILY    ondansetron (ZOFRAN) injection 4 mg  4 mg IntraVENous Q6H PRN       Objective:  Vital Signs:    Visit Vitals  /67 (BP 1 Location: Left arm, BP Patient Position: At rest)   Pulse 79   Temp 98.3 °F (36.8 °C)   Resp 11   Ht 6' (1.829 m)   [facility] 101 kg (222 lb 10.6 oz)   SpO2 95%   BMI 30.20 kg/m²      O2 Device: Room air  O2 Flow Rate (L/min): 2 l/min  Temp (24hrs), Av.5 °F (36.9 °C), Min:98.3 °F (36.8 °C), Max:98.8 °F (37.1 °C)      Intake/Output:   Last shift:      12/15 0701 - 12/15 1900  In: 381 [P.O.:600; I.V.:100]  Out: 320 [Urine:320]  Last 3 shifts: 1901 - 12/15 0700  In: 1398.2 [I.V.:1088.2]  Out: 6683 [Urine:1310]    Intake/Output Summary (Last 24 hours) at 12/15/2020 1528  Last data filed at 12/15/2020 1500  Gross per 24 hour   Intake 1140 ml   Output 1085 ml   Net 55 ml       Physical Exam:    General/Neurology: Awake, alert, cooperative. Head:   Normocephalic, without obvious abnormality  Eye:   PERRL, EOM intact  Oral:  Mucus membranes moist, NG tube  Lung:   B/l air entry fair. No rales. No rhonchi. No wheezing  Heart:   S1 S2 present  Abdomen:  Soft, non tender, BS+nt, mild distention  Extremities:  No pedal edema.    Skin:   Dry, intact    Data:      Recent Results (from the past 24 hour(s))   GLUCOSE, POC    Collection Time: 20  6:50 PM   Result Value Ref Range    Glucose (POC) 118 (H) 70 - 110 mg/dL   GLUCOSE, POC    Collection Time: 12/15/20 12:35 AM   Result Value Ref Range    Glucose (POC) 108 70 - 110 mg/dL   CBC W/O DIFF    Collection Time: 12/15/20  4:20 AM   Result Value Ref Range    WBC 8.1 4.6 - 13.2 K/uL    RBC 3.47 (L) 4.70 - 5.50 M/uL    HGB 10.7 (L) 13.0 - 16.0 g/dL    HCT 30.8 (L) 36.0 - 48.0 %    MCV 88.8 74.0 - 97.0 FL    MCH 30.8 24.0 - 34.0 PG    MCHC 34.7 31.0 - 37.0 g/dL    RDW 14.1 11.6 - 14.5 %    PLATELET 229 935 - 054 K/uL    MPV 9.8 9.2 - 13.1 FL   METABOLIC PANEL, BASIC    Collection Time: 12/15/20  4:20 AM   Result Value Ref Range    Sodium 143 136 - 145 mmol/L    Potassium 3.9 3.5 - 5.5 mmol/L    Chloride 111 100 - 111 mmol/L    CO2 21 21 - 32 mmol/L    Anion gap 11 3.0 - 18 mmol/L    Glucose 114 (H) 74 - 99 mg/dL    BUN 17 7.0 - 18 MG/DL    Creatinine 0.69 0.6 - 1.3 MG/DL    BUN/Creatinine ratio 25 (H) 12 - 20      GFR est AA >60 >60 ml/min/1.73m2    GFR est non-AA >60 >60 ml/min/1.73m2    Calcium 8.5 8.5 - 10.1 MG/DL   PHOSPHORUS    Collection Time: 12/15/20  4:20 AM   Result Value Ref Range    Phosphorus 2.7 2.5 - 4.9 MG/DL   MAGNESIUM    Collection Time: 12/15/20  4:20 AM   Result Value Ref Range    Magnesium 2.0 1.6 - 2.6 mg/dL   GLUCOSE, POC    Collection Time: 12/15/20  6:18 AM   Result Value Ref Range    Glucose (POC) 127 (H) 70 - 110 mg/dL   GLUCOSE, POC    Collection Time: 12/15/20 11:47 AM   Result Value Ref Range    Glucose (POC) 137 (H) 70 - 110 mg/dL         Chemistry   Recent Labs     12/15/20  0420 12/14/20  0604 12/13/20  0414   * 102* 102*    141 139   K 3.9 3.6 3.7    111 111   CO2 21 21 23   BUN 17 17 17   CREA 0.69 0.74 0.90   CA 8.5 7.9* 8.4*   MG 2.0 1.9 2.2   PHOS 2.7 2.9  --    AGAP 11 9 5   BUCR 25* 23* 19   AP  --   --  56   TP  --   --  6.0*   ALB  --   --  2.6*   GLOB  --   --  3.4   AGRAT  --   --  0.8       CBC w/Diff   Recent Labs     12/15/20  0420 12/14/20  0604 12/13/20  1547  12/13/20  0414   WBC 8.1 7.7  --   --  7.5   RBC 3.47* 3.58*  --   --  3.39*   HGB 10.7* 11.0* 10.5*   < > 10.5*   HCT 30.8* 32.3* 30.5*   < > 30.9*    181  --   --  156   GRANS  --  70  --   --  65   LYMPH  --  15*  --   --  20*   EOS  --  5  --   --  4    < > = values in this interval not displayed. ABG   Recent Labs     12/13/20  0559   PHI 7.38   PCO2I 34.7*   PO2I 76*   HCO3I 20.3*       Micro  No results for input(s): SDES, CULT in the last 72 hours. No results for input(s): CULT in the last 72 hours. CT (Most Recent)   Results from Hospital Encounter encounter on 05/16/19   CT SPINE CERV WO CONT    Narrative EXAM: CT SPINE CERV WO CONT    INDICATION: Neck pain following trauma    COMPARISON: None. TECHNIQUE: Unenhanced multislice helical CT of the cervical spine was performed  in the axial plane and sagittal and coronal reformations were generated. CT  dose reduction was achieved through use of a standardized protocol tailored for  this examination and automatic exposure control for dose modulation. FINDINGS:    The alignment of the cervical spine is normal.     The vertebral body heights. Multilevel anterior discal calcifications and  prominent bridging osteophytes largest at C6-7. Multilevel mild uncovertebral  spurring. Facet joints are well aligned. No significant facet arthropathy. Minimal disc space height loss. There is no fracture or subluxation. The prevertebral soft tissues are normal.    The odontoid process is intact. The visualized portions of the lung apices are clear. Impression IMPRESSION:     1. No evidence of acute cervical spine fracture or subluxation. 2. Cervical spondylosis and mild multilevel degenerative disc disease. XR (Most Recent). CXR reviewed by me and compared with previous CXR   Results from Hospital Encounter encounter on 12/08/20   XR CHEST SNGL V    Narrative EXAM:  Chest Portable. INDICATION:  Respiratory failure. COMPARISON:  12/11/20    TECHNIQUE:  Portable AP chest study    FINDINGS:      - ET tube placement, distal tip 4.2 cm above the paxton.  - NG/OG tube placement, distal portion coursing below the inferior margin of the  field of view. - Streaky densities in the right lower lung zone. Subtle hazy opacities in the  left lower lung zone with superimposed bandlike density. Partial obscuration of  the left hemidiaphragmatic outline. No significant overall interval change in  both lungs compared to 12/11/20.  - No pneumothorax. Impression IMPRESSION:    1.  Stable life-support devices. No pneumothorax. 2.  Bilateral lower lung zone opacities. Suggestive of atelectatic changes vs.  infiltrate. Small pleural effusion may be present as well.        See my orders for details     Total care time exclusive of procedures with complex decision making, coordination of care and counseling patient performed and > 50% time spent in face to face evaluation as mentioned above.     Yanick Rai MD  Critical Care Medicine

## 2020-12-15 NOTE — PROGRESS NOTES
[facility]  Pulmonary, Critical Care, and Sleep Medicine    Pulmonary Medicine Progress Note    Name: Melvina Lesches MRN: [id number]  : 1950 Hospital: [address] [place] [address]  Date: 2020       Subjective:  Pt remains awake on high dose sedation. ABGs good. VSS. No signs of bleeding from ETT. Patient Active Problem List   Diagnosis Code    Chronic pain syndrome G89.4    Coronary artery disease involving native coronary artery with unstable angina pectoris (HCC) I25.110    Essential hypertension I10    Hyperlipidemia E78.5    Thyroid goiter E04.9    CAD (coronary artery disease) I25.10    S/P coronary artery stent placement Z95.5    Unstable angina pectoris (HCC) I20.0    Controlled type 2 diabetes mellitus without complication, without long-term current use of insulin ([facility]) E11.9    Insomnia G47.00    Tubular adenoma of colon D12.6    Cannabis use, uncomplicated Y23.81    Cervical disc disease M50.90    GERD (gastroesophageal reflux disease) K21.9    ACS (acute coronary syndrome) ([facility]) I24.9       Assessment:  · Respiratory Failure: Hypoxic: pulmonary edema and hemoptysis- improved today  · Hemoptysis: in the setting of antiplatlet therapy post cath and traumatic ETT tube echange and pulmonary edema- appears resolved at this time- Remains on aggressive antiplatelet therapy due to LIZ placement  · CAD: S/P PCI with LIZ 12/10/20-, LAD for ongoing unstable angina  · Probable aspiration  · Hypotension: possible SIRs response, sedation. Shock cardiogenic and/or distributive- clinical picture still evolving- currently improved  · Hypokalemia- replacing  · Diabetes  · GERD  · Obesity: Body mass index is 30.2 kg/m². Impression/Plan:  - Pt did well on an SBT, will plan to extubate  - Will follow labs, H and H stable  - Pulmonary toilet  - May need f/u chest imaging as an outpatient    Will follow.      Other issues management by primary team and respective consultants. Events and notes from last 24 hours reviewed. Discussed with patient and family, answered all questions to their satisfaction. Care plan discussed with nursing.      Labs and images personally seen and available reports reviewed  All current medicines are reviewed       Medications- Current:  Current Facility-Administered Medications   Medication Dose Route Frequency    albuterol-ipratropium (DUO-NEB) 2.5 MG-0.5 MG/3 ML  3 mL Nebulization Q4H PRN    hydrALAZINE (APRESOLINE) tablet 25 mg  25 mg Per NG tube Q6H PRN    acetaminophen (TYLENOL) tablet 500 mg  500 mg Oral Q6H PRN    famotidine (PF) (PEPCID) injection 20 mg  20 mg IntraVENous Q12H    bisacodyL (DULCOLAX) suppository 10 mg  10 mg Rectal NOW    chlorhexidine (PERIDEX) 0.12 % mouthwash 10 mL  10 mL Oral BID    fentaNYL citrate (PF) injection  mcg   mcg IntraVENous Q30MIN PRN    ELECTROLYTE REPLACEMENT PROTOCOL - Potassium Renal Dosing  1 Each Other PRN    sodium chloride (NS) flush 5-40 mL  5-40 mL IntraVENous Q8H    ticagrelor (BRILINTA) tablet 90 mg  90 mg Oral BID    piperacillin-tazobactam (ZOSYN) 3.375 g in 0.9% sodium chloride (MBP/ADV) 100 mL MBP  3.375 g IntraVENous Q6H    NOREPINephrine (LEVOPHED) 8 mg in 5% dextrose 250mL (32 mcg/mL) infusion  0.5-50 mcg/min IntraVENous TITRATE    insulin lispro (HUMALOG) injection   SubCUTAneous Q6H    sodium chloride (NS) flush 5-40 mL  5-40 mL IntraVENous Q8H    sodium chloride (NS) flush 5-40 mL  5-40 mL IntraVENous PRN    temazepam (RESTORIL) capsule 15 mg  15 mg Oral QHS PRN    nitroglycerin (NITROSTAT) tablet 0.4 mg  0.4 mg SubLINGual Q5MIN PRN    rosuvastatin (CRESTOR) tablet 40 mg  40 mg Oral QHS    mupirocin (BACTROBAN) 2 % ointment   Both Nostrils BID    glucose chewable tablet 16 g  4 Tab Oral PRN    glucagon (GLUCAGEN) injection 1 mg  1 mg IntraMUSCular PRN    dextrose (D50W) injection syrg 12.5-25 g  25-50 mL IntraVENous PRN    carvediloL (COREG) tablet 6.25 mg  6.25 mg Oral Q12H    aspirin chewable tablet 81 mg  81 mg Oral DAILY    ondansetron (ZOFRAN) injection 4 mg  4 mg IntraVENous Q6H PRN       Objective:  Vital Signs:    Visit Vitals  BP (!) 148/69 (BP 1 Location: Left arm, BP Patient Position: At rest;Post activity)   Pulse 93   Temp 98.5 °F (36.9 °C)   Resp 13   Ht 6' (1.829 m)   Wt 101 kg (222 lb 10.6 oz)   SpO2 97%   BMI 30.20 kg/m²      O2 Device: Nasal cannula  O2 Flow Rate (L/min): 4 l/min  Temp (24hrs), Av.4 °F (36.9 °C), Min:97.9 °F (36.6 °C), Max:98.8 °F (37.1 °C)      Intake/Output:   Last shift:      No intake/output data recorded. Last 3 shifts:  0701 -  1900  In: 1587.1 [I.V.:1357.1]  Out: 4964 [Urine:2515]    Intake/Output Summary (Last 24 hours) at 2020  Last data filed at 2020 1800  Gross per 24 hour   Intake 883.3 ml   Output 1035 ml   Net -151.7 ml       Physical Exam:      General/Neurology: Intubated, awake and alert. Head:   Normocephalic, without obvious abnormality  Eye:   PERRL, EOM intact  Oral:  Mucus membranes moist  Lung:   B/l air entry fair. No rales. No rhonchi. No wheezing  Heart:   S1 S2 present  Abdomen:  Soft, non tender, BS+nt   Extremities:  No pedal edema. Skin:   Dry, intact  Data:      Recent Results (from the past 24 hour(s))   GLUCOSE, POC    Collection Time: 20 11:02 PM   Result Value Ref Range    Glucose (POC) 99 70 - 110 mg/dL   CBC WITH AUTOMATED DIFF    Collection Time: 20  6:04 AM   Result Value Ref Range    WBC 7.7 4.6 - 13.2 K/uL    RBC 3.58 (L) 4.70 - 5.50 M/uL    HGB 11.0 (L) 13.0 - 16.0 g/dL    HCT 32.3 (L) 36.0 - 48.0 %    MCV 90.2 74.0 - 97.0 FL    MCH 30.7 24.0 - 34.0 PG    MCHC 34.1 31.0 - 37.0 g/dL    RDW 14.6 (H) 11.6 - 14.5 %    PLATELET 476 484 - 554 K/uL    MPV 10.2 9.2 - 11.8 FL    NEUTROPHILS 70 40 - 73 %    LYMPHOCYTES 15 (L) 21 - 52 %    MONOCYTES 10 3 - 10 %    EOSINOPHILS 5 0 - 5 %    BASOPHILS 0 0 - 2 %    ABS.  NEUTROPHILS 5.4 1.8 - 8.0 K/UL    ABS. LYMPHOCYTES 1.2 0.9 - 3.6 K/UL    ABS. MONOCYTES 0.7 0.05 - 1.2 K/UL    ABS. EOSINOPHILS 0.4 0.0 - 0.4 K/UL    ABS. BASOPHILS 0.0 0.0 - 0.1 K/UL    DF AUTOMATED     MAGNESIUM    Collection Time: 12/14/20  6:04 AM   Result Value Ref Range    Magnesium 1.9 1.6 - 2.6 mg/dL   METABOLIC PANEL, BASIC    Collection Time: 12/14/20  6:04 AM   Result Value Ref Range    Sodium 141 136 - 145 mmol/L    Potassium 3.6 3.5 - 5.5 mmol/L    Chloride 111 100 - 111 mmol/L    CO2 21 21 - 32 mmol/L    Anion gap 9 3.0 - 18 mmol/L    Glucose 102 (H) 74 - 99 mg/dL    BUN 17 7.0 - 18 MG/DL    Creatinine 0.74 0.6 - 1.3 MG/DL    BUN/Creatinine ratio 23 (H) 12 - 20      GFR est AA >60 >60 ml/min/1.73m2    GFR est non-AA >60 >60 ml/min/1.73m2    Calcium 7.9 (L) 8.5 - 10.1 MG/DL   PHOSPHORUS    Collection Time: 12/14/20  6:04 AM   Result Value Ref Range    Phosphorus 2.9 2.5 - 4.9 MG/DL   GLUCOSE, POC    Collection Time: 12/14/20  6:50 PM   Result Value Ref Range    Glucose (POC) 118 (H) 70 - 110 mg/dL         Chemistry   Recent Labs     12/14/20  0604 12/13/20  0414 12/12/20  0400   * 102* 134*    139 141   K 3.6 3.7 3.5    111 112*   CO2 21 23 23   BUN 17 17 15   CREA 0.74 0.90 0.86   CA 7.9* 8.4* 7.9*   MG 1.9 2.2 2.2   PHOS 2.9  --   --    AGAP 9 5 6   BUCR 23* 19 17   AP  --  56  --    TP  --  6.0*  --    ALB  --  2.6*  --    GLOB  --  3.4  --    AGRAT  --  0.8  --        CBC w/Diff   Recent Labs     12/14/20  0604 12/13/20  1547 12/13/20  1126 12/13/20  0414  12/12/20  0400   WBC 7.7  --   --  7.5  --  10.5   RBC 3.58*  --   --  3.39*  --  3.48*   HGB 11.0* 10.5* 11.2* 10.5*   < > 10.6*   HCT 32.3* 30.5* 32.3* 30.9*   < > 31.5*     --   --  156  --  160   GRANS 70  --   --  65  --  74*   LYMPH 15*  --   --  20*  --  14*   EOS 5  --   --  4  --  2    < > = values in this interval not displayed.        ABG   Recent Labs     12/13/20  0559 12/12/20  0354   PHI 7.38 7.47*   PCO2I 34.7* 27.8*   PO2I 76* 108*   HCO3I 20.3* 20.4*   FIO2I  --  35       Micro  No results for input(s): SDES, CULT in the last 72 hours. No results for input(s): CULT in the last 72 hours. CT (Most Recent)   Results from Hospital Encounter encounter on 05/16/19   CT SPINE CERV WO CONT    Narrative EXAM: CT SPINE CERV WO CONT    INDICATION: Neck pain following trauma    COMPARISON: None. TECHNIQUE: Unenhanced multislice helical CT of the cervical spine was performed  in the axial plane and sagittal and coronal reformations were generated. CT  dose reduction was achieved through use of a standardized protocol tailored for  this examination and automatic exposure control for dose modulation. FINDINGS:    The alignment of the cervical spine is normal.     The vertebral body heights. Multilevel anterior discal calcifications and  prominent bridging osteophytes largest at C6-7. Multilevel mild uncovertebral  spurring. Facet joints are well aligned. No significant facet arthropathy. Minimal disc space height loss. There is no fracture or subluxation. The prevertebral soft tissues are normal.    The odontoid process is intact. The visualized portions of the lung apices are clear. Impression IMPRESSION:     1. No evidence of acute cervical spine fracture or subluxation. 2. Cervical spondylosis and mild multilevel degenerative disc disease. XR (Most Recent). CXR reviewed by me and compared with previous CXR   Results from Hospital Encounter encounter on 12/08/20   XR CHEST SNGL V    Narrative EXAM:  Chest Portable. INDICATION:  Respiratory failure. COMPARISON:  12/11/20    TECHNIQUE:  Portable AP chest study    FINDINGS:      - ET tube placement, distal tip 4.2 cm above the paxton.  - NG/OG tube placement, distal portion coursing below the inferior margin of the  field of view. - Streaky densities in the right lower lung zone.   Subtle hazy opacities in the  left lower lung zone with superimposed bandlike density. Partial obscuration of  the left hemidiaphragmatic outline. No significant overall interval change in  both lungs compared to 12/11/20.  - No pneumothorax. Impression IMPRESSION:    1.  Stable life-support devices. No pneumothorax. 2.  Bilateral lower lung zone opacities. Suggestive of atelectatic changes vs.  infiltrate. Small pleural effusion may be present as well. See my orders for details     Total care time exclusive of procedures with complex decision making, coordination of care and counseling patient performed and > 50% time spent in face to face evaluation as mentioned above.     Faye Michael MD  Critical Care Medicine

## 2020-12-15 NOTE — PROGRESS NOTES
[facility] Hospitalist Group  Progress Note    Patient: Misha Alex Age: 79 y.o. : 1950 MR#: [id number] SSN: [id number]  Date/Time: 12/15/2020    Subjective:       Cleared for regular diet per SLP. Started on clears as no BM since 20. +flatus. Sheath and a line removed from right groin this am.     Tolerated clear liquid diet for lunch. Assessment/Plan:     1. Unstable angina. 40-45% distal LM stenosis, critical mid LAD stenosis. S/p celi to mid LAD lesion. DAPT, statin. Coreg. 2. acute Hypoxic respiratory failure requiring intubation on mechanical ventilation. d/t acute pulmonary edema. Extubated 2020.    3. Hemoptysis in the setting of DAPT post cath and traumatic ETT tube exchange and pulmonary edema- appears resolved at this time. Continue zosyn. 4. Hypertension controlled  5. Dyslipidemia on statin  6. Diet-controlled type 2 diabetes. Sliding scale insulin   7. degenerative joint disease  8. Obesity Body mass index is 30.2 kg/m². 9. Volume overload. Daily dosing of lasix as needed. euvolemic today. 10.  Constipation . last BM PTA,  per nursing documentation. Enema tomorrow if needed. 11. Full code. I discussed the case with Dr. Krysta Acuna. Transfer SDU if no bleeding by 4:30 pm.     wife at phone #[phone].       Case discussed with:  [x]Patient  []Family  [x]Nursing  [x]Case Management  DVT Prophylaxis:  []Lovenox  []Hep SQ  [x]SCDs  []Coumadin   []On Heparin gtt    Objective:   VS:   Visit Vitals  BP (!) 164/80   Pulse 88   Temp 98.8 °F (37.1 °C)   Resp 17   Ht 6' (1.829 m)   Wt 101 kg (222 lb 10.6 oz)   SpO2 94%   BMI 30.20 kg/m²      Tmax/24hrs: Temp (24hrs), Av.6 °F (37 °C), Min:98.5 °F (36.9 °C), Max:98.8 °F (37.1 °C)    Input/Output:     Intake/Output Summary (Last 24 hours) at 12/15/2020 0902  Last data filed at 12/15/2020 0800  Gross per 24 hour   Intake 658.1 ml   Output 1095 ml   Net -436.9 ml       General: awake alert and oriented x4. Speaking in full sentences on supplemental oxygen. Heent: ncat. Perrl. Oropharynx clear. Cardiovascular:  S1S2+, RRR  Pulmonary: cta b.l anterior and infraaxillary fields  GI:  Soft, NT, ND nabs. Extremities:  No edema. dp 2+ b.l. nsg applying pressure to right groin. Neuro: no focal deficit. Moving all 4s.  Toes downgoing b.l  Skin: no rash      Labs:    Recent Results (from the past 24 hour(s))   GLUCOSE, POC    Collection Time: 12/14/20  6:50 PM   Result Value Ref Range    Glucose (POC) 118 (H) 70 - 110 mg/dL   GLUCOSE, POC    Collection Time: 12/15/20 12:35 AM   Result Value Ref Range    Glucose (POC) 108 70 - 110 mg/dL   CBC W/O DIFF    Collection Time: 12/15/20  4:20 AM   Result Value Ref Range    WBC 8.1 4.6 - 13.2 K/uL    RBC 3.47 (L) 4.70 - 5.50 M/uL    HGB 10.7 (L) 13.0 - 16.0 g/dL    HCT 30.8 (L) 36.0 - 48.0 %    MCV 88.8 74.0 - 97.0 FL    MCH 30.8 24.0 - 34.0 PG    MCHC 34.7 31.0 - 37.0 g/dL    RDW 14.1 11.6 - 14.5 %    PLATELET 535 864 - 134 K/uL    MPV 9.8 9.2 - 57.7 FL   METABOLIC PANEL, BASIC    Collection Time: 12/15/20  4:20 AM   Result Value Ref Range    Sodium 143 136 - 145 mmol/L    Potassium 3.9 3.5 - 5.5 mmol/L    Chloride 111 100 - 111 mmol/L    CO2 21 21 - 32 mmol/L    Anion gap 11 3.0 - 18 mmol/L    Glucose 114 (H) 74 - 99 mg/dL    BUN 17 7.0 - 18 MG/DL    Creatinine 0.69 0.6 - 1.3 MG/DL    BUN/Creatinine ratio 25 (H) 12 - 20      GFR est AA >60 >60 ml/min/1.73m2    GFR est non-AA >60 >60 ml/min/1.73m2    Calcium 8.5 8.5 - 10.1 MG/DL   PHOSPHORUS    Collection Time: 12/15/20  4:20 AM   Result Value Ref Range    Phosphorus 2.7 2.5 - 4.9 MG/DL   MAGNESIUM    Collection Time: 12/15/20  4:20 AM   Result Value Ref Range    Magnesium 2.0 1.6 - 2.6 mg/dL   GLUCOSE, POC    Collection Time: 12/15/20  6:18 AM   Result Value Ref Range    Glucose (POC) 127 (H) 70 - 110 mg/dL       Signed By: Massiel Geller MD     December 15, 2020

## 2020-12-15 NOTE — PROGRESS NOTES
Chest CT order discontinued. Attempt x5 days to transport pt for exam without success. Please reorder when appropriate.

## 2020-12-16 LAB
ANION GAP SERPL CALC-SCNC: 7 MMOL/L (ref 3–18)
APPEARANCE UR: ABNORMAL
BACTERIA URNS QL MICRO: ABNORMAL /HPF
BILIRUB UR QL: ABNORMAL
BUN SERPL-MCNC: 23 MG/DL (ref 7–18)
BUN/CREAT SERPL: 31 (ref 12–20)
CALCIUM SERPL-MCNC: 9 MG/DL (ref 8.5–10.1)
CHLORIDE SERPL-SCNC: 110 MMOL/L (ref 100–111)
CO2 SERPL-SCNC: 24 MMOL/L (ref 21–32)
COLOR UR: ABNORMAL
CREAT SERPL-MCNC: 0.74 MG/DL (ref 0.6–1.3)
EPITH CASTS URNS QL MICRO: NEGATIVE /LPF (ref 0–5)
GLUCOSE BLD STRIP.AUTO-MCNC: 119 MG/DL (ref 70–110)
GLUCOSE BLD STRIP.AUTO-MCNC: 120 MG/DL (ref 70–110)
GLUCOSE BLD STRIP.AUTO-MCNC: 148 MG/DL (ref 70–110)
GLUCOSE BLD STRIP.AUTO-MCNC: 174 MG/DL (ref 70–110)
GLUCOSE SERPL-MCNC: 154 MG/DL (ref 74–99)
GLUCOSE UR STRIP.AUTO-MCNC: 100 MG/DL
HCT VFR BLD AUTO: 33 % (ref 36–48)
HGB BLD-MCNC: 11.7 G/DL (ref 13–16)
HGB UR QL STRIP: ABNORMAL
KETONES UR QL STRIP.AUTO: 15 MG/DL
LEUKOCYTE ESTERASE UR QL STRIP.AUTO: NEGATIVE
NITRITE UR QL STRIP.AUTO: NEGATIVE
OTHER,OTHU: ABNORMAL
PH UR STRIP: 6 [PH] (ref 5–8)
POTASSIUM SERPL-SCNC: 3.8 MMOL/L (ref 3.5–5.5)
PROT UR STRIP-MCNC: >300 MG/DL
RBC #/AREA URNS HPF: ABNORMAL /HPF (ref 0–5)
SODIUM SERPL-SCNC: 141 MMOL/L (ref 136–145)
SP GR UR REFRACTOMETRY: 1.02 (ref 1–1.03)
URATE CRY URNS QL MICRO: ABNORMAL
UROBILINOGEN UR QL STRIP.AUTO: 4 EU/DL (ref 0.2–1)
WBC URNS QL MICRO: ABNORMAL /HPF (ref 0–4)

## 2020-12-16 PROCEDURE — 74011250637 HC RX REV CODE- 250/637: Performed by: INTERNAL MEDICINE

## 2020-12-16 PROCEDURE — 74011000258 HC RX REV CODE- 258: Performed by: EMERGENCY MEDICINE

## 2020-12-16 PROCEDURE — 97165 OT EVAL LOW COMPLEX 30 MIN: CPT

## 2020-12-16 PROCEDURE — 99232 SBSQ HOSP IP/OBS MODERATE 35: CPT | Performed by: HOSPITALIST

## 2020-12-16 PROCEDURE — 74011250637 HC RX REV CODE- 250/637: Performed by: NURSE PRACTITIONER

## 2020-12-16 PROCEDURE — 81001 URINALYSIS AUTO W/SCOPE: CPT

## 2020-12-16 PROCEDURE — 97161 PT EVAL LOW COMPLEX 20 MIN: CPT

## 2020-12-16 PROCEDURE — 36415 COLL VENOUS BLD VENIPUNCTURE: CPT

## 2020-12-16 PROCEDURE — 85018 HEMOGLOBIN: CPT

## 2020-12-16 PROCEDURE — 94762 N-INVAS EAR/PLS OXIMTRY CONT: CPT

## 2020-12-16 PROCEDURE — 74011636637 HC RX REV CODE- 636/637: Performed by: HOSPITALIST

## 2020-12-16 PROCEDURE — 74011250636 HC RX REV CODE- 250/636: Performed by: HOSPITALIST

## 2020-12-16 PROCEDURE — 99233 SBSQ HOSP IP/OBS HIGH 50: CPT | Performed by: INTERNAL MEDICINE

## 2020-12-16 PROCEDURE — 82962 GLUCOSE BLOOD TEST: CPT

## 2020-12-16 PROCEDURE — 92526 ORAL FUNCTION THERAPY: CPT

## 2020-12-16 PROCEDURE — 74011250636 HC RX REV CODE- 250/636: Performed by: EMERGENCY MEDICINE

## 2020-12-16 PROCEDURE — 80048 BASIC METABOLIC PNL TOTAL CA: CPT

## 2020-12-16 PROCEDURE — 65660000004 HC RM CVT STEPDOWN

## 2020-12-16 PROCEDURE — 74011250637 HC RX REV CODE- 250/637: Performed by: HOSPITALIST

## 2020-12-16 RX ORDER — AMOXICILLIN AND CLAVULANATE POTASSIUM 875; 125 MG/1; MG/1
1 TABLET, FILM COATED ORAL 2 TIMES DAILY WITH MEALS
Status: DISCONTINUED | OUTPATIENT
Start: 2020-12-16 | End: 2020-12-18 | Stop reason: HOSPADM

## 2020-12-16 RX ORDER — AMLODIPINE BESYLATE 2.5 MG/1
2.5 TABLET ORAL 2 TIMES DAILY
Status: DISCONTINUED | OUTPATIENT
Start: 2020-12-16 | End: 2020-12-18 | Stop reason: HOSPADM

## 2020-12-16 RX ADMIN — INSULIN LISPRO 2 UNITS: 100 INJECTION, SOLUTION INTRAVENOUS; SUBCUTANEOUS at 11:14

## 2020-12-16 RX ADMIN — FAMOTIDINE 20 MG: 10 INJECTION INTRAVENOUS at 22:19

## 2020-12-16 RX ADMIN — TICAGRELOR 90 MG: 90 TABLET ORAL at 17:32

## 2020-12-16 RX ADMIN — Medication 10 ML: at 22:20

## 2020-12-16 RX ADMIN — POLYETHYLENE GLYCOL 3350 17 G: 17 POWDER, FOR SOLUTION ORAL at 08:06

## 2020-12-16 RX ADMIN — Medication 10 ML: at 06:00

## 2020-12-16 RX ADMIN — PIPERACILLIN AND TAZOBACTAM 3.38 G: 3; .375 INJECTION, POWDER, LYOPHILIZED, FOR SOLUTION INTRAVENOUS at 00:00

## 2020-12-16 RX ADMIN — AMLODIPINE BESYLATE 2.5 MG: 2.5 TABLET ORAL at 11:14

## 2020-12-16 RX ADMIN — ROSUVASTATIN 40 MG: 20 TABLET, FILM COATED ORAL at 22:19

## 2020-12-16 RX ADMIN — CARVEDILOL 12.5 MG: 12.5 TABLET, FILM COATED ORAL at 22:19

## 2020-12-16 RX ADMIN — Medication 10 ML: at 14:00

## 2020-12-16 RX ADMIN — MUPIROCIN: 20 OINTMENT TOPICAL at 19:11

## 2020-12-16 RX ADMIN — ASPIRIN 81 MG CHEWABLE TABLET 81 MG: 81 TABLET CHEWABLE at 08:06

## 2020-12-16 RX ADMIN — TICAGRELOR 90 MG: 90 TABLET ORAL at 04:00

## 2020-12-16 RX ADMIN — MUPIROCIN: 20 OINTMENT TOPICAL at 08:07

## 2020-12-16 RX ADMIN — PIPERACILLIN AND TAZOBACTAM 3.38 G: 3; .375 INJECTION, POWDER, LYOPHILIZED, FOR SOLUTION INTRAVENOUS at 06:00

## 2020-12-16 RX ADMIN — AMOXICILLIN AND CLAVULANATE POTASSIUM 1 TABLET: 875; 125 TABLET, FILM COATED ORAL at 17:32

## 2020-12-16 RX ADMIN — FAMOTIDINE 20 MG: 10 INJECTION INTRAVENOUS at 08:06

## 2020-12-16 RX ADMIN — AMLODIPINE BESYLATE 2.5 MG: 2.5 TABLET ORAL at 17:32

## 2020-12-16 RX ADMIN — CARVEDILOL 12.5 MG: 12.5 TABLET, FILM COATED ORAL at 08:06

## 2020-12-16 NOTE — PROGRESS NOTES
Chart reviewed. Potential d/c for tomorrow noted. PT/OT evals complete with no further recommendations. Discharge plan is home with family.       Giovani Jett RN BSN  Care Manager  [phone]

## 2020-12-16 NOTE — PROGRESS NOTES
Pt ambulated to stepdown to room 2309 after voiding ~400mL of dark brown urine. Urine collected and sent for UA per MD. MD notified of color and haziness.  Pt denied pain upon urination and states his urine has been this color since this admission, claiming it was this color when he came to the hospital.

## 2020-12-16 NOTE — PROGRESS NOTES
Cardiopulmonary Navigator Recommendations    The following recommendations are based on chart review. They are intended to help patient transition home when appropriate, and to help reduce risk of re-admission. Pt may be a good candidate for outpatient evaluation. Please consider referring patient for evaluation at OP Cardiac Rehabilitation. Criteria:  Cardiac:   Coronary artery disease involving native coronary artery with unstable angina pectoris (HCC)   CAD (coronary artery disease)   S/P coronary artery stent placement   Unstable angina pectoris (HCC)   ACS (acute coronary syndrome) [facility])             Rehabilitation:  Cardiac Rehabilitation  Or  Acute Inpatient rehabilitation followed by Outpatient Cardiac/Pulmonary Rehabilitation      Nebulizer:  Criteria: Hand Held Nebulizer Administration    CPAP/BIPAP  Criteria: BMI> 30 and co-morbidities. Recommend sleep study to determine if PAP therapy is appropriate for pt. Available Devices:  [address] [place] [address]  Respro            Will continue to follow patient during this admission, and update recommendations as pt progresses.

## 2020-12-16 NOTE — PROGRESS NOTES
[facility]      CARDIOLOGY PROGRESS NOTE  RECS:    1. Unstable angina- s/p PCI of the LAD. Status post mechanical ventilation. continue DAPT brilinta and asa. Chest pain has resolved. 2. Acute respiratory failure- extubated   3. Hypotension-resolved. Off pressors. 4. Hypertension: Blood pressure increasing gradually as expected. Will start low-dose amlodipine and in fact today will try twice daily format and see how much he needs. 5. CAD-cardiac cath 12/2017 showed  Triple-vessel coronary artery disease. 6. Hyperlipidemia- LDL 74 11/20 Continue with Crestor  20 mg.  7. Diabetes- well controlled with A1c 5.9   8. LBBB- new since 12/20   9. Hemoptysis- traumatic ETT tube echange and pulmonary edema- better     Ambulate the patient. Transfer to stepdown. Patient wanted to be discharged but I had a discussion with him that he is started feeding again less than 24 hours ago and his blood pressure is going high as expected. Would like to keep him in the hospital 24 more hours to adjust his medications. Patient agreed. Cardiac cath 12/9/20  · Three-vessel coronary artery disease with 90% mid LAD stenosis 80% distal circumflex stenosis and 100% mid RCA stenosis after diffuse disease in proximal and ostial RCA. · Patent previously deployed drug-eluting stents in mid circumflex and distal circumflex areas. · Borderline critical left main stenosis which is approximately 50% and has progressed from previous catheterization in 2017. · Procedure done via right radial artery without any complications. Given left main disease, CABG will be considered first.  We will take a surgical opinion with Dr. Matthew Pedro. I discussed with him on phone and he/his team will be seeing the patient.     Echo 12/20  · Contrast used: DEFINITY. · LV: Calculated LVEF is 55%. Visually measured ejection fraction. Normal cavity size and systolic function (ejection fraction normal).  Mild concentric hypertrophy. Age-appropriate left ventricular diastolic function. · PA: Pulmonary arterial systolic pressure is 26 mmHg. · Aorta: Dilated aortic root; diameter is 3.7 cm. ASSESSMENT:  Hospital Problems  Date Reviewed: 11/13/2019          Codes Class Noted POA    ACS (acute coronary syndrome) ([facility] ICD-10-CM: I24.9  ICD-9-CM: 411.1  12/8/2020 Unknown        Controlled type 2 diabetes mellitus without complication, without long-term current use of insulin ([facility] ICD-10-CM: E11.9  ICD-9-CM: 250.00  11/29/2018 Yes        Coronary artery disease involving native coronary artery with unstable angina pectoris ([facility] ICD-10-CM: I25.110  ICD-9-CM: 414.01, 411.1  1/27/2016 Yes    Overview Signed 1/27/2016 11:41 AM by Ramírez Cohen MD     recent admission angina stable on medical managment  unable to tolerate isordil             Essential hypertension ICD-10-CM: I10  ICD-9-CM: 401.9  1/27/2016 Yes    Overview Signed 1/27/2016 11:41 AM by Ramírez Cohen MD     controlled             Unstable angina pectoris ([facility] ICD-10-CM: I20.0  ICD-9-CM: 411.1  1/17/2016 Yes                SUBJECTIVE:  No chest pain  No shortness of breath  Tolerating p.o. food      OBJECTIVE:    VS:   Visit Vitals  BP (!) 156/67   Pulse 80   Temp 98.9 °F (37.2 °C)   Resp 25   Ht 6' (1.829 m)   Wt 101 kg (222 lb 10.6 oz)   SpO2 93%   BMI 30.20 kg/m²         Intake/Output Summary (Last 24 hours) at 12/16/2020 1151  Last data filed at 12/16/2020 0800  Gross per 24 hour   Intake 810 ml   Output 1045 ml   Net -235 ml     TELE: normal sinus rhythm    General: in no apparent distress, sitting in the chair, alert and oriented x3  HENT: Normocephalic, atraumatic. Normal external eye. Neck :  no bruit, no JVD  Cardiac:  regular rate and rhythm, S1, S2 normal, no murmur, click, rub or gallop  Lungs: Clear without any rales or rhonchi  Abdomen: Soft, nontender, no masses  Extremities:  No c/c/e, peripheral pulses present.  Groin exam does not reveal any hematoma or bruits post cath. Distal pulse is present        Labs: Results:       Chemistry Recent Labs     12/16/20  0657 12/15/20  0420 12/14/20  0604   * 114* 102*    143 141   K 3.8 3.9 3.6    111 111   CO2 24 21 21   BUN 23* 17 17   CREA 0.74 0.69 0.74   CA 9.0 8.5 7.9*   AGAP 7 11 9   BUCR 31* 25* 23*      CBC w/Diff Recent Labs     12/16/20  1055 12/15/20  0420 12/14/20  0604   WBC  --  8.1 7.7   RBC  --  3.47* 3.58*   HGB 11.7* 10.7* 11.0*   HCT 33.0* 30.8* 32.3*   PLT  --  200 181   GRANS  --   --  70   LYMPH  --   --  15*   EOS  --   --  5      Cardiac Enzymes No results for input(s): CPK, CKND1, AMERICA in the last 72 hours. No lab exists for component: CKRMB, TROIP   Coagulation No results for input(s): PTP, INR, APTT, INREXT, INREXT in the last 72 hours. Lipid Panel Lab Results   Component Value Date/Time    Cholesterol, total 114 12/12/2020 04:00 AM    HDL Cholesterol 28 (L) 12/12/2020 04:00 AM    LDL, calculated 38 12/12/2020 04:00 AM    VLDL, calculated 48 12/12/2020 04:00 AM    Triglyceride 240 (H) 12/12/2020 04:00 AM    CHOL/HDL Ratio 4.1 12/12/2020 04:00 AM      BNP No results for input(s): BNPP in the last 72 hours. Liver Enzymes No results for input(s): TP, ALB, TBIL, AP in the last 72 hours.     No lab exists for component: SGOT, GPT, DBIL   Thyroid Studies Lab Results   Component Value Date/Time    TSH 1.33 11/23/2020 07:34 AM            Amol Abbott MD

## 2020-12-16 NOTE — PROGRESS NOTES
Outpatient Cardiopulmonary Rehab Order    Leeroy Hinton is a 79y.o. year old male with ACS (acute coronary syndrome) ([facility] [I24.9]  ACS (acute coronary syndrome) ([facility] [I24.9]. He is a potential candidate for Cardiac rehab. Please co-sign this note if you would like your patient to be evaluated for entry into outpatient rehabilitation. If there are any restrictions for this patient as far as their rehab, please notate those below. Thank you.

## 2020-12-16 NOTE — PROGRESS NOTES
Patient is IP at [facility] for ACS 12/8/20-current

## 2020-12-16 NOTE — PROGRESS NOTES
[facility]  Pulmonary, Critical Care, and Sleep Medicine    Pulmonary Medicine Progress Note    Name: Sarah Lee MRN: [id number]  : 1950 Hospital: [facility]  Date: 2020       Subjective:  Pt is doing well. Vitals stable. On room air. Patient Active Problem List   Diagnosis Code    Chronic pain syndrome G89.4    Coronary artery disease involving native coronary artery with unstable angina pectoris (HCC) I25.110    Essential hypertension I10    Hyperlipidemia E78.5    Thyroid goiter E04.9    CAD (coronary artery disease) I25.10    S/P coronary artery stent placement Z95.5    Unstable angina pectoris (HCC) I20.0    Controlled type 2 diabetes mellitus without complication, without long-term current use of insulin (HCC) E11.9    Insomnia G47.00    Tubular adenoma of colon D12.6    Cannabis use, uncomplicated A73.87    Cervical disc disease M50.90    GERD (gastroesophageal reflux disease) K21.9    ACS (acute coronary syndrome) ([facility]) I24.9       Assessment:  · Respiratory Failure: Hypoxic: pulmonary edema and hemoptysis- improved today  · Hemoptysis: in the setting of antiplatlet therapy post cath and traumatic ETT tube echange and pulmonary edema- appears resolved at this time- Remains on aggressive antiplatelet therapy due to LIZ placement  · CAD: S/P PCI with LIZ 12/10/20-, LAD for ongoing unstable angina  · Probable aspiration  · Hypotension: resolved  · Hypokalemia-   · Diabetes  · GERD  · Obesity: Body mass index is 30.2 kg/m². Impression/Plan:  - Pulmonary toilet, IS  - Recommend f/u in our clinic in 2 months for f/u imaging. Okay to transfer to the floor. Will sign off. Other issues management by primary team and respective consultants. Events and notes from last 24 hours reviewed. Discussed with patient and family, answered all questions to their satisfaction. Care plan discussed with nursing.      Labs and images personally seen and available reports reviewed  All current medicines are reviewed       Medications- Current:  Current Facility-Administered Medications   Medication Dose Route Frequency    amoxicillin-clavulanate (AUGMENTIN) 875-125 mg per tablet 1 Tab  1 Tab Oral BID WITH MEALS    amLODIPine (NORVASC) tablet 2.5 mg  2.5 mg Oral BID    carvediloL (COREG) tablet 12.5 mg  12.5 mg Oral Q12H    polyethylene glycol (MIRALAX) packet 17 g  17 g Oral DAILY    insulin lispro (HUMALOG) injection   SubCUTAneous AC&HS    albuterol-ipratropium (DUO-NEB) 2.5 MG-0.5 MG/3 ML  3 mL Nebulization Q4H PRN    hydrALAZINE (APRESOLINE) tablet 25 mg  25 mg Per NG tube Q6H PRN    acetaminophen (TYLENOL) tablet 500 mg  500 mg Oral Q6H PRN    famotidine (PF) (PEPCID) injection 20 mg  20 mg IntraVENous Q12H    fentaNYL citrate (PF) injection  mcg   mcg IntraVENous Q30MIN PRN    ELECTROLYTE REPLACEMENT PROTOCOL - Potassium Renal Dosing  1 Each Other PRN    sodium chloride (NS) flush 5-40 mL  5-40 mL IntraVENous Q8H    ticagrelor (BRILINTA) tablet 90 mg  90 mg Oral BID    sodium chloride (NS) flush 5-40 mL  5-40 mL IntraVENous Q8H    sodium chloride (NS) flush 5-40 mL  5-40 mL IntraVENous PRN    temazepam (RESTORIL) capsule 15 mg  15 mg Oral QHS PRN    nitroglycerin (NITROSTAT) tablet 0.4 mg  0.4 mg SubLINGual Q5MIN PRN    rosuvastatin (CRESTOR) tablet 40 mg  40 mg Oral QHS    mupirocin (BACTROBAN) 2 % ointment   Both Nostrils BID    glucose chewable tablet 16 g  4 Tab Oral PRN    glucagon (GLUCAGEN) injection 1 mg  1 mg IntraMUSCular PRN    dextrose (D50W) injection syrg 12.5-25 g  25-50 mL IntraVENous PRN    aspirin chewable tablet 81 mg  81 mg Oral DAILY    ondansetron (ZOFRAN) injection 4 mg  4 mg IntraVENous Q6H PRN       Objective:  Vital Signs:    Visit Vitals  BP (!) 156/67   Pulse 80   Temp 98.9 °F (37.2 °C)   Resp 25   Ht 6' (1.829 m)   Wt 101 kg (222 lb 10.6 oz)   SpO2 93%   BMI 30.20 kg/m² O2 Device: Room air  O2 Flow Rate (L/min): 2 l/min  Temp (24hrs), Av.7 °F (37.1 °C), Min:98.5 °F (36.9 °C), Max:98.9 °F (37.2 °C)      Intake/Output:   Last shift:      701 - 1900  In: -   Out: 250 [Urine:250]  Last 3 shifts: 1901 -  07  In: 1800 [P.O.:960; I.V.:700]  Out: 1500 [Urine:1500]    Intake/Output Summary (Last 24 hours) at 2020 1638  Last data filed at 2020 0800  Gross per 24 hour   Intake 540 ml   Output 845 ml   Net -305 ml       Physical Exam:    General/Neurology: Awake, alert, cooperative. Head:   Normocephalic, without obvious abnormality  Eye:   PERRL, EOM intact  Oral:  Mucus membranes moist, NG tube  Lung:   B/l air entry fair. No rales. No rhonchi. No wheezing  Heart:   S1 S2 present  Abdomen:  Soft, non tender, BS+nt, mild distention  Extremities:  No pedal edema.    Skin:   Dry, intact    Data:      Recent Results (from the past 24 hour(s))   GLUCOSE, POC    Collection Time: 12/15/20  5:00 PM   Result Value Ref Range    Glucose (POC) 130 (H) 70 - 110 mg/dL   GLUCOSE, POC    Collection Time: 12/15/20  9:37 PM   Result Value Ref Range    Glucose (POC) 129 (H) 70 - 704 mg/dL   METABOLIC PANEL, BASIC    Collection Time: 20  6:57 AM   Result Value Ref Range    Sodium 141 136 - 145 mmol/L    Potassium 3.8 3.5 - 5.5 mmol/L    Chloride 110 100 - 111 mmol/L    CO2 24 21 - 32 mmol/L    Anion gap 7 3.0 - 18 mmol/L    Glucose 154 (H) 74 - 99 mg/dL    BUN 23 (H) 7.0 - 18 MG/DL    Creatinine 0.74 0.6 - 1.3 MG/DL    BUN/Creatinine ratio 31 (H) 12 - 20      GFR est AA >60 >60 ml/min/1.73m2    GFR est non-AA >60 >60 ml/min/1.73m2    Calcium 9.0 8.5 - 10.1 MG/DL   GLUCOSE, POC    Collection Time: 20  7:38 AM   Result Value Ref Range    Glucose (POC) 148 (H) 70 - 110 mg/dL   HGB & HCT    Collection Time: 20 10:55 AM   Result Value Ref Range    HGB 11.7 (L) 13.0 - 16.0 g/dL    HCT 33.0 (L) 36.0 - 48.0 %   GLUCOSE, POC    Collection Time: 20 11:10 AM   Result Value Ref Range    Glucose (POC) 174 (H) 70 - 110 mg/dL   GLUCOSE, POC    Collection Time: 12/16/20  4:10 PM   Result Value Ref Range    Glucose (POC) 119 (H) 70 - 110 mg/dL         Chemistry   Recent Labs     12/16/20  0657 12/15/20  0420 12/14/20  0604   * 114* 102*    143 141   K 3.8 3.9 3.6    111 111   CO2 24 21 21   BUN 23* 17 17   CREA 0.74 0.69 0.74   CA 9.0 8.5 7.9*   MG  --  2.0 1.9   PHOS  --  2.7 2.9   AGAP 7 11 9   BUCR 31* 25* 23*       CBC w/Diff   Recent Labs     12/16/20  1055 12/15/20  0420 12/14/20  0604   WBC  --  8.1 7.7   RBC  --  3.47* 3.58*   HGB 11.7* 10.7* 11.0*   HCT 33.0* 30.8* 32.3*   PLT  --  200 181   GRANS  --   --  70   LYMPH  --   --  15*   EOS  --   --  5       ABG   No results for input(s): PHI, PHI, POC2, PCO2I, PO2, PO2I, HCO3, HCO3I, FIO2, FIO2I in the last 72 hours. Micro  No results for input(s): SDES, CULT in the last 72 hours. No results for input(s): CULT in the last 72 hours. CT (Most Recent)   Results from Hospital Encounter encounter on 05/16/19   CT SPINE CERV WO CONT    Narrative EXAM: CT SPINE CERV WO CONT    INDICATION: Neck pain following trauma    COMPARISON: None. TECHNIQUE: Unenhanced multislice helical CT of the cervical spine was performed  in the axial plane and sagittal and coronal reformations were generated. CT  dose reduction was achieved through use of a standardized protocol tailored for  this examination and automatic exposure control for dose modulation. FINDINGS:    The alignment of the cervical spine is normal.     The vertebral body heights. Multilevel anterior discal calcifications and  prominent bridging osteophytes largest at C6-7. Multilevel mild uncovertebral  spurring. Facet joints are well aligned. No significant facet arthropathy. Minimal disc space height loss. There is no fracture or subluxation. The prevertebral soft tissues are normal.    The odontoid process is intact.     The visualized portions of the lung apices are clear. Impression IMPRESSION:     1. No evidence of acute cervical spine fracture or subluxation. 2. Cervical spondylosis and mild multilevel degenerative disc disease. XR (Most Recent). CXR reviewed by me and compared with previous CXR   Results from Hospital Encounter encounter on 12/08/20   XR CHEST SNGL V    Narrative EXAM:  Chest Portable. INDICATION:  Respiratory failure. COMPARISON:  12/11/20    TECHNIQUE:  Portable AP chest study    FINDINGS:      - ET tube placement, distal tip 4.2 cm above the paxton.  - NG/OG tube placement, distal portion coursing below the inferior margin of the  field of view. - Streaky densities in the right lower lung zone. Subtle hazy opacities in the  left lower lung zone with superimposed bandlike density. Partial obscuration of  the left hemidiaphragmatic outline. No significant overall interval change in  both lungs compared to 12/11/20.  - No pneumothorax. Impression IMPRESSION:    1.  Stable life-support devices. No pneumothorax. 2.  Bilateral lower lung zone opacities. Suggestive of atelectatic changes vs.  infiltrate. Small pleural effusion may be present as well. See my orders for details     Total care time exclusive of procedures with complex decision making, coordination of care and counseling patient performed and > 50% time spent in face to face evaluation as mentioned above.     Rajesh Rondon MD  Critical Care Medicine

## 2020-12-16 NOTE — PROGRESS NOTES
[facility]  [facility]    Patient: Bean Deal Age: 79 y.o. : 1950 MR#: [id number] SSN: [id number]  Date/Time: 2020    Subjective:       Patient reports nausea, he does not like the food. States he wants to go home. His wife is waiting for him with fish, and broccoli, and salad. He otherwise feels well. Did well with PT OT this morning. Blood pressure 156/67 this morning. He declines to have me call his wife, states she has been updated he is spoken with her daily. Assessment/Plan:     1. Unstable angina. 40-45% distal LM stenosis, critical mid LAD stenosis. S/p celi to mid LAD lesion. DAPT, statin. Coreg. 2. acute Hypoxic respiratory failure requiring intubation on mechanical ventilation. d/t acute pulmonary edema. Extubated 2020.    3. Hemoptysis in the setting of DAPT post cath and traumatic ETT tube exchange and pulmonary edema- appears resolved at this time. Zosyn --> Augmentin  4. Hypertension suboptimal control. Coreg increased per cardiology. Started on Norvasc  5. Dyslipidemia on statin  6. Diet-controlled type 2 diabetes. Sliding scale insulin   7. degenerative joint disease  8. Obesity Body mass index is 30.2 kg/m². 9. Volume overload. Daily dosing of lasix as needed. euvolemic today. 10.  Constipation . last BM PTA,  per nursing documentation. Enema tomorrow if needed. 11. Full code. Transfer SDU, bed pending. Anticipate discharge 24 to 48 hours. wife at phone #[phone].       Case discussed with:  [x]Patient  []Family  [x]Nursing  [x]Case Management  DVT Prophylaxis:  []Lovenox  []Hep SQ  [x]SCDs  []Coumadin   []On Heparin gtt    Objective:   VS:   Visit Vitals  BP (!) 155/72   Pulse 79   Temp 98.6 °F (37 °C)   Resp 26   Ht 6' (1.829 m)   Wt 101 kg (222 lb 10.6 oz)   SpO2 93%   BMI 30.20 kg/m²      Tmax/24hrs: Temp (24hrs), Av.5 °F (36.9 °C), Min:98.3 °F (36.8 °C), Max:98.6 °F (37 °C)    Input/Output:     Intake/Output Summary (Last 24 hours) at 12/16/2020 0844  Last data filed at 12/15/2020 1823  Gross per 24 hour   Intake 1010 ml   Output 915 ml   Net 95 ml       General: awake alert and oriented x4. Found sitting up in chair speaking in full sentences on room air. On FaceTime with his wife. Heent: ncat. Perrl. Oropharynx clear. Cardiovascular:  S1S2+, RRR  Pulmonary: cta b.l anterior and infraaxillary fields  GI:  Soft, NT, ND nabs. Extremities:  No edema. dp 2+ b.l.  Dressing to right groin, no hematoma. Neuro: no focal deficit. Moving all 4s.  Toes downgoing b.l  Skin: no rash      Labs:    Recent Results (from the past 24 hour(s))   GLUCOSE, POC    Collection Time: 12/15/20 11:47 AM   Result Value Ref Range    Glucose (POC) 137 (H) 70 - 110 mg/dL   GLUCOSE, POC    Collection Time: 12/15/20  5:00 PM   Result Value Ref Range    Glucose (POC) 130 (H) 70 - 110 mg/dL   GLUCOSE, POC    Collection Time: 12/15/20  9:37 PM   Result Value Ref Range    Glucose (POC) 129 (H) 70 - 032 mg/dL   METABOLIC PANEL, BASIC    Collection Time: 12/16/20  6:57 AM   Result Value Ref Range    Sodium 141 136 - 145 mmol/L    Potassium 3.8 3.5 - 5.5 mmol/L    Chloride 110 100 - 111 mmol/L    CO2 24 21 - 32 mmol/L    Anion gap 7 3.0 - 18 mmol/L    Glucose 154 (H) 74 - 99 mg/dL    BUN 23 (H) 7.0 - 18 MG/DL    Creatinine 0.74 0.6 - 1.3 MG/DL    BUN/Creatinine ratio 31 (H) 12 - 20      GFR est AA >60 >60 ml/min/1.73m2    GFR est non-AA >60 >60 ml/min/1.73m2    Calcium 9.0 8.5 - 10.1 MG/DL   GLUCOSE, POC    Collection Time: 12/16/20  7:38 AM   Result Value Ref Range    Glucose (POC) 148 (H) 70 - 110 mg/dL       Signed By: Uriel Liao MD     December 16, 2020

## 2020-12-16 NOTE — PROGRESS NOTES
Nutrition Note    Patient started on clear liquids yesterday and cleared for regular consistency diet per SLP after swallow evaluation but kept on clears due to constipation- last BM 12/8 with abdominal distension and started on bowel regimen (miralax daily), now passing flatus and BM x 2 yesterday and x 2 this morning per nursing. Patient c/o mild nausea earlier, resolved without vomiting and reports fair appetite, consuming <50% of recent meals. Reports stable weight history PTA, usual weight of 210 lb with good appetite/meal intake PTA. Pt follows heart healthy diet at home with support of his wife, provided educational materials and discussed cardiac and diabetic diet restrictions. Noted plan for possible discharge tomorrow. Recommendations/Plan  - Add supplements: Glucerna Shake TID. Monitor and encourage po intake.      Electronically signed by Watson Sykes RD, [address]  on 12/16/2020 at 1:13 PM    Contact: [phone]

## 2020-12-16 NOTE — PROGRESS NOTES
Problem: Self Care Deficits Care Plan (Adult)  Goal: *Acute Goals and Plan of Care (Insert Text)  Outcome: Resolved/Met     OCCUPATIONAL THERAPY EVALUATION/DISCHARGE    Patient: Teo Gutierrez (61 y.o. male)  Date: 12/16/2020  Primary Diagnosis: ACS (acute coronary syndrome) ([facility] [I24.9]  [facility] [I24.9]  Procedure(s) (LRB):  LEFT HEART CATH (N/A)  Insert Stent Kingsley Coronary (N/A)  Coronary Angiography (N/A) 6 Days Post-Op   Precautions: Fall, Aspiration  PLOF: Patient was independent with self-care and functional mobility PTA. ASSESSMENT AND RECOMMENDATIONS:  Based on the objective data described below, the patient presents with no deficits that impede pt function with ADLs, functional transfers, and functional mobility. At this time patient is safe to d/c home with family support. OT to d/c from caseload at this time. Skilled occupational therapy is not indicated at this time.   Discharge Recommendations: None  Further Equipment Recommendations for Discharge: N/A      SUBJECTIVE:   Patient stated dez been walking around all morning    OBJECTIVE DATA SUMMARY:     Past Medical History:   Diagnosis Date    Arthritis     1999 vicodin    Diabetes ([facility] 1998 metformin    GERD (gastroesophageal reflux disease)     Hypercholesterolemia     Hypertension 1996 norvasc    Kidney stones     MI (myocardial infarction) [facility])      Past Surgical History:   Procedure Laterality Date    CARDIAC SURG PROCEDURE UNLIST  12/2017    stints    COLONOSCOPY N/A 11/2/2018    COLONOSCOPY with Polypectomies performed by Sabrina Cedeno MD at [address] [place] [address] GI  2010    gallbladder     Barriers to Learning/Limitations: None  Compensate with: visual, verbal, tactile, kinesthetic cues/model    Home Situation:   Home Situation  Home Environment: Private residence  # Steps to Enter: 3  One/Two Story Residence: One story  Living Alone: No  Support Systems: Family member(s), Friends \ neighbors  Patient Expects to be Discharged to[de-identified] Private residence  Current DME Used/Available at Home: None  Tub or Shower Type: Tub/Shower combination  [x]     Right hand dominant   []     Left hand dominant    Cognitive/Behavioral Status:  Neurologic State: Alert  Orientation Level: Oriented X4  Cognition: Follows commands  Safety/Judgement: Fall prevention    Skin: Intact  Edema: None noted    Vision/Perceptual:    Acuity: Within Defined Limits    Corrective Lenses: Glasses    Coordination: BUE  Fine Motor Skills-Upper: Left Intact; Right Intact    Gross Motor Skills-Upper: Left Intact; Right Intact    Balance:  Sitting: Intact  Standing: Intact    Strength: BUE  Strength: Within functional limits    Tone & Sensation: BUE  Tone: Normal  Sensation: Intact    Range of Motion: BUE  AROM: Within functional limits    Functional Mobility and Transfers for ADLs:    Transfers:  Sit to Stand: Independent  Stand to Sit: Independent   Toilet Transfer : Independent(simulation with reclienr)    ADL Assessment:  Feeding: Independent    Oral Facial Hygiene/Grooming: Independent    Bathing: Independent    Upper Body Dressing: Independent    Lower Body Dressing: Independent    Toileting: Independent    ADL Intervention:  Upper Body Dressing Assistance  Dressing Assistance: Independent  Shirt simulation with hospital gown: Independent    Lower Body Dressing Assistance  Dressing Assistance: Independent  Socks: Independent  Leg Crossed Method Used: Yes  Position Performed: Seated in chair    Cognitive Retraining  Safety/Judgement: Fall prevention    Pain:  Pain level pre-treatment:0/10   Pain level post-treatment: 0/10   Pain Intervention(s): Medication (see MAR); Response to intervention: Nurse notified, See doc flow    Activity Tolerance:   Good    Please refer to the flowsheet for vital signs taken during this treatment.   After treatment:   [x]  Patient left in no apparent distress sitting up in chair  []  Patient left in no apparent distress in bed  [x]  Call bell left within reach  [x]  Nursing notified  []  Caregiver present  []  Bed alarm activated    COMMUNICATION/EDUCATION:   [x]      Role of Occupational Therapy in the acute care setting  [x]      Home safety education was provided and the patient/caregiver indicated understanding. [x]      Patient/family have participated as able and agree with findings and recommendations. []      Patient is unable to participate in plan of care at this time. Thank you for this referral.  Deejay Murphy OTR/L  Time Calculation: 12 mins      Eval Complexity: History: MEDIUM Complexity : Expanded review of history including physical, cognitive and psychosocial  history ; Examination: LOW Complexity : 1-3 performance deficits relating to physical, cognitive , or psychosocial skils that result in activity limitations and / or participation restrictions ;    Decision Making:LOW Complexity : No comorbidities that affect functional and no verbal or physical assistance needed to complete eval tasks

## 2020-12-16 NOTE — PROGRESS NOTES
Problem: Dysphagia (Adult)  Goal: *Acute Goals and Plan of Care (Insert Text)  Description: Patient will:  1. Tolerate PO trials with 0 s/s overt distress in 4/5 trials-met   2. Participate in training and education related to continued aspiration risk, diet recs and compensatory strategies-met      Recommend:   Regular diet with thin liquids   Meds as tolerated   General safe swallow precautions     Outcome: Resolved/Met    [id number] [address] TREATMENT & DISCHARGE    Patient: Richie Morris (85 y.o. male)  Date: 12/16/2020  Diagnosis: ACS (acute coronary syndrome) (HCC) [I24.9]  ACS (acute coronary syndrome) (HCC) [I24.9] <principal problem not specified>  Procedure(s) (LRB):  LEFT HEART CATH (N/A)  Insert Stent Kingsley Coronary (N/A)  Coronary Angiography (N/A) 6 Days Post-Op  Precautions: Fall, Aspiration  PLOF: Regular diet with thin liquids    ASSESSMENT:  Pt seen for follow up dysphagia treatment seated upright in chair. Denying dysphagia and diet now advanced to regular/thin liquids. Educated with regard to s/sx aspiration and to alert MD/RN if symptoms arise; able to verbalize understanding. Will sign off as no further skilled SLP services indicated. Discussed with pt and RN. Progression toward goals:  [x]         Goals met     PLAN:  Recommendations and Planned Interventions:  Maximum therapeutic gains met; safest, least restrictive diet achieved. D/C SLP intervention at this time.    Discharge Recommendations:  Do not anticipate further SLP needs upon discharge      SUBJECTIVE:   Patient stated I can't stay in that bed\"    OBJECTIVE:   Cognitive and Communication Status:  Neurologic State: Alert  Orientation Level: Oriented X4  Cognition: Follows commands  Perception: Appears intact  Perseveration: No perseveration noted  Safety/Judgement: Fall prevention  Dysphagia Treatment:  Oral Assessment:  Oral Assessment  Labial: No impairment  Dentition: Natural  Oral Hygiene: Good  Lingual: No impairment  Velum: No impairment  Mandible: No impairment  P.O. Trials:   Patient Position: [facility] at [facility]   Vocal quality prior to P.O.: No impairment   Consistency Presented:  Thin liquid, Solid   How Presented: Self-fed/presented, Cup/sip, Spoon, Straw, Successive swallows   Bolus Acceptance: No impairment   Bolus Formation/Control: No impairment   Propulsion: No impairment   Oral Residue: None   Initiation of Swallow: No impairment   Laryngeal Elevation: Functional   Aspiration Signs/Symptoms: None    Pharyngeal Phase Characteristics: None    Oral Phase Severity: No impairment   Pharyngeal Phase Severity : No impairment      PAIN:  Start of Tx: not reported   End of Tx: not reported      After treatment:   [x]              Patient left in no apparent distress sitting up in chair  []              Patient left in no apparent distress in bed  [x]              Call bell left within reach  [x]              Nursing notified  []              Caregiver present  []              Bed alarm activated      COMMUNICATION/EDUCATION:   [x] Aspiration precautions; swallow safety; compensatory techniques  [x]        Patient/family able to participate in training and education   []  Patient unable to participate in education; education ongoing with staff  [] Patient understands goals/intent of therapy; neutral about participation     Thank you for this referral,  Katarina Allan M.S., [address] [facility]  Speech-Language Pathologist

## 2020-12-16 NOTE — PROGRESS NOTES
Problem: Mobility Impaired (Adult and Pediatric)  Goal: *Acute Goals and Plan of Care (Insert Text)  Outcome: Resolved/Met     PHYSICAL THERAPY EVALUATION AND DISCHARGE    Patient: Calvin Cruz (89 y.o. male)  Date: 12/16/2020  Primary Diagnosis: ACS (acute coronary syndrome) (HCC) [I24.9]  ACS (acute coronary syndrome) (HCC) [I24.9]  Procedure(s) (LRB):  LEFT HEART CATH (N/A)  Insert Stent Kingsley Coronary (N/A)  Coronary Angiography (N/A) 6 Days Post-Op   Precautions:   Fall, Aspiration  WBAT  PLOF: Pt was ind PTA and did not use an AD. Lives with his wife in a one story home with 3 NEERAJ. ASSESSMENT :  Based on the objective data described below, the patient presents sitting up in chair in NAD, agreeable to PT tx session. Pt is seen with OT to maximize functional mobility and safety. Pt presents with BLE WFL for ROM and strength. He is sitting up in chair and is able to perform sit <> stand with independence as well as amb around his room with no AD or LOB/gait deviations noted with independence. Pt demonstrates good standing balance and does not present with any functional deficits at this time. Vitals remained stable throughout session and BP noted to be 133/79. Pt does not warrant skilled PT in the acute setting and is safe for discharge home with no discharge needs. Pt educated on energy conservation techniques upon his return home. Pt left up in chair with all needs met and call bell within reach. Patient does not require further skilled intervention at this level of care. PLAN :  Recommendations and Planned Interventions:   No formal PT needs identified at this time. Discharge Recommendations: None  Further Equipment Recommendations for Discharge: N/A     SUBJECTIVE:   Patient stated I am moving around fine, I cant wait to get home.     OBJECTIVE DATA SUMMARY:     Past Medical History:   Diagnosis Date    Arthritis     1999 vicodin    Diabetes ([facility] [address] 1998 metformin    GERD (gastroesophageal reflux disease)     Hypercholesterolemia     Hypertension 1996 norvasc    Kidney stones     MI (myocardial infarction) [facility])      Past Surgical History:   Procedure Laterality Date    CARDIAC SURG PROCEDURE UNLIST  12/2017    stints    COLONOSCOPY N/A 11/2/2018    COLONOSCOPY with Polypectomies performed by Jayjay Villanueva MD at [facility] ENDOSCOPY    HX CHOLECYSTECTOMY      HX GI  2010    gallbladder     Barriers to Learning/Limitations: None  Compensate with: N/A  Home Situation:   Home Situation  Home Environment: Private residence  # Steps to Enter: 3  One/Two Story Residence: One story  Living Alone: No  Support Systems: Family member(s)  Patient Expects to be Discharged to[de-identified] Private residence  Current DME Used/Available at Home: None  Tub or Shower Type: Tub/Shower combination  Critical Behavior:  Neurologic State: Alert  Orientation Level: Oriented X4  Cognition: Follows commands  Safety/Judgement: Fall prevention  Psychosocial  Patient Behaviors: Calm; Cooperative  Purposeful Interaction: Yes  Pt Identified Daily Priority: Communication issues (comment)  Caritas Process: Establish trust;Teaching/learning; Attend basic human needs;Create healing environment  Caring Interventions: Reassure; Therapeutic modalities  Reassure: Informing; Acceptance;Caring rounds  Therapeutic Modalities: Humor; Intentional therapeutic touch  Other Caring Modalities: Hourly Rounding  Skin Condition/Temp: Dry;Warm     Skin Integrity: Intact  Skin Integumentary  Skin Color: Appropriate for ethnicity  Skin Condition/Temp: Dry;Warm  Skin Integrity: Intact  Turgor: Non-tenting  Hair Growth: Present  Varicosities: Absent  Nails: Within Defined Limits     Strength:    Strength:  Within functional limits    Tone & Sensation:   Tone: Normal    Sensation: Intact    Range Of Motion:  AROM: Within functional limits    Functional Mobility:  Bed Mobility:  Rolling: (NT pt up in chair )           Transfers:  Sit to Stand: Independent  Stand to Sit: Independent    Balance:   Sitting: Intact  Standing: Intact    Ambulation/Gait Training:    Gait Description (WDL): (x20 ft within room at ind level with no AD )    Pain:  Pain level pre-treatment: 0/10   Pain level post-treatment: 0/10  Pain Intervention(s): Medication (see MAR); Rest, Repositioning   Response to intervention: Nurse notified    Activity Tolerance:   Good    Please refer to the flowsheet for vital signs taken during this treatment. After treatment:   [x]         Patient left in no apparent distress sitting up in chair  []         Patient left in no apparent distress in bed  [x]         Call bell left within reach  [x]         Nursing notified  []         Caregiver present  []         Bed alarm activated  []         SCDs applied    COMMUNICATION/EDUCATION:   [x]         Role of Physical Therapy in the acute care setting. [x]         Fall prevention education was provided and the patient/caregiver indicated understanding. [x]         Patient/family have participated as able in goal setting and plan of care. [x]         Patient/family agree to work toward stated goals and plan of care. []         Patient understands intent and goals of therapy, but is neutral about his/her participation. []         Patient is unable to participate in goal setting/plan of care: ongoing with therapy staff.  []         Other:     Thank you for this referral.  [place]   Time Calculation: 12 mins      Eval Complexity: History: MEDIUM  Complexity : 1-2 comorbidities / personal factors will impact the outcome/ POC Exam:MEDIUM Complexity : 3 Standardized tests and measures addressing body structure, function, activity limitation and / or participation in recreation  Presentation: LOW Complexity : Stable, uncomplicated  Clinical Decision Making:Low Complexity    Overall Complexity:LOW

## 2020-12-17 ENCOUNTER — APPOINTMENT (OUTPATIENT)
Dept: CT IMAGING | Age: 70
DRG: 246 | End: 2020-12-17
Attending: HOSPITALIST
Payer: MEDICARE

## 2020-12-17 LAB
ABO + RH BLD: NORMAL
ANION GAP SERPL CALC-SCNC: 7 MMOL/L (ref 3–18)
BLD PROD TYP BPU: NORMAL
BLOOD GROUP ANTIBODIES SERPL: NORMAL
BPU ID: NORMAL
BUN SERPL-MCNC: 22 MG/DL (ref 7–18)
BUN/CREAT SERPL: 30 (ref 12–20)
CALCIUM SERPL-MCNC: 8.6 MG/DL (ref 8.5–10.1)
CHLORIDE SERPL-SCNC: 110 MMOL/L (ref 100–111)
CO2 SERPL-SCNC: 25 MMOL/L (ref 21–32)
CREAT SERPL-MCNC: 0.73 MG/DL (ref 0.6–1.3)
CROSSMATCH RESULT,%XM: NORMAL
GLUCOSE BLD STRIP.AUTO-MCNC: 130 MG/DL (ref 70–110)
GLUCOSE BLD STRIP.AUTO-MCNC: 135 MG/DL (ref 70–110)
GLUCOSE BLD STRIP.AUTO-MCNC: 140 MG/DL (ref 70–110)
GLUCOSE BLD STRIP.AUTO-MCNC: 172 MG/DL (ref 70–110)
GLUCOSE SERPL-MCNC: 118 MG/DL (ref 74–99)
POTASSIUM SERPL-SCNC: 4 MMOL/L (ref 3.5–5.5)
SODIUM SERPL-SCNC: 142 MMOL/L (ref 136–145)
SPECIMEN EXP DATE BLD: NORMAL
STATUS OF UNIT,%ST: NORMAL
UNIT DIVISION, %UDIV: 0

## 2020-12-17 PROCEDURE — 74011250637 HC RX REV CODE- 250/637: Performed by: INTERNAL MEDICINE

## 2020-12-17 PROCEDURE — 74011250637 HC RX REV CODE- 250/637: Performed by: NURSE PRACTITIONER

## 2020-12-17 PROCEDURE — 74176 CT ABD & PELVIS W/O CONTRAST: CPT

## 2020-12-17 PROCEDURE — 74011250636 HC RX REV CODE- 250/636: Performed by: HOSPITALIST

## 2020-12-17 PROCEDURE — 74011250637 HC RX REV CODE- 250/637: Performed by: HOSPITALIST

## 2020-12-17 PROCEDURE — 65660000004 HC RM CVT STEPDOWN

## 2020-12-17 PROCEDURE — 80048 BASIC METABOLIC PNL TOTAL CA: CPT

## 2020-12-17 PROCEDURE — 36415 COLL VENOUS BLD VENIPUNCTURE: CPT

## 2020-12-17 PROCEDURE — 82962 GLUCOSE BLOOD TEST: CPT

## 2020-12-17 PROCEDURE — 99232 SBSQ HOSP IP/OBS MODERATE 35: CPT | Performed by: INTERNAL MEDICINE

## 2020-12-17 PROCEDURE — 99232 SBSQ HOSP IP/OBS MODERATE 35: CPT | Performed by: HOSPITALIST

## 2020-12-17 PROCEDURE — 74011636637 HC RX REV CODE- 636/637: Performed by: HOSPITALIST

## 2020-12-17 RX ORDER — OXYCODONE AND ACETAMINOPHEN 5; 325 MG/1; MG/1
1 TABLET ORAL
Status: DISCONTINUED | OUTPATIENT
Start: 2020-12-17 | End: 2020-12-18 | Stop reason: HOSPADM

## 2020-12-17 RX ORDER — MORPHINE SULFATE 2 MG/ML
1 INJECTION, SOLUTION INTRAMUSCULAR; INTRAVENOUS
Status: DISCONTINUED | OUTPATIENT
Start: 2020-12-17 | End: 2020-12-18 | Stop reason: HOSPADM

## 2020-12-17 RX ORDER — SODIUM CHLORIDE 9 MG/ML
75 INJECTION, SOLUTION INTRAVENOUS CONTINUOUS
Status: DISCONTINUED | OUTPATIENT
Start: 2020-12-17 | End: 2020-12-18

## 2020-12-17 RX ADMIN — FAMOTIDINE 20 MG: 10 INJECTION INTRAVENOUS at 10:53

## 2020-12-17 RX ADMIN — POLYETHYLENE GLYCOL 3350 17 G: 17 POWDER, FOR SOLUTION ORAL at 10:53

## 2020-12-17 RX ADMIN — ROSUVASTATIN 40 MG: 20 TABLET, FILM COATED ORAL at 21:19

## 2020-12-17 RX ADMIN — FAMOTIDINE 20 MG: 10 INJECTION INTRAVENOUS at 21:00

## 2020-12-17 RX ADMIN — AMLODIPINE BESYLATE 2.5 MG: 2.5 TABLET ORAL at 10:52

## 2020-12-17 RX ADMIN — AMOXICILLIN AND CLAVULANATE POTASSIUM 1 TABLET: 875; 125 TABLET, FILM COATED ORAL at 10:52

## 2020-12-17 RX ADMIN — Medication 10 ML: at 15:12

## 2020-12-17 RX ADMIN — TICAGRELOR 90 MG: 90 TABLET ORAL at 17:01

## 2020-12-17 RX ADMIN — SODIUM CHLORIDE 75 ML/HR: 900 INJECTION, SOLUTION INTRAVENOUS at 10:58

## 2020-12-17 RX ADMIN — ONDANSETRON 4 MG: 2 INJECTION INTRAMUSCULAR; INTRAVENOUS at 09:58

## 2020-12-17 RX ADMIN — CARVEDILOL 12.5 MG: 12.5 TABLET, FILM COATED ORAL at 21:19

## 2020-12-17 RX ADMIN — ASPIRIN 81 MG CHEWABLE TABLET 81 MG: 81 TABLET CHEWABLE at 10:52

## 2020-12-17 RX ADMIN — MUPIROCIN: 20 OINTMENT TOPICAL at 17:02

## 2020-12-17 RX ADMIN — ONDANSETRON 4 MG: 2 INJECTION INTRAMUSCULAR; INTRAVENOUS at 16:57

## 2020-12-17 RX ADMIN — Medication 10 ML: at 22:00

## 2020-12-17 RX ADMIN — AMOXICILLIN AND CLAVULANATE POTASSIUM 1 TABLET: 875; 125 TABLET, FILM COATED ORAL at 17:00

## 2020-12-17 RX ADMIN — TICAGRELOR 90 MG: 90 TABLET ORAL at 04:21

## 2020-12-17 RX ADMIN — OXYCODONE HYDROCHLORIDE AND ACETAMINOPHEN 1 TABLET: 5; 325 TABLET ORAL at 21:19

## 2020-12-17 RX ADMIN — MUPIROCIN: 20 OINTMENT TOPICAL at 10:53

## 2020-12-17 RX ADMIN — OXYCODONE HYDROCHLORIDE AND ACETAMINOPHEN 1 TABLET: 5; 325 TABLET ORAL at 10:52

## 2020-12-17 RX ADMIN — INSULIN LISPRO 2 UNITS: 100 INJECTION, SOLUTION INTRAVENOUS; SUBCUTANEOUS at 11:43

## 2020-12-17 RX ADMIN — CARVEDILOL 12.5 MG: 12.5 TABLET, FILM COATED ORAL at 10:52

## 2020-12-17 RX ADMIN — AMLODIPINE BESYLATE 2.5 MG: 2.5 TABLET ORAL at 17:00

## 2020-12-17 NOTE — PROGRESS NOTES
Patient without any c/o pain overnight. Sbp's remained in the 150's. PIV infiltrated. Several attempts made to insert new line. Patient resting comfortably. Call bell within reach. Bedside and Verbal shift change report given to Baldo De Anda RN (oncoming nurse) by Tressa Saunders RN   (offgoing nurse). Report included the following information SBAR, Kardex, MAR and Recent Results.

## 2020-12-17 NOTE — PROGRESS NOTES
[facility]      CARDIOLOGY PROGRESS NOTE  RECS:  1. Unstable angina- s/p PCI of the LAD. Status post mechanical ventilation. continue DAPT brilinta and asa. Chest pain has resolved. 2. Acute respiratory failure- extubated   3. Hypotension-resolved. Off pressors. 4. Hypertension: Blood pressure increasing gradually as expected. Continue BID amlodipine. Elevated this afternoon - likely due to pain. 5. CAD-cardiac cath 12/2017 showed  Triple-vessel coronary artery disease - For CTS evaluation  6. Hyperlipidemia- LDL 74 11/20 Continue with Crestor  20 mg.  7. Diabetes- well controlled with A1c 5.9   8. LBBB- new since 12/20   9. Hemoptysis- traumatic ETT tube echange and pulmonary edema- better   10. Hematuria / flank pain - hx of renal stones - work up per primary team.    No chest pain. Ambulating with no cardiac symptoms. Discharge planning per medical team.      Cardiac cath 12/9/20  · Three-vessel coronary artery disease with 90% mid LAD stenosis 80% distal circumflex stenosis and 100% mid RCA stenosis after diffuse disease in proximal and ostial RCA. · Patent previously deployed drug-eluting stents in mid circumflex and distal circumflex areas. · Borderline critical left main stenosis which is approximately 50% and has progressed from previous catheterization in 2017. · Procedure done via right radial artery without any complications.  Leyla Legions left main disease, CABG will be considered first.  We will take a surgical opinion with Dr. Ivette Cornelius discussed with him on phone and he/his team will be seeing the patient.     Echo 12/20  · Contrast used: DEFINITY. · LV: Calculated LVEF is 55%. Visually measured ejection fraction. Normal cavity size and systolic function (ejection fraction normal). Mild concentric hypertrophy. Age-appropriate left ventricular diastolic function. · PA: Pulmonary arterial systolic pressure is 26 mmHg.   · Aorta: Dilated aortic root; diameter is 3. 7 cm. ASSESSMENT:  Hospital Problems  Date Reviewed: 11/13/2019          Codes Class Noted POA    ACS (acute coronary syndrome) ([facility] ICD-10-CM: I24.9  ICD-9-CM: 411.1  12/8/2020 Unknown        Controlled type 2 diabetes mellitus without complication, without long-term current use of insulin ([facility] ICD-10-CM: E11.9  ICD-9-CM: 250.00  11/29/2018 Yes        Coronary artery disease involving native coronary artery with unstable angina pectoris ([facility] ICD-10-CM: I25.110  ICD-9-CM: 414.01, 411.1  1/27/2016 Yes    Overview Signed 1/27/2016 11:41 AM by Yamini Crawford MD     recent admission angina stable on medical managment  unable to tolerate isordil             Essential hypertension ICD-10-CM: I10  ICD-9-CM: 401.9  1/27/2016 Yes    Overview Signed 1/27/2016 11:41 AM by Yamini Crawford MD     controlled             Unstable angina pectoris ([facility] ICD-10-CM: I20.0  ICD-9-CM: 411.1  1/17/2016 Yes              SUBJECTIVE:  No CP or SOB  C/O flank pain - easing after pain meds    OBJECTIVE:    VS:   Visit Vitals  BP (!) 167/77 (BP 1 Location: Left arm, BP Patient Position: At rest)   Pulse 72   Temp 98.1 °F (36.7 °C)   Resp 19   Ht 6' (1.829 m)   Wt 101 kg (222 lb 10.6 oz)   SpO2 96%   BMI 30.20 kg/m²         Intake/Output Summary (Last 24 hours) at 12/17/2020 1350  Last data filed at 12/17/2020 0839  Gross per 24 hour   Intake 0 ml   Output 800 ml   Net -800 ml     TELE: normal sinus rhythm    General: alert, well developed and in no apparent distress  HENT: Normocephalic, atraumatic. Normal external eye.   Neck :  no JVD  Cardiac:  regular rate and rhythm, S1, S2 normal, no murmur, click, rub or gallop  Lungs: clear to auscultation bilaterally  Abdomen: Soft, nontender, no masses  Extremities:  No c/c/e, peripheral pulses present      Labs: Results:       Chemistry Recent Labs     12/17/20  0625 12/16/20  0657 12/15/20  0420   * 154* 114*    141 143   K 4.0 3.8 3.9    110 111   CO2 25 24 21   BUN 22* 23* 17   CREA 0.73 0.74 0.69   CA 8.6 9.0 8.5   AGAP 7 7 11   BUCR 30* 31* 25*      CBC w/Diff Recent Labs     12/16/20  1055 12/15/20  0420   WBC  --  8.1   RBC  --  3.47*   HGB 11.7* 10.7*   HCT 33.0* 30.8*   PLT  --  200      Cardiac Enzymes No results for input(s): CPK, CKND1, AMERICA in the last 72 hours. No lab exists for component: CKRMB, TROIP   Coagulation No results for input(s): PTP, INR, APTT, INREXT in the last 72 hours. Lipid Panel Lab Results   Component Value Date/Time    Cholesterol, total 114 12/12/2020 04:00 AM    HDL Cholesterol 28 (L) 12/12/2020 04:00 AM    LDL, calculated 38 12/12/2020 04:00 AM    VLDL, calculated 48 12/12/2020 04:00 AM    Triglyceride 240 (H) 12/12/2020 04:00 AM    CHOL/HDL Ratio 4.1 12/12/2020 04:00 AM      BNP No results for input(s): BNPP in the last 72 hours. Liver Enzymes No results for input(s): TP, ALB, TBIL, AP in the last 72 hours. No lab exists for component: SGOT, GPT, DBIL   Thyroid Studies Lab Results   Component Value Date/Time    TSH 1.33 11/23/2020 07:34 AM              Coby Fitch NP   Supervised    I have independently evaluated and examined the patient. All relevant labs and testing data's are reviewed. Care plan discussed and updated after review.     Bennie Vázquez MD

## 2020-12-17 NOTE — PROGRESS NOTES
[facility]  Progress Note    Patient: Slime Balderas Age: 79 y.o. : 1950 MR#: [id number] SSN: [id number]  Date/Time: 2020    Subjective:       Gross hematuria noted. He is experiencing nausea, and flank pain. Had breakfast yesterday, ate a salad yesterday evening. Has hx of renal stones, [facility].     nsg attempting to place piv w/ vein finder. bp 170/82. Assessment/Plan:     1. Unstable angina. 40-45% distal LM stenosis, critical mid LAD stenosis. S/p celi to mid LAD lesion. DAPT, statin. Coreg. 2. acute Hypoxic respiratory failure requiring intubation on mechanical ventilation. d/t acute pulmonary edema. Extubated 2020.    3. Hemoptysis in the setting of DAPT post cath and traumatic ETT tube exchange and pulmonary edema- appears resolved at this time. Zosyn --> Augmentin  4. Hypertension suboptimal control likely related to pain. On coreg and Norvasc. 5. Dyslipidemia on statin  6. Diet-controlled type 2 diabetes. Sliding scale insulin   7. degenerative joint disease  8. Obesity Body mass index is 30.2 kg/m². 9. Volume overload resolving. 10.  Constipation resolving. Continue bowel regimen. 11. Hematuria, flank pain - CT abd pelvis renal stone protocol ordered stat. 12. Full code. wife at phone #[phone].       Case discussed with:  [x]Patient  []Family  [x]Nursing  [x]Case Management  DVT Prophylaxis:  []Lovenox  []Hep SQ  [x]SCDs  []Coumadin   []On Heparin gtt    Objective:   VS:   Visit Vitals  BP (!) 170/82 (BP 1 Location: Left arm, BP Patient Position: At rest)   Pulse 78   Temp 98.3 °F (36.8 °C)   Resp 19   Ht 6' (1.829 m)   Wt 101 kg (222 lb 10.6 oz)   SpO2 95%   BMI 30.20 kg/m²      Tmax/24hrs: Temp (24hrs), Av.2 °F (36.8 °C), Min:97 °F (36.1 °C), Max:98.9 °F (37.2 °C)    Input/Output:     Intake/Output Summary (Last 24 hours) at 2020 0926  Last data filed at 2020 8226  Gross per 24 hour   Intake 0 ml   Output 800 ml   Net -800 ml       General: awake alert and oriented x4. Found sitting up in chair, nsg looking for vein w/ vein finder. Heent: ncat. Perrl. Oropharynx clear. Cardiovascular:  S1S2+, RRR  Pulmonary: cta b.l anterior and infraaxillary fields  GI:  Soft, NT, ND nabs.  +right flank ttp. Extremities:  No edema. dp 2+ b.l.  Dressing to right groin, no hematoma. Neuro: no focal deficit. Moving all 4s.  Toes downgoing b.l  Skin: no rash      Labs:    Recent Results (from the past 24 hour(s))   HGB & HCT    Collection Time: 12/16/20 10:55 AM   Result Value Ref Range    HGB 11.7 (L) 13.0 - 16.0 g/dL    HCT 33.0 (L) 36.0 - 48.0 %   GLUCOSE, POC    Collection Time: 12/16/20 11:10 AM   Result Value Ref Range    Glucose (POC) 174 (H) 70 - 110 mg/dL   GLUCOSE, POC    Collection Time: 12/16/20  4:10 PM   Result Value Ref Range    Glucose (POC) 119 (H) 70 - 110 mg/dL   URINALYSIS W/MICROSCOPIC    Collection Time: 12/16/20  6:15 PM   Result Value Ref Range    Color BROWN      Appearance TURBID      Specific gravity 1.025 1.005 - 1.030      pH (UA) 6.0 5.0 - 8.0      Protein >300 (A) NEG mg/dL    Glucose 100 (A) NEG mg/dL    Ketone 15 (A) NEG mg/dL    Bilirubin LARGE (A) NEG      Blood LARGE (A) NEG      Urobilinogen 4.0 (H) 0.2 - 1.0 EU/dL    Nitrites Negative NEG      Leukocyte Esterase Negative NEG      WBC  0 - 4 /hpf     UNABLE TO QUANTITATE MICROSCOPIC PARAMETERS DUE TO EXCESSIVE RBCS    RBC TOO NUMEROUS TO COUNT 0 - 5 /hpf    Epithelial cells Negative 0 - 5 /lpf    Bacteria (A) NEG /hpf     UNABLE TO QUANTITATE MICROSCOPIC PARAMETERS DUE TO EXCESSIVE RBCS    Other:        Macroscopic performed on spun urine due to gross blood  or mucus    Uric acid crystals 2+ (A) NEG   GLUCOSE, POC    Collection Time: 12/16/20  9:45 PM   Result Value Ref Range    Glucose (POC) 120 (H) 70 - 089 mg/dL   METABOLIC PANEL, BASIC    Collection Time: 12/17/20  6:25 AM   Result Value Ref Range    Sodium 142 136 - 145 mmol/L    Potassium 4.0 3.5 - 5.5 mmol/L    Chloride 110 100 - 111 mmol/L    CO2 25 21 - 32 mmol/L    Anion gap 7 3.0 - 18 mmol/L    Glucose 118 (H) 74 - 99 mg/dL    BUN 22 (H) 7.0 - 18 MG/DL    Creatinine 0.73 0.6 - 1.3 MG/DL    BUN/Creatinine ratio 30 (H) 12 - 20      GFR est AA >60 >60 ml/min/1.73m2    GFR est non-AA >60 >60 ml/min/1.73m2    Calcium 8.6 8.5 - 10.1 MG/DL   GLUCOSE, POC    Collection Time: 12/17/20  7:12 AM   Result Value Ref Range    Glucose (POC) 140 (H) 70 - 110 mg/dL       Signed By: Dianna Roman MD     December 17, 2020

## 2020-12-18 VITALS
DIASTOLIC BLOOD PRESSURE: 82 MMHG | HEIGHT: 72 IN | HEART RATE: 68 BPM | SYSTOLIC BLOOD PRESSURE: 162 MMHG | TEMPERATURE: 97.8 F | OXYGEN SATURATION: 97 % | WEIGHT: 211.64 LBS | BODY MASS INDEX: 28.67 KG/M2 | RESPIRATION RATE: 16 BRPM

## 2020-12-18 LAB
ANION GAP SERPL CALC-SCNC: 5 MMOL/L (ref 3–18)
BASOPHILS # BLD: 0 K/UL (ref 0–0.1)
BASOPHILS NFR BLD: 1 % (ref 0–2)
BUN SERPL-MCNC: 21 MG/DL (ref 7–18)
BUN/CREAT SERPL: 26 (ref 12–20)
CALCIUM SERPL-MCNC: 8.2 MG/DL (ref 8.5–10.1)
CHLORIDE SERPL-SCNC: 110 MMOL/L (ref 100–111)
CO2 SERPL-SCNC: 26 MMOL/L (ref 21–32)
CREAT SERPL-MCNC: 0.8 MG/DL (ref 0.6–1.3)
DIFFERENTIAL METHOD BLD: ABNORMAL
EOSINOPHIL # BLD: 0.2 K/UL (ref 0–0.4)
EOSINOPHIL NFR BLD: 3 % (ref 0–5)
ERYTHROCYTE [DISTWIDTH] IN BLOOD BY AUTOMATED COUNT: 14 % (ref 11.6–14.5)
GLUCOSE BLD STRIP.AUTO-MCNC: 136 MG/DL (ref 70–110)
GLUCOSE BLD STRIP.AUTO-MCNC: 147 MG/DL (ref 70–110)
GLUCOSE SERPL-MCNC: 135 MG/DL (ref 74–99)
HCT VFR BLD AUTO: 30.7 % (ref 36–48)
HGB BLD-MCNC: 10.6 G/DL (ref 13–16)
LYMPHOCYTES # BLD: 1.3 K/UL (ref 0.9–3.6)
LYMPHOCYTES NFR BLD: 15 % (ref 21–52)
MAGNESIUM SERPL-MCNC: 1.9 MG/DL (ref 1.6–2.6)
MCH RBC QN AUTO: 30.5 PG (ref 24–34)
MCHC RBC AUTO-ENTMCNC: 34.5 G/DL (ref 31–37)
MCV RBC AUTO: 88.5 FL (ref 74–97)
MONOCYTES # BLD: 1.2 K/UL (ref 0.05–1.2)
MONOCYTES NFR BLD: 13 % (ref 3–10)
NEUTS SEG # BLD: 6 K/UL (ref 1.8–8)
NEUTS SEG NFR BLD: 68 % (ref 40–73)
PHOSPHATE SERPL-MCNC: 3 MG/DL (ref 2.5–4.9)
PLATELET # BLD AUTO: 211 K/UL (ref 135–420)
PMV BLD AUTO: 9.6 FL (ref 9.2–11.8)
POTASSIUM SERPL-SCNC: 3.6 MMOL/L (ref 3.5–5.5)
RBC # BLD AUTO: 3.47 M/UL (ref 4.7–5.5)
SODIUM SERPL-SCNC: 141 MMOL/L (ref 136–145)
WBC # BLD AUTO: 8.8 K/UL (ref 4.6–13.2)

## 2020-12-18 PROCEDURE — 82962 GLUCOSE BLOOD TEST: CPT

## 2020-12-18 PROCEDURE — 80048 BASIC METABOLIC PNL TOTAL CA: CPT

## 2020-12-18 PROCEDURE — 74011250637 HC RX REV CODE- 250/637: Performed by: NURSE PRACTITIONER

## 2020-12-18 PROCEDURE — 84100 ASSAY OF PHOSPHORUS: CPT

## 2020-12-18 PROCEDURE — 74011250637 HC RX REV CODE- 250/637: Performed by: INTERNAL MEDICINE

## 2020-12-18 PROCEDURE — 74011250636 HC RX REV CODE- 250/636: Performed by: HOSPITALIST

## 2020-12-18 PROCEDURE — 87086 URINE CULTURE/COLONY COUNT: CPT

## 2020-12-18 PROCEDURE — 74011250637 HC RX REV CODE- 250/637: Performed by: HOSPITALIST

## 2020-12-18 PROCEDURE — 99239 HOSP IP/OBS DSCHRG MGMT >30: CPT | Performed by: INTERNAL MEDICINE

## 2020-12-18 PROCEDURE — 85025 COMPLETE CBC W/AUTO DIFF WBC: CPT

## 2020-12-18 PROCEDURE — 36415 COLL VENOUS BLD VENIPUNCTURE: CPT

## 2020-12-18 PROCEDURE — 83735 ASSAY OF MAGNESIUM: CPT

## 2020-12-18 PROCEDURE — 94762 N-INVAS EAR/PLS OXIMTRY CONT: CPT

## 2020-12-18 RX ORDER — AMOXICILLIN AND CLAVULANATE POTASSIUM 875; 125 MG/1; MG/1
1 TABLET, FILM COATED ORAL 2 TIMES DAILY WITH MEALS
Qty: 24 TAB | Refills: 0 | Status: SHIPPED | OUTPATIENT
Start: 2020-12-18 | End: 2020-12-30

## 2020-12-18 RX ORDER — OXYCODONE AND ACETAMINOPHEN 5; 325 MG/1; MG/1
1 TABLET ORAL
Qty: 15 TAB | Refills: 0 | Status: SHIPPED | OUTPATIENT
Start: 2020-12-18 | End: 2020-12-23

## 2020-12-18 RX ORDER — ROSUVASTATIN CALCIUM 10 MG/1
40 TABLET, COATED ORAL
Qty: 90 TAB | Refills: 3 | Status: SHIPPED | OUTPATIENT
Start: 2020-12-18 | End: 2021-01-07

## 2020-12-18 RX ORDER — AMLODIPINE BESYLATE 5 MG/1
5 TABLET ORAL ONCE
Status: COMPLETED | OUTPATIENT
Start: 2020-12-18 | End: 2020-12-18

## 2020-12-18 RX ORDER — TAMSULOSIN HYDROCHLORIDE 0.4 MG/1
0.4 CAPSULE ORAL DAILY
Qty: 30 CAP | Refills: 3 | Status: SHIPPED | OUTPATIENT
Start: 2020-12-19 | End: 2021-01-12 | Stop reason: SDUPTHER

## 2020-12-18 RX ORDER — TAMSULOSIN HYDROCHLORIDE 0.4 MG/1
0.4 CAPSULE ORAL DAILY
Status: DISCONTINUED | OUTPATIENT
Start: 2020-12-19 | End: 2020-12-18 | Stop reason: HOSPADM

## 2020-12-18 RX ADMIN — AMOXICILLIN AND CLAVULANATE POTASSIUM 1 TABLET: 875; 125 TABLET, FILM COATED ORAL at 08:49

## 2020-12-18 RX ADMIN — POLYETHYLENE GLYCOL 3350 17 G: 17 POWDER, FOR SOLUTION ORAL at 08:47

## 2020-12-18 RX ADMIN — MUPIROCIN: 20 OINTMENT TOPICAL at 11:54

## 2020-12-18 RX ADMIN — Medication 10 ML: at 05:55

## 2020-12-18 RX ADMIN — ASPIRIN 81 MG CHEWABLE TABLET 81 MG: 81 TABLET CHEWABLE at 08:47

## 2020-12-18 RX ADMIN — TICAGRELOR 90 MG: 90 TABLET ORAL at 04:04

## 2020-12-18 RX ADMIN — FAMOTIDINE 20 MG: 10 INJECTION INTRAVENOUS at 08:49

## 2020-12-18 RX ADMIN — SODIUM CHLORIDE 75 ML/HR: 900 INJECTION, SOLUTION INTRAVENOUS at 01:25

## 2020-12-18 RX ADMIN — OXYCODONE HYDROCHLORIDE AND ACETAMINOPHEN 1 TABLET: 5; 325 TABLET ORAL at 05:08

## 2020-12-18 RX ADMIN — ONDANSETRON 4 MG: 2 INJECTION INTRAMUSCULAR; INTRAVENOUS at 04:04

## 2020-12-18 RX ADMIN — AMLODIPINE BESYLATE 5 MG: 5 TABLET ORAL at 11:51

## 2020-12-18 NOTE — PROGRESS NOTES
0744-Bedside and Verbal shift change report received from  [address] [place] [address] (off going nurse). Report included the following information SBAR, Kardex, MAR and Recent Results. Assumed care,fall prevention program maintained,call bell and bedside table within reach. Will continue care. Urine C/S done. 1440- PIV removed patient tolerated well no bleeding. AVS given & explained. Brilinta coupon  & Percocet prescription given.

## 2020-12-18 NOTE — PROGRESS NOTES
Discharge order noted for today. Orders reviewed. Brilinta discount card placed on chart and notified RN. Pt's prescriptions have already been sent to pt's home pharmacy. Important Message from [address]" reviewed and explained with the patient and/or representative at bedside and signature was obtained. A signed copy provided to patient/representative. Original signed document placed in patient's chart. No additional needs identified at this time. Pt to be transported home by his wife.  remains available if needed.     Andres Holm RN BSN  Care Manager  [phone]

## 2020-12-18 NOTE — PROGRESS NOTES
Chart reviewed. PT/OT evals complete with no further recommendations.  Discharge plan is home with family when ready.        [address]  [phone]

## 2020-12-18 NOTE — PROGRESS NOTES
0700: Bedside and Verbal shift change report given to Julieth Cruz (oncoming nurse) by Kathryn Melendez (offgoing nurse). Report included the following information SBAR, Kardex, Recent Results and Cardiac Rhythm NSR.

## 2020-12-18 NOTE — DISCHARGE INSTRUCTIONS
Discharge Instructions    Patient: Aida Pinto MRN: [id number]  [facility]: [id number]    YOB: 1950  Age: 79 y.o. Sex: male    DOA: 12/8/2020 LOS:  LOS: 10 days   Discharge Date:      ACUTE DIAGNOSES:  1. Unstable angina. -post Cardiac Catheterization with placement of LIZ to mid LAD lesion. 2.  Acute Hypoxic Respiratory Failure required intubation on mechanical ventilation. Resolved with extubation   3. Acute pulmonary edema with volume overload, resolved. 4.  Hemoptysis in the setting of DAPT post cath and traumatic ETT tube exchange. Improved   5. Aspiration pneumonia  6. Hypertension  7. Constipation resolving. Continue bowel regimen. 8.  Hematuria, flank pain suspected pyelonephritis on CT abdomen/pelvic  9. Normocytic anemia, associate with blood loss, stable         DISCHARGE MEDICATIONS:         · It is important that you take the medication exactly as they are prescribed. · Keep your medication in the bottles provided by the pharmacist and keep a list of the medication names, dosages, and times to be taken in your wallet. · Do not take other medications without consulting your doctor. DIET:  Cardiac Diet, Diabetic Diet and Low fat, Low cholesterol    ACTIVITY: Activity as tolerated    ADDITIONAL INFORMATION: If you experience any of the following symptoms then please call your primary care physician or return to the emergency room if you cannot get hold of your doctor: Fever, chills, nausea, vomiting, diarrhea, change in mentation, falling, bleeding, shortness of breath. FOLLOW UP CARE:  Dr. Yvette Che MD  you are to call and set up an appointment to see them in 10-14 days. Follow-up with Dr. Rhonda Burr, cardiology in 1-2 weeks   Follow up with [facility], as scheduled in 1-2 weeks       Information obtained by :  I understand that if any problems occur once I am at home I am to contact my physician.     I understand and acknowledge receipt of the instructions indicated above. Physician's or R.N.'s Signature                                                                  Date/Time                                                                                                                                              Patient or Representative Signature                                                          Date/Time    Karlene Lopez MD  12/18/2020  12:59 PM              Empiric Stone Prevention Recommendations were discussed:   · Increase fluid consumption to make at least 2-2.5 litre of urine per day or consume at least 3 liters of fluid daily. · Decrease dietary salt intake (2 gm sodium daily) -- to lower urinary Calcium   · Avoid dark drinks (ie, Coffee, Tea, Jesse), nuts, chocolate -- to lower urinary Oxalate   · Lemonade: 4 oz lemon juice mixed with 2 litre water, sweetened to taste -- to increase urinary Citrate    Patient verbalized understanding. He/she will monitor for symptoms of a stone. If present will, bring the stone to the office for analysis.

## 2020-12-18 NOTE — DISCHARGE SUMMARY
Discharge Summary    Patient: Romina Crouch               Sex: male          DOA: 12/8/2020         YOB: 1950      Age:  79 y.o.        LOS:  LOS: 10 days                Admit Date: 12/8/2020    Discharge Date: 12/18/2020    Primary care physician: Camille Chung MD    Discharge Diagnoses:    1. Unstable angina with 3-vessel CAD       -post Cardiac Catheterization with placement of LIZ to mid LAD lesion. 2.  Acute Hypoxic Respiratory Failure required intubation on mechanical ventilation. Resolved with extubation   3. Acute pulmonary edema with volume overload, resolved. 4.  Hemoptysis in the setting of DAPT post cath and traumatic ETT tube exchange. Improved   5. Aspiration pneumonia  6. Brief cardiogenic shock post cardiac cath, resolved   7. Constipation resolving. Continue bowel regimen. 8.  Hematuria, flank pain suspected pyelonephritis on CT abdomen/pelvic  9. Normocytic anemia, associate with blood loss, stable   10. Hypertension     Discharge Condition: Good  Disposition: home with home health  Code Status: full code    Follow up for Primary Care Physician:  1) he needs further follow up with his cardiologist in 1-2 weeks for further care. 2) He continues on antibiotic for aspiration concerning. Please monitor for clinical improvement   3) he needs follow up with Urology for concern of hematuria and pyelonephritis concern on CT abd/pelv. His current oral antibiotic is suffice for treatment. Please monitor for clinical improvement   4) please monitor his CBC, BMP on return to clinic in 1 week   5)  He needs to follow up with [facility] pulmonary follow up with repeat imaging       Hospital Course:   79 y.o male with HTN, 3-vessel CAD, hyperlipidemia, type 2 DM, h/o kidney stone, GERD, presented to ER with transient chest pain that has been worsened for 2-3 weeks. He had associated SOB with exertion. His chest pain partially relief with nitroglycerin.  He has significant CAD history with previous stenting and has been on optimal medical management. He was seen by cardiology and concern for needs of unstable angina. He was admitted with CT surgery consultation after his cardiac cath finding:  Cardiac cath 12/9/20  · Three-vessel coronary artery disease with 90% mid LAD stenosis 80% distal circumflex stenosis and 100% mid RCA stenosis after diffuse disease in proximal and ostial RCA. · Patent previously deployed drug-eluting stents in mid circumflex and distal circumflex areas. · Borderline critical left main stenosis which is approximately 50% and has progressed from previous catheterization in 2017. · Procedure done via right radial artery without any complications.   Given left main disease, CABG will be considered first.  We will take a surgical opinion with Dr. Emeterio Jenkins. Post cath with PCI and LAD stent placement, he went into pulmonary edema and he was intubated. He has hemoptysis post ETT placement. This was exchanged. And he was admitted to ICU with required pressor support for his brief post procedural hypotension (cardiogenic shock). He was briefly on empiric antibiotic while continued on ICU care. He was able to be extubated on 12/14/2020. He continued with improvement on diuretic post extubation. He has been off oxygen supplement. He has been OOB and ambulated on room air without complaint. He has hematuria with concern for renal stone in the setting DAPT use. CT abd/pel without evidence of stone. His CT found evidence of aspiration pneumonia which his antibiotic was deescalated to Augmentin. Urology evaluated and recommended outpatient follow up for concern flank pain that has been resolved and CT finding that concerning for pyelonephritis. Last 24 Hours: he has no flank pain. His gross hematuria resolved. His Hgb/Hct has been stable. He has been up and out of bed without complaint. He as to go home.  Urology evaluated and recommended to concern antibiotic and follow up outpatient with repeat imagining   Cardiology ok to resume his antihypertensive at discharge. ROS:  No current fever/chills, no headache, no dizziness, no facial pain, no sinus congestion,   No swallowing pain, No chest pain, no palpitation, no shortness of breath, no abd pain,  No diarrhea, no urinary complaint, no leg pain or swelling    VS:   Visit Vitals  BP (!) 162/82 (BP 1 Location: Left arm)   Pulse 68   Temp 97.8 °F (36.6 °C)   Resp 16   Ht 6' (1.829 m)   Wt 96 kg (211 lb 10.3 oz)   SpO2 97%   BMI 28.70 kg/m²      Tmax/24hrs: Temp (24hrs), Av °F (36.7 °C), Min:97.4 °F (36.3 °C), Max:98.4 °F (36.9 °C)      Intake/Output Summary (Last 24 hours) at 2020 1307  Last data filed at 2020 0040  Gross per 24 hour   Intake    Output 650 ml   Net -650 ml       Tele: sinus  General:  Cooperative, Not in acute distress, speaks in full sentence while in bed  HEENT: PERRL, EOMI, supple neck, no JVD, dry oral mucosa  Cardiovascular: S1S2 regular, no rub/gallop   Pulmonary: air entry bilaterally, no wheezing, no crackle  GI:  Soft, non tender, non distended, +bs, no guarding   Extremities:  No pedal edema, +distal pulses appreciated   Neuro: AOx3, moving all extremities, no gross deficit. Consults:   Cardiology: Dr. Ej Cadena   Pulmonary: Dr. Margo Ahumada surgery  Urology: Dr. Dickerson Grief:   CT Results (most recent):  Results from [place] encounter on 20   CT ABD PELV WO CONT    Addendum Addendum:   As mentioned in the initial report, patchy airspace  opacities/consolidations at the posterior right lower lobe and smaller area in left lower lobe. Recommend clinical correlation and follow-up chest CT for further evaluation.       Christy Wright MD 2020  2:12 PM          Narrative CT abdomen and pelvis for stone study     HISTORY: Hematuria, flank pain    COMPARISON: CT 5/3/08    TECHNIQUE: Helical scan to the abdomen and pelvis is obtained without oral or IV  contrast administration per stone protocol. All CT scans at this facility performed using dose optimization techniques as  appreciated to a performed exam, to include automated exposure control,  adjustment of the mA and or KU according to patient size (including appropriate  matching for site specific examination), or use of iterative reconstruction  technique. FINDINGS: Imaging portion to the lung bases show several lobulated  consolidations with mild surrounding infiltration at the posterior right lower  lobe. Smaller opacity also seen at anterior left lower lobe near the diaphragm. KIDNEYS, URETERS AND BLADDER:     The right kidney shows 2.5 x 2.5 cm cortical cyst in the upper and mid  interpolar region. No renal calculi or hydronephrosis identified. There is mild  perinephric stranding and periureteral stranding. The ureter is mildly prominent  at the proximal and very distal portion. There is subtle asymmetric linear  hyperdensity at the medial aspect of the mid right ureter as nonspecific  finding, on coronal #36/601. The left kidney shows no renal calculi or hydronephrosis identified. The left  ureter appears nondilated and no evidence of ureteral calculi. The bladder is poorly distended. There is layering small hyperdensity in left  bladder base. No abnormality in left UVJ. CT ABDOMEN AND PELVIS:     The gallbladder is a surgically absent. The moderate hepatic steatosis on prior  CT is no longer evident. The spleen, pancreas, and both adrenal glands appear  grossly normal on this noncontrast study. The small and large bowel appear  nondilated. Numerous diverticula in the sigmoid colon. No retroperitoneal  adenopathy identified. Abdominal aorta appears unremarkable for age. The prostate  is mildly prominent No pelvic free fluid identified. .     CT OSSEOUS STRUCTURES:     Spondylosis. Impression IMPRESSION:    1. No evidence of nephrolithiasis or hydronephrosis.  There is questionable  linear hyperdensity along the medial aspect of mid right ureter as well as along  the left bladder base, probably representing milk of calcium. 2. Diverticulosis coli. Thank you for this referral.       XR Results (most recent):  Results from [facility] encounter on 12/08/20   XR CHEST SNGL V    Narrative EXAM:  Chest Portable. INDICATION:  Respiratory failure. COMPARISON:  12/11/20    TECHNIQUE:  Portable AP chest study    FINDINGS:      - ET tube placement, distal tip 4.2 cm above the paxton.  - NG/OG tube placement, distal portion coursing below the inferior margin of the  field of view. - Streaky densities in the right lower lung zone. Subtle hazy opacities in the  left lower lung zone with superimposed bandlike density. Partial obscuration of  the left hemidiaphragmatic outline. No significant overall interval change in  both lungs compared to 12/11/20.  - No pneumothorax. Impression IMPRESSION:    1.  Stable life-support devices. No pneumothorax. 2.  Bilateral lower lung zone opacities. Suggestive of atelectatic changes vs.  infiltrate. Small pleural effusion may be present as well. 12/08/20   ECHO ADULT COMPLETE 12/09/2020 12/9/2020    Narrative · Contrast used: DEFINITY. · LV: Calculated LVEF is 55%. Visually measured ejection fraction. Normal   cavity size and systolic function (ejection fraction normal). Mild   concentric hypertrophy. Age-appropriate left ventricular diastolic   function. · PA: Pulmonary arterial systolic pressure is 26 mmHg. · Aorta: Dilated aortic root; diameter is 3.7 cm. Signed by: Shruti Guevara MD       12/08/20   CARDIAC PROCEDURE 12/10/2020 12/10/2020    Narrative 40-45% distal LM stenosis. Critical mid LAD stenosis. Patient on high dose of IV ntg infusion-   continues to have rest chest pain. Awaiting CABG. However CTS not emergently available at this time.   Discussed with patient regarding transfer to [facility] vs emergent PCI. Patient prefers emergent PCI due to ongoing chest pain. PCI procedure uncomplicated. Patient developed acute pulmonary edema   requiring intubation on mechanical ventilation. Will continue DAPT and Integrilin. Above findings discussed with patient's spouse. Signed by: Albina Guzman MD         Lab/Data Review:  Labs: Results:       Chemistry Recent Labs     12/18/20  0548 12/17/20  0625 12/16/20  0657   * 118* 154*    142 141   K 3.6 4.0 3.8    110 110   CO2 26 25 24   BUN 21* 22* 23*   CREA 0.80 0.73 0.74   CA 8.2* 8.6 9.0   AGAP 5 7 7   BUCR 26* 30* 31*      CBC w/Diff Recent Labs     12/18/20  0548 12/16/20  1055   WBC 8.8  --    RBC 3.47*  --    HGB 10.6* 11.7*   HCT 30.7* 33.0*     --    GRANS 68  --    LYMPH 15*  --    EOS 3  --       Coagulation No results for input(s): PTP, INR, APTT, INREXT in the last 72 hours. Iron/Ferritin No results for input(s): IRON in the last 72 hours. No lab exists for component: TIBCCALC   BNP No results for input(s): BNPP in the last 72 hours. Cardiac Enzymes No results for input(s): CPK, CKND1, AMERICA in the last 72 hours. No lab exists for component: CKRMB, TROIP   Liver Enzymes No results for input(s): TP, ALB, TBIL, AP in the last 72 hours. No lab exists for component: SGOT, GPT, DBIL   Thyroid Studies No results for input(s): T4, T3U, TSH, TSHEXT in the last 72 hours. No lab exists for component: T3RU       All Micro Results     Procedure Component Value Units Date/Time    CULTURE, URINE [[id number]]     Order Status: Sent Specimen: Urine from Clean catch                 Medications at discharge  including reasons for change and indications for new ones:   Current Discharge Medication List      START taking these medications    Details   ticagrelor (BRILINTA) 90 mg tablet Take 1 Tab by mouth two (2) times a day.  Indications: treatment to prevent a heart attack  Qty: 60 Tab, Refills: 1      tamsulosin (FLOMAX) 0.4 mg capsule Take 1 Cap by mouth daily. Qty: 30 Cap, Refills: 3      oxyCODONE-acetaminophen (PERCOCET) 5-325 mg per tablet Take 1 Tab by mouth every eight (8) hours as needed for Pain for up to 5 days. Max Daily Amount: 3 Tabs. Qty: 15 Tab, Refills: 0    Associated Diagnoses: Pyelonephritis      amoxicillin-clavulanate (AUGMENTIN) 875-125 mg per tablet Take 1 Tab by mouth two (2) times daily (with meals) for 12 days. Qty: 24 Tab, Refills: 0         CONTINUE these medications which have CHANGED    Details   rosuvastatin (CRESTOR) 10 mg tablet Take 4 Tabs by mouth nightly. Indications: treatment to prevent a heart attack  Qty: 90 Tab, Refills: 3         CONTINUE these medications which have NOT CHANGED    Details   carvediloL (COREG) 25 mg tablet TAKE 1 TABLET BY MOUTH TWICE A DAY WITH FOOD  Qty: 180 Tab, Refills: 3    Associated Diagnoses: Essential hypertension      Januvia 100 mg tablet TAKE 1 TABLET BY MOUTH EVERY DAY  Qty: 90 Tab, Refills: 0    Associated Diagnoses: Well controlled diabetes mellitus ([address]      glipiZIDE SR (GLUCOTROL XL) 5 mg CR tablet TAKE 1 TABLET BY MOUTH EVERY DAY  Qty: 90 Tab, Refills: 1    Comments: DX Code Needed  . Associated Diagnoses: Poorly controlled diabetes mellitus (HCC)      omeprazole (PRILOSEC) 20 mg capsule TAKE 1 CAPSULE BY MOUTH EVERY DAY  Qty: 90 Cap, Refills: 1    Associated Diagnoses: Gastroesophageal reflux disease without esophagitis      amLODIPine (NORVASC) 10 mg tablet Take 1 Tab by mouth daily. Qty: 90 Tab, Refills: 1      metFORMIN (GLUCOPHAGE) 1,000 mg tablet TAKE 1 TABLET BY MOUTH TWICE A DAY WITH MEALS  Qty: 180 Tab, Refills: 1    Associated Diagnoses: Well controlled diabetes mellitus ([address]      loratadine (Claritin) 10 mg tablet Take 10 mg by mouth daily as needed. ramipril (ALTACE) 10 mg capsule Take 1 Cap by mouth daily. Qty: 90 Cap, Refills: 3      aspirin delayed-release 325 mg tablet Take 1 Tab by mouth daily.   Qty: 90 Tab, Refills: 3      nitroglycerin (NITROSTAT) 0.4 mg SL tablet 1 Tab by SubLINGual route every five (5) minutes as needed for Chest Pain for up to 3 doses.   Qty: 25 Tab, Refills: 0      Blood Pressure Monitor kit Check once a day  Qty: 1 Kit, Refills: 0    Associated Diagnoses: Essential hypertension         STOP taking these medications       clopidogrel (PLAVIX) 75 mg tab Comments:   Reason for Stopping:                       Pending laboratory work and tests: urine culture     Activity: Activity as tolerated    Diet: Cardiac Diet, Diabetic Diet and Low fat, Low cholesterol    Wound Care: None needed        Bobby Galvez MD  12/18/2020  1:07 PM

## 2020-12-18 NOTE — CONSULTS
1. ACS (acute coronary syndrome) ([facility] [address]    2. LBBB (left bundle branch block)    3. NSTEMI (non-ST elevated myocardial infarction) ([facility] [address]    4. Coronary artery disease involving native coronary artery of native heart with unstable angina pectoris ([facility] [address]    5. Essential hypertension    6. Hyperlipidemia, unspecified hyperlipidemia type    7. Acute pulmonary edema (HCC)    8. Acute respiratory failure with hypoxia (HCC)    9. Gastroesophageal reflux disease, unspecified whether esophagitis present    10. S/P coronary artery stent placement    11. Hypotension, unspecified hypotension type    12. Hemoptysis    13. Hypokalemia    14. Controlled diabetes mellitus type 2 with complications, unspecified whether long term insulin use ([facility])        ASSESSMENT:   Hematuria- ??? Stones   Pt denies blood in urine   On Brilinta and ASA s/p stent placement   Hgb 10.6   Creat 0.80   UA large blood    CT negative for stones      Pyelonephritis   Hgb 10.6   Creat 0.80   UA large blood    CT negative for stones;  Mild perinephric stranding and periureteral stranding   On Augmentin      Chest Pain    Unstable angina. 40-45% distal LM stenosis, critical mid LAD stenosis. S/p celi to mid LAD lesion. DAPT, statin. Coreg    HTN    PLAN:    Overall management by the medicine team. Thanks for the consult. Monitor UOP and renal function  Monitor VS and labs  Start flomax daily  Patient denies any blood in urine or flank pain. CT A/P unremarkable for stone. Will get patient scheduled in our office for follow-up for renal stones and pyelonephritis in 2-3 weeks. Patient is agreeable. Okay to d/c home on antibiotics. [facility] will contact patient with appt date and time. No immediate urological need at this time. We will sign off and available as needed. Thank you for allowing the urology team the opportunity to participate in [facility]. We will sign off and are available as needed. Please feel free to reach out.     Singh Robledo FNP-C  Urology of [address]: [phone]  Pager: [phone]    I am available M-F 8:00am- 5:00pm. After 5pm on weekdays and weekends, please call our on call provider at [phone]. Thank you. Chief Complaint   Patient presents with    Chest Pain    Shortness of Breath       HISTORY OF PRESENT ILLNESS:  Kenny Child is a 79 y.o. male who is seen in consultation as referred by Anna Estrada for hematuria. Patient was admitted with chest pain and underwent stent placement. We were consulted due to his c/o flank pain and hematuria yesterday. Patient reports that he had a kidney stone that was removed in 1996 via PCNL. He denies any further stones since then. Patient denies concerns of frequency, urgency, dysuria, and nocturia. He states that he does not currently have any  complaints. No flowsheet data found.       Past Medical History:   Diagnosis Date    Arthritis     1999 vicodin    Diabetes ([address] 1998 metformin    GERD (gastroesophageal reflux disease)     Hypercholesterolemia     Hypertension 1996 norvasc    Kidney stones     MI [facility] infarction[facility])        Past Surgical History:   Procedure Laterality Date    CARDIAC SURG PROCEDURE UNLIST  12/2017    stints    COLONOSCOPY N/A 11/2/2018    COLONOSCOPY with Polypectomies performed by Joanna Sarmiento MD at [address] HX CHOLECYSTECTOMY      HX GI  2010    gallbladder       Social History     Tobacco Use    Smoking status: Never Smoker    Smokeless tobacco: Never Used   Substance Use Topics    Alcohol use: No    Drug use: No       No Known Allergies    Family History   Problem Relation Age of Onset    Stroke Mother     Headache Mother     Hypertension Mother     Lung Cancer Mother     Heart Disease Father     Heart Attack Father     Hypertension Father     Diabetes Father     Lung Cancer Father     Ovarian Cancer Sister     Heart Attack Brother     Breast Cancer Sister     Diabetes Sister  Cancer Maternal Aunt         lung cancer    Cancer Maternal Uncle     Cancer Paternal Aunt     Cancer Paternal Uncle        Current Facility-Administered Medications   Medication Dose Route Frequency Provider Last Rate Last Admin    [START ON 12/19/2020] tamsulosin (FLOMAX) capsule 0.4 mg  0.4 mg Oral DAILY Katarina RENTERIA NP        amLODIPine (NORVASC) tablet 5 mg  5 mg Oral Corey Ledezma MD        morphine injection 1 mg  1 mg IntraVENous Q3H PRN Bebe Ruiz MD        oxyCODONE-acetaminophen (PERCOCET) 5-325 mg per tablet 1 Tab  1 Tab Oral Q8H PRN Bebe Ruiz MD   1 Tab at 12/18/20 0508    amoxicillin-clavulanate (AUGMENTIN) 875-125 mg per tablet 1 Tab  1 Tab Oral BID WITH MEALS Bebe Ruiz MD   1 Tab at 12/18/20 0849    amLODIPine (NORVASC) tablet 2.5 mg  2.5 mg Oral BID Brad Chavez MD   2.5 mg at 12/17/20 1700    carvediloL (COREG) tablet 12.5 mg  12.5 mg Oral Q12H Bebe Ruiz MD   12.5 mg at 12/17/20 2119    polyethylene glycol (MIRALAX) packet 17 g  17 g Oral DAILY Bebe Ruiz MD   17 g at 12/18/20 0847    insulin lispro (HUMALOG) injection   SubCUTAneous AC&HS Bebe Ruiz MD   Stopped at 12/17/20 1702    albuterol-ipratropium (DUO-NEB) 2.5 MG-0.5 MG/3 ML  3 mL Nebulization Q4H PRN Reno Herring MD        hydrALAZINE (APRESOLINE) tablet 25 mg  25 mg Per NG tube Q6H PRN Bebe Ruiz MD   25 mg at 12/14/20 2129    acetaminophen (TYLENOL) tablet 500 mg  500 mg Oral Q6H PRN Bebe Ruiz MD        famotidine (PF) (PEPCID) injection 20 mg  20 mg IntraVENous Q12H Bebe Ruiz MD   20 mg at 12/18/20 0849    sodium chloride (NS) flush 5-40 mL  5-40 mL IntraVENous Q8H Sabina Roberson MD   10 mL at 12/18/20 0555    ticagrelor (BRILINTA) tablet 90 mg  90 mg Oral BID Sabina Roberson MD   90 mg at 12/18/20 0404    sodium chloride (NS) flush 5-40 mL  5-40 mL IntraVENous Q8H Brad Chavez MD   10 mL at 12/18/20 0522    sodium chloride (NS) flush 5-40 mL  5-40 mL IntraVENous PRN Esteban Sampson MD   10 mL at 12/15/20 1416    temazepam (RESTORIL) capsule 15 mg  15 mg Oral QHS PRN Esteban Sampson MD        nitroglycerin (NITROSTAT) tablet 0.4 mg  0.4 mg SubLINGual Q5MIN PRN Esteban Sampson MD        rosuvastatin (CRESTOR) tablet 40 mg  40 mg Oral QHS Esteban Sampson MD   40 mg at 12/17/20 2119    mupirocin (BACTROBAN) 2 % ointment   Both Nostrils BID [place], [place]   Given at 12/17/20 1702    glucose chewable tablet 16 g  4 Tab Oral PRN Shakir Christy NP        glucagon (GLUCAGEN) injection 1 mg  1 mg IntraMUSCular PRN Shakir Christy NP        dextrose (D50W) injection syrg 12.5-25 g  25-50 mL IntraVENous PRN Shakir Christy NP        aspirin chewable tablet 81 mg  81 mg Oral DAILY Mini Tinajero NP   81 mg at 12/18/20 0847    ondansetron (ZOFRAN) injection 4 mg  4 mg IntraVENous Q6H PRN Frank Paniagua MD   4 mg at 12/18/20 0404       Review of Systems  ROS is:      Negative for: Ophthalmologic issues, ENT issues, Cardiovascular issues, respiratory issues, GI issues, neurologic issues, hematoogic issues, skin lesions, musculoskeletal issues, psychiatric issues  Exceptions: no    Positive for:    none              PHYSICAL EXAMINATION:   Visit Vitals  BP (!) 162/82 (BP 1 Location: Left arm)   Pulse 68   Temp 97.8 °F (36.6 °C)   Resp 16   Ht 6' (1.829 m)   Wt 96 kg (211 lb 10.3 oz)   SpO2 97%   BMI 28.70 kg/m²     Constitutional: Well developed, well nourished male. No acute distress. HEENT: Normocephalic, Atraumatic, EOM's intact   CV:  no edema  Respiratory: No respiratory distress or difficulties breathing   Abdomen:  soft and non tender    Male:  No CVA tenderness  LEYDA:Perineum normal to visual inspection, no erythema or irritation, Sphincter with good tone, Rectum with no hemorrhoids, fissures or masses, Prostate smooth, symmetric and anodular. Prostate is small. Prostate 30g  SCROTUM:  No scrotal rash or lesions noticed. Normal bilateral testes and epididymis. PENIS: Urethral meatus normal in location and size. No urethral discharge. Circumsized   Skin: No evidence of jaundice. Normal color  Neuro/Psych:  Alert and oriented. Affect appropriate. Lymphatic:   No enlarged inguinal lymph nodes. REVIEW OF LABS AND IMAGING:      CT A/P WO  IMPRESSION:     1. No evidence of nephrolithiasis or hydronephrosis. There is questionable  linear hyperdensity along the medial aspect of mid right ureter as well as along the left bladder base, probably representing milk of calcium.     2. Diverticulosis coli. KIDNEYS, URETERS AND BLADDER:      The right kidney shows 2.5 x 2.5 cm cortical cyst in the upper and mid  interpolar region. No renal calculi or hydronephrosis identified. There is mild  perinephric stranding and periureteral stranding. The ureter is mildly prominent at the proximal and very distal portion. There is subtle asymmetric linear hyperdensity at the medial aspect of the mid right ureter as nonspecific  finding, on coronal #36/601.     The left kidney shows no renal calculi or hydronephrosis identified. The left  ureter appears nondilated and no evidence of ureteral calculi.     The bladder is poorly distended. There is layering small hyperdensity in left  bladder base. No abnormality in left UVJ. Labs: Results:   Chemistry    Recent Labs     12/18/20  0548 12/17/20  0625 12/16/20  0657   * 118* 154*    142 141   K 3.6 4.0 3.8    110 110   CO2 26 25 24   BUN 21* 22* 23*   CREA 0.80 0.73 0.74   CA 8.2* 8.6 9.0   AGAP 5 7 7   BUCR 26* 30* 31*      CBC w/Diff Recent Labs     12/18/20  0548 12/16/20  1055   WBC 8.8  --    RBC 3.47*  --    HGB 10.6* 11.7*   HCT 30.7* 33.0*     --    GRANS 68  --    LYMPH 15*  --    EOS 3  --       Cultures No results for input(s): CULT in the last 72 hours.   All Micro Results     Procedure Component Value Units Date/Time    CULTURE, URINE [[id number]]     Order Status: Sent Specimen: Urine from Clean catch             Urinalysis Color   Date Value Ref Range Status   12/16/2020 BROWN   Final     Appearance   Date Value Ref Range Status   12/16/2020 TURBID   Final     Specific gravity   Date Value Ref Range Status   12/16/2020 1.025 1.005 - 1.030   Final     pH (UA)   Date Value Ref Range Status   12/16/2020 6.0 5.0 - 8.0   Final     Protein   Date Value Ref Range Status   12/16/2020 >300 (A) NEG mg/dL Final     Ketone   Date Value Ref Range Status   12/16/2020 15 (A) NEG mg/dL Final     Bilirubin   Date Value Ref Range Status   12/16/2020 LARGE (A) NEG   Final     Blood   Date Value Ref Range Status   12/16/2020 LARGE (A) NEG   Final     Urobilinogen   Date Value Ref Range Status   12/16/2020 4.0 (H) 0.2 - 1.0 EU/dL Final     Nitrites   Date Value Ref Range Status   12/16/2020 Negative NEG   Final     Leukocyte Esterase   Date Value Ref Range Status   12/16/2020 Negative NEG   Final     Potassium   Date Value Ref Range Status   12/18/2020 3.6 3.5 - 5.5 mmol/L Final     Creatinine   Date Value Ref Range Status   12/18/2020 0.80 0.6 - 1.3 MG/DL Final     BUN   Date Value Ref Range Status   12/18/2020 21 (H) 7.0 - 18 MG/DL Final     Prostate Specific Ag   Date Value Ref Range Status   04/27/2020 1.6 0.0 - 4.0 ng/mL Final      PSA No results for input(s): PSA in the last 72 hours.    Coagulation Lab Results   Component Value Date/Time    Prothrombin time 13.4 12/10/2020 04:25 PM    Prothrombin time 13.7 12/10/2020 03:00 AM    INR 1.0 12/10/2020 04:25 PM    INR 1.1 12/10/2020 03:00 AM    aPTT 25.5 12/12/2020 10:17 AM    aPTT 32.9 12/11/2020 04:04 AM

## 2020-12-19 ENCOUNTER — PATIENT OUTREACH (OUTPATIENT)
Dept: CASE MANAGEMENT | Age: 70
End: 2020-12-19

## 2020-12-19 LAB
BACTERIA SPEC CULT: NORMAL
SERVICE CMNT-IMP: NORMAL

## 2020-12-19 NOTE — PROGRESS NOTES
Patient was admitted to [facility] on 20 and discharged on 20  for Unstable Angina and Acute Hypoxic Respiratory Failure. Outreach made within 2 business days of discharge: Yes    Top Discharge Challenges to be reviewed by the provider   Additional needs identified to be addressed with provider none    Method of communication with provider : chart routing       Advance Care Planning:   Does patient have an Advance Directive:  none on file at this time. Inpatient Readmission Risk score: 4  Was this a readmission? no   Patient stated reason for the admission: n/a  Patients top risk factors for readmission: medical condition  Interventions to address risk factors: * Patient has already called cardiologist    Care Transition Nurse (CTN) contacted the patient by telephone to perform post hospital discharge assessment. Verified name and  with patient as identifiers. Provided introduction to self, and explanation of the CTN role. CTN reviewed discharge instructions, medical action plan and red flags with patient who verbalized understanding. Patient given an opportunity to ask questions and does not have any further questions or concerns at this time. The patient agrees to contact the PCP office for questions related to their healthcare. Patient states he is not feeling well. He said he has vomited several times since yesterday afternoon. He was able to eat a sandwich but states it took him several hours. He is sipping on water and trying to eat some toast at present. He has already called and spoke with cardiologist this am who recommended that he go to the ED. He wants to try and see if he can keep down the water and toast .  I recommended that he could try to see if what he is eating will stay down and give it a couple of hours and if no improvement or further vomiting that he go to ED.   Patient also states that his urine is dark again (apparently was like that while in the hospital). Told pt that could be an indication that he is dehydrated and he may need some fluids. Patient denies cp , sob or any other symptoms at this time. He states that the only new medications he was discharged on is the oxycodone and the antibiotic - both of which could be causing the nausea. Home Health/Outpatient orders at discharge: [address]: n/a  Date of initial visit: [facility] ordered at discharge: none  [address]:    [facility] received: n/a    Covid Risk Education    Patient has following risk factors of: acute respiratory failure and diabetes. Education provided regarding infection prevention, and signs and symptoms of COVID-19 and when to seek medical attention with patient who verbalized understanding. Briefly discussed that if patient does leave house, importance of wearing mask and social distancing. He is aware of need to wash hands frequently also. Discussed follow-up appointments. If no appointment was previously scheduled, appointment scheduling offered: see below - [facility] follow up appointment(s):   Future Appointments   Date Time Provider Stephanie Terry   12/23/2020  5:00 PM Ajay Sena MD [facility] BS AMB   1/7/2021  8:15 AM Trice Rodgers MD [facility] BS AMB     Non-[facility] follow up appointment(s): none  Plan for follow-up call in 1-2 days based on severity of symptoms and risk factors. Pt is aware and will go to ED this afternoon if symptoms don't improve , vomiting continues and urine remains dark.

## 2020-12-21 ENCOUNTER — PATIENT OUTREACH (OUTPATIENT)
Dept: CASE MANAGEMENT | Age: 70
End: 2020-12-21

## 2020-12-22 NOTE — PROGRESS NOTES
Follow-Up      Date/Time:  2020 10:19 AM    Patient was admitted to [facility] on 20 and discharged on 20  for Unstable Angina and Acute Hypoxic Respiratory Failure. Nurse Navigator (NN) contacted the patient by telephone in follow up. Verified name and  with patient as identifiers. Since talking with the patient on , patient states he is feeling some better. He continues with some easy fatiguability and some nausea and vomiting which he feels maybe related to the antibiotic he is taking. He is able to eat and drink most of the day and is keeping food and liquids down. On Saturday, he had stated this his urine was dark but states today that the color is better 0 lighter and less concentrated appearing. Patient has a virtual appt with Dr. Misbah Espinoza tomorrow and he will discuss with N/V with him at that time. Overall, though, pt does state that he is feeling better. PCP/Specialist follow up:   Future Appointments   Date Time Provider Stephanie Terry   2020  5:00 PM Rachid Paul MD [facility] AMB   2021  8:15 AM Claudia Casey MD [facility] BS AMB        Will continue to follow for now.     Ion Huerta RN

## 2020-12-23 ENCOUNTER — VIRTUAL VISIT (OUTPATIENT)
Dept: FAMILY MEDICINE CLINIC | Age: 70
End: 2020-12-23
Payer: MEDICARE

## 2020-12-23 DIAGNOSIS — Z09 HOSPITAL DISCHARGE FOLLOW-UP: ICD-10-CM

## 2020-12-23 DIAGNOSIS — I24.9 ACS (ACUTE CORONARY SYNDROME) (HCC): Primary | ICD-10-CM

## 2020-12-23 DIAGNOSIS — E11.9 CONTROLLED TYPE 2 DIABETES MELLITUS WITHOUT COMPLICATION, WITHOUT LONG-TERM CURRENT USE OF INSULIN (HCC): ICD-10-CM

## 2020-12-23 DIAGNOSIS — I25.110 CORONARY ARTERY DISEASE INVOLVING NATIVE CORONARY ARTERY OF NATIVE HEART WITH UNSTABLE ANGINA PECTORIS (HCC): ICD-10-CM

## 2020-12-23 DIAGNOSIS — N12 PYELONEPHRITIS: ICD-10-CM

## 2020-12-23 PROCEDURE — 99496 TRANSJ CARE MGMT HIGH F2F 7D: CPT | Performed by: FAMILY MEDICINE

## 2020-12-23 PROCEDURE — 1111F DSCHRG MED/CURRENT MED MERGE: CPT | Performed by: FAMILY MEDICINE

## 2020-12-23 PROCEDURE — G8427 DOCREV CUR MEDS BY ELIG CLIN: HCPCS | Performed by: FAMILY MEDICINE

## 2020-12-23 RX ORDER — CIPROFLOXACIN 500 MG/1
500 TABLET ORAL 2 TIMES DAILY
Qty: 14 TAB | Refills: 0 | Status: SHIPPED | OUTPATIENT
Start: 2020-12-23 | End: 2020-12-30

## 2020-12-23 NOTE — PROGRESS NOTES
1. Have you been to the ER, urgent care clinic since your last visit? Hospitalized since your last visit? Yes Where: [facility]    2. Have you seen or consulted any other health care providers outside of the [address] [place] [address] since your last visit? Include any pap smears or colon screening.  No

## 2020-12-24 ENCOUNTER — TELEPHONE (OUTPATIENT)
Dept: CARDIAC REHAB | Age: 70
End: 2020-12-24

## 2020-12-24 NOTE — PROGRESS NOTES
Virtual Video Transitional Care Management Progress Note    Patient: Qamar Cantrell  : 1950  PCP: Dayana Rivera MD    Date of office visit: 2020   Date of admission: 20  Date of discharge: 20  Hospital: [facility]    Call initiated w/i 2 business dates of discharge: Yes   Date of the most recent call to the patient: 2020 11:30 AM        Assessment/Plan:       ICD-10-CM ICD-9-CM    1. ACS (acute coronary syndrome) ([facility])  I24.9 411.1    2. Coronary artery disease involving native coronary artery of native heart with unstable angina pectoris ([facility])  I25.110 414.01      411.1    3. Pyelonephritis  N12 590.80    4. Hospital discharge follow-up  Z09 V67.59 [place] DISCHARGE MEDS RECONCILED W/ CURRENT OUTPATIENT MED LIST   5. Controlled type 2 diabetes mellitus without complication, without long-term current use of insulin ([facility]  E11.9 250.00      Hospital records reviewed - testing and discharge summary. Advised to cont per cardiology. Advised on change to ciprofloxacin from Augmentin due to GI side effects. Follow-up with [facility] Contact information provided. Since his DM is controlled now, follow-up with PCP in April for AWV and routine. Subjective:   Qamar Cantrell is a 79 y.o. male presenting today for follow-up after hospital discharge. This encounter and supporting documentation was reviewed if available. Medication reconciliation was performed today. The main problem requiring admission was ACS. Complications during admission: possible pyelonephritis on CT      Interval history/Current status: doing very well other than GI intolerability of Augmentin which was started for possible pyelonephritis. Admitting symptoms have: significantly improved      Medications marked \"taking\" at this time:  Home Medications    Medication Sig Start Date End Date Taking?  Authorizing Provider   ciprofloxacin HCl (CIPRO) 500 mg tablet Take 1 Tab by mouth two (2) times a day for 7 days. 12/23/20 12/30/20 Yes Shade Leroy MD   rosuvastatin (CRESTOR) 10 mg tablet Take 4 Tabs by mouth nightly. Indications: treatment to prevent a heart attack 12/18/20  Yes Alexandro Calabrese MD   ticagrelor (BRILINTA) 90 mg tablet Take 1 Tab by mouth two (2) times a day. Indications: treatment to prevent a heart attack 12/18/20  Yes Alexandro Calabrese MD   tamsulosin [facility]) 0.4 mg capsule Take 1 Cap by mouth daily. 12/19/20  Yes Alexandro Calabrese MD   oxyCODONE-acetaminophen (PERCOCET) 5-325 mg per tablet Take 1 Tab by mouth every eight (8) hours as needed for Pain for up to 5 days. Max Daily Amount: 3 Tabs. 12/18/20 12/23/20 Yes Alexandro Calabrese MD   carvediloL (COREG) 25 mg tablet TAKE 1 TABLET BY MOUTH TWICE A DAY WITH FOOD 10/30/20  Yes Mini Tinajero NP   Januvia 100 mg tablet TAKE 1 TABLET BY MOUTH EVERY DAY 10/2/20  Yes Neftali Diamond MD   glipiZIDE SR (GLUCOTROL XL) 5 mg CR tablet TAKE 1 TABLET BY MOUTH EVERY DAY 8/3/20  Yes Neftali Diamond MD   omeprazole (PRILOSEC) 20 mg capsule TAKE 1 CAPSULE BY MOUTH EVERY DAY 8/3/20  Yes Neftali Diamond MD   amLODIPine (NORVASC) 10 mg tablet Take 1 Tab by mouth daily. 7/20/20  Yes Mini Tinajero NP   metFORMIN (GLUCOPHAGE) 1,000 mg tablet TAKE 1 TABLET BY MOUTH TWICE A DAY WITH MEALS 7/16/20  Yes Neftali Diamond MD   Blood Pressure Monitor kit Check once a day 5/12/20  Yes Neftali Diamond MD   loratadine (Claritin) 10 mg tablet Take 10 mg by mouth daily as needed. Yes Provider, Ruddy   ramipril (ALTACE) 10 mg capsule Take 1 Cap by mouth daily. 2/27/20  Yes Coby Mantilla NP   aspirin delayed-release 325 mg tablet Take 1 Tab by mouth daily. 2/27/20  Yes Coby Mantilla NP   nitroglycerin (NITROSTAT) 0.4 mg SL tablet 1 Tab by SubLINGual route every five (5) minutes as needed for Chest Pain for up to 3 doses.  12/2/17  Yes Paula Guzman MD   amoxicillin-clavulanate (AUGMENTIN) 875-125 mg per tablet Take 1 Tab by mouth two (2) times daily (with meals) for 12 days. 12/18/20 12/30/20  Morro North MD        Review of Systems:  Negative except GI symptoms reported       Current Outpatient Medications   Medication Sig Dispense Refill    ciprofloxacin HCl (CIPRO) 500 mg tablet Take 1 Tab by mouth two (2) times a day for 7 days. 14 Tab 0    rosuvastatin (CRESTOR) 10 mg tablet Take 4 Tabs by mouth nightly. Indications: treatment to prevent a heart attack 90 Tab 3    ticagrelor (BRILINTA) 90 mg tablet Take 1 Tab by mouth two (2) times a day. Indications: treatment to prevent a heart attack 60 Tab 1    tamsulosin (FLOMAX) 0.4 mg capsule Take 1 Cap by mouth daily. 30 Cap 3    carvediloL (COREG) 25 mg tablet TAKE 1 TABLET BY MOUTH TWICE A DAY WITH FOOD 180 Tab 3    Januvia 100 mg tablet TAKE 1 TABLET BY MOUTH EVERY DAY 90 Tab 0    glipiZIDE SR (GLUCOTROL XL) 5 mg CR tablet TAKE 1 TABLET BY MOUTH EVERY DAY 90 Tab 1    omeprazole (PRILOSEC) 20 mg capsule TAKE 1 CAPSULE BY MOUTH EVERY DAY 90 Cap 1    amLODIPine (NORVASC) 10 mg tablet Take 1 Tab by mouth daily. 90 Tab 1    metFORMIN (GLUCOPHAGE) 1,000 mg tablet TAKE 1 TABLET BY MOUTH TWICE A DAY WITH MEALS 180 Tab 1    Blood Pressure Monitor kit Check once a day 1 Kit 0    loratadine (Claritin) 10 mg tablet Take 10 mg by mouth daily as needed.  ramipril (ALTACE) 10 mg capsule Take 1 Cap by mouth daily. 90 Cap 3    aspirin delayed-release 325 mg tablet Take 1 Tab by mouth daily. 90 Tab 3    nitroglycerin (NITROSTAT) 0.4 mg SL tablet 1 Tab by SubLINGual route every five (5) minutes as needed for Chest Pain for up to 3 doses. 25 Tab 0    amoxicillin-clavulanate (AUGMENTIN) 875-125 mg per tablet Take 1 Tab by mouth two (2) times daily (with meals) for 12 days.  24 Tab 0     No Known Allergies  Past Medical History:   Diagnosis Date    Arthritis     1999 vicodin    Diabetes ([facility] 1998 metformin    GERD (gastroesophageal reflux disease)     Hypercholesterolemia     Hypertension 1996 norvasc    Kidney stones     MI [facility])      Past Surgical History:   Procedure Laterality Date    CARDIAC SURG PROCEDURE UNLIST  12/2017    stints    COLONOSCOPY N/A 11/2/2018    COLONOSCOPY with Polypectomies performed by Amaya Beckham MD at [facility] H[facility] GI  2010    gallbladder     Family History   Problem Relation Age of Onset    Stroke Mother     Headache Mother     Hypertension Mother    Hansa Willson Mother     Heart Disease Father     Heart Attack Father     Hypertension Father     Diabetes Father     Lung Cancer Father     Ovarian Cancer Sister     Heart Attack Brother     Breast Cancer Sister     Diabetes Sister     Cancer Maternal Aunt         lung cancer    Cancer Maternal Uncle     Cancer Paternal Aunt     Cancer Paternal Uncle      Social History     Tobacco Use    Smoking status: Never Smoker    Smokeless tobacco: Never Used   Substance Use Topics    Alcohol use: No         Objective:   No flowsheet data found. General: alert, cooperative, no distress   Mental  status: normal mood, behavior, speech, dress, motor activity, and thought processes, able to follow commands   HENT: NCAT   Neck: no visualized mass   Resp: no respiratory distress   Neuro: no gross deficits   Skin: no discoloration or lesions of concern on visible areas   Psychiatric: normal affect, consistent with stated mood, no evidence of hallucinations     Additional exam findings: We discussed the expected course, resolution and complications of the diagnosis(es) in detail. Medication risks, benefits, costs, interactions, and alternatives were discussed as indicated. I advised him to contact the office if his condition worsens, changes or fails to improve as anticipated. He expressed understanding with the diagnosis(es) and plan.        Anna Kirby, who was evaluated through a synchronous (real-time) audio-video encounter and/or his healthcare decision maker, is aware that it is a billable service, with coverage as determined by his insurance carrier. He provided verbal consent to proceed: Yes, and patient identification was verified. It was conducted pursuant to the emergency declaration under the [address] [place] [address], [address] [place] [address] authority and the [employer] Act. A caregiver was present when appropriate. Ability to conduct physical exam was limited. I was in the office. The patient was at home.       Yesenia Singleton MD

## 2020-12-24 NOTE — TELEPHONE ENCOUNTER
[facility] called patient and left a message about the program. Additional attempts at contact will be made.     Thank you,  Wade Godinez

## 2020-12-28 ENCOUNTER — TELEPHONE (OUTPATIENT)
Dept: CARDIAC REHAB | Age: 70
End: 2020-12-28

## 2020-12-28 NOTE — TELEPHONE ENCOUNTER
[facility] received a call from patient stating that he is not interested in the program as he is doing well post procedure. He will call back if he changes his mind.     Thank you,  April Ramirez

## 2020-12-28 NOTE — TELEPHONE ENCOUNTER
[facility] called patient and left a message about the program on his home and cell numbers. Additional attempts at contact will be made.     Thank you,  Randell Abbasi

## 2021-01-01 DIAGNOSIS — E11.9 WELL CONTROLLED DIABETES MELLITUS (HCC): ICD-10-CM

## 2021-01-03 RX ORDER — SITAGLIPTIN 100 MG/1
TABLET, FILM COATED ORAL
Qty: 90 TAB | Refills: 0 | Status: SHIPPED | OUTPATIENT
Start: 2021-01-03 | End: 2021-03-30

## 2021-01-07 ENCOUNTER — OFFICE VISIT (OUTPATIENT)
Dept: CARDIOLOGY CLINIC | Age: 71
End: 2021-01-07
Payer: MEDICARE

## 2021-01-07 VITALS
OXYGEN SATURATION: 97 % | TEMPERATURE: 97.5 F | WEIGHT: 204.2 LBS | DIASTOLIC BLOOD PRESSURE: 81 MMHG | SYSTOLIC BLOOD PRESSURE: 155 MMHG | HEIGHT: 72 IN | HEART RATE: 83 BPM | BODY MASS INDEX: 27.66 KG/M2

## 2021-01-07 DIAGNOSIS — I10 ESSENTIAL HYPERTENSION: ICD-10-CM

## 2021-01-07 DIAGNOSIS — E78.2 MIXED HYPERLIPIDEMIA: ICD-10-CM

## 2021-01-07 DIAGNOSIS — I25.118 CORONARY ARTERY DISEASE OF NATIVE ARTERY OF NATIVE HEART WITH STABLE ANGINA PECTORIS (HCC): Primary | ICD-10-CM

## 2021-01-07 DIAGNOSIS — Z95.5 S/P CORONARY ARTERY STENT PLACEMENT: ICD-10-CM

## 2021-01-07 PROCEDURE — G8419 CALC BMI OUT NRM PARAM NOF/U: HCPCS | Performed by: INTERNAL MEDICINE

## 2021-01-07 PROCEDURE — G8432 DEP SCR NOT DOC, RNG: HCPCS | Performed by: INTERNAL MEDICINE

## 2021-01-07 PROCEDURE — G8753 SYS BP > OR = 140: HCPCS | Performed by: INTERNAL MEDICINE

## 2021-01-07 PROCEDURE — 1111F DSCHRG MED/CURRENT MED MERGE: CPT | Performed by: INTERNAL MEDICINE

## 2021-01-07 PROCEDURE — 1101F PT FALLS ASSESS-DOCD LE1/YR: CPT | Performed by: INTERNAL MEDICINE

## 2021-01-07 PROCEDURE — 3017F COLORECTAL CA SCREEN DOC REV: CPT | Performed by: INTERNAL MEDICINE

## 2021-01-07 PROCEDURE — G8754 DIAS BP LESS 90: HCPCS | Performed by: INTERNAL MEDICINE

## 2021-01-07 PROCEDURE — G8427 DOCREV CUR MEDS BY ELIG CLIN: HCPCS | Performed by: INTERNAL MEDICINE

## 2021-01-07 PROCEDURE — G8536 NO DOC ELDER MAL SCRN: HCPCS | Performed by: INTERNAL MEDICINE

## 2021-01-07 PROCEDURE — 99215 OFFICE O/P EST HI 40 MIN: CPT | Performed by: INTERNAL MEDICINE

## 2021-01-07 RX ORDER — ROSUVASTATIN CALCIUM 40 MG/1
40 TABLET, COATED ORAL
Qty: 90 TAB | Refills: 3 | Status: SHIPPED | OUTPATIENT
Start: 2021-01-07 | End: 2022-01-11

## 2021-01-07 RX ORDER — ASPIRIN 81 MG/1
81 TABLET ORAL DAILY
Qty: 100 TAB | Refills: 1 | Status: SHIPPED | OUTPATIENT
Start: 2021-01-07 | End: 2021-07-15

## 2021-01-07 RX ORDER — NITROGLYCERIN 0.4 MG/1
0.4 TABLET SUBLINGUAL
Qty: 25 TAB | Refills: 0 | Status: SHIPPED | OUTPATIENT
Start: 2021-01-07 | End: 2021-01-31

## 2021-01-07 RX ORDER — ONDANSETRON 4 MG/1
4 TABLET, ORALLY DISINTEGRATING ORAL
Qty: 30 TAB | Refills: 3 | Status: SHIPPED | OUTPATIENT
Start: 2021-01-07 | End: 2021-01-22

## 2021-01-07 RX ORDER — AMLODIPINE BESYLATE 10 MG/1
10 TABLET ORAL DAILY
Qty: 90 TAB | Refills: 1 | Status: SHIPPED | OUTPATIENT
Start: 2021-01-07 | End: 2021-06-23

## 2021-01-07 NOTE — PROGRESS NOTES
Requested Prescriptions     Pending Prescriptions Disp Refills    nitroglycerin (NITROSTAT) 0.4 mg SL tablet 25 Tab 0     Si Tab by SubLINGual route every five (5) minutes as needed for Chest Pain for up to 3 doses.

## 2021-01-07 NOTE — PROGRESS NOTES
HISTORY OF PRESENT ILLNESS  Brittney Mitchell is a 79 y.o. male. Patient with cad,htn,hyperlipidemia,dm. On follow up patient denies any chest pains,sob, palpitation or other significant symptoms. 12/2017  Admitted with unstable angina-had pci  7/2018. Patient was in emergency room with atypical chest pain. No acute EKG changes cardiac enzymes negative CTA and chest x-ray reports reviewed. Labs are negative for cardiac workup  5/2019. Recent ER visit with fall and concussion. Records reviewed. 6/2020  Patient seen for follow-up. He has rare occasion of chest pain he has used 1 nitroglycerin since last evaluation. No other complaints  1/2021  Patient is here for follow-up he was in hospital with  1. Unstable angina- s/p PCI of the LAD.  Status post mechanical ventilation. continue DAPT brilinta and asa.  Chest pain has resolved. 2. Acute respiratory failure- extubated   3. Hypotension-resolved.  Off pressors. 4. Hypertension: Blood pressure increasing gradually as expected.  Continue BID amlodipine. Elevated this afternoon - likely due to pain. 5. CAD-cardiac cath 12/2017 showed  Triple-vessel coronary artery disease - For CTS evaluation  6. Hyperlipidemia- LDL 74 11/20 Continue with Crestor  20 mg.  7. Diabetes- well controlled with A1c 5.9   8. LBBB- new since 12/20   9. Hemoptysis- traumatic ETT tube echange and pulmonary edema- better   10. Hematuria / flank pain - hx of renal stones - work up per primary team.  Initial plans were for CABG. Patient had recurrent unstable angina requiring emergency PCI of LAD. Patient is here for follow-up. He is feeling better  He still has occasional episode of nausea and vomiting that are sudden. He had one episode of chest pain that relieved with nitroglycerin. The progression of chest pain. He is here hemoptysis has resolved.   Hematuria is significantly improved he has urology follow-up pending this week      Hypertension  The history is provided by the patient. This is a chronic problem. The problem occurs constantly. The problem has not changed since onset. Pertinent negatives include no chest pain, no abdominal pain, no headaches and no shortness of breath. Chest Pain (Angina)   The history is provided by the patient. This is a recurrent problem. The problem has not changed since onset. The problem occurs rarely. The pain is associated with exertion. The pain is present in the substernal region. The quality of the pain is described as pressure-like. The pain does not radiate. Pertinent negatives include no abdominal pain, no claudication, no cough, no dizziness, no fever, no headaches, no hemoptysis, no malaise/fatigue, no nausea, no orthopnea, no palpitations, no PND, no shortness of breath, no sputum production, no vomiting and no weakness. Hospital Follow Up  Pertinent negatives include no chest pain, no abdominal pain, no headaches and no shortness of breath. Review of Systems   Constitutional: Negative for chills, fever and malaise/fatigue. HENT: Negative for nosebleeds. Eyes: Negative for blurred vision and double vision. Respiratory: Negative for cough, hemoptysis, sputum production, shortness of breath and wheezing. Cardiovascular: Negative for chest pain, palpitations, orthopnea, claudication, leg swelling and PND. Gastrointestinal: Negative for abdominal pain, heartburn, nausea and vomiting. Musculoskeletal: Negative for falls and myalgias. Skin: Negative for rash. Neurological: Negative for dizziness, weakness and headaches. Endo/Heme/Allergies: Does not bruise/bleed easily.      Family History   Problem Relation Age of Onset   Wally Loser Stroke Mother     Headache Mother     Hypertension Mother     Lung Cancer Mother     Heart Disease Father     Heart Attack Father     Hypertension Father     Diabetes Father     Lung Cancer Father     Ovarian Cancer Sister     Heart Attack Brother     Breast Cancer Sister     Diabetes Sister     Cancer Maternal Aunt         lung cancer    Cancer Maternal Uncle     Cancer Paternal Aunt     Cancer Paternal Uncle        Past Medical History:   Diagnosis Date    Arthritis     1999 vicodin    Diabetes ([facility] [address] 1998 metformin    GERD (gastroesophageal reflux disease)     Hypercholesterolemia     Hypertension 1996 norvasc    Kidney stones     MI (myocardial infarction) ([facility] [address]        Past Surgical History:   Procedure Laterality Date    COLONOSCOPY N/A 11/2/2018    COLONOSCOPY with Polypectomies performed by Kimmy Snell MD at [facility] END[facility]COPY    HX CHOLECYSTECTOMY      HX GI  2010    gallbladder    SD CARDIAC SURG PROCEDURE UNLIST  12/2017    stints       Social History     Tobacco Use    Smoking status: Never Smoker    Smokeless tobacco: Never Used   Substance Use Topics    Alcohol use: No       No Known Allergies        Visit Vitals  BP (!) 155/81 (BP 1 Location: Left arm, BP Patient Position: Sitting)   Pulse 83   Temp 97.5 °F (36.4 °C) (Temporal)   Ht 6' (1.829 m)   Wt 92.6 kg (204 lb 3.2 oz)   SpO2 97%   BMI 27.69 kg/m²        Physical Exam   Constitutional: He is oriented to person, place, and time. He appears well-developed and well-nourished. HENT:   Head: Normocephalic and atraumatic. Eyes: Conjunctivae are normal.   Neck: Neck supple. No JVD present. No tracheal deviation present. No thyromegaly present. Cardiovascular: Normal rate, regular rhythm and normal heart sounds. Exam reveals no gallop and no friction rub. No murmur heard. Pulmonary/Chest: Breath sounds normal. No respiratory distress. He has no wheezes. He has no rales. He exhibits no tenderness. Abdominal: Soft. There is no abdominal tenderness. Musculoskeletal:         General: No edema. Neurological: He is alert and oriented to person, place, and time. Skin: Skin is warm and dry. Psychiatric: He has a normal mood and affect.        Mr. Surendra Jalloh has a reminder for a \"due or due soon\" health maintenance. I have asked that he contact his primary care provider for follow-up on this health maintenance. CONCLUSION:1/2016-  ABNORMAL STRESS ECHO WITH EKG AND WALL MOTION ABNORMALITIES SUGGESTIVE OF LAD  DISTRIBUTION DISEASE    IMPRESSION:cath:1/2016    1. TWO VESSEL CORONARY ARTERY DISEASE IN THE FORM OF 80% OSTIAL AND 90% PROXIMAL NON-DOMINANT RIGHT CORONARY STENOSIS WHICH IS A MODERATE SIZED VESSEL OF ABOUT 1.5 TO 2 MM IN DIAMETER, AND 40% PROXIMAL CIRCUMFLEX, AND 70% MID CIRCUMFLEX STENOSIS WHICH IS A DOMINANT VESSEL. THERE ARE DIFFUSE LUMINAL IRREGULARITIES SEEN THROUGHOUT THE CORONARIES. 2. NO NORMAL OVERALL LV EJECTION FRACTION AT REST. 3. EVIDENCE OF CHEST PAIN DURING THE CASE AS WELL AS BEFORE THE CASE WAS STARTED. PARTIAL RELIEF WITH SUBLINGUAL NITROGLYCERIN AND NO ACUTE EKG CHANGES SEEN IN THE SIX MONITORING LEADS IN THE CATH LAB. DISCUSSION AND RECOMMENDATIONS: PATIENT IS LIKELY HAVING CHEST PAIN FROM THE NON-DOMINANT RIGHT CORONARY ARTERY. THE LEFT CIRCUMFLEX IS DOMINANT AND APPEARS TO HAVE BORDERLINE MID STENOSIS WHICH DOES NOT HAVE ANY APPEARANCE OF ANY ACUTE UNSTABLE LESION. I THINK HE SHOULD BE TREATED MEDICALLY FOR NOW, AND IF THE SYMPTOMS PERSIST, LEFT CIRCUMFLEX ARTERY WILL NEED TO BE INTERROGATED BY INTRACORONARY DOPPLER WITH FFR DETERMINATION. RIGHT CORONARY, THOUGH APPEARS TO BE CRITICAL IS A SMALL VESSEL OF ABOUT 1.5 TO A MAXIMUM OF 2 MM IN DIAMETER AND PROBABLY SHOULD BE LEFT TO MEDICAL TREATMENT. AGGRESSIVE RISK FACTOR MODIFICATION SHOULD BE FOLLOWED. SUMMARY:12/2017-echo  Left ventricle: Systolic function was normal. Ejection fraction was estimated   in the range of 55 % to 60 %. No obvious  wall motion abnormalities identified in the views obtained. Wall thickness   was mildly increased. Doppler parameters  were consistent with abnormal left ventricular relaxation (grade 1 diastolic   dysfunction). Right ventricle:  The size was at the upper limits of normal.    Left atrium: The atrium was mildly to moderately dilated. FINDING-12/2017-cath  1. Left main is patent, bifurcates into left anterior descending artery  and circumflex artery. 2. Left anterior descending artery has ostial 30% to 40% stenosis  followed by mid diffuse 30-40% stenosis. The vessel appears to be  moderately calcified. Diagonal 1 artery has diffuse 30-40% stenosis. Mid to distal left anterior descending artery is patent. 3. Circumflex artery is dominant with proximal 95% calcific stenosis. Status post PTCA using a Trek 1.5 x 8 mm balloon followed by a 2.5  mm x 12 mm balloon. The stent was a Synergy 2.75 mm x 15 mm  stent was deployed at about 14 atmospheres. Post-PCI PTCA was  performed using a noncompliant 3.0 mm x 8 mm balloon inflated about  16-18 atmospheres. Lesion reduced to 10%. 4. Obtuse marginal 1 artery was a small-caliber vessel with ostial 70%  to 80% stenosis. 5. Obtuse marginal 2 artery is a small- to medium-caliber vessel with  diffuse 30-40% stenosis. Mid circumflex artery has focal 80% stenosis. Status post PTCA using a Trek 2.5 mm x 12 mm balloon followed by a  Synergy 2.75 mm x 12 mm stent deployed about 14 atmospheres. Post-PCI PTCA was performed using a 3.0 mm x 8 mm  balloon deployed about 16 atmospheres. Lesion reduced to 0%. 6. Left posterior descending artery is patent. 7. Right coronary artery is nondominant, has ostial calcific 90%  followed by mid 99% stenosis. CONCLUSION:  Triple-vessel coronary artery disease. Status post  percutaneous transluminal coronary angioplasty/stent to proximal and  mid circumflex artery using drug-eluting stents. The patient will be on  aspirin and Brilinta at this time. Intense medical management and risk  factor modification is advised.    Conclusion 12/9/2020     · Three-vessel coronary artery disease with 90% mid LAD stenosis 80% distal circumflex stenosis and 100% mid RCA stenosis after diffuse disease in proximal and ostial RCA.  · Patent previously deployed drug-eluting stents in mid circumflex and distal circumflex areas.  · Borderline critical left main stenosis which is approximately 50% and has progressed from previous catheterization in 2017.  · Procedure done via right radial artery without any complications.     Given left main disease, CABG will be considered first.  We will take a surgical opinion with Dr. Lebron.  I discussed with him on phone and he/his team will be seeing the patient.        I have personally reviewed patient's records available from hospital and other providers and incorporated findings in patient care.  Cath films reviewed personally to evaluate-12/2020  Notes,lab,echo,cath  Coronary Findings 12/10/2020    Diagnostic  Dominance: Right  Left Main   Ost LM lesion, 20% stenosed.   Dist LM lesion, 40% stenosed.   Left Anterior Descending   Ost LAD to Prox LAD lesion, 50% stenosed.   Mid LAD lesion, 99% stenosed. The lesion is type C. The lesion is moderately calcified.   Left Circumflex   Prox Cx to Mid Cx lesion, 10% stenosed. The lesion was previously treated using a stent (unknown type).   Intervention 12/10/2020    Mid LAD lesion   Angioplasty   Angioplasty using a standard balloon was performed prior to stent deployment. The balloon used was a CATH BLLN RX MINI TREK 2X12MM -- . Balloon inflated using multiple inflations inflation technique. The second balloon used was a CATH BLLN RX TREK [id number] -- .   Stent   A single stent was placed. Drug-eluting stent was successfully placed. The stent used was a STENT SYS COR [id number] -- XIENCE JUAN DIEGO. Inflation 1 - Maximum pressure: 12 janice; Time: 10 sec. The strut is well apposed.   Angioplasty   Angioplasty using a standard balloon was performed following stent deployment. The balloon used was a CATH BLLN COR DIL 2.5X8MM -- NC TREK RAPID-EXCHANGE.   Post-Intervention Lesion Assessment   The intervention was successful. The guidewire crossed the lesion. Device  was deployed. The pre-interventional distal flow is decreased (MYKE 2). Post-intervention MYKE flow is 3. Lesion had 15 mm of its length treated. There were no complications. There is a 0% residual stenosis post intervention. Interpretation Summary 12/09/2020    · Contrast used: DEFINITY. · LV: Calculated LVEF is 55%. Visually measured ejection fraction. Normal cavity size and systolic function (ejection fraction normal). Mild concentric hypertrophy. Age-appropriate left ventricular diastolic function. · PA: Pulmonary arterial systolic pressure is 26 mmHg. · Aorta: Dilated aortic root; diameter is 3.7 cm. Assessment         ICD-10-CM ICD-9-CM    1. Coronary artery disease of native artery of native heart with stable angina pectoris ([facility] [address]  I25.118 414.01      413.9     Three-vessel disease. Initial plan for CABG was not done due to emergency need for PCI. Serious event noted in the hospital.  Angina since discharge    2. S/P coronary artery stent placement  Z95.5 V45.82     LAD stent 1/2020 on emergency basis   3. Essential hypertension  I10 401.9     Elevated in office monitor adjust medication as needed   4. Mixed hyperlipidemia  E78.2 272.2     Statin was increased to 40 mg refill   1/2018  Out of brillinta for 2 weeks-unable to afford  Changed to plavix-discussed compliance  5/2018  Stable mild cp  Out of norvasc 3 months  refilled  10/2018  Atypical chest pain. Clinically noncardiac. Will continue dual antiplatelet therapy. Norvasc increased for hypertension    6/2020  Cardiac status stable. Rare use of sublingual nitroglycerin stable pattern. Continue treatment. Check lipids  1/2021  Patient seen for follow-up after recent admission for unstable angina. Initial plan for CABG was reviewed. Patient became unstable suddenly and required LAD PCI for severe high-grade lesion that was new. CABG was not done.   Subsequently stabilized and was discharged home LVEF was normal.  Since discharge his hemoptysis and hematuria are improving. He has urology follow-up. Currently on Brilinta aspirin along with other medication I have reduce aspirin to 81 mg a day. Continue Brilinta. Prescriptions are sent. Blood pressure elevated in office today we will continue to monitor closely. I personally reviewed both his cardiac catheterization 9 right lesion appears chronic. Circumflex lesion will be tried medical management. He has some progression of left main disease to 45-50% which will be managed medically for now he will require close monitoring of symptoms. If he does stabilize on medical management will monitor clinically. If symptoms persist then further evaluation will be carried out. He has nausea at times and I have prescribed Zofran. We will monitor patient's status closely for any progression of symptomsleft main disease for symptom and likely do a stress testing in 6 to 12 months to assess need for further bypass surgery. Patient is advised to contact us if there is any recurrence of symptoms. Medications Discontinued During This Encounter   Medication Reason    nitroglycerin (NITROSTAT) 0.4 mg SL tablet REORDER    amLODIPine (NORVASC) 10 mg tablet REORDER    rosuvastatin (CRESTOR) 10 mg tablet     ticagrelor (BRILINTA) 90 mg tablet REORDER    aspirin delayed-release 325 mg tablet        Orders Placed This Encounter    nitroglycerin (NITROSTAT) 0.4 mg SL tablet     Si Tab by SubLINGual route every five (5) minutes as needed for Chest Pain for up to 3 doses. Dispense:  25 Tab     Refill:  0    ticagrelor (BRILINTA) 90 mg tablet     Sig: Take 1 Tab by mouth two (2) times a day. Indications: treatment to prevent a heart attack     Dispense:  180 Tab     Refill:  3    rosuvastatin (CRESTOR) 40 mg tablet     Sig: Take 1 Tab by mouth nightly.  Indications: treatment to prevent a heart attack     Dispense:  90 Tab     Refill:  3    amLODIPine (NORVASC) 10 mg tablet     Sig: Take 1 Tab by mouth daily. Indications: high blood pressure     Dispense:  90 Tab     Refill:  1    aspirin delayed-release 81 mg tablet     Sig: Take 1 Tab by mouth daily. Dispense:  100 Tab     Refill:  1    ondansetron (ZOFRAN ODT) 4 mg disintegrating tablet     Sig: Take 1 Tab by mouth every eight (8) hours as needed for Nausea or Vomiting. Dispense:  30 Tab     Refill:  3       Follow-up and Dispositions    · Return in about 3 months (around 4/7/2021).          Lenin Brown MD

## 2021-01-18 ENCOUNTER — PATIENT OUTREACH (OUTPATIENT)
Dept: CASE MANAGEMENT | Age: 71
End: 2021-01-18

## 2021-01-18 NOTE — PROGRESS NOTES
Follow-Up      Date/Time:  2021 12:18 PM    Patient was admitted to [facility] on 20 and discharged on 20  for Unstable Angina and Acute Hypoxic Respiratory Failure. ACM contacted the patient by telephone in follow up. Verified name and  with patient as identifiers. Since we last spoke, pt states he is doing very well. He is able to move about his house as he desires. He did a virtual visit with Dr. Jessica Espinosa and has an upcoming appt with Dr. Mayito Murdock this Friday. He has also been back to see his urologist.      We discussed  covid risk and information regarding infection prevention, and signs and symptoms of COVID-19 and when to seek medical attention with patient who verbalized understanding. Briefly discussed that if patient does leave house, importance of wearing mask and social distancing. He is aware of need to wash hands frequently also. Patient has graduated from the Transitions of Care Coordination  program on 21. Patient's symptoms are stable at this time. Patient/family has the ability to self-manage. Care management goals have been completed at this time. No further nurse navigator follow up scheduled. Pt has this ACM's contact information for any further questions, concerns, or needs.     Patients upcoming visits:      Future Appointments   Date Time Provider Stephanie Terry   2021  1:00 PM Sonal Wang MD [facility] BS AMB   3/11/2021  8:45 AM Karin Cockayne, [place] [facility] [place]   4/15/2021  8:30 AM Eliud Gifford MD [facility] BS AMB

## 2021-01-22 ENCOUNTER — VIRTUAL VISIT (OUTPATIENT)
Dept: FAMILY MEDICINE CLINIC | Age: 71
End: 2021-01-22
Payer: MEDICARE

## 2021-01-22 DIAGNOSIS — D50.8 OTHER IRON DEFICIENCY ANEMIA: ICD-10-CM

## 2021-01-22 DIAGNOSIS — E78.00 PURE HYPERCHOLESTEROLEMIA: ICD-10-CM

## 2021-01-22 DIAGNOSIS — I10 ESSENTIAL HYPERTENSION: ICD-10-CM

## 2021-01-22 DIAGNOSIS — E11.9 CONTROLLED TYPE 2 DIABETES MELLITUS WITHOUT COMPLICATION, WITHOUT LONG-TERM CURRENT USE OF INSULIN (HCC): Primary | ICD-10-CM

## 2021-01-22 DIAGNOSIS — E11.8 TYPE 2 DIABETES MELLITUS WITH UNSPECIFIED COMPLICATIONS (HCC): ICD-10-CM

## 2021-01-22 DIAGNOSIS — R31.9 HEMATURIA, UNSPECIFIED TYPE: ICD-10-CM

## 2021-01-22 DIAGNOSIS — I25.10 CORONARY ARTERY DISEASE, ANGINA PRESENCE UNSPECIFIED, UNSPECIFIED VESSEL OR LESION TYPE, UNSPECIFIED WHETHER NATIVE OR TRANSPLANTED HEART: ICD-10-CM

## 2021-01-22 PROCEDURE — 3046F HEMOGLOBIN A1C LEVEL >9.0%: CPT | Performed by: FAMILY MEDICINE

## 2021-01-22 PROCEDURE — 1101F PT FALLS ASSESS-DOCD LE1/YR: CPT | Performed by: FAMILY MEDICINE

## 2021-01-22 PROCEDURE — G8427 DOCREV CUR MEDS BY ELIG CLIN: HCPCS | Performed by: FAMILY MEDICINE

## 2021-01-22 PROCEDURE — 3017F COLORECTAL CA SCREEN DOC REV: CPT | Performed by: FAMILY MEDICINE

## 2021-01-22 PROCEDURE — G8432 DEP SCR NOT DOC, RNG: HCPCS | Performed by: FAMILY MEDICINE

## 2021-01-22 PROCEDURE — G0463 HOSPITAL OUTPT CLINIC VISIT: HCPCS | Performed by: FAMILY MEDICINE

## 2021-01-22 PROCEDURE — G8756 NO BP MEASURE DOC: HCPCS | Performed by: FAMILY MEDICINE

## 2021-01-22 PROCEDURE — 2022F DILAT RTA XM EVC RTNOPTHY: CPT | Performed by: FAMILY MEDICINE

## 2021-01-22 PROCEDURE — 99214 OFFICE O/P EST MOD 30 MIN: CPT | Performed by: FAMILY MEDICINE

## 2021-01-22 NOTE — PROGRESS NOTES
Ted Branch is a 79 y.o. male who was seen by synchronous (real-time) audio-video technology on 1/22/2021 for hospital follow up . Assessment & Plan:   Diagnoses and all orders for this visit:    Controlled type 2 diabetes mellitus without complication, without long-term current use of insulin ([facility] [address]    Pure hypercholesterolemia: On statin    Essential hypertension: Elevated during recent cardiology visit. Adjusted medication. He is taking it with compliance. Going to get the blood pressure machine today and will keep the log. I have advised him to send the log through my chart and he will do that. Meantime low-salt diet. Any anginal symptom gets worse advised to contact the cardiology office or emergency room evaluation. Comments:  seen by cardiology. recenlty increased amlodipine, coreg  and given brilinta     Coronary artery disease, angina presence unspecified, unspecified vessel or lesion type, unspecified whether native or transplanted heart  Comments:  tripple vessel disease. originally plan for CABG but need for emergency PCI. cardiogenic shock during procedure. Hematuria, unspecified type: resolved at this time. Seen urology. Will follow their recommendations. Low wbc, anemia. Discussed multivitamin, will repeat labs. Sending to hematology as well. Comments:  seen urology. was told no blood in urine but now scheduled for urogram.     Type 2 diabetes mellitus with unspecified complications ([address]): last A1C fairly controlled. Checked during hospitalization. Low carb diet. On statin and anticoagulation. Other iron deficiency anemia: will recheck labs. See above. -     REFERRAL TO HEMATOLOGY ONCOLOGY  -     IRON PROFILE; Future  -     CBC W/O DIFF; Future    Pt understood and agree with the plan   Review hM   Reminded to get yearly eye exam done. He has scheduled an appt. 712  Subjective:   Done visit with doxy. Recent hospitalization. Chest pain.  Diagnosed with triple vessel disease. Needed emergency PCI. Initial plan was CABG. During PCI had to resuscitate patient. Currently review hospitalization record and discharge summary. He has seen cardiology post discharge. Adjusted blood pressure medication due to elevated blood pressure and he is on anticoagulation. On statin. Currently having on and off chest pain but given nitro to use as needed. His diabetes is well controlled and A1c fairly controlled. No hypo or hyperglycemic symptoms. He was sitting comfortable and did not appear in any acute distress. He had a nausea on and off but currently fairly stable. Also noted that he had a blood in the urine. CT abdomen and pelvis showed no acute findings. Currently it resolved. Has seen urology post discharge. Now scheduled for urogram and further evaluation. Will be following the recommendation. During the virtual visit he was sitting comfortable and did not appear in any acute distress  No headache or dizziness. No nausea or vomiting. No abdominal pain. No shortness of breath. No palpitation or diaphoresis. No appetite or weight changes. No bowel complaints. No blood in the urine or any dysuria or trouble urination. Noted elevated blood pressure during the cardiology visit. Again noted adjustment in the medication. He has been taking anticoagulation with compliance  Reviewed labs  XR Results (most recent):  Results from Hospital Encounter encounter on 12/08/20   XR CHEST SNGL V    Narrative EXAM:  Chest Portable. INDICATION:  Respiratory failure. COMPARISON:  12/11/20    TECHNIQUE:  Portable AP chest study    FINDINGS:      - ET tube placement, distal tip 4.2 cm above the paxton.  - NG/OG tube placement, distal portion coursing below the inferior margin of the  field of view. - Streaky densities in the right lower lung zone. Subtle hazy opacities in the  left lower lung zone with superimposed bandlike density.   Partial obscuration of  the left hemidiaphragmatic outline. No significant overall interval change in  both lungs compared to 12/11/20.  - No pneumothorax. Impression IMPRESSION:    1.  Stable life-support devices. No pneumothorax. 2.  Bilateral lower lung zone opacities. Suggestive of atelectatic changes vs.  infiltrate. Small pleural effusion may be present as well. CT Results (most recent):  Results from Hospital Encounter encounter on 12/08/20   CT ABD PELV WO CONT    Addendum Addendum:   As mentioned in the initial report, patchy airspace  opacities/consolidations at the posterior right lower lobe and smaller area in left lower lobe. Recommend clinical correlation and follow-up chest CT for further evaluation. Sarah Melendez MD 12/17/2020  2:12 PM          Narrative CT abdomen and pelvis for stone study     HISTORY: Hematuria, flank pain    COMPARISON: CT 5/3/08    TECHNIQUE: Helical scan to the abdomen and pelvis is obtained without oral or IV  contrast administration per stone protocol. All CT scans at this facility performed using dose optimization techniques as  appreciated to a performed exam, to include automated exposure control,  adjustment of the mA and or KU according to patient size (including appropriate  matching for site specific examination), or use of iterative reconstruction  technique. FINDINGS: Imaging portion to the lung bases show several lobulated  consolidations with mild surrounding infiltration at the posterior right lower  lobe. Smaller opacity also seen at anterior left lower lobe near the diaphragm. KIDNEYS, URETERS AND BLADDER:     The right kidney shows 2.5 x 2.5 cm cortical cyst in the upper and mid  interpolar region. No renal calculi or hydronephrosis identified. There is mild  perinephric stranding and periureteral stranding. The ureter is mildly prominent  at the proximal and very distal portion.  There is subtle asymmetric linear  hyperdensity at the medial aspect of the mid right ureter as nonspecific  finding, on coronal #36/601. The left kidney shows no renal calculi or hydronephrosis identified. The left  ureter appears nondilated and no evidence of ureteral calculi. The bladder is poorly distended. There is layering small hyperdensity in left  bladder base. No abnormality in left UVJ. CT ABDOMEN AND PELVIS:     The gallbladder is a surgically absent. The moderate hepatic steatosis on prior  CT is no longer evident. The spleen, pancreas, and both adrenal glands appear  grossly normal on this noncontrast study. The small and large bowel appear  nondilated. Numerous diverticula in the sigmoid colon. No retroperitoneal  adenopathy identified. Abdominal aorta appears unremarkable for age. The prostate  is mildly prominent No pelvic free fluid identified. .     CT OSSEOUS STRUCTURES:     Spondylosis. Impression IMPRESSION:    1. No evidence of nephrolithiasis or hydronephrosis. There is questionable  linear hyperdensity along the medial aspect of mid right ureter as well as along  the left bladder base, probably representing milk of calcium. 2. Diverticulosis coli. Thank you for this referral.     12/08/20   ECHO ADULT COMPLETE 12/09/2020 12/9/2020    Narrative · Contrast used: DEFINITY. · LV: Calculated LVEF is 55%. Visually measured ejection fraction. Normal   cavity size and systolic function (ejection fraction normal). Mild   concentric hypertrophy. Age-appropriate left ventricular diastolic   function. · PA: Pulmonary arterial systolic pressure is 26 mmHg. · Aorta: Dilated aortic root; diameter is 3.7 cm.         Signed by: Nelida Gooden MD       Lab Results   Component Value Date/Time    WBC 8.8 12/18/2020 05:48 AM    Hemoglobin, POC 9.5 (L) 12/12/2020 03:54 AM    HGB 10.6 (L) 12/18/2020 05:48 AM    Hematocrit, POC 28 (L) 12/12/2020 03:54 AM    HCT 30.7 (L) 12/18/2020 05:48 AM    PLATELET 631 84/81/0015 05:48 AM    MCV 88.5 12/18/2020 05:48 AM     Lab Results Component Value Date/Time    Sodium 141 12/18/2020 05:48 AM    Potassium 3.6 12/18/2020 05:48 AM    Chloride 110 12/18/2020 05:48 AM    CO2 26 12/18/2020 05:48 AM    Anion gap 5 12/18/2020 05:48 AM    Glucose 135 (H) 12/18/2020 05:48 AM    BUN 21 (H) 12/18/2020 05:48 AM    Creatinine 0.80 12/18/2020 05:48 AM    BUN/Creatinine ratio 26 (H) 12/18/2020 05:48 AM    GFR est AA >60 12/18/2020 05:48 AM    GFR est non-AA >60 12/18/2020 05:48 AM    Calcium 8.2 (L) 12/18/2020 05:48 AM    Bilirubin, total 0.4 12/13/2020 04:14 AM    Alk. phosphatase 56 12/13/2020 04:14 AM    Protein, total 6.0 (L) 12/13/2020 04:14 AM    Albumin 2.6 (L) 12/13/2020 04:14 AM    Globulin 3.4 12/13/2020 04:14 AM    A-G Ratio 0.8 12/13/2020 04:14 AM    ALT (SGPT) 21 12/13/2020 04:14 AM    AST (SGOT) 28 12/13/2020 04:14 AM     Lab Results   Component Value Date/Time    Cholesterol, total 114 12/12/2020 04:00 AM    HDL Cholesterol 28 (L) 12/12/2020 04:00 AM    LDL, calculated 38 12/12/2020 04:00 AM    VLDL, calculated 48 12/12/2020 04:00 AM    Triglyceride 240 (H) 12/12/2020 04:00 AM    CHOL/HDL Ratio 4.1 12/12/2020 04:00 AM     Lab Results   Component Value Date/Time    TSH 1.33 11/23/2020 07:34 AM     Lab Results   Component Value Date/Time    Hemoglobin A1c 5.9 (H) 12/10/2020 03:00 AM     Lab Results   Component Value Date/Time    TSH 1.33 11/23/2020 07:34 AM         Lab Results   Component Value Date/Time    Microalbumin/Creat ratio (mg/g creat) 5 11/23/2020 07:35 AM    Microalbumin,urine random 1.20 11/23/2020 07:35 AM       Prior to Admission medications    Medication Sig Start Date End Date Taking? Authorizing Provider   glipiZIDE SR (GLUCOTROL XL) 5 mg CR tablet TAKE 1 TABLET BY MOUTH EVERY DAY 1/20/21  Yes Susie Whitman MD   metFORMIN (GLUCOPHAGE) 1,000 mg tablet TAKE 1 TABLET BY MOUTH TWICE A DAY WITH MEALS 1/13/21  Yes Susie Whitman MD   tamsulosin (FLOMAX) 0.4 mg capsule Take 1 Cap by mouth daily.  1/12/21  Yes Gamakathy Horan PA ticagrelor (BRILINTA) 90 mg tablet Take 1 Tab by mouth two (2) times a day. Indications: treatment to prevent a heart attack 1/7/21  Yes Xochitl Wallace MD   rosuvastatin (CRESTOR) 40 mg tablet Take 1 Tab by mouth nightly. Indications: treatment to prevent a heart attack 1/7/21  Yes Xochitl Wallace MD   amLODIPine (NORVASC) 10 mg tablet Take 1 Tab by mouth daily. Indications: high blood pressure 1/7/21  Yes Xochitl Wallace MD   aspirin delayed-release 81 mg tablet Take 1 Tab by mouth daily. 1/7/21  Yes Xochitl Wallace MD   Januvia 100 mg tablet TAKE 1 TABLET BY MOUTH EVERY DAY 1/3/21  Yes Melissa Hogan MD   carvediloL (COREG) 25 mg tablet TAKE 1 TABLET BY MOUTH TWICE A DAY WITH FOOD 10/30/20  Yes Mini Tinajero NP   omeprazole (PRILOSEC) 20 mg capsule TAKE 1 CAPSULE BY MOUTH EVERY DAY 8/3/20  Yes Melissa Hogan MD   loratadine (Claritin) 10 mg tablet Take 10 mg by mouth daily as needed. Yes Provider, Historical   ramipril (ALTACE) 10 mg capsule Take 1 Cap by mouth daily. 2/27/20  Yes Coby Mantilla NP   promethazine (PHENERGAN) 12.5 mg tablet Take 1 Tab by mouth every six (6) hours as needed for Nausea. 1/12/21   LILY Pablo   nitroglycerin (NITROSTAT) 0.4 mg SL tablet 1 Tab by SubLINGual route every five (5) minutes as needed for Chest Pain for up to 3 doses. 1/7/21   Xochitl Wallace MD   ondansetron (ZOFRAN ODT) 4 mg disintegrating tablet Take 1 Tab by mouth every eight (8) hours as needed for Nausea or Vomiting.  1/7/21 1/22/21  Xochitl Wallace MD   Blood Pressure Monitor kit Check once a day 5/12/20   Melissa Hogan MD     Patient Active Problem List    Diagnosis Date Noted    CAD (coronary artery disease) 12/01/2017     Priority: 1 - One    ACS (acute coronary syndrome) ([facility] [address] 12/08/2020    Tubular adenoma of colon 09/26/2019    Cannabis use, uncomplicated 78/65/2508    Cervical disc disease 09/26/2019    GERD (gastroesophageal reflux disease) 09/26/2019    Controlled type 2 diabetes mellitus without complication, without long-term current use of insulin ([facility] 11/29/2018    Insomnia 11/29/2018    S/P coronary artery stent placement 01/25/2018    Coronary artery disease involving native coronary artery with unstable angina pectoris ([facility] 01/27/2016    Essential hypertension 01/27/2016    Hyperlipidemia 01/27/2016    Thyroid goiter 01/27/2016    Unstable angina pectoris (HCC) 01/17/2016    Chronic pain syndrome 11/17/2014     Current Outpatient Medications   Medication Sig Dispense Refill    glipiZIDE SR (GLUCOTROL XL) 5 mg CR tablet TAKE 1 TABLET BY MOUTH EVERY DAY 90 Tab 0    metFORMIN (GLUCOPHAGE) 1,000 mg tablet TAKE 1 TABLET BY MOUTH TWICE A DAY WITH MEALS 180 Tab 0    tamsulosin (FLOMAX) 0.4 mg capsule Take 1 Cap by mouth daily. 90 Cap 3    ticagrelor (BRILINTA) 90 mg tablet Take 1 Tab by mouth two (2) times a day. Indications: treatment to prevent a heart attack 180 Tab 3    rosuvastatin (CRESTOR) 40 mg tablet Take 1 Tab by mouth nightly. Indications: treatment to prevent a heart attack 90 Tab 3    amLODIPine (NORVASC) 10 mg tablet Take 1 Tab by mouth daily. Indications: high blood pressure 90 Tab 1    aspirin delayed-release 81 mg tablet Take 1 Tab by mouth daily. 100 Tab 1    Januvia 100 mg tablet TAKE 1 TABLET BY MOUTH EVERY DAY 90 Tab 0    carvediloL (COREG) 25 mg tablet TAKE 1 TABLET BY MOUTH TWICE A DAY WITH FOOD 180 Tab 3    omeprazole (PRILOSEC) 20 mg capsule TAKE 1 CAPSULE BY MOUTH EVERY DAY 90 Cap 1    loratadine (Claritin) 10 mg tablet Take 10 mg by mouth daily as needed.  ramipril (ALTACE) 10 mg capsule Take 1 Cap by mouth daily. 90 Cap 3    promethazine (PHENERGAN) 12.5 mg tablet Take 1 Tab by mouth every six (6) hours as needed for Nausea. 30 Tab 1    nitroglycerin (NITROSTAT) 0.4 mg SL tablet 1 Tab by SubLINGual route every five (5) minutes as needed for Chest Pain for up to 3 doses.  25 Tab 0    Blood Pressure Monitor kit Check once a day 1 Kit 0     No Known Allergies  Past Medical History:   Diagnosis Date    Arthritis     1999 vicodin    Diabetes ([facility] [address] 1998 metformin    GERD (gastroesophageal reflux disease)     Hypercholesterolemia     Hypertension 1996 norvasc    Kidney stones     MI (myocardial infarction) ([facility] [address]      Social History     Tobacco Use    Smoking status: Never Smoker    Smokeless tobacco: Never Used   Substance Use Topics    Alcohol use: No       ROS: See HPI    Objective:   No flowsheet data found. General: alert, cooperative, no distress   Mental  status: normal mood, behavior, speech, dress, motor activity, and thought processes, able to follow commands   HENT: NCAT   Neck: no visualized mass   Resp: no respiratory distress   Neuro: no gross deficits   Skin: no discoloration or lesions of concern on visible areas   Psychiatric: normal affect, consistent with stated mood, no evidence of hallucinations     Additional exam findings: We discussed the expected course, resolution and complications of the diagnosis(es) in detail. Medication risks, benefits, costs, interactions, and alternatives were discussed as indicated. I advised him to contact the office if his condition worsens, changes or fails to improve as anticipated. He expressed understanding with the diagnosis(es) and plan. Yuliya Mott, who was evaluated through a patient-initiated, synchronous (real-time) audio-video encounter, and/or his healthcare decision maker, is aware that it is a billable service, with coverage as determined by his insurance carrier. He provided verbal consent to proceed: Yes, and patient identification was verified. It was conducted pursuant to the emergency declaration under the [address] [place] [address], [address] [place] [address] authority and the [employer] and [employer] General Act. A caregiver was present when appropriate. Ability to conduct physical exam was limited.  I was in the office. The patient was at home.       Madan Cazares MD

## 2021-01-26 DIAGNOSIS — K21.9 GASTROESOPHAGEAL REFLUX DISEASE WITHOUT ESOPHAGITIS: ICD-10-CM

## 2021-01-26 RX ORDER — OMEPRAZOLE 20 MG/1
CAPSULE, DELAYED RELEASE ORAL
Qty: 90 CAP | Refills: 1 | Status: SHIPPED | OUTPATIENT
Start: 2021-01-26 | End: 2021-07-16

## 2021-01-31 RX ORDER — NITROGLYCERIN 0.4 MG/1
TABLET SUBLINGUAL
Qty: 25 TAB | Refills: 0 | Status: SHIPPED | OUTPATIENT
Start: 2021-01-31

## 2021-02-19 RX ORDER — RAMIPRIL 10 MG/1
CAPSULE ORAL
Qty: 90 CAP | Refills: 3 | Status: SHIPPED | OUTPATIENT
Start: 2021-02-19 | End: 2022-04-11

## 2021-02-19 RX ORDER — ASPIRIN 325 MG
TABLET, DELAYED RELEASE (ENTERIC COATED) ORAL
Qty: 90 TAB | Refills: 3 | Status: SHIPPED | OUTPATIENT
Start: 2021-02-19 | End: 2021-03-12

## 2021-03-03 ENCOUNTER — HOSPITAL ENCOUNTER (OUTPATIENT)
Dept: CT IMAGING | Age: 71
Discharge: HOME | End: 2021-03-03
Attending: PHYSICIAN ASSISTANT
Payer: MEDICARE

## 2021-03-03 DIAGNOSIS — R31.0 GROSS HEMATURIA: ICD-10-CM

## 2021-03-03 LAB — CREAT UR-MCNC: 0.8 MG/DL (ref 0.6–1.3)

## 2021-03-03 PROCEDURE — 82565 ASSAY OF CREATININE: CPT

## 2021-03-03 PROCEDURE — 74178 CT ABD&PLV WO CNTR FLWD CNTR: CPT

## 2021-03-03 PROCEDURE — 74011000636 HC RX REV CODE- 636: Performed by: PHYSICIAN ASSISTANT

## 2021-03-03 RX ADMIN — IOPAMIDOL 90 ML: 755 INJECTION, SOLUTION INTRAVENOUS at 08:34

## 2021-03-12 ENCOUNTER — APPOINTMENT (OUTPATIENT)
Dept: GENERAL RADIOLOGY | Age: 71
End: 2021-03-12
Attending: EMERGENCY MEDICINE
Payer: MEDICARE

## 2021-03-12 ENCOUNTER — HOSPITAL ENCOUNTER (EMERGENCY)
Age: 71
Discharge: HOME | End: 2021-03-12
Attending: EMERGENCY MEDICINE
Payer: MEDICARE

## 2021-03-12 VITALS
SYSTOLIC BLOOD PRESSURE: 128 MMHG | BODY MASS INDEX: 27.09 KG/M2 | WEIGHT: 200 LBS | HEIGHT: 72 IN | HEART RATE: 69 BPM | OXYGEN SATURATION: 100 % | RESPIRATION RATE: 15 BRPM | DIASTOLIC BLOOD PRESSURE: 64 MMHG | TEMPERATURE: 97.6 F

## 2021-03-12 DIAGNOSIS — R07.9 CHEST PAIN, UNSPECIFIED TYPE: Primary | ICD-10-CM

## 2021-03-12 LAB
ALBUMIN SERPL-MCNC: 3.7 G/DL (ref 3.4–5)
ALBUMIN/GLOB SERPL: 0.9 {RATIO} (ref 0.8–1.7)
ALP SERPL-CCNC: 83 U/L (ref 45–117)
ALT SERPL-CCNC: 21 U/L (ref 16–61)
ANION GAP SERPL CALC-SCNC: 7 MMOL/L (ref 3–18)
AST SERPL-CCNC: 15 U/L (ref 10–38)
ATRIAL RATE: 72 BPM
BASOPHILS # BLD: 0 K/UL (ref 0–0.1)
BASOPHILS NFR BLD: 0 % (ref 0–2)
BILIRUB SERPL-MCNC: 0.3 MG/DL (ref 0.2–1)
BUN SERPL-MCNC: 14 MG/DL (ref 7–18)
BUN/CREAT SERPL: 14 (ref 12–20)
CALCIUM SERPL-MCNC: 9.2 MG/DL (ref 8.5–10.1)
CALCULATED P AXIS, ECG09: 40 DEGREES
CALCULATED R AXIS, ECG10: 66 DEGREES
CALCULATED T AXIS, ECG11: 114 DEGREES
CHLORIDE SERPL-SCNC: 107 MMOL/L (ref 100–111)
CK MB CFR SERPL CALC: 1.8 % (ref 0–4)
CK MB CFR SERPL CALC: NORMAL % (ref 0–4)
CK MB SERPL-MCNC: 1 NG/ML (ref 5–25)
CK MB SERPL-MCNC: <1 NG/ML (ref 5–25)
CK SERPL-CCNC: 48 U/L (ref 39–308)
CK SERPL-CCNC: 55 U/L (ref 39–308)
CO2 SERPL-SCNC: 25 MMOL/L (ref 21–32)
CREAT SERPL-MCNC: 1.01 MG/DL (ref 0.6–1.3)
DIAGNOSIS, 93000: NORMAL
DIFFERENTIAL METHOD BLD: ABNORMAL
EOSINOPHIL # BLD: 0.1 K/UL (ref 0–0.4)
EOSINOPHIL NFR BLD: 1 % (ref 0–5)
ERYTHROCYTE [DISTWIDTH] IN BLOOD BY AUTOMATED COUNT: 14.3 % (ref 11.6–14.5)
GLOBULIN SER CALC-MCNC: 4.2 G/DL (ref 2–4)
GLUCOSE SERPL-MCNC: 92 MG/DL (ref 74–99)
HCT VFR BLD AUTO: 39.1 % (ref 36–48)
HGB BLD-MCNC: 13.4 G/DL (ref 13–16)
LYMPHOCYTES # BLD: 1.9 K/UL (ref 0.9–3.6)
LYMPHOCYTES NFR BLD: 25 % (ref 21–52)
MCH RBC QN AUTO: 30.6 PG (ref 24–34)
MCHC RBC AUTO-ENTMCNC: 34.3 G/DL (ref 31–37)
MCV RBC AUTO: 89.3 FL (ref 74–97)
MONOCYTES # BLD: 0.9 K/UL (ref 0.05–1.2)
MONOCYTES NFR BLD: 12 % (ref 3–10)
NEUTS SEG # BLD: 4.7 K/UL (ref 1.8–8)
NEUTS SEG NFR BLD: 62 % (ref 40–73)
P-R INTERVAL, ECG05: 178 MS
PLATELET # BLD AUTO: 198 K/UL (ref 135–420)
PMV BLD AUTO: 9.6 FL (ref 9.2–11.8)
POTASSIUM SERPL-SCNC: 4.1 MMOL/L (ref 3.5–5.5)
PROT SERPL-MCNC: 7.9 G/DL (ref 6.4–8.2)
Q-T INTERVAL, ECG07: 416 MS
QRS DURATION, ECG06: 132 MS
QTC CALCULATION (BEZET), ECG08: 455 MS
RBC # BLD AUTO: 4.38 M/UL (ref 4.7–5.5)
SODIUM SERPL-SCNC: 139 MMOL/L (ref 136–145)
TROPONIN I SERPL-MCNC: <0.02 NG/ML (ref 0–0.04)
TROPONIN I SERPL-MCNC: <0.02 NG/ML (ref 0–0.04)
VENTRICULAR RATE, ECG03: 72 BPM
WBC # BLD AUTO: 7.6 K/UL (ref 4.6–13.2)

## 2021-03-12 PROCEDURE — 80053 COMPREHEN METABOLIC PANEL: CPT

## 2021-03-12 PROCEDURE — 85025 COMPLETE CBC W/AUTO DIFF WBC: CPT

## 2021-03-12 PROCEDURE — 96375 TX/PRO/DX INJ NEW DRUG ADDON: CPT

## 2021-03-12 PROCEDURE — 82553 CREATINE MB FRACTION: CPT

## 2021-03-12 PROCEDURE — 93005 ELECTROCARDIOGRAM TRACING: CPT

## 2021-03-12 PROCEDURE — 74011250636 HC RX REV CODE- 250/636: Performed by: EMERGENCY MEDICINE

## 2021-03-12 PROCEDURE — 96374 THER/PROPH/DIAG INJ IV PUSH: CPT

## 2021-03-12 PROCEDURE — 99285 EMERGENCY DEPT VISIT HI MDM: CPT

## 2021-03-12 PROCEDURE — 71045 X-RAY EXAM CHEST 1 VIEW: CPT

## 2021-03-12 PROCEDURE — 74011250637 HC RX REV CODE- 250/637: Performed by: EMERGENCY MEDICINE

## 2021-03-12 RX ORDER — MORPHINE SULFATE 2 MG/ML
2 INJECTION, SOLUTION INTRAMUSCULAR; INTRAVENOUS
Status: COMPLETED | OUTPATIENT
Start: 2021-03-12 | End: 2021-03-12

## 2021-03-12 RX ORDER — CETIRIZINE HCL 10 MG
10 TABLET ORAL DAILY
COMMUNITY

## 2021-03-12 RX ORDER — ONDANSETRON 2 MG/ML
4 INJECTION INTRAMUSCULAR; INTRAVENOUS
Status: COMPLETED | OUTPATIENT
Start: 2021-03-12 | End: 2021-03-12

## 2021-03-12 RX ORDER — FAMOTIDINE 10 MG/ML
20 INJECTION INTRAVENOUS
Status: COMPLETED | OUTPATIENT
Start: 2021-03-12 | End: 2021-03-12

## 2021-03-12 RX ORDER — GUAIFENESIN 100 MG/5ML
81 LIQUID (ML) ORAL
Status: COMPLETED | OUTPATIENT
Start: 2021-03-12 | End: 2021-03-12

## 2021-03-12 RX ADMIN — ONDANSETRON 4 MG: 2 INJECTION INTRAMUSCULAR; INTRAVENOUS at 13:57

## 2021-03-12 RX ADMIN — ASPIRIN 81 MG: 81 TABLET, CHEWABLE ORAL at 13:43

## 2021-03-12 RX ADMIN — FAMOTIDINE 20 MG: 10 INJECTION INTRAVENOUS at 14:03

## 2021-03-12 RX ADMIN — MORPHINE SULFATE 2 MG: 2 INJECTION, SOLUTION INTRAMUSCULAR; INTRAVENOUS at 13:45

## 2021-03-12 NOTE — ED PROVIDER NOTES
EMERGENCY DEPARTMENT HISTORY AND PHYSICAL EXAM    1:36 PM      Date: 3/12/2021  Patient Name: Ted Branch    History of Presenting Illness     Chief Complaint   Patient presents with    Chest Pain    Shortness of Breath    Nausea         History Provided By: Patient    Additional History (Context): Ted Branch is a 70 y.o. male with diabetes, hypertension, hyperlipidemia and myocardial infarction who presents with chest pain and 3 to 4 days ago. Patient states it is sharp on the left side. Nonradiating. Associated with nausea, shortness of breath and diaphoresis. Pain is currently 4 out of 10. Patient did take 2 nitros which have improved pain. Patient took the aspirin today. Patient denies fever, cough, vomiting or diarrhea. PCP: Blake Diaz MD    Current Outpatient Medications   Medication Sig Dispense Refill    cetirizine (ZyrTEC) 10 mg tablet Take 10 mg by mouth daily.  ramipriL (ALTACE) 10 mg capsule TAKE 1 CAPSULE BY MOUTH EVERY DAY 90 Cap 3    nitroglycerin (NITROSTAT) 0.4 mg SL tablet DISSOLVE 1 TABLET UNDER TONGUE EVERY 5 MIN AS NEEDED FOR CHEST PAIN FOR UP TO 3 DOSES 25 Tab 0    omeprazole (PRILOSEC) 20 mg capsule TAKE 1 CAPSULE BY MOUTH EVERY DAY 90 Cap 1    glipiZIDE SR (GLUCOTROL XL) 5 mg CR tablet TAKE 1 TABLET BY MOUTH EVERY DAY 90 Tab 0    metFORMIN (GLUCOPHAGE) 1,000 mg tablet TAKE 1 TABLET BY MOUTH TWICE A DAY WITH MEALS 180 Tab 0    promethazine (PHENERGAN) 12.5 mg tablet Take 1 Tab by mouth every six (6) hours as needed for Nausea. 30 Tab 1    ticagrelor (BRILINTA) 90 mg tablet Take 1 Tab by mouth two (2) times a day. Indications: treatment to prevent a heart attack 180 Tab 3    rosuvastatin (CRESTOR) 40 mg tablet Take 1 Tab by mouth nightly. Indications: treatment to prevent a heart attack 90 Tab 3    amLODIPine (NORVASC) 10 mg tablet Take 1 Tab by mouth daily.  Indications: high blood pressure 90 Tab 1    aspirin delayed-release 81 mg tablet Take 1 Tab by mouth daily. 100 Tab 1    Januvia 100 mg tablet TAKE 1 TABLET BY MOUTH EVERY DAY 90 Tab 0    carvediloL (COREG) 25 mg tablet TAKE 1 TABLET BY MOUTH TWICE A DAY WITH FOOD 180 Tab 3    ciprofloxacin HCl (CIPRO) 500 mg tablet Take 1 Tab by mouth two (2) times a day. 1 Tab 0    Blood Pressure Monitor kit Check once a day 1 Kit 0       Past History     Past Medical History:  Past Medical History:   Diagnosis Date    Arthritis     1999 vicodin    Diabetes ([facility] [address] 1998 metformin    GERD (gastroesophageal reflux disease)     Hypercholesterolemia     Hypertension 1996 norvasc    Kidney stones     MI (myocardial infarction) ([facility] [address]        Past Surgical History:  Past Surgical History:   Procedure Laterality Date    COLONOSCOPY N/A 11/2/2018    COLONOSCOPY with Polypectomies performed by Natalie Thayer MD at [address] HX CHOLECYSTECTOMY      HX GI  2010    gallbladder    MO CARDIAC SURG PROCEDURE UNLIST  12/2017    stints       Family History:  Family History   Problem Relation Age of Onset    Stroke Mother     Headache Mother     Hypertension Mother    Gaby Shy Mother     Heart Disease Father     Heart Attack Father     Hypertension Father     Diabetes Father     Lung Cancer Father     Ovarian Cancer Sister     Heart Attack Brother     Breast Cancer Sister     Diabetes Sister     Cancer Maternal Aunt         lung cancer    Cancer Maternal Uncle     Cancer Paternal Aunt     Cancer Paternal Uncle        Social History:  Social History     Tobacco Use    Smoking status: Never Smoker    Smokeless tobacco: Never Used   Substance Use Topics    Alcohol use: No    Drug use: No       Allergies:  No Known Allergies      Review of Systems       Review of Systems   Constitutional: Positive for diaphoresis. Negative for chills and fever. HENT: Negative. Negative for congestion, rhinorrhea and sore throat. Eyes: Negative. Negative for pain, discharge and redness.    Respiratory: Positive for shortness of breath. Negative for cough, chest tightness and wheezing. Cardiovascular: Positive for chest pain. Gastrointestinal: Positive for nausea. Negative for abdominal pain, constipation, diarrhea and vomiting. Genitourinary: Negative. Negative for dysuria, flank pain, frequency, hematuria and urgency. Musculoskeletal: Negative. Negative for back pain and neck pain. Skin: Negative. Negative for rash. Neurological: Negative. Negative for syncope, weakness, numbness and headaches. Psychiatric/Behavioral: Negative. All other systems reviewed and are negative. Physical Exam     Visit Vitals  /64   Pulse 69   Temp 97.6 °F (36.4 °C)   Resp 15   Ht 6' (1.829 m)   Wt 90.7 kg (200 lb)   SpO2 100%   BMI 27.12 kg/m²         Physical Exam  Vitals signs and nursing note reviewed. Constitutional:       General: He is not in acute distress. Appearance: Normal appearance. He is well-developed. He is not ill-appearing, toxic-appearing or diaphoretic. HENT:      Head: Normocephalic and atraumatic. Mouth/Throat:      Pharynx: No oropharyngeal exudate. Eyes:      General: No scleral icterus. Conjunctiva/sclera: Conjunctivae normal.      Pupils: Pupils are equal, round, and reactive to light. Neck:      Musculoskeletal: Normal range of motion and neck supple. Thyroid: No thyromegaly. Vascular: No hepatojugular reflux or JVD. Trachea: No tracheal deviation. Cardiovascular:      Rate and Rhythm: Normal rate and regular rhythm. Pulses: Normal pulses. Radial pulses are 2+ on the right side and 2+ on the left side. Dorsalis pedis pulses are 2+ on the right side and 2+ on the left side. Heart sounds: Normal heart sounds, S1 normal and S2 normal. No murmur. No gallop. No S3 or S4 sounds. Pulmonary:      Effort: Pulmonary effort is normal. No respiratory distress. Breath sounds: Normal breath sounds.  No decreased breath sounds, wheezing, rhonchi or rales. Chest:       Abdominal:      General: Bowel sounds are normal. There is no distension. Palpations: Abdomen is soft. Abdomen is not rigid. There is no mass. Tenderness: There is no abdominal tenderness. There is no guarding or rebound. Negative signs include Miller's sign and McBurney's sign. Musculoskeletal: Normal range of motion. Comments: Strength is 4 out of 5 throughout. Lymphadenopathy:      Head:      Right side of head: No submental, submandibular, preauricular or occipital adenopathy. Left side of head: No submental, submandibular, preauricular or occipital adenopathy. Cervical: No cervical adenopathy. Upper Body:      Right upper body: No supraclavicular adenopathy. Left upper body: No supraclavicular adenopathy. Skin:     General: Skin is warm and dry. Findings: No rash. Neurological:      Mental Status: He is alert. He is not disoriented. GCS: GCS eye subscore is 4. GCS verbal subscore is 5. GCS motor subscore is 6. Cranial Nerves: No cranial nerve deficit. Sensory: No sensory deficit. Coordination: Coordination normal.      Gait: Gait normal.      Deep Tendon Reflexes: Reflexes are normal and symmetric. Comments: Grossly intact. Psychiatric:         Speech: Speech normal.         Behavior: Behavior normal.         Thought Content:  Thought content normal.         Judgment: Judgment normal.           Diagnostic Study Results     Labs -  Recent Results (from the past 12 hour(s))   EKG, 12 LEAD, INITIAL    Collection Time: 03/12/21  1:10 PM   Result Value Ref Range    Ventricular Rate 72 BPM    Atrial Rate 72 BPM    P-R Interval 178 ms    QRS Duration 132 ms    Q-T Interval 416 ms    QTC Calculation (Bezet) 455 ms    Calculated P Axis 40 degrees    Calculated R Axis 66 degrees    Calculated T Axis 114 degrees    Diagnosis       Normal sinus rhythm  Left bundle branch block  Abnormal ECG  When compared with ECG of 11-DEC-2020 05:01,  QT has shortened     CBC WITH AUTOMATED DIFF    Collection Time: 03/12/21  1:17 PM   Result Value Ref Range    WBC 7.6 4.6 - 13.2 K/uL    RBC 4.38 (L) 4.70 - 5.50 M/uL    HGB 13.4 13.0 - 16.0 g/dL    HCT 39.1 36.0 - 48.0 %    MCV 89.3 74.0 - 97.0 FL    MCH 30.6 24.0 - 34.0 PG    MCHC 34.3 31.0 - 37.0 g/dL    RDW 14.3 11.6 - 14.5 %    PLATELET 142 376 - 635 K/uL    MPV 9.6 9.2 - 11.8 FL    NEUTROPHILS 62 40 - 73 %    LYMPHOCYTES 25 21 - 52 %    MONOCYTES 12 (H) 3 - 10 %    EOSINOPHILS 1 0 - 5 %    BASOPHILS 0 0 - 2 %    ABS. NEUTROPHILS 4.7 1.8 - 8.0 K/UL    ABS. LYMPHOCYTES 1.9 0.9 - 3.6 K/UL    ABS. MONOCYTES 0.9 0.05 - 1.2 K/UL    ABS. EOSINOPHILS 0.1 0.0 - 0.4 K/UL    ABS. BASOPHILS 0.0 0.0 - 0.1 K/UL    DF AUTOMATED     METABOLIC PANEL, COMPREHENSIVE    Collection Time: 03/12/21  1:17 PM   Result Value Ref Range    Sodium 139 136 - 145 mmol/L    Potassium 4.1 3.5 - 5.5 mmol/L    Chloride 107 100 - 111 mmol/L    CO2 25 21 - 32 mmol/L    Anion gap 7 3.0 - 18 mmol/L    Glucose 92 74 - 99 mg/dL    BUN 14 7.0 - 18 MG/DL    Creatinine 1.01 0.6 - 1.3 MG/DL    BUN/Creatinine ratio 14 12 - 20      GFR est AA >60 >60 ml/min/1.73m2    GFR est non-AA >60 >60 ml/min/1.73m2    Calcium 9.2 8.5 - 10.1 MG/DL    Bilirubin, total 0.3 0.2 - 1.0 MG/DL    ALT (SGPT) 21 16 - 61 U/L    AST (SGOT) 15 10 - 38 U/L    Alk.  phosphatase 83 45 - 117 U/L    Protein, total 7.9 6.4 - 8.2 g/dL    Albumin 3.7 3.4 - 5.0 g/dL    Globulin 4.2 (H) 2.0 - 4.0 g/dL    A-G Ratio 0.9 0.8 - 1.7     CARDIAC PANEL,(CK, CKMB & TROPONIN)    Collection Time: 03/12/21  1:17 PM   Result Value Ref Range    CK - MB 1.0 <3.6 ng/ml    CK-MB Index 1.8 0.0 - 4.0 %    CK 55 39 - 308 U/L    Troponin-I, QT <0.02 0.0 - 0.045 NG/ML   CARDIAC PANEL,(CK, CKMB & TROPONIN)    Collection Time: 03/12/21  3:36 PM   Result Value Ref Range    CK - MB <1.0 <3.6 ng/ml    CK-MB Index  0.0 - 4.0 %     CALCULATION NOT PERFORMED WHEN RESULT IS BELOW LINEAR LIMIT    CK 48 39 - 308 U/L    Troponin-I, QT <0.02 0.0 - 0.045 NG/ML       Radiologic Studies -   XR CHEST PORT   Final Result   1. Small linear opacities in the left lingula and lung base, likely   atelectasis/scarring. No other radiographic evidence of acute cardiopulmonary   disease. Thank you for enabling us to participate in the care of this patient. Medical Decision Making   Provider Notes (Medical Decision Making):  MDM  Number of Diagnoses or Management Options  Diagnosis management comments: DIFFERENTIAL DIAGNOSES/ MEDICAL DECISION MAKING:  Chest pain etiologies include acute cardiac events to include possible acute myocardial infarction, acute coronary syndrome, pneumonia, chest wall pain (myofascial/ musculoskeletal etiology), chronic obstructive pulmonary disease (copd), acute asthma exacerbation, congestive heart failure, acute bronchitis, pulmonary embolism, upper respiratory infection, referred abdominal pain, other etiologies, versus combination of the above. I am the first provider for this patient. I reviewed the vital signs, available nursing notes, past medical history, past surgical history, family history and social history. Vital Signs-Reviewed the patient's vital signs. Records Reviewed: Nursing Notes (Time of Review: 1:36 PM)    ED Course: Progress Notes, Reevaluation, and Consults:    Labs essentially normal.  Chest X-Ray showed No acute process. EKG showed NSR with bundle branch block with a rate of 72 bpm. With no ST elevations or depression and non specific T wave changes. 3:36 PM 3/12/2021        Diagnosis       I have reassessed the patient. Patient is feeling well. Patient was discharged in stable condition. Patient is to return to emergency department if any new or worsening condition. Clinical Impression:   1.  Chest pain, unspecified type        Disposition: Discharged home     Follow-up Information     Follow up With Specialties Details Why Contact Info    Brian Ramirez MD Family Medicine In 2 days  [facility] [address]  [phone]               Attestation        Provider Attestation:     I personally performed the services described in the documentation, reviewed the documentation and it accurately and completely records my words and actions utilizing the [address] March 12, 2021 at 4:06 PM - [place], [address]. It is dictated using utilizing voice recognition software. Unfortunately this leads to occasional typographical errors. I apologize in advance if the situation occurs. If questions arise please do not hesitate to contact me or call our department.

## 2021-03-12 NOTE — ED TRIAGE NOTES
Pt here for evaluation of constant aching chest pain x 3-4 days; states the past couple of days the pain has been increased and at times having sharp pain as well.  + nausea. Took 2 NTG this morning. Also took his routine asa 81mg pta. States he has had some intermittent shortness of breath.

## 2021-03-23 ENCOUNTER — OFFICE VISIT (OUTPATIENT)
Dept: ONCOLOGY | Age: 71
End: 2021-03-23
Payer: MEDICARE

## 2021-03-23 ENCOUNTER — HOSPITAL ENCOUNTER (OUTPATIENT)
Dept: LAB | Age: 71
Discharge: HOME | End: 2021-03-23
Payer: MEDICARE

## 2021-03-23 VITALS
TEMPERATURE: 98.1 F | WEIGHT: 207 LBS | BODY MASS INDEX: 28.04 KG/M2 | SYSTOLIC BLOOD PRESSURE: 126 MMHG | HEART RATE: 69 BPM | OXYGEN SATURATION: 97 % | HEIGHT: 72 IN | DIASTOLIC BLOOD PRESSURE: 78 MMHG | RESPIRATION RATE: 16 BRPM

## 2021-03-23 DIAGNOSIS — D50.9 IRON DEFICIENCY ANEMIA, UNSPECIFIED IRON DEFICIENCY ANEMIA TYPE: Primary | ICD-10-CM

## 2021-03-23 DIAGNOSIS — D50.9 IRON DEFICIENCY ANEMIA, UNSPECIFIED IRON DEFICIENCY ANEMIA TYPE: ICD-10-CM

## 2021-03-23 LAB
ALBUMIN SERPL-MCNC: 3.8 G/DL (ref 3.4–5)
ALBUMIN/GLOB SERPL: 1.1 {RATIO} (ref 0.8–1.7)
ALP SERPL-CCNC: 83 U/L (ref 45–117)
ALT SERPL-CCNC: 20 U/L (ref 16–61)
ANION GAP SERPL CALC-SCNC: 9 MMOL/L (ref 3–18)
AST SERPL-CCNC: 9 U/L (ref 10–38)
BASOPHILS # BLD: 0 K/UL (ref 0–0.1)
BASOPHILS NFR BLD: 0 % (ref 0–2)
BILIRUB SERPL-MCNC: 0.3 MG/DL (ref 0.2–1)
BUN SERPL-MCNC: 13 MG/DL (ref 7–18)
BUN/CREAT SERPL: 17 (ref 12–20)
CALCIUM SERPL-MCNC: 8.9 MG/DL (ref 8.5–10.1)
CHLORIDE SERPL-SCNC: 108 MMOL/L (ref 100–111)
CO2 SERPL-SCNC: 25 MMOL/L (ref 21–32)
CREAT SERPL-MCNC: 0.77 MG/DL (ref 0.6–1.3)
DIFFERENTIAL METHOD BLD: ABNORMAL
EOSINOPHIL # BLD: 0.1 K/UL (ref 0–0.4)
EOSINOPHIL NFR BLD: 1 % (ref 0–5)
ERYTHROCYTE [DISTWIDTH] IN BLOOD BY AUTOMATED COUNT: 14.6 % (ref 11.6–14.5)
FERRITIN SERPL-MCNC: 19 NG/ML (ref 8–388)
FOLATE SERPL-MCNC: 8.4 NG/ML (ref 3.1–17.5)
GLOBULIN SER CALC-MCNC: 3.5 G/DL (ref 2–4)
GLUCOSE SERPL-MCNC: 102 MG/DL (ref 74–99)
HCT VFR BLD AUTO: 38.9 % (ref 36–48)
HGB BLD-MCNC: 13.5 G/DL (ref 13–16)
IRON SATN MFR SERPL: 16 % (ref 20–50)
IRON SERPL-MCNC: 57 UG/DL (ref 50–175)
LYMPHOCYTES # BLD: 1.8 K/UL (ref 0.9–3.6)
LYMPHOCYTES NFR BLD: 20 % (ref 21–52)
MCH RBC QN AUTO: 30.2 PG (ref 24–34)
MCHC RBC AUTO-ENTMCNC: 34.7 G/DL (ref 31–37)
MCV RBC AUTO: 87 FL (ref 74–97)
MONOCYTES # BLD: 0.7 K/UL (ref 0.05–1.2)
MONOCYTES NFR BLD: 8 % (ref 3–10)
NEUTS SEG # BLD: 6.4 K/UL (ref 1.8–8)
NEUTS SEG NFR BLD: 71 % (ref 40–73)
PLATELET # BLD AUTO: 196 K/UL (ref 135–420)
PMV BLD AUTO: 9.9 FL (ref 9.2–11.8)
POTASSIUM SERPL-SCNC: 4 MMOL/L (ref 3.5–5.5)
PROT SERPL-MCNC: 7.3 G/DL (ref 6.4–8.2)
RBC # BLD AUTO: 4.47 M/UL (ref 4.7–5.5)
RETICS/RBC NFR AUTO: 1.5 % (ref 0.5–2.3)
SODIUM SERPL-SCNC: 142 MMOL/L (ref 136–145)
TIBC SERPL-MCNC: 352 UG/DL (ref 250–450)
VIT B12 SERPL-MCNC: 135 PG/ML (ref 211–911)
WBC # BLD AUTO: 9.1 K/UL (ref 4.6–13.2)

## 2021-03-23 PROCEDURE — 85025 COMPLETE CBC W/AUTO DIFF WBC: CPT

## 2021-03-23 PROCEDURE — 83550 IRON BINDING TEST: CPT

## 2021-03-23 PROCEDURE — 82728 ASSAY OF FERRITIN: CPT

## 2021-03-23 PROCEDURE — 82746 ASSAY OF FOLIC ACID SERUM: CPT

## 2021-03-23 PROCEDURE — 85045 AUTOMATED RETICULOCYTE COUNT: CPT

## 2021-03-23 PROCEDURE — 80053 COMPREHEN METABOLIC PANEL: CPT

## 2021-03-23 PROCEDURE — 99204 OFFICE O/P NEW MOD 45 MIN: CPT | Performed by: INTERNAL MEDICINE

## 2021-03-23 PROCEDURE — 36415 COLL VENOUS BLD VENIPUNCTURE: CPT

## 2021-03-23 PROCEDURE — G0463 HOSPITAL OUTPT CLINIC VISIT: HCPCS | Performed by: INTERNAL MEDICINE

## 2021-03-23 NOTE — PROGRESS NOTES
Hematology/Oncology Consultation Note      Date: 3/23/2021    Name: Anita Saeed  : 1950        Max Caldera MD         Subjective:     Chief complaint: iron deficiency anemia    History of Present Illness:   Mr. Surendra Jalloh is a most pleasant 70y.o. year old male who was seen for consultation of iron deficiency anemia. The patient has a past medical history significant of Hematuria and bloody vomiting at his recent admission. He reported he never tried any IV Iron products. He has follows up with Urology and GI. He stated his colonoscopy will be due on 2021. The patient otherwise has no other complaints. Denied fever, chills, night sweat, unintentional weight loss, skin lumps or bumps, acute bleeding or bruising issues. Denied headache, acute vision change, dizziness, chest pain, worsen shortness of breath, palpitation, productive cough, nausea, vomiting, abdominal pain, altered bowel habits, dysuria, worsen bone pain or back pain, new focal numbness or weakness.          Oncologic History:      Past Medical History, Family History, and Social History:    Past Medical History:   Diagnosis Date    Arthritis      vicodin    Diabetes ([facility] [address]  metformin    GERD (gastroesophageal reflux disease)     Hypercholesterolemia     Hypertension  norvasc    Kidney stones     MI (myocardial infarction) ([facility] [address]      Past Surgical History:   Procedure Laterality Date    COLONOSCOPY N/A 2018    COLONOSCOPY with Polypectomies performed by Kimmy Snell MD at [facility]COPY    HX CHOLECYSTECTOMY      HX GI  2010    gallbladder    CT CARDIAC SURG PROCEDURE UNLIST  2017    stints     Social History     Socioeconomic History    Marital status:      Spouse name: Not on file    Number of children: Not on file    Years of education: Not on file    Highest education level: Not on file   Occupational History    Not on file   Social Needs    Financial resource strain: Not on file   [place] Food insecurity     Worry: Not on file     Inability: Not on file    Transportation needs     Medical: Not on file     Non-medical: Not on file   Tobacco Use    Smoking status: Never Smoker    Smokeless tobacco: Never Used   Substance and Sexual Activity    Alcohol use: No    Drug use: No    Sexual activity: Yes     Partners: Female     Birth control/protection: None   Lifestyle    Physical activity     Days per week: Not on file     Minutes per session: Not on file    Stress: Not on file   Relationships    Social connections     Talks on phone: Not on file     Gets together: Not on file     Attends [employer] service: Not on file     Active member of club or organization: Not on file     Attends meetings of clubs or organizations: Not on file     Relationship status: Not on file    Intimate partner violence     Fear of current or ex partner: Not on file     Emotionally abused: Not on file     Physically abused: Not on file     Forced sexual activity: Not on file   Other Topics Concern    Not on file   Social History Narrative    Not on file     Family History   Problem Relation Age of Onset    Stroke Mother     Headache Mother     Hypertension Mother     Lung Cancer Mother     Heart Disease Father     Heart Attack Father     Hypertension Father     Diabetes Father     Lung Cancer Father     Ovarian Cancer Sister     Heart Attack Brother     Breast Cancer Sister     Diabetes Sister     Cancer Maternal Aunt         lung cancer    Cancer Maternal Uncle     Cancer Paternal Aunt     Cancer Paternal Uncle      Current Outpatient Medications   Medication Sig Dispense Refill    cetirizine (ZyrTEC) 10 mg tablet Take 10 mg by mouth daily.       ramipriL (ALTACE) 10 mg capsule TAKE 1 CAPSULE BY MOUTH EVERY DAY 90 Cap 3    nitroglycerin (NITROSTAT) 0.4 mg SL tablet DISSOLVE 1 TABLET UNDER TONGUE EVERY 5 MIN AS NEEDED FOR CHEST PAIN FOR UP TO 3 DOSES 25 Tab 0    omeprazole (PRILOSEC) 20 mg capsule TAKE 1 CAPSULE BY MOUTH EVERY DAY 90 Cap 1    glipiZIDE SR (GLUCOTROL XL) 5 mg CR tablet TAKE 1 TABLET BY MOUTH EVERY DAY 90 Tab 0    metFORMIN (GLUCOPHAGE) 1,000 mg tablet TAKE 1 TABLET BY MOUTH TWICE A DAY WITH MEALS 180 Tab 0    ticagrelor (BRILINTA) 90 mg tablet Take 1 Tab by mouth two (2) times a day. Indications: treatment to prevent a heart attack 180 Tab 3    rosuvastatin (CRESTOR) 40 mg tablet Take 1 Tab by mouth nightly. Indications: treatment to prevent a heart attack 90 Tab 3    amLODIPine (NORVASC) 10 mg tablet Take 1 Tab by mouth daily. Indications: high blood pressure 90 Tab 1    aspirin delayed-release 81 mg tablet Take 1 Tab by mouth daily. 100 Tab 1    Januvia 100 mg tablet TAKE 1 TABLET BY MOUTH EVERY DAY 90 Tab 0    carvediloL (COREG) 25 mg tablet TAKE 1 TABLET BY MOUTH TWICE A DAY WITH FOOD 180 Tab 3    ciprofloxacin HCl (CIPRO) 500 mg tablet Take 1 Tab by mouth two (2) times a day. 1 Tab 0    promethazine (PHENERGAN) 12.5 mg tablet Take 1 Tab by mouth every six (6) hours as needed for Nausea. 30 Tab 1    Blood Pressure Monitor kit Check once a day 1 Kit 0       Review of Systems   Constitutional: Negative for chills, diaphoresis, fever, malaise/fatigue and weight loss. Respiratory: Negative for cough, hemoptysis, shortness of breath and wheezing. Cardiovascular: Negative for chest pain, palpitations and leg swelling. Gastrointestinal: Negative for abdominal pain, diarrhea, heartburn, nausea and vomiting. Genitourinary: Negative for dysuria, frequency, hematuria and urgency. Musculoskeletal: Negative for joint pain and myalgias. Skin: Negative for itching and rash. Neurological: Negative for dizziness, seizures, weakness and headaches. Psychiatric/Behavioral: Negative for depression. The patient does not have insomnia.              Objective:     Visit Vitals  /78 (BP Patient Position: Sitting)   Pulse 69   Temp 98.1 °F (36.7 °C) (Skin)   Resp 16   Ht 6' (1.829 m)   Wt 93.9 kg (207 lb)   SpO2 97%   BMI 28.07 kg/m²       ECOG Performance Status (grade): 0  0 - able to carry on all pre-disease activity w/out restriction  1 - restricted but able to carry out light work  2 - ambulatory and can self- care but unable to carry out work  3 - bed or chair >50% of waking hours  4 - completely disable, total care, confined to bed or chair    Physical Exam  Constitutional:       General: He is not in acute distress. HENT:      Head: Normocephalic and atraumatic. Eyes:      Pupils: Pupils are equal, round, and reactive to light. Neck:      Musculoskeletal: Neck supple. No neck rigidity. Cardiovascular:      Pulses: Normal pulses. Heart sounds: Normal heart sounds. No murmur. Pulmonary:      Effort: Pulmonary effort is normal. No respiratory distress. Breath sounds: Normal breath sounds. Abdominal:      General: Bowel sounds are normal. There is no distension. Palpations: Abdomen is soft. There is no mass. Tenderness: There is no abdominal tenderness. There is no guarding. Musculoskeletal:         General: No swelling or tenderness. Right lower leg: No edema. Left lower leg: No edema. Lymphadenopathy:      Cervical: No cervical adenopathy. Skin:     General: Skin is warm. Findings: No rash. Neurological:      Mental Status: He is alert and oriented to person, place, and time. Mental status is at baseline. Cranial Nerves: No cranial nerve deficit.    Psychiatric:         Mood and Affect: Mood normal.          Diagnostics:      Recent Results (from the past 96 hour(s))   METABOLIC PANEL, COMPREHENSIVE    Collection Time: 03/23/21 12:22 PM   Result Value Ref Range    Sodium 142 136 - 145 mmol/L    Potassium 4.0 3.5 - 5.5 mmol/L    Chloride 108 100 - 111 mmol/L    CO2 25 21 - 32 mmol/L    Anion gap 9 3.0 - 18 mmol/L    Glucose 102 (H) 74 - 99 mg/dL    BUN 13 7.0 - 18 MG/DL    Creatinine 0.77 0.6 - 1.3 MG/DL    BUN/Creatinine ratio 17 12 - 20      GFR est AA >60 >60 ml/min/1.73m2    GFR est non-AA >60 >60 ml/min/1.73m2    Calcium 8.9 8.5 - 10.1 MG/DL    Bilirubin, total 0.3 0.2 - 1.0 MG/DL    ALT (SGPT) 20 16 - 61 U/L    AST (SGOT) 9 (L) 10 - 38 U/L    Alk. phosphatase 83 45 - 117 U/L    Protein, total 7.3 6.4 - 8.2 g/dL    Albumin 3.8 3.4 - 5.0 g/dL    Globulin 3.5 2.0 - 4.0 g/dL    A-G Ratio 1.1 0.8 - 1.7     FERRITIN    Collection Time: 03/23/21 12:22 PM   Result Value Ref Range    Ferritin 19 8 - 388 NG/ML   RETICULOCYTE COUNT    Collection Time: 03/23/21 12:22 PM   Result Value Ref Range    Reticulocyte count 1.5 0.5 - 2.3 %   CBC WITH AUTOMATED DIFF    Collection Time: 03/23/21 12:22 PM   Result Value Ref Range    WBC 9.1 4.6 - 13.2 K/uL    RBC 4.47 (L) 4.70 - 5.50 M/uL    HGB 13.5 13.0 - 16.0 g/dL    HCT 38.9 36.0 - 48.0 %    MCV 87.0 74.0 - 97.0 FL    MCH 30.2 24.0 - 34.0 PG    MCHC 34.7 31.0 - 37.0 g/dL    RDW 14.6 (H) 11.6 - 14.5 %    PLATELET 976 956 - 401 K/uL    MPV 9.9 9.2 - 11.8 FL    NEUTROPHILS 71 40 - 73 %    LYMPHOCYTES 20 (L) 21 - 52 %    MONOCYTES 8 3 - 10 %    EOSINOPHILS 1 0 - 5 %    BASOPHILS 0 0 - 2 %    ABS. NEUTROPHILS 6.4 1.8 - 8.0 K/UL    ABS. LYMPHOCYTES 1.8 0.9 - 3.6 K/UL    ABS. MONOCYTES 0.7 0.05 - 1.2 K/UL    ABS. EOSINOPHILS 0.1 0.0 - 0.4 K/UL    ABS. BASOPHILS 0.0 0.0 - 0.1 K/UL    DF AUTOMATED     VITAMIN B12 & FOLATE    Collection Time: 03/23/21 12:22 PM   Result Value Ref Range    Vitamin B12 135 (L) 211 - 911 pg/mL    Folate 8.4 3.10 - 17.50 ng/mL   IRON PROFILE    Collection Time: 03/23/21 12:22 PM   Result Value Ref Range    Iron 57 50 - 175 ug/dL    TIBC 352 250 - 450 ug/dL    Iron % saturation 16 (L) 20 - 50 %       Imaging:  No results found for this or any previous visit. Results for orders placed during the hospital encounter of 03/12/21   XR CHEST PORT    Narrative EXAM: CHEST PORTABLE     CLINICAL HISTORY/INDICATION: chest pain, aching chest pain for 3-4 days.     COMPARISON: 12/14/2020. TECHNIQUE: Portable AP view was obtained. FINDINGS:     LINES/DEVICES: None. HEART/MEDIASTINUM: The cardiomediastinal silhouette is unremarkable in size. LUNGS: Interval resolution of previous opacities. Small linear opacities in the  left lingula and lung base, likely atelectasis/scarring. No pleural effusion or  pneumothorax. SOFT TISSUES: Unremarkable. BONES: Chronic fracture deformity of the mid to distal left clavicle. Impression 1. Small linear opacities in the left lingula and lung base, likely  atelectasis/scarring. No other radiographic evidence of acute cardiopulmonary  disease. Thank you for enabling us to participate in the care of this patient. Results for orders placed during the hospital encounter of 03/03/21   CT UROGRAM W WO CONT    Narrative CT Abdomen And Pelvis Without And With Enhancement     INDICATION: Hematuria. TECHNIQUE: Axial  helical scan through the abdomen and pelvis before and after  90 mL Isovue-370 were performed by dynamic enhanced scanning protocol. Delayed  images were obtained to the collecting system, ureters and bladder. MIP and 3-D  computer reformatted thin and thick section were then created on an independent  workstation in order to further evaluate collecting system and relationships  between collecting system, kidneys and ureters and to minimize the radiation  dose. All CT scans at this facility are performed using dose optimization technique as  appropriate to a performed exam, to include automated exposure control,  adjustment of the mA and/or kV according to patient size (including appropriate  matching first site-specific examinations), or use of iterative reconstruction  technique. COMPARISON: 12/17/2020. FINDINGS:     Kidneys: Right duplicated kidney. No renal or ureteral calculi. No  hydronephrosis.  There is a 2.2 cm right interpolar and a 0.9 cm left upper pole  simple cyst. A few too small to characterize renal lesions. No solid renal  masses. Urinary collecting system: Right duplicated ureter. Both ureters are visualized  to the distal ureteral region. They appear to merge distally. Small ureterocele  on the right. Left single ureter. No filling defects. Bladder: No filling defect, calculus, focal or diffuse wall thickening. ABDOMEN FINDINGS:     Liver: Low end of normal liver density. Spleen: Unremarkable. Pancreas: Unremarkable. Biliary: Cholecystectomy. Bowel: Second portion duodenal diverticulum. Diverticulosis. Peritoneum/ Retroperitoneum: Unremarkable. Lymph Nodes: Unremarkable. Adrenal Glands: Unremarkable. Vessels: Moderate atherosclerosis. Mild narrowing at the celiac ostia. Some  degree of narrowing at the bilateral renal ostia. PELVIS FINDINGS:     Pelvic Organs: Prostate is 5.9 x 4.7 cm. There is median lobe hypertrophy with  mass effect at the posterior bladder. Left fat-containing inguinal hernia. Lung Base: Unremarkable. Bones: Mild degenerative changes bilateral hips. Degenerative disc disease. Transitional thoracolumbar junction. Impression 1. Right duplicated kidney with bifid ureter which appears to merge distally  with small ureterocele. 2.  Prostatomegaly and BPH. 3.  Left fat-containing inguinal hernia. 4.  Diverticulosis. 5.  Moderate atherosclerosis with stenosis celiac and bilateral renal ostia. Pathology          Assessment:                                        1. Iron deficiency anemia, unspecified iron deficiency anemia type        Plan:                                        # Iron deficiency anemia  --  Past medical history significant of Hematuria and bloody vomiting at his recent admission. He reported he never tried any IV Iron products. He has follows up with Urology and GI.  He stated his colonoscopy will be due on 11/2021.    -- 3/12/2021 CBC reported H/H 13.4/39.1 No recent Iron profiles or ferritin available for review. -- We will obtain CBC, CMP, Iron profiles, ferritin, B12/folate, and ret count today. The patient will be notified if any interventions is warranted. Further workup will be guided by results from aforementioned initial study. -- The patient will f/u with GI and Urology as scheduled. -- I will see the patient back in clinic in about 3 weeks. Always sooner if required. Orders Placed This Encounter    METABOLIC PANEL, COMPREHENSIVE     Standing Status:   Future     Number of Occurrences:   1     Standing Expiration Date:   3/24/2022    IRON PROFILE     Standing Status:   Future     Number of Occurrences:   1     Standing Expiration Date:   3/24/2022    FERRITIN     Standing Status:   Future     Number of Occurrences:   1     Standing Expiration Date:   3/23/2022    RETICULOCYTE COUNT     Standing Status:   Future     Number of Occurrences:   1     Standing Expiration Date:   3/23/2022    VITAMIN B12 & FOLATE     Standing Status:   Future     Number of Occurrences:   1     Standing Expiration Date:   3/23/2022    CBC WITH AUTOMATED DIFF     Standing Status:   Future     Number of Occurrences:   1     Standing Expiration Date:   3/24/2022           Mr. Silvestre Mata has a reminder for a \"due or due soon\" health maintenance. I have asked that he contact his primary care provider for follow-up on this health maintenance. All of patient's questions answered to their apparent satisfaction. They verbally show understanding and agreement with aforementioned plan. I would like to thank Dr Xenia Pierre MD  for allowing me to participate in the care of this very pleasant patient. If I can be of further assistance please do not hesitate to call. Tan Palacios MD  3/23/2021          About 45 minutes were spent for this encounter with more than 50% of the time spent in face-to-face counseling, discussing on diagnosis and management plan going forward, and co-ordination of care.   Parts of this document has been produced using Dragon dictation system. Unrecognized errors in transcription may be present. Please do not hesitate to reach out for any questions or clarifications.       CC: Tatiana Solano MD

## 2021-03-30 DIAGNOSIS — E11.9 WELL CONTROLLED DIABETES MELLITUS (HCC): ICD-10-CM

## 2021-03-30 RX ORDER — SITAGLIPTIN 100 MG/1
TABLET, FILM COATED ORAL
Qty: 90 TAB | Refills: 0 | Status: SHIPPED | OUTPATIENT
Start: 2021-03-30 | End: 2021-06-23

## 2021-04-02 ENCOUNTER — OFFICE VISIT (OUTPATIENT)
Dept: ORTHOPEDIC SURGERY | Age: 71
End: 2021-04-02
Payer: MEDICARE

## 2021-04-02 VITALS
OXYGEN SATURATION: 98 % | HEIGHT: 72 IN | HEART RATE: 85 BPM | RESPIRATION RATE: 16 BRPM | BODY MASS INDEX: 27.87 KG/M2 | WEIGHT: 205.8 LBS | TEMPERATURE: 96.9 F

## 2021-04-02 DIAGNOSIS — M19.011 PRIMARY OSTEOARTHRITIS, RIGHT SHOULDER: Primary | ICD-10-CM

## 2021-04-02 DIAGNOSIS — M25.511 RIGHT SHOULDER PAIN, UNSPECIFIED CHRONICITY: ICD-10-CM

## 2021-04-02 PROCEDURE — G8432 DEP SCR NOT DOC, RNG: HCPCS | Performed by: SPECIALIST

## 2021-04-02 PROCEDURE — G8419 CALC BMI OUT NRM PARAM NOF/U: HCPCS | Performed by: SPECIALIST

## 2021-04-02 PROCEDURE — G8427 DOCREV CUR MEDS BY ELIG CLIN: HCPCS | Performed by: SPECIALIST

## 2021-04-02 PROCEDURE — G8536 NO DOC ELDER MAL SCRN: HCPCS | Performed by: SPECIALIST

## 2021-04-02 PROCEDURE — G8756 NO BP MEASURE DOC: HCPCS | Performed by: SPECIALIST

## 2021-04-02 PROCEDURE — 20610 DRAIN/INJ JOINT/BURSA W/O US: CPT | Performed by: SPECIALIST

## 2021-04-02 PROCEDURE — 3017F COLORECTAL CA SCREEN DOC REV: CPT | Performed by: SPECIALIST

## 2021-04-02 PROCEDURE — 1101F PT FALLS ASSESS-DOCD LE1/YR: CPT | Performed by: SPECIALIST

## 2021-04-02 PROCEDURE — 99203 OFFICE O/P NEW LOW 30 MIN: CPT | Performed by: SPECIALIST

## 2021-04-02 RX ORDER — BETAMETHASONE SODIUM PHOSPHATE AND BETAMETHASONE ACETATE 3; 3 MG/ML; MG/ML
3 INJECTION, SUSPENSION INTRA-ARTICULAR; INTRALESIONAL; INTRAMUSCULAR; SOFT TISSUE ONCE
Status: COMPLETED | OUTPATIENT
Start: 2021-04-02 | End: 2021-04-02

## 2021-04-02 RX ADMIN — BETAMETHASONE SODIUM PHOSPHATE AND BETAMETHASONE ACETATE 3 MG: 3; 3 INJECTION, SUSPENSION INTRA-ARTICULAR; INTRALESIONAL; INTRAMUSCULAR; SOFT TISSUE at 09:21

## 2021-04-02 NOTE — PROGRESS NOTES
Patient: Christoph Lu                MRN: [id number]       SSN: [id number]  YOB: 1950        AGE: 70 y.o. SEX: male    PCP: Chelo Maldonado MD  04/02/21    Chief Complaint   Patient presents with    Shoulder Pain     shoulder     HISTORY:  Christoph Lu is a 70 y.o. male who is seen for right shoulder pain. He has been experiencing right shoulder pain for the past several months. He does not recall any shoulder injury. He feels shoulder pain with overhead activities and at night. He is right handed. He is also seen for hip pain. He has experiencing hip pain off an on for many years. He has received 5 cortisone injections in the past.   Pain Assessment  4/2/2021   Location of Pain Shoulder   Location Modifiers Right   Severity of Pain 6   Quality of Pain Other (Comment)   Quality of Pain Comment deep pain in the socket   Duration of Pain Persistent   Frequency of Pain Constant   Date Pain First Started (No Data)   Date Pain First Started Comment Dec 2020   Aggravating Factors Other (Comment)   Aggravating Factors Comment nothing   Limiting Behavior Yes   Relieving Factors Nothing   Result of Injury No     Occupation, etc:  Mr. Sonia Tabor is retired. 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 He lives in [place] with his wife. He has 5 daughters and 15 grandchildren--6 boys, 6 girls. He is taking his grandchildren to [facility] to go bass fishing this weekend. Mr. Sonia Tabor weighs 205 lbs and is 6'0\" tall. He is a metformin controlled diabetic.      Lab Results   Component Value Date/Time    Hemoglobin A1c 5.9 (H) 12/10/2020 03:00 AM     Weight Metrics 4/2/2021 3/23/2021 3/12/2021 3/11/2021 1/12/2021 1/7/2021 12/18/2020   Weight 205 lb 12.8 oz 207 lb 200 lb 204 lb 204 lb 204 lb 3.2 oz 211 lb 10.3 oz   BMI 27.91 kg/m2 28.07 kg/m2 27.12 kg/m2 27.67 kg/m2 27.67 kg/m2 27.69 kg/m2 -       Patient Active Problem List Diagnosis Code    Chronic pain syndrome G89.4    Coronary artery disease involving native coronary artery with unstable angina pectoris ([facility]) I25.110    Essential hypertension I10    Hyperlipidemia E78.5    Thyroid goiter E04.9    CAD (coronary artery disease) I25.10    S/P coronary artery stent placement Z95.5    Unstable angina pectoris ([facility]) I20.0    Controlled type 2 diabetes mellitus without complication, without long-term current use of insulin ([facility]) E11.9    Insomnia G47.00    Tubular adenoma of colon D12.6    Cannabis use, uncomplicated V79.26    Cervical disc disease M50.90    GERD (gastroesophageal reflux disease) K21.9    ACS (acute coronary syndrome) ([facility]) I24.9     REVIEW OF SYSTEMS:    Constitutional Symptoms: Negative   Eyes: Negative   Ears, Nose, Throat and Mouth: Negative   Cardiovascular: Negative   Respiratory: Negative   Genitourinary: Per HPI   Gastrointestinal: Per HPI   Integumentary (Skin and/or Breast): Negative   Musculoskeletal: Per HPI   Endocrine/Rheumatologic: Negative   Neurological: Per HPI   Hematology/Lymphatic: Negative    Allergic/Immunologic: Negative   Phychiatric: Negative    Social History     Socioeconomic History    Marital status:      Spouse name: Not on file    Number of children: Not on file    Years of education: Not on file    Highest education level: Not on file   Occupational History    Not on file   Social Needs    Financial resource strain: Not on file    Food insecurity     Worry: Not on file     Inability: Not on file    Transportation needs     Medical: Not on file     Non-medical: Not on file   Tobacco Use    Smoking status: Never Smoker    Smokeless tobacco: Never Used   Substance and Sexual Activity    Alcohol use: No    Drug use: No    Sexual activity: Yes     Partners: Female     Birth control/protection: None   Lifestyle    Physical activity     Days per week: Not on file     Minutes per session: Not on file   [facility] Stress: Not on file   Relationships    Social connections     Talks on phone: Not on file     Gets together: Not on file     Attends Gnosticist service: Not on file     Active member of club or organization: Not on file     Attends meetings of clubs or organizations: Not on file     Relationship status: Not on file    Intimate partner violence     Fear of current or ex partner: Not on file     Emotionally abused: Not on file     Physically abused: Not on file     Forced sexual activity: Not on file   Other Topics Concern    Not on file   Social History Narrative    Not on file      No Known Allergies   Current Outpatient Medications   Medication Sig    Januvia 100 mg tablet TAKE 1 TABLET BY MOUTH EVERY DAY    cetirizine (ZyrTEC) 10 mg tablet Take 10 mg by mouth daily.  ramipriL (ALTACE) 10 mg capsule TAKE 1 CAPSULE BY MOUTH EVERY DAY    nitroglycerin (NITROSTAT) 0.4 mg SL tablet DISSOLVE 1 TABLET UNDER TONGUE EVERY 5 MIN AS NEEDED FOR CHEST PAIN FOR UP TO 3 DOSES    omeprazole (PRILOSEC) 20 mg capsule TAKE 1 CAPSULE BY MOUTH EVERY DAY    glipiZIDE SR (GLUCOTROL XL) 5 mg CR tablet TAKE 1 TABLET BY MOUTH EVERY DAY    metFORMIN (GLUCOPHAGE) 1,000 mg tablet TAKE 1 TABLET BY MOUTH TWICE A DAY WITH MEALS    ticagrelor (BRILINTA) 90 mg tablet Take 1 Tab by mouth two (2) times a day. Indications: treatment to prevent a heart attack    rosuvastatin (CRESTOR) 40 mg tablet Take 1 Tab by mouth nightly. Indications: treatment to prevent a heart attack    amLODIPine (NORVASC) 10 mg tablet Take 1 Tab by mouth daily. Indications: high blood pressure    aspirin delayed-release 81 mg tablet Take 1 Tab by mouth daily.  carvediloL (COREG) 25 mg tablet TAKE 1 TABLET BY MOUTH TWICE A DAY WITH FOOD    Blood Pressure Monitor kit Check once a day    ciprofloxacin HCl (CIPRO) 500 mg tablet Take 1 Tab by mouth two (2) times a day.     promethazine (PHENERGAN) 12.5 mg tablet Take 1 Tab by mouth every six (6) hours as needed for Nausea. Current Facility-Administered Medications   Medication Dose Route Frequency    betamethasone (CELESTONE) injection 3 mg  3 mg Intra artICUlar ONCE      PHYSICAL EXAMINATION:  Visit Vitals  Pulse 85   Temp 96.9 °F (36.1 °C)   Resp 16   Ht 6' (1.829 m)   Wt 205 lb 12.8 oz (93.4 kg)   SpO2 98%   BMI 27.91 kg/m²      ORTHO EXAMINATION:  Examination Right shoulder Left shoulder   Skin Intact Intact   Effusion - -   Biceps deformity - -   Atrophy - -   AC joint tenderness - -   Acromial tenderness + -   Biceps tenderness - -   Forward flexion/Elevation  170   Active abduction  170   External rotation ROM 15 30   Internal rotation ROM 70 90   Apprehension - -   Impingement - -   Drop Arm Test - -   Neurovascular Intact Intact        TIME OUT:  Chart reviewed for the following:   I, Angela Sarmiento MD, have reviewed the History, Physical and updated the Allergic reactions for R Chuy Acosta 38 performed immediately prior to start of procedure:  Toni Nelson MD, have performed the following reviews on [facility] prior to the start of the procedure:          * Patient was identified by name and date of birth   * Agreement on procedure being performed was verified  * Risks and Benefits explained to the patient  * Procedure site verified and marked as necessary  * Patient was positioned for comfort  * Consent was obtained     Time: 9:14 AM     Date of procedure: 4/2/2021  Procedure performed by:  Angela Sarmiento MD  Mr. Elli Rodas tolerated the procedure well with no complications. RADIOGRAPHS:  XR RIGHT SHOULDER 4/2/21 JORGE  IMPRESSION:  Three views - No fractures, moderate acromioclavicular narrowing, moderate glenohumeral narrowing, no calcific densities. IMPRESSION:      ICD-10-CM ICD-9-CM    1. Primary osteoarthritis, right shoulder  M19.011 715.11 betamethasone (CELESTONE) injection 3 mg      DRAIN/INJECT LARGE JOINT/BURSA   2.  Right shoulder pain, unspecified chronicity  M25.511 719.41 AMB POC XRAY, SHOULDER; COMPLETE, 2+      betamethasone (CELESTONE) injection 3 mg      DRAIN/INJECT LARGE JOINT/BURSA     PLAN:  Joint protective exercises were outlined today. After discussing treatment options, patient's right shoulder was injected with 4 cc Marcaine and 1/2 cc Celestone. We discussed a possible need for right shoulder MRI in the future if pain continues. There is no need for surgery at this time. He will follow up as needed.       Scribed by Radhika Jenkins ([address]) as dictated by Paul Norton MD

## 2021-04-02 NOTE — PATIENT INSTRUCTIONS
Shoulder Arthritis: Exercises Introduction Here are some examples of exercises for you to try. The exercises may be suggested for a condition or for rehabilitation. Start each exercise slowly. Ease off the exercises if you start to have pain. You will be told when to start these exercises and which ones will work best for you. How to do the exercises Shoulder flexion (lying down) To make a wand for this exercise, use a piece of PVC pipe or a broom handle with the broom removed. Make the wand about a foot wider than your shoulders. 1. Lie on your back, holding a wand with both hands. Your palms should face down as you hold the wand. 2. Keeping your elbows straight, slowly raise your arms over your head. Raise them until you feel a stretch in your shoulders, upper back, and chest. 
3. Hold for 15 to 30 seconds. 4. Repeat 2 to 4 times. Shoulder rotation (lying down) To make a wand for this exercise, use a piece of PVC pipe or a broom handle with the broom removed. Make the wand about a foot wider than your shoulders. 1. Lie on your back. Hold a wand with both hands with your elbows bent and palms up. 2. Keep your elbows close to your body, and move the wand across your body toward the sore arm. 3. Hold for 8 to 12 seconds. 4. Repeat 2 to 4 times. Shoulder internal rotation with towel 1. Hold a towel above and behind your head with the arm that is not sore. 2. With your sore arm, reach behind your back and grasp the towel. 3. With the arm above your head, pull the towel upward. Do this until you feel a stretch on the front and outside of your sore shoulder. 4. Hold 15 to 30 seconds. 5. Repeat 2 to 4 times. Shoulder blade squeeze 1. Stand with your arms at your sides, and squeeze your shoulder blades together. Do not raise your shoulders up as you squeeze. 2. Hold 6 seconds. 3. Repeat 8 to 12 times. Resisted rows For this exercise, you will need elastic exercise material, such as surgical tubing or Thera-Band. 1. Put the band around a solid object at about waist level. (A bedpost will work well.) Each hand should hold an end of the band. 2. With your elbows at your sides and bent to 90 degrees, pull the band back. Your shoulder blades should move toward each other. Return to the starting position. 3. Repeat 8 to 12 times. External rotator strengthening exercise 1. Start by tying a piece of elastic exercise material to a doorknob. You can use surgical tubing or Thera-Band. (You may also hold one end of the band in each hand.) 2. Stand or sit with your shoulder relaxed and your elbow bent 90 degrees. Your upper arm should rest comfortably against your side. Squeeze a rolled towel between your elbow and your body for comfort. This will help keep your arm at your side. 3. Hold one end of the elastic band with the hand of the painful arm. 4. Start with your forearm across your belly. Slowly rotate the forearm out away from your body. Keep your elbow and upper arm tucked against the towel roll or the side of your body until you begin to feel tightness in your shoulder. Slowly move your arm back to where you started. 5. Repeat 8 to 12 times. Internal rotator strengthening exercise 1. Start by tying a piece of elastic exercise material to a doorknob. You can use surgical tubing or Thera-Band. 2. Stand or sit with your shoulder relaxed and your elbow bent 90 degrees. Your upper arm should rest comfortably against your side. Squeeze a rolled towel between your elbow and your body for comfort. This will help keep your arm at your side. 3. Hold one end of the elastic band in the hand of the painful arm. 4. Slowly rotate your forearm toward your body until it touches your belly. Slowly move it back to where you started. 5. Keep your elbow and upper arm firmly tucked against the towel roll or at your side. 6. Repeat 8 to 12 times. Pendulum swing If you have pain in your back, do not do this exercise. 1. Hold on to a table or the back of a chair with your good arm. Then bend forward a little and let your sore arm hang straight down. This exercise does not use the arm muscles. Rather, use your legs and your hips to create movement that makes your arm swing freely. 2. Use the movement from your hips and legs to guide the slightly swinging arm back and forth like a pendulum (or elephant trunk). Then guide it in circles that start small (about the size of a dinner plate). Make the circles a bit larger each day, as your pain allows. 3. Do this exercise for 5 minutes, 5 to 7 times each day. 4. As you have less pain, try bending over a little farther to do this exercise. This will increase the amount of movement at your shoulder. Follow-up care is a key part of your treatment and safety. Be sure to make and go to all appointments, and call your doctor if you are having problems. It's also a good idea to know your test results and keep a list of the medicines you take. Where can you learn more? Go to http://www.gray.com/ Enter H562 in the search box to learn more about \"Shoulder Arthritis: Exercises. \" Current as of: November 16, 2020               Content Version: 12.8 © 2006-2021 [employer]. Care instructions adapted under license by [employer] (which disclaims liability or warranty for this information). If you have questions about a medical condition or this instruction, always ask your healthcare professional. [place] [address] any warranty or liability for your use of this information.

## 2021-04-06 DIAGNOSIS — E11.9 WELL CONTROLLED DIABETES MELLITUS (HCC): ICD-10-CM

## 2021-04-06 RX ORDER — METFORMIN HYDROCHLORIDE 1000 MG/1
TABLET ORAL
Qty: 180 TAB | Refills: 0 | Status: SHIPPED | OUTPATIENT
Start: 2021-04-06 | End: 2021-06-23

## 2021-04-14 DIAGNOSIS — E11.65 POORLY CONTROLLED DIABETES MELLITUS (HCC): ICD-10-CM

## 2021-04-14 RX ORDER — GLIPIZIDE 5 MG/1
TABLET, FILM COATED, EXTENDED RELEASE ORAL
Qty: 90 TAB | Refills: 0 | Status: SHIPPED | OUTPATIENT
Start: 2021-04-14 | End: 2021-07-12

## 2021-04-15 ENCOUNTER — OFFICE VISIT (OUTPATIENT)
Dept: CARDIOLOGY CLINIC | Age: 71
End: 2021-04-15
Payer: MEDICARE

## 2021-04-15 VITALS
TEMPERATURE: 98.1 F | HEIGHT: 72 IN | HEART RATE: 77 BPM | OXYGEN SATURATION: 97 % | SYSTOLIC BLOOD PRESSURE: 115 MMHG | BODY MASS INDEX: 27.82 KG/M2 | DIASTOLIC BLOOD PRESSURE: 58 MMHG | WEIGHT: 205.4 LBS

## 2021-04-15 DIAGNOSIS — Z95.5 S/P CORONARY ARTERY STENT PLACEMENT: ICD-10-CM

## 2021-04-15 DIAGNOSIS — I10 ESSENTIAL HYPERTENSION: ICD-10-CM

## 2021-04-15 DIAGNOSIS — E78.2 MIXED HYPERLIPIDEMIA: ICD-10-CM

## 2021-04-15 DIAGNOSIS — I25.118 CORONARY ARTERY DISEASE OF NATIVE ARTERY OF NATIVE HEART WITH STABLE ANGINA PECTORIS (HCC): Primary | ICD-10-CM

## 2021-04-15 PROCEDURE — G8536 NO DOC ELDER MAL SCRN: HCPCS | Performed by: INTERNAL MEDICINE

## 2021-04-15 PROCEDURE — G8419 CALC BMI OUT NRM PARAM NOF/U: HCPCS | Performed by: INTERNAL MEDICINE

## 2021-04-15 PROCEDURE — G8752 SYS BP LESS 140: HCPCS | Performed by: INTERNAL MEDICINE

## 2021-04-15 PROCEDURE — 99214 OFFICE O/P EST MOD 30 MIN: CPT | Performed by: INTERNAL MEDICINE

## 2021-04-15 PROCEDURE — 1101F PT FALLS ASSESS-DOCD LE1/YR: CPT | Performed by: INTERNAL MEDICINE

## 2021-04-15 PROCEDURE — G8432 DEP SCR NOT DOC, RNG: HCPCS | Performed by: INTERNAL MEDICINE

## 2021-04-15 PROCEDURE — 3017F COLORECTAL CA SCREEN DOC REV: CPT | Performed by: INTERNAL MEDICINE

## 2021-04-15 PROCEDURE — G8754 DIAS BP LESS 90: HCPCS | Performed by: INTERNAL MEDICINE

## 2021-04-15 PROCEDURE — G8427 DOCREV CUR MEDS BY ELIG CLIN: HCPCS | Performed by: INTERNAL MEDICINE

## 2021-04-15 NOTE — PROGRESS NOTES
HISTORY OF PRESENT ILLNESS  Mario Arnold is a 70 y.o. male. Patient with cad,htn,hyperlipidemia,dm. On follow up patient denies any chest pains,sob, palpitation or other significant symptoms. 12/2017  Admitted with unstable angina-had pci  7/2018. Patient was in emergency room with atypical chest pain. No acute EKG changes cardiac enzymes negative CTA and chest x-ray reports reviewed. Labs are negative for cardiac workup  5/2019. Recent ER visit with fall and concussion. Records reviewed. 6/2020  Patient seen for follow-up. He has rare occasion of chest pain he has used 1 nitroglycerin since last evaluation. No other complaints  1/2021  Patient is here for follow-up he was in hospital with  1. Unstable angina- s/p PCI of the LAD.  Status post mechanical ventilation. continue DAPT brilinta and asa.  Chest pain has resolved. 2. Acute respiratory failure- extubated   3. Hypotension-resolved.  Off pressors. 4. Hypertension: Blood pressure increasing gradually as expected.  Continue BID amlodipine. Elevated this afternoon - likely due to pain. 5. CAD-cardiac cath 12/2017 showed  Triple-vessel coronary artery disease - For CTS evaluation  6. Hyperlipidemia- LDL 74 11/20 Continue with Crestor  20 mg.  7. Diabetes- well controlled with A1c 5.9   8. LBBB- new since 12/20   9. Hemoptysis- traumatic ETT tube echange and pulmonary edema- better   10. Hematuria / flank pain - hx of renal stones - work up per primary team.  Initial plans were for CABG. Patient had recurrent unstable angina requiring emergency PCI of LAD. Patient is here for follow-up. He is feeling better  He still has occasional episode of nausea and vomiting that are sudden. He had one episode of chest pain that relieved with nitroglycerin. The progression of chest pain. He is here hemoptysis has resolved.   Hematuria is significantly improved he has urology follow-up pending this week      Hospital Follow Up  Pertinent negatives include no chest pain, no abdominal pain, no headaches and no shortness of breath. Hypertension  The history is provided by the patient. This is a chronic problem. The problem occurs constantly. The problem has not changed since onset. Pertinent negatives include no chest pain, no abdominal pain, no headaches and no shortness of breath. Chest Pain (Angina)   The history is provided by the patient. This is a recurrent problem. The problem has not changed since onset. The problem occurs rarely. The pain is associated with exertion. The pain is present in the substernal region. The quality of the pain is described as pressure-like. The pain does not radiate. Pertinent negatives include no abdominal pain, no claudication, no cough, no dizziness, no fever, no headaches, no hemoptysis, no malaise/fatigue, no nausea, no orthopnea, no palpitations, no PND, no shortness of breath, no sputum production, no vomiting and no weakness. Follow-up  Pertinent negatives include no chest pain, no abdominal pain, no headaches and no shortness of breath. Review of Systems   Constitutional: Negative for chills, fever and malaise/fatigue. HENT: Negative for nosebleeds. Eyes: Negative for blurred vision and double vision. Respiratory: Negative for cough, hemoptysis, sputum production, shortness of breath and wheezing. Cardiovascular: Negative for chest pain, palpitations, orthopnea, claudication, leg swelling and PND. Gastrointestinal: Negative for abdominal pain, heartburn, nausea and vomiting. Musculoskeletal: Negative for falls and myalgias. Skin: Negative for rash. Neurological: Negative for dizziness, weakness and headaches. Endo/Heme/Allergies: Does not bruise/bleed easily.      Family History   Problem Relation Age of Onset    Stroke Mother     Headache Mother     Hypertension Mother     Lung Cancer Mother     Heart Disease Father     Heart Attack Father     Hypertension Father     Diabetes Father    Collette Satchel Lung Cancer Father     Ovarian Cancer Sister     Heart Attack Brother     Breast Cancer Sister     Diabetes Sister     Cancer Maternal Aunt         lung cancer    Cancer Maternal Uncle     Cancer Paternal Aunt     Cancer Paternal Uncle        Past Medical History:   Diagnosis Date    Arthritis     1999 vicodin    Diabetes ([facility] [address] 1998 metformin    GERD (gastroesophageal reflux disease)     Hypercholesterolemia     Hypertension 1996 norvasc    Kidney stones     MI (myocardial infarction) ([facility] [address]        Past Surgical History:   Procedure Laterality Date    COLONOSCOPY N/A 11/2/2018    COLONOSCOPY with Polypectomies performed by Megha Wing MD at [facility]    HX CHOLECYSTECTOMY      HX GI  2010    gallbladder    ME CARDIAC SURG PROCEDURE UNLIST  12/2017    stints       Social History     Tobacco Use    Smoking status: Never Smoker    Smokeless tobacco: Never Used   Substance Use Topics    Alcohol use: No       No Known Allergies        Visit Vitals  BP (!) 115/58 (BP 1 Location: Left arm, BP Patient Position: Sitting, BP Cuff Size: Adult)   Pulse 77   Temp 98.1 °F (36.7 °C) (Temporal)   Ht 6' (1.829 m)   Wt 93.2 kg (205 lb 6.4 oz)   SpO2 97%   BMI 27.86 kg/m²        Physical Exam   Constitutional: He is oriented to person, place, and time. He appears well-developed and well-nourished. HENT:   Head: Normocephalic and atraumatic. Eyes: Conjunctivae are normal.   Neck: Neck supple. No JVD present. No tracheal deviation present. No thyromegaly present. Cardiovascular: Normal rate, regular rhythm and normal heart sounds. Exam reveals no gallop and no friction rub. No murmur heard. Pulmonary/Chest: Breath sounds normal. No respiratory distress. He has no wheezes. He has no rales. He exhibits no tenderness. Abdominal: Soft. There is no abdominal tenderness. Musculoskeletal:         General: No edema. Neurological: He is alert and oriented to person, place, and time.    Skin: Skin is warm and dry. Psychiatric: He has a normal mood and affect. Mr. Nicho Amaya has a reminder for a \"due or due soon\" health maintenance. I have asked that he contact his primary care provider for follow-up on this health maintenance. CONCLUSION:1/2016-  ABNORMAL STRESS ECHO WITH EKG AND WALL MOTION ABNORMALITIES SUGGESTIVE OF LAD  DISTRIBUTION DISEASE    IMPRESSION:cath:1/2016    1. TWO VESSEL CORONARY ARTERY DISEASE IN THE FORM OF 80% OSTIAL AND 90% PROXIMAL NON-DOMINANT RIGHT CORONARY STENOSIS WHICH IS A MODERATE SIZED VESSEL OF ABOUT 1.5 TO 2 MM IN DIAMETER, AND 40% PROXIMAL CIRCUMFLEX, AND 70% MID CIRCUMFLEX STENOSIS WHICH IS A DOMINANT VESSEL. THERE ARE DIFFUSE LUMINAL IRREGULARITIES SEEN THROUGHOUT THE CORONARIES. 2. NO NORMAL OVERALL LV EJECTION FRACTION AT REST. 3. EVIDENCE OF CHEST PAIN DURING THE CASE AS WELL AS BEFORE THE CASE WAS STARTED. PARTIAL RELIEF WITH SUBLINGUAL NITROGLYCERIN AND NO ACUTE EKG CHANGES SEEN IN THE SIX MONITORING LEADS IN THE CATH LAB. DISCUSSION AND RECOMMENDATIONS: PATIENT IS LIKELY HAVING CHEST PAIN FROM THE NON-DOMINANT RIGHT CORONARY ARTERY. THE LEFT CIRCUMFLEX IS DOMINANT AND APPEARS TO HAVE BORDERLINE MID STENOSIS WHICH DOES NOT HAVE ANY APPEARANCE OF ANY ACUTE UNSTABLE LESION. I THINK HE SHOULD BE TREATED MEDICALLY FOR NOW, AND IF THE SYMPTOMS PERSIST, LEFT CIRCUMFLEX ARTERY WILL NEED TO BE INTERROGATED BY INTRACORONARY DOPPLER WITH FFR DETERMINATION. RIGHT CORONARY, THOUGH APPEARS TO BE CRITICAL IS A SMALL VESSEL OF ABOUT 1.5 TO A MAXIMUM OF 2 MM IN DIAMETER AND PROBABLY SHOULD BE LEFT TO MEDICAL TREATMENT. AGGRESSIVE RISK FACTOR MODIFICATION SHOULD BE FOLLOWED. SUMMARY:12/2017-echo  Left ventricle: Systolic function was normal. Ejection fraction was estimated   in the range of 55 % to 60 %. No obvious  wall motion abnormalities identified in the views obtained. Wall thickness   was mildly increased.  Doppler parameters  were consistent with abnormal left ventricular relaxation (grade 1 diastolic   dysfunction). Right ventricle: The size was at the upper limits of normal.    Left atrium: The atrium was mildly to moderately dilated. FINDING-12/2017-cath  1. Left main is patent, bifurcates into left anterior descending artery  and circumflex artery. 2. Left anterior descending artery has ostial 30% to 40% stenosis  followed by mid diffuse 30-40% stenosis. The vessel appears to be  moderately calcified. Diagonal 1 artery has diffuse 30-40% stenosis. Mid to distal left anterior descending artery is patent. 3. Circumflex artery is dominant with proximal 95% calcific stenosis. Status post PTCA using a Trek 1.5 x 8 mm balloon followed by a 2.5  mm x 12 mm balloon. The stent was a Synergy 2.75 mm x 15 mm  stent was deployed at about 14 atmospheres. Post-PCI PTCA was  performed using a noncompliant 3.0 mm x 8 mm balloon inflated about  16-18 atmospheres. Lesion reduced to 10%. 4. Obtuse marginal 1 artery was a small-caliber vessel with ostial 70%  to 80% stenosis. 5. Obtuse marginal 2 artery is a small- to medium-caliber vessel with  diffuse 30-40% stenosis. Mid circumflex artery has focal 80% stenosis. Status post PTCA using a Trek 2.5 mm x 12 mm balloon followed by a  Synergy 2.75 mm x 12 mm stent deployed about 14 atmospheres. Post-PCI PTCA was performed using a 3.0 mm x 8 mm  balloon deployed about 16 atmospheres. Lesion reduced to 0%. 6. Left posterior descending artery is patent. 7. Right coronary artery is nondominant, has ostial calcific 90%  followed by mid 99% stenosis. CONCLUSION:  Triple-vessel coronary artery disease. Status post  percutaneous transluminal coronary angioplasty/stent to proximal and  mid circumflex artery using drug-eluting stents. The patient will be on  aspirin and Brilinta at this time. Intense medical management and risk  factor modification is advised.    Conclusion 12/9/2020     · Three-vessel coronary artery disease with 90% mid LAD stenosis 80% distal circumflex stenosis and 100% mid RCA stenosis after diffuse disease in proximal and ostial RCA. · Patent previously deployed drug-eluting stents in mid circumflex and distal circumflex areas. · Borderline critical left main stenosis which is approximately 50% and has progressed from previous catheterization in 2017. · Procedure done via right radial artery without any complications. Given left main disease, CABG will be considered first.  We will take a surgical opinion with Dr. Consuello Osgood. I discussed with him on phone and he/his team will be seeing the patient.        I have personally reviewed patient's records available from hospital and other providers and incorporated findings in patient care. Cath films reviewed personally to evaluate-12/2020  Notes,lab,echo,cath  Coronary Findings 12/10/2020    Diagnostic  Dominance: Right  Left Main   Ost LM lesion, 20% stenosed. Dist LM lesion, 40% stenosed. Left Anterior Descending   Ost LAD to Prox LAD lesion, 50% stenosed. Mid LAD lesion, 99% stenosed. The lesion is type C. The lesion is moderately calcified. Left Circumflex   Prox Cx to Mid Cx lesion, 10% stenosed. The lesion was previously treated using a stent (unknown type). Intervention 12/10/2020    Mid LAD lesion   Angioplasty   Angioplasty using a standard balloon was performed prior to stent deployment. The balloon used was a CATH BLLN RX MINI TREK 2X12MM -- . Balloon inflated using multiple inflations inflation technique. The second balloon used was a CATH BLLN RX TREK [id number] -- .   Stent   A single stent was placed. Drug-eluting stent was successfully placed. The stent used was a STENT SYS COR [id number] -- XIENCE JUAN DIEGO. Inflation 1 - Maximum pressure: 12 janice; Time: 10 sec. The strut is well apposed. Angioplasty   Angioplasty using a standard balloon was performed following stent deployment.  The balloon used was a CATH BLLN COR DIL 2.5X8MM -- NC TREK RAPID-EXCHANGE. Post-Intervention Lesion Assessment   The intervention was successful. The guidewire crossed the lesion. Device was deployed. The pre-interventional distal flow is decreased (MYKE 2). Post-intervention MYKE flow is 3. Lesion had 15 mm of its length treated. There were no complications. There is a 0% residual stenosis post intervention. Interpretation Summary 12/09/2020    · Contrast used: DEFINITY. · LV: Calculated LVEF is 55%. Visually measured ejection fraction. Normal cavity size and systolic function (ejection fraction normal). Mild concentric hypertrophy. Age-appropriate left ventricular diastolic function. · PA: Pulmonary arterial systolic pressure is 26 mmHg. · Aorta: Dilated aortic root; diameter is 3.7 cm. Assessment         ICD-10-CM ICD-9-CM    1. Coronary artery disease of native artery of native heart with stable angina pectoris (HCC)  I25.118 414.01 NUCLEAR CARDIAC STRESS TEST     413.9 ECHO ADULT COMPLETE    Stable continue treatment monitor   2. S/P coronary artery stent placement  Z95.5 V45.82     Stable   3. Essential hypertension  I10 401.9 NUCLEAR CARDIAC STRESS TEST      ECHO ADULT COMPLETE    Stable continue treatment   4. Mixed hyperlipidemia  E78.2 272.2     Continue treatment lab with PCP   1/2018  Out of brillinta for 2 weeks-unable to afford  Changed to plavix-discussed compliance  5/2018  Stable mild cp  Out of norvasc 3 months  refilled  10/2018  Atypical chest pain. Clinically noncardiac. Will continue dual antiplatelet therapy. Norvasc increased for hypertension    6/2020  Cardiac status stable. Rare use of sublingual nitroglycerin stable pattern. Continue treatment. Check lipids  1/2021  Patient seen for follow-up after recent admission for unstable angina. Initial plan for CABG was reviewed. Patient became unstable suddenly and required LAD PCI for severe high-grade lesion that was new. CABG was not done.   Subsequently stabilized and was discharged home LVEF was normal.  Since discharge his hemoptysis and hematuria are improving. He has urology follow-up. Currently on Brilinta aspirin along with other medication I have reduce aspirin to 81 mg a day. Continue Brilinta. Prescriptions are sent. Blood pressure elevated in office today we will continue to monitor closely. I personally reviewed both his cardiac catheterization 9 right lesion appears chronic. Circumflex lesion will be tried medical management. He has some progression of left main disease to 45-50% which will be managed medically for now he will require close monitoring of symptoms. If he does stabilize on medical management will monitor clinically. If symptoms persist then further evaluation will be carried out. He has nausea at times and I have prescribed Zofran. We will monitor patient's status closely for any progression of symptomsleft main disease for symptom and likely do a stress testing in 6 to 12 months to assess need for further bypass surgery. Patient is advised to contact us if there is any recurrence of symptoms. 4 2021  Seen for follow-up of occasional chest pain feels fatigue and tired. Recent hemoglobin is normal.  Other labs unremarkable. We will continue to monitor continue dual antiplatelet therapy. Monitor closely  Medications Discontinued During This Encounter   Medication Reason    ciprofloxacin HCl (CIPRO) 500 mg tablet Not A Current Medication    promethazine (PHENERGAN) 12.5 mg tablet Not A Current Medication       Orders Placed This Encounter    ECHO ADULT COMPLETE     Standing Status:   Future     Standing Expiration Date:   4/15/2022     Order Specific Question:   Contrast Enhancement (Bubble Study, Definity, Optison) may be used if criteria listed in established evidence-based protocol has been identified. Answer:   Yes       Follow-up and Dispositions    · Return in about 3 months (around 7/15/2021).          Mitra Pinzon MD

## 2021-04-15 NOTE — PROGRESS NOTES
1. Have you been to the ER, urgent care clinic since your last visit? Hospitalized since your last visit? Yes When: 03/12/21 Where: [facility] Reason for visit: CP    2. Have you seen or consulted any other health care providers outside of the [address] [place] [address] since your last visit? Include any pap smears or colon screening.  No

## 2021-04-16 ENCOUNTER — VIRTUAL VISIT (OUTPATIENT)
Dept: ONCOLOGY | Age: 71
End: 2021-04-16
Payer: MEDICARE

## 2021-04-16 DIAGNOSIS — D64.9 ANEMIA, UNSPECIFIED TYPE: ICD-10-CM

## 2021-04-16 DIAGNOSIS — D50.9 IRON DEFICIENCY ANEMIA, UNSPECIFIED IRON DEFICIENCY ANEMIA TYPE: Primary | ICD-10-CM

## 2021-04-16 DIAGNOSIS — E53.8 B12 DEFICIENCY: ICD-10-CM

## 2021-04-16 PROCEDURE — 99442 PR PHYS/QHP TELEPHONE EVALUATION 11-20 MIN: CPT | Performed by: INTERNAL MEDICINE

## 2021-04-16 RX ORDER — ALBUTEROL SULFATE 0.83 MG/ML
2.5 SOLUTION RESPIRATORY (INHALATION) AS NEEDED
Status: CANCELLED
Start: 2021-04-16

## 2021-04-16 RX ORDER — EPINEPHRINE 1 MG/ML
0.3 INJECTION, SOLUTION, CONCENTRATE INTRAVENOUS AS NEEDED
Status: CANCELLED | OUTPATIENT
Start: 2021-04-16

## 2021-04-16 RX ORDER — CYANOCOBALAMIN 1000 UG/ML
1000 INJECTION, SOLUTION INTRAMUSCULAR; SUBCUTANEOUS ONCE
Status: CANCELLED
Start: 2021-04-16 | End: 2021-04-16

## 2021-04-16 RX ORDER — DIPHENHYDRAMINE HYDROCHLORIDE 50 MG/ML
50 INJECTION, SOLUTION INTRAMUSCULAR; INTRAVENOUS AS NEEDED
Status: CANCELLED
Start: 2021-04-16

## 2021-04-16 RX ORDER — ACETAMINOPHEN 325 MG/1
650 TABLET ORAL AS NEEDED
Status: CANCELLED
Start: 2021-04-16

## 2021-04-16 RX ORDER — DIPHENHYDRAMINE HYDROCHLORIDE 50 MG/ML
25 INJECTION, SOLUTION INTRAMUSCULAR; INTRAVENOUS AS NEEDED
Status: CANCELLED
Start: 2021-04-16

## 2021-04-16 RX ORDER — SODIUM CHLORIDE 9 MG/ML
10 INJECTION INTRAMUSCULAR; INTRAVENOUS; SUBCUTANEOUS AS NEEDED
Status: CANCELLED | OUTPATIENT
Start: 2021-04-16

## 2021-04-16 RX ORDER — HEPARIN 100 UNIT/ML
300-500 SYRINGE INTRAVENOUS AS NEEDED
Status: CANCELLED
Start: 2021-04-16

## 2021-04-16 RX ORDER — ONDANSETRON 2 MG/ML
8 INJECTION INTRAMUSCULAR; INTRAVENOUS AS NEEDED
Status: CANCELLED | OUTPATIENT
Start: 2021-04-16

## 2021-04-16 RX ORDER — SODIUM CHLORIDE 9 MG/ML
25 INJECTION, SOLUTION INTRAVENOUS CONTINUOUS
Status: CANCELLED | OUTPATIENT
Start: 2021-04-16

## 2021-04-16 RX ORDER — HYDROCORTISONE SODIUM SUCCINATE 100 MG/2ML
100 INJECTION, POWDER, FOR SOLUTION INTRAMUSCULAR; INTRAVENOUS AS NEEDED
Status: CANCELLED | OUTPATIENT
Start: 2021-04-16

## 2021-04-16 RX ORDER — SODIUM CHLORIDE 0.9 % (FLUSH) 0.9 %
10 SYRINGE (ML) INJECTION AS NEEDED
Status: CANCELLED | OUTPATIENT
Start: 2021-04-16

## 2021-04-16 NOTE — PROGRESS NOTES
Ander Covington is a 70 y.o. male, evaluated via audio-only technology on 4/16/2021 for Iron deficiency anemia. Assessment & Plan:   Iron deficiency anemia  Vitamin B12 deficiency   --  Past medical history significant of Hematuria and bloody vomiting at his recent admission. He reported he never tried any IV Iron products. He has follows up with Urology and GI. He stated his colonoscopy will be scheduled soon. -- 3/12/2021 CBC reported H/H 13.4/39.1 No recent Iron profiles or ferritin available for review. -- Today I have reviewed with the patient about recent lab reports. 3/23/2021 hemoglobin 13.5, hematocrit 38.9%, iron profile showed saturation 16% and ferritin 19, vitamin B12 135, folate 8.4. We have recommended IV Iron. I have discussed about potential side effect of IV iron. The patient was agreeable with the plan. He also was agreeable to have B12 replacement at our [facility]    Plan:  -- We will arrange IV Venofer x 3  -- The patient will also get Vit B12 replacement at [facility] as indicated. -- We will repeat CBC, CMP, Iron profiles, ferritin, B12/folate, and ret count prior to next visit. -- The patient will f/u with GI and Urology as scheduled. -- I will see the patient back in clinic in about 3 months. Always sooner if required. 12  Subjective:   Mr. Elli Rodas is a most pleasant 70y.o. year old male who was seen for consultation of iron deficiency anemia. The patient has a past medical history significant of Hematuria and bloody vomiting at his recent admission. He reported he never tried any IV Iron products. He has follows up with Urology and GI. H  He reported chronic fatigue. The patient otherwise has no other complaints. Denied fever, chills, night sweat, unintentional weight loss, skin lumps or bumps, acute bleeding or bruising issues.  Denied headache, acute vision change, dizziness, chest pain, worsen shortness of breath, palpitation, productive cough, nausea, vomiting, abdominal pain, altered bowel habits, dysuria, worsen bone pain or back pain, new focal numbness or weakness. Prior to Admission medications    Medication Sig Start Date End Date Taking? Authorizing Provider   glipiZIDE SR (GLUCOTROL XL) 5 mg CR tablet TAKE 1 TABLET BY MOUTH EVERY DAY 4/14/21   Jake INIGUEZ MD   metFORMIN (GLUCOPHAGE) 1,000 mg tablet TAKE 1 TABLET BY MOUTH TWICE A DAY WITH MEALS 4/6/21   George Drake MD   Januvia 100 mg tablet TAKE 1 TABLET BY MOUTH EVERY DAY 3/30/21   George Drake MD   cetirizine (ZyrTEC) 10 mg tablet Take 10 mg by mouth daily. Other, MD Abena   ramipriL (ALTACE) 10 mg capsule TAKE 1 CAPSULE BY MOUTH EVERY DAY 2/19/21   Coby Mantilla NP   nitroglycerin (NITROSTAT) 0.4 mg SL tablet DISSOLVE 1 TABLET UNDER TONGUE EVERY 5 MIN AS NEEDED FOR CHEST PAIN FOR UP TO 3 DOSES 1/31/21   Tae Hardy MD   omeprazole (PRILOSEC) 20 mg capsule TAKE 1 CAPSULE BY MOUTH EVERY DAY 1/26/21   George Drake MD   metFORMIN (GLUCOPHAGE) 1,000 mg tablet TAKE 1 TABLET BY MOUTH TWICE A DAY WITH MEALS 1/13/21   George Drake MD   ticagrelor (BRILINTA) 90 mg tablet Take 1 Tab by mouth two (2) times a day. Indications: treatment to prevent a heart attack 1/7/21   Tae Hardy MD   rosuvastatin (CRESTOR) 40 mg tablet Take 1 Tab by mouth nightly. Indications: treatment to prevent a heart attack 1/7/21   Tae Hardy MD   amLODIPine (NORVASC) 10 mg tablet Take 1 Tab by mouth daily. Indications: high blood pressure 1/7/21   Tae Hardy MD   aspirin delayed-release 81 mg tablet Take 1 Tab by mouth daily. 1/7/21   Tae Hardy MD   carvediloL (COREG) 25 mg tablet TAKE 1 TABLET BY MOUTH TWICE A DAY WITH FOOD 10/30/20   Mini Tinajero NP   Blood Pressure Monitor kit Check once a day 5/12/20   George Drake MD         Review of Systems   Constitutional: Positive for malaise/fatigue. Negative for chills, diaphoresis, fever and weight loss.    Respiratory: Negative for cough, hemoptysis, shortness of breath and wheezing. Cardiovascular: Negative for chest pain, palpitations and leg swelling. Gastrointestinal: Negative for abdominal pain, diarrhea, heartburn, nausea and vomiting. Genitourinary: Negative for dysuria, frequency, hematuria and urgency. Musculoskeletal: Negative for joint pain and myalgias. Skin: Negative for itching and rash. Neurological: Negative for dizziness, seizures, weakness and headaches. Psychiatric/Behavioral: Negative for depression. The patient does not have insomnia. Patient-Reported Vitals 4/16/2021   Patient-Reported Weight 205   Patient-Reported Height 6.0   Patient-Reported Pulse 78   Patient-Reported Temperature 98.6   Patient-Reported SpO2 N/A   Patient-Reported Systolic  771   Patient-Reported Diastolic 59        Eunice Oviedo, who was evaluated through a patient-initiated, synchronous (real-time) audio only encounter, and/or her healthcare decision maker, is aware that it is a billable service, with coverage as determined by his insurance carrier. He provided verbal consent to proceed: Yes. He has not had a related appointment within my department in the past 7 days or scheduled within the next 24 hours.       Total Time: minutes: 11-20 minutes    Nilda Fields MD

## 2021-05-01 DIAGNOSIS — D64.9 ANEMIA, UNSPECIFIED TYPE: ICD-10-CM

## 2021-05-14 DIAGNOSIS — D64.9 ANEMIA, UNSPECIFIED TYPE: ICD-10-CM

## 2021-05-15 DIAGNOSIS — E53.8 B12 DEFICIENCY: ICD-10-CM

## 2021-05-26 ENCOUNTER — DOCUMENTATION ONLY (OUTPATIENT)
Dept: ONCOLOGY | Age: 71
End: 2021-05-26

## 2021-05-26 NOTE — PROGRESS NOTES
Patient called [facility] trying to get infusion appt scheduled hasn't heard from anyone. Gave message to Joe. Patients wife called back [facility] @1:27pm stating that patient hasn't been scheduled for infusion and he has a hard time staying awake. Please call [phone] on her cell.

## 2021-06-04 ENCOUNTER — OFFICE VISIT (OUTPATIENT)
Dept: ORTHOPEDIC SURGERY | Age: 71
End: 2021-06-04
Payer: MEDICARE

## 2021-06-04 VITALS
WEIGHT: 201 LBS | HEIGHT: 72 IN | OXYGEN SATURATION: 99 % | TEMPERATURE: 97.8 F | RESPIRATION RATE: 16 BRPM | BODY MASS INDEX: 27.22 KG/M2 | HEART RATE: 71 BPM

## 2021-06-04 DIAGNOSIS — M25.511 RIGHT SHOULDER PAIN, UNSPECIFIED CHRONICITY: ICD-10-CM

## 2021-06-04 DIAGNOSIS — M19.011 PRIMARY OSTEOARTHRITIS, RIGHT SHOULDER: Primary | ICD-10-CM

## 2021-06-04 PROCEDURE — 1101F PT FALLS ASSESS-DOCD LE1/YR: CPT | Performed by: SPECIALIST

## 2021-06-04 PROCEDURE — G8419 CALC BMI OUT NRM PARAM NOF/U: HCPCS | Performed by: SPECIALIST

## 2021-06-04 PROCEDURE — G8427 DOCREV CUR MEDS BY ELIG CLIN: HCPCS | Performed by: SPECIALIST

## 2021-06-04 PROCEDURE — G8756 NO BP MEASURE DOC: HCPCS | Performed by: SPECIALIST

## 2021-06-04 PROCEDURE — 20610 DRAIN/INJ JOINT/BURSA W/O US: CPT | Performed by: SPECIALIST

## 2021-06-04 PROCEDURE — G8536 NO DOC ELDER MAL SCRN: HCPCS | Performed by: SPECIALIST

## 2021-06-04 PROCEDURE — 3017F COLORECTAL CA SCREEN DOC REV: CPT | Performed by: SPECIALIST

## 2021-06-04 PROCEDURE — G8432 DEP SCR NOT DOC, RNG: HCPCS | Performed by: SPECIALIST

## 2021-06-04 PROCEDURE — 99213 OFFICE O/P EST LOW 20 MIN: CPT | Performed by: SPECIALIST

## 2021-06-04 RX ORDER — BETAMETHASONE SODIUM PHOSPHATE AND BETAMETHASONE ACETATE 3; 3 MG/ML; MG/ML
3 INJECTION, SUSPENSION INTRA-ARTICULAR; INTRALESIONAL; INTRAMUSCULAR; SOFT TISSUE ONCE
Status: COMPLETED | OUTPATIENT
Start: 2021-06-04 | End: 2021-06-04

## 2021-06-04 RX ADMIN — BETAMETHASONE SODIUM PHOSPHATE AND BETAMETHASONE ACETATE 3 MG: 3; 3 INJECTION, SUSPENSION INTRA-ARTICULAR; INTRALESIONAL; INTRAMUSCULAR; SOFT TISSUE at 16:32

## 2021-06-04 NOTE — PROGRESS NOTES
Patient: Dustin Smith                MRN: [id number]       SSN: [id number]  YOB: 1950        AGE: 70 y.o. SEX: male    PCP: George Drake MD  06/04/21    Chief Complaint   Patient presents with    Shoulder Pain     right     HISTORY:  Dustin Smith is a 70 y.o. male who is seen for right shoulder pain. He responded well to a cortisone injection at last ov, but pain has returned. He has been experiencing right shoulder pain for the past several months. He does not recall any shoulder injury. He feels shoulder pain with overhead activities and at night. He is right handed. He was previously seen for hip pain. He has experiencing hip pain off an on for many years. He has received 5 cortisone injections in the past.   Pain Assessment  6/4/2021   Location of Pain Shoulder   Location Modifiers Right   Severity of Pain 5   Quality of Pain Sharp   Quality of Pain Comment -   Duration of Pain Persistent   Frequency of Pain Constant   Date Pain First Started -   Date Pain First Started Comment -   Aggravating Factors Other (Comment)   Aggravating Factors Comment raising the shoulder up   Limiting Behavior Yes   Relieving Factors Nothing   Result of Injury -     Occupation, etc:  Mr. Shari Novak is retired. 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 He lives in [place] with his wife. He has 5 daughters and 15 grandchildren--6 boys, 6 girls. He takes his grandchildren to [place] [facility] to go bass fishing. He recently visited his grandchildren in [place]. Mr. Shari Novak weighs 201 lbs and is 6'0\" tall. He is a metformin controlled diabetic.      Lab Results   Component Value Date/Time    Hemoglobin A1c 5.9 (H) 12/10/2020 03:00 AM     Weight Metrics 6/4/2021 4/15/2021 4/2/2021 3/23/2021 3/12/2021 3/11/2021 1/12/2021   Weight 201 lb 205 lb 6.4 oz 205 lb 12.8 oz 207 lb 200 lb 204 lb 204 lb   BMI 27.26 kg/m2 27.86 kg/m2 27.91 kg/m2 28.07 kg/m2 27.12 kg/m2 27.67 kg/m2 27.67 kg/m2       Patient Active Problem List   Diagnosis Code    Chronic pain syndrome G89.4    Coronary artery disease involving native coronary artery with unstable angina pectoris ([facility]) I25.110    Essential hypertension I10    Hyperlipidemia E78.5    Thyroid goiter E04.9    CAD (coronary artery disease) I25.10    S/P coronary artery stent placement Z95.5    Unstable angina pectoris ([facility]) I20.0    Controlled type 2 diabetes mellitus without complication, without long-term current use of insulin ([facility]) E11.9    Insomnia G47.00    Tubular adenoma of colon D12.6    Cannabis use, uncomplicated M38.34    Cervical disc disease M50.90    GERD (gastroesophageal reflux disease) K21.9    ACS (acute coronary syndrome) ([facility]) I24.9    B12 deficiency E53.8    Anemia D64.9     REVIEW OF SYSTEMS:    Constitutional Symptoms: Negative   Eyes: Negative   Ears, Nose, Throat and Mouth: Negative   Cardiovascular: Negative   Respiratory: Negative   Genitourinary: Per HPI   Gastrointestinal: Per HPI   Integumentary (Skin and/or Breast): Negative   Musculoskeletal: Per HPI   Endocrine/Rheumatologic: Negative   Neurological: Per HPI   Hematology/Lymphatic: Negative    Allergic/Immunologic: Negative   Phychiatric: Negative    Social History     Socioeconomic History    Marital status:      Spouse name: Not on file    Number of children: Not on file    Years of education: Not on file    Highest education level: Not on file   Occupational History    Not on file   Tobacco Use    Smoking status: Never Smoker    Smokeless tobacco: Never Used   Vaping Use    Vaping Use: Never used   Substance and Sexual Activity    Alcohol use: No    Drug use: No    Sexual activity: Yes     Partners: Female     Birth control/protection: None   Other Topics Concern    Not on file   Social History Narrative    Not on file     Social Determinants of Health     Financial Resource Strain:     Difficulty of Paying Living Expenses:    Food Insecurity:     Worried About Running Out of Food in the Last Year:     [address] in the Last Year:    Transportation Needs:     Lack of Transportation (Medical):  Lack of Transportation (Non-Medical):    Physical Activity:     Days of Exercise per Week:     Minutes of Exercise per Session:    Stress:     Feeling of Stress :    Social Connections:     Frequency of Communication with Friends and Family:     Frequency of Social Gatherings with Friends and Family:     Attends [employer] Services:     Active Member of Clubs or Organizations:     Attends Club or Organization Meetings:     Marital Status:    Intimate Partner Violence:     Fear of Current or Ex-Partner:     Emotionally Abused:     Physically Abused:     Sexually Abused:       No Known Allergies   Current Outpatient Medications   Medication Sig    glipiZIDE SR (GLUCOTROL XL) 5 mg CR tablet TAKE 1 TABLET BY MOUTH EVERY DAY    metFORMIN (GLUCOPHAGE) 1,000 mg tablet TAKE 1 TABLET BY MOUTH TWICE A DAY WITH MEALS    Januvia 100 mg tablet TAKE 1 TABLET BY MOUTH EVERY DAY    cetirizine (ZyrTEC) 10 mg tablet Take 10 mg by mouth daily.  ramipriL (ALTACE) 10 mg capsule TAKE 1 CAPSULE BY MOUTH EVERY DAY    nitroglycerin (NITROSTAT) 0.4 mg SL tablet DISSOLVE 1 TABLET UNDER TONGUE EVERY 5 MIN AS NEEDED FOR CHEST PAIN FOR UP TO 3 DOSES    omeprazole (PRILOSEC) 20 mg capsule TAKE 1 CAPSULE BY MOUTH EVERY DAY    ticagrelor (BRILINTA) 90 mg tablet Take 1 Tab by mouth two (2) times a day. Indications: treatment to prevent a heart attack    rosuvastatin (CRESTOR) 40 mg tablet Take 1 Tab by mouth nightly. Indications: treatment to prevent a heart attack    amLODIPine (NORVASC) 10 mg tablet Take 1 Tab by mouth daily. Indications: high blood pressure    aspirin delayed-release 81 mg tablet Take 1 Tab by mouth daily.     carvediloL (COREG) 25 mg tablet TAKE 1 TABLET BY MOUTH TWICE A DAY WITH FOOD    Blood Pressure Monitor kit Check once a day     No current facility-administered medications for this visit. PHYSICAL EXAMINATION:  Visit Vitals  Pulse 71   Temp 97.8 °F (36.6 °C)   Resp 16   Ht 6' (1.829 m)   Wt 201 lb (91.2 kg)   SpO2 99%   BMI 27.26 kg/m²      ORTHO EXAMINATION:  Examination Right shoulder Left shoulder   Skin Intact Intact   Effusion - -   Biceps deformity - -   Atrophy - -   AC joint tenderness + and osteophyte -   Acromial tenderness + -   Biceps tenderness - -   Forward flexion/Elevation  170   Active abduction  170   External rotation ROM 15 30   Internal rotation ROM 70 90   Apprehension - -   Impingement - -   Drop Arm Test - -   Neurovascular Intact Intact    Prominence of AC joint    TIME OUT:  Chart reviewed for the following:   IMeaghan MD, have reviewed the History, Physical and updated the Allergic reactions for R Chuy Acosta 38 performed immediately prior to start of procedure:  Maria E Kenyon MD, have performed the following reviews on Joellen Holder prior to the start of the procedure:          * Patient was identified by name and date of birth   * Agreement on procedure being performed was verified  * Risks and Benefits explained to the patient  * Procedure site verified and marked as necessary  * Patient was positioned for comfort  * Consent was obtained     Time: 4:17 PM      Date of procedure: 6/4/2021  Procedure performed by:  Meaghan Emerson MD  [facility] tolerated the procedure well with no complications. RADIOGRAPHS:  XR RIGHT SHOULDER 4/2/21 JORGE  IMPRESSION:  Three views - No fractures, moderate acromioclavicular narrowing, moderate glenohumeral narrowing, no calcific densities. IMPRESSION:      ICD-10-CM ICD-9-CM    1. Primary osteoarthritis, right shoulder  M19.011 715.11 DRAIN/INJECT LARGE JOINT/BURSA      betamethasone (CELESTONE) injection 3 mg   2.  Right shoulder pain, unspecified chronicity  M25.511 719.41 PLAN:    After discussing treatment options, patient's right shoulder was injected with 4 cc Marcaine and 1/2 cc Celestone. We discussed a possible need for right shoulder MRI in the future if pain continues. There is no need for surgery at this time. He will follow up as needed.       Scribed by Paula Coon ([facility]) as dictated by Maria C Suarez MD

## 2021-06-09 ENCOUNTER — APPOINTMENT (OUTPATIENT)
Dept: INFUSION THERAPY | Age: 71
End: 2021-06-09

## 2021-06-10 ENCOUNTER — HOSPITAL ENCOUNTER (OUTPATIENT)
Dept: INFUSION THERAPY | Age: 71
Discharge: HOME | End: 2021-06-10
Payer: MEDICARE

## 2021-06-10 VITALS
RESPIRATION RATE: 18 BRPM | HEIGHT: 72 IN | TEMPERATURE: 97.1 F | BODY MASS INDEX: 27.26 KG/M2 | DIASTOLIC BLOOD PRESSURE: 80 MMHG | OXYGEN SATURATION: 99 % | HEART RATE: 72 BPM | SYSTOLIC BLOOD PRESSURE: 139 MMHG

## 2021-06-10 DIAGNOSIS — D64.9 ANEMIA, UNSPECIFIED TYPE: ICD-10-CM

## 2021-06-10 DIAGNOSIS — E53.8 B12 DEFICIENCY: Primary | ICD-10-CM

## 2021-06-10 PROCEDURE — 96372 THER/PROPH/DIAG INJ SC/IM: CPT

## 2021-06-10 PROCEDURE — 96366 THER/PROPH/DIAG IV INF ADDON: CPT

## 2021-06-10 PROCEDURE — 96365 THER/PROPH/DIAG IV INF INIT: CPT

## 2021-06-10 PROCEDURE — 74011250636 HC RX REV CODE- 250/636: Performed by: INTERNAL MEDICINE

## 2021-06-10 RX ORDER — CYANOCOBALAMIN 1000 UG/ML
1000 INJECTION, SOLUTION INTRAMUSCULAR; SUBCUTANEOUS ONCE
Status: COMPLETED | OUTPATIENT
Start: 2021-06-10 | End: 2021-06-10

## 2021-06-10 RX ORDER — SODIUM CHLORIDE 0.9 % (FLUSH) 0.9 %
10 SYRINGE (ML) INJECTION AS NEEDED
Status: DISPENSED | OUTPATIENT
Start: 2021-06-10 | End: 2021-06-10

## 2021-06-10 RX ORDER — EPINEPHRINE 1 MG/ML
0.3 INJECTION, SOLUTION, CONCENTRATE INTRAVENOUS AS NEEDED
Status: CANCELLED | OUTPATIENT
Start: 2021-07-08

## 2021-06-10 RX ORDER — HYDROCORTISONE SODIUM SUCCINATE 100 MG/2ML
100 INJECTION, POWDER, FOR SOLUTION INTRAMUSCULAR; INTRAVENOUS AS NEEDED
Status: CANCELLED | OUTPATIENT
Start: 2021-06-24

## 2021-06-10 RX ORDER — DIPHENHYDRAMINE HYDROCHLORIDE 50 MG/ML
25 INJECTION, SOLUTION INTRAMUSCULAR; INTRAVENOUS AS NEEDED
Status: CANCELLED
Start: 2021-07-08

## 2021-06-10 RX ORDER — CYANOCOBALAMIN 1000 UG/ML
1000 INJECTION, SOLUTION INTRAMUSCULAR; SUBCUTANEOUS ONCE
Status: CANCELLED
Start: 2021-07-08 | End: 2021-07-08

## 2021-06-10 RX ORDER — ONDANSETRON 2 MG/ML
8 INJECTION INTRAMUSCULAR; INTRAVENOUS AS NEEDED
Status: CANCELLED | OUTPATIENT
Start: 2021-07-08

## 2021-06-10 RX ORDER — ALBUTEROL SULFATE 0.83 MG/ML
2.5 SOLUTION RESPIRATORY (INHALATION) AS NEEDED
Status: CANCELLED
Start: 2021-07-08

## 2021-06-10 RX ORDER — SODIUM CHLORIDE 9 MG/ML
25 INJECTION, SOLUTION INTRAVENOUS CONTINUOUS
Status: DISPENSED | OUTPATIENT
Start: 2021-06-10 | End: 2021-06-10

## 2021-06-10 RX ORDER — HYDROCORTISONE SODIUM SUCCINATE 100 MG/2ML
100 INJECTION, POWDER, FOR SOLUTION INTRAMUSCULAR; INTRAVENOUS AS NEEDED
Status: CANCELLED | OUTPATIENT
Start: 2021-07-08

## 2021-06-10 RX ORDER — SODIUM CHLORIDE 0.9 % (FLUSH) 0.9 %
10 SYRINGE (ML) INJECTION AS NEEDED
Status: CANCELLED | OUTPATIENT
Start: 2021-06-24

## 2021-06-10 RX ORDER — SODIUM CHLORIDE 9 MG/ML
10 INJECTION INTRAMUSCULAR; INTRAVENOUS; SUBCUTANEOUS AS NEEDED
Status: CANCELLED | OUTPATIENT
Start: 2021-06-24

## 2021-06-10 RX ORDER — DIPHENHYDRAMINE HYDROCHLORIDE 50 MG/ML
50 INJECTION, SOLUTION INTRAMUSCULAR; INTRAVENOUS AS NEEDED
Status: CANCELLED
Start: 2021-06-24

## 2021-06-10 RX ORDER — ALBUTEROL SULFATE 0.83 MG/ML
2.5 SOLUTION RESPIRATORY (INHALATION) AS NEEDED
Status: CANCELLED
Start: 2021-06-24

## 2021-06-10 RX ORDER — ONDANSETRON 2 MG/ML
8 INJECTION INTRAMUSCULAR; INTRAVENOUS AS NEEDED
Status: CANCELLED | OUTPATIENT
Start: 2021-06-24

## 2021-06-10 RX ORDER — DIPHENHYDRAMINE HYDROCHLORIDE 50 MG/ML
50 INJECTION, SOLUTION INTRAMUSCULAR; INTRAVENOUS AS NEEDED
Status: CANCELLED
Start: 2021-07-08

## 2021-06-10 RX ORDER — EPINEPHRINE 1 MG/ML
0.3 INJECTION, SOLUTION, CONCENTRATE INTRAVENOUS AS NEEDED
Status: CANCELLED | OUTPATIENT
Start: 2021-06-24

## 2021-06-10 RX ORDER — ACETAMINOPHEN 325 MG/1
650 TABLET ORAL AS NEEDED
Status: CANCELLED
Start: 2021-07-08

## 2021-06-10 RX ORDER — HEPARIN 100 UNIT/ML
300-500 SYRINGE INTRAVENOUS AS NEEDED
Status: CANCELLED
Start: 2021-06-24

## 2021-06-10 RX ORDER — ACETAMINOPHEN 325 MG/1
650 TABLET ORAL AS NEEDED
Status: CANCELLED
Start: 2021-06-24

## 2021-06-10 RX ORDER — SODIUM CHLORIDE 9 MG/ML
25 INJECTION, SOLUTION INTRAVENOUS CONTINUOUS
Status: CANCELLED | OUTPATIENT
Start: 2021-06-24

## 2021-06-10 RX ORDER — DIPHENHYDRAMINE HYDROCHLORIDE 50 MG/ML
25 INJECTION, SOLUTION INTRAMUSCULAR; INTRAVENOUS AS NEEDED
Status: CANCELLED
Start: 2021-06-24

## 2021-06-10 RX ADMIN — CYANOCOBALAMIN 1000 MCG: 1000 INJECTION, SOLUTION INTRAMUSCULAR at 08:20

## 2021-06-10 RX ADMIN — Medication 20 ML: at 10:05

## 2021-06-10 RX ADMIN — Medication 10 ML: at 08:15

## 2021-06-10 RX ADMIN — IRON SUCROSE 300 MG: 20 INJECTION, SOLUTION INTRAVENOUS at 08:19

## 2021-06-10 NOTE — PROGRESS NOTES
[facility] Progress Note    Date: Thelma 10, 2021    Name: Orlin Zepeda    MRN: [id number]         : 1950    VENOFER #1 OF 3 & B-12 INJECTION    Mr. Emilee Avila was assessed and education was provided. Venofer & B-12 carenotes read to patient and all questions answered. Mr. Marthena Kanner vitals were reviewed and patient was observed for 5 minutes prior to treatment. Patient Vitals for the past 12 hrs:   Temp Pulse Resp BP SpO2   06/10/21 1029 97.1 °F (36.2 °C) 72 18 139/80 99 %   06/10/21 1005 98 °F (36.7 °C) 70 18 (!) 147/79 98 %   06/10/21 0804 (!) 96.5 °F (35.8 °C) 73 18 132/79 97 %       Visit Vitals  /80 (BP 1 Location: Left upper arm, BP Patient Position: At rest)   Pulse 72   Temp 97.1 °F (36.2 °C)   Resp 18   Ht 6' (1.829 m)   SpO2 99%   BMI 27.26 kg/m²   #22 PIV started in right wrist x2 attempts, brisk blood return and flushed with ease. Venofer 300mg IV infusing over 90 minutes as ordered. B-12 IM injections given in right deltoid, bandaid applied. Upon completion of venofer line flushed with 20cc NS. Vitals completed. Mr. Emilee Avila observed for 30 minutes. Upon completion of 30 minute observation vitals taken again. Vitals WNL and patient denies any allergic reaction. PIV removed and 2x2 and coban applied. Mr. Emilee Avila tolerated the infusion, and had no complaints. Patient armband removed and shredded. Mr. Emilee Avila was discharged from [place] [address] in stable condition at 1035. He is to return on 21 at 1100 for his next [facility] 46 appointment.     Elle Beach RN  Thelma 10, 2021  11:42 AM

## 2021-06-12 DIAGNOSIS — E53.8 B12 DEFICIENCY: ICD-10-CM

## 2021-06-22 DIAGNOSIS — E11.9 WELL CONTROLLED DIABETES MELLITUS (HCC): ICD-10-CM

## 2021-06-23 ENCOUNTER — APPOINTMENT (OUTPATIENT)
Dept: INFUSION THERAPY | Age: 71
End: 2021-06-23

## 2021-06-23 DIAGNOSIS — E11.9 WELL CONTROLLED DIABETES MELLITUS (HCC): ICD-10-CM

## 2021-06-23 RX ORDER — AMLODIPINE BESYLATE 10 MG/1
TABLET ORAL
Qty: 90 TABLET | Refills: 1 | Status: SHIPPED | OUTPATIENT
Start: 2021-06-23 | End: 2021-12-17

## 2021-06-23 RX ORDER — METFORMIN HYDROCHLORIDE 1000 MG/1
TABLET ORAL
Qty: 180 TABLET | Refills: 0 | Status: SHIPPED | OUTPATIENT
Start: 2021-06-23 | End: 2021-09-17 | Stop reason: SDUPTHER

## 2021-06-23 RX ORDER — SITAGLIPTIN 100 MG/1
TABLET, FILM COATED ORAL
Qty: 90 TABLET | Refills: 0 | Status: SHIPPED | OUTPATIENT
Start: 2021-06-23 | End: 2021-09-17 | Stop reason: SDUPTHER

## 2021-06-24 ENCOUNTER — HOSPITAL ENCOUNTER (OUTPATIENT)
Dept: INFUSION THERAPY | Age: 71
Discharge: HOME | End: 2021-06-24
Payer: MEDICARE

## 2021-06-24 VITALS
RESPIRATION RATE: 18 BRPM | TEMPERATURE: 97 F | HEART RATE: 73 BPM | SYSTOLIC BLOOD PRESSURE: 128 MMHG | DIASTOLIC BLOOD PRESSURE: 73 MMHG | OXYGEN SATURATION: 98 %

## 2021-06-24 DIAGNOSIS — D64.9 ANEMIA, UNSPECIFIED TYPE: Primary | ICD-10-CM

## 2021-06-24 DIAGNOSIS — E53.8 B12 DEFICIENCY: ICD-10-CM

## 2021-06-24 PROCEDURE — 74011250636 HC RX REV CODE- 250/636: Performed by: INTERNAL MEDICINE

## 2021-06-24 PROCEDURE — 96365 THER/PROPH/DIAG IV INF INIT: CPT

## 2021-06-24 PROCEDURE — 96366 THER/PROPH/DIAG IV INF ADDON: CPT

## 2021-06-24 RX ORDER — DIPHENHYDRAMINE HYDROCHLORIDE 50 MG/ML
25 INJECTION, SOLUTION INTRAMUSCULAR; INTRAVENOUS AS NEEDED
Status: CANCELLED
Start: 2021-07-08

## 2021-06-24 RX ORDER — DIPHENHYDRAMINE HYDROCHLORIDE 50 MG/ML
50 INJECTION, SOLUTION INTRAMUSCULAR; INTRAVENOUS AS NEEDED
Status: CANCELLED
Start: 2021-07-08

## 2021-06-24 RX ORDER — SODIUM CHLORIDE 0.9 % (FLUSH) 0.9 %
10 SYRINGE (ML) INJECTION AS NEEDED
Status: DISPENSED | OUTPATIENT
Start: 2021-06-24 | End: 2021-06-24

## 2021-06-24 RX ORDER — SODIUM CHLORIDE 0.9 % (FLUSH) 0.9 %
10 SYRINGE (ML) INJECTION AS NEEDED
Status: CANCELLED | OUTPATIENT
Start: 2021-07-08

## 2021-06-24 RX ORDER — SODIUM CHLORIDE 9 MG/ML
10 INJECTION INTRAMUSCULAR; INTRAVENOUS; SUBCUTANEOUS AS NEEDED
Status: CANCELLED | OUTPATIENT
Start: 2021-07-08

## 2021-06-24 RX ORDER — ALBUTEROL SULFATE 0.83 MG/ML
2.5 SOLUTION RESPIRATORY (INHALATION) AS NEEDED
Status: CANCELLED
Start: 2021-07-08

## 2021-06-24 RX ORDER — HYDROCORTISONE SODIUM SUCCINATE 100 MG/2ML
100 INJECTION, POWDER, FOR SOLUTION INTRAMUSCULAR; INTRAVENOUS AS NEEDED
Status: CANCELLED | OUTPATIENT
Start: 2021-07-08

## 2021-06-24 RX ORDER — ONDANSETRON 2 MG/ML
8 INJECTION INTRAMUSCULAR; INTRAVENOUS AS NEEDED
Status: CANCELLED | OUTPATIENT
Start: 2021-07-08

## 2021-06-24 RX ORDER — ACETAMINOPHEN 325 MG/1
650 TABLET ORAL AS NEEDED
Status: CANCELLED
Start: 2021-07-08

## 2021-06-24 RX ORDER — SODIUM CHLORIDE 9 MG/ML
25 INJECTION, SOLUTION INTRAVENOUS CONTINUOUS
Status: CANCELLED | OUTPATIENT
Start: 2021-07-08

## 2021-06-24 RX ORDER — EPINEPHRINE 1 MG/ML
0.3 INJECTION, SOLUTION, CONCENTRATE INTRAVENOUS AS NEEDED
Status: CANCELLED | OUTPATIENT
Start: 2021-07-08

## 2021-06-24 RX ORDER — SODIUM CHLORIDE 9 MG/ML
25 INJECTION, SOLUTION INTRAVENOUS CONTINUOUS
Status: DISPENSED | OUTPATIENT
Start: 2021-06-24 | End: 2021-06-24

## 2021-06-24 RX ORDER — HEPARIN 100 UNIT/ML
300-500 SYRINGE INTRAVENOUS AS NEEDED
Status: CANCELLED
Start: 2021-07-08

## 2021-06-24 RX ADMIN — IRON SUCROSE 300 MG: 20 INJECTION, SOLUTION INTRAVENOUS at 11:17

## 2021-06-24 RX ADMIN — SODIUM CHLORIDE 25 ML/HR: 900 INJECTION, SOLUTION INTRAVENOUS at 11:17

## 2021-06-24 RX ADMIN — Medication 10 ML: at 13:11

## 2021-06-24 NOTE — PROGRESS NOTES
[facility] Progress Note    Date: 2021    Name: Milvia Contreras    MRN: [id number]         : 1950    VENOFER #2 of 3    Mr. Matias Monsalve arrived ambulatory to [facility] at 1110. Pt was assessed and education was provided. Mr. Prieto Charles vitals were reviewed and patient was observed for 5 minutes prior to treatment. Visit Vitals  /74 (BP 1 Location: Left upper arm, BP Patient Position: At rest)   Pulse 78   Temp 97 °F (36.1 °C)   Resp 18   SpO2 98%       #24 PIV started in right AC x 1 attempt. Brisk blood return and flushed with ease. Venofer 300mg IV infusing over 90 minutes as ordered. Upon completion of venofer line flushed with 20cc NS. Vitals completed. Patient Vitals for the past 12 hrs:   Temp Pulse Resp BP SpO2   21 1314  73  128/73    21 1109 97 °F (36.1 °C) 78 18 130/74 98 %     PIV removed, cath intact. Gauze/coban to site. Mr. Matias Monsalve tolerated the infusion, and had no complaints. Patient armband removed and shredded. Mr. Matias Monsalve was discharged from [place] [address] in stable condition at 1315. He is to return on 21 at 1100 for his next [facility] 46 appointment.       Maynor Dobbins RN  2021

## 2021-07-08 ENCOUNTER — HOSPITAL ENCOUNTER (OUTPATIENT)
Dept: INFUSION THERAPY | Age: 71
Discharge: HOME | End: 2021-07-08
Payer: MEDICARE

## 2021-07-08 ENCOUNTER — HOSPITAL ENCOUNTER (OUTPATIENT)
Dept: LAB | Age: 71
Discharge: HOME | End: 2021-07-08
Payer: MEDICARE

## 2021-07-08 ENCOUNTER — LAB ONLY (OUTPATIENT)
Dept: ONCOLOGY | Age: 71
End: 2021-07-08

## 2021-07-08 VITALS
HEART RATE: 75 BPM | OXYGEN SATURATION: 96 % | SYSTOLIC BLOOD PRESSURE: 138 MMHG | TEMPERATURE: 97.4 F | DIASTOLIC BLOOD PRESSURE: 80 MMHG | RESPIRATION RATE: 18 BRPM

## 2021-07-08 DIAGNOSIS — D64.9 ANEMIA, UNSPECIFIED TYPE: ICD-10-CM

## 2021-07-08 DIAGNOSIS — E53.8 B12 DEFICIENCY: ICD-10-CM

## 2021-07-08 DIAGNOSIS — E53.8 B12 DEFICIENCY: Primary | ICD-10-CM

## 2021-07-08 DIAGNOSIS — D50.9 IRON DEFICIENCY ANEMIA, UNSPECIFIED IRON DEFICIENCY ANEMIA TYPE: Primary | ICD-10-CM

## 2021-07-08 LAB
ALBUMIN SERPL-MCNC: 4 G/DL (ref 3.4–5)
ALBUMIN/GLOB SERPL: 1.2 {RATIO} (ref 0.8–1.7)
ALP SERPL-CCNC: 72 U/L (ref 45–117)
ALT SERPL-CCNC: 42 U/L (ref 16–61)
ANION GAP SERPL CALC-SCNC: 7 MMOL/L (ref 3–18)
AST SERPL-CCNC: 22 U/L (ref 10–38)
BASOPHILS # BLD: 0.1 K/UL (ref 0–0.1)
BASOPHILS NFR BLD: 1 % (ref 0–2)
BILIRUB SERPL-MCNC: 0.2 MG/DL (ref 0.2–1)
BUN SERPL-MCNC: 18 MG/DL (ref 7–18)
BUN/CREAT SERPL: 23 (ref 12–20)
CALCIUM SERPL-MCNC: 9.1 MG/DL (ref 8.5–10.1)
CHLORIDE SERPL-SCNC: 107 MMOL/L (ref 100–111)
CO2 SERPL-SCNC: 25 MMOL/L (ref 21–32)
CREAT SERPL-MCNC: 0.8 MG/DL (ref 0.6–1.3)
DIFFERENTIAL METHOD BLD: ABNORMAL
EOSINOPHIL # BLD: 0.2 K/UL (ref 0–0.4)
EOSINOPHIL NFR BLD: 2 % (ref 0–5)
ERYTHROCYTE [DISTWIDTH] IN BLOOD BY AUTOMATED COUNT: 15.6 % (ref 11.6–14.5)
FERRITIN SERPL-MCNC: 149 NG/ML (ref 8–388)
FOLATE SERPL-MCNC: 11.4 NG/ML (ref 3.1–17.5)
GLOBULIN SER CALC-MCNC: 3.3 G/DL (ref 2–4)
GLUCOSE SERPL-MCNC: 138 MG/DL (ref 74–99)
HCT VFR BLD AUTO: 41.5 % (ref 36–48)
HGB BLD-MCNC: 13.4 G/DL (ref 13–16)
IRON SATN MFR SERPL: 106 % (ref 20–50)
IRON SERPL-MCNC: 611 UG/DL (ref 50–175)
LYMPHOCYTES # BLD: 2.2 K/UL (ref 0.9–3.6)
LYMPHOCYTES NFR BLD: 28 % (ref 21–52)
MCH RBC QN AUTO: 30.1 PG (ref 24–34)
MCHC RBC AUTO-ENTMCNC: 32.3 G/DL (ref 31–37)
MCV RBC AUTO: 93.3 FL (ref 74–97)
MONOCYTES # BLD: 0.7 K/UL (ref 0.05–1.2)
MONOCYTES NFR BLD: 9 % (ref 3–10)
NEUTS SEG # BLD: 4.7 K/UL (ref 1.8–8)
NEUTS SEG NFR BLD: 60 % (ref 40–73)
PLATELET # BLD AUTO: 184 K/UL (ref 135–420)
PMV BLD AUTO: 10.1 FL (ref 9.2–11.8)
POTASSIUM SERPL-SCNC: 4.2 MMOL/L (ref 3.5–5.5)
PROT SERPL-MCNC: 7.3 G/DL (ref 6.4–8.2)
RBC # BLD AUTO: 4.45 M/UL (ref 4.35–5.65)
SODIUM SERPL-SCNC: 139 MMOL/L (ref 136–145)
TIBC SERPL-MCNC: 578 UG/DL (ref 250–450)
VIT B12 SERPL-MCNC: >2000 PG/ML (ref 211–911)
WBC # BLD AUTO: 7.9 K/UL (ref 4.6–13.2)

## 2021-07-08 PROCEDURE — 96365 THER/PROPH/DIAG IV INF INIT: CPT

## 2021-07-08 PROCEDURE — 82728 ASSAY OF FERRITIN: CPT

## 2021-07-08 PROCEDURE — 82607 VITAMIN B-12: CPT

## 2021-07-08 PROCEDURE — 80053 COMPREHEN METABOLIC PANEL: CPT

## 2021-07-08 PROCEDURE — 85025 COMPLETE CBC W/AUTO DIFF WBC: CPT

## 2021-07-08 PROCEDURE — 96366 THER/PROPH/DIAG IV INF ADDON: CPT

## 2021-07-08 PROCEDURE — 83540 ASSAY OF IRON: CPT

## 2021-07-08 PROCEDURE — 36415 COLL VENOUS BLD VENIPUNCTURE: CPT

## 2021-07-08 PROCEDURE — 74011250636 HC RX REV CODE- 250/636: Performed by: INTERNAL MEDICINE

## 2021-07-08 RX ORDER — DIPHENHYDRAMINE HYDROCHLORIDE 50 MG/ML
25 INJECTION, SOLUTION INTRAMUSCULAR; INTRAVENOUS AS NEEDED
Status: CANCELLED
Start: 2021-08-05

## 2021-07-08 RX ORDER — HYDROCORTISONE SODIUM SUCCINATE 100 MG/2ML
100 INJECTION, POWDER, FOR SOLUTION INTRAMUSCULAR; INTRAVENOUS AS NEEDED
Status: CANCELLED | OUTPATIENT
Start: 2021-08-05

## 2021-07-08 RX ORDER — SODIUM CHLORIDE 0.9 % (FLUSH) 0.9 %
10 SYRINGE (ML) INJECTION AS NEEDED
Status: DISPENSED | OUTPATIENT
Start: 2021-07-08 | End: 2021-07-08

## 2021-07-08 RX ORDER — DIPHENHYDRAMINE HYDROCHLORIDE 50 MG/ML
25 INJECTION, SOLUTION INTRAMUSCULAR; INTRAVENOUS AS NEEDED
Status: CANCELLED
Start: 2021-07-08

## 2021-07-08 RX ORDER — DIPHENHYDRAMINE HYDROCHLORIDE 50 MG/ML
50 INJECTION, SOLUTION INTRAMUSCULAR; INTRAVENOUS AS NEEDED
Status: CANCELLED
Start: 2021-07-08

## 2021-07-08 RX ORDER — EPINEPHRINE 1 MG/ML
0.3 INJECTION, SOLUTION, CONCENTRATE INTRAVENOUS AS NEEDED
Status: CANCELLED | OUTPATIENT
Start: 2021-07-08

## 2021-07-08 RX ORDER — HYDROCORTISONE SODIUM SUCCINATE 100 MG/2ML
100 INJECTION, POWDER, FOR SOLUTION INTRAMUSCULAR; INTRAVENOUS AS NEEDED
Status: CANCELLED | OUTPATIENT
Start: 2021-07-08

## 2021-07-08 RX ORDER — DIPHENHYDRAMINE HYDROCHLORIDE 50 MG/ML
50 INJECTION, SOLUTION INTRAMUSCULAR; INTRAVENOUS AS NEEDED
Status: CANCELLED
Start: 2021-08-05

## 2021-07-08 RX ORDER — ACETAMINOPHEN 325 MG/1
650 TABLET ORAL AS NEEDED
Status: CANCELLED
Start: 2021-08-05

## 2021-07-08 RX ORDER — ONDANSETRON 2 MG/ML
8 INJECTION INTRAMUSCULAR; INTRAVENOUS AS NEEDED
Status: CANCELLED | OUTPATIENT
Start: 2021-08-05

## 2021-07-08 RX ORDER — SODIUM CHLORIDE 0.9 % (FLUSH) 0.9 %
10 SYRINGE (ML) INJECTION AS NEEDED
Status: CANCELLED | OUTPATIENT
Start: 2021-07-08

## 2021-07-08 RX ORDER — ALBUTEROL SULFATE 0.83 MG/ML
2.5 SOLUTION RESPIRATORY (INHALATION) AS NEEDED
Status: CANCELLED
Start: 2021-08-05

## 2021-07-08 RX ORDER — CYANOCOBALAMIN 1000 UG/ML
1000 INJECTION, SOLUTION INTRAMUSCULAR; SUBCUTANEOUS ONCE
Status: COMPLETED | OUTPATIENT
Start: 2021-07-08 | End: 2021-07-08

## 2021-07-08 RX ORDER — ACETAMINOPHEN 325 MG/1
650 TABLET ORAL AS NEEDED
Status: CANCELLED
Start: 2021-07-08

## 2021-07-08 RX ORDER — ONDANSETRON 2 MG/ML
8 INJECTION INTRAMUSCULAR; INTRAVENOUS AS NEEDED
Status: CANCELLED | OUTPATIENT
Start: 2021-07-08

## 2021-07-08 RX ORDER — SODIUM CHLORIDE 9 MG/ML
10 INJECTION INTRAMUSCULAR; INTRAVENOUS; SUBCUTANEOUS AS NEEDED
Status: CANCELLED | OUTPATIENT
Start: 2021-07-08

## 2021-07-08 RX ORDER — SODIUM CHLORIDE 9 MG/ML
25 INJECTION, SOLUTION INTRAVENOUS CONTINUOUS
Status: CANCELLED | OUTPATIENT
Start: 2021-07-08

## 2021-07-08 RX ORDER — ALBUTEROL SULFATE 0.83 MG/ML
2.5 SOLUTION RESPIRATORY (INHALATION) AS NEEDED
Status: CANCELLED
Start: 2021-07-08

## 2021-07-08 RX ORDER — HEPARIN 100 UNIT/ML
300-500 SYRINGE INTRAVENOUS AS NEEDED
Status: CANCELLED
Start: 2021-07-08

## 2021-07-08 RX ORDER — CYANOCOBALAMIN 1000 UG/ML
1000 INJECTION, SOLUTION INTRAMUSCULAR; SUBCUTANEOUS ONCE
Status: CANCELLED
Start: 2021-08-05 | End: 2021-08-05

## 2021-07-08 RX ORDER — EPINEPHRINE 1 MG/ML
0.3 INJECTION, SOLUTION, CONCENTRATE INTRAVENOUS AS NEEDED
Status: CANCELLED | OUTPATIENT
Start: 2021-08-05

## 2021-07-08 RX ADMIN — Medication 10 ML: at 11:23

## 2021-07-08 RX ADMIN — IRON SUCROSE 400 MG: 20 INJECTION, SOLUTION INTRAVENOUS at 08:32

## 2021-07-08 RX ADMIN — CYANOCOBALAMIN 1000 MCG: 1000 INJECTION, SOLUTION INTRAMUSCULAR at 08:35

## 2021-07-08 NOTE — PROGRESS NOTES
[facility] Progress Note    Date: 2021    Name: Patricio Ferraro    MRN: [id number]         : 1950    VENOFER #3 OF 3 & B-12 INJECTION    Mr. Lesli Ibarra arrived ambulatory to [facility] at 0815. Mr. Lesli Ibarra was assessed and education was provided. Mr. Rivka Garcia vitals were reviewed and patient was observed for 5 minutes prior to treatment. Patient Vitals for the past 12 hrs:   Temp Pulse Resp BP SpO2   21 0815 97.3 °F (36.3 °C) 82 18 135/79 96 %       Visit Vitals  /79 (BP 1 Location: Right upper arm)   Pulse 82   Temp 97.3 °F (36.3 °C)   Resp 18   SpO2 96%     #24 PIV started in right hand x1 attempt, brisk blood return and flushed with ease. Venofer 400mg IV infusing over 120 minutes as ordered. B-12 IM injections given in right arm, bandaid applied. PIV accidentally dislodged prior to flushing, 2x2 and coban applied to area. Hand elevated for 5-10 minutes. No signs or symptoms of complication noted to area. Mr. Lesli Ibarra tolerated the infusion, and had no complaints. Patient armband removed and shredded. Mr. Lesli Ibarra was discharged from [place] [address] in stable condition at 1130. He is to return on 21 at 0930 for his next b-12 appointment.       Phoenix Lopez  2021

## 2021-07-12 DIAGNOSIS — E11.65 POORLY CONTROLLED DIABETES MELLITUS (HCC): ICD-10-CM

## 2021-07-12 RX ORDER — GLIPIZIDE 5 MG/1
TABLET, FILM COATED, EXTENDED RELEASE ORAL
Qty: 90 TABLET | Refills: 0 | Status: SHIPPED | OUTPATIENT
Start: 2021-07-12 | End: 2021-11-24 | Stop reason: SDUPTHER

## 2021-07-14 ENCOUNTER — OFFICE VISIT (OUTPATIENT)
Dept: CARDIOLOGY CLINIC | Age: 71
End: 2021-07-14
Payer: MEDICARE

## 2021-07-14 VITALS
SYSTOLIC BLOOD PRESSURE: 125 MMHG | HEIGHT: 72 IN | HEART RATE: 78 BPM | OXYGEN SATURATION: 98 % | DIASTOLIC BLOOD PRESSURE: 62 MMHG | WEIGHT: 209 LBS | BODY MASS INDEX: 28.31 KG/M2

## 2021-07-14 DIAGNOSIS — Z95.5 S/P CORONARY ARTERY STENT PLACEMENT: ICD-10-CM

## 2021-07-14 DIAGNOSIS — I25.118 CORONARY ARTERY DISEASE OF NATIVE ARTERY OF NATIVE HEART WITH STABLE ANGINA PECTORIS (HCC): Primary | ICD-10-CM

## 2021-07-14 DIAGNOSIS — E78.2 MIXED HYPERLIPIDEMIA: ICD-10-CM

## 2021-07-14 DIAGNOSIS — I10 ESSENTIAL HYPERTENSION: ICD-10-CM

## 2021-07-14 PROCEDURE — 1101F PT FALLS ASSESS-DOCD LE1/YR: CPT | Performed by: INTERNAL MEDICINE

## 2021-07-14 PROCEDURE — G8752 SYS BP LESS 140: HCPCS | Performed by: INTERNAL MEDICINE

## 2021-07-14 PROCEDURE — G8754 DIAS BP LESS 90: HCPCS | Performed by: INTERNAL MEDICINE

## 2021-07-14 PROCEDURE — 3017F COLORECTAL CA SCREEN DOC REV: CPT | Performed by: INTERNAL MEDICINE

## 2021-07-14 PROCEDURE — G8419 CALC BMI OUT NRM PARAM NOF/U: HCPCS | Performed by: INTERNAL MEDICINE

## 2021-07-14 PROCEDURE — 99214 OFFICE O/P EST MOD 30 MIN: CPT | Performed by: INTERNAL MEDICINE

## 2021-07-14 PROCEDURE — G8536 NO DOC ELDER MAL SCRN: HCPCS | Performed by: INTERNAL MEDICINE

## 2021-07-14 PROCEDURE — G8432 DEP SCR NOT DOC, RNG: HCPCS | Performed by: INTERNAL MEDICINE

## 2021-07-14 PROCEDURE — G8427 DOCREV CUR MEDS BY ELIG CLIN: HCPCS | Performed by: INTERNAL MEDICINE

## 2021-07-14 RX ORDER — CLOPIDOGREL BISULFATE 75 MG/1
75 TABLET ORAL DAILY
Qty: 90 TABLET | Refills: 3 | Status: SHIPPED | OUTPATIENT
Start: 2021-07-14 | End: 2022-07-08

## 2021-07-14 NOTE — PROGRESS NOTES
HISTORY OF PRESENT ILLNESS  David Zepeda is a 70 y.o. male. Patient with cad,htn,hyperlipidemia,dm. On follow up patient denies any chest pains,sob, palpitation or other significant symptoms. 12/2017  Admitted with unstable angina-had pci  7/2018. Patient was in emergency room with atypical chest pain. No acute EKG changes cardiac enzymes negative CTA and chest x-ray reports reviewed. Labs are negative for cardiac workup  5/2019. Recent ER visit with fall and concussion. Records reviewed. 6/2020  Patient seen for follow-up. He has rare occasion of chest pain he has used 1 nitroglycerin since last evaluation. No other complaints  1/2021  Patient is here for follow-up he was in hospital with  1. Unstable angina- s/p PCI of the LAD.  Status post mechanical ventilation. continue DAPT brilinta and asa.  Chest pain has resolved. 2. Acute respiratory failure- extubated   3. Hypotension-resolved.  Off pressors. 4. Hypertension: Blood pressure increasing gradually as expected.  Continue BID amlodipine. Elevated this afternoon - likely due to pain. 5. CAD-cardiac cath 12/2017 showed  Triple-vessel coronary artery disease - For CTS evaluation  6. Hyperlipidemia- LDL 74 11/20 Continue with Crestor  20 mg.  7. Diabetes- well controlled with A1c 5.9   8. LBBB- new since 12/20   9. Hemoptysis- traumatic ETT tube echange and pulmonary edema- better   10. Hematuria / flank pain - hx of renal stones - work up per primary team.  Initial plans were for CABG. Patient had recurrent unstable angina requiring emergency PCI of LAD. Patient is here for follow-up. He is feeling better  He still has occasional episode of nausea and vomiting that are sudden. He had one episode of chest pain that relieved with nitroglycerin. The progression of chest pain. He is here hemoptysis has resolved.   Hematuria is significantly improved he has urology follow-up pending this week      Follow-up  Pertinent negatives include no chest pain, no abdominal pain, no headaches and no shortness of breath. Hospital Follow Up  Pertinent negatives include no chest pain, no abdominal pain, no headaches and no shortness of breath. Hypertension  The history is provided by the patient. This is a chronic problem. The problem occurs constantly. The problem has not changed since onset. Pertinent negatives include no chest pain, no abdominal pain, no headaches and no shortness of breath. Chest Pain (Angina)   The history is provided by the patient. This is a recurrent problem. The problem has not changed since onset. The problem occurs rarely. The pain is associated with exertion. The pain is present in the substernal region. The quality of the pain is described as pressure-like. The pain does not radiate. Pertinent negatives include no abdominal pain, no claudication, no cough, no dizziness, no fever, no headaches, no hemoptysis, no malaise/fatigue, no nausea, no orthopnea, no palpitations, no PND, no shortness of breath, no sputum production, no vomiting and no weakness. Review of Systems   Constitutional: Negative for chills, fever and malaise/fatigue. HENT: Negative for nosebleeds. Eyes: Negative for blurred vision and double vision. Respiratory: Negative for cough, hemoptysis, sputum production, shortness of breath and wheezing. Cardiovascular: Negative for chest pain, palpitations, orthopnea, claudication, leg swelling and PND. Gastrointestinal: Negative for abdominal pain, heartburn, nausea and vomiting. Musculoskeletal: Negative for falls and myalgias. Skin: Negative for rash. Neurological: Negative for dizziness, weakness and headaches. Endo/Heme/Allergies: Does not bruise/bleed easily.      Family History   Problem Relation Age of Onset    Stroke Mother     Headache Mother     Hypertension Mother     Lung Cancer Mother     Heart Disease Father     Heart Attack Father     Hypertension Father     Diabetes Father    [place] Lung Cancer Father     Ovarian Cancer Sister     Heart Attack Brother     Breast Cancer Sister     Diabetes Sister     Cancer Maternal Aunt         lung cancer    Cancer Maternal Uncle     Cancer Paternal Aunt     Cancer Paternal Uncle        Past Medical History:   Diagnosis Date    Arthritis     1999 vicodin    Diabetes ([facility] [address] 1998 metformin    GERD (gastroesophageal reflux disease)     Hypercholesterolemia     Hypertension 1996 norvasc    Kidney stones     MI (myocardial infarction) ([facility] [address]        Past Surgical History:   Procedure Laterality Date    COLONOSCOPY N/A 11/2/2018    COLONOSCOPY with Polypectomies performed by Leisa Reece MD at [facility]COPY    HX CHOLECYSTECTOMY      HX GI  2010    gallbladder    RI CARDIAC SURG PROCEDURE UNLIST  12/2017    stints       Social History     Tobacco Use    Smoking status: Never Smoker    Smokeless tobacco: Never Used   Substance Use Topics    Alcohol use: No       No Known Allergies        Visit Vitals  /62 (BP 1 Location: Left upper arm, BP Patient Position: Sitting, BP Cuff Size: Large adult)   Pulse 78   Ht 6' (1.829 m)   Wt 94.8 kg (209 lb)   SpO2 98%   BMI 28.35 kg/m²        Physical Exam  Constitutional:       Appearance: He is well-developed. HENT:      Head: Normocephalic and atraumatic. Eyes:      Conjunctiva/sclera: Conjunctivae normal.   Neck:      Thyroid: No thyromegaly. Vascular: No JVD. Trachea: No tracheal deviation. Cardiovascular:      Rate and Rhythm: Normal rate and regular rhythm. Heart sounds: Normal heart sounds. No murmur heard. No friction rub. No gallop. Pulmonary:      Effort: No respiratory distress. Breath sounds: Normal breath sounds. No wheezing or rales. Chest:      Chest wall: No tenderness. Abdominal:      Palpations: Abdomen is soft. Tenderness: There is no abdominal tenderness. Musculoskeletal:      Cervical back: Neck supple.    Skin:     General: Skin is warm and dry.   Neurological:      Mental Status: He is alert and oriented to person, place, and time.  [facility] has a reminder for a \"due or due soon\" health maintenance. I have asked that he contact his primary care provider for follow-up on this health maintenance. CONCLUSION:1/2016-  ABNORMAL STRESS ECHO WITH EKG AND WALL MOTION ABNORMALITIES SUGGESTIVE OF LAD  DISTRIBUTION DISEASE    IMPRESSION:cath:1/2016    1. TWO VESSEL CORONARY ARTERY DISEASE IN THE FORM OF 80% OSTIAL AND 90% PROXIMAL NON-DOMINANT RIGHT CORONARY STENOSIS WHICH IS A MODERATE SIZED VESSEL OF ABOUT 1.5 TO 2 MM IN DIAMETER, AND 40% PROXIMAL CIRCUMFLEX, AND 70% MID CIRCUMFLEX STENOSIS WHICH IS A DOMINANT VESSEL. THERE ARE DIFFUSE LUMINAL IRREGULARITIES SEEN THROUGHOUT THE CORONARIES. 2. NO NORMAL OVERALL LV EJECTION FRACTION AT REST. 3. EVIDENCE OF CHEST PAIN DURING THE CASE AS WELL AS BEFORE THE CASE WAS STARTED. PARTIAL RELIEF WITH SUBLINGUAL NITROGLYCERIN AND NO ACUTE EKG CHANGES SEEN IN THE SIX MONITORING LEADS IN THE CATH LAB. DISCUSSION AND RECOMMENDATIONS: PATIENT IS LIKELY HAVING CHEST PAIN FROM THE NON-DOMINANT RIGHT CORONARY ARTERY. THE LEFT CIRCUMFLEX IS DOMINANT AND APPEARS TO HAVE BORDERLINE MID STENOSIS WHICH DOES NOT HAVE ANY APPEARANCE OF ANY ACUTE UNSTABLE LESION. I THINK HE SHOULD BE TREATED MEDICALLY FOR NOW, AND IF THE SYMPTOMS PERSIST, LEFT CIRCUMFLEX ARTERY WILL NEED TO BE INTERROGATED BY INTRACORONARY DOPPLER WITH FFR DETERMINATION. RIGHT CORONARY, THOUGH APPEARS TO BE CRITICAL IS A SMALL VESSEL OF ABOUT 1.5 TO A MAXIMUM OF 2 MM IN DIAMETER AND PROBABLY SHOULD BE LEFT TO MEDICAL TREATMENT. AGGRESSIVE RISK FACTOR MODIFICATION SHOULD BE FOLLOWED. SUMMARY:12/2017-echo  Left ventricle: Systolic function was normal. Ejection fraction was estimated   in the range of 55 % to 60 %. No obvious  wall motion abnormalities identified in the views obtained. Wall thickness   was mildly increased.  Doppler parameters  were consistent with abnormal left ventricular relaxation (grade 1 diastolic   dysfunction). Right ventricle: The size was at the upper limits of normal.    Left atrium: The atrium was mildly to moderately dilated. FINDING-12/2017-cath  1. Left main is patent, bifurcates into left anterior descending artery  and circumflex artery. 2. Left anterior descending artery has ostial 30% to 40% stenosis  followed by mid diffuse 30-40% stenosis. The vessel appears to be  moderately calcified. Diagonal 1 artery has diffuse 30-40% stenosis. Mid to distal left anterior descending artery is patent. 3. Circumflex artery is dominant with proximal 95% calcific stenosis. Status post PTCA using a Trek 1.5 x 8 mm balloon followed by a 2.5  mm x 12 mm balloon. The stent was a Synergy 2.75 mm x 15 mm  stent was deployed at about 14 atmospheres. Post-PCI PTCA was  performed using a noncompliant 3.0 mm x 8 mm balloon inflated about  16-18 atmospheres. Lesion reduced to 10%. 4. Obtuse marginal 1 artery was a small-caliber vessel with ostial 70%  to 80% stenosis. 5. Obtuse marginal 2 artery is a small- to medium-caliber vessel with  diffuse 30-40% stenosis. Mid circumflex artery has focal 80% stenosis. Status post PTCA using a Trek 2.5 mm x 12 mm balloon followed by a  Synergy 2.75 mm x 12 mm stent deployed about 14 atmospheres. Post-PCI PTCA was performed using a 3.0 mm x 8 mm  balloon deployed about 16 atmospheres. Lesion reduced to 0%. 6. Left posterior descending artery is patent. 7. Right coronary artery is nondominant, has ostial calcific 90%  followed by mid 99% stenosis. CONCLUSION:  Triple-vessel coronary artery disease. Status post  percutaneous transluminal coronary angioplasty/stent to proximal and  mid circumflex artery using drug-eluting stents. The patient will be on  aspirin and Brilinta at this time. Intense medical management and risk  factor modification is advised.    Conclusion 12/9/2020     · Three-vessel coronary artery disease with 90% mid LAD stenosis 80% distal circumflex stenosis and 100% mid RCA stenosis after diffuse disease in proximal and ostial RCA. · Patent previously deployed drug-eluting stents in mid circumflex and distal circumflex areas. · Borderline critical left main stenosis which is approximately 50% and has progressed from previous catheterization in 2017. · Procedure done via right radial artery without any complications. Given left main disease, CABG will be considered first.  We will take a surgical opinion with Dr. Arlyn Blackmon. I discussed with him on phone and he/his team will be seeing the patient.        I have personally reviewed patient's records available from hospital and other providers and incorporated findings in patient care. Cath films reviewed personally to evaluate-12/2020  Notes,lab,echo,cath  Coronary Findings 12/10/2020    Diagnostic  Dominance: Right  Left Main   Ost LM lesion, 20% stenosed. Dist LM lesion, 40% stenosed. Left Anterior Descending   Ost LAD to Prox LAD lesion, 50% stenosed. Mid LAD lesion, 99% stenosed. The lesion is type C. The lesion is moderately calcified. Left Circumflex   Prox Cx to Mid Cx lesion, 10% stenosed. The lesion was previously treated using a stent (unknown type). Intervention 12/10/2020    Mid LAD lesion   Angioplasty   Angioplasty using a standard balloon was performed prior to stent deployment. The balloon used was a CATH BLLN RX MINI TREK 2X12MM -- . Balloon inflated using multiple inflations inflation technique. The second balloon used was a CATH BLLN RX TREK [id number] -- .   Stent   A single stent was placed. Drug-eluting stent was successfully placed. The stent used was a STENT SYS COR [id number] -- XIENCE JUAN DIEGO. Inflation 1 - Maximum pressure: 12 janice; Time: 10 sec. The strut is well apposed. Angioplasty   Angioplasty using a standard balloon was performed following stent deployment.  The balloon used was a CATH BLLN COR DIL 2.5X8MM -- NC TREK RAPID-EXCHANGE. Post-Intervention Lesion Assessment   The intervention was successful. The guidewire crossed the lesion. Device was deployed. The pre-interventional distal flow is decreased (MYKE 2). Post-intervention MYKE flow is 3. Lesion had 15 mm of its length treated. There were no complications. There is a 0% residual stenosis post intervention. Interpretation Summary 12/09/2020    · Contrast used: DEFINITY. · LV: Calculated LVEF is 55%. Visually measured ejection fraction. Normal cavity size and systolic function (ejection fraction normal). Mild concentric hypertrophy. Age-appropriate left ventricular diastolic function. · PA: Pulmonary arterial systolic pressure is 26 mmHg. · Aorta: Dilated aortic root; diameter is 3.7 cm. Reading Providers     Reading Role Read Date   Perla Sandoval MD Test Supervisor, ECG [place], SPECT [place] 6/9/2021   Procedure Conclusion    Nuclear Stress Test    Nuclear Cardiac Spect Rest then Gated Stress with tomographic imaging/tomography utilized for the tomographic myocardical perfusion imaging performed. Teresa Lynch was used as the stressing method and agent. (Teresa Lynch given via a 10 - 20 sec injection.)   One day myocardial perfusion study. Date: 6/2/2021. Left ventricular perfusion is normal. Myocardial perfusion imaging supports a low risk stress test.   There is no prior study available for comparison. . This Single Photon Emission Computer Tomograph (SPECT) study utilized tomographic imaging/tomography for the tomographic myocardial perfusion imaging performed during this study. Interpretation Summary 6/2021    · LV: Estimated LVEF is 55 - 60%. Normal cavity size and systolic function (ejection fraction normal). Mild concentric hypertrophy. Wall motion: normal. Mild (grade 1) left ventricular diastolic dysfunction. · MV: Mild mitral annular calcification. Mild mitral valve regurgitation is present.   · TV: Right Ventricular Arterial Pressure (RVSP) is 30 mmHg. Pulmonary hypertension not suggested by Doppler findings. I Have personally reviewed recent relevant labs available and discussed with patient  CBC BMP LFT daily7/2021    Assessment         ICD-10-CM ICD-9-CM    1. Coronary artery disease of native artery of native heart with stable angina pectoris ([facility] [address]  I25.118 414.01      413.9    2. Essential hypertension  I10 401.9    3. S/P coronary artery stent placement  Z95.5 V45.82    4. Mixed hyperlipidemia  E78.2 272.2    1/2018  Out of brillinta for 2 weeks-unable to afford  Changed to plavix-discussed compliance  5/2018  Stable mild cp  Out of norvasc 3 months  refilled  10/2018  Atypical chest pain. Clinically noncardiac. Will continue dual antiplatelet therapy. Norvasc increased for hypertension    6/2020  Cardiac status stable. Rare use of sublingual nitroglycerin stable pattern. Continue treatment. Check lipids  1/2021  Patient seen for follow-up after recent admission for unstable angina. Initial plan for CABG was reviewed. Patient became unstable suddenly and required LAD PCI for severe high-grade lesion that was new. CABG was not done. Subsequently stabilized and was discharged home LVEF was normal.  Since discharge his hemoptysis and hematuria are improving. He has urology follow-up. Currently on Brilinta aspirin along with other medication I have reduce aspirin to 81 mg a day. Continue Brilinta. Prescriptions are sent. Blood pressure elevated in office today we will continue to monitor closely. I personally reviewed both his cardiac catheterization 9 right lesion appears chronic. Circumflex lesion will be tried medical management. He has some progression of left main disease to 45-50% which will be managed medically for now he will require close monitoring of symptoms. If he does stabilize on medical management will monitor clinically.   If symptoms persist then further evaluation will be carried out.  He has nausea at times and I have prescribed Zofran. We will monitor patient's status closely for any progression of symptomsleft main disease for symptom and likely do a stress testing in 6 to 12 months to assess need for further bypass surgery. Patient is advised to contact us if there is any recurrence of symptoms. 4 2021  Seen for follow-up of occasional chest pain feels fatigue and tired. Recent hemoglobin is normal.  Other labs unremarkable. We will continue to monitor continue dual antiplatelet therapy. Monitor closely  7/2021  Cardiac status stable. Still feels fatigue and tired. Now gets iron infusion. Stress test negative echo with normal ejection fraction. We will discontinue Brilinta and change to Plavix to see if that has any role in patient's fatigue and tiredness. Patient is about 7-month out from his last stent  Medications Discontinued During This Encounter   Medication Reason    ticagrelor (BRILINTA) 90 mg tablet Alternate Therapy       Orders Placed This Encounter    clopidogreL (Plavix) 75 mg tab     Sig: Take 1 Tablet by mouth daily. Dispense:  90 Tablet     Refill:  3       Follow-up and Dispositions    · Return in about 3 months (around 10/14/2021).          Parth Timmons MD

## 2021-07-14 NOTE — PROGRESS NOTES
1. Have you been to the ER, urgent care clinic since your last visit? Hospitalized since your last visit? No    2. Have you seen or consulted any other health care providers outside of the [address] [place] [address] since your last visit? Include any pap smears or colon screening. No        3. Do you need any refills today?    No

## 2021-07-15 RX ORDER — ASPIRIN 81 MG/1
TABLET ORAL
Qty: 100 TABLET | Refills: 1 | Status: SHIPPED | OUTPATIENT
Start: 2021-07-15 | End: 2021-10-20 | Stop reason: ALTCHOICE

## 2021-07-19 ENCOUNTER — OFFICE VISIT (OUTPATIENT)
Dept: ONCOLOGY | Age: 71
End: 2021-07-19
Payer: MEDICARE

## 2021-07-19 VITALS
SYSTOLIC BLOOD PRESSURE: 122 MMHG | HEIGHT: 72 IN | DIASTOLIC BLOOD PRESSURE: 73 MMHG | WEIGHT: 210 LBS | BODY MASS INDEX: 28.44 KG/M2 | OXYGEN SATURATION: 97 % | HEART RATE: 73 BPM | TEMPERATURE: 97.6 F

## 2021-07-19 DIAGNOSIS — E53.8 B12 DEFICIENCY: ICD-10-CM

## 2021-07-19 DIAGNOSIS — D50.9 IRON DEFICIENCY ANEMIA, UNSPECIFIED IRON DEFICIENCY ANEMIA TYPE: Primary | ICD-10-CM

## 2021-07-19 DIAGNOSIS — D64.9 ANEMIA, UNSPECIFIED TYPE: ICD-10-CM

## 2021-07-19 PROCEDURE — G8427 DOCREV CUR MEDS BY ELIG CLIN: HCPCS | Performed by: NURSE PRACTITIONER

## 2021-07-19 PROCEDURE — 1101F PT FALLS ASSESS-DOCD LE1/YR: CPT | Performed by: NURSE PRACTITIONER

## 2021-07-19 PROCEDURE — G8419 CALC BMI OUT NRM PARAM NOF/U: HCPCS | Performed by: NURSE PRACTITIONER

## 2021-07-19 PROCEDURE — 3017F COLORECTAL CA SCREEN DOC REV: CPT | Performed by: NURSE PRACTITIONER

## 2021-07-19 PROCEDURE — G8432 DEP SCR NOT DOC, RNG: HCPCS | Performed by: NURSE PRACTITIONER

## 2021-07-19 PROCEDURE — G8752 SYS BP LESS 140: HCPCS | Performed by: NURSE PRACTITIONER

## 2021-07-19 PROCEDURE — G8536 NO DOC ELDER MAL SCRN: HCPCS | Performed by: NURSE PRACTITIONER

## 2021-07-19 PROCEDURE — G0463 HOSPITAL OUTPT CLINIC VISIT: HCPCS | Performed by: NURSE PRACTITIONER

## 2021-07-19 PROCEDURE — G8754 DIAS BP LESS 90: HCPCS | Performed by: NURSE PRACTITIONER

## 2021-07-19 PROCEDURE — 99213 OFFICE O/P EST LOW 20 MIN: CPT | Performed by: NURSE PRACTITIONER

## 2021-07-19 NOTE — PATIENT INSTRUCTIONS
Iron Deficiency Anemia: Care Instructions  Your Care Instructions     Anemia means that you don't have enough red blood cells. Red blood cells carry oxygen around your body. When you have anemia, it can make you pale, weak, and tired. Many things can cause anemia. The most common cause is loss of blood. This can happen if you have heavy menstrual periods. It can also happen if you have bleeding in your stomach or bowel. You can also get anemia if you don't have enough iron in your diet or if it's hard for your body to absorb iron. In some cases, pregnancy causes anemia. That's because a pregnant woman needs more iron. Your doctor may do more tests to find the cause of your anemia. If a disease or other health problem is causing it, your doctor will treat that problem. It's important to follow up with your doctor to make sure that your iron level returns to normal.  Follow-up care is a key part of your treatment and safety. Be sure to make and go to all appointments, and call your doctor if you are having problems. It's also a good idea to know your test results and keep a list of the medicines you take. How can you care for yourself at home? · If your doctor recommended iron pills, take them as directed. ? Try to take the pills on an empty stomach. You can do this about 1 hour before or 2 hours after meals. But you may need to take iron with food to avoid an upset stomach. ? Do not take antacids or drink milk or anything with caffeine within 2 hours of when you take your iron. They can keep your body from absorbing the iron well. ? Vitamin C helps your body absorb iron. You may want to take iron pills with a glass of orange juice or some other food high in vitamin C.  ? Iron pills may cause stomach problems. These include heartburn, nausea, diarrhea, constipation, and cramps. It can help to drink plenty of fluids and include fruits, vegetables, and fiber in your diet.   ? It's normal for iron pills to make your stool a greenish or grayish black. But internal bleeding can also cause dark stool. So it's important to tell your doctor about any color changes. ? Call your doctor if you think you are having a problem with your iron pills. Even after you start to feel better, it will take several months for your body to build up its supply of iron. ? If you miss a pill, don't take a double dose. ? Keep iron pills out of the reach of small children. Too much iron can be very dangerous. · Eat foods with a lot of iron. These include red meat, shellfish, poultry, and eggs. They also include beans, raisins, whole-grain bread, and leafy green vegetables. · Steam your vegetables. This is the best way to prepare them if you want to get as much iron as possible. · Be safe with medicines. Do not take nonsteroidal anti-inflammatory pain relievers unless your doctor tells you to. These include aspirin, naproxen (Aleve), and ibuprofen (Advil, Motrin). · Liquid iron can stain your teeth. But you can mix it with water or juice and drink it with a straw. Then it won't get on your teeth. When should you call for help? Call 911 anytime you think you may need emergency care. For example, call if:    · You passed out (lost consciousness). Call your doctor now or seek immediate medical care if:    · You are short of breath.     · You are dizzy or light-headed, or you feel like you may faint.     · You have new or worse bleeding. Watch closely for changes in your health, and be sure to contact your doctor if:    · You feel weaker or more tired than usual.     · You do not get better as expected. Where can you learn more? Go to http://www.gray.com/  Enter Z825 in the search box to learn more about \"Iron Deficiency Anemia: Care Instructions. \"  Current as of: September 23, 2020               Content Version: 12.8  © 3160-0774 Healthwise, [employer].    Care instructions adapted under license by [employer] (which disclaims liability or warranty for this information). If you have questions about a medical condition or this instruction, always ask your healthcare professional. [address] any warranty or liability for your use of this information. Learning About High-Iron Foods  What foods are high in iron? The foods you eat contain nutrients, such as vitamins and minerals. Iron is a nutrient. Your body needs the right amount to stay healthy and work as it should. You can use the list below to help you make choices about which foods to eat. Here are some foods that contain iron. They have 1 to 2 milligrams of iron per serving. Fruits  · Figs (dried), 5 figs  Vegetables  · Asparagus (canned), 6 garcia  · Valeri, beet, Swiss chard, or turnip greens, 1 cup  · Dried peas, cooked, ½ cup  · Seaweed, spirulina (dried), ¼ cup  · Spinach, (cooked) ½ cup or (raw) 1 cup  Grains  · Cereals, fortified with iron, 1 cup  · Grits (instant, cooked), fortified with iron, ½ cup  Meats and other protein foods  · Beans (kidney, lima, navy, white), canned or cooked, ½ cup  · Beef or lamb, 3 oz  · Chicken giblets, 3 oz  · Chickpeas (garbanzo beans), ½ cup  · Liver of beef, lamb, or pork, 3 oz  · Oysters (cooked), 3 oz  · Sardines (canned), 3 oz  · Soybeans (boiled), ½ cup  · Tofu (firm), ½ cup  Work with your doctor to find out how much of this nutrient you need. Depending on your health, you may need more or less of it in your diet. Current as of: December 17, 2020               Content Version: 12.8  © 2006-2021 Healthwise, [employer] [place]. Care instructions adapted under license by [employer] (which disclaims liability or warranty for this information). If you have questions about a medical condition or this instruction, always ask your healthcare professional. [address] [facility] [address] any warranty or liability for your use of this information.          Iron-Rich Diet: Care Instructions  Your Care Instructions     Your body needs iron to make hemoglobin. Hemoglobin is a substance in red blood cells that carries oxygen from the lungs to cells all through your body. If you do not get enough iron, your body makes fewer and smaller red blood cells. As a result, your body's cells may not get enough oxygen. Adult men need 8 milligrams of iron a day; adult women need 18 milligrams of iron a day. After menopause, women need 8 milligrams of iron a day. A pregnant woman needs 27 milligrams of iron a day. Infants and young children have higher iron needs relative to their size than other age groups. People who have lost blood because of ulcers or heavy menstrual periods may become very low in iron and may develop anemia. Most people can get the iron their bodies need by eating enough of certain iron-rich foods. Your doctor may recommend that you take an iron supplement along with eating an iron-rich diet. Follow-up care is a key part of your treatment and safety. Be sure to make and go to all appointments, and call your doctor if you are having problems. It's also a good idea to know your test results and keep a list of the medicines you take. How can you care for yourself at home? · Make iron-rich foods a part of your daily diet. Iron-rich foods include:  ? All meats, such as chicken, beef, lamb, pork, fish, and shellfish. Liver is especially high in iron. ? Leafy green vegetables. ? Raisins, peas, beans, lentils, barley, and eggs. ? Iron-fortified breakfast cereals. · Eat foods with vitamin C along with iron-rich foods. Vitamin C helps you absorb more iron from food. Drink a glass of orange juice or another citrus juice with your food. · Eat meat and vegetables or grains together. The iron in meat helps your body absorb the iron in other foods. Where can you learn more?   Go to http://www.gray.com/  Enter Z290 in the search box to learn more about \"Iron-Rich Diet: Care Instructions. \"  Current as of: December 17, 2020               Content Version: 12.8  © 2006-2021 Healthwise[employer]. Care instructions adapted under license by [email or web address] (which disclaims liability or warranty for this information). If you have questions about a medical condition or this instruction, always ask your healthcare professional. [address] any warranty or liability for your use of this information.

## 2021-07-19 NOTE — PROGRESS NOTES
Hematology/Oncology  Progress Note    Name: David Zepeda  Date: 2021  : 1950    Sheamr Moreno MD     Mr. Carla Tucker is a 70y.o. year old male who was seen for management of iron deficiency and vitamin B12 deficiency       Subjective:   Mr. Carla Tucker is a 70y.o. year old male who was seen for management of iron deficiency anemia. The patient has a past medical history significant of Hematuria and bloody vomiting at his recent admission. S/P iron infusion in the form of Venofer on 2021. He Denied fever, chills, night sweat, unintentional weight loss, skin lumps or bumps, acute bleeding or bruising issues. Denied headache, acute vision change, dizziness, chest pain, worsen shortness of breath, palpitation, productive cough, nausea, vomiting, abdominal pain, altered bowel habits, dysuria, worsen bone pain or back pain, new focal numbness or weakness. Past medical history, family history, and social history: these were reviewed and remains unchanged.     Past Medical History:   Diagnosis Date    Arthritis      vicodin    Diabetes ([facility] [address]  metformin    GERD (gastroesophageal reflux disease)     Hypercholesterolemia     Hypertension  norvasc    Kidney stones     MI (myocardial infarction) ([facility] [address]      Past Surgical History:   Procedure Laterality Date    COLONOSCOPY N/A 2018    COLONOSCOPY with Polypectomies performed by Aretha Clemons MD at [facility]COPY    HX CHOLECYSTECTOMY      HX GI  2010    gallbladder    NM CARDIAC SURG PROCEDURE UNLIST  2017    stints     Social History     Socioeconomic History    Marital status:      Spouse name: Not on file    Number of children: Not on file    Years of education: Not on file    Highest education level: Not on file   Occupational History    Not on file   Tobacco Use    Smoking status: Never Smoker    Smokeless tobacco: Never Used   Vaping Use    Vaping Use: Never used   Substance and Sexual Activity    Alcohol use: No    Drug use: No    Sexual activity: Yes     Partners: Female     Birth control/protection: None   Other Topics Concern    Not on file   Social History Narrative    Not on file     Social Determinants of Health     Financial Resource Strain:     Difficulty of Paying Living Expenses:    Food Insecurity:     Worried About Running Out of Food in the Last Year:     [address] in the Last Year:    Transportation Needs:     Lack of Transportation (Medical):  Lack of Transportation (Non-Medical):    Physical Activity:     Days of Exercise per Week:     Minutes of Exercise per Session:    Stress:     Feeling of Stress :    Social Connections:     Frequency of Communication with Friends and Family:     Frequency of Social Gatherings with Friends and Family:     Attends [employer]ars[employer] Services:     Active Member of Clubs or Organizations:     Attends Club or Organization Meetings:     Marital Status:    Intimate Partner Violence:     Fear of Current or Ex-Partner:     Emotionally Abused:     Physically Abused:     Sexually Abused:      Family History   Problem Relation Age of Onset    Stroke Mother     Headache Mother     Hypertension Mother     Lung Cancer Mother     Heart Disease Father     Heart Attack Father     Hypertension Father     Diabetes Father     Lung Cancer Father     Ovarian Cancer Sister     Heart Attack Brother     Breast Cancer Sister     Diabetes Sister     Cancer Maternal Aunt         lung cancer    Cancer Maternal Uncle     Cancer Paternal Aunt     Cancer Paternal Uncle      Current Outpatient Medications   Medication Sig Dispense Refill    omeprazole (PRILOSEC) 20 mg capsule TAKE 1 CAPSULE BY MOUTH EVERY DAY 30 Capsule 0    aspirin delayed-release 81 mg tablet TAKE 1 TABLET BY MOUTH EVERY  Tablet 1    clopidogreL (Plavix) 75 mg tab Take 1 Tablet by mouth daily.  90 Tablet 3    glipiZIDE SR (GLUCOTROL XL) 5 mg CR tablet TAKE 1 TABLET BY MOUTH EVERY DAY 90 Tablet 0    amLODIPine (NORVASC) 10 mg tablet TAKE 1 TABLET BY MOUTH EVERY DAY 90 Tablet 1    metFORMIN (GLUCOPHAGE) 1,000 mg tablet TAKE 1 TABLET BY MOUTH TWICE A DAY WITH MEALS 180 Tablet 0    Januvia 100 mg tablet TAKE 1 TABLET BY MOUTH EVERY DAY 90 Tablet 0    cetirizine (ZyrTEC) 10 mg tablet Take 10 mg by mouth daily.  ramipriL (ALTACE) 10 mg capsule TAKE 1 CAPSULE BY MOUTH EVERY DAY 90 Cap 3    nitroglycerin (NITROSTAT) 0.4 mg SL tablet DISSOLVE 1 TABLET UNDER TONGUE EVERY 5 MIN AS NEEDED FOR CHEST PAIN FOR UP TO 3 DOSES 25 Tab 0    rosuvastatin (CRESTOR) 40 mg tablet Take 1 Tab by mouth nightly. Indications: treatment to prevent a heart attack 90 Tab 3    carvediloL (COREG) 25 mg tablet TAKE 1 TABLET BY MOUTH TWICE A DAY WITH FOOD 180 Tab 3    Blood Pressure Monitor kit Check once a day 1 Kit 0       Review of Systems   Constitutional: Negative. HENT: Negative. Eyes: Negative. Respiratory: Negative. Cardiovascular: Negative. Gastrointestinal: Negative. Genitourinary: Negative. Musculoskeletal: Negative. Skin: Negative. Neurological: Negative. Endo/Heme/Allergies: Negative. Psychiatric/Behavioral: Negative. Objective:     Visit Vitals  /73   Pulse 73   Temp 97.6 °F (36.4 °C)   Ht 6' (1.829 m)   Wt 95.3 kg (210 lb)   SpO2 97%   BMI 28.48 kg/m²     ECOG PS0   Physical Exam  Cardiovascular:      Rate and Rhythm: Normal rate. Pulses: Normal pulses. Pulmonary:      Effort: Pulmonary effort is normal.   Abdominal:      Palpations: Abdomen is soft. Skin:     General: Skin is warm. Neurological:      Mental Status: He is alert and oriented to person, place, and time. Psychiatric:         Mood and Affect: Mood normal.         Behavior: Behavior normal.         Thought Content: Thought content normal.          Lab data:      No results found for this or any previous visit. Assessment:     1.  Iron deficiency anemia, unspecified iron deficiency anemia type    2. B12 deficiency    3. Anemia, unspecified type          Plan:   Iron deficiency anemia  Vitamin B12 deficiency   --  Past medical history significant of Hematuria and bloody vomiting at his recent admission. He has follows up with Urology and GI. He stated his colonoscopy will be scheduled soon. --7/08/2021 CBC reported H/H 13.4/41.5, iron profile showed saturation 106% and ferritin 149, vitamin B12 >2000,  folate 11.4. S/P iron infusion in the form of Venofer x 3 doses completed on 7/8/2021      Plan:  -- s/P IV Venofer x 3 doses on 7/8/2021   -- The patient will also get Vit B12 replacement at [facility] as indicated. -- We will repeat CBC, CMP, Iron profiles, ferritin, B12/folate, and ret count prior to next visit. -- The patient will f/u with GI and Urology as scheduled. -- I will see the patient back in clinic in about 4 months. Always sooner if required. No orders of the defined types were placed in this encounter. Fabby López NP  7/19/2021      Please note: This document has been produced using voice recognition software. Unrecognized errors in transcription may be present.

## 2021-08-05 ENCOUNTER — APPOINTMENT (OUTPATIENT)
Dept: INFUSION THERAPY | Age: 71
End: 2021-08-05
Payer: MEDICARE

## 2021-08-05 ENCOUNTER — HOSPITAL ENCOUNTER (OUTPATIENT)
Dept: INFUSION THERAPY | Age: 71
Discharge: HOME | End: 2021-08-05
Payer: MEDICARE

## 2021-08-05 VITALS
RESPIRATION RATE: 16 BRPM | OXYGEN SATURATION: 98 % | TEMPERATURE: 97.3 F | HEART RATE: 76 BPM | DIASTOLIC BLOOD PRESSURE: 70 MMHG | SYSTOLIC BLOOD PRESSURE: 119 MMHG

## 2021-08-05 DIAGNOSIS — E53.8 B12 DEFICIENCY: ICD-10-CM

## 2021-08-05 DIAGNOSIS — E53.8 B12 DEFICIENCY: Primary | ICD-10-CM

## 2021-08-05 PROCEDURE — 74011250636 HC RX REV CODE- 250/636: Performed by: INTERNAL MEDICINE

## 2021-08-05 PROCEDURE — 96372 THER/PROPH/DIAG INJ SC/IM: CPT

## 2021-08-05 RX ORDER — ONDANSETRON 2 MG/ML
8 INJECTION INTRAMUSCULAR; INTRAVENOUS AS NEEDED
Status: CANCELLED | OUTPATIENT
Start: 2021-09-02

## 2021-08-05 RX ORDER — DIPHENHYDRAMINE HYDROCHLORIDE 50 MG/ML
25 INJECTION, SOLUTION INTRAMUSCULAR; INTRAVENOUS AS NEEDED
Status: CANCELLED
Start: 2021-09-02

## 2021-08-05 RX ORDER — ACETAMINOPHEN 325 MG/1
650 TABLET ORAL AS NEEDED
Status: CANCELLED
Start: 2021-09-02

## 2021-08-05 RX ORDER — CYANOCOBALAMIN 1000 UG/ML
1000 INJECTION, SOLUTION INTRAMUSCULAR; SUBCUTANEOUS ONCE
Status: COMPLETED | OUTPATIENT
Start: 2021-08-05 | End: 2021-08-05

## 2021-08-05 RX ORDER — CYANOCOBALAMIN 1000 UG/ML
1000 INJECTION, SOLUTION INTRAMUSCULAR; SUBCUTANEOUS ONCE
Status: CANCELLED
Start: 2021-09-02 | End: 2021-09-02

## 2021-08-05 RX ORDER — ALBUTEROL SULFATE 0.83 MG/ML
2.5 SOLUTION RESPIRATORY (INHALATION) AS NEEDED
Status: CANCELLED
Start: 2021-09-02

## 2021-08-05 RX ORDER — HYDROCORTISONE SODIUM SUCCINATE 100 MG/2ML
100 INJECTION, POWDER, FOR SOLUTION INTRAMUSCULAR; INTRAVENOUS AS NEEDED
Status: CANCELLED | OUTPATIENT
Start: 2021-09-02

## 2021-08-05 RX ORDER — DIPHENHYDRAMINE HYDROCHLORIDE 50 MG/ML
50 INJECTION, SOLUTION INTRAMUSCULAR; INTRAVENOUS AS NEEDED
Status: CANCELLED
Start: 2021-09-02

## 2021-08-05 RX ORDER — EPINEPHRINE 1 MG/ML
0.3 INJECTION, SOLUTION, CONCENTRATE INTRAVENOUS AS NEEDED
Status: CANCELLED | OUTPATIENT
Start: 2021-09-02

## 2021-08-05 RX ADMIN — CYANOCOBALAMIN 1000 MCG: 1000 INJECTION, SOLUTION INTRAMUSCULAR at 09:45

## 2021-08-05 NOTE — PROGRESS NOTES
[facility] Progress Note    Date: 2021    Name: Lico Moore    MRN: [id number]         : 1950    Mr. Miguel Pimentel arrived in the [facility] today at 0930, in stable condition, here for B-12 Injection (Every 4 Weeks). He was assessed and education was provided. Mr. Vernon Irby vitals were reviewed. Visit Vitals  /70 (BP 1 Location: Right upper arm, BP Patient Position: Sitting)   Pulse 76   Temp 97.3 °F (36.3 °C)   Resp 16   SpO2 98%         Cyanocobalamin (B-12) 1,000 mcg, was administered IM in his right deltoid at 0945, per order and without incident. Mr. Miguel Pimentel tolerated well, and voiced no complaints. Mr. Miguel Pimentel was discharged from [place] [address] in stable condition at 0950. Sarah Colder He is to return in 4 weeks, on Thursday, 21 at 0930, for his next appointment, for his next B-12 Injection.      Min Lopes RN  2021  9:40 AM

## 2021-08-08 DIAGNOSIS — K21.9 GASTROESOPHAGEAL REFLUX DISEASE WITHOUT ESOPHAGITIS: ICD-10-CM

## 2021-08-09 RX ORDER — OMEPRAZOLE 20 MG/1
CAPSULE, DELAYED RELEASE ORAL
Qty: 30 CAPSULE | Refills: 0 | Status: SHIPPED | OUTPATIENT
Start: 2021-08-09 | End: 2021-11-24 | Stop reason: SDUPTHER

## 2021-08-12 ENCOUNTER — OFFICE VISIT (OUTPATIENT)
Dept: ORTHOPEDIC SURGERY | Age: 71
End: 2021-08-12
Payer: MEDICARE

## 2021-08-12 VITALS
OXYGEN SATURATION: 98 % | BODY MASS INDEX: 29.39 KG/M2 | TEMPERATURE: 97.7 F | HEART RATE: 78 BPM | WEIGHT: 217 LBS | HEIGHT: 72 IN

## 2021-08-12 DIAGNOSIS — M25.511 CHRONIC RIGHT SHOULDER PAIN: ICD-10-CM

## 2021-08-12 DIAGNOSIS — G89.29 CHRONIC RIGHT SHOULDER PAIN: ICD-10-CM

## 2021-08-12 DIAGNOSIS — M19.011 PRIMARY OSTEOARTHRITIS, RIGHT SHOULDER: Primary | ICD-10-CM

## 2021-08-12 PROCEDURE — G8427 DOCREV CUR MEDS BY ELIG CLIN: HCPCS | Performed by: SPECIALIST

## 2021-08-12 PROCEDURE — G8756 NO BP MEASURE DOC: HCPCS | Performed by: SPECIALIST

## 2021-08-12 PROCEDURE — 1101F PT FALLS ASSESS-DOCD LE1/YR: CPT | Performed by: SPECIALIST

## 2021-08-12 PROCEDURE — G8419 CALC BMI OUT NRM PARAM NOF/U: HCPCS | Performed by: SPECIALIST

## 2021-08-12 PROCEDURE — 99213 OFFICE O/P EST LOW 20 MIN: CPT | Performed by: SPECIALIST

## 2021-08-12 PROCEDURE — 3017F COLORECTAL CA SCREEN DOC REV: CPT | Performed by: SPECIALIST

## 2021-08-12 PROCEDURE — G8536 NO DOC ELDER MAL SCRN: HCPCS | Performed by: SPECIALIST

## 2021-08-12 PROCEDURE — G8510 SCR DEP NEG, NO PLAN REQD: HCPCS | Performed by: SPECIALIST

## 2021-08-12 PROCEDURE — 20610 DRAIN/INJ JOINT/BURSA W/O US: CPT | Performed by: SPECIALIST

## 2021-08-12 RX ORDER — BETAMETHASONE SODIUM PHOSPHATE AND BETAMETHASONE ACETATE 3; 3 MG/ML; MG/ML
3 INJECTION, SUSPENSION INTRA-ARTICULAR; INTRALESIONAL; INTRAMUSCULAR; SOFT TISSUE ONCE
Status: COMPLETED | OUTPATIENT
Start: 2021-08-12 | End: 2021-08-12

## 2021-08-12 RX ADMIN — BETAMETHASONE SODIUM PHOSPHATE AND BETAMETHASONE ACETATE 3 MG: 3; 3 INJECTION, SUSPENSION INTRA-ARTICULAR; INTRALESIONAL; INTRAMUSCULAR; SOFT TISSUE at 09:32

## 2021-08-12 NOTE — PROGRESS NOTES
Patient: Leon Ya                MRN: [id number]       SSN: [id number]  YOB: 1950        AGE: 70 y.o. SEX: male    PCP: Andre Reis MD  08/12/21    Chief Complaint   Patient presents with    Shoulder Pain     right     HISTORY:  Leon Ya is a 70 y.o. male who is seen for increased right shoulder pain. He responded to a cortisone injection at last ov but his shoulder pain has returned. Hehas been experiencing right shoulder pain for the past several months. He does not recall any shoulder injury. He feels shoulder pain with overhead activities and at night. He feels radiating pain from his shoulder down his right upper arm. His pain is affecting his golf and fishing game. He is right handed. He was previously seen for hip pain. He has experiencing hip pain off an on for many years. He has received 5 cortisone injections in the past.   Pain Assessment  8/12/2021   Location of Pain Shoulder   Location Modifiers Right   Severity of Pain 4   Quality of Pain Sharp; Aching   Quality of Pain Comment -   Duration of Pain Persistent   Frequency of Pain Constant   Date Pain First Started -   Date Pain First Started Comment -   Aggravating Factors Stretching;Straightening   Aggravating Factors Comment -   Limiting Behavior No   Relieving Factors Nothing   Result of Injury No     Occupation, etc:  Mr. Cintia Herrera is retired. 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 He lives in [place] with his wife. He has 5 daughters and 15 grandchildren--6 boys, 6 girls. He takes his grandchildren to [facility] to go bass fishing. He will be vacatining in the [employer] next week and he would like to feel better prior to his trip. He likes to fish and golf. He recently visited his grandchildren in [place]. Mr. Cintia Herrera weighs 201 lbs and is 6'0\" tall. He is a metformin controlled diabetic.      Lab Results   Component Value Date/Time Hemoglobin A1c 5.9 (H) 12/10/2020 03:00 AM     Weight Metrics 8/12/2021 7/19/2021 7/14/2021 6/10/2021 6/9/2021 6/9/2021 6/4/2021   Weight 217 lb 210 lb 209 lb - 201 lb 1 oz 201 lb 201 lb   BMI 29.43 kg/m2 28.48 kg/m2 28.35 kg/m2 27.26 kg/m2 27.27 kg/m2 27.26 kg/m2 27.26 kg/m2       Patient Active Problem List   Diagnosis Code    Chronic pain syndrome G89.4    Coronary artery disease involving native coronary artery with unstable angina pectoris ([facility]) I25.110    Essential hypertension I10    Hyperlipidemia E78.5    Thyroid goiter E04.9    CAD (coronary artery disease) I25.10    S/P coronary artery stent placement Z95.5    Unstable angina pectoris ([facility]) I20.0    Controlled type 2 diabetes mellitus without complication, without long-term current use of insulin ([facility]) E11.9    Insomnia G47.00    Tubular adenoma of colon D12.6    Cannabis use, uncomplicated M80.89    Cervical disc disease M50.90    GERD (gastroesophageal reflux disease) K21.9    ACS (acute coronary syndrome) ([facility]) I24.9    B12 deficiency E53.8    Anemia D64.9     REVIEW OF SYSTEMS:    Constitutional Symptoms: Negative   Eyes: Negative   Ears, Nose, Throat and Mouth: Negative   Cardiovascular: Negative   Respiratory: Negative   Genitourinary: Per HPI   Gastrointestinal: Per HPI   Integumentary (Skin and/or Breast): Negative   Musculoskeletal: Per HPI   Endocrine/Rheumatologic: Negative   Neurological: Per HPI   Hematology/Lymphatic: Negative    Allergic/Immunologic: Negative   Phychiatric: Negative    Social History     Socioeconomic History    Marital status:      Spouse name: Not on file    Number of children: Not on file    Years of education: Not on file    Highest education level: Not on file   Occupational History    Not on file   Tobacco Use    Smoking status: Never Smoker    Smokeless tobacco: Never Used   Vaping Use    Vaping Use: Never used   Substance and Sexual Activity    Alcohol use: No    Drug use: No    Sexual activity: Yes     Partners: Female     Birth control/protection: None   Other Topics Concern    Not on file   Social History Narrative    Not on file     Social Determinants of Health     Financial Resource Strain:     Difficulty of Paying Living Expenses:    Food Insecurity:     Worried About Running Out of Food in the Last Year:     [address] in the Last Year:    Transportation Needs:     Lack of Transportation (Medical):  Lack of Transportation (Non-Medical):    Physical Activity:     Days of Exercise per Week:     Minutes of Exercise per Session:    Stress:     Feeling of Stress :    Social Connections:     Frequency of Communication with Friends and Family:     Frequency of Social Gatherings with Friends and Family:     Attends [employer] Services:     Active Member of Clubs or Organizations:     Attends Club or Organization Meetings:     Marital Status:    Intimate Partner Violence:     Fear of Current or Ex-Partner:     Emotionally Abused:     Physically Abused:     Sexually Abused:       No Known Allergies   Current Outpatient Medications   Medication Sig    omeprazole (PRILOSEC) 20 mg capsule TAKE 1 CAPSULE BY MOUTH EVERY DAY    aspirin delayed-release 81 mg tablet TAKE 1 TABLET BY MOUTH EVERY DAY    clopidogreL (Plavix) 75 mg tab Take 1 Tablet by mouth daily.  glipiZIDE SR (GLUCOTROL XL) 5 mg CR tablet TAKE 1 TABLET BY MOUTH EVERY DAY    amLODIPine (NORVASC) 10 mg tablet TAKE 1 TABLET BY MOUTH EVERY DAY    metFORMIN (GLUCOPHAGE) 1,000 mg tablet TAKE 1 TABLET BY MOUTH TWICE A DAY WITH MEALS    Januvia 100 mg tablet TAKE 1 TABLET BY MOUTH EVERY DAY    cetirizine (ZyrTEC) 10 mg tablet Take 10 mg by mouth daily.     ramipriL (ALTACE) 10 mg capsule TAKE 1 CAPSULE BY MOUTH EVERY DAY    nitroglycerin (NITROSTAT) 0.4 mg SL tablet DISSOLVE 1 TABLET UNDER TONGUE EVERY 5 MIN AS NEEDED FOR CHEST PAIN FOR UP TO 3 DOSES    rosuvastatin (CRESTOR) 40 mg tablet Take 1 Tab by mouth nightly. Indications: treatment to prevent a heart attack    carvediloL (COREG) 25 mg tablet TAKE 1 TABLET BY MOUTH TWICE A DAY WITH FOOD    Blood Pressure Monitor kit Check once a day     No current facility-administered medications for this visit. PHYSICAL EXAMINATION:  Visit Vitals  Pulse 78   Temp 97.7 °F (36.5 °C) (Temporal)   Ht 6' (1.829 m)   Wt 217 lb (98.4 kg)   SpO2 98%   BMI 29.43 kg/m²      ORTHO EXAMINATION:  Examination Right shoulder Left shoulder   Skin Intact Intact   Effusion - -   Biceps deformity - -   Atrophy - -   AC joint tenderness + and osteophyte -   Acromial tenderness + -   Biceps tenderness - -   Forward flexion/Elevation  170   Active abduction  170   External rotation ROM 15 30   Internal rotation ROM 70 90   Apprehension - -   Impingement - -   Drop Arm Test - -   Neurovascular Intact Intact    Prominence of AC joint    TIME OUT:  Chart reviewed for the following:   IOlayinka MD, have reviewed the History, Physical and updated the Allergic reactions for R Chuy Conway Karla 38 performed immediately prior to start of procedure:  Reji Cardona MD, have performed the following reviews on [employer] prior to the start of the procedure:          * Patient was identified by name and date of birth   * Agreement on procedure being performed was verified  * Risks and Benefits explained to the patient  * Procedure site verified and marked as necessary  * Patient was positioned for comfort  * Consent was obtained     Time: 9:20 AM    Date of procedure: 8/12/2021  Procedure performed by:  Olayinka Stephens MD  Mr. Varsha Varela tolerated the procedure well with no complications. RADIOGRAPHS:  XR RIGHT SHOULDER 4/2/21 JORGE  IMPRESSION:  Three views - No fractures, moderate acromioclavicular narrowing, moderate glenohumeral narrowing, no calcific densities. IMPRESSION:      ICD-10-CM ICD-9-CM    1.  Primary osteoarthritis, right shoulder M19.011 715.11 betamethasone (CELESTONE) injection 3 mg      DRAIN/INJECT LARGE JOINT/BURSA   2. Chronic right shoulder pain  M25.511 719.41 betamethasone (CELESTONE) injection 3 mg    G89.29 338.29 DRAIN/INJECT LARGE JOINT/BURSA     PLAN:  After discussing treatment options, patient's right shoulder was injected with 4 cc Marcaine and 1/2 cc Celestone. We discussed a possible need for a right shoulder MR arthrogram in the future if pain continues. He will follow up as needed.      Scribed by Iris Kessler ([address]) as dictated by Lindsey Pascual MD

## 2021-09-02 ENCOUNTER — HOSPITAL ENCOUNTER (OUTPATIENT)
Dept: INFUSION THERAPY | Age: 71
Discharge: HOME | End: 2021-09-02
Payer: MEDICARE

## 2021-09-02 VITALS
DIASTOLIC BLOOD PRESSURE: 77 MMHG | SYSTOLIC BLOOD PRESSURE: 163 MMHG | TEMPERATURE: 97.9 F | RESPIRATION RATE: 18 BRPM | OXYGEN SATURATION: 98 % | HEART RATE: 75 BPM

## 2021-09-02 DIAGNOSIS — E53.8 B12 DEFICIENCY: Primary | ICD-10-CM

## 2021-09-02 DIAGNOSIS — E53.8 B12 DEFICIENCY: ICD-10-CM

## 2021-09-02 PROCEDURE — 74011250636 HC RX REV CODE- 250/636: Performed by: INTERNAL MEDICINE

## 2021-09-02 PROCEDURE — 96372 THER/PROPH/DIAG INJ SC/IM: CPT

## 2021-09-02 RX ORDER — CYANOCOBALAMIN 1000 UG/ML
1000 INJECTION, SOLUTION INTRAMUSCULAR; SUBCUTANEOUS ONCE
Status: CANCELLED
Start: 2021-09-30 | End: 2021-09-30

## 2021-09-02 RX ORDER — ONDANSETRON 2 MG/ML
8 INJECTION INTRAMUSCULAR; INTRAVENOUS AS NEEDED
Status: CANCELLED | OUTPATIENT
Start: 2021-09-30

## 2021-09-02 RX ORDER — HYDROCORTISONE SODIUM SUCCINATE 100 MG/2ML
100 INJECTION, POWDER, FOR SOLUTION INTRAMUSCULAR; INTRAVENOUS AS NEEDED
Status: ACTIVE | OUTPATIENT
Start: 2021-09-02 | End: 2021-09-02

## 2021-09-02 RX ORDER — CYANOCOBALAMIN 1000 UG/ML
1000 INJECTION, SOLUTION INTRAMUSCULAR; SUBCUTANEOUS ONCE
Status: COMPLETED | OUTPATIENT
Start: 2021-09-02 | End: 2021-09-02

## 2021-09-02 RX ORDER — EPINEPHRINE 1 MG/ML
0.3 INJECTION, SOLUTION, CONCENTRATE INTRAVENOUS AS NEEDED
Status: CANCELLED | OUTPATIENT
Start: 2021-09-30

## 2021-09-02 RX ORDER — HYDROCORTISONE SODIUM SUCCINATE 100 MG/2ML
100 INJECTION, POWDER, FOR SOLUTION INTRAMUSCULAR; INTRAVENOUS AS NEEDED
Status: CANCELLED | OUTPATIENT
Start: 2021-09-30

## 2021-09-02 RX ORDER — DIPHENHYDRAMINE HYDROCHLORIDE 50 MG/ML
25 INJECTION, SOLUTION INTRAMUSCULAR; INTRAVENOUS AS NEEDED
Status: CANCELLED
Start: 2021-09-30

## 2021-09-02 RX ORDER — DIPHENHYDRAMINE HYDROCHLORIDE 50 MG/ML
50 INJECTION, SOLUTION INTRAMUSCULAR; INTRAVENOUS AS NEEDED
Status: ACTIVE | OUTPATIENT
Start: 2021-09-02 | End: 2021-09-02

## 2021-09-02 RX ORDER — DIPHENHYDRAMINE HYDROCHLORIDE 50 MG/ML
50 INJECTION, SOLUTION INTRAMUSCULAR; INTRAVENOUS AS NEEDED
Status: CANCELLED
Start: 2021-09-30

## 2021-09-02 RX ORDER — ACETAMINOPHEN 325 MG/1
650 TABLET ORAL AS NEEDED
Status: CANCELLED
Start: 2021-09-30

## 2021-09-02 RX ORDER — EPINEPHRINE 1 MG/ML
0.3 INJECTION, SOLUTION, CONCENTRATE INTRAVENOUS AS NEEDED
Status: DISPENSED | OUTPATIENT
Start: 2021-09-02 | End: 2021-09-02

## 2021-09-02 RX ORDER — ALBUTEROL SULFATE 0.83 MG/ML
2.5 SOLUTION RESPIRATORY (INHALATION) AS NEEDED
Status: DISPENSED | OUTPATIENT
Start: 2021-09-02 | End: 2021-09-02

## 2021-09-02 RX ORDER — ALBUTEROL SULFATE 0.83 MG/ML
2.5 SOLUTION RESPIRATORY (INHALATION) AS NEEDED
Status: CANCELLED
Start: 2021-09-30

## 2021-09-02 RX ADMIN — CYANOCOBALAMIN 1000 MCG: 1000 INJECTION, SOLUTION INTRAMUSCULAR at 09:40

## 2021-09-02 NOTE — PROGRESS NOTES
[facility] Progress Note    Date: 2021    Name: Leon Ya    MRN: [id number]         : 1950    B-12 INJECTION Q 28 days. Mr. Cintia Herrera arrived ambulatory to [facility] at 1949. .Mr. Cintia Herrera was assessed and education was provided. Mr. Ras Zepeda vitals were reviewed and patient was observed for 5 minutes prior to treatment. Patient Vitals for the past 12 hrs:   Temp Pulse Resp BP SpO2   21 0935 97.9 °F (36.6 °C) 75 18 (!) 163/77 98 %       Visit Vitals  BP (!) 163/77 (BP 1 Location: Left upper arm, BP Patient Position: Sitting)   Pulse 75   Temp 97.9 °F (36.6 °C)   Resp 18   SpO2 98%         B-12 IM injections given in right arm, bandaid applied. Mr. Cintia Herrera tolerated the infusion, and had no complaints. Patient armband removed and shredded. Mr. Cintia Herrera was discharged from [place] [address] in stable condition at [facility]. He is to return on 9/3/21 at 0930 for his next b-12 appointment.       Julio John  2021

## 2021-09-30 ENCOUNTER — HOSPITAL ENCOUNTER (OUTPATIENT)
Dept: INFUSION THERAPY | Age: 71
Discharge: HOME | End: 2021-09-30
Payer: MEDICARE

## 2021-09-30 VITALS
RESPIRATION RATE: 16 BRPM | HEART RATE: 74 BPM | DIASTOLIC BLOOD PRESSURE: 80 MMHG | SYSTOLIC BLOOD PRESSURE: 148 MMHG | TEMPERATURE: 97.6 F | OXYGEN SATURATION: 98 %

## 2021-09-30 DIAGNOSIS — E11.9 CONTROLLED TYPE 2 DIABETES MELLITUS WITHOUT COMPLICATION, WITHOUT LONG-TERM CURRENT USE OF INSULIN (HCC): Primary | ICD-10-CM

## 2021-09-30 DIAGNOSIS — E53.8 B12 DEFICIENCY: Primary | ICD-10-CM

## 2021-09-30 DIAGNOSIS — E53.8 B12 DEFICIENCY: ICD-10-CM

## 2021-09-30 PROCEDURE — 74011250636 HC RX REV CODE- 250/636: Performed by: INTERNAL MEDICINE

## 2021-09-30 PROCEDURE — 96372 THER/PROPH/DIAG INJ SC/IM: CPT

## 2021-09-30 RX ORDER — EPINEPHRINE 1 MG/ML
0.3 INJECTION, SOLUTION, CONCENTRATE INTRAVENOUS AS NEEDED
Status: CANCELLED | OUTPATIENT
Start: 2021-10-28

## 2021-09-30 RX ORDER — HYDROCORTISONE SODIUM SUCCINATE 100 MG/2ML
100 INJECTION, POWDER, FOR SOLUTION INTRAMUSCULAR; INTRAVENOUS AS NEEDED
Status: CANCELLED | OUTPATIENT
Start: 2021-10-28

## 2021-09-30 RX ORDER — DIPHENHYDRAMINE HYDROCHLORIDE 50 MG/ML
50 INJECTION, SOLUTION INTRAMUSCULAR; INTRAVENOUS AS NEEDED
Status: CANCELLED
Start: 2021-10-28

## 2021-09-30 RX ORDER — ONDANSETRON 2 MG/ML
8 INJECTION INTRAMUSCULAR; INTRAVENOUS AS NEEDED
Status: CANCELLED | OUTPATIENT
Start: 2021-10-28

## 2021-09-30 RX ORDER — ACETAMINOPHEN 325 MG/1
650 TABLET ORAL AS NEEDED
Status: CANCELLED
Start: 2021-10-28

## 2021-09-30 RX ORDER — DIPHENHYDRAMINE HYDROCHLORIDE 50 MG/ML
25 INJECTION, SOLUTION INTRAMUSCULAR; INTRAVENOUS AS NEEDED
Status: CANCELLED
Start: 2021-10-28

## 2021-09-30 RX ORDER — ALBUTEROL SULFATE 0.83 MG/ML
2.5 SOLUTION RESPIRATORY (INHALATION) AS NEEDED
Status: CANCELLED
Start: 2021-10-28

## 2021-09-30 RX ORDER — CYANOCOBALAMIN 1000 UG/ML
1000 INJECTION, SOLUTION INTRAMUSCULAR; SUBCUTANEOUS ONCE
Status: CANCELLED
Start: 2021-10-28 | End: 2021-10-28

## 2021-09-30 RX ORDER — CYANOCOBALAMIN 1000 UG/ML
1000 INJECTION, SOLUTION INTRAMUSCULAR; SUBCUTANEOUS ONCE
Status: COMPLETED | OUTPATIENT
Start: 2021-09-30 | End: 2021-09-30

## 2021-09-30 RX ADMIN — CYANOCOBALAMIN 1000 MCG: 1000 INJECTION, SOLUTION INTRAMUSCULAR at 09:26

## 2021-10-20 ENCOUNTER — OFFICE VISIT (OUTPATIENT)
Dept: CARDIOLOGY CLINIC | Age: 71
End: 2021-10-20
Payer: MEDICARE

## 2021-10-20 VITALS
DIASTOLIC BLOOD PRESSURE: 70 MMHG | BODY MASS INDEX: 28.71 KG/M2 | HEART RATE: 88 BPM | WEIGHT: 212 LBS | SYSTOLIC BLOOD PRESSURE: 136 MMHG | OXYGEN SATURATION: 98 % | HEIGHT: 72 IN

## 2021-10-20 DIAGNOSIS — I25.118 CORONARY ARTERY DISEASE OF NATIVE ARTERY OF NATIVE HEART WITH STABLE ANGINA PECTORIS (HCC): Primary | ICD-10-CM

## 2021-10-20 DIAGNOSIS — Z95.5 S/P CORONARY ARTERY STENT PLACEMENT: ICD-10-CM

## 2021-10-20 DIAGNOSIS — E78.2 MIXED HYPERLIPIDEMIA: ICD-10-CM

## 2021-10-20 DIAGNOSIS — I10 ESSENTIAL HYPERTENSION: ICD-10-CM

## 2021-10-20 PROCEDURE — G8536 NO DOC ELDER MAL SCRN: HCPCS | Performed by: INTERNAL MEDICINE

## 2021-10-20 PROCEDURE — 3017F COLORECTAL CA SCREEN DOC REV: CPT | Performed by: INTERNAL MEDICINE

## 2021-10-20 PROCEDURE — G8754 DIAS BP LESS 90: HCPCS | Performed by: INTERNAL MEDICINE

## 2021-10-20 PROCEDURE — G8427 DOCREV CUR MEDS BY ELIG CLIN: HCPCS | Performed by: INTERNAL MEDICINE

## 2021-10-20 PROCEDURE — 99214 OFFICE O/P EST MOD 30 MIN: CPT | Performed by: INTERNAL MEDICINE

## 2021-10-20 PROCEDURE — G8419 CALC BMI OUT NRM PARAM NOF/U: HCPCS | Performed by: INTERNAL MEDICINE

## 2021-10-20 PROCEDURE — G8432 DEP SCR NOT DOC, RNG: HCPCS | Performed by: INTERNAL MEDICINE

## 2021-10-20 PROCEDURE — G8752 SYS BP LESS 140: HCPCS | Performed by: INTERNAL MEDICINE

## 2021-10-20 PROCEDURE — 1101F PT FALLS ASSESS-DOCD LE1/YR: CPT | Performed by: INTERNAL MEDICINE

## 2021-10-20 NOTE — PROGRESS NOTES
HISTORY OF PRESENT ILLNESS  Luther Hull is a 70 y.o. male. Patient with cad,htn,hyperlipidemia,dm. On follow up patient denies any chest pains,sob, palpitation or other significant symptoms. 12/2017  Admitted with unstable angina-had pci  7/2018. Patient was in emergency room with atypical chest pain. No acute EKG changes cardiac enzymes negative CTA and chest x-ray reports reviewed. Labs are negative for cardiac workup  5/2019. Recent ER visit with fall and concussion. Records reviewed. 6/2020  Patient seen for follow-up. He has rare occasion of chest pain he has used 1 nitroglycerin since last evaluation. No other complaints  1/2021  Patient is here for follow-up he was in hospital with  1. Unstable angina- s/p PCI of the LAD.  Status post mechanical ventilation. continue DAPT brilinta and asa.  Chest pain has resolved. 2. Acute respiratory failure- extubated   3. Hypotension-resolved.  Off pressors. 4. Hypertension: Blood pressure increasing gradually as expected.  Continue BID amlodipine. Elevated this afternoon - likely due to pain. 5. CAD-cardiac cath 12/2017 showed  Triple-vessel coronary artery disease - For CTS evaluation  6. Hyperlipidemia- LDL 74 11/20 Continue with Crestor  20 mg.  7. Diabetes- well controlled with A1c 5.9   8. LBBB- new since 12/20   9. Hemoptysis- traumatic ETT tube echange and pulmonary edema- better   10. Hematuria / flank pain - hx of renal stones - work up per primary team.  Initial plans were for CABG. Patient had recurrent unstable angina requiring emergency PCI of LAD. Patient is here for follow-up. He is feeling better  He still has occasional episode of nausea and vomiting that are sudden. He had one episode of chest pain that relieved with nitroglycerin. The progression of chest pain. He is here hemoptysis has resolved.   Hematuria is significantly improved he has urology follow-up pending this week      Follow-up  Pertinent negatives include no chest pain, no abdominal pain, no headaches and no shortness of breath. Hospital Follow Up  Pertinent negatives include no chest pain, no abdominal pain, no headaches and no shortness of breath. Hypertension  The history is provided by the patient. This is a chronic problem. The problem occurs constantly. The problem has not changed since onset. Pertinent negatives include no chest pain, no abdominal pain, no headaches and no shortness of breath. Chest Pain (Angina)   The history is provided by the patient. This is a recurrent problem. The problem has not changed since onset. The problem occurs rarely. The pain is associated with exertion. The pain is present in the substernal region. The quality of the pain is described as pressure-like. The pain does not radiate. Pertinent negatives include no abdominal pain, no claudication, no cough, no dizziness, no fever, no headaches, no hemoptysis, no malaise/fatigue, no nausea, no orthopnea, no palpitations, no PND, no shortness of breath, no sputum production, no vomiting and no weakness. Review of Systems   Constitutional: Negative for chills, fever and malaise/fatigue. HENT: Negative for nosebleeds. Eyes: Negative for blurred vision and double vision. Respiratory: Negative for cough, hemoptysis, sputum production, shortness of breath and wheezing. Cardiovascular: Negative for chest pain, palpitations, orthopnea, claudication, leg swelling and PND. Gastrointestinal: Negative for abdominal pain, heartburn, nausea and vomiting. Musculoskeletal: Negative for falls and myalgias. Skin: Negative for rash. Neurological: Negative for dizziness, weakness and headaches. Endo/Heme/Allergies: Does not bruise/bleed easily.      Family History   Problem Relation Age of Onset    Stroke Mother     Headache Mother     Hypertension Mother     Lung Cancer Mother     Heart Disease Father     Heart Attack Father     Hypertension Father     Diabetes Father    [facility] Lung Cancer Father     Ovarian Cancer Sister     Heart Attack Brother     Breast Cancer Sister     Diabetes Sister     Cancer Maternal Aunt         lung cancer    Cancer Maternal Uncle     Cancer Paternal Aunt     Cancer Paternal Uncle        Past Medical History:   Diagnosis Date    Arthritis     1999 vicodin    Diabetes ([facility] [address] 1998 metformin    GERD (gastroesophageal reflux disease)     Hypercholesterolemia     Hypertension 1996 norvasc    Kidney stones     MI (myocardial infarction) ([facility] [address]        Past Surgical History:   Procedure Laterality Date    COLONOSCOPY N/A 11/2/2018    COLONOSCOPY with Polypectomies performed by Harmony Neal MD at [facility]COPY    HX CHOLECYSTECTOMY      HX GI  2010    gallbladder    MI CARDIAC SURG PROCEDURE UNLIST  12/2017    stints       Social History     Tobacco Use    Smoking status: Never Smoker    Smokeless tobacco: Never Used   Substance Use Topics    Alcohol use: No       No Known Allergies        Visit Vitals  /70 (BP 1 Location: Left upper arm, BP Patient Position: Sitting, BP Cuff Size: Adult)   Pulse 88   Ht 6' (1.829 m)   Wt 96.2 kg (212 lb)   SpO2 98%   BMI 28.75 kg/m²        Physical Exam  Constitutional:       Appearance: He is well-developed. HENT:      Head: Normocephalic and atraumatic. Eyes:      Conjunctiva/sclera: Conjunctivae normal.   Neck:      Thyroid: No thyromegaly. Vascular: No JVD. Trachea: No tracheal deviation. Cardiovascular:      Rate and Rhythm: Normal rate and regular rhythm. Heart sounds: Normal heart sounds. No murmur heard. No friction rub. No gallop. Pulmonary:      Effort: No respiratory distress. Breath sounds: Normal breath sounds. No wheezing or rales. Chest:      Chest wall: No tenderness. Abdominal:      Palpations: Abdomen is soft. Tenderness: There is no abdominal tenderness. Musculoskeletal:      Cervical back: Neck supple. Skin:     General: Skin is warm and dry. Neurological:      Mental Status: He is alert and oriented to person, place, and time. Mr. Ruddy Simpson has a reminder for a \"due or due soon\" health maintenance. I have asked that he contact his primary care provider for follow-up on this health maintenance. CONCLUSION:1/2016-  ABNORMAL STRESS ECHO WITH EKG AND WALL MOTION ABNORMALITIES SUGGESTIVE OF LAD  DISTRIBUTION DISEASE    IMPRESSION:cath:1/2016    1. TWO VESSEL CORONARY ARTERY DISEASE IN THE FORM OF 80% OSTIAL AND 90% PROXIMAL NON-DOMINANT RIGHT CORONARY STENOSIS WHICH IS A MODERATE SIZED VESSEL OF ABOUT 1.5 TO 2 MM IN DIAMETER, AND 40% PROXIMAL CIRCUMFLEX, AND 70% MID CIRCUMFLEX STENOSIS WHICH IS A DOMINANT VESSEL. THERE ARE DIFFUSE LUMINAL IRREGULARITIES SEEN THROUGHOUT THE CORONARIES. 2. NO NORMAL OVERALL LV EJECTION FRACTION AT REST. 3. EVIDENCE OF CHEST PAIN DURING THE CASE AS WELL AS BEFORE THE CASE WAS STARTED. PARTIAL RELIEF WITH SUBLINGUAL NITROGLYCERIN AND NO ACUTE EKG CHANGES SEEN IN THE SIX MONITORING LEADS IN THE CATH LAB. DISCUSSION AND RECOMMENDATIONS: PATIENT IS LIKELY HAVING CHEST PAIN FROM THE NON-DOMINANT RIGHT CORONARY ARTERY. THE LEFT CIRCUMFLEX IS DOMINANT AND APPEARS TO HAVE BORDERLINE MID STENOSIS WHICH DOES NOT HAVE ANY APPEARANCE OF ANY ACUTE UNSTABLE LESION. I THINK HE SHOULD BE TREATED MEDICALLY FOR NOW, AND IF THE SYMPTOMS PERSIST, LEFT CIRCUMFLEX ARTERY WILL NEED TO BE INTERROGATED BY INTRACORONARY DOPPLER WITH FFR DETERMINATION. RIGHT CORONARY, THOUGH APPEARS TO BE CRITICAL IS A SMALL VESSEL OF ABOUT 1.5 TO A MAXIMUM OF 2 MM IN DIAMETER AND PROBABLY SHOULD BE LEFT TO MEDICAL TREATMENT. AGGRESSIVE RISK FACTOR MODIFICATION SHOULD BE FOLLOWED. SUMMARY:12/2017-echo  Left ventricle: Systolic function was normal. Ejection fraction was estimated   in the range of 55 % to 60 %. No obvious  wall motion abnormalities identified in the views obtained. Wall thickness   was mildly increased.  Doppler parameters  were consistent with abnormal left ventricular relaxation (grade 1 diastolic   dysfunction). Right ventricle: The size was at the upper limits of normal.    Left atrium: The atrium was mildly to moderately dilated. FINDING-12/2017-cath  1. Left main is patent, bifurcates into left anterior descending artery  and circumflex artery. 2. Left anterior descending artery has ostial 30% to 40% stenosis  followed by mid diffuse 30-40% stenosis. The vessel appears to be  moderately calcified. Diagonal 1 artery has diffuse 30-40% stenosis. Mid to distal left anterior descending artery is patent. 3. Circumflex artery is dominant with proximal 95% calcific stenosis. Status post PTCA using a Trek 1.5 x 8 mm balloon followed by a 2.5  mm x 12 mm balloon. The stent was a Synergy 2.75 mm x 15 mm  stent was deployed at about 14 atmospheres. Post-PCI PTCA was  performed using a noncompliant 3.0 mm x 8 mm balloon inflated about  16-18 atmospheres. Lesion reduced to 10%. 4. Obtuse marginal 1 artery was a small-caliber vessel with ostial 70%  to 80% stenosis. 5. Obtuse marginal 2 artery is a small- to medium-caliber vessel with  diffuse 30-40% stenosis. Mid circumflex artery has focal 80% stenosis. Status post PTCA using a Trek 2.5 mm x 12 mm balloon followed by a  Synergy 2.75 mm x 12 mm stent deployed about 14 atmospheres. Post-PCI PTCA was performed using a 3.0 mm x 8 mm  balloon deployed about 16 atmospheres. Lesion reduced to 0%. 6. Left posterior descending artery is patent. 7. Right coronary artery is nondominant, has ostial calcific 90%  followed by mid 99% stenosis. CONCLUSION:  Triple-vessel coronary artery disease. Status post  percutaneous transluminal coronary angioplasty/stent to proximal and  mid circumflex artery using drug-eluting stents. The patient will be on  aspirin and Brilinta at this time. Intense medical management and risk  factor modification is advised.    Conclusion 12/9/2020     · Three-vessel coronary artery disease with 90% mid LAD stenosis 80% distal circumflex stenosis and 100% mid RCA stenosis after diffuse disease in proximal and ostial RCA. · Patent previously deployed drug-eluting stents in mid circumflex and distal circumflex areas. · Borderline critical left main stenosis which is approximately 50% and has progressed from previous catheterization in 2017. · Procedure done via right radial artery without any complications. Given left main disease, CABG will be considered first.  We will take a surgical opinion with Dr. Halley Feng. I discussed with him on phone and he/his team will be seeing the patient.        I have personally reviewed patient's records available from hospital and other providers and incorporated findings in patient care. Cath films reviewed personally to evaluate-12/2020  Notes,lab,echo,cath  Coronary Findings 12/10/2020    Diagnostic  Dominance: Right  Left Main   Ost LM lesion, 20% stenosed. Dist LM lesion, 40% stenosed. Left Anterior Descending   Ost LAD to Prox LAD lesion, 50% stenosed. Mid LAD lesion, 99% stenosed. The lesion is type C. The lesion is moderately calcified. Left Circumflex   Prox Cx to Mid Cx lesion, 10% stenosed. The lesion was previously treated using a stent (unknown type). Intervention 12/10/2020    Mid LAD lesion   Angioplasty   Angioplasty using a standard balloon was performed prior to stent deployment. The balloon used was a CATH BLLN RX MINI TREK 2X12MM -- . Balloon inflated using multiple inflations inflation technique. The second balloon used was a CATH BLLN RX TREK [id number] -- .   Stent   A single stent was placed. Drug-eluting stent was successfully placed. The stent used was a STENT SYS COR [id number] -- XIENCE JUAN DIEGO. Inflation 1 - Maximum pressure: 12 janice; Time: 10 sec. The strut is well apposed. Angioplasty   Angioplasty using a standard balloon was performed following stent deployment.  The balloon used was a CATH BLLN COR DIL 2. 5X8MM -- NC TREK RAPID-EXCHANGE. Post-Intervention Lesion Assessment   The intervention was successful. The guidewire crossed the lesion. Device was deployed. The pre-interventional distal flow is decreased (MYKE 2). Post-intervention MYKE flow is 3. Lesion had 15 mm of its length treated. There were no complications. There is a 0% residual stenosis post intervention. Interpretation Summary 12/09/2020    · Contrast used: DEFINITY. · LV: Calculated LVEF is 55%. Visually measured ejection fraction. Normal cavity size and systolic function (ejection fraction normal). Mild concentric hypertrophy. Age-appropriate left ventricular diastolic function. · PA: Pulmonary arterial systolic pressure is 26 mmHg. · Aorta: Dilated aortic root; diameter is 3.7 cm. Reading Providers     Reading Role Read Date   Kj See MD Test Supervisor, ECG [place], SPECT [place] 6/9/2021   Procedure Conclusion    Nuclear Stress Test    Nuclear Cardiac Spect Rest then Gated Stress with tomographic imaging/tomography utilized for the tomographic myocardical perfusion imaging performed. Daryle Block was used as the stressing method and agent. (Daryle Block given via a 10 - 20 sec injection.)   One day myocardial perfusion study. Date: 6/2/2021. Left ventricular perfusion is normal. Myocardial perfusion imaging supports a low risk stress test.   There is no prior study available for comparison. . This Single Photon Emission Computer Tomograph (SPECT) study utilized tomographic imaging/tomography for the tomographic myocardial perfusion imaging performed during this study. Interpretation Summary 6/2021    · LV: Estimated LVEF is 55 - 60%. Normal cavity size and systolic function (ejection fraction normal). Mild concentric hypertrophy. Wall motion: normal. Mild (grade 1) left ventricular diastolic dysfunction. · MV: Mild mitral annular calcification. Mild mitral valve regurgitation is present.   · TV: Right Ventricular Arterial Pressure (RVSP) is 30 mmHg. Pulmonary hypertension not suggested by Doppler findings. I Have personally reviewed recent relevant labs available and discussed with patient  CBC BMP LFT daily7/2021    Assessment         ICD-10-CM ICD-9-CM    1. Coronary artery disease of native artery of native heart with stable angina pectoris (HCC)  I25.118 414.01      413.9     Stable symptoms continue treatment monitor   2. Essential hypertension  I10 401.9     Stable continue treatment   3. S/P coronary artery stent placement  Z95.5 V45.82     Stable   4. Mixed hyperlipidemia  E78.2 272.2     Continue current treatment lab with PCP   1/2018  Out of brillinta for 2 weeks-unable to afford  Changed to plavix-discussed compliance  5/2018  Stable mild cp  Out of norvasc 3 months  refilled  10/2018  Atypical chest pain. Clinically noncardiac. Will continue dual antiplatelet therapy. Norvasc increased for hypertension    6/2020  Cardiac status stable. Rare use of sublingual nitroglycerin stable pattern. Continue treatment. Check lipids  1/2021  Patient seen for follow-up after recent admission for unstable angina. Initial plan for CABG was reviewed. Patient became unstable suddenly and required LAD PCI for severe high-grade lesion that was new. CABG was not done. Subsequently stabilized and was discharged home LVEF was normal.  Since discharge his hemoptysis and hematuria are improving. He has urology follow-up. Currently on Brilinta aspirin along with other medication I have reduce aspirin to 81 mg a day. Continue Brilinta. Prescriptions are sent. Blood pressure elevated in office today we will continue to monitor closely. I personally reviewed both his cardiac catheterization 9 right lesion appears chronic. Circumflex lesion will be tried medical management. He has some progression of left main disease to 45-50% which will be managed medically for now he will require close monitoring of symptoms.   If he does stabilize on medical management will monitor clinically. If symptoms persist then further evaluation will be carried out. He has nausea at times and I have prescribed Zofran. We will monitor patient's status closely for any progression of symptomsleft main disease for symptom and likely do a stress testing in 6 to 12 months to assess need for further bypass surgery. Patient is advised to contact us if there is any recurrence of symptoms. 4 2021  Seen for follow-up of occasional chest pain feels fatigue and tired. Recent hemoglobin is normal.  Other labs unremarkable. We will continue to monitor continue dual antiplatelet therapy. Monitor closely  7/2021  Cardiac status stable. Still feels fatigue and tired. Now gets iron infusion. Stress test negative echo with normal ejection fraction. We will discontinue Brilinta and change to Plavix to see if that has any role in patient's fatigue and tiredness. Patient is about 7-month out from his last stent  10/2021  Stable cardiac status continue current medical management. Will discontinue aspirin and continue Plavix        Medications Discontinued During This Encounter   Medication Reason    aspirin delayed-release 81 mg tablet Therapy Completed       No orders of the defined types were placed in this encounter. Follow-up and Dispositions    · Return in about 6 months (around 4/20/2022).          Juvencio Larry MD

## 2021-10-22 ENCOUNTER — VIRTUAL VISIT (OUTPATIENT)
Dept: FAMILY MEDICINE CLINIC | Age: 71
End: 2021-10-22
Payer: MEDICARE

## 2021-10-22 DIAGNOSIS — I10 ESSENTIAL HYPERTENSION: ICD-10-CM

## 2021-10-22 DIAGNOSIS — E04.9 THYROID GOITER: ICD-10-CM

## 2021-10-22 DIAGNOSIS — E78.00 PURE HYPERCHOLESTEROLEMIA: ICD-10-CM

## 2021-10-22 DIAGNOSIS — E61.1 IRON DEFICIENCY: ICD-10-CM

## 2021-10-22 DIAGNOSIS — Z00.00 MEDICARE ANNUAL WELLNESS VISIT, SUBSEQUENT: ICD-10-CM

## 2021-10-22 DIAGNOSIS — I25.10 CORONARY ARTERY DISEASE INVOLVING NATIVE CORONARY ARTERY OF NATIVE HEART WITHOUT ANGINA PECTORIS: ICD-10-CM

## 2021-10-22 DIAGNOSIS — R31.9 HEMATURIA, UNSPECIFIED TYPE: ICD-10-CM

## 2021-10-22 DIAGNOSIS — K21.9 GASTROESOPHAGEAL REFLUX DISEASE WITHOUT ESOPHAGITIS: ICD-10-CM

## 2021-10-22 DIAGNOSIS — E11.9 CONTROLLED TYPE 2 DIABETES MELLITUS WITHOUT COMPLICATION, WITHOUT LONG-TERM CURRENT USE OF INSULIN (HCC): Primary | ICD-10-CM

## 2021-10-22 PROCEDURE — 1101F PT FALLS ASSESS-DOCD LE1/YR: CPT | Performed by: FAMILY MEDICINE

## 2021-10-22 PROCEDURE — 2022F DILAT RTA XM EVC RTNOPTHY: CPT | Performed by: FAMILY MEDICINE

## 2021-10-22 PROCEDURE — G0439 PPPS, SUBSEQ VISIT: HCPCS | Performed by: FAMILY MEDICINE

## 2021-10-22 PROCEDURE — G8510 SCR DEP NEG, NO PLAN REQD: HCPCS | Performed by: FAMILY MEDICINE

## 2021-10-22 PROCEDURE — 3046F HEMOGLOBIN A1C LEVEL >9.0%: CPT | Performed by: FAMILY MEDICINE

## 2021-10-22 PROCEDURE — 3017F COLORECTAL CA SCREEN DOC REV: CPT | Performed by: FAMILY MEDICINE

## 2021-10-22 PROCEDURE — G8419 CALC BMI OUT NRM PARAM NOF/U: HCPCS | Performed by: FAMILY MEDICINE

## 2021-10-22 PROCEDURE — 99214 OFFICE O/P EST MOD 30 MIN: CPT | Performed by: FAMILY MEDICINE

## 2021-10-22 PROCEDURE — G8427 DOCREV CUR MEDS BY ELIG CLIN: HCPCS | Performed by: FAMILY MEDICINE

## 2021-10-22 PROCEDURE — G8536 NO DOC ELDER MAL SCRN: HCPCS | Performed by: FAMILY MEDICINE

## 2021-10-22 PROCEDURE — G8756 NO BP MEASURE DOC: HCPCS | Performed by: FAMILY MEDICINE

## 2021-10-22 NOTE — ACP (ADVANCE CARE PLANNING)
Advance Care Planning     General Advance Care Planning (ACP) Conversation      Date of Conversation: 10/22/2021  Conducted with: Patient with Decision Making Capacity    Healthcare Decision Maker:     Primary Decision Maker: Alley Bledsoe - Spouse - [phone]  Click here to complete [address] including selection of the Healthcare Decision Maker Relationship (ie \"Primary\")      Today we discussed advance directive. has not decided about it. will discuss it with his wife and let me know. discussed ACP specialist referral as well. he will think about it and let me know.      Content/Action Overview:   see above       Length of Voluntary ACP Conversation in minutes:  <16 minutes (Non-Billable)    Laura Councilman, MD

## 2021-10-22 NOTE — PROGRESS NOTES
This is the Subsequent Medicare Annual Wellness Exam, performed 12 months or more after the Initial AWV or the last Subsequent AWV    I have reviewed the patient's medical history in detail and updated the computerized patient record. Assessment/Plan   Education and counseling provided:  Are appropriate based on today's review and evaluation  Discussed 5 years health plan. Discussed advance directive. See ACP note from today. 1. Controlled type 2 diabetes mellitus without complication, without long-term current use of insulin ([address]  -     LIPID PANEL; Future  -     HEMOGLOBIN A1C WITH EAG; Future  2. Coronary artery disease involving native coronary artery of native heart without angina pectoris  3. Gastroesophageal reflux disease without esophagitis  4. Thyroid goiter  -     US THYROID/PARATHYROID/SOFT TISS; Future  5. Essential hypertension  6. Pure hypercholesterolemia  -     LIPID PANEL; Future  7. Iron deficiency  8. Hematuria, unspecified type  9. Medicare annual wellness visit, subsequent       Depression Risk Factor Screening:     3 most recent PHQ Screens 10/22/2021   Little interest or pleasure in doing things Not at all   Feeling down, depressed, irritable, or hopeless Not at all   Total Score PHQ 2 0       Alcohol Risk Screen    Do you average more than 1 drink per night or more than 7 drinks a week: No    In the past three months have you have had more than 4 drinks containing alcohol on one occasion: No        Functional Ability and Level of Safety:    Hearing: Hearing is good. Activities of Daily Living: The home contains: no safety equipment. Patient does total self care      Ambulation: with no difficulty     Fall Risk:  Fall Risk Assessment, last 12 mths 10/22/2021   Able to walk? Yes   Fall in past 12 months? 0   Do you feel unsteady? 0   Are you worried about falling 0   Number of falls in past 12 months -   Fall with injury?  -      Abuse Screen:  Patient is not abused Cognitive Screening    Has your family/caregiver stated any concerns about your memory: no        Health Maintenance Due     Health Maintenance Due   Topic Date Due    Eye Exam Retinal or Dilated  Never done    Foot Exam Q1  10/08/2020    COVID-19 Vaccine (3 - Pfizer booster) 10/03/2021    Colorectal Cancer Screening Combo  11/02/2021     He will make a follow-up appointment with podiatry  He had an eye exam done with Dustin Donahue in February  Has a coming up appointment for colonoscopy in November  Has taken his Covid shot and will be getting soon booster  Patient Care Team   Patient Care Team:  Sonal Wang MD as PCP - General (Family Medicine)  Sonal Wang MD as PCP - [facility]  Eliud Gifford MD as Physician (Cardiology)  Soren Hernandez MD as Physician (Endocrinology)  Aleah Núñez MD (Hematology)    History     Patient Active Problem List   Diagnosis Code    Chronic pain syndrome G89.4    Coronary artery disease involving native coronary artery with unstable angina pectoris ([address] I25.110    Essential hypertension I10    Hyperlipidemia E78.5    Thyroid goiter E04.9    CAD (coronary artery disease) I25.10    S/P coronary artery stent placement Z95.5    Unstable angina pectoris ([address] I20.0    Controlled type 2 diabetes mellitus without complication, without long-term current use of insulin ([address] E11.9    Insomnia G47.00    Tubular adenoma of colon D12.6    Cannabis use, uncomplicated Z53.82    Cervical disc disease M50.90    GERD (gastroesophageal reflux disease) K21.9    ACS (acute coronary syndrome) ([address] I24.9    B12 deficiency E53.8    Anemia D64.9     Past Medical History:   Diagnosis Date    Arthritis     1999 vicodin    Diabetes ([address] 1998 metformin    GERD (gastroesophageal reflux disease)     Hypercholesterolemia     Hypertension 1996 norvasc    Kidney stones     MI (myocardial infarction) ([address]       Past Surgical History:   Procedure Laterality Date    COLONOSCOPY N/A 11/2/2018    COLONOSCOPY with Polypectomies performed by Xenia Lamb MD at [facility]    HX CHOLECYSTECTOMY      HX GI  2010    gallbladder    OR CARDIAC SURG PROCEDURE UNLIST  12/2017    stints     Current Outpatient Medications   Medication Sig Dispense Refill    cyanocobalamin, vitamin B-12, (VITAMIN B-12 INJECTION) by Injection route.  Januvia 100 mg tablet TAKE 1 TABLET BY MOUTH EVERY DAY 30 Tablet 1    metFORMIN (GLUCOPHAGE) 1,000 mg tablet TAKE 1 TABLET BY MOUTH TWICE A DAY WITH MEALS 30 Tablet 1    tamsulosin (FLOMAX) 0.4 mg capsule Take 1 Capsule by mouth daily (after dinner). 90 Capsule 3    omeprazole (PRILOSEC) 20 mg capsule TAKE 1 CAPSULE BY MOUTH EVERY DAY 30 Capsule 0    clopidogreL (Plavix) 75 mg tab Take 1 Tablet by mouth daily. 90 Tablet 3    glipiZIDE SR (GLUCOTROL XL) 5 mg CR tablet TAKE 1 TABLET BY MOUTH EVERY DAY 90 Tablet 0    amLODIPine (NORVASC) 10 mg tablet TAKE 1 TABLET BY MOUTH EVERY DAY 90 Tablet 1    cetirizine (ZyrTEC) 10 mg tablet Take 10 mg by mouth daily.  ramipriL (ALTACE) 10 mg capsule TAKE 1 CAPSULE BY MOUTH EVERY DAY 90 Cap 3    nitroglycerin (NITROSTAT) 0.4 mg SL tablet DISSOLVE 1 TABLET UNDER TONGUE EVERY 5 MIN AS NEEDED FOR CHEST PAIN FOR UP TO 3 DOSES 25 Tab 0    rosuvastatin (CRESTOR) 40 mg tablet Take 1 Tab by mouth nightly.  Indications: treatment to prevent a heart attack 90 Tab 3    carvediloL (COREG) 25 mg tablet TAKE 1 TABLET BY MOUTH TWICE A DAY WITH FOOD 180 Tab 3    Blood Pressure Monitor kit Check once a day 1 Kit 0     No Known Allergies    Family History   Problem Relation Age of Onset    Stroke Mother     Headache Mother     Hypertension Mother     Lung Cancer Mother     Heart Disease Father     Heart Attack Father     Hypertension Father     Diabetes Father     Lung Cancer Father     Ovarian Cancer Sister     Heart Attack Brother     Breast Cancer Sister     Diabetes Sister    Devin Torres Cancer Maternal Aunt         lung cancer    Cancer Maternal Uncle     Cancer Paternal Aunt     Cancer Paternal Uncle      Social History     Tobacco Use    Smoking status: Never Smoker    Smokeless tobacco: Never Used   Substance Use Topics    Alcohol use: No       Alma Amezcua, who was evaluated through a synchronous (real-time) audio-video encounter, and/or his healthcare decision maker, is aware that it is a billable service, with coverage as determined by his insurance carrier. He provided verbal consent to proceed: Yes, and patient identification was verified. It was conducted pursuant to the emergency declaration under the [address] [place] [address], [address] [place] [address] authority and the [employer] and [employer] General Act. A caregiver was present when appropriate. Ability to conduct physical exam was limited. I was in the office. The patient was at home.     Roberto Edwards MD

## 2021-10-22 NOTE — PROGRESS NOTES
Santana Castro is a 70 y.o. male who was seen by synchronous (real-time) audio-video technology on 10/22/2021 for Diabetes, Cholesterol Problem, Hypertension, Coronary Artery Disease, and Fatigue        Assessment & Plan:   Diagnoses and all orders for this visit:    Controlled type 2 diabetes mellitus without complication, without long-term current use of insulin ([address]): We will recheck labs. Checking blood sugar on and off at home and fasting sugar under 100. No hyper or hypoglycemic symptoms. Continue current plan. Not seen endocrinologist more than a year  -     LIPID PANEL; Future  -     HEMOGLOBIN A1C WITH EAG; Future    Coronary artery disease involving native coronary artery of native heart without angina pectoris: Been following cardiology. No anginal symptoms. Will follow specialist recommendation. On risk factor management. No signs of volume overload    Gastroesophageal reflux disease without esophagitis: Fairly stable. Asymptomatic: Asymptomatic. Thyroid goiter: Asymptomatic. Will check ultrasound and TSH  -     US THYROID/PARATHYROID/SOFT TISS; Future    Essential hypertension: Vitals been stable. Keeping log at home. Also recent cardiology office visit vitals were stable. Will continue current plan. Low-salt diet  Pure hypercholesterolemia: On statin. No side effects.    -     LIPID PANEL; Future    Iron deficiency: Been following hematology. Fairly stable. Asymptomatic    Hematuria, unspecified type: Has been seen by urology. Cystoscopy was done. Has coming up follow-up appointment in November. At this time he is asymptomatic and no blood in the urine    Medicare annual wellness visit, subsequent    Patient understood and agreed with the plan  Review health maintenance. See Medicare wellness note for health maintenance  Follow-up in 4 months. Advised patient to call back to the office    Please note that this dictation was completed with [email or web address], the [employer] voice recognition software.   Quite often unanticipated grammatical, syntax, homophones, and other interpretive errors are inadvertently transcribed by the computer software. Please disregard these errors. Please excuse any errors that have escaped final proofreading. 712  Subjective:     Done visit through doxy  Here for follow-up. Overdue for follow-up. Used to be noncompliant with medicine and follow-ups. Coronary artery disease. Following cardiology. No anginal symptoms. On risk factor management and taking medication with compliance. Working on lifestyle modification and diet modification as well. Diabetes. Well-controlled glucose when checking at home. Fasting sugar under 100. Checking on and off. No hyper or hypoglycemic symptoms. Sitting comfortable without any acute distress during the visit. Denies any headache, dizziness, no chest pain or trouble breathing, no arm or leg weakness. No nausea or vomiting, no weight or appetite changes, no mood changes . No urine or bowel complains, no palpitation, no diaphoresis. No abdominal pain. No cold or cough. No leg swelling. No fever. No sleep trouble. History of hematuria. Seen hematology. Also history of kidney stone. No recent episode. Had a cystoscopy done. Has coming a follow-up appointment in November. At this time asymptomatic. History of goiter. Asymptomatic. No trouble swallowing or change in voice. Not seen endocrinology over a year. GERD symptoms stable. Prior to Admission medications    Medication Sig Start Date End Date Taking? Authorizing Provider   cyanocobalamin, vitamin B-12, (VITAMIN B-12 INJECTION) by Injection route. Yes Provider, Historical   Januvia 100 mg tablet TAKE 1 TABLET BY MOUTH EVERY DAY 9/17/21  Yes Aydee Raza MD   metFORMIN (GLUCOPHAGE) 1,000 mg tablet TAKE 1 TABLET BY MOUTH TWICE A DAY WITH MEALS 9/17/21  Yes Aydee Raza MD   tamsulosin [facility]) 0.4 mg capsule Take 1 Capsule by mouth daily (after dinner).  9/9/21  Yes Soren Menjivar NP omeprazole (PRILOSEC) 20 mg capsule TAKE 1 CAPSULE BY MOUTH EVERY DAY 8/9/21  Yes Zaynab Chery MD   clopidogreL (Plavix) 75 mg tab Take 1 Tablet by mouth daily. 7/14/21  Yes Nelida Gooden MD   glipiZIDE SR (GLUCOTROL XL) 5 mg CR tablet TAKE 1 TABLET BY MOUTH EVERY DAY 7/12/21  Yes Zaynab Chery MD   amLODIPine (NORVASC) 10 mg tablet TAKE 1 TABLET BY MOUTH EVERY DAY 6/23/21  Yes Nelida Gooden MD   cetirizine (ZyrTEC) 10 mg tablet Take 10 mg by mouth daily. Yes Abena Garcia MD   ramipriL (ALTACE) 10 mg capsule TAKE 1 CAPSULE BY MOUTH EVERY DAY 2/19/21  Yes Coby Mantilla NP   nitroglycerin (NITROSTAT) 0.4 mg SL tablet DISSOLVE 1 TABLET UNDER TONGUE EVERY 5 MIN AS NEEDED FOR CHEST PAIN FOR UP TO 3 DOSES 1/31/21  Yes Nelida Gooden MD   rosuvastatin (CRESTOR) 40 mg tablet Take 1 Tab by mouth nightly.  Indications: treatment to prevent a heart attack 1/7/21  Yes Nelida Gooden MD   carvediloL (COREG) 25 mg tablet TAKE 1 TABLET BY MOUTH TWICE A DAY WITH FOOD 10/30/20  Yes Mini Tinajero NP   Blood Pressure Monitor kit Check once a day 5/12/20  Yes Zaynab Cehry MD   metFORMIN (GLUCOPHAGE) 1,000 mg tablet TAKE 1 TABLET BY MOUTH TWICE A DAY WITH MEALS 6/23/21   Zaynab Chery MD   Januvia 100 mg tablet TAKE 1 TABLET BY MOUTH EVERY DAY 6/23/21   Zaynab Chery MD   metFORMIN (GLUCOPHAGE) 1,000 mg tablet TAKE 1 TABLET BY MOUTH TWICE A DAY WITH MEALS 1/13/21   Zaynab Chery MD     Patient Active Problem List    Diagnosis Date Noted    CAD (coronary artery disease) 12/01/2017    B12 deficiency 04/16/2021    Anemia 04/16/2021    [facility]) [facility] 12/08/2020    Tubular adenoma of colon 09/26/2019    Cannabis use, uncomplicated 25/18/1712    Cervical disc disease 09/26/2019    GERD (gastroesophageal reflux disease) 09/26/2019    Controlled type 2 diabetes mellitus without complication, without long-term current use of insulin ([facility] 11/29/2018    Insomnia 11/29/2018    S/P coronary artery stent placement 01/25/2018    Coronary artery disease involving native coronary artery with unstable angina pectoris ([facility] 01/27/2016    Essential hypertension 01/27/2016    Hyperlipidemia 01/27/2016    Thyroid goiter 01/27/2016    Unstable angina pectoris ([facility]) 01/17/2016    Chronic pain syndrome 11/17/2014     Current Outpatient Medications   Medication Sig Dispense Refill    cyanocobalamin, vitamin B-12, (VITAMIN B-12 INJECTION) by Injection route.  Januvia 100 mg tablet TAKE 1 TABLET BY MOUTH EVERY DAY 30 Tablet 1    metFORMIN (GLUCOPHAGE) 1,000 mg tablet TAKE 1 TABLET BY MOUTH TWICE A DAY WITH MEALS 30 Tablet 1    tamsulosin (FLOMAX) 0.4 mg capsule Take 1 Capsule by mouth daily (after dinner). 90 Capsule 3    omeprazole (PRILOSEC) 20 mg capsule TAKE 1 CAPSULE BY MOUTH EVERY DAY 30 Capsule 0    clopidogreL (Plavix) 75 mg tab Take 1 Tablet by mouth daily. 90 Tablet 3    glipiZIDE SR (GLUCOTROL XL) 5 mg CR tablet TAKE 1 TABLET BY MOUTH EVERY DAY 90 Tablet 0    amLODIPine (NORVASC) 10 mg tablet TAKE 1 TABLET BY MOUTH EVERY DAY 90 Tablet 1    cetirizine (ZyrTEC) 10 mg tablet Take 10 mg by mouth daily.  ramipriL (ALTACE) 10 mg capsule TAKE 1 CAPSULE BY MOUTH EVERY DAY 90 Cap 3    nitroglycerin (NITROSTAT) 0.4 mg SL tablet DISSOLVE 1 TABLET UNDER TONGUE EVERY 5 MIN AS NEEDED FOR CHEST PAIN FOR UP TO 3 DOSES 25 Tab 0    rosuvastatin (CRESTOR) 40 mg tablet Take 1 Tab by mouth nightly.  Indications: treatment to prevent a heart attack 90 Tab 3    carvediloL (COREG) 25 mg tablet TAKE 1 TABLET BY MOUTH TWICE A DAY WITH FOOD 180 Tab 3    Blood Pressure Monitor kit Check once a day 1 Kit 0     No Known Allergies  Past Medical History:   Diagnosis Date    Arthritis     1999 vicodin    Diabetes [facility] 1998 metformin    GERD (gastroesophageal reflux disease)     Hypercholesterolemia     Hypertension 1996 norvasc    Kidney stones     MI (myocardial infarction) ([facility] Social History     Tobacco Use    Smoking status: Never Smoker    Smokeless tobacco: Never Used   Substance Use Topics    Alcohol use: No       ROS: See HPI    Objective:     Patient-Reported Vitals 10/22/2021   Patient-Reported Weight -   Patient-Reported Height -   Patient-Reported Pulse -   Patient-Reported Temperature -   Patient-Reported SpO2 -   Patient-Reported Systolic  410   Patient-Reported Diastolic 74      General: alert, cooperative, no distress   Mental  status: normal mood, behavior, speech, dress, motor activity, and thought processes, able to follow commands   HENT: NCAT   Neck: no visualized mass   Resp: no respiratory distress   Neuro: no gross deficits   Skin: no discoloration or lesions of concern on visible areas   Psychiatric: normal affect, consistent with stated mood, no evidence of hallucinations     Additional exam findings: We discussed the expected course, resolution and complications of the diagnosis(es) in detail. Medication risks, benefits, costs, interactions, and alternatives were discussed as indicated. I advised him to contact the office if his condition worsens, changes or fails to improve as anticipated. He expressed understanding with the diagnosis(es) and plan. Leobardo Cruz, was evaluated through a synchronous (real-time) audio-video encounter. The patient (or guardian if applicable) is aware that this is a billable service. Verbal consent to proceed has been obtained within the past 12 months. The visit was conducted pursuant to the emergency declaration under the [address] [place] [address] authority and the [employer] General Act. Patient identification was verified, and a caregiver was present when appropriate. The patient was located in a state where the provider was credentialed to provide care.     Yonny Caldwell MD

## 2021-10-22 NOTE — PATIENT INSTRUCTIONS
Medicare Wellness Visit, Male    The best way to live healthy is to have a lifestyle where you eat a well-balanced diet, exercise regularly, limit alcohol use, and quit all forms of tobacco/nicotine, if applicable. Regular preventive services are another way to keep healthy. Preventive services (vaccines, screening tests, monitoring & exams) can help personalize your care plan, which helps you manage your own care. Screening tests can find health problems at the earliest stages, when they are easiest to treat. [facility] follows the current, evidence-based guidelines published by the [facility] ([facility]) when recommending preventive services for our patients. Because we follow these guidelines, sometimes recommendations change over time as research supports it. (For example, a prostate screening blood test is no longer routinely recommended for men with no symptoms). Of course, you and your doctor may decide to screen more often for some diseases, based on your risk and co-morbidities (chronic disease you are already diagnosed with). Preventive services for you include:  - Medicare offers their members a free annual wellness visit, which is time for you and your primary care provider to discuss and plan for your preventive service needs. Take advantage of this benefit every year!  -All adults over age 72 should receive the recommended pneumonia vaccines. Current USPSTF guidelines recommend a series of two vaccines for the best pneumonia protection.   -All adults should have a flu vaccine yearly and tetanus vaccine every 10 years.  -All adults age 48 and older should receive the shingles vaccines (series of two vaccines).        -All adults age 38-68 who are overweight should have a diabetes screening test once every three years.   -Other screening tests & preventive services for persons with diabetes include: an eye exam to screen for diabetic retinopathy, a kidney function test, a foot exam, and stricter control over your cholesterol.   -Cardiovascular screening for adults with routine risk involves an electrocardiogram (ECG) at intervals determined by the provider.   -Colorectal cancer screening should be done for adults age 54-65 with no increased risk factors for colorectal cancer. There are a number of acceptable methods of screening for this type of cancer. Each test has its own benefits and drawbacks. Discuss with your provider what is most appropriate for you during your annual wellness visit. The different tests include: colonoscopy (considered the best screening method), a fecal occult blood test, a fecal DNA test, and sigmoidoscopy.  -All adults born between [facility] should be screened once for Hepatitis C.  -An Abdominal Aortic Aneurysm (AAA) Screening is recommended for men age 73-68 who has ever smoked in their lifetime.      Here is a list of your current Health Maintenance items (your personalized list of preventive services) with a due date:  Health Maintenance Due   Topic Date Due    Eye Exam  Never done    Diabetic Foot Care  10/08/2020    COVID-19 Vaccine (3 - Pfizer booster) 10/03/2021    Colorectal Screening  11/02/2021

## 2021-10-27 ENCOUNTER — HOSPITAL ENCOUNTER (OUTPATIENT)
Dept: LAB | Age: 71
Discharge: HOME | End: 2021-10-27
Payer: MEDICARE

## 2021-10-27 DIAGNOSIS — E78.00 PURE HYPERCHOLESTEROLEMIA: ICD-10-CM

## 2021-10-27 DIAGNOSIS — E11.9 CONTROLLED TYPE 2 DIABETES MELLITUS WITHOUT COMPLICATION, WITHOUT LONG-TERM CURRENT USE OF INSULIN (HCC): ICD-10-CM

## 2021-10-27 DIAGNOSIS — E04.9 THYROID GOITER: ICD-10-CM

## 2021-10-27 LAB
CHOLEST SERPL-MCNC: 117 MG/DL
EST. AVERAGE GLUCOSE BLD GHB EST-MCNC: 114 MG/DL
HBA1C MFR BLD: 5.6 % (ref 4.2–5.6)
HDLC SERPL-MCNC: 36 MG/DL (ref 40–60)
HDLC SERPL: 3.3 {RATIO} (ref 0–5)
LDLC SERPL CALC-MCNC: 43.4 MG/DL (ref 0–100)
LIPID PROFILE,FLP: ABNORMAL
TRIGL SERPL-MCNC: 188 MG/DL (ref ?–150)
TSH SERPL DL<=0.05 MIU/L-ACNC: 1.24 UIU/ML (ref 0.36–3.74)
VLDLC SERPL CALC-MCNC: 37.6 MG/DL

## 2021-10-27 PROCEDURE — 83036 HEMOGLOBIN GLYCOSYLATED A1C: CPT

## 2021-10-27 PROCEDURE — 84443 ASSAY THYROID STIM HORMONE: CPT

## 2021-10-27 PROCEDURE — 36415 COLL VENOUS BLD VENIPUNCTURE: CPT

## 2021-10-27 PROCEDURE — 80061 LIPID PANEL: CPT

## 2021-10-27 NOTE — PROGRESS NOTES
A1c normal range. Other labs stable.   Continue current plan and further discussion on follow-up visit

## 2021-10-28 ENCOUNTER — HOSPITAL ENCOUNTER (OUTPATIENT)
Dept: INFUSION THERAPY | Age: 71
Discharge: HOME | End: 2021-10-28
Payer: MEDICARE

## 2021-10-28 VITALS
HEART RATE: 85 BPM | OXYGEN SATURATION: 97 % | TEMPERATURE: 98 F | RESPIRATION RATE: 18 BRPM | SYSTOLIC BLOOD PRESSURE: 134 MMHG | DIASTOLIC BLOOD PRESSURE: 80 MMHG

## 2021-10-28 DIAGNOSIS — E53.8 B12 DEFICIENCY: Primary | ICD-10-CM

## 2021-10-28 PROCEDURE — 96372 THER/PROPH/DIAG INJ SC/IM: CPT

## 2021-10-28 PROCEDURE — 74011250636 HC RX REV CODE- 250/636: Performed by: INTERNAL MEDICINE

## 2021-10-28 RX ORDER — CYANOCOBALAMIN 1000 UG/ML
1000 INJECTION, SOLUTION INTRAMUSCULAR; SUBCUTANEOUS ONCE
Status: CANCELLED
Start: 2021-11-25 | End: 2021-11-25

## 2021-10-28 RX ORDER — DIPHENHYDRAMINE HYDROCHLORIDE 50 MG/ML
25 INJECTION, SOLUTION INTRAMUSCULAR; INTRAVENOUS AS NEEDED
Status: CANCELLED
Start: 2021-11-25

## 2021-10-28 RX ORDER — ALBUTEROL SULFATE 0.83 MG/ML
2.5 SOLUTION RESPIRATORY (INHALATION) AS NEEDED
Status: CANCELLED
Start: 2021-11-25

## 2021-10-28 RX ORDER — CYANOCOBALAMIN 1000 UG/ML
1000 INJECTION, SOLUTION INTRAMUSCULAR; SUBCUTANEOUS ONCE
Status: COMPLETED | OUTPATIENT
Start: 2021-10-28 | End: 2021-10-28

## 2021-10-28 RX ORDER — ACETAMINOPHEN 325 MG/1
650 TABLET ORAL AS NEEDED
Status: CANCELLED
Start: 2021-11-25

## 2021-10-28 RX ORDER — HYDROCORTISONE SODIUM SUCCINATE 100 MG/2ML
100 INJECTION, POWDER, FOR SOLUTION INTRAMUSCULAR; INTRAVENOUS AS NEEDED
Status: CANCELLED | OUTPATIENT
Start: 2021-11-25

## 2021-10-28 RX ORDER — EPINEPHRINE 1 MG/ML
0.3 INJECTION, SOLUTION, CONCENTRATE INTRAVENOUS AS NEEDED
Status: CANCELLED | OUTPATIENT
Start: 2021-11-25

## 2021-10-28 RX ORDER — DIPHENHYDRAMINE HYDROCHLORIDE 50 MG/ML
50 INJECTION, SOLUTION INTRAMUSCULAR; INTRAVENOUS AS NEEDED
Status: CANCELLED
Start: 2021-11-25

## 2021-10-28 RX ORDER — ONDANSETRON 2 MG/ML
8 INJECTION INTRAMUSCULAR; INTRAVENOUS AS NEEDED
Status: CANCELLED | OUTPATIENT
Start: 2021-11-25

## 2021-10-28 RX ADMIN — CYANOCOBALAMIN 1000 MCG: 1000 INJECTION, SOLUTION INTRAMUSCULAR at 10:08

## 2021-10-28 NOTE — PROGRESS NOTES
[facility] Progress Note    Date: 2021    Name: Jaden Burr    MRN: [id number]         : 1950    B-12 INJECTION Q 28 days. Mr. Denise Hernandez arrived ambulatory to [facility] at 1000. .Mr. Denise Hernandez was assessed and education was provided. Mr. Marleni Jenkins vitals were reviewed and patient was observed for 5 minutes prior to treatment. Patient Vitals for the past 12 hrs:   Temp Pulse Resp BP SpO2   10/28/21 1005 98 °F (36.7 °C) 85 18 134/80 97 %       Visit Vitals  /80 (BP 1 Location: Left upper arm, BP Patient Position: Sitting)   Pulse 85   Temp 98 °F (36.7 °C)   Resp 18   SpO2 97%         B-12 IM injection given in right arm, bandaid applied. Mr. Denise Hernandez tolerated the infusion, and had no complaints. Patient armband removed and shredded. Mr. Denise Hernandez was discharged from [place] [address] in stable condition at 1015. He is to return on  at 0930 for his next b-12 appointment.       Daylin Cabral RN  2021

## 2021-10-29 DIAGNOSIS — I10 ESSENTIAL HYPERTENSION: ICD-10-CM

## 2021-10-29 RX ORDER — CARVEDILOL 25 MG/1
TABLET ORAL
Qty: 180 TABLET | Refills: 3 | Status: SHIPPED | OUTPATIENT
Start: 2021-10-29

## 2021-11-01 ENCOUNTER — HOSPITAL ENCOUNTER (OUTPATIENT)
Dept: ULTRASOUND IMAGING | Age: 71
Discharge: HOME | End: 2021-11-01
Attending: FAMILY MEDICINE
Payer: MEDICARE

## 2021-11-01 DIAGNOSIS — E04.9 THYROID GOITER: ICD-10-CM

## 2021-11-01 PROCEDURE — 76536 US EXAM OF HEAD AND NECK: CPT

## 2021-11-03 NOTE — PROGRESS NOTES
Pt has seen Florinda Pittman in the past. Needed FNA of thyroid nodule. Please let pt know and make a sooner appt. Also need to do ultrasound in a year to have follow up on rest of the nodule.  Pt mentioned during visit has not seen them lately so please ask and make sure pt needs a new referral??

## 2021-11-16 ENCOUNTER — LAB ONLY (OUTPATIENT)
Dept: ONCOLOGY | Age: 71
End: 2021-11-16

## 2021-11-16 ENCOUNTER — HOSPITAL ENCOUNTER (OUTPATIENT)
Dept: LAB | Age: 71
Discharge: HOME | End: 2021-11-16
Payer: MEDICARE

## 2021-11-16 DIAGNOSIS — E53.8 B12 DEFICIENCY: ICD-10-CM

## 2021-11-16 DIAGNOSIS — D64.9 ANEMIA, UNSPECIFIED TYPE: ICD-10-CM

## 2021-11-16 DIAGNOSIS — D50.9 IRON DEFICIENCY ANEMIA, UNSPECIFIED IRON DEFICIENCY ANEMIA TYPE: Primary | ICD-10-CM

## 2021-11-16 DIAGNOSIS — D50.9 IRON DEFICIENCY ANEMIA, UNSPECIFIED IRON DEFICIENCY ANEMIA TYPE: ICD-10-CM

## 2021-11-16 LAB
ALBUMIN SERPL-MCNC: 4.2 G/DL (ref 3.4–5)
ALBUMIN/GLOB SERPL: 1.2 {RATIO} (ref 0.8–1.7)
ALP SERPL-CCNC: 79 U/L (ref 45–117)
ALT SERPL-CCNC: 23 U/L (ref 16–61)
ANION GAP SERPL CALC-SCNC: 7 MMOL/L (ref 3–18)
AST SERPL-CCNC: 16 U/L (ref 10–38)
BASOPHILS # BLD: 0.1 K/UL (ref 0–0.1)
BASOPHILS NFR BLD: 1 % (ref 0–2)
BILIRUB SERPL-MCNC: 0.5 MG/DL (ref 0.2–1)
BUN SERPL-MCNC: 13 MG/DL (ref 7–18)
BUN/CREAT SERPL: 12 (ref 12–20)
CALCIUM SERPL-MCNC: 9.7 MG/DL (ref 8.5–10.1)
CHLORIDE SERPL-SCNC: 103 MMOL/L (ref 100–111)
CO2 SERPL-SCNC: 28 MMOL/L (ref 21–32)
CREAT SERPL-MCNC: 1.11 MG/DL (ref 0.6–1.3)
DIFFERENTIAL METHOD BLD: ABNORMAL
EOSINOPHIL # BLD: 0.2 K/UL (ref 0–0.4)
EOSINOPHIL NFR BLD: 2 % (ref 0–5)
ERYTHROCYTE [DISTWIDTH] IN BLOOD BY AUTOMATED COUNT: 14.6 % (ref 11.6–14.5)
FERRITIN SERPL-MCNC: 81 NG/ML (ref 8–388)
FOLATE SERPL-MCNC: 8.5 NG/ML (ref 3.1–17.5)
GLOBULIN SER CALC-MCNC: 3.4 G/DL (ref 2–4)
GLUCOSE SERPL-MCNC: 277 MG/DL (ref 74–99)
HCT VFR BLD AUTO: 43.2 % (ref 36–48)
HGB BLD-MCNC: 14 G/DL (ref 13–16)
IMM GRANULOCYTES # BLD AUTO: 0 K/UL (ref 0–0.04)
IMM GRANULOCYTES NFR BLD AUTO: 0 % (ref 0–0.5)
IRON SATN MFR SERPL: 33 % (ref 20–50)
IRON SERPL-MCNC: 105 UG/DL (ref 50–175)
LYMPHOCYTES # BLD: 2.1 K/UL (ref 0.9–3.6)
LYMPHOCYTES NFR BLD: 24 % (ref 21–52)
MCH RBC QN AUTO: 30.4 PG (ref 24–34)
MCHC RBC AUTO-ENTMCNC: 32.4 G/DL (ref 31–37)
MCV RBC AUTO: 93.9 FL (ref 78–100)
MONOCYTES # BLD: 0.6 K/UL (ref 0.05–1.2)
MONOCYTES NFR BLD: 7 % (ref 3–10)
NEUTS SEG # BLD: 6 K/UL (ref 1.8–8)
NEUTS SEG NFR BLD: 67 % (ref 40–73)
NRBC # BLD: 0 K/UL (ref 0–0.01)
NRBC BLD-RTO: 0 PER 100 WBC
PLATELET # BLD AUTO: 194 K/UL (ref 135–420)
PMV BLD AUTO: 10.4 FL (ref 9.2–11.8)
POTASSIUM SERPL-SCNC: 5 MMOL/L (ref 3.5–5.5)
PROT SERPL-MCNC: 7.6 G/DL (ref 6.4–8.2)
RBC # BLD AUTO: 4.6 M/UL (ref 4.35–5.65)
SODIUM SERPL-SCNC: 138 MMOL/L (ref 136–145)
TIBC SERPL-MCNC: 317 UG/DL (ref 250–450)
VIT B12 SERPL-MCNC: 345 PG/ML (ref 211–911)
WBC # BLD AUTO: 9 K/UL (ref 4.6–13.2)

## 2021-11-16 PROCEDURE — 82607 VITAMIN B-12: CPT

## 2021-11-16 PROCEDURE — 83540 ASSAY OF IRON: CPT

## 2021-11-16 PROCEDURE — 36415 COLL VENOUS BLD VENIPUNCTURE: CPT

## 2021-11-16 PROCEDURE — 85025 COMPLETE CBC W/AUTO DIFF WBC: CPT

## 2021-11-16 PROCEDURE — 82728 ASSAY OF FERRITIN: CPT

## 2021-11-16 PROCEDURE — 80053 COMPREHEN METABOLIC PANEL: CPT

## 2021-11-18 DIAGNOSIS — E04.9 THYROID GOITER: Primary | ICD-10-CM

## 2021-11-18 DIAGNOSIS — E11.9 CONTROLLED TYPE 2 DIABETES MELLITUS WITHOUT COMPLICATION, WITHOUT LONG-TERM CURRENT USE OF INSULIN (HCC): ICD-10-CM

## 2021-11-18 NOTE — PROGRESS NOTES
Contacted patient and verified identity using name and date of birth (2- identifiers)  Spoke with patient and he verbalized understanding of A1c normal range. Other labs stable.   Continue current plan and further discussion on follow-up visit

## 2021-11-23 NOTE — PROGRESS NOTES
An appointment request has been routed to Dr. Asa Buitrago. His office will contact patient to schedule.

## 2021-11-26 ENCOUNTER — HOSPITAL ENCOUNTER (OUTPATIENT)
Dept: INFUSION THERAPY | Age: 71
Discharge: HOME | End: 2021-11-26
Payer: MEDICARE

## 2021-11-26 VITALS
RESPIRATION RATE: 16 BRPM | OXYGEN SATURATION: 95 % | SYSTOLIC BLOOD PRESSURE: 128 MMHG | HEART RATE: 76 BPM | TEMPERATURE: 97.1 F | DIASTOLIC BLOOD PRESSURE: 76 MMHG

## 2021-11-26 DIAGNOSIS — E53.8 B12 DEFICIENCY: Primary | ICD-10-CM

## 2021-11-26 PROCEDURE — 96372 THER/PROPH/DIAG INJ SC/IM: CPT

## 2021-11-26 PROCEDURE — 74011250636 HC RX REV CODE- 250/636: Performed by: INTERNAL MEDICINE

## 2021-11-26 RX ORDER — CYANOCOBALAMIN 1000 UG/ML
1000 INJECTION, SOLUTION INTRAMUSCULAR; SUBCUTANEOUS ONCE
Status: CANCELLED
Start: 2021-12-19 | End: 2021-12-19

## 2021-11-26 RX ORDER — ONDANSETRON 2 MG/ML
8 INJECTION INTRAMUSCULAR; INTRAVENOUS AS NEEDED
Status: CANCELLED | OUTPATIENT
Start: 2021-12-19

## 2021-11-26 RX ORDER — CYANOCOBALAMIN 1000 UG/ML
1000 INJECTION, SOLUTION INTRAMUSCULAR; SUBCUTANEOUS ONCE
Status: COMPLETED | OUTPATIENT
Start: 2021-11-26 | End: 2021-11-26

## 2021-11-26 RX ORDER — DIPHENHYDRAMINE HYDROCHLORIDE 50 MG/ML
50 INJECTION, SOLUTION INTRAMUSCULAR; INTRAVENOUS AS NEEDED
Status: CANCELLED
Start: 2021-12-19

## 2021-11-26 RX ORDER — ACETAMINOPHEN 325 MG/1
650 TABLET ORAL AS NEEDED
Status: CANCELLED
Start: 2021-12-19

## 2021-11-26 RX ORDER — DIPHENHYDRAMINE HYDROCHLORIDE 50 MG/ML
25 INJECTION, SOLUTION INTRAMUSCULAR; INTRAVENOUS AS NEEDED
Status: CANCELLED
Start: 2021-12-19

## 2021-11-26 RX ORDER — ALBUTEROL SULFATE 0.83 MG/ML
2.5 SOLUTION RESPIRATORY (INHALATION) AS NEEDED
Status: CANCELLED
Start: 2021-12-19

## 2021-11-26 RX ORDER — HYDROCORTISONE SODIUM SUCCINATE 100 MG/2ML
100 INJECTION, POWDER, FOR SOLUTION INTRAMUSCULAR; INTRAVENOUS AS NEEDED
Status: CANCELLED | OUTPATIENT
Start: 2021-12-19

## 2021-11-26 RX ORDER — EPINEPHRINE 1 MG/ML
0.3 INJECTION, SOLUTION, CONCENTRATE INTRAVENOUS AS NEEDED
Status: CANCELLED | OUTPATIENT
Start: 2021-12-19

## 2021-11-26 RX ADMIN — CYANOCOBALAMIN 1000 MCG: 1000 INJECTION, SOLUTION INTRAMUSCULAR at 10:36

## 2021-11-26 NOTE — PROGRESS NOTES
[facility] Progress Note    Date: 2021    Name: Leobardo Cruz    MRN: [id number]         : 1950    B-12 INJECTION Q 28 days. Mr. Pedro Guadalupe arrived ambulatory to [place] at 1030. .Mr. Pedro Guadalupe was assessed and education was provided. Mr. La Ma vitals were reviewed. Visit Vitals  /76 (BP 1 Location: Left upper arm, BP Patient Position: Sitting)   Pulse 76   Temp 97.1 °F (36.2 °C)   Resp 16   SpO2 95%       B-12 IM injection given in right deltoid, bandaid applied. Mr. Pedro Guadalupe tolerated the injection, and had no complaints. Patient armband removed and shredded. Mr. Pedro Guadalupe was discharged from [place] [address] in stable condition at [phone]. He is to return on 21 at 56 for his next b-12 appointment.       Shalom Leary RN  2021
complains of pain/discomfort

## 2021-11-30 ENCOUNTER — VIRTUAL VISIT (OUTPATIENT)
Dept: ONCOLOGY | Age: 71
End: 2021-11-30
Payer: MEDICARE

## 2021-11-30 DIAGNOSIS — D50.9 IRON DEFICIENCY ANEMIA, UNSPECIFIED IRON DEFICIENCY ANEMIA TYPE: Primary | ICD-10-CM

## 2021-11-30 DIAGNOSIS — E53.8 B12 DEFICIENCY: ICD-10-CM

## 2021-11-30 DIAGNOSIS — D64.9 ANEMIA, UNSPECIFIED TYPE: ICD-10-CM

## 2021-11-30 PROCEDURE — 99442 PR PHYS/QHP TELEPHONE EVALUATION 11-20 MIN: CPT | Performed by: NURSE PRACTITIONER

## 2021-11-30 NOTE — PROGRESS NOTES
Mimi Dahl is a 70 y.o. male, evaluated via audio-only technology on 11/30/2021 for Follow-up  . Assessment & Plan:   Iron deficiency anemia  Vitamin B12 deficiency   --  Past medical history significant of Hematuria and bloody vomiting at his recent admission. He has follows up with Urology and GI. He stated his colonoscopy will be scheduled soon.    -- 11/16/2021 CBC reported H/H 14.0 /43.2, iron profile showed saturation 33% and ferritin 81, vitamin B12 >345,  folate 8.5. S/P iron infusion in the form of Venofer x 3 doses completed on 7/8/2021      Plan:  -- s/P IV Venofer x 3 doses on 7/8/2021   -- The patient will continue the Vit B12 replacement at [facility] as indicated. -- We will repeat CBC, CMP, Iron profiles, ferritin, B12/folate, and ret count prior to next visit. -- The patient will f/u with GI and Urology as scheduled.     -- I will see the patient back in clinic in about 4 months. Always sooner if required. No orders of the defined types were placed in this encounter.      12  Subjective:   Mr. Dakotah Burris is a 70y.o. year old male who was seen for management of iron deficiency anemia and vitamin B12  deficiency. The patient has a past medical history significant of Hematuria and bloody vomiting at his recent admission. S/P iron infusion in the form of Venofer on 7/8/2021. He Denied fever, chills, night sweat, unintentional weight loss, skin lumps or bumps, acute bleeding or bruising issues. Denied headache, acute vision change, dizziness, chest pain, worsen shortness of breath, palpitation, productive cough, nausea, vomiting, abdominal pain, altered bowel habits, dysuria, worsen bone pain or back pain, new focal numbness or weakness.     Prior to Admission medications    Medication Sig Start Date End Date Taking?  Authorizing Provider   omeprazole (PRILOSEC) 20 mg capsule TAKE 1 CAPSULE BY MOUTH EVERY DAY 11/24/21   Mp Veras MD   glipiZIDE SR (GLUCOTROL XL) 5 mg CR tablet TAKE 1 TABLET BY MOUTH EVERY DAY 11/24/21   Baldo Yates MD   carvediloL (COREG) 25 mg tablet TAKE 1 TABLET BY MOUTH TWICE A DAY WITH FOOD 10/29/21   Mini Tinajero NP   metFORMIN (GLUCOPHAGE) 1,000 mg tablet TAKE 1 TABLET BY MOUTH TWICE A DAY WITH MEALS 10/25/21   Baldo Yates MD   cyanocobalamin, vitamin B-12, (VITAMIN B-12 INJECTION) by Injection route. Provider, Ruddy   Januvia 100 mg tablet TAKE 1 TABLET BY MOUTH EVERY DAY 9/17/21   Baldo Yates MD   tamsulosin [facility]) 0.4 mg capsule Take 1 Capsule by mouth daily (after dinner). 9/9/21   Lucio Burr NP   clopidogreL (Plavix) 75 mg tab Take 1 Tablet by mouth daily. 7/14/21   Gina Shah MD   amLODIPine (NORVASC) 10 mg tablet TAKE 1 TABLET BY MOUTH EVERY DAY 6/23/21   Gina Shah MD   metFORMIN (GLUCOPHAGE) 1,000 mg tablet TAKE 1 TABLET BY MOUTH TWICE A DAY WITH MEALS 6/23/21   Baldo Yates MD   Januvia 100 mg tablet TAKE 1 TABLET BY MOUTH EVERY DAY 6/23/21   Baldo Yates MD   cetirizine (ZyrTEC) 10 mg tablet Take 10 mg by mouth daily. Other, MD Abena   ramipriL (ALTACE) 10 mg capsule TAKE 1 CAPSULE BY MOUTH EVERY DAY 2/19/21   Coby Mantilla NP   nitroglycerin (NITROSTAT) 0.4 mg SL tablet DISSOLVE 1 TABLET UNDER TONGUE EVERY 5 MIN AS NEEDED FOR CHEST PAIN FOR UP TO 3 DOSES 1/31/21   Gina Shah MD   metFORMIN (GLUCOPHAGE) 1,000 mg tablet TAKE 1 TABLET BY MOUTH TWICE A DAY WITH MEALS 1/13/21   Baldo Yates MD   rosuvastatin (CRESTOR) 40 mg tablet Take 1 Tab by mouth nightly. Indications: treatment to prevent a heart attack 1/7/21   Gina Shah MD   Blood Pressure Monitor kit Check once a day 5/12/20   Baldo Yates MD         Review of Systems   Constitutional: Negative. HENT: Negative. Eyes: Negative. Respiratory: Negative. Cardiovascular: Negative. Gastrointestinal: Negative. Genitourinary: Negative. Musculoskeletal: Negative. Skin: Negative. Neurological: Negative. Endo/Heme/Allergies: Negative. Psychiatric/Behavioral: Negative. Patient-Reported Vitals 11/30/2021   Patient-Reported Weight 200lbs   Patient-Reported Height -   Patient-Reported Pulse -   Patient-Reported Temperature -   Patient-Reported SpO2 -   Patient-Reported Systolic  -   Patient-Reported Diastolic -       Neptali Moreira, who was evaluated through a patient-initiated, synchronous (real-time) audio only encounter, and/or her healthcare decision maker, is aware that it is a billable service, with coverage as determined by his insurance carrier. He provided verbal consent to proceed: YES-Consent obtained within past 12 months Yes. He has not had a related appointment within my department in the past 7 days or scheduled within the next 24 hours.     Time Documentation 15 min     Herrera Putnam NP

## 2021-12-17 RX ORDER — AMLODIPINE BESYLATE 10 MG/1
TABLET ORAL
Qty: 90 TABLET | Refills: 1 | Status: SHIPPED | OUTPATIENT
Start: 2021-12-17 | End: 2022-07-08

## 2021-12-23 ENCOUNTER — HOSPITAL ENCOUNTER (OUTPATIENT)
Dept: INFUSION THERAPY | Age: 71
Discharge: HOME | End: 2021-12-23
Payer: MEDICARE

## 2021-12-23 VITALS
HEART RATE: 68 BPM | SYSTOLIC BLOOD PRESSURE: 144 MMHG | TEMPERATURE: 97.1 F | DIASTOLIC BLOOD PRESSURE: 84 MMHG | OXYGEN SATURATION: 98 % | RESPIRATION RATE: 20 BRPM

## 2021-12-23 DIAGNOSIS — E53.8 B12 DEFICIENCY: Primary | ICD-10-CM

## 2021-12-23 PROCEDURE — 96372 THER/PROPH/DIAG INJ SC/IM: CPT

## 2021-12-23 PROCEDURE — 74011250636 HC RX REV CODE- 250/636: Performed by: INTERNAL MEDICINE

## 2021-12-23 RX ORDER — ALBUTEROL SULFATE 0.83 MG/ML
2.5 SOLUTION RESPIRATORY (INHALATION) AS NEEDED
Status: CANCELLED
Start: 2021-12-24

## 2021-12-23 RX ORDER — HYDROCORTISONE SODIUM SUCCINATE 100 MG/2ML
100 INJECTION, POWDER, FOR SOLUTION INTRAMUSCULAR; INTRAVENOUS AS NEEDED
Status: CANCELLED | OUTPATIENT
Start: 2021-12-24

## 2021-12-23 RX ORDER — ACETAMINOPHEN 325 MG/1
650 TABLET ORAL AS NEEDED
Status: CANCELLED
Start: 2021-12-24

## 2021-12-23 RX ORDER — CYANOCOBALAMIN 1000 UG/ML
1000 INJECTION, SOLUTION INTRAMUSCULAR; SUBCUTANEOUS ONCE
Status: CANCELLED
Start: 2021-12-24 | End: 2021-12-24

## 2021-12-23 RX ORDER — DIPHENHYDRAMINE HYDROCHLORIDE 50 MG/ML
25 INJECTION, SOLUTION INTRAMUSCULAR; INTRAVENOUS AS NEEDED
Status: CANCELLED
Start: 2021-12-24

## 2021-12-23 RX ORDER — DIPHENHYDRAMINE HYDROCHLORIDE 50 MG/ML
50 INJECTION, SOLUTION INTRAMUSCULAR; INTRAVENOUS AS NEEDED
Status: CANCELLED
Start: 2021-12-24

## 2021-12-23 RX ORDER — ONDANSETRON 2 MG/ML
8 INJECTION INTRAMUSCULAR; INTRAVENOUS AS NEEDED
Status: CANCELLED | OUTPATIENT
Start: 2021-12-24

## 2021-12-23 RX ORDER — CYANOCOBALAMIN 1000 UG/ML
1000 INJECTION, SOLUTION INTRAMUSCULAR; SUBCUTANEOUS ONCE
Status: COMPLETED | OUTPATIENT
Start: 2021-12-23 | End: 2021-12-23

## 2021-12-23 RX ORDER — EPINEPHRINE 1 MG/ML
0.3 INJECTION, SOLUTION, CONCENTRATE INTRAVENOUS AS NEEDED
Status: CANCELLED | OUTPATIENT
Start: 2021-12-24

## 2021-12-23 RX ADMIN — CYANOCOBALAMIN 1000 MCG: 1000 INJECTION, SOLUTION INTRAMUSCULAR at 09:20

## 2021-12-23 NOTE — PROGRESS NOTES
[facility] Progress Note    Date: 2021    Name: Juvenal Navarrete    MRN: [id number]         : 1950    Mr. Thorne arrived in the [facility] today at [phone], in stable condition, here for his B-12 Injection (Every 4 Weeks). He was assessed and education was provided. Mr. Jaclyn Waldrop vitals were reviewed. Visit Vitals  BP (!) 144/84 (BP 1 Location: Right upper arm, BP Patient Position: Sitting)   Pulse 68   Temp 97.1 °F (36.2 °C)   Resp 20   SpO2 98%         Cyanocobalamin (B-12) 1,000 mcg, was administered IM in his right deltoid at 0920, per order and without incident. Mr. Thorne tolerated well, and voiced no complaints. Mr. Thorne was discharged from [place] [address] in stable condition at . Sharon Lam He is to return in 4 weeks, on Thursday, 22 at 1130, for his next appointment, for his next B-12 Injection.      Pascale Cyr RN  2021  9:05 AM

## 2022-01-10 ENCOUNTER — OFFICE VISIT (OUTPATIENT)
Dept: ORTHOPEDIC SURGERY | Age: 72
End: 2022-01-10
Payer: MEDICARE

## 2022-01-10 VITALS — BODY MASS INDEX: 28.05 KG/M2 | OXYGEN SATURATION: 97 % | TEMPERATURE: 97 F | WEIGHT: 206.8 LBS | HEART RATE: 71 BPM

## 2022-01-10 DIAGNOSIS — G89.29 CHRONIC RIGHT SHOULDER PAIN: Primary | ICD-10-CM

## 2022-01-10 DIAGNOSIS — M25.511 CHRONIC RIGHT SHOULDER PAIN: Primary | ICD-10-CM

## 2022-01-10 DIAGNOSIS — M19.011 PRIMARY OSTEOARTHRITIS, RIGHT SHOULDER: ICD-10-CM

## 2022-01-10 DIAGNOSIS — M75.01 ADHESIVE CAPSULITIS OF RIGHT SHOULDER: ICD-10-CM

## 2022-01-10 PROCEDURE — 20610 DRAIN/INJ JOINT/BURSA W/O US: CPT | Performed by: SPECIALIST

## 2022-01-10 PROCEDURE — G8432 DEP SCR NOT DOC, RNG: HCPCS | Performed by: SPECIALIST

## 2022-01-10 PROCEDURE — G8536 NO DOC ELDER MAL SCRN: HCPCS | Performed by: SPECIALIST

## 2022-01-10 PROCEDURE — 3017F COLORECTAL CA SCREEN DOC REV: CPT | Performed by: SPECIALIST

## 2022-01-10 PROCEDURE — G8756 NO BP MEASURE DOC: HCPCS | Performed by: SPECIALIST

## 2022-01-10 PROCEDURE — 99213 OFFICE O/P EST LOW 20 MIN: CPT | Performed by: SPECIALIST

## 2022-01-10 PROCEDURE — G8427 DOCREV CUR MEDS BY ELIG CLIN: HCPCS | Performed by: SPECIALIST

## 2022-01-10 PROCEDURE — G8419 CALC BMI OUT NRM PARAM NOF/U: HCPCS | Performed by: SPECIALIST

## 2022-01-10 PROCEDURE — 1101F PT FALLS ASSESS-DOCD LE1/YR: CPT | Performed by: SPECIALIST

## 2022-01-10 RX ORDER — BETAMETHASONE SODIUM PHOSPHATE AND BETAMETHASONE ACETATE 3; 3 MG/ML; MG/ML
3 INJECTION, SUSPENSION INTRA-ARTICULAR; INTRALESIONAL; INTRAMUSCULAR; SOFT TISSUE ONCE
Status: COMPLETED | OUTPATIENT
Start: 2022-01-10 | End: 2022-01-10

## 2022-01-10 RX ADMIN — BETAMETHASONE SODIUM PHOSPHATE AND BETAMETHASONE ACETATE 3 MG: 3; 3 INJECTION, SUSPENSION INTRA-ARTICULAR; INTRALESIONAL; INTRAMUSCULAR; SOFT TISSUE at 14:38

## 2022-01-10 NOTE — PROGRESS NOTES
Patient: Shiloh Smallwood                MRN: [id number]       SSN: [id number]  YOB: 1950        AGE: 70 y.o. SEX: male    PCP: Brandi Farmer MD  01/10/22    Chief Complaint   Patient presents with    Shoulder Pain     right     HISTORY:  Shiloh Smallwood is a 70 y.o. male who is seen for increased right shoulder pain. He responded to a cortisone injection at last ov but his shoulder pain has returned. He has been experiencing right shoulder pain for the past several months. He does not recall any shoulder injury. He feels shoulder pain with overhead activities and at night. He feels radiating pain from his shoulder down his right upper arm. His pain is affecting his golf and fishing game. He is right handed. He was previously seen for hip pain. He has experiencing hip pain off an on for many years. He has received 5 cortisone injections in the past.   Pain Assessment  1/10/2022   Location of Pain Shoulder   Location Modifiers Right   Severity of Pain 4   Quality of Pain Aching   Quality of Pain Comment -   Duration of Pain Persistent   Frequency of Pain Constant   Date Pain First Started -   Date Pain First Started Comment -   Aggravating Factors Stretching;Straightening   Aggravating Factors Comment -   Limiting Behavior Yes   Relieving Factors Rest   Result of Injury No     Occupation, etc:  Mr. Thorne is retired. 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 He lives with his wife, daughter, son-in-law, and 3 grandchildren in [place]. He has 5 daughters and 15 grandchildren--6 boys, 6 girls. He takes his grandchildren to [facility] to go bass fishing. He likes to fish and golf, but he hasn't been golfing lately due to shoulder pain. He recently visited his grandchildren in [place]. Mr. Thorne weighs 206 lbs and is 6'0\" tall. He is a metformin controlled diabetic.      Lab Results   Component Value Date/Time    Hemoglobin A1c 5.6 10/27/2021 08:50 AM     Weight Metrics 1/10/2022 10/20/2021 9/9/2021 8/12/2021 7/19/2021 7/14/2021 6/10/2021   Weight 206 lb 12.8 oz 212 lb 215 lb 217 lb 210 lb 209 lb -   BMI 28.05 kg/m2 28.75 kg/m2 29.16 kg/m2 29.43 kg/m2 28.48 kg/m2 28.35 kg/m2 27.26 kg/m2       Patient Active Problem List   Diagnosis Code    Chronic pain syndrome G89.4    Coronary artery disease involving native coronary artery with unstable angina pectoris ([facility]) I25.110    Essential hypertension I10    Hyperlipidemia E78.5    Thyroid goiter E04.9    CAD (coronary artery disease) I25.10    S/P coronary artery stent placement Z95.5    Unstable angina pectoris ([facility]) I20.0    Controlled type 2 diabetes mellitus without complication, without long-term current use of insulin ([facility]) E11.9    Insomnia G47.00    Tubular adenoma of colon D12.6    Cannabis use, uncomplicated B76.46    Cervical disc disease M50.90    GERD (gastroesophageal reflux disease) K21.9    ACS (acute coronary syndrome) ([facility]) I24.9    B12 deficiency E53.8    Anemia D64.9     REVIEW OF SYSTEMS:    Constitutional Symptoms: Negative   Eyes: Negative   Ears, Nose, Throat and Mouth: Negative   Cardiovascular: Negative   Respiratory: Negative   Genitourinary: Per HPI   Gastrointestinal: Per HPI   Integumentary (Skin and/or Breast): Negative   Musculoskeletal: Per HPI   Endocrine/Rheumatologic: Negative   Neurological: Per HPI   Hematology/Lymphatic: Negative    Allergic/Immunologic: Negative   Phychiatric: Negative    Social History     Socioeconomic History    Marital status:      Spouse name: Not on file    Number of children: Not on file    Years of education: Not on file    Highest education level: Not on file   Occupational History    Not on file   Tobacco Use    Smoking status: Never Smoker    Smokeless tobacco: Never Used   Vaping Use    Vaping Use: Never used   Substance and Sexual Activity    Alcohol use: No    Drug use: No    Sexual activity: Yes     Partners: Female     Birth control/protection: None   Other Topics Concern    Not on file   Social History Narrative    Not on file     Social Determinants of Health     Financial Resource Strain:     Difficulty of Paying Living Expenses: Not on file   Food Insecurity:     Worried About Running Out of Food in the Last Year: Not on file    Asndro of Food in the Last Year: Not on file   Transportation Needs:     Lack of Transportation (Medical): Not on file    Lack of Transportation (Non-Medical):  Not on file   Physical Activity:     Days of Exercise per Week: Not on file    Minutes of Exercise per Session: Not on file   Stress:     Feeling of Stress : Not on file   Social Connections:     Frequency of Communication with Friends and Family: Not on file    Frequency of Social Gatherings with Friends and Family: Not on file    Attends Shinto Services: Not on file    Active Member of Clubs or Organizations: Not on file    Attends Club or Organization Meetings: Not on file    Marital Status: Not on file   Intimate Partner Violence:     Fear of Current or Ex-Partner: Not on file    Emotionally Abused: Not on file    Physically Abused: Not on file    Sexually Abused: Not on file   Housing Stability:     Unable to Pay for Housing in the Last Year: Not on file    Number of Places Lived in the Last Year: Not on file    Unstable Housing in the Last Year: Not on file      No Known Allergies   Current Outpatient Medications   Medication Sig    amLODIPine (NORVASC) 10 mg tablet TAKE 1 TABLET BY MOUTH EVERY DAY    omeprazole (PRILOSEC) 20 mg capsule TAKE 1 CAPSULE BY MOUTH EVERY DAY    glipiZIDE SR (GLUCOTROL XL) 5 mg CR tablet TAKE 1 TABLET BY MOUTH EVERY DAY    carvediloL (COREG) 25 mg tablet TAKE 1 TABLET BY MOUTH TWICE A DAY WITH FOOD    metFORMIN (GLUCOPHAGE) 1,000 mg tablet TAKE 1 TABLET BY MOUTH TWICE A DAY WITH MEALS    cyanocobalamin, vitamin B-12, (VITAMIN B-12 INJECTION) by Injection route.  Januvia 100 mg tablet TAKE 1 TABLET BY MOUTH EVERY DAY    tamsulosin (FLOMAX) 0.4 mg capsule Take 1 Capsule by mouth daily (after dinner).  clopidogreL (Plavix) 75 mg tab Take 1 Tablet by mouth daily.  cetirizine (ZyrTEC) 10 mg tablet Take 10 mg by mouth daily.  ramipriL (ALTACE) 10 mg capsule TAKE 1 CAPSULE BY MOUTH EVERY DAY    nitroglycerin (NITROSTAT) 0.4 mg SL tablet DISSOLVE 1 TABLET UNDER TONGUE EVERY 5 MIN AS NEEDED FOR CHEST PAIN FOR UP TO 3 DOSES    rosuvastatin (CRESTOR) 40 mg tablet Take 1 Tab by mouth nightly. Indications: treatment to prevent a heart attack    Blood Pressure Monitor kit Check once a day     No current facility-administered medications for this visit.       PHYSICAL EXAMINATION:  Visit Vitals  Pulse 71   Temp 97 °F (36.1 °C) (Temporal)   Wt 206 lb 12.8 oz (93.8 kg)   SpO2 97%   BMI 28.05 kg/m²      ORTHO EXAMINATION:  Examination Right shoulder Left shoulder   Skin Intact Intact   Effusion - -   Biceps deformity - -   Atrophy - -   AC joint tenderness + and osteophyte -   Acromial tenderness + -   Biceps tenderness - -   Forward flexion/Elevation  170   Active abduction  170   External rotation ROM 15 30   Internal rotation ROM 70 90   Apprehension - -   Impingement - -   Drop Arm Test - -   Neurovascular Intact Intact    Prominence of AC joint    TIME OUT:  Chart reviewed for the following:   IAshley MD, have reviewed the History, Physical and updated the Allergic reactions for R Chuy Conway Karla 38 performed immediately prior to start of procedure:  Jr Isaac MD, have performed the following reviews on [facility] prior to the start of the procedure:          * Patient was identified by name and date of birth   * Agreement on procedure being performed was verified  * Risks and Benefits explained to the patient  * Procedure site verified and marked as necessary  * Patient was positioned for comfort  * Consent was obtained     Time: 2:30 PM     Date of procedure: 1/10/2022  Procedure performed by:  Jenni Castellanos MD  Mr. Cheyanne Montano tolerated the procedure well with no complications. RADIOGRAPHS:  XR RIGHT SHOULDER 4/2/21 JORGE  IMPRESSION:  Three views - No fractures, moderate acromioclavicular narrowing, moderate glenohumeral narrowing, no calcific densities. IMPRESSION:      ICD-10-CM ICD-9-CM    1. Chronic right shoulder pain  M25.511 719.41 betamethasone (CELESTONE) injection 3 mg    G89.29 338.29 DRAIN/INJECT LARGE JOINT/BURSA   2. Primary osteoarthritis, right shoulder  M19.011 715.11 betamethasone (CELESTONE) injection 3 mg      DRAIN/INJECT LARGE JOINT/BURSA   3. Adhesive capsulitis of right shoulder  M75.01 726.0      PLAN:  After discussing treatment options, patient's right shoulder was injected with 4 cc Marcaine and 1/2 cc Celestone. There is no need for surgery at this time. He will follow up as needed.      Scribed by Jenni Castellanos MD Nate Harada) as dictated by Jenni Castellanos MD

## 2022-01-11 RX ORDER — ROSUVASTATIN CALCIUM 40 MG/1
TABLET, COATED ORAL
Qty: 90 TABLET | Refills: 3 | Status: SHIPPED | OUTPATIENT
Start: 2022-01-11

## 2022-02-17 ENCOUNTER — HOSPITAL ENCOUNTER (OUTPATIENT)
Dept: INFUSION THERAPY | Age: 72
Discharge: HOME | End: 2022-02-17
Payer: MEDICARE

## 2022-02-17 VITALS
DIASTOLIC BLOOD PRESSURE: 66 MMHG | OXYGEN SATURATION: 96 % | TEMPERATURE: 97 F | SYSTOLIC BLOOD PRESSURE: 131 MMHG | HEART RATE: 75 BPM | RESPIRATION RATE: 18 BRPM

## 2022-02-17 DIAGNOSIS — E53.8 B12 DEFICIENCY: Primary | ICD-10-CM

## 2022-02-17 PROCEDURE — 96372 THER/PROPH/DIAG INJ SC/IM: CPT

## 2022-02-17 PROCEDURE — 74011250636 HC RX REV CODE- 250/636: Performed by: INTERNAL MEDICINE

## 2022-02-17 RX ORDER — HYDROCORTISONE SODIUM SUCCINATE 100 MG/2ML
100 INJECTION, POWDER, FOR SOLUTION INTRAMUSCULAR; INTRAVENOUS AS NEEDED
Status: CANCELLED | OUTPATIENT
Start: 2022-03-17

## 2022-02-17 RX ORDER — DIPHENHYDRAMINE HYDROCHLORIDE 50 MG/ML
25 INJECTION, SOLUTION INTRAMUSCULAR; INTRAVENOUS AS NEEDED
Status: CANCELLED
Start: 2022-03-17

## 2022-02-17 RX ORDER — ACETAMINOPHEN 325 MG/1
650 TABLET ORAL AS NEEDED
Status: CANCELLED
Start: 2022-03-17

## 2022-02-17 RX ORDER — EPINEPHRINE 1 MG/ML
0.3 INJECTION, SOLUTION, CONCENTRATE INTRAVENOUS AS NEEDED
Status: CANCELLED | OUTPATIENT
Start: 2022-03-17

## 2022-02-17 RX ORDER — ALBUTEROL SULFATE 0.83 MG/ML
2.5 SOLUTION RESPIRATORY (INHALATION) AS NEEDED
Status: CANCELLED
Start: 2022-03-17

## 2022-02-17 RX ORDER — DIPHENHYDRAMINE HYDROCHLORIDE 50 MG/ML
50 INJECTION, SOLUTION INTRAMUSCULAR; INTRAVENOUS AS NEEDED
Status: CANCELLED
Start: 2022-03-17

## 2022-02-17 RX ORDER — CYANOCOBALAMIN 1000 UG/ML
1000 INJECTION, SOLUTION INTRAMUSCULAR; SUBCUTANEOUS ONCE
Status: CANCELLED
Start: 2022-03-17 | End: 2022-03-17

## 2022-02-17 RX ORDER — ONDANSETRON 2 MG/ML
8 INJECTION INTRAMUSCULAR; INTRAVENOUS AS NEEDED
Status: CANCELLED | OUTPATIENT
Start: 2022-03-17

## 2022-02-17 RX ORDER — CYANOCOBALAMIN 1000 UG/ML
1000 INJECTION, SOLUTION INTRAMUSCULAR; SUBCUTANEOUS ONCE
Status: COMPLETED | OUTPATIENT
Start: 2022-02-17 | End: 2022-02-17

## 2022-02-17 RX ADMIN — CYANOCOBALAMIN 1000 MCG: 1000 INJECTION INTRAMUSCULAR; SUBCUTANEOUS at 11:33

## 2022-02-17 NOTE — PROGRESS NOTES
[facility] Progress Note    Date: 2022    Name: Roxy Frias    MRN: [id number]         : 1950    Mr. Jd Capps arrived in the [facility] today at [phone], in stable condition, here for his B-12 Injection (Every 4 Weeks). He was assessed and education was provided. Mr. Digna Holly vitals were reviewed. Visit Vitals  /66 (BP 1 Location: Right upper arm, BP Patient Position: Sitting)   Pulse 75   Temp 97 °F (36.1 °C)   Resp 18   SpO2 96%       Cyanocobalamin (B-12) 1,000 mcg, was administered IM in his right deltoid. Band-aid to site. Mr. Jd Capps tolerated well, and voiced no complaints. Discharge/ follow-up instructions discussed w/ pt. Pt verbalized understanding. Armband removed and shredded. Mr. Jd Capps was discharged from [place] [address] in stable condition at 1135. He is to return on 3/17/22 at 36, for his next appointment, for his next B-12 Injection.      Barry Dupree RN  2022

## 2022-02-24 RX ORDER — ASPIRIN 81 MG/1
TABLET ORAL
Qty: 90 TABLET | Refills: 2 | Status: SHIPPED | OUTPATIENT
Start: 2022-02-24 | End: 2022-03-31

## 2022-03-17 ENCOUNTER — APPOINTMENT (OUTPATIENT)
Dept: ONCOLOGY | Age: 72
End: 2022-03-17

## 2022-03-17 ENCOUNTER — HOSPITAL ENCOUNTER (OUTPATIENT)
Dept: INFUSION THERAPY | Age: 72
Discharge: HOME | End: 2022-03-17
Payer: MEDICARE

## 2022-03-17 ENCOUNTER — HOSPITAL ENCOUNTER (OUTPATIENT)
Dept: LAB | Age: 72
Discharge: HOME | End: 2022-03-17
Payer: MEDICARE

## 2022-03-17 VITALS
OXYGEN SATURATION: 98 % | DIASTOLIC BLOOD PRESSURE: 67 MMHG | SYSTOLIC BLOOD PRESSURE: 111 MMHG | TEMPERATURE: 97.1 F | HEART RATE: 92 BPM | RESPIRATION RATE: 18 BRPM

## 2022-03-17 DIAGNOSIS — E53.8 B12 DEFICIENCY: ICD-10-CM

## 2022-03-17 DIAGNOSIS — E53.8 B12 DEFICIENCY: Primary | ICD-10-CM

## 2022-03-17 DIAGNOSIS — D50.9 IRON DEFICIENCY ANEMIA, UNSPECIFIED IRON DEFICIENCY ANEMIA TYPE: ICD-10-CM

## 2022-03-17 DIAGNOSIS — D64.9 ANEMIA, UNSPECIFIED TYPE: ICD-10-CM

## 2022-03-17 DIAGNOSIS — N40.0 BENIGN PROSTATIC HYPERPLASIA, UNSPECIFIED WHETHER LOWER URINARY TRACT SYMPTOMS PRESENT: ICD-10-CM

## 2022-03-17 LAB
ALBUMIN SERPL-MCNC: 3.9 G/DL (ref 3.4–5)
ALBUMIN/GLOB SERPL: 1.2 {RATIO} (ref 0.8–1.7)
ALP SERPL-CCNC: 65 U/L (ref 45–117)
ALT SERPL-CCNC: 22 U/L (ref 16–61)
ANION GAP SERPL CALC-SCNC: 3 MMOL/L (ref 3–18)
AST SERPL-CCNC: 13 U/L (ref 10–38)
BASOPHILS # BLD: 0.1 K/UL (ref 0–0.1)
BASOPHILS NFR BLD: 1 % (ref 0–2)
BILIRUB SERPL-MCNC: 0.3 MG/DL (ref 0.2–1)
BUN SERPL-MCNC: 10 MG/DL (ref 7–18)
BUN/CREAT SERPL: 11 (ref 12–20)
CALCIUM SERPL-MCNC: 9.3 MG/DL (ref 8.5–10.1)
CHLORIDE SERPL-SCNC: 105 MMOL/L (ref 100–111)
CO2 SERPL-SCNC: 30 MMOL/L (ref 21–32)
CREAT SERPL-MCNC: 0.94 MG/DL (ref 0.6–1.3)
DIFFERENTIAL METHOD BLD: ABNORMAL
EOSINOPHIL # BLD: 0.1 K/UL (ref 0–0.4)
EOSINOPHIL NFR BLD: 2 % (ref 0–5)
ERYTHROCYTE [DISTWIDTH] IN BLOOD BY AUTOMATED COUNT: 14.6 % (ref 11.6–14.5)
FERRITIN SERPL-MCNC: 46 NG/ML (ref 8–388)
GLOBULIN SER CALC-MCNC: 3.2 G/DL (ref 2–4)
GLUCOSE SERPL-MCNC: 185 MG/DL (ref 74–99)
HCT VFR BLD AUTO: 40 % (ref 36–48)
HGB BLD-MCNC: 13.3 G/DL (ref 13–16)
IMM GRANULOCYTES # BLD AUTO: 0 K/UL (ref 0–0.04)
IMM GRANULOCYTES NFR BLD AUTO: 0 % (ref 0–0.5)
IRON SATN MFR SERPL: 23 % (ref 20–50)
IRON SERPL-MCNC: 70 UG/DL (ref 50–175)
LYMPHOCYTES # BLD: 2 K/UL (ref 0.9–3.6)
LYMPHOCYTES NFR BLD: 25 % (ref 21–52)
MCH RBC QN AUTO: 30.6 PG (ref 24–34)
MCHC RBC AUTO-ENTMCNC: 33.3 G/DL (ref 31–37)
MCV RBC AUTO: 92.2 FL (ref 78–100)
MONOCYTES # BLD: 0.6 K/UL (ref 0.05–1.2)
MONOCYTES NFR BLD: 7 % (ref 3–10)
NEUTS SEG # BLD: 5.3 K/UL (ref 1.8–8)
NEUTS SEG NFR BLD: 65 % (ref 40–73)
NRBC # BLD: 0 K/UL (ref 0–0.01)
NRBC BLD-RTO: 0 PER 100 WBC
PLATELET # BLD AUTO: 172 K/UL (ref 135–420)
PMV BLD AUTO: 10.4 FL (ref 9.2–11.8)
POTASSIUM SERPL-SCNC: 4 MMOL/L (ref 3.5–5.5)
PROT SERPL-MCNC: 7.1 G/DL (ref 6.4–8.2)
PSA SERPL-MCNC: 2.4 NG/ML (ref 0–4)
RBC # BLD AUTO: 4.34 M/UL (ref 4.35–5.65)
RETICS/RBC NFR AUTO: 1.7 % (ref 0.5–2.5)
SODIUM SERPL-SCNC: 138 MMOL/L (ref 136–145)
TIBC SERPL-MCNC: 302 UG/DL (ref 250–450)
WBC # BLD AUTO: 8.1 K/UL (ref 4.6–13.2)

## 2022-03-17 PROCEDURE — 80053 COMPREHEN METABOLIC PANEL: CPT

## 2022-03-17 PROCEDURE — 83540 ASSAY OF IRON: CPT

## 2022-03-17 PROCEDURE — 85025 COMPLETE CBC W/AUTO DIFF WBC: CPT

## 2022-03-17 PROCEDURE — 74011250636 HC RX REV CODE- 250/636: Performed by: INTERNAL MEDICINE

## 2022-03-17 PROCEDURE — 96372 THER/PROPH/DIAG INJ SC/IM: CPT

## 2022-03-17 PROCEDURE — 85045 AUTOMATED RETICULOCYTE COUNT: CPT

## 2022-03-17 PROCEDURE — 84153 ASSAY OF PSA TOTAL: CPT

## 2022-03-17 PROCEDURE — 82728 ASSAY OF FERRITIN: CPT

## 2022-03-17 PROCEDURE — 36415 COLL VENOUS BLD VENIPUNCTURE: CPT

## 2022-03-17 RX ORDER — EPINEPHRINE 1 MG/ML
0.3 INJECTION, SOLUTION, CONCENTRATE INTRAVENOUS AS NEEDED
Status: CANCELLED | OUTPATIENT
Start: 2022-04-14

## 2022-03-17 RX ORDER — DIPHENHYDRAMINE HYDROCHLORIDE 50 MG/ML
50 INJECTION, SOLUTION INTRAMUSCULAR; INTRAVENOUS AS NEEDED
Status: CANCELLED
Start: 2022-04-14

## 2022-03-17 RX ORDER — CYANOCOBALAMIN 1000 UG/ML
1000 INJECTION, SOLUTION INTRAMUSCULAR; SUBCUTANEOUS ONCE
Status: COMPLETED | OUTPATIENT
Start: 2022-03-17 | End: 2022-03-17

## 2022-03-17 RX ORDER — ACETAMINOPHEN 325 MG/1
650 TABLET ORAL AS NEEDED
Status: CANCELLED
Start: 2022-04-14

## 2022-03-17 RX ORDER — HYDROCORTISONE SODIUM SUCCINATE 100 MG/2ML
100 INJECTION, POWDER, FOR SOLUTION INTRAMUSCULAR; INTRAVENOUS AS NEEDED
Status: CANCELLED | OUTPATIENT
Start: 2022-04-14

## 2022-03-17 RX ORDER — DIPHENHYDRAMINE HYDROCHLORIDE 50 MG/ML
25 INJECTION, SOLUTION INTRAMUSCULAR; INTRAVENOUS AS NEEDED
Status: CANCELLED
Start: 2022-04-14

## 2022-03-17 RX ORDER — ALBUTEROL SULFATE 0.83 MG/ML
2.5 SOLUTION RESPIRATORY (INHALATION) AS NEEDED
Status: CANCELLED
Start: 2022-04-14

## 2022-03-17 RX ORDER — ONDANSETRON 2 MG/ML
8 INJECTION INTRAMUSCULAR; INTRAVENOUS AS NEEDED
Status: CANCELLED | OUTPATIENT
Start: 2022-04-14

## 2022-03-17 RX ORDER — CYANOCOBALAMIN 1000 UG/ML
1000 INJECTION, SOLUTION INTRAMUSCULAR; SUBCUTANEOUS ONCE
Status: CANCELLED
Start: 2022-04-14 | End: 2022-04-14

## 2022-03-17 RX ADMIN — CYANOCOBALAMIN 1000 MCG: 1000 INJECTION, SOLUTION INTRAMUSCULAR at 11:22

## 2022-03-17 NOTE — PROGRESS NOTES
[facility] Progress Note    Date: 2022    Name: Antonio Sánchez    MRN: [id number]         : 1950    Mr. Matteo Rodriguez arrived in the [facility] today at 1, in stable condition, here for his B-12 Injection (Every 4 Weeks). He was assessed and education was provided. Mr. Beka Marsh vitals were reviewed. Visit Vitals  /67 (BP 1 Location: Left upper arm, BP Patient Position: Sitting)   Pulse 92   Temp 97.1 °F (36.2 °C)   Resp 18   SpO2 98%       Cyanocobalamin (B-12) 1,000 mcg, was administered IM in his LEFT deltoid. Band-aid to site. Mr. Matteo Rodriguez tolerated well, and voiced no complaints. Discharge/ follow-up instructions discussed w/ pt. Pt verbalized understanding. Armband removed and shredded. Mr. Matteo Rodriguez was discharged from [place] [address] in stable condition at 1130. He is to return on 22 at 36, for his next appointment, for his next B-12 Injection.      Etelvina Martinez  2022

## 2022-03-18 PROBLEM — I25.10 CAD (CORONARY ARTERY DISEASE): Status: ACTIVE | Noted: 2017-12-01

## 2022-03-18 PROBLEM — E11.9 CONTROLLED TYPE 2 DIABETES MELLITUS WITHOUT COMPLICATION, WITHOUT LONG-TERM CURRENT USE OF INSULIN (HCC): Status: ACTIVE | Noted: 2018-11-29

## 2022-03-18 PROBLEM — D64.9 ANEMIA: Status: ACTIVE | Noted: 2021-04-16

## 2022-03-19 PROBLEM — M50.90 CERVICAL DISC DISEASE: Status: ACTIVE | Noted: 2019-09-26

## 2022-03-19 PROBLEM — Z95.5 S/P CORONARY ARTERY STENT PLACEMENT: Status: ACTIVE | Noted: 2018-01-25

## 2022-03-19 PROBLEM — K21.9 GERD (GASTROESOPHAGEAL REFLUX DISEASE): Status: ACTIVE | Noted: 2019-09-26

## 2022-03-19 PROBLEM — D12.6 TUBULAR ADENOMA OF COLON: Status: ACTIVE | Noted: 2019-09-26

## 2022-03-19 PROBLEM — G47.00 INSOMNIA: Status: ACTIVE | Noted: 2018-11-29

## 2022-03-20 PROBLEM — I24.9 ACS (ACUTE CORONARY SYNDROME) (HCC): Status: ACTIVE | Noted: 2020-12-08

## 2022-03-20 PROBLEM — F12.90 CANNABIS USE, UNCOMPLICATED: Status: ACTIVE | Noted: 2019-09-26

## 2022-03-20 PROBLEM — E53.8 B12 DEFICIENCY: Status: ACTIVE | Noted: 2021-04-16

## 2022-03-31 ENCOUNTER — VIRTUAL VISIT (OUTPATIENT)
Dept: ONCOLOGY | Age: 72
End: 2022-03-31
Payer: MEDICARE

## 2022-03-31 DIAGNOSIS — D64.9 ANEMIA, UNSPECIFIED TYPE: ICD-10-CM

## 2022-03-31 DIAGNOSIS — D50.9 IRON DEFICIENCY ANEMIA, UNSPECIFIED IRON DEFICIENCY ANEMIA TYPE: Primary | ICD-10-CM

## 2022-03-31 DIAGNOSIS — E53.8 B12 DEFICIENCY: ICD-10-CM

## 2022-03-31 PROCEDURE — 99442 PR PHYS/QHP TELEPHONE EVALUATION 11-20 MIN: CPT | Performed by: INTERNAL MEDICINE

## 2022-03-31 NOTE — PROGRESS NOTES
Alejandrina Hunter is a 67 y.o. male, evaluated via audio-only technology on 3/31/2022 for Follow-up  . Assessment & Plan:   Diagnoses and all orders for this visit:    1. Iron deficiency anemia, unspecified iron deficiency anemia type    2. B12 deficiency    3. Anemia, unspecified type    Other orders  -     ferrous sulfate (SLOW FE) 142 mg (45 mg iron) ER tablet; Take 1 Tablet by mouth Daily (before breakfast) for 30 days. The complexity of medical decision making for this visit is moderate. Iron deficiency anemia  Vitamin B12 deficiency   --  Past medical history significant of Hematuria and bloody vomiting at his previous admission. He has follows up with Urology and GI.   -- 7/8/2021 S/P iron infusion in the form of Venofer x 3 doses  -- 11/16/2021 CBC reported H/H 14.0 /43.2, iron profile showed saturation 33% and ferritin 81, vitamin B12 >345,  folate 8.5.  -- Today I have reviewed with the patient about recent lab reports. 3/17/2022 CBC reported Hb 13.3, hematocrit 40%, WBC 8.1, platelet 560,618. Iron sat 23%, Ferritin 46     Plan:  -- Iron replacement orally. Will prescribe Iron pills. -- The patient will continue the Vit B12 replacement at [facility] as indicated. -- We will repeat CBC, CMP, Iron profiles, ferritin, B12/folate, and ret count prior to next visit. -- The patient was advised to f/u with GI and Urology as scheduled. He stated f/u his colonoscopy will be scheduled this Wed.    -- We will see the patient back in about 4 months. Always sooner if required. No orders of the defined types were placed in this encounter. 12  Subjective:   Mr. Avery Avila is a 67y.o. year old male who was seen for management of iron deficiency anemia and vitamin B12  deficiency. The patient has a past medical history significant of Hematuria and bloody vomiting at his recent admission. S/P iron infusion in the form of Venofer on 7/8/2021. He reported doing well after that.  Denied fever, chills, night sweat, unintentional weight loss, skin lumps or bumps, acute bleeding or bruising issues. Denied headache, acute vision change, dizziness, chest pain, worsen shortness of breath, palpitation, productive cough, nausea, vomiting, abdominal pain, altered bowel habits, dysuria, worsen bone pain or back pain, new focal numbness or weakness.     Prior to Admission medications    Medication Sig Start Date End Date Taking? Authorizing Provider   ferrous sulfate (SLOW FE) 142 mg (45 mg iron) ER tablet Take 1 Tablet by mouth Daily (before breakfast) for 30 days. 3/31/22 4/30/22 Yes Do, Endy Johnson MD   solifenacin (VESICARE) 5 mg tablet Take 1 Tablet by mouth daily. 3/7/22   Fran Dudley NP   Januvia 100 mg tablet TAKE 1 TABLET BY MOUTH EVERY DAY 1/17/22   Owen INIGUEZ MD   rosuvastatin (CRESTOR) 40 mg tablet TAKE 1 TABLET BY MOUTH NIGHTLY 1/11/22   Eric Parker MD   amLODIPine (NORVASC) 10 mg tablet TAKE 1 TABLET BY MOUTH EVERY DAY 12/17/21   Eric Parker MD   omeprazole (PRILOSEC) 20 mg capsule TAKE 1 CAPSULE BY MOUTH EVERY DAY 11/24/21   Astrid Mariano MD   glipiZIDE SR (GLUCOTROL XL) 5 mg CR tablet TAKE 1 TABLET BY MOUTH EVERY DAY 11/24/21   Astrid Mariano MD   carvediloL (COREG) 25 mg tablet TAKE 1 TABLET BY MOUTH TWICE A DAY WITH FOOD 10/29/21   Mini Tinajero NP   metFORMIN (GLUCOPHAGE) 1,000 mg tablet TAKE 1 TABLET BY MOUTH TWICE A DAY WITH MEALS 10/25/21   Astrid Mariano MD   cyanocobalamin, vitamin B-12, (VITAMIN B-12 INJECTION) by Injection route. Provider, Historical   tamsulosin (FLOMAX) 0.4 mg capsule Take 1 Capsule by mouth daily (after dinner). 9/9/21   Fran Dudley NP   clopidogreL (Plavix) 75 mg tab Take 1 Tablet by mouth daily. 7/14/21   Eric Parker MD   cetirizine (ZyrTEC) 10 mg tablet Take 10 mg by mouth daily.     Other, MD Abena   ramipriL (ALTACE) 10 mg capsule TAKE 1 CAPSULE BY MOUTH EVERY DAY 2/19/21   Coby Mantilla, NP   nitroglycerin (NITROSTAT) 0.4 mg SL tablet DISSOLVE 1 TABLET UNDER TONGUE EVERY 5 MIN AS NEEDED FOR CHEST PAIN FOR UP TO 3 DOSES 1/31/21   Christiane Beckman MD   Blood Pressure Monitor kit Check once a day 5/12/20   Harish Gould MD     Patient Active Problem List   Diagnosis Code    Chronic pain syndrome G89.4    Coronary artery disease involving native coronary artery with unstable angina pectoris ([facility]) I25.110    Essential hypertension I10    Hyperlipidemia E78.5    Thyroid goiter E04.9    CAD (coronary artery disease) I25.10    S/P coronary artery stent placement Z95.5    Unstable angina pectoris ([facility]) I20.0    Controlled type 2 diabetes mellitus without complication, without long-term current use of insulin ([facility]) E11.9    Insomnia G47.00    Tubular adenoma of colon D12.6    Cannabis use, uncomplicated M65.96    Cervical disc disease M50.90    GERD (gastroesophageal reflux disease) K21.9    ACS (acute coronary syndrome) ([facility]) I24.9    B12 deficiency E53.8    Anemia D64.9       Review of Systems   Constitutional: Negative for chills, diaphoresis, fever, malaise/fatigue and weight loss. Respiratory: Negative for cough, hemoptysis, shortness of breath and wheezing. Cardiovascular: Negative for chest pain, palpitations and leg swelling. Gastrointestinal: Negative for abdominal pain, diarrhea, heartburn, nausea and vomiting. Genitourinary: Negative for dysuria, frequency, hematuria and urgency. Musculoskeletal: Negative for joint pain and myalgias. Skin: Negative for itching and rash. Neurological: Negative for dizziness, seizures, weakness and headaches. Psychiatric/Behavioral: Negative for depression. The patient does not have insomnia.         Patient-Reported Vitals 3/31/2022   Patient-Reported Weight 200lb   Patient-Reported Height -   Patient-Reported Pulse -   Patient-Reported Temperature -   Patient-Reported SpO2 -   Patient-Reported Systolic  794   Patient-Reported Diastolic 70         The patient was advised that our priority at this time is to keep the patients safe, and therefore we are reaching out to patients virtually to prevent them coming into the office unless necessarily. Patient verbalized understanding and was agreeable for virtual visit. Kristie Levine, who was evaluated through a patient-initiated, synchronous (real-time) audio only encounter, and/or her healthcare decision maker, is aware that it is a billable service, with coverage as determined by his insurance carrier. He provided verbal consent to proceed: Yes. He has not had a related appointment within my department in the past 7 days or scheduled within the next 24 hours. I have spent 20 minutes reviewing previous notes, test results and discussing the diagnosis and importance of compliance with the treatment plan as well as documenting on the day of the visit.     Zachary Boles MD        CC: Puja Judge MD

## 2022-04-01 NOTE — PERIOP NOTES
PRE-SURGICAL INSTRUCTIONS        Patient's Name:  Milvia Contreras      [id number] Date:  4/1/2022            Covid Testing Date and Time:    Surgery Date:  4/5/2022                1. Do NOT eat or drink anything, including candy, gum, or ice chips after midnight on 04/04/22, unless you have specific instructions from your surgeon or anesthesia provider to do so.  2. You may brush your teeth before coming to the hospital.  3. No smoking 24 hours prior to the day of surgery. 4. No alcohol 24 hours prior to the day of surgery. 5. No recreational drugs for one week prior to the day of surgery. 6. Leave all valuables, including money/purse, at home. 7. Remove all jewelry, nail polish, acrylic nails, and makeup (including mascara); no lotions powders, deodorant, or perfume/cologne/after shave on the skin. 8. Follow instruction for Hibiclens washes and CHG wipes from surgeon's office. 9. Glasses/contact lenses and dentures may be worn to the hospital.  They will be removed prior to surgery. 10. Call your doctor if symptoms of a cold or illness develop within 24-48 hours prior to your surgery. 11.  If you are having an outpatient procedure, please make arrangements for a responsible ADULT TO [address] [place] [address] and stay with you for 24 hours after your surgery. 12. ONE VISITOR in the hospital at this time for outpatient procedures. Exceptions may be made for surgical admissions, per nursing unit guidelines      Special Instructions:      Bring list of CURRENT medications. Bring any pertinent legal medical records. Take blood pressure medications the morning of surgery with a sip of water. Follow physician instructions about insulin and blood glucose Diabetes medications. Follow physician instructions about stopping anticoagulants. Complete bowel prep per MD instructions. On the day of surgery, come in the main entrance of [facility].   Let the  at the desk know you are there for surgery. A staff member will come escort you to the surgical area on the second floor. If you have any questions or concerns, please do not hesitate to call:     (Prior to the day of surgery) PAT department:  [phone]   (Day of surgery) Pre-Op department:  [phone]    These surgical instructions were reviewed with patient during the PAT phone call.

## 2022-04-04 ENCOUNTER — ANESTHESIA EVENT (OUTPATIENT)
Dept: ENDOSCOPY | Age: 72
End: 2022-04-04
Payer: MEDICARE

## 2022-04-05 ENCOUNTER — ANESTHESIA (OUTPATIENT)
Dept: ENDOSCOPY | Age: 72
End: 2022-04-05
Payer: MEDICARE

## 2022-04-05 ENCOUNTER — HOSPITAL ENCOUNTER (OUTPATIENT)
Age: 72
Setting detail: OUTPATIENT SURGERY
Discharge: HOME | End: 2022-04-05
Attending: INTERNAL MEDICINE | Admitting: INTERNAL MEDICINE
Payer: MEDICARE

## 2022-04-05 VITALS
HEIGHT: 72 IN | SYSTOLIC BLOOD PRESSURE: 139 MMHG | RESPIRATION RATE: 16 BRPM | HEART RATE: 67 BPM | BODY MASS INDEX: 27.09 KG/M2 | OXYGEN SATURATION: 98 % | DIASTOLIC BLOOD PRESSURE: 75 MMHG | WEIGHT: 200 LBS | TEMPERATURE: 97 F

## 2022-04-05 LAB
GLUCOSE BLD STRIP.AUTO-MCNC: 116 MG/DL (ref 70–110)
GLUCOSE BLD STRIP.AUTO-MCNC: 154 MG/DL (ref 70–110)

## 2022-04-05 PROCEDURE — 88305 TISSUE EXAM BY PATHOLOGIST: CPT

## 2022-04-05 PROCEDURE — 74011250636 HC RX REV CODE- 250/636: Performed by: ANESTHESIOLOGY

## 2022-04-05 PROCEDURE — 82962 GLUCOSE BLOOD TEST: CPT

## 2022-04-05 PROCEDURE — 76040000007: Performed by: INTERNAL MEDICINE

## 2022-04-05 PROCEDURE — 00811 ANES LWR INTST NDSC NOS: CPT | Performed by: ANESTHESIOLOGY

## 2022-04-05 PROCEDURE — 77030008565 HC TBNG SUC IRR ERBE -B: Performed by: INTERNAL MEDICINE

## 2022-04-05 PROCEDURE — 2709999900 HC NON-CHARGEABLE SUPPLY: Performed by: INTERNAL MEDICINE

## 2022-04-05 PROCEDURE — 77030021593 HC FCPS BIOP ENDOSC BSC -A: Performed by: INTERNAL MEDICINE

## 2022-04-05 PROCEDURE — 77030029384 HC SNR POLYP CAPTVR II BSC -B: Performed by: INTERNAL MEDICINE

## 2022-04-05 PROCEDURE — 99100 ANES PT EXTEME AGE<1 YR&>70: CPT | Performed by: ANESTHESIOLOGY

## 2022-04-05 PROCEDURE — 76060000032 HC ANESTHESIA 0.5 TO 1 HR: Performed by: INTERNAL MEDICINE

## 2022-04-05 PROCEDURE — 77030013992 HC SNR POLYP ENDOSC BSC -B: Performed by: INTERNAL MEDICINE

## 2022-04-05 PROCEDURE — 74011000250 HC RX REV CODE- 250: Performed by: ANESTHESIOLOGY

## 2022-04-05 PROCEDURE — 74011250636 HC RX REV CODE- 250/636: Performed by: NURSE ANESTHETIST, CERTIFIED REGISTERED

## 2022-04-05 PROCEDURE — 74011250637 HC RX REV CODE- 250/637: Performed by: NURSE ANESTHETIST, CERTIFIED REGISTERED

## 2022-04-05 RX ORDER — SODIUM CHLORIDE 0.9 % (FLUSH) 0.9 %
5-40 SYRINGE (ML) INJECTION AS NEEDED
Status: DISCONTINUED | OUTPATIENT
Start: 2022-04-05 | End: 2022-04-05 | Stop reason: HOSPADM

## 2022-04-05 RX ORDER — LIDOCAINE HYDROCHLORIDE 10 MG/ML
0.1 INJECTION, SOLUTION EPIDURAL; INFILTRATION; INTRACAUDAL; PERINEURAL AS NEEDED
Status: DISCONTINUED | OUTPATIENT
Start: 2022-04-05 | End: 2022-04-05 | Stop reason: HOSPADM

## 2022-04-05 RX ORDER — SODIUM CHLORIDE, SODIUM LACTATE, POTASSIUM CHLORIDE, CALCIUM CHLORIDE 600; 310; 30; 20 MG/100ML; MG/100ML; MG/100ML; MG/100ML
25 INJECTION, SOLUTION INTRAVENOUS CONTINUOUS
Status: DISCONTINUED | OUTPATIENT
Start: 2022-04-05 | End: 2022-04-05 | Stop reason: HOSPADM

## 2022-04-05 RX ORDER — LIDOCAINE HYDROCHLORIDE 20 MG/ML
INJECTION, SOLUTION EPIDURAL; INFILTRATION; INTRACAUDAL; PERINEURAL AS NEEDED
Status: DISCONTINUED | OUTPATIENT
Start: 2022-04-05 | End: 2022-04-05 | Stop reason: HOSPADM

## 2022-04-05 RX ORDER — PROPOFOL 10 MG/ML
INJECTION, EMULSION INTRAVENOUS AS NEEDED
Status: DISCONTINUED | OUTPATIENT
Start: 2022-04-05 | End: 2022-04-05 | Stop reason: HOSPADM

## 2022-04-05 RX ORDER — FAMOTIDINE 20 MG/1
20 TABLET, FILM COATED ORAL ONCE
Status: COMPLETED | OUTPATIENT
Start: 2022-04-05 | End: 2022-04-05

## 2022-04-05 RX ORDER — SODIUM CHLORIDE 0.9 % (FLUSH) 0.9 %
5-40 SYRINGE (ML) INJECTION EVERY 8 HOURS
Status: DISCONTINUED | OUTPATIENT
Start: 2022-04-05 | End: 2022-04-05 | Stop reason: HOSPADM

## 2022-04-05 RX ORDER — INSULIN LISPRO 100 [IU]/ML
INJECTION, SOLUTION INTRAVENOUS; SUBCUTANEOUS ONCE
Status: DISCONTINUED | OUTPATIENT
Start: 2022-04-05 | End: 2022-04-05 | Stop reason: HOSPADM

## 2022-04-05 RX ADMIN — PROPOFOL 50 MG: 10 INJECTION, EMULSION INTRAVENOUS at 08:59

## 2022-04-05 RX ADMIN — LIDOCAINE HYDROCHLORIDE 50 MG: 20 INJECTION, SOLUTION EPIDURAL; INFILTRATION; INTRACAUDAL; PERINEURAL at 08:44

## 2022-04-05 RX ADMIN — PROPOFOL 50 MG: 10 INJECTION, EMULSION INTRAVENOUS at 09:04

## 2022-04-05 RX ADMIN — PROPOFOL 50 MG: 10 INJECTION, EMULSION INTRAVENOUS at 09:13

## 2022-04-05 RX ADMIN — FAMOTIDINE 20 MG: 20 TABLET ORAL at 07:37

## 2022-04-05 RX ADMIN — PROPOFOL 50 MG: 10 INJECTION, EMULSION INTRAVENOUS at 09:08

## 2022-04-05 RX ADMIN — PROPOFOL 80 MG: 10 INJECTION, EMULSION INTRAVENOUS at 08:44

## 2022-04-05 RX ADMIN — PROPOFOL 50 MG: 10 INJECTION, EMULSION INTRAVENOUS at 08:54

## 2022-04-05 RX ADMIN — PROPOFOL 50 MG: 10 INJECTION, EMULSION INTRAVENOUS at 08:49

## 2022-04-05 RX ADMIN — SODIUM CHLORIDE, SODIUM LACTATE, POTASSIUM CHLORIDE, AND CALCIUM CHLORIDE 25 ML/HR: 600; 310; 30; 20 INJECTION, SOLUTION INTRAVENOUS at 07:39

## 2022-04-05 NOTE — ANESTHESIA PREPROCEDURE EVALUATION
Anesthetic History   No history of anesthetic complications            Review of Systems / Medical History  Patient summary reviewed, nursing notes reviewed and pertinent labs reviewed    Pulmonary  Within defined limits                 Neuro/Psych   Within defined limits           Cardiovascular    Hypertension          Past MI (2020), CAD, cardiac stents (2020) and hyperlipidemia      Comments: 06/2021 ECHO  Estimated LVEF is 55 - 60%    06/2021 stress low risk   GI/Hepatic/Renal     GERD           Endo/Other    Diabetes         Other Findings              Physical Exam    Airway  Mallampati: II  TM Distance: 4 - 6 cm  Neck ROM: normal range of motion   Mouth opening: Normal     Cardiovascular    Rhythm: regular           Dental    Dentition: Upper partial plate and Poor dentition     Pulmonary  Breath sounds clear to auscultation               Abdominal  GI exam deferred       Other Findings            Anesthetic Plan    ASA: 3  Anesthesia type: MAC            Anesthetic plan and risks discussed with: Patient

## 2022-04-05 NOTE — H&P
[employer] [address]  [place], [address]        Impression:   1.hx polyps      Plan:     1. Xenia mac all risks benefits and alt discussed       Chief Complaint: hx polyps      HPI:  Sravan Duffy is a 67 y.o. male who is being seen on consult for hx of polyps. .    PMH:   Past Medical History:   Diagnosis Date    Arthritis     1999 vicodin    Diabetes ([facility] [address] 1998 metformin    GERD (gastroesophageal reflux disease)     Hypercholesterolemia     Hypertension 1996 norvasc    Kidney stones     MI (myocardial infarction) ([facility] [address] 12/12/2020       PSH:   Past Surgical History:   Procedure Laterality Date    COLONOSCOPY N/A 11/2/2018    COLONOSCOPY with Polypectomies performed by Kaley Fuentes MD at [facility]    HX ANGIOPLASTY  12/2020    stented    HX CHOLECYSTECTOMY      HX GI  2010    gallbladder    [place] CARDIAC SURG PROCEDURE UNLIST  12/2017    stents       Social HX:   Social History     Socioeconomic History    Marital status:      Spouse name: Not on file    Number of children: Not on file    Years of education: Not on file    Highest education level: Not on file   Occupational History    Not on file   Tobacco Use    Smoking status: Never Smoker    Smokeless tobacco: Never Used   Vaping Use    Vaping Use: Never used   Substance and Sexual Activity    Alcohol use: No    Drug use: Never    Sexual activity: Yes     Partners: Female     Birth control/protection: None   Other Topics Concern    Not on file   Social History Narrative    Not on file     Social Determinants of Health     Financial Resource Strain:     Difficulty of Paying Living Expenses: Not on file   Food Insecurity:     Worried About Running Out of Food in the Last Year: Not on file    [place]ro of Food in the Last Year: Not on file   Transportation Needs:     Lack of Transportation (Medical):  Not on file    Lack of Transportation (Non-Medical): Not on file   Physical Activity:     Days of Exercise per Week: Not on file    Minutes of Exercise per Session: Not on file   Stress:     Feeling of Stress : Not on file   Social Connections:     Frequency of Communication with Friends and Family: Not on file    Frequency of Social Gatherings with Friends and Family: Not on file    Attends Jainism Services: Not on file    Active Member of Clubs or Organizations: Not on file    Attends Club or Organization Meetings: Not on file    Marital Status: Not on file   Intimate Partner Violence:     Fear of Current or Ex-Partner: Not on file    Emotionally Abused: Not on file    Physically Abused: Not on file    Sexually Abused: Not on file   Housing Stability:     Unable to Pay for Housing in the Last Year: Not on file    Number of [place] in the Last Year: Not on file    Unstable Housing in the Last Year: Not on file       FHX:   Family History   Problem Relation Age of Onset    Stroke Mother     Headache Mother     Hypertension Mother    Manisha Brody Mother     Heart Disease Father     Heart Attack Father     Hypertension Father     Diabetes Father     Lung Cancer Father     Ovarian Cancer Sister     Heart Attack Brother     Breast Cancer Sister     Diabetes Sister     Cancer Maternal Aunt         lung cancer    Cancer Maternal Uncle     Cancer Paternal Aunt     Cancer Paternal Uncle        Allergy:   No Known Allergies    Home Medications:     Medications Prior to Admission   Medication Sig    ferrous sulfate (SLOW FE) 142 mg (45 mg iron) ER tablet Take 1 Tablet by mouth Daily (before breakfast) for 30 days.  solifenacin (VESICARE) 5 mg tablet Take 1 Tablet by mouth daily.     Januvia 100 mg tablet TAKE 1 TABLET BY MOUTH EVERY DAY    rosuvastatin (CRESTOR) 40 mg tablet TAKE 1 TABLET BY MOUTH NIGHTLY    amLODIPine (NORVASC) 10 mg tablet TAKE 1 TABLET BY MOUTH EVERY DAY    omeprazole (PRILOSEC) 20 mg capsule TAKE 1 CAPSULE BY MOUTH EVERY DAY    glipiZIDE SR (GLUCOTROL XL) 5 mg CR tablet TAKE 1 TABLET BY MOUTH EVERY DAY    carvediloL (COREG) 25 mg tablet TAKE 1 TABLET BY MOUTH TWICE A DAY WITH FOOD    metFORMIN (GLUCOPHAGE) 1,000 mg tablet TAKE 1 TABLET BY MOUTH TWICE A DAY WITH MEALS    tamsulosin (FLOMAX) 0.4 mg capsule Take 1 Capsule by mouth daily (after dinner).  clopidogreL (Plavix) 75 mg tab Take 1 Tablet by mouth daily.  cetirizine (ZyrTEC) 10 mg tablet Take 10 mg by mouth daily.  ramipriL (ALTACE) 10 mg capsule TAKE 1 CAPSULE BY MOUTH EVERY DAY    nitroglycerin (NITROSTAT) 0.4 mg SL tablet DISSOLVE 1 TABLET UNDER TONGUE EVERY 5 MIN AS NEEDED FOR CHEST PAIN FOR UP TO 3 DOSES    cyanocobalamin, vitamin B-12, (VITAMIN B-12 INJECTION) by Injection route.  Blood Pressure Monitor kit Check once a day       Review of Systems:     Constitutional: No fevers, chills, weight loss, fatigue. Skin: No rashes, pruritis, jaundice, ulcerations, erythema. HENT: No headaches, nosebleeds, sinus pressure, rhinorrhea, sore throat. Eyes: No visual changes, blurred vision, eye pain, photophobia, jaundice. Cardiovascular: No chest pain, heart palpitations. Respiratory: No cough, SOB, wheezing, chest discomfort, orthopnea. Gastrointestinal: Hx polyps   Genitourinary: No dysuria, bleeding, discharge, pyuria. Musculoskeletal: No weakness, arthralgias, wasting. Endo: No sweats. Heme: No bruising, easy bleeding. Allergies: As noted. Neurological: Cranial nerves intact. Alert and oriented. Gait not assessed. Psychiatric:  No anxiety, depression, hallucinations. Visit Vitals  /69 (BP 1 Location: Right arm, BP Patient Position: At rest)   Pulse 72   Temp 97.8 °F (36.6 °C)   Resp 20   Ht 6' (1.829 m)   Wt 90.7 kg (200 lb)   SpO2 96%   BMI 27.12 kg/m²       Physical Assessment:     constitutional: appearance: well developed, well nourished, normal habitus, no deformities, in no acute distress. skin: inspection: no rashes, ulcers, icterus or other lesions; no clubbing or telangiectasias. palpation: no induration or subcutaneos nodules. eyes: inspection: normal conjunctivae and lids; no jaundice pupils: symmetrical, normoreactive to light, normal accommodation and size. ENMT: mouth: normal oral mucosa,lips and gums; good dentition. oropharynx: normal tongue, hard and soft palate; posterior pharynx without erythema, exudate or lesions. neck: no masses organomegaly or tenderness. respiratory: effort: normal chest excursion; no intercostal retraction or accessory muscle use. cardiovascular: abdominal aorta: normal size and position; no bruits. palpation: PMI of normal size and position; normal rhythm; no thrill or murmurs. abdominal: abdomen: normal consistency; no tenderness or masses. hernias: no hernias appreciated. liver: normal size and consistency. spleen: not palpable. rectal: hemoccult/guaiac: not performed. musculoskeletal: no deformities or muscle wasting   lymphatic: axilae: not palpable. groin: not palpable. neck: within normal limits. other: not palpable. neurologic: cranial nerves: II-XII normal.   psychiatric: judgement/insight: within normal limits. memory: within normal limits for recent and remote events. mood and affect: no evidence of depression, anxiety or agitation. orientation: oriented to time, space and person. Basic Metabolic Profile   No results for input(s): NA, K, CL, CO2, BUN, GLU, CA, MG, PHOS in the last 72 hours. No lab exists for component: CREAT      CBC w/Diff    No results for input(s): WBC, RBC, HGB, HCT, MCV, MCH, MCHC, RDW, PLT, HGBEXT, HCTEXT, PLTEXT in the last 72 hours. No lab exists for component: MPV No results for input(s): GRANS, LYMPH, EOS, PRO, MYELO, METAS, BLAST in the last 72 hours.     No lab exists for component: MONO, BASO     Hepatic Function   No results for input(s): ALB, TP, TBILI, AP, AML, LPSE in the last 72 hours.    No lab exists for component: SHANTHI ROCK, SGOT       Vanessa Farr MD, M.JUANA. [facility], [facility]  [email or web address]

## 2022-04-05 NOTE — DISCHARGE INSTRUCTIONS
Patient Education        Colonoscopy: What to Expect at Home  Your Recovery  After a colonoscopy, you'll stay at the clinic until you wake up. Then you can go home. But you'll need to arrange for a ride. Your doctor will tell you when you can eat and do your other usual activities. Your doctor will talk to you about when you'll need your next colonoscopy. Your doctor can help you decide how often you need to be checked. This will depend on the results of your test and your risk for colorectal cancer. After the test, you may be bloated or have gas pains. You may need to pass gas. If a biopsy was done or a polyp was removed, you may have streaks of blood in your stool (feces) for a few days. Problems such as heavy rectal bleeding may not occur until several weeks after the test. This isn't common. But it can happen after polyps are removed. This care sheet gives you a general idea about how long it will take for you to recover. But each person recovers at a different pace. Follow the steps below to get better as quickly as possible. How can you care for yourself at home? Activity    · Rest when you feel tired.     · You can do your normal activities when it feels okay to do so. Diet    · Follow your doctor's directions for eating.     · Unless your doctor has told you not to, drink plenty of fluids. This helps to replace the fluids that were lost during the colon prep.     · Do not drink alcohol. Medicines    · Your doctor will tell you if and when you can restart your medicines. You will also be given instructions about taking any new medicines.     · If you take aspirin or some other blood thinner, ask your doctor if and when to start taking it again. Make sure that you understand exactly what your doctor wants you to do.     · If polyps were removed or a biopsy was done during the test, your doctor may tell you not to take aspirin or other anti-inflammatory medicines for a few days.  These include ibuprofen (Advil, Motrin) and naproxen (Aleve). Other instructions    · For your safety, do not drive or operate machinery until the medicine wears off and you can think clearly. Your doctor may tell you not to drive or operate machinery until the day after your test.     · Do not sign legal documents or make major decisions until the medicine wears off and you can think clearly. The anesthesia can make it hard for you to fully understand what you are agreeing to. Follow-up care is a key part of your treatment and safety. Be sure to make and go to all appointments, and call your doctor if you are having problems. It's also a good idea to know your test results and keep a list of the medicines you take. When should you call for help? Call 911 anytime you think you may need emergency care. For example, call if:    · You passed out (lost consciousness).     · You pass maroon or bloody stools.     · You have trouble breathing. Call your doctor now or seek immediate medical care if:    · You have pain that does not get better after you take pain medicine.     · You are sick to your stomach or cannot drink fluids.     · You have new or worse belly pain.     · You have blood in your stools.     · You have a fever.     · You cannot pass stools or gas. Watch closely for changes in your health, and be sure to contact your doctor if you have any problems. Where can you learn more? Go to http://www.gray.com/  Enter E264 in the search box to learn more about \"Colonoscopy: What to Expect at Home. \"  Current as of: September 8, 2021               Content Version: 13.2  © 4408-2921 "[employer]. Care instructions adapted under license by [employer] (which disclaims liability or warranty for this information).  If you have questions about a medical condition or this instruction, always ask your healthcare professional. Fermin Novak disclaims any warranty or liability for your use of this information. Patient Education        Colon Polyps: Care Instructions  Your Care Instructions     Colon polyps are growths in the colon or the rectum. The cause of most colon polyps is not known, and most people who get them do not have any problems. But a certain kind can turn into cancer. For this reason, regular testing for colon polyps is important for people as they get older. It is also important for anyone who has an increased risk for colon cancer. Polyps are usually found through routine colon cancer screening tests. Although most colon polyps are not cancerous, they are usually removed and then tested for cancer. Screening for colon cancer saves lives because the cancer can usually be cured if it is caught early. If you have a polyp that is the type that can turn into cancer, you may need more tests to examine your entire colon. The doctor will remove any other polyps that he or she finds, and you will be tested more often. Follow-up care is a key part of your treatment and safety. Be sure to make and go to all appointments, and call your doctor if you are having problems. It's also a good idea to know your test results and keep a list of the medicines you take. How can you care for yourself at home? Regular exams to look for colon polyps are the best way to prevent polyps from turning into colon cancer. These can include stool tests, sigmoidoscopy, colonoscopy, and CT colonography. Talk with your doctor about a testing schedule that is right for you. To prevent polyps  There is no home treatment that can prevent colon polyps. But these steps may help lower your risk for cancer. · Stay active. Being active can help you get to and stay at a healthy weight. Try to exercise on most days of the week. Walking is a good choice. · Eat well. Choose a variety of vegetables, fruits, legumes (such as peas and beans), fish, poultry, and whole grains. · Do not smoke.  If you need help quitting, talk to your doctor about stop-smoking programs and medicines. These can increase your chances of quitting for good. · If you drink alcohol, limit how much you drink. Limit alcohol to 2 drinks a day for men and 1 drink a day for women. When should you call for help? Call your doctor now or seek immediate medical care if:    · You have severe belly pain.     · Your stools are maroon or very bloody. Watch closely for changes in your health, and be sure to contact your doctor if:    · You have a fever.     · You have nausea or vomiting.     · You have a change in bowel habits (new constipation or diarrhea).     · Your symptoms get worse or are not improving as expected. Where can you learn more? Go to http://www.bridges.com/  Enter C571 in the search box to learn more about \"Colon Polyps: Care Instructions. \"  Current as of: September 8, 2021               Content Version: 13.2  © 2006-2022 [employer]. Care instructions adapted under license by [employer] (which disclaims liability or warranty for this information). If you have questions about a medical condition or this instruction, always ask your healthcare professional. [place] [address] any warranty or liability for your use of this information. Patient Education        Learning About Diverticulosis and Diverticulitis  What are diverticulosis and diverticulitis? In diverticulosis and diverticulitis, pouches called diverticula form in the wall of the large intestine, or colon. · In diverticulosis, the pouches do not cause any pain or other symptoms. · In diverticulitis, the pouches get inflamed or infected and cause symptoms. Doctors aren't sure what causes these pouches in the colon. But they think that a low-fiber diet may play a role. Without fiber to add bulk to the stool, the colon has to work harder than normal to push the stool forward.  The pressure from this may cause pouches to form in weak spots along the colon. Some people with diverticulosis get diverticulitis. But experts don't know why this happens. What are the symptoms? · In diverticulosis, most people don't have symptoms. But pouches sometimes bleed. · In diverticulitis, symptoms may last from a few hours to a week or more. They include:  ? Belly pain. This is usually in the lower left side. It is sometimes worse when you move. This is the most common symptom. ? Fever and chills. ? Bloating and gas. ? Diarrhea or constipation. ? Nausea and sometimes vomiting.  ? Not feeling like eating. How can you prevent diverticulitis? You may be able to lower your chance of getting diverticulitis. You can do this by taking steps to prevent constipation. · Eat fruits, vegetables, beans, and whole grains every day. These foods are high in fiber. · Drink plenty of fluids. If you have kidney, heart, or liver disease and have to limit fluids, talk with your doctor before you increase the amount of fluids you drink. · Get at least 30 minutes of exercise on most days of the week. Walking is a good choice. You also may want to do other activities, such as running, swimming, cycling, or playing tennis or team sports. · Take a fiber supplement, such as Citrucel or Metamucil, every day if needed. Read and follow all instructions on the label. · Schedule time each day for a bowel movement. Having a daily routine may help. Take your time and do not strain when having a bowel movement. Some people avoid nuts, seeds, berries, and popcorn. They believe that these foods might get trapped in the diverticula and cause pain. But there is no proof that these foods cause diverticulitis or make it worse. How are these problems treated? · The best way to treat diverticulosis is to avoid constipation. · Treatment for diverticulitis includes antibiotics. It often includes a change in your diet.  You may need only liquids at first. Your doctor may suggest pain medicines for pain or belly cramps. In some cases, surgery may be needed. Follow-up care is a key part of your treatment and safety. Be sure to make and go to all appointments, and call your doctor if you are having problems. It's also a good idea to know your test results and keep a list of the medicines you take. Where can you learn more? Go to http://www.gray.com/  Enter A841 in the search box to learn more about \"Learning About Diverticulosis and Diverticulitis. \"  Current as of: September 8, 2021               Content Version: 13.2  © 4411-8017 [employer]. Care instructions adapted under license by [employer] (which disclaims liability or warranty for this information). If you have questions about a medical condition or this instruction, always ask your healthcare professional. [place] [address] any warranty or liability for your use of this information. DISCHARGE SUMMARY from Nurse    PATIENT INSTRUCTIONS:    After general anesthesia or intravenous sedation, for 24 hours or while taking prescription Narcotics:  · Limit your activities  · Do not drive and operate hazardous machinery  · Do not make important personal or business decisions  · Do  not drink alcoholic beverages  · If you have not urinated within 8 hours after discharge, please contact your surgeon on call.     Report the following to your surgeon:  · Excessive pain, swelling, redness or odor of or around the surgical area  · Temperature over 100.5  · Nausea and vomiting lasting longer than 4 hours or if unable to take medications  · Any signs of decreased circulation or nerve impairment to extremity: change in color, persistent  numbness, tingling, coldness or increase pain  · Any questions        These are general instructions for a healthy lifestyle:    No smoking/ No tobacco products/ Avoid exposure to second hand smoke  Surgeon Meghna Aguayo Warning:  Quitting smoking now greatly reduces serious risk to your health. Obesity, smoking, and sedentary lifestyle greatly increases your risk for illness    A healthy diet, regular physical exercise & weight monitoring are important for maintaining a healthy lifestyle    You may be retaining fluid if you have a history of heart failure or if you experience any of the following symptoms:  Weight gain of 3 pounds or more overnight or 5 pounds in a week, increased swelling in our hands or feet or shortness of breath while lying flat in bed. Please call your doctor as soon as you notice any of these symptoms; do not wait until your next office visit. The discharge information has been reviewed with the patient. The patient verbalized understanding. Discharge medications reviewed with the patient and appropriate educational materials and side effects teaching were provided. ___________________________________________________________________________________________________________________________________  Patient Education        High-Fiber Diet: Care Instructions  Overview     A high-fiber diet may help you relieve constipation and feel less bloated. Your doctor and dietitian will help you make a high-fiber eating plan based on your personal needs. The plan will include the things you like to eat. It will also make sure that you get 25 to 35 grams of fiber a day. Before you make changes to the way you eat, be sure to talk with your doctor or dietitian. Follow-up care is a key part of your treatment and safety. Be sure to make and go to all appointments, and call your doctor if you are having problems. It's also a good idea to know your test results and keep a list of the medicines you take. How can you care for yourself at home? · You can increase how much fiber you get if you eat more of certain foods. These foods include:  ? Whole-grain breads and cereals. ?  Fruits, such as pears, apples, and peaches. Eat the skins and peels if you can.  ? Vegetables, such as broccoli, cabbage, spinach, carrots, asparagus, and squash. ? Starchy vegetables. These include potatoes with skins, kidney beans, and lima beans. · Take a fiber supplement every day if your doctor recommends it. Examples are Benefiber, Citrucel, FiberCon, and Metamucil. Ask your doctor how much to take. · Drink plenty of fluids. If you have kidney, heart, or liver disease and have to limit fluids, talk with your doctor before you increase the amount of fluids you drink. Where can you learn more? Go to http://www.gray.com/  Enter E031 in the search box to learn more about \"High-Fiber Diet: Care Instructions. \"  Current as of: September 8, 2021               Content Version: 13.2  © 2006-2022 [employer]. Care instructions adapted under license by [employer] (which disclaims liability or warranty for this information). If you have questions about a medical condition or this instruction, always ask your healthcare professional. [place] [address] any warranty or liability for your use of this information.

## 2022-04-05 NOTE — ANESTHESIA POSTPROCEDURE EVALUATION
Procedure(s):  COLONOSCOPY w polypectomies. MAC    Anesthesia Post Evaluation      Multimodal analgesia: multimodal analgesia used between 6 hours prior to anesthesia start to PACU discharge  Patient location during evaluation: PACU  Patient participation: complete - patient participated  Level of consciousness: awake and alert  Pain management: adequate  Airway patency: patent  Anesthetic complications: no  Cardiovascular status: acceptable and hemodynamically stable  Respiratory status: acceptable  Hydration status: acceptable  Post anesthesia nausea and vomiting:  controlled      INITIAL Post-op Vital signs:   Vitals Value Taken Time   /61 04/05/22 0944   Temp 36.1 °C (97 °F) 04/05/22 0944   Pulse 67 04/05/22 0947   Resp 11 04/05/22 0945   SpO2 100 % 04/05/22 0947   Vitals shown include unvalidated device data.

## 2022-04-11 RX ORDER — RAMIPRIL 10 MG/1
CAPSULE ORAL
Qty: 90 CAPSULE | Refills: 3 | Status: SHIPPED | OUTPATIENT
Start: 2022-04-11

## 2022-04-14 ENCOUNTER — HOSPITAL ENCOUNTER (OUTPATIENT)
Dept: INFUSION THERAPY | Age: 72
Discharge: HOME | End: 2022-04-14
Payer: MEDICARE

## 2022-04-14 VITALS
OXYGEN SATURATION: 97 % | HEART RATE: 67 BPM | DIASTOLIC BLOOD PRESSURE: 71 MMHG | SYSTOLIC BLOOD PRESSURE: 112 MMHG | TEMPERATURE: 97.7 F | RESPIRATION RATE: 18 BRPM

## 2022-04-14 DIAGNOSIS — E53.8 B12 DEFICIENCY: Primary | ICD-10-CM

## 2022-04-14 PROCEDURE — 74011250636 HC RX REV CODE- 250/636: Performed by: INTERNAL MEDICINE

## 2022-04-14 PROCEDURE — 96372 THER/PROPH/DIAG INJ SC/IM: CPT

## 2022-04-14 RX ORDER — CYANOCOBALAMIN 1000 UG/ML
1000 INJECTION, SOLUTION INTRAMUSCULAR; SUBCUTANEOUS ONCE
Status: COMPLETED | OUTPATIENT
Start: 2022-04-14 | End: 2022-04-14

## 2022-04-14 RX ORDER — CYANOCOBALAMIN 1000 UG/ML
1000 INJECTION, SOLUTION INTRAMUSCULAR; SUBCUTANEOUS ONCE
Status: CANCELLED
Start: 2022-04-14 | End: 2022-04-14

## 2022-04-14 RX ORDER — EPINEPHRINE 1 MG/ML
0.3 INJECTION, SOLUTION, CONCENTRATE INTRAVENOUS AS NEEDED
Status: CANCELLED | OUTPATIENT
Start: 2022-04-14

## 2022-04-14 RX ORDER — ONDANSETRON 2 MG/ML
8 INJECTION INTRAMUSCULAR; INTRAVENOUS AS NEEDED
Status: CANCELLED | OUTPATIENT
Start: 2022-04-14

## 2022-04-14 RX ORDER — DIPHENHYDRAMINE HYDROCHLORIDE 50 MG/ML
25 INJECTION, SOLUTION INTRAMUSCULAR; INTRAVENOUS AS NEEDED
Status: CANCELLED
Start: 2022-04-14

## 2022-04-14 RX ORDER — DIPHENHYDRAMINE HYDROCHLORIDE 50 MG/ML
50 INJECTION, SOLUTION INTRAMUSCULAR; INTRAVENOUS AS NEEDED
Status: CANCELLED
Start: 2022-04-14

## 2022-04-14 RX ORDER — ACETAMINOPHEN 325 MG/1
650 TABLET ORAL AS NEEDED
Status: CANCELLED
Start: 2022-04-14

## 2022-04-14 RX ORDER — ALBUTEROL SULFATE 0.83 MG/ML
2.5 SOLUTION RESPIRATORY (INHALATION) AS NEEDED
Status: CANCELLED
Start: 2022-04-14

## 2022-04-14 RX ORDER — HYDROCORTISONE SODIUM SUCCINATE 100 MG/2ML
100 INJECTION, POWDER, FOR SOLUTION INTRAMUSCULAR; INTRAVENOUS AS NEEDED
Status: CANCELLED | OUTPATIENT
Start: 2022-04-14

## 2022-04-14 RX ADMIN — CYANOCOBALAMIN 1000 MCG: 1000 INJECTION, SOLUTION INTRAMUSCULAR at 11:36

## 2022-04-14 NOTE — PROGRESS NOTES
[facility] Progress Note    Date: 2022    Name: Duane Sake    MRN: [id number]         : 1950    Mr. Ina Pereira arrived in the [facility] today at 1130, here for his B-12 Injection (Every 4 Weeks). He was assessed and education was provided. Mr. Campbell Hernandez vitals were reviewed. Visit Vitals  /71 (BP 1 Location: Left upper arm, BP Patient Position: Sitting)   Pulse 67   Temp 97.7 °F (36.5 °C)   Resp 18   SpO2 97%       Cyanocobalamin (B-12) 1,000 mcg, was administered IM in his Right deltoid. Band-aid to site. Mr. Ina Pereira tolerated well, and voiced no complaints. Discharge/ follow-up instructions discussed w/ pt. Pt verbalized understanding. Armband removed and shredded. Mr. Ina Pereira was discharged from [place] [address] in stable condition at 1140. He is to return on 22 at 1000, for his next appointment, for his next B-12 Injection.      Mary Quan RN  2022

## 2022-05-12 ENCOUNTER — HOSPITAL ENCOUNTER (OUTPATIENT)
Dept: INFUSION THERAPY | Age: 72
Discharge: HOME | End: 2022-05-12
Payer: MEDICARE

## 2022-05-12 VITALS
SYSTOLIC BLOOD PRESSURE: 134 MMHG | HEART RATE: 69 BPM | TEMPERATURE: 98.2 F | RESPIRATION RATE: 18 BRPM | DIASTOLIC BLOOD PRESSURE: 82 MMHG | OXYGEN SATURATION: 99 %

## 2022-05-12 DIAGNOSIS — E53.8 B12 DEFICIENCY: Primary | ICD-10-CM

## 2022-05-12 PROCEDURE — 96372 THER/PROPH/DIAG INJ SC/IM: CPT

## 2022-05-12 PROCEDURE — 74011250636 HC RX REV CODE- 250/636: Performed by: NURSE PRACTITIONER

## 2022-05-12 RX ORDER — ALBUTEROL SULFATE 0.83 MG/ML
2.5 SOLUTION RESPIRATORY (INHALATION) AS NEEDED
Status: CANCELLED
Start: 2022-05-12

## 2022-05-12 RX ORDER — CYANOCOBALAMIN 1000 UG/ML
1000 INJECTION, SOLUTION INTRAMUSCULAR; SUBCUTANEOUS ONCE
Status: CANCELLED
Start: 2022-05-12 | End: 2022-05-12

## 2022-05-12 RX ORDER — CYANOCOBALAMIN 1000 UG/ML
1000 INJECTION, SOLUTION INTRAMUSCULAR; SUBCUTANEOUS ONCE
Status: CANCELLED
Start: 2022-06-05 | End: 2022-06-05

## 2022-05-12 RX ORDER — DIPHENHYDRAMINE HYDROCHLORIDE 50 MG/ML
50 INJECTION, SOLUTION INTRAMUSCULAR; INTRAVENOUS AS NEEDED
Status: CANCELLED
Start: 2022-05-12

## 2022-05-12 RX ORDER — ALBUTEROL SULFATE 0.83 MG/ML
2.5 SOLUTION RESPIRATORY (INHALATION) AS NEEDED
Status: CANCELLED
Start: 2022-06-05

## 2022-05-12 RX ORDER — DIPHENHYDRAMINE HYDROCHLORIDE 50 MG/ML
50 INJECTION, SOLUTION INTRAMUSCULAR; INTRAVENOUS AS NEEDED
Status: CANCELLED
Start: 2022-06-05

## 2022-05-12 RX ORDER — EPINEPHRINE 1 MG/ML
0.3 INJECTION, SOLUTION, CONCENTRATE INTRAVENOUS AS NEEDED
Status: CANCELLED | OUTPATIENT
Start: 2022-05-12

## 2022-05-12 RX ORDER — HYDROCORTISONE SODIUM SUCCINATE 100 MG/2ML
100 INJECTION, POWDER, FOR SOLUTION INTRAMUSCULAR; INTRAVENOUS AS NEEDED
Status: CANCELLED | OUTPATIENT
Start: 2022-05-12

## 2022-05-12 RX ORDER — HYDROCORTISONE SODIUM SUCCINATE 100 MG/2ML
100 INJECTION, POWDER, FOR SOLUTION INTRAMUSCULAR; INTRAVENOUS AS NEEDED
Status: CANCELLED | OUTPATIENT
Start: 2022-06-05

## 2022-05-12 RX ORDER — EPINEPHRINE 1 MG/ML
0.3 INJECTION, SOLUTION, CONCENTRATE INTRAVENOUS AS NEEDED
Status: CANCELLED | OUTPATIENT
Start: 2022-06-05

## 2022-05-12 RX ORDER — CYANOCOBALAMIN 1000 UG/ML
1000 INJECTION, SOLUTION INTRAMUSCULAR; SUBCUTANEOUS ONCE
Status: COMPLETED | OUTPATIENT
Start: 2022-05-12 | End: 2022-05-12

## 2022-05-12 RX ADMIN — CYANOCOBALAMIN 1000 MCG: 1000 INJECTION, SOLUTION INTRAMUSCULAR at 10:21

## 2022-05-12 NOTE — PROGRESS NOTES
\[facility]\"" Progress Note    Date: May 12, 2022    Name: Lianet Blake    MRN: [id number]         : 1950    Mr. Varsha Varela arrived in the [facility] today at 1000, here for his B-12 Injection (Every 4 Weeks). He was assessed and education was provided. No complaints or concerns voiced    Mr. Betancourt Lizandro vitals were reviewed. Visit Vitals  /82 (BP 1 Location: Left upper arm, BP Patient Position: Sitting)   Pulse 69   Temp 98.2 °F (36.8 °C)   Resp 18   SpO2 99%       Cyanocobalamin (B-12) 1,000 mcg, was administered IM in his left deltoid. Band-aid to site. Mr. Varsha Varela tolerated well, and voiced no complaints. Discharge/ follow-up instructions discussed w/ pt. Pt verbalized understanding. Armband removed and shredded. Mr. Varsha Varela was discharged from [place] [address] in stable condition at 1025. He is to return on 22 at 1000, for his next appointment of his next B-12 Injection.      Silva Oliveira RN  May 12, 2022

## 2022-05-18 ENCOUNTER — OFFICE VISIT (OUTPATIENT)
Dept: CARDIOLOGY CLINIC | Age: 72
End: 2022-05-18
Payer: MEDICARE

## 2022-05-18 VITALS
OXYGEN SATURATION: 98 % | HEART RATE: 77 BPM | SYSTOLIC BLOOD PRESSURE: 123 MMHG | BODY MASS INDEX: 26.55 KG/M2 | HEIGHT: 72 IN | WEIGHT: 196 LBS | DIASTOLIC BLOOD PRESSURE: 67 MMHG

## 2022-05-18 DIAGNOSIS — I10 ESSENTIAL HYPERTENSION: Primary | ICD-10-CM

## 2022-05-18 DIAGNOSIS — Z95.5 S/P CORONARY ARTERY STENT PLACEMENT: ICD-10-CM

## 2022-05-18 DIAGNOSIS — E11.9 TYPE 2 DIABETES MELLITUS WITHOUT COMPLICATION, WITHOUT LONG-TERM CURRENT USE OF INSULIN (HCC): ICD-10-CM

## 2022-05-18 DIAGNOSIS — I25.118 CORONARY ARTERY DISEASE OF NATIVE ARTERY OF NATIVE HEART WITH STABLE ANGINA PECTORIS (HCC): ICD-10-CM

## 2022-05-18 DIAGNOSIS — E78.2 MIXED HYPERLIPIDEMIA: ICD-10-CM

## 2022-05-18 DIAGNOSIS — I95.9 HYPOTENSION, UNSPECIFIED HYPOTENSION TYPE: ICD-10-CM

## 2022-05-18 PROCEDURE — G8427 DOCREV CUR MEDS BY ELIG CLIN: HCPCS | Performed by: INTERNAL MEDICINE

## 2022-05-18 PROCEDURE — G8754 DIAS BP LESS 90: HCPCS | Performed by: INTERNAL MEDICINE

## 2022-05-18 PROCEDURE — 99214 OFFICE O/P EST MOD 30 MIN: CPT | Performed by: INTERNAL MEDICINE

## 2022-05-18 PROCEDURE — 1101F PT FALLS ASSESS-DOCD LE1/YR: CPT | Performed by: INTERNAL MEDICINE

## 2022-05-18 PROCEDURE — 2022F DILAT RTA XM EVC RTNOPTHY: CPT | Performed by: INTERNAL MEDICINE

## 2022-05-18 PROCEDURE — G8432 DEP SCR NOT DOC, RNG: HCPCS | Performed by: INTERNAL MEDICINE

## 2022-05-18 PROCEDURE — G8419 CALC BMI OUT NRM PARAM NOF/U: HCPCS | Performed by: INTERNAL MEDICINE

## 2022-05-18 PROCEDURE — 3017F COLORECTAL CA SCREEN DOC REV: CPT | Performed by: INTERNAL MEDICINE

## 2022-05-18 PROCEDURE — G8536 NO DOC ELDER MAL SCRN: HCPCS | Performed by: INTERNAL MEDICINE

## 2022-05-18 PROCEDURE — 3046F HEMOGLOBIN A1C LEVEL >9.0%: CPT | Performed by: INTERNAL MEDICINE

## 2022-05-18 PROCEDURE — G8752 SYS BP LESS 140: HCPCS | Performed by: INTERNAL MEDICINE

## 2022-05-18 NOTE — PROGRESS NOTES
HISTORY OF PRESENT ILLNESS  Steve Hall is a 67 y.o. male. Patient with cad,htn,hyperlipidemia,dm. On follow up patient denies any chest pains,sob, palpitation or other significant symptoms. 12/2017  Admitted with unstable angina-had pci  7/2018. Patient was in emergency room with atypical chest pain. No acute EKG changes cardiac enzymes negative CTA and chest x-ray reports reviewed. Labs are negative for cardiac workup  5/2019. Recent ER visit with fall and concussion. Records reviewed. 6/2020  Patient seen for follow-up. He has rare occasion of chest pain he has used 1 nitroglycerin since last evaluation. No other complaints  1/2021  Patient is here for follow-up he was in hospital with  1. Unstable angina- s/p PCI of the LAD.  Status post mechanical ventilation. continue DAPT brilinta and asa.  Chest pain has resolved. 2. Acute respiratory failure- extubated   3. Hypotension-resolved.  Off pressors. 4. Hypertension: Blood pressure increasing gradually as expected.  Continue BID amlodipine. Elevated this afternoon - likely due to pain. 5. CAD-cardiac cath 12/2017 showed  Triple-vessel coronary artery disease - For CTS evaluation  6. Hyperlipidemia- LDL 74 11/20 Continue with Crestor  20 mg.  7. Diabetes- well controlled with A1c 5.9   8. LBBB- new since 12/20   9. Hemoptysis- traumatic ETT tube echange and pulmonary edema- better   10. Hematuria / flank pain - hx of renal stones - work up per primary team.  Initial plans were for CABG. Patient had recurrent unstable angina requiring emergency PCI of LAD. Patient is here for follow-up. He is feeling better  He still has occasional episode of nausea and vomiting that are sudden. He had one episode of chest pain that relieved with nitroglycerin. The progression of chest pain. He is here hemoptysis has resolved.   Hematuria is significantly improved he has urology follow-up pending this week      Follow-up  Pertinent negatives include no chest pain, no abdominal pain, no headaches and no shortness of breath. Hospital Follow Up  Pertinent negatives include no chest pain, no abdominal pain, no headaches and no shortness of breath. Hypertension  The history is provided by the patient. This is a chronic problem. The problem occurs constantly. The problem has not changed since onset. Pertinent negatives include no chest pain, no abdominal pain, no headaches and no shortness of breath. Chest Pain (Angina)   The history is provided by the patient. This is a recurrent problem. The problem has not changed since onset. The problem occurs rarely. The pain is associated with exertion. The pain is present in the substernal region. The quality of the pain is described as pressure-like. The pain does not radiate. Pertinent negatives include no abdominal pain, no claudication, no cough, no dizziness, no fever, no headaches, no hemoptysis, no malaise/fatigue, no nausea, no orthopnea, no palpitations, no PND, no shortness of breath, no sputum production, no vomiting and no weakness. Review of Systems   Constitutional: Negative for chills, fever and malaise/fatigue. HENT: Negative for nosebleeds. Eyes: Negative for blurred vision and double vision. Respiratory: Negative for cough, hemoptysis, sputum production, shortness of breath and wheezing. Cardiovascular: Negative for chest pain, palpitations, orthopnea, claudication, leg swelling and PND. Gastrointestinal: Negative for abdominal pain, heartburn, nausea and vomiting. Musculoskeletal: Negative for falls and myalgias. Skin: Negative for rash. Neurological: Negative for dizziness, weakness and headaches. Endo/Heme/Allergies: Does not bruise/bleed easily.      Family History   Problem Relation Age of Onset    Stroke Mother     Headache Mother     Hypertension Mother     Lung Cancer Mother     Heart Disease Father     Heart Attack Father     Hypertension Father     Diabetes Father    Duana Big Lung Cancer Father     Ovarian Cancer Sister     Heart Attack Brother     Breast Cancer Sister     Diabetes Sister     Cancer Maternal Aunt         lung cancer    Cancer Maternal Uncle     Cancer Paternal Aunt     Cancer Paternal Uncle        Past Medical History:   Diagnosis Date    Arthritis     1999 vicodin    Diabetes ([facility] [address] 1998 metformin    GERD (gastroesophageal reflux disease)     Hypercholesterolemia     Hypertension 1996 norvasc    Kidney stones     MI (myocardial infarction) ([facility] [address] 12/12/2020       Past Surgical History:   Procedure Laterality Date    COLONOSCOPY N/A 11/2/2018    COLONOSCOPY with Polypectomies performed by Mario Del Valle MD at [address] COLONOSCOPY N/A 4/5/2022    COLONOSCOPY w polypectomies performed by Mario Del Valle MD at [address] HX ANGIOPLASTY  12/2020    stented    HX CHOLECYSTECTOMY      HX GI  2010    gallbladder    [place] CARDIAC SURG PROCEDURE UNLIST  12/2017    stents       Social History     Tobacco Use    Smoking status: Never Smoker    Smokeless tobacco: Never Used   Substance Use Topics    Alcohol use: No       No Known Allergies        Visit Vitals  /67   Pulse 77   Ht 6' (1.829 m)   Wt 88.9 kg (196 lb)   SpO2 98%   BMI 26.58 kg/m²        Physical Exam  Constitutional:       Appearance: He is well-developed. HENT:      Head: Normocephalic and atraumatic. Eyes:      Conjunctiva/sclera: Conjunctivae normal.   Neck:      Thyroid: No thyromegaly. Vascular: No JVD. Trachea: No tracheal deviation. Cardiovascular:      Rate and Rhythm: Normal rate and regular rhythm. Heart sounds: Normal heart sounds. No murmur heard. No friction rub. No gallop. Pulmonary:      Effort: No respiratory distress. Breath sounds: Normal breath sounds. No wheezing or rales. Chest:      Chest wall: No tenderness. Abdominal:      Palpations: Abdomen is soft. Tenderness: There is no abdominal tenderness.    Musculoskeletal: Cervical back: Neck supple. Skin:     General: Skin is warm and dry. Neurological:      Mental Status: He is alert and oriented to person, place, and time. Mr. Tamra Fitzgerald has a reminder for a \"due or due soon\" health maintenance. I have asked that he contact his primary care provider for follow-up on this health maintenance. CONCLUSION:1/2016-  ABNORMAL STRESS ECHO WITH EKG AND WALL MOTION ABNORMALITIES SUGGESTIVE OF LAD  DISTRIBUTION DISEASE    IMPRESSION:cath:1/2016    1. TWO VESSEL CORONARY ARTERY DISEASE IN THE FORM OF 80% OSTIAL AND 90% PROXIMAL NON-DOMINANT RIGHT CORONARY STENOSIS WHICH IS A MODERATE SIZED VESSEL OF ABOUT 1.5 TO 2 MM IN DIAMETER, AND 40% PROXIMAL CIRCUMFLEX, AND 70% MID CIRCUMFLEX STENOSIS WHICH IS A DOMINANT VESSEL. THERE ARE DIFFUSE LUMINAL IRREGULARITIES SEEN THROUGHOUT THE CORONARIES. 2. NO NORMAL OVERALL LV EJECTION FRACTION AT REST. 3. EVIDENCE OF CHEST PAIN DURING THE CASE AS WELL AS BEFORE THE CASE WAS STARTED. PARTIAL RELIEF WITH SUBLINGUAL NITROGLYCERIN AND NO ACUTE EKG CHANGES SEEN IN THE SIX MONITORING LEADS IN THE CATH LAB. DISCUSSION AND RECOMMENDATIONS: PATIENT IS LIKELY HAVING CHEST PAIN FROM THE NON-DOMINANT RIGHT CORONARY ARTERY. THE LEFT CIRCUMFLEX IS DOMINANT AND APPEARS TO HAVE BORDERLINE MID STENOSIS WHICH DOES NOT HAVE ANY APPEARANCE OF ANY ACUTE UNSTABLE LESION. I THINK HE SHOULD BE TREATED MEDICALLY FOR NOW, AND IF THE SYMPTOMS PERSIST, LEFT CIRCUMFLEX ARTERY WILL NEED TO BE INTERROGATED BY INTRACORONARY DOPPLER WITH FFR DETERMINATION. RIGHT CORONARY, THOUGH APPEARS TO BE CRITICAL IS A SMALL VESSEL OF ABOUT 1.5 TO A MAXIMUM OF 2 MM IN DIAMETER AND PROBABLY SHOULD BE LEFT TO MEDICAL TREATMENT. AGGRESSIVE RISK FACTOR MODIFICATION SHOULD BE FOLLOWED. SUMMARY:12/2017-echo  Left ventricle: Systolic function was normal. Ejection fraction was estimated   in the range of 55 % to 60 %. No obvious  wall motion abnormalities identified in the views obtained. Wall thickness   was mildly increased. Doppler parameters  were consistent with abnormal left ventricular relaxation (grade 1 diastolic   dysfunction). Right ventricle: The size was at the upper limits of normal.    Left atrium: The atrium was mildly to moderately dilated. FINDING-12/2017-cath  1. Left main is patent, bifurcates into left anterior descending artery  and circumflex artery. 2. Left anterior descending artery has ostial 30% to 40% stenosis  followed by mid diffuse 30-40% stenosis. The vessel appears to be  moderately calcified. Diagonal 1 artery has diffuse 30-40% stenosis. Mid to distal left anterior descending artery is patent. 3. Circumflex artery is dominant with proximal 95% calcific stenosis. Status post PTCA using a Trek 1.5 x 8 mm balloon followed by a 2.5  mm x 12 mm balloon. The stent was a Synergy 2.75 mm x 15 mm  stent was deployed at about 14 atmospheres. Post-PCI PTCA was  performed using a noncompliant 3.0 mm x 8 mm balloon inflated about  16-18 atmospheres. Lesion reduced to 10%. 4. Obtuse marginal 1 artery was a small-caliber vessel with ostial 70%  to 80% stenosis. 5. Obtuse marginal 2 artery is a small- to medium-caliber vessel with  diffuse 30-40% stenosis. Mid circumflex artery has focal 80% stenosis. Status post PTCA using a Trek 2.5 mm x 12 mm balloon followed by a  Synergy 2.75 mm x 12 mm stent deployed about 14 atmospheres. Post-PCI PTCA was performed using a 3.0 mm x 8 mm  balloon deployed about 16 atmospheres. Lesion reduced to 0%. 6. Left posterior descending artery is patent. 7. Right coronary artery is nondominant, has ostial calcific 90%  followed by mid 99% stenosis. CONCLUSION:  Triple-vessel coronary artery disease. Status post  percutaneous transluminal coronary angioplasty/stent to proximal and  mid circumflex artery using drug-eluting stents. The patient will be on  aspirin and Brilinta at this time.  Intense medical management and risk  factor modification is advised. Conclusion 12/9/2020     · Three-vessel coronary artery disease with 90% mid LAD stenosis 80% distal circumflex stenosis and 100% mid RCA stenosis after diffuse disease in proximal and ostial RCA. · Patent previously deployed drug-eluting stents in mid circumflex and distal circumflex areas. · Borderline critical left main stenosis which is approximately 50% and has progressed from previous catheterization in 2017. · Procedure done via right radial artery without any complications. Given left main disease, CABG will be considered first.  We will take a surgical opinion with Dr. Avelino López. I discussed with him on phone and he/his team will be seeing the patient.        I have personally reviewed patient's records available from hospital and other providers and incorporated findings in patient care. Cath films reviewed personally to evaluate-12/2020  Notes,lab,echo,cath  Coronary Findings 12/10/2020    Diagnostic  Dominance: Right  Left Main   Ost LM lesion, 20% stenosed. Dist LM lesion, 40% stenosed. Left Anterior Descending   Ost LAD to Prox LAD lesion, 50% stenosed. Mid LAD lesion, 99% stenosed. The lesion is type C. The lesion is moderately calcified. Left Circumflex   Prox Cx to Mid Cx lesion, 10% stenosed. The lesion was previously treated using a stent (unknown type). Intervention 12/10/2020    Mid LAD lesion   Angioplasty   Angioplasty using a standard balloon was performed prior to stent deployment. The balloon used was a CATH BLLN RX MINI TREK 2X12MM -- . Balloon inflated using multiple inflations inflation technique. The second balloon used was a CATH BLLN RX TREK [id number] -- .   Stent   A single stent was placed. Drug-eluting stent was successfully placed. The stent used was a STENT SYS COR [id number] -- XIENCE JUAN DIEGO. Inflation 1 - Maximum pressure: 12 janice; Time: 10 sec. The strut is well apposed.    Angioplasty   Angioplasty using a standard balloon was performed following stent deployment. The balloon used was a CATH BLLN COR DIL 2.5X8MM -- NC TREK RAPID-EXCHANGE. Post-Intervention Lesion Assessment   The intervention was successful. The guidewire crossed the lesion. Device was deployed. The pre-interventional distal flow is decreased (MYKE 2). Post-intervention MYKE flow is 3. Lesion had 15 mm of its length treated. There were no complications. There is a 0% residual stenosis post intervention. Interpretation Summary 12/09/2020    · Contrast used: DEFINITY. · LV: Calculated LVEF is 55%. Visually measured ejection fraction. Normal cavity size and systolic function (ejection fraction normal). Mild concentric hypertrophy. Age-appropriate left ventricular diastolic function. · PA: Pulmonary arterial systolic pressure is 26 mmHg. · Aorta: Dilated aortic root; diameter is 3.7 cm. Reading Providers     Reading Role Read Date   Alexei Carcamo MD Test Supervisor, ECG [place], SPECT [place] 6/9/2021   Procedure Conclusion    Nuclear Stress Test    Nuclear Cardiac Spect Rest then Gated Stress with tomographic imaging/tomography utilized for the tomographic myocardical perfusion imaging performed. [place] was used as the stressing method and agent. ([place] given via a 10 - 20 sec injection.)   One day myocardial perfusion study. Date: 6/2/2021. Left ventricular perfusion is normal. Myocardial perfusion imaging supports a low risk stress test.   There is no prior study available for comparison. . This Single Photon Emission Computer Tomograph (SPECT) study utilized tomographic imaging/tomography for the tomographic myocardial perfusion imaging performed during this study. Interpretation Summary 6/2021    · LV: Estimated LVEF is 55 - 60%. Normal cavity size and systolic function (ejection fraction normal). Mild concentric hypertrophy. Wall motion: normal. Mild (grade 1) left ventricular diastolic dysfunction. · MV: Mild mitral annular calcification.  Mild mitral valve regurgitation is present. · TV: Right Ventricular Arterial Pressure (RVSP) is 30 mmHg. Pulmonary hypertension not suggested by Doppler findings. I Have personally reviewed recent relevant labs available and discussed with patient  CBC BMP LFT daily7/2021    Assessment         ICD-10-CM ICD-9-CM    1. Essential hypertension  I10 401.9     Stable monitor   2. Coronary artery disease of native artery of native heart with stable angina pectoris ([facility] [address]  I25.118 414.01      413.9     Stable on treatment continue therapy   3. S/P coronary artery stent placement  Z95.5 V45.82     Stable   4. Mixed hyperlipidemia  E78.2 272.2     Continue current treatment lab with PCP   5. Type 2 diabetes mellitus without complication, without long-term current use of insulin (HCC)  E11.9 250.00     Continue current treatment. Follow-up with PCP   6. Hypotension, unspecified hypotension type  I95.9 458.9     Hypertension and dizziness after working in the attic. Likely orthostatic. Improved after holding Norvasc. Will try Norvasc half tablet at bedtime   1/2018  Out of brillinta for 2 weeks-unable to afford  Changed to plavix-discussed compliance  5/2018  Stable mild cp  Out of norvasc 3 months  refilled  10/2018  Atypical chest pain. Clinically noncardiac. Will continue dual antiplatelet therapy. Norvasc increased for hypertension    6/2020  Cardiac status stable. Rare use of sublingual nitroglycerin stable pattern. Continue treatment. Check lipids  1/2021  Patient seen for follow-up after recent admission for unstable angina. Initial plan for CABG was reviewed. Patient became unstable suddenly and required LAD PCI for severe high-grade lesion that was new. CABG was not done. Subsequently stabilized and was discharged home LVEF was normal.  Since discharge his hemoptysis and hematuria are improving. He has urology follow-up.   Currently on Brilinta aspirin along with other medication I have reduce aspirin to 81 mg a day. Continue Brilinta. Prescriptions are sent. Blood pressure elevated in office today we will continue to monitor closely. I personally reviewed both his cardiac catheterization 9 right lesion appears chronic. Circumflex lesion will be tried medical management. He has some progression of left main disease to 45-50% which will be managed medically for now he will require close monitoring of symptoms. If he does stabilize on medical management will monitor clinically. If symptoms persist then further evaluation will be carried out. He has nausea at times and I have prescribed Zofran. We will monitor patient's status closely for any progression of symptomsleft main disease for symptom and likely do a stress testing in 6 to 12 months to assess need for further bypass surgery. Patient is advised to contact us if there is any recurrence of symptoms. 4 2021  Seen for follow-up of occasional chest pain feels fatigue and tired. Recent hemoglobin is normal.  Other labs unremarkable. We will continue to monitor continue dual antiplatelet therapy. Monitor closely  7/2021  Cardiac status stable. Still feels fatigue and tired. Now gets iron infusion. Stress test negative echo with normal ejection fraction. We will discontinue Brilinta and change to Plavix to see if that has any role in patient's fatigue and tiredness. Patient is about 7-month out from his last stent  10/2021  Stable cardiac status continue current medical management. Will discontinue aspirin and continue Plavix    5/2022  Stable cardiac status. Episode of hypotension once likely orthostatic. Will reduce Norvasc to 5 mg at bedtime if blood pressure remains controlled. He will monitor it. Labs reviewed. There are no discontinued medications. No orders of the defined types were placed in this encounter. Follow-up and Dispositions    · Return in about 6 months (around 11/18/2022).          Fany Richardson MD

## 2022-05-18 NOTE — PROGRESS NOTES
1. Have you been to the ER, urgent care clinic since your last visit? Hospitalized since your last visit?     no  2. Have you seen or consulted any other health care providers outside of the [address] [place] [address] since your last visit? Include any pap smears or colon screening. No     3. Since your last visit, have you had any of the following symptoms?      dizziness.

## 2022-06-09 ENCOUNTER — HOSPITAL ENCOUNTER (OUTPATIENT)
Dept: INFUSION THERAPY | Age: 72
Discharge: HOME | End: 2022-06-09
Payer: MEDICARE

## 2022-06-09 VITALS
HEART RATE: 69 BPM | RESPIRATION RATE: 18 BRPM | TEMPERATURE: 97.9 F | DIASTOLIC BLOOD PRESSURE: 81 MMHG | SYSTOLIC BLOOD PRESSURE: 142 MMHG | OXYGEN SATURATION: 97 %

## 2022-06-09 DIAGNOSIS — E53.8 B12 DEFICIENCY: Primary | ICD-10-CM

## 2022-06-09 PROCEDURE — 74011250636 HC RX REV CODE- 250/636: Performed by: NURSE PRACTITIONER

## 2022-06-09 PROCEDURE — 96372 THER/PROPH/DIAG INJ SC/IM: CPT

## 2022-06-09 RX ORDER — CYANOCOBALAMIN 1000 UG/ML
1000 INJECTION, SOLUTION INTRAMUSCULAR; SUBCUTANEOUS ONCE
Status: COMPLETED | OUTPATIENT
Start: 2022-06-09 | End: 2022-06-09

## 2022-06-09 RX ORDER — EPINEPHRINE 1 MG/ML
0.3 INJECTION, SOLUTION, CONCENTRATE INTRAVENOUS AS NEEDED
Status: CANCELLED | OUTPATIENT
Start: 2022-07-07

## 2022-06-09 RX ORDER — ALBUTEROL SULFATE 0.83 MG/ML
2.5 SOLUTION RESPIRATORY (INHALATION) AS NEEDED
Status: CANCELLED
Start: 2022-07-07

## 2022-06-09 RX ORDER — CYANOCOBALAMIN 1000 UG/ML
1000 INJECTION, SOLUTION INTRAMUSCULAR; SUBCUTANEOUS ONCE
Status: CANCELLED
Start: 2022-07-07 | End: 2022-07-07

## 2022-06-09 RX ORDER — HYDROCORTISONE SODIUM SUCCINATE 100 MG/2ML
100 INJECTION, POWDER, FOR SOLUTION INTRAMUSCULAR; INTRAVENOUS AS NEEDED
Status: CANCELLED | OUTPATIENT
Start: 2022-07-07

## 2022-06-09 RX ORDER — DIPHENHYDRAMINE HYDROCHLORIDE 50 MG/ML
50 INJECTION, SOLUTION INTRAMUSCULAR; INTRAVENOUS AS NEEDED
Status: CANCELLED
Start: 2022-07-07

## 2022-06-09 RX ADMIN — CYANOCOBALAMIN 1000 MCG: 1000 INJECTION, SOLUTION INTRAMUSCULAR at 10:09

## 2022-06-09 NOTE — PROGRESS NOTES
[facility] Progress Note    Date: 2022    Name: María Elena Jaimes    MRN: [id number]         : 1950    Mr. Leeroy Farmer arrived in the [facility] today at 1, here for his B-12 Injection (Every 4 Weeks). He was assessed and education was provided. No complaints of pain. No concerns voiced    Mr. Shruthi Plaza vitals were reviewed. Visit Vitals  BP (!) 142/81 (BP 1 Location: Left upper arm, BP Patient Position: Sitting)   Pulse 69   Temp 97.9 °F (36.6 °C)   Resp 18   SpO2 97%       Cyanocobalamin (B-12) 1,000 mcg, was administered IM in his right deltoid. Band-aid to site. Mr. Leeroy Farmer tolerated well, and voiced no complaints. Discharge/ follow-up instructions discussed w/ pt. Pt verbalized understanding. Armband removed and shredded. Mr. Leeroy Farmer was discharged from [place] [address] in stable condition at 1015. He is to return on 22 at 1100, for his next appointment of his next B-12 Injection.      Wilman Romero RN  2022

## 2022-07-07 ENCOUNTER — HOSPITAL ENCOUNTER (OUTPATIENT)
Dept: INFUSION THERAPY | Age: 72
Discharge: HOME | End: 2022-07-07
Payer: MEDICARE

## 2022-07-07 VITALS
RESPIRATION RATE: 16 BRPM | SYSTOLIC BLOOD PRESSURE: 109 MMHG | HEART RATE: 69 BPM | OXYGEN SATURATION: 98 % | DIASTOLIC BLOOD PRESSURE: 62 MMHG | TEMPERATURE: 97.8 F

## 2022-07-07 DIAGNOSIS — E53.8 B12 DEFICIENCY: Primary | ICD-10-CM

## 2022-07-07 PROCEDURE — 74011250636 HC RX REV CODE- 250/636: Performed by: NURSE PRACTITIONER

## 2022-07-07 PROCEDURE — 96372 THER/PROPH/DIAG INJ SC/IM: CPT

## 2022-07-07 RX ORDER — DIPHENHYDRAMINE HYDROCHLORIDE 50 MG/ML
50 INJECTION, SOLUTION INTRAMUSCULAR; INTRAVENOUS AS NEEDED
Status: CANCELLED
Start: 2022-08-04

## 2022-07-07 RX ORDER — CYANOCOBALAMIN 1000 UG/ML
1000 INJECTION, SOLUTION INTRAMUSCULAR; SUBCUTANEOUS ONCE
Status: CANCELLED
Start: 2022-08-04 | End: 2022-08-04

## 2022-07-07 RX ORDER — CYANOCOBALAMIN 1000 UG/ML
1000 INJECTION, SOLUTION INTRAMUSCULAR; SUBCUTANEOUS ONCE
Status: COMPLETED | OUTPATIENT
Start: 2022-07-07 | End: 2022-07-07

## 2022-07-07 RX ORDER — EPINEPHRINE 1 MG/ML
0.3 INJECTION, SOLUTION, CONCENTRATE INTRAVENOUS AS NEEDED
Status: CANCELLED | OUTPATIENT
Start: 2022-08-04

## 2022-07-07 RX ORDER — ALBUTEROL SULFATE 0.83 MG/ML
2.5 SOLUTION RESPIRATORY (INHALATION) AS NEEDED
Status: CANCELLED
Start: 2022-08-04

## 2022-07-07 RX ORDER — HYDROCORTISONE SODIUM SUCCINATE 100 MG/2ML
100 INJECTION, POWDER, FOR SOLUTION INTRAMUSCULAR; INTRAVENOUS AS NEEDED
Status: CANCELLED | OUTPATIENT
Start: 2022-08-04

## 2022-07-07 RX ADMIN — CYANOCOBALAMIN 1000 MCG: 1000 INJECTION, SOLUTION INTRAMUSCULAR at 11:07

## 2022-07-07 NOTE — PROGRESS NOTES
[facility] Progress Note    Date: 2022    Name: Rodolfo Krishnan    MRN: [id number]         : 1950    Mr. Tracie Gitelman arrived in the [facility] today at 1100, here for his B-12 Injection (Every 4 Weeks). He was assessed and education was provided. No complaints of pain. No concerns voiced    Mr. Brown Faster vitals were reviewed. Visit Vitals  /62 (BP 1 Location: Left upper arm, BP Patient Position: Sitting)   Pulse 69   Temp 97.8 °F (36.6 °C)   Resp 16   SpO2 98%       Cyanocobalamin (B-12) 1,000 mcg, was administered IM in his right deltoid. Band-aid to site. Mr. Tracie Gitelman tolerated well, and voiced no complaints. Discharge/ follow-up instructions discussed w/ pt. Pt verbalized understanding. Armband removed and shredded. Mr. Tracie Gitelman was discharged from [place] [address] in stable condition at 1110. He is to return on 22 at 1100, for his next appointment of his next B-12 Injection.      Martha Johnson RN  2022

## 2022-07-08 RX ORDER — AMLODIPINE BESYLATE 10 MG/1
TABLET ORAL
Qty: 90 TABLET | Refills: 1 | Status: SHIPPED | OUTPATIENT
Start: 2022-07-08 | End: 2022-07-12

## 2022-07-08 RX ORDER — CLOPIDOGREL BISULFATE 75 MG/1
TABLET ORAL
Qty: 90 TABLET | Refills: 3 | Status: SHIPPED | OUTPATIENT
Start: 2022-07-08 | End: 2022-07-12

## 2022-07-12 RX ORDER — CLOPIDOGREL BISULFATE 75 MG/1
TABLET ORAL
Qty: 90 TABLET | Refills: 3 | Status: SHIPPED | OUTPATIENT
Start: 2022-07-12

## 2022-07-12 RX ORDER — AMLODIPINE BESYLATE 10 MG/1
TABLET ORAL
Qty: 90 TABLET | Refills: 1 | Status: SHIPPED | OUTPATIENT
Start: 2022-07-12

## 2022-07-18 ENCOUNTER — LAB ONLY (OUTPATIENT)
Dept: ONCOLOGY | Age: 72
End: 2022-07-18

## 2022-07-18 ENCOUNTER — HOSPITAL ENCOUNTER (OUTPATIENT)
Dept: LAB | Age: 72
Discharge: HOME | End: 2022-07-18
Payer: MEDICARE

## 2022-07-18 DIAGNOSIS — D64.9 ANEMIA, UNSPECIFIED TYPE: ICD-10-CM

## 2022-07-18 DIAGNOSIS — D50.9 IRON DEFICIENCY ANEMIA, UNSPECIFIED IRON DEFICIENCY ANEMIA TYPE: Primary | ICD-10-CM

## 2022-07-18 DIAGNOSIS — D50.9 IRON DEFICIENCY ANEMIA, UNSPECIFIED IRON DEFICIENCY ANEMIA TYPE: ICD-10-CM

## 2022-07-18 LAB
ALBUMIN SERPL-MCNC: 3.8 G/DL (ref 3.4–5)
ALBUMIN/GLOB SERPL: 1.3 {RATIO} (ref 0.8–1.7)
ALP SERPL-CCNC: 65 U/L (ref 45–117)
ALT SERPL-CCNC: 21 U/L (ref 16–61)
ANION GAP SERPL CALC-SCNC: 5 MMOL/L (ref 3–18)
AST SERPL-CCNC: 14 U/L (ref 10–38)
BASOPHILS # BLD: 0.1 K/UL (ref 0–0.1)
BASOPHILS NFR BLD: 1 % (ref 0–2)
BILIRUB SERPL-MCNC: 0.3 MG/DL (ref 0.2–1)
BUN SERPL-MCNC: 11 MG/DL (ref 7–18)
BUN/CREAT SERPL: 12 (ref 12–20)
CALCIUM SERPL-MCNC: 9 MG/DL (ref 8.5–10.1)
CHLORIDE SERPL-SCNC: 107 MMOL/L (ref 100–111)
CO2 SERPL-SCNC: 28 MMOL/L (ref 21–32)
CREAT SERPL-MCNC: 0.92 MG/DL (ref 0.6–1.3)
DIFFERENTIAL METHOD BLD: ABNORMAL
EOSINOPHIL # BLD: 0.1 K/UL (ref 0–0.4)
EOSINOPHIL NFR BLD: 2 % (ref 0–5)
ERYTHROCYTE [DISTWIDTH] IN BLOOD BY AUTOMATED COUNT: 15 % (ref 11.6–14.5)
FERRITIN SERPL-MCNC: 34 NG/ML (ref 8–388)
GLOBULIN SER CALC-MCNC: 3 G/DL (ref 2–4)
GLUCOSE SERPL-MCNC: 145 MG/DL (ref 74–99)
HCT VFR BLD AUTO: 42.2 % (ref 36–48)
HGB BLD-MCNC: 13.9 G/DL (ref 13–16)
IMM GRANULOCYTES # BLD AUTO: 0 K/UL (ref 0–0.04)
IMM GRANULOCYTES NFR BLD AUTO: 0 % (ref 0–0.5)
IRON SATN MFR SERPL: 24 % (ref 20–50)
IRON SERPL-MCNC: 72 UG/DL (ref 50–175)
LYMPHOCYTES # BLD: 1.9 K/UL (ref 0.9–3.6)
LYMPHOCYTES NFR BLD: 25 % (ref 21–52)
MCH RBC QN AUTO: 30.4 PG (ref 24–34)
MCHC RBC AUTO-ENTMCNC: 32.9 G/DL (ref 31–37)
MCV RBC AUTO: 92.3 FL (ref 78–100)
MONOCYTES # BLD: 0.5 K/UL (ref 0.05–1.2)
MONOCYTES NFR BLD: 7 % (ref 3–10)
NEUTS SEG # BLD: 5 K/UL (ref 1.8–8)
NEUTS SEG NFR BLD: 66 % (ref 40–73)
NRBC # BLD: 0 K/UL (ref 0–0.01)
NRBC BLD-RTO: 0 PER 100 WBC
PLATELET # BLD AUTO: 166 K/UL (ref 135–420)
PMV BLD AUTO: 10.7 FL (ref 9.2–11.8)
POTASSIUM SERPL-SCNC: 4.4 MMOL/L (ref 3.5–5.5)
PROT SERPL-MCNC: 6.8 G/DL (ref 6.4–8.2)
RBC # BLD AUTO: 4.57 M/UL (ref 4.35–5.65)
SODIUM SERPL-SCNC: 140 MMOL/L (ref 136–145)
TIBC SERPL-MCNC: 297 UG/DL (ref 250–450)
WBC # BLD AUTO: 7.5 K/UL (ref 4.6–13.2)

## 2022-07-18 PROCEDURE — 82728 ASSAY OF FERRITIN: CPT

## 2022-07-18 PROCEDURE — 83540 ASSAY OF IRON: CPT

## 2022-07-18 PROCEDURE — 80053 COMPREHEN METABOLIC PANEL: CPT

## 2022-07-18 PROCEDURE — 36415 COLL VENOUS BLD VENIPUNCTURE: CPT

## 2022-07-18 PROCEDURE — 85025 COMPLETE CBC W/AUTO DIFF WBC: CPT

## 2022-08-01 ENCOUNTER — VIRTUAL VISIT (OUTPATIENT)
Dept: ONCOLOGY | Age: 72
End: 2022-08-01
Payer: MEDICARE

## 2022-08-01 DIAGNOSIS — D50.9 IRON DEFICIENCY ANEMIA, UNSPECIFIED IRON DEFICIENCY ANEMIA TYPE: Primary | ICD-10-CM

## 2022-08-01 DIAGNOSIS — E53.8 B12 DEFICIENCY: ICD-10-CM

## 2022-08-01 PROCEDURE — 99442 PR PHYS/QHP TELEPHONE EVALUATION 11-20 MIN: CPT | Performed by: NURSE PRACTITIONER

## 2022-08-01 NOTE — PROGRESS NOTES
Christina Goins is a 67 y.o. male, evaluated via audio-only technology on 8/1/2022. PCP: Geeta Garcia MD    Assessment & Plan:   Iron Deficiency Anemia  Vitamin B12 deficiency   --Past medical history significant of Hematuria and bloody vomiting at his previous admission. --He has follow up with Urology and GI.   --7/8/2021 S/P iron infusion in the form of Venofer x 3 doses  --3/17/2022 CBC reported Hb 13.3g/dL, hematocrit 40%, WBC 8.1, platelet 224,392. Iron sat 23%, Ferritin 46  --Today I have reviewed with the patient about recent lab reports.   --07/18/2022: CBC showed WBC 7.5K/uL, hemoglobin 13.3g/dL with hematocrit of 42.2%. Platelet 721. Iron Sat 24%. Ferritin 34. Plan:  --Iron replacement orally will be continued  --The patient will continue the Vit B12 replacement at [facility] as indicated. --We will repeat CBC, CMP, Iron Profile, Ferritin, B12/folate, and ret count prior to next visit. --The patient was advised to f/u with GI and Urology as scheduled. --Follow up with GI as scheduled     Subjective:   Mr. Avi Martinze is a 67y.o. year old male who was seen for management of iron deficiency anemia and vitamin B12  deficiency. The patient has a past medical history significant of Hematuria and bloody vomiting at his recent admission. S/P iron infusion in the form of Venofer on 7/8/2021. He reported doing well after that. Denies feves,  chills, night sweats, unintentional weight loss, skin lumps or bumps, acute bleeding or bruising issues. Denies headaches, acute vision changes, dizziness, chest pain, shortness of breath, palpitation, productive cough, nausea, vomiting, abdominal pain, altered bowel habits, dysuria, bone pain or back pain, new focal numbness or weakness. He does not have any concerns or complaints to report at this time. Prior to Admission medications    Medication Sig Start Date End Date Taking?  Authorizing Provider   clopidogreL (PLAVIX) 75 mg tab TAKE 1 TABLET BY MOUTH EVERY DAY 7/12/22   Kaley Olivas MD   amLODIPine (NORVASC) 10 mg tablet TAKE 1 TABLET BY MOUTH EVERY DAY 7/12/22   Kaley Olivas MD   glipiZIDE SR (GLUCOTROL XL) 5 mg CR tablet TAKE 1 TABLET BY MOUTH EVERY DAY 5/20/22   Annalise Hernández MD   omeprazole (PRILOSEC) 20 mg capsule TAKE 1 CAPSULE BY MOUTH EVERY DAY 5/20/22   Annalise Hernández MD   metFORMIN (GLUCOPHAGE) 1,000 mg tablet TAKE 1 TABLET BY MOUTH TWICE A DAY WITH MEALS 5/20/22   Annalise Hernández MD   ramipriL (ALTACE) 10 mg capsule TAKE 1 CAPSULE BY MOUTH EVERY DAY 4/11/22   Coby Mantilla NP   solifenacin (VESICARE) 5 mg tablet Take 1 Tablet by mouth daily. 3/7/22   Daniele Villanueva NP   Januvia 100 mg tablet TAKE 1 TABLET BY MOUTH EVERY DAY 1/17/22   Annalise Hernández MD   rosuvastatin (CRESTOR) 40 mg tablet TAKE 1 TABLET BY MOUTH NIGHTLY 1/11/22   Kaley Olivas MD   carvediloL (COREG) 25 mg tablet TAKE 1 TABLET BY MOUTH TWICE A DAY WITH FOOD 10/29/21   Mini Tinajero NP   cyanocobalamin, vitamin B-12, (VITAMIN B-12 INJECTION) by Injection route. Provider, Historical   tamsulosin (FLOMAX) 0.4 mg capsule Take 1 Capsule by mouth daily (after dinner). 9/9/21   Daniele Villanueva NP   cetirizine (ZyrTEC) 10 mg tablet Take 10 mg by mouth daily.     Other, MD Abena   nitroglycerin (NITROSTAT) 0.4 mg SL tablet DISSOLVE 1 TABLET UNDER TONGUE EVERY 5 MIN AS NEEDED FOR CHEST PAIN FOR UP TO 3 DOSES 1/31/21   Kaley Olivas MD   Blood Pressure Monitor kit Check once a day 5/12/20   Annalise Hernández MD     Patient Active Problem List   Diagnosis Code    Chronic pain syndrome G89.4    Coronary artery disease involving native coronary artery with unstable angina pectoris ([facility] I25.110    Essential hypertension I10    Hyperlipidemia E78.5    Thyroid goiter E04.9    Coronary artery disease of native artery of native heart with stable angina pectoris ([facility] I25.118    S/P coronary artery stent placement Z95.5    Unstable angina pectoris ([address] I20.0    Controlled type 2 diabetes mellitus without complication, without long-term current use of insulin ([facility]) E11.9    Insomnia G47.00    Tubular adenoma of colon D12.6    Cannabis use, uncomplicated O72.90    Cervical disc disease M50.90    GERD (gastroesophageal reflux disease) K21.9    ACS (acute coronary syndrome) ([facility]) I24.9    B12 deficiency E53.8    Anemia D64.9       Review of Systems   Constitutional:  Negative for chills, diaphoresis, fever, malaise/fatigue and weight loss. Respiratory:  Negative for cough, hemoptysis, shortness of breath and wheezing. Cardiovascular:  Negative for chest pain, palpitations and leg swelling. Gastrointestinal:  Negative for abdominal pain, diarrhea, heartburn, nausea and vomiting. Genitourinary:  Negative for dysuria, frequency, hematuria and urgency. Musculoskeletal:  Negative for joint pain and myalgias. Skin:  Negative for itching and rash. Neurological:  Negative for dizziness, seizures, weakness and headaches. Psychiatric/Behavioral:  Negative for depression. The patient does not have insomnia.         Patient-Reported Vitals 8/1/2022   Patient-Reported Weight 200   Patient-Reported Height -   Patient-Reported Pulse -   Patient-Reported Temperature 98   Patient-Reported SpO2 -   Patient-Reported Systolic  810   Patient-Reported Diastolic 62     LAB DATA  Lab Results   Component Value Date/Time    WBC 7.5 07/18/2022 09:59 AM    Hemoglobin, POC 9.5 (L) 12/12/2020 03:54 AM    HGB 13.9 07/18/2022 09:59 AM    Hematocrit, POC 28 (L) 12/12/2020 03:54 AM    HCT 42.2 07/18/2022 09:59 AM    PLATELET 102 35/87/2606 09:59 AM    MCV 92.3 07/18/2022 09:59 AM     Lab Results   Component Value Date/Time    Sodium 140 07/18/2022 09:59 AM    Potassium 4.4 07/18/2022 09:59 AM    Chloride 107 07/18/2022 09:59 AM    CO2 28 07/18/2022 09:59 AM    Anion gap 5 07/18/2022 09:59 AM    Glucose 145 (H) 07/18/2022 09:59 AM    BUN 11 07/18/2022 09:59 AM    Creatinine 0.92 07/18/2022 09:59 AM BUN/Creatinine ratio 12 07/18/2022 09:59 AM    GFR est AA >60 07/18/2022 09:59 AM    GFR est non-AA >60 07/18/2022 09:59 AM    Calcium 9.0 07/18/2022 09:59 AM    Bilirubin, total 0.3 07/18/2022 09:59 AM    Alk. phosphatase 65 07/18/2022 09:59 AM    Protein, total 6.8 07/18/2022 09:59 AM    Albumin 3.8 07/18/2022 09:59 AM    Globulin 3.0 07/18/2022 09:59 AM    A-G Ratio 1.3 07/18/2022 09:59 AM    ALT (SGPT) 21 07/18/2022 09:59 AM    AST (SGOT) 14 07/18/2022 09:59 AM     Lab Results   Component Value Date/Time    Iron 72 07/18/2022 09:59 AM    TIBC 297 07/18/2022 09:59 AM    Iron % saturation 24 07/18/2022 09:59 AM    Ferritin 34 07/18/2022 09:59 AM       Anita Saeed, who was evaluated through a patient-initiated, synchronous (real-time) audio only encounter, and/or her healthcare decision maker, is aware that it is a billable service, with coverage as determined by his insurance carrier. He provided verbal consent to proceed: Yes. He has not had a related appointment within my department in the past 7 days or scheduled within the next 24 hours. TOTAL TIME: 19 minutes    Follow up in 4 months with repeat labs or sooner if indicated.     Orders Placed This Encounter    CBC WITH AUTOMATED DIFF     Standing Status:   Future     Standing Expiration Date:   8/2/2023    FERRITIN     Standing Status:   Future     Standing Expiration Date:   8/2/2023    IRON PROFILE     Standing Status:   Future     Standing Expiration Date:   8/4/1971    METABOLIC PANEL, COMPREHENSIVE     Standing Status:   Future     Standing Expiration Date:   8/2/2023    VITAMIN B12 & FOLATE     Standing Status:   Future     Standing Expiration Date:   8/2/2023    RETICULOCYTE COUNT     Standing Status:   Future     Standing Expiration Date:   8/2/2023       Kim Hernandez DNP, FNP-C      CC: Max Caldera MD

## 2022-08-04 ENCOUNTER — HOSPITAL ENCOUNTER (OUTPATIENT)
Dept: INFUSION THERAPY | Age: 72
Discharge: HOME | End: 2022-08-04
Payer: MEDICARE

## 2022-08-04 VITALS
OXYGEN SATURATION: 98 % | TEMPERATURE: 97.8 F | DIASTOLIC BLOOD PRESSURE: 79 MMHG | SYSTOLIC BLOOD PRESSURE: 121 MMHG | RESPIRATION RATE: 16 BRPM | HEART RATE: 90 BPM

## 2022-08-04 DIAGNOSIS — E53.8 B12 DEFICIENCY: Primary | ICD-10-CM

## 2022-08-04 PROCEDURE — 96372 THER/PROPH/DIAG INJ SC/IM: CPT

## 2022-08-04 PROCEDURE — 74011250636 HC RX REV CODE- 250/636: Performed by: NURSE PRACTITIONER

## 2022-08-04 RX ORDER — CYANOCOBALAMIN 1000 UG/ML
1000 INJECTION, SOLUTION INTRAMUSCULAR; SUBCUTANEOUS ONCE
Status: CANCELLED
Start: 2022-09-01 | End: 2022-09-01

## 2022-08-04 RX ORDER — EPINEPHRINE 1 MG/ML
0.3 INJECTION, SOLUTION, CONCENTRATE INTRAVENOUS AS NEEDED
Status: CANCELLED | OUTPATIENT
Start: 2022-09-01

## 2022-08-04 RX ORDER — CYANOCOBALAMIN 1000 UG/ML
1000 INJECTION, SOLUTION INTRAMUSCULAR; SUBCUTANEOUS ONCE
Status: COMPLETED | OUTPATIENT
Start: 2022-08-04 | End: 2022-08-04

## 2022-08-04 RX ORDER — DIPHENHYDRAMINE HYDROCHLORIDE 50 MG/ML
50 INJECTION, SOLUTION INTRAMUSCULAR; INTRAVENOUS AS NEEDED
Status: CANCELLED
Start: 2022-09-01

## 2022-08-04 RX ORDER — ALBUTEROL SULFATE 0.83 MG/ML
2.5 SOLUTION RESPIRATORY (INHALATION) AS NEEDED
Status: CANCELLED
Start: 2022-09-01

## 2022-08-04 RX ORDER — HYDROCORTISONE SODIUM SUCCINATE 100 MG/2ML
100 INJECTION, POWDER, FOR SOLUTION INTRAMUSCULAR; INTRAVENOUS AS NEEDED
Status: CANCELLED | OUTPATIENT
Start: 2022-09-01

## 2022-08-04 RX ADMIN — CYANOCOBALAMIN 1000 MCG: 1000 INJECTION, SOLUTION INTRAMUSCULAR at 11:33

## 2022-09-01 ENCOUNTER — HOSPITAL ENCOUNTER (OUTPATIENT)
Dept: INFUSION THERAPY | Age: 72
Discharge: HOME | End: 2022-09-01
Payer: MEDICARE

## 2022-09-01 VITALS
DIASTOLIC BLOOD PRESSURE: 76 MMHG | OXYGEN SATURATION: 96 % | HEART RATE: 73 BPM | RESPIRATION RATE: 16 BRPM | TEMPERATURE: 97.1 F | SYSTOLIC BLOOD PRESSURE: 135 MMHG

## 2022-09-01 DIAGNOSIS — E53.8 B12 DEFICIENCY: Primary | ICD-10-CM

## 2022-09-01 PROCEDURE — 96372 THER/PROPH/DIAG INJ SC/IM: CPT

## 2022-09-01 PROCEDURE — 74011250636 HC RX REV CODE- 250/636: Performed by: NURSE PRACTITIONER

## 2022-09-01 RX ORDER — CYANOCOBALAMIN 1000 UG/ML
1000 INJECTION, SOLUTION INTRAMUSCULAR; SUBCUTANEOUS ONCE
Status: COMPLETED | OUTPATIENT
Start: 2022-09-01 | End: 2022-09-01

## 2022-09-01 RX ORDER — CYANOCOBALAMIN 1000 UG/ML
1000 INJECTION, SOLUTION INTRAMUSCULAR; SUBCUTANEOUS ONCE
Status: CANCELLED
Start: 2022-09-29 | End: 2022-09-29

## 2022-09-01 RX ORDER — ALBUTEROL SULFATE 0.83 MG/ML
2.5 SOLUTION RESPIRATORY (INHALATION) AS NEEDED
Status: CANCELLED
Start: 2022-09-29

## 2022-09-01 RX ORDER — EPINEPHRINE 1 MG/ML
0.3 INJECTION, SOLUTION, CONCENTRATE INTRAVENOUS AS NEEDED
Status: CANCELLED | OUTPATIENT
Start: 2022-09-29

## 2022-09-01 RX ORDER — DIPHENHYDRAMINE HYDROCHLORIDE 50 MG/ML
50 INJECTION, SOLUTION INTRAMUSCULAR; INTRAVENOUS AS NEEDED
Status: CANCELLED
Start: 2022-09-29

## 2022-09-01 RX ORDER — HYDROCORTISONE SODIUM SUCCINATE 100 MG/2ML
100 INJECTION, POWDER, FOR SOLUTION INTRAMUSCULAR; INTRAVENOUS AS NEEDED
Status: CANCELLED | OUTPATIENT
Start: 2022-09-29

## 2022-09-01 RX ADMIN — CYANOCOBALAMIN 1000 MCG: 1000 INJECTION, SOLUTION INTRAMUSCULAR at 10:46

## 2022-09-01 NOTE — PROGRESS NOTES
[facility] Progress Note    Date: 2022    Name: Hemanth Boswell    MRN: [id number]         : 1950    Cyanocobalamin (B-12) Injection (Every 4 weeks)    Mr. Baldo Oneill arrived in the [facility] today at 200. He was assessed and education was provided. Mr. Denisha Cardona vitals were reviewed. Visit Vitals  /76 (BP 1 Location: Right upper arm, BP Patient Position: Sitting)   Pulse 73   Temp 97.1 °F (36.2 °C)   Resp 16   SpO2 96%       Cyanocobalamin (B-12) 1,000 mcg, was administered IM in his right deltoid. Band-aid to site. Mr. Baldo Oneill tolerated well, and voiced no complaints. Discharge/ follow-up instructions discussed w/ pt. Pt verbalized understanding. Armband removed and shredded. Mr. Baldo Oneill was discharged from [place] [address] in stable condition at 1050. He is to return on 22 at 56, for his next appointment of his next B-12 Injection.      Bob Thomas RN  2022

## 2022-09-27 DIAGNOSIS — K21.9 GASTROESOPHAGEAL REFLUX DISEASE WITHOUT ESOPHAGITIS: ICD-10-CM

## 2022-09-27 DIAGNOSIS — E11.9 WELL CONTROLLED DIABETES MELLITUS (HCC): ICD-10-CM

## 2022-09-27 NOTE — TELEPHONE ENCOUNTER
Dr. Cas Bennett, patient has an appointment with you on 10/31/22 at 10:45 AM. He will run out of medication prior to this appointment and he's requesting refill on Prilosec and Januvia.

## 2022-09-27 NOTE — TELEPHONE ENCOUNTER
----- Message from [address] [place] [address] sent at 9/27/2022 11:17 AM EDT -----  Subject: Refill Request    QUESTIONS  Name of Medication? Januvia 100 mg tablet  Patient-reported dosage and instructions? januvia 100mg one a day  How many days do you have left? 8  Preferred Pharmacy? [facility] phone number (if available)? [phone]  Additional Information for Provider? patient has virtual appt [facility] but not   until 10/31/22 that was the first available will need meds to hold over   until appt   ---------------------------------------------------------------------------  --------------,  Name of Medication? omeprazole (PRILOSEC) 20 mg capsule  Patient-reported dosage and instructions? prilosec 20mg one a day  How many days do you have left? 0  [facility] phone number (if available)? [phone]  Additional Information for Provider? patient has virtual appt [facility] but not   until 10/31/22 that was the first available will need meds to hold over   until appt   ---------------------------------------------------------------------------  --------------  [address]  What is the best way for the office to contact you? Do not leave any   message, patient will call back for answer  Preferred Call Back Phone Number? [phone]  ---------------------------------------------------------------------------  --------------  SCRIPT ANSWERS  Relationship to Patient?  Self

## 2022-09-28 RX ORDER — OMEPRAZOLE 20 MG/1
20 CAPSULE, DELAYED RELEASE ORAL DAILY
Qty: 90 CAPSULE | Refills: 0 | Status: SHIPPED | OUTPATIENT
Start: 2022-09-28

## 2022-09-29 ENCOUNTER — HOSPITAL ENCOUNTER (OUTPATIENT)
Dept: INFUSION THERAPY | Age: 72
Discharge: HOME | End: 2022-09-29
Payer: MEDICARE

## 2022-09-29 VITALS
TEMPERATURE: 97.6 F | DIASTOLIC BLOOD PRESSURE: 83 MMHG | SYSTOLIC BLOOD PRESSURE: 160 MMHG | OXYGEN SATURATION: 98 % | RESPIRATION RATE: 16 BRPM | HEART RATE: 67 BPM

## 2022-09-29 DIAGNOSIS — E53.8 B12 DEFICIENCY: Primary | ICD-10-CM

## 2022-09-29 PROCEDURE — 74011250636 HC RX REV CODE- 250/636: Performed by: NURSE PRACTITIONER

## 2022-09-29 PROCEDURE — 96372 THER/PROPH/DIAG INJ SC/IM: CPT

## 2022-09-29 RX ORDER — ALBUTEROL SULFATE 0.83 MG/ML
2.5 SOLUTION RESPIRATORY (INHALATION) AS NEEDED
Status: CANCELLED
Start: 2022-10-27

## 2022-09-29 RX ORDER — CYANOCOBALAMIN 1000 UG/ML
1000 INJECTION, SOLUTION INTRAMUSCULAR; SUBCUTANEOUS ONCE
Status: COMPLETED | OUTPATIENT
Start: 2022-09-29 | End: 2022-09-29

## 2022-09-29 RX ORDER — EPINEPHRINE 1 MG/ML
0.3 INJECTION, SOLUTION, CONCENTRATE INTRAVENOUS AS NEEDED
Status: CANCELLED | OUTPATIENT
Start: 2022-10-27

## 2022-09-29 RX ORDER — DIPHENHYDRAMINE HYDROCHLORIDE 50 MG/ML
50 INJECTION, SOLUTION INTRAMUSCULAR; INTRAVENOUS AS NEEDED
Status: CANCELLED
Start: 2022-10-27

## 2022-09-29 RX ORDER — HYDROCORTISONE SODIUM SUCCINATE 100 MG/2ML
100 INJECTION, POWDER, FOR SOLUTION INTRAMUSCULAR; INTRAVENOUS AS NEEDED
Status: CANCELLED | OUTPATIENT
Start: 2022-10-27

## 2022-09-29 RX ORDER — CYANOCOBALAMIN 1000 UG/ML
1000 INJECTION, SOLUTION INTRAMUSCULAR; SUBCUTANEOUS ONCE
Status: CANCELLED
Start: 2022-10-27 | End: 2022-10-27

## 2022-09-29 RX ADMIN — CYANOCOBALAMIN 1000 MCG: 1000 INJECTION, SOLUTION INTRAMUSCULAR at 14:38

## 2022-09-29 NOTE — PROGRESS NOTES
[facility] Progress Note    Date: 2022    Name: Colby Cedeno    MRN: [id number]         : 1950    Cyanocobalamin (B-12) Injection (Every 4 weeks)    Mr. Miguel Blevins arrived in the [facility] today at 1430, ambulatory. He was assessed and education was provided. Mr. Wilbert Jung vitals were reviewed. Visit Vitals  BP (!) 160/83 (BP 1 Location: Left upper arm, BP Patient Position: Sitting)   Pulse 67   Temp 97.6 °F (36.4 °C)   Resp 16   SpO2 98%       Cyanocobalamin (B-12) 1,000 mcg, was administered IM in his right deltoid. Band-aid to site. Mr. Miguel Blevins tolerated well, and voiced no complaints. Discharge/ follow-up instructions discussed w/ pt. Pt verbalized understanding. Armband removed and shredded. Mr. Miguel Blevins was discharged from [place] [address] in stable condition at 1450. He is to return on 10/27/22 at 56, for his next appointment of his next B-12 Injection.      Amos Walker RN  2022

## 2022-10-27 ENCOUNTER — HOSPITAL ENCOUNTER (OUTPATIENT)
Dept: INFUSION THERAPY | Age: 72
Discharge: HOME | End: 2022-10-27
Payer: MEDICARE

## 2022-10-27 VITALS
OXYGEN SATURATION: 95 % | DIASTOLIC BLOOD PRESSURE: 86 MMHG | HEART RATE: 79 BPM | SYSTOLIC BLOOD PRESSURE: 146 MMHG | TEMPERATURE: 97.5 F | RESPIRATION RATE: 16 BRPM

## 2022-10-27 DIAGNOSIS — E53.8 B12 DEFICIENCY: Primary | ICD-10-CM

## 2022-10-27 PROCEDURE — 74011250636 HC RX REV CODE- 250/636: Performed by: NURSE PRACTITIONER

## 2022-10-27 PROCEDURE — 96372 THER/PROPH/DIAG INJ SC/IM: CPT

## 2022-10-27 RX ORDER — EPINEPHRINE 1 MG/ML
0.3 INJECTION, SOLUTION, CONCENTRATE INTRAVENOUS AS NEEDED
Status: CANCELLED | OUTPATIENT
Start: 2022-11-24

## 2022-10-27 RX ORDER — DIPHENHYDRAMINE HYDROCHLORIDE 50 MG/ML
50 INJECTION, SOLUTION INTRAMUSCULAR; INTRAVENOUS AS NEEDED
Status: CANCELLED
Start: 2022-11-24

## 2022-10-27 RX ORDER — CYANOCOBALAMIN 1000 UG/ML
1000 INJECTION, SOLUTION INTRAMUSCULAR; SUBCUTANEOUS ONCE
Status: COMPLETED | OUTPATIENT
Start: 2022-10-27 | End: 2022-10-27

## 2022-10-27 RX ORDER — ALBUTEROL SULFATE 0.83 MG/ML
2.5 SOLUTION RESPIRATORY (INHALATION) AS NEEDED
Status: CANCELLED
Start: 2022-11-24

## 2022-10-27 RX ORDER — CYANOCOBALAMIN 1000 UG/ML
1000 INJECTION, SOLUTION INTRAMUSCULAR; SUBCUTANEOUS ONCE
Status: CANCELLED
Start: 2022-11-24 | End: 2022-11-24

## 2022-10-27 RX ORDER — HYDROCORTISONE SODIUM SUCCINATE 100 MG/2ML
100 INJECTION, POWDER, FOR SOLUTION INTRAMUSCULAR; INTRAVENOUS AS NEEDED
Status: CANCELLED | OUTPATIENT
Start: 2022-11-24

## 2022-10-27 RX ADMIN — CYANOCOBALAMIN 1000 MCG: 1000 INJECTION, SOLUTION INTRAMUSCULAR at 10:26

## 2022-10-27 NOTE — PROGRESS NOTES
\[facility]\"" Progress Note    Date: 2022    Name: Sergio Webb    MRN: [id number]         : 1950    Cyanocobalamin (B-12) Injection (Every 4 weeks)    Mr. Jose Arita arrived in the [facility] today at [phone]. He was assessed and education was provided. Mr. Catherine Quiroz vitals were reviewed. Visit Vitals  BP (!) 146/86 (BP 1 Location: Left upper arm, BP Patient Position: Sitting)   Pulse 79   Temp 97.5 °F (36.4 °C)   Resp 16   SpO2 95%     Cyanocobalamin (B-12) 1,000 mcg, was administered IM in his Left deltoid. Band-aid to site. Mr. Jose Arita tolerated well, and voiced no complaints. Armband removed and shredded. Mr. Jose Arita was discharged from [place] [address] in stable condition at 1030. He is to return on 22 at 36, for his next appointment of his next B-12 Injection.      Sal Carter RN  2022

## 2022-10-27 NOTE — PROGRESS NOTES
1. \"Have you been to the ER, urgent care clinic since your last visit? Hospitalized since your last visit? \" Yes When: [facility] 4/05/22 colonoscopy with polypectomies - Dr. Negro Phillips. 2. \"Have you seen or consulted any other health care providers outside of the [address] [place] [address] since your last visit? \" Yes When: Urology Dr. Karmen Olson: 6/23/22. 3. For patients aged 39-70: Has the patient had a colonoscopy / FIT/ Cologuard?  Yes - no Care Gap present 4/05/22 colonoscopy    Chief Complaint   Patient presents with    Diabetes    Coronary Artery Disease    GERD    Thyroid Problem    Hypertension    Anemia    Hematuria

## 2022-10-31 ENCOUNTER — VIRTUAL VISIT (OUTPATIENT)
Dept: FAMILY MEDICINE CLINIC | Age: 72
End: 2022-10-31
Payer: MEDICARE

## 2022-10-31 DIAGNOSIS — E78.00 PURE HYPERCHOLESTEROLEMIA: ICD-10-CM

## 2022-10-31 DIAGNOSIS — I10 ESSENTIAL HYPERTENSION: ICD-10-CM

## 2022-10-31 DIAGNOSIS — I25.118 CORONARY ARTERY DISEASE OF NATIVE ARTERY OF NATIVE HEART WITH STABLE ANGINA PECTORIS (HCC): Primary | ICD-10-CM

## 2022-10-31 DIAGNOSIS — K21.9 GASTROESOPHAGEAL REFLUX DISEASE WITHOUT ESOPHAGITIS: ICD-10-CM

## 2022-10-31 DIAGNOSIS — Z00.00 MEDICARE ANNUAL WELLNESS VISIT, SUBSEQUENT: ICD-10-CM

## 2022-10-31 DIAGNOSIS — E11.8 TYPE 2 DIABETES MELLITUS WITH UNSPECIFIED COMPLICATIONS (HCC): ICD-10-CM

## 2022-10-31 DIAGNOSIS — E53.8 B12 DEFICIENCY: ICD-10-CM

## 2022-10-31 DIAGNOSIS — D50.8 OTHER IRON DEFICIENCY ANEMIA: ICD-10-CM

## 2022-10-31 DIAGNOSIS — E04.9 THYROID GOITER: ICD-10-CM

## 2022-10-31 PROCEDURE — 2022F DILAT RTA XM EVC RTNOPTHY: CPT | Performed by: FAMILY MEDICINE

## 2022-10-31 PROCEDURE — 99214 OFFICE O/P EST MOD 30 MIN: CPT | Performed by: FAMILY MEDICINE

## 2022-10-31 PROCEDURE — 3046F HEMOGLOBIN A1C LEVEL >9.0%: CPT | Performed by: FAMILY MEDICINE

## 2022-10-31 PROCEDURE — 3017F COLORECTAL CA SCREEN DOC REV: CPT | Performed by: FAMILY MEDICINE

## 2022-10-31 PROCEDURE — G0439 PPPS, SUBSEQ VISIT: HCPCS | Performed by: FAMILY MEDICINE

## 2022-10-31 PROCEDURE — G8756 NO BP MEASURE DOC: HCPCS | Performed by: FAMILY MEDICINE

## 2022-10-31 PROCEDURE — G8427 DOCREV CUR MEDS BY ELIG CLIN: HCPCS | Performed by: FAMILY MEDICINE

## 2022-10-31 PROCEDURE — G8417 CALC BMI ABV UP PARAM F/U: HCPCS | Performed by: FAMILY MEDICINE

## 2022-10-31 PROCEDURE — 1123F ACP DISCUSS/DSCN MKR DOCD: CPT | Performed by: FAMILY MEDICINE

## 2022-10-31 PROCEDURE — 1101F PT FALLS ASSESS-DOCD LE1/YR: CPT | Performed by: FAMILY MEDICINE

## 2022-10-31 PROCEDURE — G8536 NO DOC ELDER MAL SCRN: HCPCS | Performed by: FAMILY MEDICINE

## 2022-10-31 PROCEDURE — G8432 DEP SCR NOT DOC, RNG: HCPCS | Performed by: FAMILY MEDICINE

## 2022-10-31 NOTE — PROGRESS NOTES
This is the Subsequent Medicare Annual Wellness Exam, performed 12 months or more after the Initial AWV or the last Subsequent AWV    I have reviewed the patient's medical history in detail and updated the computerized patient record. Assessment/Plan   Education and counseling provided:  Are appropriate based on today's review and evaluation  Discussed prior health plan. Discussed advanced directive. See ACP note from today  Was a difficult discussion about the advanced directive as he is daughter just diagnosed with rectal cancer 2 days ago. Was emotional.  Will discuss again in near future but he will discuss with his wife about advised. 1. Coronary artery disease of native artery of native heart with stable angina pectoris ([facility]  2. Type 2 diabetes mellitus with unspecified complications (HCC)  -     LIPID PANEL; Future  -     MICROALBUMIN, UR, RAND W/ MICROALB/CREAT RATIO; Future  -     HEMOGLOBIN A1C WITH EAG; Future  3. Essential hypertension  4. Pure hypercholesterolemia  5. Thyroid goiter  6. Gastroesophageal reflux disease without esophagitis  7. B12 deficiency  8. Other iron deficiency anemia  9. Medicare annual wellness visit, subsequent       Depression Risk Factor Screening:     3 most recent PHQ Screens 8/1/2022   PHQ Not Done -   Little interest or pleasure in doing things Not at all   Feeling down, depressed, irritable, or hopeless Not at all   Total Score PHQ 2 0       Alcohol & Drug Abuse Risk Screen    Do you average more than 1 drink per night or more than 7 drinks a week: No    In the past three months have you have had more than 4 drinks containing alcohol on one occasion: No          Functional Ability and Level of Safety:    Hearing: Hearing is good. Activities of Daily Living: The home contains: no safety equipment. Patient does total self care      Ambulation: with no difficulty     Fall Risk:  Fall Risk Assessment, last 12 mths 8/1/2022   Able to walk?  Yes   Fall in past 12 months? 0   Do you feel unsteady? -   Are you worried about falling -   Number of falls in past 12 months -   Fall with injury? -      Abuse Screen:  Patient is not abused       Cognitive Screening    Has your family/caregiver stated any concerns about your memory: no        Health Maintenance Due     Health Maintenance Due   Topic Date Due    Eye Exam Retinal or Dilated  Never done    Foot Exam Q1  10/08/2020    MICROALBUMIN Q1  11/23/2021    A1C test (Diabetic or Prediabetic)  10/27/2022    Lipid Screen  10/27/2022     Ordered due to labs.   He has completed an eye exam.  Will provide location and physician name  The exam next visit  Patient Care Team   Patient Care Team:  Syd Roldan MD as PCP - General (Family Medicine)  Syd Roldan MD as PCP - [facility]  Leandro Montiel MD as Physician (Cardiovascular Disease Physician)  Asha Saunders MD as Physician (Endocrinology Physician)  Giovani Noel, Olivia Puentes MD (Hematology Physician)    History     Patient Active Problem List   Diagnosis Code    Chronic pain syndrome G89.4    Coronary artery disease involving native coronary artery with unstable angina pectoris ([address] I25.110    Essential hypertension I10    Hyperlipidemia E78.5    Thyroid goiter E04.9    Coronary artery disease of native artery of native heart with stable angina pectoris ([address] I25.118    S/P coronary artery stent placement Z95.5    Unstable angina pectoris ([address] I20.0    Controlled type 2 diabetes mellitus without complication, without long-term current use of insulin ([address] E11.9    Insomnia G47.00    Tubular adenoma of colon D12.6    Cannabis use, uncomplicated W44.21    Cervical disc disease M50.90    GERD (gastroesophageal reflux disease) K21.9    ACS (acute coronary syndrome) ([address] I24.9    B12 deficiency E53.8    Anemia D64.9     Past Medical History:   Diagnosis Date    Arthritis     1999 vicodin    Diabetes ([address] 1998 metformin    GERD (gastroesophageal reflux disease) Hypercholesterolemia     Hypertension 1996 norvasc    Kidney stones     MI (myocardial infarction) ([address] 12/12/2020      Past Surgical History:   Procedure Laterality Date    COLONOSCOPY N/A 11/2/2018    COLONOSCOPY with Polypectomies performed by Gilma Andrade MD at [facility]    COLONOSCOPY N/A 4/5/2022    COLONOSCOPY w polypectomies performed by Gilma Andrade MD at [address]  12/2020    stented    HX CHOLECYSTECTOMY      HX GI  2010    gallbladder    NE CARDIAC SURG PROCEDURE UNLIST  12/2017    stents     Current Outpatient Medications   Medication Sig Dispense Refill    tamsulosin (FLOMAX) 0.4 mg capsule TAKE 1 CAPSULE BY MOUTH DAILY (AFTER DINNER). 90 Capsule 2    omeprazole (PRILOSEC) 20 mg capsule Take 1 Capsule by mouth daily. 90 Capsule 0    SITagliptin (Januvia) 100 mg tablet Take 1 Tablet by mouth daily. 90 Tablet 0    clopidogreL (PLAVIX) 75 mg tab TAKE 1 TABLET BY MOUTH EVERY DAY 90 Tablet 3    glipiZIDE SR (GLUCOTROL XL) 5 mg CR tablet TAKE 1 TABLET BY MOUTH EVERY DAY 90 Tablet 0    metFORMIN (GLUCOPHAGE) 1,000 mg tablet TAKE 1 TABLET BY MOUTH TWICE A DAY WITH MEALS 180 Tablet 0    ramipriL (ALTACE) 10 mg capsule TAKE 1 CAPSULE BY MOUTH EVERY DAY 90 Capsule 3    solifenacin (VESICARE) 5 mg tablet Take 1 Tablet by mouth daily. 90 Tablet 3    rosuvastatin (CRESTOR) 40 mg tablet TAKE 1 TABLET BY MOUTH NIGHTLY 90 Tablet 3    carvediloL (COREG) 25 mg tablet TAKE 1 TABLET BY MOUTH TWICE A DAY WITH FOOD 180 Tablet 3    cyanocobalamin, vitamin B-12, (VITAMIN B-12 INJECTION) by Injection route. cetirizine (ZYRTEC) 10 mg tablet Take 10 mg by mouth daily.       nitroglycerin (NITROSTAT) 0.4 mg SL tablet DISSOLVE 1 TABLET UNDER TONGUE EVERY 5 MIN AS NEEDED FOR CHEST PAIN FOR UP TO 3 DOSES 25 Tab 0    Blood Pressure Monitor kit Check once a day 1 Kit 0    amLODIPine (NORVASC) 10 mg tablet TAKE 1 TABLET BY MOUTH EVERY DAY (Patient taking differently: 5 mg.) 90 Tablet 1     No Known Allergies    Family History   Problem Relation Age of Onset    Stroke Mother     Headache Mother     Hypertension Mother     Lung Cancer Mother     Heart Disease Father     Heart Attack Father     Hypertension Father     Diabetes Father     Lung Cancer Father     Ovarian Cancer Sister     Heart Attack Brother     Breast Cancer Sister     Diabetes Sister     Cancer Maternal Aunt         lung cancer    Cancer Maternal Uncle     Cancer Paternal Aunt     Cancer Paternal Uncle      Social History     Tobacco Use    Smoking status: Never    Smokeless tobacco: Never   Substance Use Topics    Alcohol use: No       Vonita Bharathi, was evaluated through a synchronous (real-time) audio-video encounter. The patient (or guardian if applicable) is aware that this is a billable service, which includes applicable co-pays. This Virtual Visit was conducted with patient's (and/or legal guardian's) consent. The visit was conducted pursuant to the emergency declaration under the [facility], [address] [place] [address] authority and the [employer] General Act. Patient identification was verified, and a caregiver was present when appropriate. The patient was located at: Home: [address] [place] [id number]  The provider was located at:  Facility (Appt Department): [address] [place] [address],Fourth Floor [address] [phone]       Fatmata Harry MD

## 2022-10-31 NOTE — ACP (ADVANCE CARE PLANNING)
Advance Care Planning     General Advance Care Planning (ACP) Conversation      Date of Conversation: 10/31/2022  Conducted with: Patient with Decision Making Capacity    Healthcare Decision Maker:     Primary Decision Maker: Alley Bledsoe - Spouse - [phone]  Click here to complete [address] including selection of the Healthcare Decision Maker Relationship (ie \"Primary\")    Today we discussed advance directive . Wants his wife to decide if he is on a life support. Not sure about resuscitation and he will talk about it to his wife.      Content/Action Overview:   See above       Length of Voluntary ACP Conversation in minutes:  <16 minutes (Non-Billable)    Chance Spear MD

## 2022-10-31 NOTE — PATIENT INSTRUCTIONS
Medicare Wellness Visit, Male    The best way to live healthy is to have a lifestyle where you eat a well-balanced diet, exercise regularly, limit alcohol use, and quit all forms of tobacco/nicotine, if applicable. Regular preventive services are another way to keep healthy. Preventive services (vaccines, screening tests, monitoring & exams) can help personalize your care plan, which helps you manage your own care. Screening tests can find health problems at the earliest stages, when they are easiest to treat. [facility] follows the current, evidence-based guidelines published by the [facility] ([facility]) when recommending preventive services for our patients. Because we follow these guidelines, sometimes recommendations change over time as research supports it. (For example, a prostate screening blood test is no longer routinely recommended for men with no symptoms). Of course, you and your doctor may decide to screen more often for some diseases, based on your risk and co-morbidities (chronic disease you are already diagnosed with). Preventive services for you include:  - Medicare offers their members a free annual wellness visit, which is time for you and your primary care provider to discuss and plan for your preventive service needs. Take advantage of this benefit every year!  -All adults over age 72 should receive the recommended pneumonia vaccines. Current USPSTF guidelines recommend a series of two vaccines for the best pneumonia protection.   -All adults should have a flu vaccine yearly and tetanus vaccine every 10 years.  -All adults age 48 and older should receive the shingles vaccines (series of two vaccines).        -All adults age 38-68 who are overweight should have a diabetes screening test once every three years.   -Other screening tests & preventive services for persons with diabetes include: an eye exam to screen for diabetic retinopathy, a kidney function test, a foot exam, and stricter control over your cholesterol.   -Cardiovascular screening for adults with routine risk involves an electrocardiogram (ECG) at intervals determined by the provider.   -Colorectal cancer screening should be done for adults age 54-65 with no increased risk factors for colorectal cancer. There are a number of acceptable methods of screening for this type of cancer. Each test has its own benefits and drawbacks. Discuss with your provider what is most appropriate for you during your annual wellness visit. The different tests include: colonoscopy (considered the best screening method), a fecal occult blood test, a fecal DNA test, and sigmoidoscopy.  -All adults born between [facility] should be screened once for Hepatitis C.  -An Abdominal Aortic Aneurysm (AAA) Screening is recommended for men age 73-68 who has ever smoked in their lifetime.      Here is a list of your current Health Maintenance items (your personalized list of preventive services) with a due date:  Health Maintenance Due   Topic Date Due    Eye Exam  Never done    Diabetic Foot Care  10/08/2020    Albumin Urine Test  11/23/2021    Hemoglobin A1C    10/27/2022    Cholesterol Test   10/27/2022

## 2022-10-31 NOTE — PROGRESS NOTES
Alejandrina Hunter is a 67 y.o. male who was seen by synchronous (real-time) audio-video technology on 10/31/2022 for Diabetes, Coronary Artery Disease, GERD, Thyroid Problem, Hypertension, Anemia, and Hematuria        Assessment & Plan:   Diagnoses and all orders for this visit:    1. Coronary artery disease of native artery of native heart with stable angina pectoris [facility]): Following cardiology. On risk factor management. No anginal symptoms    2. Type 2 diabetes mellitus with unspecified complications [facility]): Obtaining labs. Following Endo. Continue current plan and follow specialist recommendation  -     LIPID PANEL; Future  -     MICROALBUMIN, UR, RAND W/ MICROALB/CREAT RATIO; Future  -     HEMOGLOBIN A1C WITH EAG; Future    3. Essential hypertension: Vitals been stable at home. Taking medication with compliance. We will continue current plan    4. Pure hypercholesterolemia: On statin. More compliant with diet and lifestyle modification which she will    5. Thyroid goiter: Following Endo. Asymptomatic. We will follow the recommendation    6. Gastroesophageal reflux disease without esophagitis: On and off symptomatic. Taking PPI with compliance discussed more compliance with diet modification    7. B12 deficiency: On supplement. Will  8. Other iron deficiency anemia: On supplement. Following hematology    9. Medicare annual wellness visit, subsequent    Patient understood agreed with the plan  See Medicare wellness note for health maintenance  Follow-up and Dispositions    Return in about 3 months (around 1/31/2023). I spent at least 20-25 minutes on this visit with this established patient. Please note that this dictation was completed with [employer], the computer voice recognition software. Quite often unanticipated grammatical, syntax, homophones, and other interpretive errors are inadvertently transcribed by the computer software. Please disregard these errors.   Please excuse any errors that have escaped final proofreading. [phone]  Subjective:   Done visit through [facility]  History of coronary artery disease. No anginal symptoms. On risk factor management. Following endocrinology for goiter and diabetes. Will order labs. No hyper or hypoglycemic symptoms. Emotional during visit as daughter just recently diagnosed with rectal cancer. No thoughts of hurting himself or someone else. Able to handle himself. Taking his medication with compliance. Hyperlipidemia. On statin. Working on lifestyle and diet modification  GERD symptom on and off symptomatic. Taking PPI. No nausea vomiting or abdominal pain. No appetite or weight changes. No trouble swallowing or change in voice. Taking vitamin supplement. B12 and iron deficiency. No unusual fatigue. Checking blood pressure at home. Vitals fairly stable. Had a urinary trouble. Currently resolved on Flomax. Seen urology. Lab Results   Component Value Date/Time    WBC 7.5 07/18/2022 09:59 AM    Hemoglobin, POC 9.5 (L) 12/12/2020 03:54 AM    HGB 13.9 07/18/2022 09:59 AM    Hematocrit, POC 28 (L) 12/12/2020 03:54 AM    HCT 42.2 07/18/2022 09:59 AM    PLATELET 662 85/61/6297 09:59 AM    MCV 92.3 07/18/2022 09:59 AM     Lab Results   Component Value Date/Time    Sodium 140 07/18/2022 09:59 AM    Potassium 4.4 07/18/2022 09:59 AM    Chloride 107 07/18/2022 09:59 AM    CO2 28 07/18/2022 09:59 AM    Anion gap 5 07/18/2022 09:59 AM    Glucose 145 (H) 07/18/2022 09:59 AM    BUN 11 07/18/2022 09:59 AM    Creatinine 0.92 07/18/2022 09:59 AM    BUN/Creatinine ratio 12 07/18/2022 09:59 AM    GFR est AA >60 07/18/2022 09:59 AM    GFR est non-AA >60 07/18/2022 09:59 AM    Calcium 9.0 07/18/2022 09:59 AM    Bilirubin, total 0.3 07/18/2022 09:59 AM    Alk.  phosphatase 65 07/18/2022 09:59 AM    Protein, total 6.8 07/18/2022 09:59 AM    Albumin 3.8 07/18/2022 09:59 AM    Globulin 3.0 07/18/2022 09:59 AM    A-G Ratio 1.3 07/18/2022 09:59 AM    ALT (SGPT) 21 07/18/2022 09:59 AM    AST (SGOT) 14 07/18/2022 09:59 AM     Lab Results   Component Value Date/Time    Cholesterol, total 117 10/27/2021 08:50 AM    HDL Cholesterol 36 (L) 10/27/2021 08:50 AM    LDL, calculated 43.4 10/27/2021 08:50 AM    VLDL, calculated 37.6 10/27/2021 08:50 AM    Triglyceride 188 (H) 10/27/2021 08:50 AM    CHOL/HDL Ratio 3.3 10/27/2021 08:50 AM     Lab Results   Component Value Date/Time    TSH 1.24 10/27/2021 08:50 AM     Lab Results   Component Value Date/Time    Hemoglobin A1c 5.6 10/27/2021 08:50 AM     Lab Results   Component Value Date/Time    Prostate Specific Ag 2.4 03/17/2022 10:51 AM    Prostate Specific Ag 1.57 09/09/2021 09:55 AM    Prostate Specific Ag 1.6 04/27/2020 07:06 AM    Prostate Specific Ag 1.6 01/30/2019 07:32 AM     Lab Results   Component Value Date/Time    Microalbumin/Creat ratio (mg/g creat) 5 11/23/2020 07:35 AM    Microalbumin,urine random 1.20 11/23/2020 07:35 AM           Prior to Admission medications    Medication Sig Start Date End Date Taking? Authorizing Provider   tamsulosin (FLOMAX) 0.4 mg capsule TAKE 1 CAPSULE BY MOUTH DAILY (AFTER DINNER). 10/5/22  Yes Brian Yen NP   omeprazole (PRILOSEC) 20 mg capsule Take 1 Capsule by mouth daily. 9/28/22  Yes Marge Mai MD   SITagliptin (Januvia) 100 mg tablet Take 1 Tablet by mouth daily. 9/28/22  Yes Marge Mai MD   clopidogreL (PLAVIX) 75 mg tab TAKE 1 TABLET BY MOUTH EVERY DAY 7/12/22  Yes Christie Sullivan MD   glipiZIDE SR (GLUCOTROL XL) 5 mg CR tablet TAKE 1 TABLET BY MOUTH EVERY DAY 5/20/22  Yes Marge Mai MD   metFORMIN (GLUCOPHAGE) 1,000 mg tablet TAKE 1 TABLET BY MOUTH TWICE A DAY WITH MEALS 5/20/22  Yes Marge Mai MD   ramipriL (ALTACE) 10 mg capsule TAKE 1 CAPSULE BY MOUTH EVERY DAY 4/11/22  Yes Coby Mantilla NP   solifenacin (VESICARE) 5 mg tablet Take 1 Tablet by mouth daily.  3/7/22  Yes Brian Yen NP   rosuvastatin (CRESTOR) 40 mg tablet TAKE 1 TABLET BY MOUTH NIGHTLY 1/11/22  Yes Eric Parker MD   carvediloL (COREG) 25 mg tablet TAKE 1 TABLET BY MOUTH TWICE A DAY WITH FOOD 10/29/21  Yes Mini Tinajero NP   cyanocobalamin, vitamin B-12, (VITAMIN B-12 INJECTION) by Injection route. Yes Provider, Ruddy   cetirizine (ZYRTEC) 10 mg tablet Take 10 mg by mouth daily.    Yes Other, MD Abena   nitroglycerin (NITROSTAT) 0.4 mg SL tablet DISSOLVE 1 TABLET UNDER TONGUE EVERY 5 MIN AS NEEDED FOR CHEST PAIN FOR UP TO 3 DOSES 1/31/21  Yes Eric Parker MD   Blood Pressure Monitor kit Check once a day 5/12/20  Yes Astrid Mariano MD   amLODIPine (NORVASC) 10 mg tablet TAKE 1 TABLET BY MOUTH EVERY DAY  Patient taking differently: 5 mg. 7/12/22   Eric Parker MD     Patient Active Problem List   Diagnosis Code    Chronic pain syndrome G89.4    Coronary artery disease involving native coronary artery with unstable angina pectoris ([facility] [address] I25.110    Essential hypertension I10    Hyperlipidemia E78.5    Thyroid goiter E04.9    Coronary artery disease of native artery of native heart with stable angina pectoris (HCC) I25.118    S/P coronary artery stent placement Z95.5    Unstable angina pectoris (HCC) I20.0    Controlled type 2 diabetes mellitus without complication, without long-term current use of insulin (HCC) E11.9    Insomnia G47.00    Tubular adenoma of colon D12.6    Cannabis use, uncomplicated U44.87    Cervical disc disease M50.90    GERD (gastroesophageal reflux disease) K21.9    ACS (acute coronary syndrome) ([facility]) I24.9    B12 deficiency E53.8    Anemia D64.9       ROS: See HPI    Objective:     Patient-Reported Vitals 10/31/2022   Patient-Reported Weight -   Patient-Reported Height -   Patient-Reported Pulse 90   Patient-Reported Temperature -   Patient-Reported SpO2 -   Patient-Reported Systolic  284   Patient-Reported Diastolic 69      General: alert, cooperative, no distress   Mental  status: normal mood, behavior, speech, dress, motor activity, and thought processes, able to follow commands   HENT: NCAT   Neck: no visualized mass   Resp: no respiratory distress   Neuro: no gross deficits   Skin: no discoloration or lesions of concern on visible areas   Psychiatric: normal affect, consistent with stated mood, no evidence of hallucinations     Additional exam findings: We discussed the expected course, resolution and complications of the diagnosis(es) in detail. Medication risks, benefits, costs, interactions, and alternatives were discussed as indicated. I advised him to contact the office if his condition worsens, changes or fails to improve as anticipated. He expressed understanding with the diagnosis(es) and plan. Winnie Justyna, was evaluated through a synchronous (real-time) audio-video encounter. The patient (or guardian if applicable) is aware that this is a billable service, which includes applicable co-pays. This Virtual Visit was conducted with patient's (and/or legal guardian's) consent. The visit was conducted pursuant to the emergency declaration under the [address] [place] [address] authority and the [employer] and [employer] General Act. Patient identification was verified, and a caregiver was present when appropriate. The patient was located at: Home: [address] [place] [phone]  The provider was located at:  Facility (Appt Department): [address] [phone]        Jong Jerry MD

## 2022-11-06 DIAGNOSIS — E11.65 POORLY CONTROLLED DIABETES MELLITUS (HCC): ICD-10-CM

## 2022-11-06 DIAGNOSIS — E11.9 WELL CONTROLLED DIABETES MELLITUS (HCC): ICD-10-CM

## 2022-11-07 RX ORDER — METFORMIN HYDROCHLORIDE 1000 MG/1
TABLET ORAL
Qty: 180 TABLET | Refills: 0 | Status: SHIPPED | OUTPATIENT
Start: 2022-11-07

## 2022-11-07 RX ORDER — GLIPIZIDE 5 MG/1
TABLET, FILM COATED, EXTENDED RELEASE ORAL
Qty: 90 TABLET | Refills: 0 | Status: SHIPPED | OUTPATIENT
Start: 2022-11-07

## 2022-11-08 ENCOUNTER — OFFICE VISIT (OUTPATIENT)
Dept: CARDIOLOGY CLINIC | Age: 72
End: 2022-11-08
Payer: MEDICARE

## 2022-11-08 VITALS
OXYGEN SATURATION: 97 % | WEIGHT: 192 LBS | HEIGHT: 72 IN | BODY MASS INDEX: 26.01 KG/M2 | DIASTOLIC BLOOD PRESSURE: 77 MMHG | HEART RATE: 74 BPM | SYSTOLIC BLOOD PRESSURE: 152 MMHG

## 2022-11-08 DIAGNOSIS — Z95.5 S/P CORONARY ARTERY STENT PLACEMENT: ICD-10-CM

## 2022-11-08 DIAGNOSIS — I25.118 CORONARY ARTERY DISEASE OF NATIVE ARTERY OF NATIVE HEART WITH STABLE ANGINA PECTORIS (HCC): Primary | ICD-10-CM

## 2022-11-08 DIAGNOSIS — E78.2 MIXED HYPERLIPIDEMIA: ICD-10-CM

## 2022-11-08 DIAGNOSIS — I10 ESSENTIAL HYPERTENSION: ICD-10-CM

## 2022-11-08 PROCEDURE — G8753 SYS BP > OR = 140: HCPCS | Performed by: INTERNAL MEDICINE

## 2022-11-08 PROCEDURE — G8536 NO DOC ELDER MAL SCRN: HCPCS | Performed by: INTERNAL MEDICINE

## 2022-11-08 PROCEDURE — 3074F SYST BP LT 130 MM HG: CPT | Performed by: INTERNAL MEDICINE

## 2022-11-08 PROCEDURE — G8427 DOCREV CUR MEDS BY ELIG CLIN: HCPCS | Performed by: INTERNAL MEDICINE

## 2022-11-08 PROCEDURE — 99214 OFFICE O/P EST MOD 30 MIN: CPT | Performed by: INTERNAL MEDICINE

## 2022-11-08 PROCEDURE — G8754 DIAS BP LESS 90: HCPCS | Performed by: INTERNAL MEDICINE

## 2022-11-08 PROCEDURE — 3078F DIAST BP <80 MM HG: CPT | Performed by: INTERNAL MEDICINE

## 2022-11-08 PROCEDURE — 1123F ACP DISCUSS/DSCN MKR DOCD: CPT | Performed by: INTERNAL MEDICINE

## 2022-11-08 PROCEDURE — G8432 DEP SCR NOT DOC, RNG: HCPCS | Performed by: INTERNAL MEDICINE

## 2022-11-08 PROCEDURE — 1101F PT FALLS ASSESS-DOCD LE1/YR: CPT | Performed by: INTERNAL MEDICINE

## 2022-11-08 PROCEDURE — 3017F COLORECTAL CA SCREEN DOC REV: CPT | Performed by: INTERNAL MEDICINE

## 2022-11-08 PROCEDURE — G8417 CALC BMI ABV UP PARAM F/U: HCPCS | Performed by: INTERNAL MEDICINE

## 2022-11-08 RX ORDER — AMLODIPINE BESYLATE 5 MG/1
5 TABLET ORAL DAILY
Qty: 90 TABLET | Refills: 3
Start: 2022-11-08

## 2022-11-08 NOTE — PROGRESS NOTES
HISTORY OF PRESENT ILLNESS  Jayro Colby is a 67 y.o. male. Patient with cad,htn,hyperlipidemia,dm. On follow up patient denies any chest pains,sob, palpitation or other significant symptoms. 12/2017  Admitted with unstable angina-had pci  7/2018. Patient was in emergency room with atypical chest pain. No acute EKG changes cardiac enzymes negative CTA and chest x-ray reports reviewed. Labs are negative for cardiac workup  5/2019. Recent ER visit with fall and concussion. Records reviewed. 6/2020  Patient seen for follow-up. He has rare occasion of chest pain he has used 1 nitroglycerin since last evaluation. No other complaints  1/2021  Patient is here for follow-up he was in hospital with  1. Unstable angina- s/p PCI of the LAD. Status post mechanical ventilation. continue DAPT brilinta and asa. Chest pain has resolved. 2. Acute respiratory failure- extubated   3. Hypotension-resolved. Off pressors. 4. Hypertension: Blood pressure increasing gradually as expected. Continue BID amlodipine. Elevated this afternoon - likely due to pain. 5. CAD-cardiac cath 12/2017 showed  Triple-vessel coronary artery disease - For CTS evaluation  6. Hyperlipidemia- LDL 74 11/20 Continue with Crestor  20 mg.  7. Diabetes- well controlled with A1c 5.9   8. LBBB- new since 12/20   9. Hemoptysis- traumatic ETT tube echange and pulmonary edema- better   10. Hematuria / flank pain - hx of renal stones - work up per primary team.  Initial plans were for CABG. Patient had recurrent unstable angina requiring emergency PCI of LAD. Patient is here for follow-up. He is feeling better  He still has occasional episode of nausea and vomiting that are sudden. He had one episode of chest pain that relieved with nitroglycerin. The progression of chest pain. He is here hemoptysis has resolved.   Hematuria is significantly improved he has urology follow-up pending this week      Follow-up  Pertinent negatives include no chest pain, no abdominal pain, no headaches and no shortness of breath. Hospital Follow Up  Pertinent negatives include no chest pain, no abdominal pain, no headaches and no shortness of breath. Hypertension  The history is provided by the Patient. This is a chronic problem. The problem occurs constantly. The problem has not changed since onset. Pertinent negatives include no chest pain, no abdominal pain, no headaches and no shortness of breath. Chest Pain (Angina)   The history is provided by the Patient. This is a recurrent problem. The problem has not changed since onset. The problem occurs rarely. The pain is associated with exertion. The pain is present in the substernal region. The quality of the pain is described as pressure-like. The pain does not radiate. Pertinent negatives include no abdominal pain, no claudication, no cough, no dizziness, no fever, no headaches, no hemoptysis, no malaise/fatigue, no nausea, no orthopnea, no palpitations, no PND, no shortness of breath, no sputum production, no vomiting and no weakness. Review of Systems   Constitutional:  Negative for chills, fever and malaise/fatigue. HENT:  Negative for nosebleeds. Eyes:  Negative for blurred vision and double vision. Respiratory:  Negative for cough, hemoptysis, sputum production, shortness of breath and wheezing. Cardiovascular:  Negative for chest pain, palpitations, orthopnea, claudication, leg swelling and PND. Gastrointestinal:  Negative for abdominal pain, heartburn, nausea and vomiting. Musculoskeletal:  Negative for falls and myalgias. Skin:  Negative for rash. Neurological:  Negative for dizziness, weakness and headaches. Endo/Heme/Allergies:  Does not bruise/bleed easily.    Family History   Problem Relation Age of Onset    Stroke Mother     Headache Mother     Hypertension Mother     Lung Cancer Mother     Heart Disease Father     Heart Attack Father     Hypertension Father     Diabetes Father     Lung Cancer Father     Ovarian Cancer Sister     Heart Attack Brother     Breast Cancer Sister     Diabetes Sister     Cancer Maternal Aunt         lung cancer    Cancer Maternal Uncle     Cancer Paternal Aunt     Cancer Paternal Uncle        Past Medical History:   Diagnosis Date    Arthritis     1999 vicodin    Diabetes ([facility] [address] 1998 metformin    GERD (gastroesophageal reflux disease)     Hypercholesterolemia     Hypertension 1996 norvasc    Kidney stones     MI (myocardial infarction) ([facility] [address] 12/12/2020       Past Surgical History:   Procedure Laterality Date    COLONOSCOPY N/A 11/2/2018    COLONOSCOPY with Polypectomies performed by Alexandra Ruff MD at [facility] ENDOSCOPY    COLONOSCOPY N/A 4/5/2022    COLONOSCOPY w polypectomies performed by Alexandra Ruff MD at [address]  12/2020    stented    HX CHOLECYSTECTOMY      HX GI  2010    gallbladder    MA CARDIAC SURG PROCEDURE UNLIST  12/2017    stents       Social History     Tobacco Use    Smoking status: Never    Smokeless tobacco: Never   Substance Use Topics    Alcohol use: No       No Known Allergies        Visit Vitals  BP (!) 152/77   Pulse 74   Ht 6' (1.829 m)   Wt 87.1 kg (192 lb)   SpO2 97%   BMI 26.04 kg/m²        Physical Exam  Constitutional:       Appearance: He is well-developed. HENT:      Head: Normocephalic and atraumatic. Eyes:      Conjunctiva/sclera: Conjunctivae normal.   Neck:      Thyroid: No thyromegaly. Vascular: No JVD. Trachea: No tracheal deviation. Cardiovascular:      Rate and Rhythm: Normal rate and regular rhythm. Heart sounds: Normal heart sounds. No murmur heard. No friction rub. No gallop. Pulmonary:      Effort: No respiratory distress. Breath sounds: Normal breath sounds. No wheezing or rales. Chest:      Chest wall: No tenderness. Abdominal:      Palpations: Abdomen is soft. Tenderness: There is no abdominal tenderness. Musculoskeletal:      Cervical back: Neck supple. Skin:     General: Skin is warm and dry. Neurological:      Mental Status: He is alert and oriented to person, place, and time. Mr. Arnulfo Orta has a reminder for a \"due or due soon\" health maintenance. I have asked that he contact his primary care provider for follow-up on this health maintenance. CONCLUSION:1/2016-  ABNORMAL STRESS ECHO WITH EKG AND WALL MOTION ABNORMALITIES SUGGESTIVE OF LAD  DISTRIBUTION DISEASE    IMPRESSION:cath:1/2016    1. TWO VESSEL CORONARY ARTERY DISEASE IN THE FORM OF 80% OSTIAL AND 90% PROXIMAL NON-DOMINANT RIGHT CORONARY STENOSIS WHICH IS A MODERATE SIZED VESSEL OF ABOUT 1.5 TO 2 MM IN DIAMETER, AND 40% PROXIMAL CIRCUMFLEX, AND 70% MID CIRCUMFLEX STENOSIS WHICH IS A DOMINANT VESSEL. THERE ARE DIFFUSE LUMINAL IRREGULARITIES SEEN THROUGHOUT THE CORONARIES. 2. NO NORMAL OVERALL LV EJECTION FRACTION AT REST. 3. EVIDENCE OF CHEST PAIN DURING THE CASE AS WELL AS BEFORE THE CASE WAS STARTED. PARTIAL RELIEF WITH SUBLINGUAL NITROGLYCERIN AND NO ACUTE EKG CHANGES SEEN IN THE SIX MONITORING LEADS IN THE CATH LAB. DISCUSSION AND RECOMMENDATIONS: PATIENT IS LIKELY HAVING CHEST PAIN FROM THE NON-DOMINANT RIGHT CORONARY ARTERY. THE LEFT CIRCUMFLEX IS DOMINANT AND APPEARS TO HAVE BORDERLINE MID STENOSIS WHICH DOES NOT HAVE ANY APPEARANCE OF ANY ACUTE UNSTABLE LESION. I THINK HE SHOULD BE TREATED MEDICALLY FOR NOW, AND IF THE SYMPTOMS PERSIST, LEFT CIRCUMFLEX ARTERY WILL NEED TO BE INTERROGATED BY INTRACORONARY DOPPLER WITH FFR DETERMINATION. RIGHT CORONARY, THOUGH APPEARS TO BE CRITICAL IS A SMALL VESSEL OF ABOUT 1.5 TO A MAXIMUM OF 2 MM IN DIAMETER AND PROBABLY SHOULD BE LEFT TO MEDICAL TREATMENT. AGGRESSIVE RISK FACTOR MODIFICATION SHOULD BE FOLLOWED. SUMMARY:12/2017-echo  Left ventricle: Systolic function was normal. Ejection fraction was estimated   in the range of 55 % to 60 %. No obvious  wall motion abnormalities identified in the views obtained.  Wall thickness   was mildly increased. Doppler parameters  were consistent with abnormal left ventricular relaxation (grade 1 diastolic   dysfunction). Right ventricle: The size was at the upper limits of normal.    Left atrium: The atrium was mildly to moderately dilated. FINDING-12/2017-cath  1. Left main is patent, bifurcates into left anterior descending artery  and circumflex artery. 2. Left anterior descending artery has ostial 30% to 40% stenosis  followed by mid diffuse 30-40% stenosis. The vessel appears to be  moderately calcified. Diagonal 1 artery has diffuse 30-40% stenosis. Mid to distal left anterior descending artery is patent. 3. Circumflex artery is dominant with proximal 95% calcific stenosis. Status post PTCA using a Trek 1.5 x 8 mm balloon followed by a 2.5  mm x 12 mm balloon. The stent was a Synergy 2.75 mm x 15 mm  stent was deployed at about 14 atmospheres. Post-PCI PTCA was  performed using a noncompliant 3.0 mm x 8 mm balloon inflated about  16-18 atmospheres. Lesion reduced to 10%. 4. Obtuse marginal 1 artery was a small-caliber vessel with ostial 70%  to 80% stenosis. 5. Obtuse marginal 2 artery is a small- to medium-caliber vessel with  diffuse 30-40% stenosis. Mid circumflex artery has focal 80% stenosis. Status post PTCA using a Trek 2.5 mm x 12 mm balloon followed by a  Synergy 2.75 mm x 12 mm stent deployed about 14 atmospheres. Post-PCI PTCA was performed using a 3.0 mm x 8 mm  balloon deployed about 16 atmospheres. Lesion reduced to 0%. 6. Left posterior descending artery is patent. 7. Right coronary artery is nondominant, has ostial calcific 90%  followed by mid 99% stenosis. CONCLUSION:  Triple-vessel coronary artery disease. Status post  percutaneous transluminal coronary angioplasty/stent to proximal and  mid circumflex artery using drug-eluting stents. The patient will be on  aspirin and Brilinta at this time. Intense medical management and risk  factor modification is advised. Conclusion 12/9/2020     Three-vessel coronary artery disease with 90% mid LAD stenosis 80% distal circumflex stenosis and 100% mid RCA stenosis after diffuse disease in proximal and ostial RCA. Patent previously deployed drug-eluting stents in mid circumflex and distal circumflex areas. Borderline critical left main stenosis which is approximately 50% and has progressed from previous catheterization in 2017. Procedure done via right radial artery without any complications. Given left main disease, CABG will be considered first.  We will take a surgical opinion with Dr. Ambika Pa. I discussed with him on phone and he/his team will be seeing the patient. I have personally reviewed patient's records available from hospital and other providers and incorporated findings in patient care. Cath films reviewed personally to evaluate-12/2020  Notes,lab,echo,cath  Coronary Findings 12/10/2020    Diagnostic  Dominance: Right  Left Main   Ost LM lesion, 20% stenosed. Dist LM lesion, 40% stenosed. Left Anterior Descending   Ost LAD to Prox LAD lesion, 50% stenosed. Mid LAD lesion, 99% stenosed. The lesion is type C. The lesion is moderately calcified. Left Circumflex   Prox Cx to Mid Cx lesion, 10% stenosed. The lesion was previously treated using a stent (unknown type). Intervention 12/10/2020    Mid LAD lesion   Angioplasty   Angioplasty using a standard balloon was performed prior to stent deployment. The balloon used was a CATH BLLN RX MINI TREK 2X12MM -- . Balloon inflated using multiple inflations inflation technique. The second balloon used was a CATH BLLN RX TREK [id number] -- .   Stent   A single stent was placed. Drug-eluting stent was successfully placed. The stent used was a STENT SYS COR [id number] -- XIENCE JUAN DIEGO. Inflation 1 - Maximum pressure: 12 janice; Time: 10 sec. The strut is well apposed. Angioplasty   Angioplasty using a standard balloon was performed following stent deployment.  The balloon used was a CATH BLLN COR DIL 2.5X8MM -- NC TREK RAPID-EXCHANGE. Post-Intervention Lesion Assessment   The intervention was successful. The guidewire crossed the lesion. Device was deployed. The pre-interventional distal flow is decreased (MYKE 2). Post-intervention MYKE flow is 3. Lesion had 15 mm of its length treated. There were no complications. There is a 0% residual stenosis post intervention. Interpretation Summary 12/09/2020    Contrast used: DEFINITY. LV: Calculated LVEF is 55%. Visually measured ejection fraction. Normal cavity size and systolic function (ejection fraction normal). Mild concentric hypertrophy. Age-appropriate left ventricular diastolic function. PA: Pulmonary arterial systolic pressure is 26 mmHg. Aorta: Dilated aortic root; diameter is 3.7 cm. Reading Providers     Reading Role Read Date   Taylor Mendez MD Test Supervisor, ECG [place], SPECT [place] 6/9/2021   Procedure Conclusion    Nuclear Stress Test    Nuclear Cardiac Spect Rest then Gated Stress with tomographic imaging/tomography utilized for the tomographic myocardical perfusion imaging performed. Nilo Apolonia was used as the stressing method and agent. (Nilo Apolonia given via a 10 - 20 sec injection.)   One day myocardial perfusion study. Date: 6/2/2021. Left ventricular perfusion is normal. Myocardial perfusion imaging supports a low risk stress test.   There is no prior study available for comparison. . This Single Photon Emission Computer Tomograph (SPECT) study utilized tomographic imaging/tomography for the tomographic myocardial perfusion imaging performed during this study. Interpretation Summary 6/2021    LV: Estimated LVEF is 55 - 60%. Normal cavity size and systolic function (ejection fraction normal). Mild concentric hypertrophy. Wall motion: normal. Mild (grade 1) left ventricular diastolic dysfunction. MV: Mild mitral annular calcification. Mild mitral valve regurgitation is present.   TV: Right Ventricular Arterial Pressure (RVSP) is 30 mmHg. Pulmonary hypertension not suggested by Doppler findings. I Have personally reviewed recent relevant labs available and discussed with patient  CBC BMP LFT daily-7/2021    Assessment         ICD-10-CM ICD-9-CM    1. Coronary artery disease of native artery of native heart with stable angina pectoris (HCC)  I25.118 414.01      413.9     Stable continue treatment monitor      2. Essential hypertension  I10 401.9     Blood pressure remains elevated I will restart amlodipine ER      3. S/P coronary artery stent placement  Z95.5 V45.82     Stable      4. Mixed hyperlipidemia  E78.2 272.2     Continue treatment lab with PCP      1/2018  Out of brillinta for 2 weeks-unable to afford  Changed to plavix-discussed compliance  5/2018  Stable mild cp  Out of norvasc 3 months  refilled  10/2018  Atypical chest pain. Clinically noncardiac. Will continue dual antiplatelet therapy. Norvasc increased for hypertension    6/2020  Cardiac status stable. Rare use of sublingual nitroglycerin stable pattern. Continue treatment. Check lipids  1/2021  Patient seen for follow-up after recent admission for unstable angina. Initial plan for CABG was reviewed. Patient became unstable suddenly and required LAD PCI for severe high-grade lesion that was new. CABG was not done. Subsequently stabilized and was discharged home LVEF was normal.  Since discharge his hemoptysis and hematuria are improving. He has urology follow-up. Currently on Brilinta aspirin along with other medication I have reduce aspirin to 81 mg a day. Continue Brilinta. Prescriptions are sent. Blood pressure elevated in office today we will continue to monitor closely. I personally reviewed both his cardiac catheterization 9 right lesion appears chronic. Circumflex lesion will be tried medical management.   He has some progression of left main disease to 45-50% which will be managed medically for now he will require close monitoring of symptoms. If he does stabilize on medical management will monitor clinically. If symptoms persist then further evaluation will be carried out. He has nausea at times and I have prescribed Zofran. We will monitor patient's status closely for any progression of symptoms-left main disease for symptom and likely do a stress testing in 6 to 12 months to assess need for further bypass surgery. Patient is advised to contact us if there is any recurrence of symptoms. 4 2021  Seen for follow-up of occasional chest pain feels fatigue and tired. Recent hemoglobin is normal.  Other labs unremarkable. We will continue to monitor continue dual antiplatelet therapy. Monitor closely  7/2021  Cardiac status stable. Still feels fatigue and tired. Now gets iron infusion. Stress test negative echo with normal ejection fraction. We will discontinue Brilinta and change to Plavix to see if that has any role in patient's fatigue and tiredness. Patient is about 7-month out from his last stent  10/2021  Stable cardiac status continue current medical management. Will discontinue aspirin and continue Plavix    5/2022  Stable cardiac status. Episode of hypotension once likely orthostatic. Will reduce Norvasc to 5 mg at bedtime if blood pressure remains controlled. He will monitor it. Labs reviewed. 11/2022  Stable cardiac status continue current medical management monitor. Blood pressure elevated. Will restart with amlodipine 5 mg a day which she had discontinued. Continue monitoring. Medications Discontinued During This Encounter   Medication Reason    amLODIPine (NORVASC) 10 mg tablet REORDER       Orders Placed This Encounter    amLODIPine (NORVASC) 5 mg tablet     Sig: Take 1 Tablet by mouth daily. Dispense:  90 Tablet     Refill:  3       Follow-up and Dispositions    Return in about 6 months (around 5/8/2023).          Cristy Ontiveros MD

## 2022-11-08 NOTE — PROGRESS NOTES
1. Have you been to the ER, urgent care clinic since your last visit? Hospitalized since your last visit?     no    2. Have you seen or consulted any other health care providers outside of the [address] [place] [address] since your last visit? Include any pap smears or colon screening. No     3. Since your last visit, have you had any of the following symptoms? chest pains.

## 2022-11-30 ENCOUNTER — HOSPITAL ENCOUNTER (OUTPATIENT)
Dept: LAB | Age: 72
Discharge: HOME | End: 2022-11-30
Payer: MEDICARE

## 2022-11-30 ENCOUNTER — APPOINTMENT (OUTPATIENT)
Dept: ONCOLOGY | Age: 72
End: 2022-11-30

## 2022-11-30 DIAGNOSIS — E53.8 B12 DEFICIENCY: ICD-10-CM

## 2022-11-30 DIAGNOSIS — D50.9 IRON DEFICIENCY ANEMIA, UNSPECIFIED IRON DEFICIENCY ANEMIA TYPE: ICD-10-CM

## 2022-11-30 LAB
ALBUMIN SERPL-MCNC: 3.9 G/DL (ref 3.4–5)
ALBUMIN/GLOB SERPL: 1.1 {RATIO} (ref 0.8–1.7)
ALP SERPL-CCNC: 74 U/L (ref 45–117)
ALT SERPL-CCNC: 21 U/L (ref 16–61)
ANION GAP SERPL CALC-SCNC: 5 MMOL/L (ref 3–18)
AST SERPL-CCNC: 12 U/L (ref 10–38)
BASOPHILS # BLD: 0.1 K/UL (ref 0–0.1)
BASOPHILS NFR BLD: 1 % (ref 0–2)
BILIRUB SERPL-MCNC: 0.4 MG/DL (ref 0.2–1)
BUN SERPL-MCNC: 19 MG/DL (ref 7–18)
BUN/CREAT SERPL: 19 (ref 12–20)
CALCIUM SERPL-MCNC: 9.7 MG/DL (ref 8.5–10.1)
CHLORIDE SERPL-SCNC: 107 MMOL/L (ref 100–111)
CO2 SERPL-SCNC: 27 MMOL/L (ref 21–32)
CREAT SERPL-MCNC: 1 MG/DL (ref 0.6–1.3)
DIFFERENTIAL METHOD BLD: NORMAL
EOSINOPHIL # BLD: 0.1 K/UL (ref 0–0.4)
EOSINOPHIL NFR BLD: 2 % (ref 0–5)
ERYTHROCYTE [DISTWIDTH] IN BLOOD BY AUTOMATED COUNT: 14.4 % (ref 11.6–14.5)
FERRITIN SERPL-MCNC: 38 NG/ML (ref 8–388)
FOLATE SERPL-MCNC: 4.2 NG/ML (ref 3.1–17.5)
GLOBULIN SER CALC-MCNC: 3.4 G/DL (ref 2–4)
GLUCOSE SERPL-MCNC: 108 MG/DL (ref 74–99)
HCT VFR BLD AUTO: 41.1 % (ref 36–48)
HGB BLD-MCNC: 13.9 G/DL (ref 13–16)
IMM GRANULOCYTES # BLD AUTO: 0 K/UL (ref 0–0.04)
IMM GRANULOCYTES NFR BLD AUTO: 0 % (ref 0–0.5)
IRON SATN MFR SERPL: 27 % (ref 20–50)
IRON SERPL-MCNC: 86 UG/DL (ref 50–175)
LYMPHOCYTES # BLD: 2.2 K/UL (ref 0.9–3.6)
LYMPHOCYTES NFR BLD: 30 % (ref 21–52)
MCH RBC QN AUTO: 30.6 PG (ref 24–34)
MCHC RBC AUTO-ENTMCNC: 33.8 G/DL (ref 31–37)
MCV RBC AUTO: 90.5 FL (ref 78–100)
MONOCYTES # BLD: 0.6 K/UL (ref 0.05–1.2)
MONOCYTES NFR BLD: 8 % (ref 3–10)
NEUTS SEG # BLD: 4.5 K/UL (ref 1.8–8)
NEUTS SEG NFR BLD: 60 % (ref 40–73)
NRBC # BLD: 0 K/UL (ref 0–0.01)
NRBC BLD-RTO: 0 PER 100 WBC
PLATELET # BLD AUTO: 167 K/UL (ref 135–420)
PMV BLD AUTO: 10.7 FL (ref 9.2–11.8)
POTASSIUM SERPL-SCNC: 4.2 MMOL/L (ref 3.5–5.5)
PROT SERPL-MCNC: 7.3 G/DL (ref 6.4–8.2)
RBC # BLD AUTO: 4.54 M/UL (ref 4.35–5.65)
RETICS/RBC NFR AUTO: 1.1 % (ref 0.5–2.5)
SODIUM SERPL-SCNC: 139 MMOL/L (ref 136–145)
TIBC SERPL-MCNC: 318 UG/DL (ref 250–450)
VIT B12 SERPL-MCNC: 385 PG/ML (ref 211–911)
WBC # BLD AUTO: 7.5 K/UL (ref 4.6–13.2)

## 2022-11-30 PROCEDURE — 82607 VITAMIN B-12: CPT

## 2022-11-30 PROCEDURE — 36415 COLL VENOUS BLD VENIPUNCTURE: CPT

## 2022-11-30 PROCEDURE — 80053 COMPREHEN METABOLIC PANEL: CPT

## 2022-11-30 PROCEDURE — 85025 COMPLETE CBC W/AUTO DIFF WBC: CPT

## 2022-11-30 PROCEDURE — 82728 ASSAY OF FERRITIN: CPT

## 2022-11-30 PROCEDURE — 83540 ASSAY OF IRON: CPT

## 2022-11-30 PROCEDURE — 85045 AUTOMATED RETICULOCYTE COUNT: CPT

## 2022-12-01 ENCOUNTER — OFFICE VISIT (OUTPATIENT)
Dept: ONCOLOGY | Age: 72
End: 2022-12-01
Payer: MEDICARE

## 2022-12-01 ENCOUNTER — HOSPITAL ENCOUNTER (OUTPATIENT)
Dept: INFUSION THERAPY | Age: 72
End: 2022-12-01
Payer: MEDICARE

## 2022-12-01 VITALS
SYSTOLIC BLOOD PRESSURE: 161 MMHG | RESPIRATION RATE: 16 BRPM | WEIGHT: 193.6 LBS | BODY MASS INDEX: 26.22 KG/M2 | HEART RATE: 71 BPM | DIASTOLIC BLOOD PRESSURE: 80 MMHG | HEIGHT: 72 IN | OXYGEN SATURATION: 97 %

## 2022-12-01 VITALS
TEMPERATURE: 98.1 F | DIASTOLIC BLOOD PRESSURE: 90 MMHG | RESPIRATION RATE: 18 BRPM | SYSTOLIC BLOOD PRESSURE: 153 MMHG | HEART RATE: 68 BPM | OXYGEN SATURATION: 97 %

## 2022-12-01 DIAGNOSIS — E53.8 B12 DEFICIENCY: Primary | ICD-10-CM

## 2022-12-01 DIAGNOSIS — E53.8 B12 DEFICIENCY: ICD-10-CM

## 2022-12-01 DIAGNOSIS — D50.8 IRON DEFICIENCY ANEMIA SECONDARY TO INADEQUATE DIETARY IRON INTAKE: Primary | ICD-10-CM

## 2022-12-01 PROCEDURE — 96372 THER/PROPH/DIAG INJ SC/IM: CPT

## 2022-12-01 PROCEDURE — 74011250636 HC RX REV CODE- 250/636: Performed by: NURSE PRACTITIONER

## 2022-12-01 PROCEDURE — G0463 HOSPITAL OUTPT CLINIC VISIT: HCPCS | Performed by: NURSE PRACTITIONER

## 2022-12-01 RX ORDER — EPINEPHRINE 1 MG/ML
0.3 INJECTION, SOLUTION, CONCENTRATE INTRAVENOUS AS NEEDED
OUTPATIENT
Start: 2022-12-29

## 2022-12-01 RX ORDER — HYDROCORTISONE SODIUM SUCCINATE 100 MG/2ML
100 INJECTION, POWDER, FOR SOLUTION INTRAMUSCULAR; INTRAVENOUS AS NEEDED
OUTPATIENT
Start: 2022-12-29

## 2022-12-01 RX ORDER — DIPHENHYDRAMINE HYDROCHLORIDE 50 MG/ML
50 INJECTION, SOLUTION INTRAMUSCULAR; INTRAVENOUS AS NEEDED
Start: 2022-12-29

## 2022-12-01 RX ORDER — CYANOCOBALAMIN 1000 UG/ML
1000 INJECTION, SOLUTION INTRAMUSCULAR; SUBCUTANEOUS ONCE
Status: COMPLETED | OUTPATIENT
Start: 2022-12-01 | End: 2022-12-01

## 2022-12-01 RX ORDER — CYANOCOBALAMIN 1000 UG/ML
1000 INJECTION, SOLUTION INTRAMUSCULAR; SUBCUTANEOUS ONCE
Start: 2022-12-29 | End: 2022-12-29

## 2022-12-01 RX ORDER — ALBUTEROL SULFATE 0.83 MG/ML
2.5 SOLUTION RESPIRATORY (INHALATION) AS NEEDED
Start: 2022-12-29

## 2022-12-01 RX ADMIN — CYANOCOBALAMIN 1000 MCG: 1000 INJECTION, SOLUTION INTRAMUSCULAR at 11:12

## 2022-12-01 NOTE — PROGRESS NOTES
Hematology/Medical Oncology  Progress Note    Name: Wero Valencia  Date: 2022  : 1950    Ricardo Gimenez MD     Mr. Josefina Joseph is a 67y.o. year old male who was seen for management of Iron Deficiency Anemia and Vitamin B12 Deficiency     Subjective:   Mr. Wero Valencia is a 67 y.o. male  who was seen for management of Iron Deficiency Anemia and Vitamin B12  Deficiency. S/P iron infusion in the form of Venofer on 2021. He reported doing well after that. Today he denies fevers,  chills, night sweats, unintentional weight loss, skin lumps or bumps, acute bleeding or bruising issues. Denies headaches, acute vision changes, dizziness, chest pains, shortness of breath, palpitation, productive cough, nausea, vomiting, abdominal pain, altered bowel habits, dysuria, bone pain or back pain, new focal numbness or weakness. He does not have any concerns or complaints to report at this time. Past medical history, family history, and social history: these were reviewed and remain unchanged.     Past Medical History:   Diagnosis Date    Arthritis      vicodin    Diabetes ([address]  metformin    GERD (gastroesophageal reflux disease)     Hypercholesterolemia     Hypertension  norvasc    Kidney stones     MI (myocardial infarction) ([address] 2020     Past Surgical History:   Procedure Laterality Date    COLONOSCOPY N/A 2018    COLONOSCOPY with Polypectomies performed by Zelalem Calixto MD at [facility]COPY    COLONOSCOPY N/A 2022    COLONOSCOPY w polypectomies performed by Zelalem Calixto MD at [address] [place] [address]  2020    stented    HX CHOLECYSTECTOMY      HX GI  2010    gallbladder    DC CARDIAC SURG PROCEDURE UNLIST  2017    stents     Social History     Socioeconomic History    Marital status:      Spouse name: Not on file    Number of children: Not on file    Years of education: Not on file    Highest education level: Not on file   Occupational History    Not on file   Tobacco Use    Smoking status: Never    Smokeless tobacco: Never   Vaping Use    Vaping Use: Never used   Substance and Sexual Activity    Alcohol use: No    Drug use: Never    Sexual activity: Yes     Partners: Female     Birth control/protection: None   Other Topics Concern    Not on file   Social History Narrative    Not on file     Social Determinants of Health     Financial Resource Strain: Not on file   Food Insecurity: Not on file   Transportation Needs: Not on file   Physical Activity: Not on file   Stress: Not on file   Social Connections: Not on file   Intimate Partner Violence: Not on file   Housing Stability: Not on file     Family History   Problem Relation Age of Onset    Stroke Mother     Headache Mother     Hypertension Mother     Kranthi Clancy Mother     Heart Disease Father     Heart Attack Father     Hypertension Father     Diabetes Father     Lung Cancer Father     Ovarian Cancer Sister     Heart Attack Brother     Breast Cancer Sister     Diabetes Sister     Cancer Maternal Aunt         lung cancer    Cancer Maternal Uncle     Cancer Paternal Aunt     Cancer Paternal Uncle      Current Outpatient Medications   Medication Sig Dispense Refill    amLODIPine (NORVASC) 5 mg tablet Take 1 Tablet by mouth daily. 90 Tablet 3    metFORMIN (GLUCOPHAGE) 1,000 mg tablet TAKE 1 TABLET BY MOUTH TWICE A DAY WITH MEALS 180 Tablet 0    glipiZIDE SR (GLUCOTROL XL) 5 mg CR tablet TAKE 1 TABLET BY MOUTH EVERY DAY 90 Tablet 0    tamsulosin (FLOMAX) 0.4 mg capsule TAKE 1 CAPSULE BY MOUTH DAILY (AFTER DINNER). 90 Capsule 2    omeprazole (PRILOSEC) 20 mg capsule Take 1 Capsule by mouth daily. 90 Capsule 0    SITagliptin (Januvia) 100 mg tablet Take 1 Tablet by mouth daily. 90 Tablet 0    clopidogreL (PLAVIX) 75 mg tab TAKE 1 TABLET BY MOUTH EVERY DAY 90 Tablet 3    ramipriL (ALTACE) 10 mg capsule TAKE 1 CAPSULE BY MOUTH EVERY DAY 90 Capsule 3    solifenacin (VESICARE) 5 mg tablet Take 1 Tablet by mouth daily.  80 Tablet 3    rosuvastatin (CRESTOR) 40 mg tablet TAKE 1 TABLET BY MOUTH NIGHTLY 90 Tablet 3    carvediloL (COREG) 25 mg tablet TAKE 1 TABLET BY MOUTH TWICE A DAY WITH FOOD 180 Tablet 3    cyanocobalamin, vitamin B-12, (VITAMIN B-12 INJECTION) by Injection route. cetirizine (ZYRTEC) 10 mg tablet Take 10 mg by mouth daily. nitroglycerin (NITROSTAT) 0.4 mg SL tablet DISSOLVE 1 TABLET UNDER TONGUE EVERY 5 MIN AS NEEDED FOR CHEST PAIN FOR UP TO 3 DOSES 25 Tab 0    Blood Pressure Monitor kit Check once a day 1 Kit 0       Review of Systems   Constitutional:  Negative for chills, fever and malaise/fatigue. HENT:  Negative for congestion. Respiratory:  Negative for cough and shortness of breath. Cardiovascular:  Negative for chest pain and leg swelling. Gastrointestinal:  Negative for abdominal pain, blood in stool, constipation, diarrhea, melena, nausea and vomiting. Genitourinary:  Negative for hematuria. Musculoskeletal:  Negative for myalgias. Skin:  Negative for rash. Neurological:  Negative for dizziness, weakness and headaches. Endo/Heme/Allergies:  Does not bruise/bleed easily. Objective:     Visit Vitals  BP (!) 161/80   Pulse 71   Resp 16   Ht 6' (1.829 m)   Wt 87.8 kg (193 lb 9.6 oz)   SpO2 97%   BMI 26.26 kg/m²     Patient reports his Cardiologist is aware of his BP and adjusted his medications. ECOG PS0   Physical Exam  Cardiovascular:      Rate and Rhythm: Normal rate. Pulses: Normal pulses. Pulmonary:      Effort: Pulmonary effort is normal.   Abdominal:      Palpations: Abdomen is soft. Skin:     General: Skin is warm. Neurological:      Mental Status: He is alert and oriented to person, place, and time. Psychiatric:         Mood and Affect: Mood normal.         Behavior: Behavior normal.         Thought Content:  Thought content normal.      Lab Results   Component Value Date/Time    WBC 7.5 11/30/2022 10:43 AM    Hemoglobin, POC 9.5 (L) 12/12/2020 03:54 AM    HGB 13.9 11/30/2022 10:43 AM    Hematocrit, POC 28 (L) 12/12/2020 03:54 AM    HCT 41.1 11/30/2022 10:43 AM    PLATELET 695 21/24/5727 10:43 AM    MCV 90.5 11/30/2022 10:43 AM     Lab Results   Component Value Date/Time    Sodium 139 11/30/2022 10:43 AM    Potassium 4.2 11/30/2022 10:43 AM    Chloride 107 11/30/2022 10:43 AM    CO2 27 11/30/2022 10:43 AM    Anion gap 5 11/30/2022 10:43 AM    Glucose 108 (H) 11/30/2022 10:43 AM    BUN 19 (H) 11/30/2022 10:43 AM    Creatinine 1.00 11/30/2022 10:43 AM    BUN/Creatinine ratio 19 11/30/2022 10:43 AM    GFR est AA >60 07/18/2022 09:59 AM    GFR est non-AA >60 07/18/2022 09:59 AM    Calcium 9.7 11/30/2022 10:43 AM    Bilirubin, total 0.4 11/30/2022 10:43 AM    Alk. phosphatase 74 11/30/2022 10:43 AM    Protein, total 7.3 11/30/2022 10:43 AM    Albumin 3.9 11/30/2022 10:43 AM    Globulin 3.4 11/30/2022 10:43 AM    A-G Ratio 1.1 11/30/2022 10:43 AM    ALT (SGPT) 21 11/30/2022 10:43 AM    AST (SGOT) 12 11/30/2022 10:43 AM     Lab Results   Component Value Date/Time    Vitamin B12 385 11/30/2022 10:43 AM    Folate 4.2 11/30/2022 10:43 AM     Lab Results   Component Value Date/Time    Iron 86 11/30/2022 10:43 AM    TIBC 318 11/30/2022 10:43 AM    Iron % saturation 27 11/30/2022 10:43 AM    Ferritin 38 11/30/2022 10:43 AM           Assessment:     1. Iron deficiency anemia secondary to inadequate dietary iron intake    2. B12 deficiency        Plan:   Iron Deficiency Anemia  Vitamin B12 deficiency   --Past medical history significant of Hematuria and bloody vomiting at his previous admission. --He has follow up with Urology and GI.   --7/8/2021 S/P iron infusion in the form of Venofer x 3 doses  --Today I have reviewed with the patient about recent lab reports.   --11/30/2022: CBC showed WBC 7.5K/uL, hemoglobin 13.9g/dL with hematocrit of 41.1%. Platelet 279. Iron Sat 27%. Ferritin 38.  Retic normal. Vit B12/Folate were normal.      Plan:  --Iron replacement orally will be continued  --The patient will continue the Vit B12 replacement at [facility] as indicated. --We will repeat CBC, CMP, Iron Profile, Ferritin, B12/folate, and ret count prior to next visit. --The patient was advised to f/u with PCP, Cardiology, GI and Urology as scheduled. --We will continue to monitor. Follow up in 4 months with repeat labs or sooner if indicated.       Orders Placed This Encounter    CBC WITH AUTOMATED DIFF     Standing Status:   Future     Standing Expiration Date:   85/4/9476    METABOLIC PANEL, COMPREHENSIVE     Standing Status:   Future     Standing Expiration Date:   12/2/2023    FERRITIN     Standing Status:   Future     Standing Expiration Date:   12/1/2023    IRON PROFILE     Standing Status:   Future     Standing Expiration Date:   12/2/2023    VITAMIN B12 & FOLATE     Standing Status:   Future     Standing Expiration Date:   12/1/2023         Karen Banks DNP, FNP-C  12/1/2022

## 2022-12-01 NOTE — PROGRESS NOTES
[facility] Progress Note    Date: 2022    Name: Meme Keller    MRN: [id number]         : 1950      B12 INJECTION Q4 WEEKS      Mr. Lionel Dahl arrived to [facility] at 1100. Mr. Lionel Dahl was assessed and education was provided. Mr. Jae Ervin vitals were reviewed. Visit Vitals  BP (!) 153/90 (BP 1 Location: Left upper arm, BP Patient Position: Sitting)   Pulse 68   Temp 98.1 °F (36.7 °C)   Resp 18   SpO2 97%       B12 1000mcg was administered as ordered SQ in patient's R upper arm. Band-aid applied to site. Mr. Lionel Dahl tolerated well without complaints. Discharge/ follow-up instructions discussed w/ pt. Pt verbalized understanding. Pt armband removed & shredded. Mr. Lionel Dahl was discharged from [place] [address] in stable condition at 1115. He is to return on 22 at 1130 for his next appointment.     Romina Bueno RN  2022
67 yo F pt w/ R foot wound - s/p R foot bone biopsy  - Pt seen and evaluated  - WBC labeled bone scan + OM, right midfoot  - R foot plantar midfoot ulceration, stable with fibrotic base with no signs of infection  - s/p R foot bone biopsy with no strikethrough on dressing, sutures intact, no dehiscence, no signs of infection  - ID recs appreciated, will await final bone biopsy culture & pathology to determine Abx regimen  - d/w attending
Is This A New Presentation, Or A Follow-Up?: Follow Up Acne

## 2022-12-29 ENCOUNTER — HOSPITAL ENCOUNTER (OUTPATIENT)
Dept: INFUSION THERAPY | Age: 72
End: 2022-12-29
Payer: MEDICARE

## 2022-12-29 VITALS
HEART RATE: 73 BPM | TEMPERATURE: 97.6 F | DIASTOLIC BLOOD PRESSURE: 85 MMHG | RESPIRATION RATE: 18 BRPM | OXYGEN SATURATION: 97 % | SYSTOLIC BLOOD PRESSURE: 136 MMHG

## 2022-12-29 DIAGNOSIS — K21.9 GASTROESOPHAGEAL REFLUX DISEASE WITHOUT ESOPHAGITIS: ICD-10-CM

## 2022-12-29 DIAGNOSIS — E53.8 B12 DEFICIENCY: Primary | ICD-10-CM

## 2022-12-29 PROCEDURE — 96372 THER/PROPH/DIAG INJ SC/IM: CPT

## 2022-12-29 PROCEDURE — 74011250636 HC RX REV CODE- 250/636: Performed by: NURSE PRACTITIONER

## 2022-12-29 RX ORDER — CYANOCOBALAMIN 1000 UG/ML
1000 INJECTION, SOLUTION INTRAMUSCULAR; SUBCUTANEOUS ONCE
Start: 2023-01-26 | End: 2023-01-26

## 2022-12-29 RX ORDER — HYDROCORTISONE SODIUM SUCCINATE 100 MG/2ML
100 INJECTION, POWDER, FOR SOLUTION INTRAMUSCULAR; INTRAVENOUS AS NEEDED
OUTPATIENT
Start: 2023-01-26

## 2022-12-29 RX ORDER — ALBUTEROL SULFATE 0.83 MG/ML
2.5 SOLUTION RESPIRATORY (INHALATION) AS NEEDED
Start: 2023-01-26

## 2022-12-29 RX ORDER — OMEPRAZOLE 20 MG/1
CAPSULE, DELAYED RELEASE ORAL
Qty: 90 CAPSULE | Refills: 0 | Status: SHIPPED | OUTPATIENT
Start: 2022-12-29

## 2022-12-29 RX ORDER — EPINEPHRINE 1 MG/ML
0.3 INJECTION, SOLUTION, CONCENTRATE INTRAVENOUS AS NEEDED
OUTPATIENT
Start: 2023-01-26

## 2022-12-29 RX ORDER — CYANOCOBALAMIN 1000 UG/ML
1000 INJECTION, SOLUTION INTRAMUSCULAR; SUBCUTANEOUS ONCE
Status: COMPLETED | OUTPATIENT
Start: 2022-12-29 | End: 2022-12-29

## 2022-12-29 RX ORDER — DIPHENHYDRAMINE HYDROCHLORIDE 50 MG/ML
50 INJECTION, SOLUTION INTRAMUSCULAR; INTRAVENOUS AS NEEDED
Start: 2023-01-26

## 2022-12-29 RX ADMIN — CYANOCOBALAMIN 1000 MCG: 1000 INJECTION, SOLUTION INTRAMUSCULAR at 11:35

## 2022-12-29 NOTE — PROGRESS NOTES
[facility] Progress Note    Date: 2022    Name: Belkis Cortes    MRN: [id number]         : 1950    Cyanocobalamin (B-12) Injection (Every 4 weeks)    Mr. Vonda Truong arrived in the [facility] today at 1130. He was assessed and education was provided. Mr. Chelita Llamas vitals were reviewed. Visit Vitals  /85 (BP 1 Location: Left upper arm, BP Patient Position: Sitting)   Pulse 73   Temp 97.6 °F (36.4 °C)   Resp 18   SpO2 97%     Cyanocobalamin (B-12) 1,000 mcg, was administered IM in his Left deltoid. Band-aid to site. Mr. Vonda Truong tolerated well, and voiced no complaints. Armband removed and shredded. Mr. Vonda Truong was discharged from [place] [address] in stable condition at 1140. He is to return on 2023 at 36, for his next appointment of his next B-12 Injection.      Ledy Sawant  2022

## 2023-01-02 DIAGNOSIS — E11.9 WELL CONTROLLED DIABETES MELLITUS (HCC): ICD-10-CM

## 2023-01-03 RX ORDER — SITAGLIPTIN 100 MG/1
TABLET, FILM COATED ORAL
Qty: 90 TABLET | Refills: 0 | Status: SHIPPED | OUTPATIENT
Start: 2023-01-03

## 2023-01-24 RX ORDER — CYANOCOBALAMIN 1000 UG/ML
1000 INJECTION, SOLUTION INTRAMUSCULAR; SUBCUTANEOUS ONCE
Status: CANCELLED
Start: 2023-02-23

## 2023-01-24 RX ORDER — EPINEPHRINE 1 MG/ML
0.3 INJECTION, SOLUTION, CONCENTRATE INTRAVENOUS PRN
Status: CANCELLED | OUTPATIENT
Start: 2023-02-23

## 2023-01-24 RX ORDER — ONDANSETRON 2 MG/ML
8 INJECTION INTRAMUSCULAR; INTRAVENOUS
Status: CANCELLED | OUTPATIENT
Start: 2023-02-23

## 2023-01-24 RX ORDER — SODIUM CHLORIDE 9 MG/ML
INJECTION, SOLUTION INTRAVENOUS CONTINUOUS
Status: CANCELLED | OUTPATIENT
Start: 2023-02-23

## 2023-01-24 RX ORDER — ACETAMINOPHEN 325 MG/1
650 TABLET ORAL
Status: CANCELLED | OUTPATIENT
Start: 2023-02-23

## 2023-01-24 RX ORDER — ALBUTEROL SULFATE 90 UG/1
4 AEROSOL, METERED RESPIRATORY (INHALATION) PRN
Status: CANCELLED | OUTPATIENT
Start: 2023-02-23

## 2023-01-24 RX ORDER — ACETAMINOPHEN 325 MG/1
325 TABLET ORAL
Status: CANCELLED | OUTPATIENT
Start: 2023-02-23

## 2023-01-24 RX ORDER — DIPHENHYDRAMINE HYDROCHLORIDE 50 MG/ML
50 INJECTION INTRAMUSCULAR; INTRAVENOUS
Status: CANCELLED | OUTPATIENT
Start: 2023-02-23

## 2023-01-26 ENCOUNTER — HOSPITAL ENCOUNTER (OUTPATIENT)
Dept: INFUSION THERAPY | Age: 73
End: 2023-01-26
Payer: MEDICARE

## 2023-01-26 VITALS
HEART RATE: 59 BPM | OXYGEN SATURATION: 97 % | DIASTOLIC BLOOD PRESSURE: 74 MMHG | RESPIRATION RATE: 18 BRPM | TEMPERATURE: 97.4 F | SYSTOLIC BLOOD PRESSURE: 124 MMHG

## 2023-01-26 DIAGNOSIS — E53.8 B12 DEFICIENCY: Primary | ICD-10-CM

## 2023-01-26 PROCEDURE — 74011250636 HC RX REV CODE- 250/636: Performed by: NURSE PRACTITIONER

## 2023-01-26 PROCEDURE — 96372 THER/PROPH/DIAG INJ SC/IM: CPT

## 2023-01-26 RX ORDER — ALBUTEROL SULFATE 0.83 MG/ML
2.5 SOLUTION RESPIRATORY (INHALATION) AS NEEDED
Start: 2023-02-23

## 2023-01-26 RX ORDER — EPINEPHRINE 1 MG/ML
0.3 INJECTION, SOLUTION, CONCENTRATE INTRAVENOUS AS NEEDED
OUTPATIENT
Start: 2023-02-23

## 2023-01-26 RX ORDER — DIPHENHYDRAMINE HYDROCHLORIDE 50 MG/ML
50 INJECTION, SOLUTION INTRAMUSCULAR; INTRAVENOUS AS NEEDED
Start: 2023-02-23

## 2023-01-26 RX ORDER — CYANOCOBALAMIN 1000 UG/ML
1000 INJECTION, SOLUTION INTRAMUSCULAR; SUBCUTANEOUS ONCE
Start: 2023-02-23 | End: 2023-02-23

## 2023-01-26 RX ORDER — CYANOCOBALAMIN 1000 UG/ML
1000 INJECTION, SOLUTION INTRAMUSCULAR; SUBCUTANEOUS ONCE
Status: COMPLETED | OUTPATIENT
Start: 2023-01-26 | End: 2023-01-26

## 2023-01-26 RX ORDER — HYDROCORTISONE SODIUM SUCCINATE 100 MG/2ML
100 INJECTION, POWDER, FOR SOLUTION INTRAMUSCULAR; INTRAVENOUS AS NEEDED
OUTPATIENT
Start: 2023-02-23

## 2023-01-26 RX ADMIN — CYANOCOBALAMIN 1000 MCG: 1000 INJECTION, SOLUTION INTRAMUSCULAR at 11:27

## 2023-01-26 NOTE — PROGRESS NOTES
[facility] Progress Note    Date: 2023    Name: Hunter Leo    MRN: [id number]         : 1950    Cyanocobalamin (B-12) Injection (Every 4 weeks)    Mr. Yue Chirinos arrived in the [facility] today at 1120. He was assessed and education was provided. Mr. Augusta Merlin vitals were reviewed. Visit Vitals  /74 (BP 1 Location: Left upper arm, BP Patient Position: Sitting)   Pulse (!) 59   Temp 97.4 °F (36.3 °C)   Resp 18   SpO2 97%     Cyanocobalamin (B-12) 1,000 mcg was administered IM in his Right deltoid. Band-aid to site. Mr. Yue Chirinos tolerated well and voiced no complaints. Armband removed and shredded. Mr. Yue Chirinos was discharged from [place] [address] in stable condition at 1130. He is to return on 23 at 1130, for his next appointment of his next B-12 Injection.      Kristal Juarez RN  2023

## 2023-02-04 DIAGNOSIS — E11.9 WELL CONTROLLED DIABETES MELLITUS (HCC): ICD-10-CM

## 2023-02-04 DIAGNOSIS — E11.65 POORLY CONTROLLED DIABETES MELLITUS (HCC): ICD-10-CM

## 2023-02-04 DIAGNOSIS — D50.8 IRON DEFICIENCY ANEMIA SECONDARY TO INADEQUATE DIETARY IRON INTAKE: Primary | ICD-10-CM

## 2023-02-05 DIAGNOSIS — D50.8 IRON DEFICIENCY ANEMIA SECONDARY TO INADEQUATE DIETARY IRON INTAKE: Primary | ICD-10-CM

## 2023-02-06 DIAGNOSIS — E53.8 B12 DEFICIENCY: Primary | ICD-10-CM

## 2023-02-06 RX ORDER — GLIPIZIDE 5 MG/1
TABLET, FILM COATED, EXTENDED RELEASE ORAL
Qty: 90 TABLET | Refills: 0 | Status: SHIPPED | OUTPATIENT
Start: 2023-02-06

## 2023-02-06 RX ORDER — METFORMIN HYDROCHLORIDE 1000 MG/1
TABLET ORAL
Qty: 180 TABLET | Refills: 0 | Status: SHIPPED | OUTPATIENT
Start: 2023-02-06

## 2023-02-23 ENCOUNTER — APPOINTMENT (OUTPATIENT)
Dept: INFUSION THERAPY | Age: 73
End: 2023-02-23

## 2023-02-23 ENCOUNTER — HOSPITAL ENCOUNTER (OUTPATIENT)
Facility: HOSPITAL | Age: 73
Setting detail: INFUSION SERIES
End: 2023-02-23
Payer: MEDICARE

## 2023-02-23 VITALS
SYSTOLIC BLOOD PRESSURE: 147 MMHG | OXYGEN SATURATION: 95 % | TEMPERATURE: 97.7 F | RESPIRATION RATE: 18 BRPM | DIASTOLIC BLOOD PRESSURE: 84 MMHG | HEART RATE: 78 BPM

## 2023-02-23 DIAGNOSIS — E53.8 B12 DEFICIENCY: Primary | ICD-10-CM

## 2023-02-23 PROCEDURE — 96372 THER/PROPH/DIAG INJ SC/IM: CPT

## 2023-02-23 PROCEDURE — 6360000002 HC RX W HCPCS: Performed by: NURSE PRACTITIONER

## 2023-02-23 RX ORDER — ACETAMINOPHEN 325 MG/1
650 TABLET ORAL
Status: CANCELLED | OUTPATIENT
Start: 2023-03-23

## 2023-02-23 RX ORDER — ONDANSETRON 2 MG/ML
8 INJECTION INTRAMUSCULAR; INTRAVENOUS
Status: CANCELLED | OUTPATIENT
Start: 2023-03-23

## 2023-02-23 RX ORDER — ALBUTEROL SULFATE 90 UG/1
4 AEROSOL, METERED RESPIRATORY (INHALATION) PRN
Status: CANCELLED | OUTPATIENT
Start: 2023-03-23

## 2023-02-23 RX ORDER — CYANOCOBALAMIN 1000 UG/ML
1000 INJECTION, SOLUTION INTRAMUSCULAR; SUBCUTANEOUS ONCE
Status: CANCELLED
Start: 2023-03-23

## 2023-02-23 RX ORDER — EPINEPHRINE 1 MG/ML
0.3 INJECTION, SOLUTION, CONCENTRATE INTRAVENOUS PRN
Status: CANCELLED | OUTPATIENT
Start: 2023-03-23

## 2023-02-23 RX ORDER — DIPHENHYDRAMINE HYDROCHLORIDE 50 MG/ML
50 INJECTION INTRAMUSCULAR; INTRAVENOUS
Status: CANCELLED | OUTPATIENT
Start: 2023-03-23

## 2023-02-23 RX ORDER — CYANOCOBALAMIN 1000 UG/ML
1000 INJECTION, SOLUTION INTRAMUSCULAR; SUBCUTANEOUS ONCE
Status: COMPLETED
Start: 2023-02-23 | End: 2023-02-23

## 2023-02-23 RX ORDER — SODIUM CHLORIDE 9 MG/ML
INJECTION, SOLUTION INTRAVENOUS CONTINUOUS
Status: CANCELLED | OUTPATIENT
Start: 2023-03-23

## 2023-02-23 RX ORDER — ACETAMINOPHEN 325 MG/1
325 TABLET ORAL
Status: CANCELLED | OUTPATIENT
Start: 2023-03-23

## 2023-02-23 RX ADMIN — CYANOCOBALAMIN 1000 MCG: 1000 INJECTION, SOLUTION INTRAMUSCULAR at 11:33

## 2023-02-23 NOTE — PROGRESS NOTES
[facility] Progress Note    Date: 2023    Name: Miranda Dill    MRN: [id number]         : 1950     Cyanocobalamin (B-12) Injection (Every 4 weeks)    Mr. Isis Lind to [facility], ambulatory at 1120. Pt was assessed and education was provided. Mr. Drew Cisneros vitals were reviewed and patient was observed for 5 minutes prior to treatment. Vitals:    23 1120   BP: (!) 147/84   Pulse: 78   Resp: 18   Temp: 97.7 °F (36.5 °C)   SpO2: 95%       Cyanocobalamin (B-12) 1,000 mcg was administered IM in his Right deltoid. Band-aid to site. Mr. Isis Lind tolerated well, and had no complaints. Patient armband removed and shredded. Mr. Isis Lind was discharged from [place] [address] in stable condition at 1140. He is to return on 23 at 1300 for his next B12 Injection appointment.     Hayde Monk RN  2023  12:15 PM

## 2023-03-07 RX ORDER — NITROGLYCERIN 0.4 MG/1
0.4 TABLET SUBLINGUAL EVERY 5 MIN PRN
Qty: 25 TABLET | Refills: 1 | OUTPATIENT
Start: 2023-03-07

## 2023-03-13 ENCOUNTER — TELEPHONE (OUTPATIENT)
Age: 73
End: 2023-03-13

## 2023-03-13 DIAGNOSIS — K21.9 GASTRO-ESOPHAGEAL REFLUX DISEASE WITHOUT ESOPHAGITIS: ICD-10-CM

## 2023-03-14 ENCOUNTER — TELEPHONE (OUTPATIENT)
Age: 73
End: 2023-03-14

## 2023-03-14 RX ORDER — OMEPRAZOLE 20 MG/1
CAPSULE, DELAYED RELEASE ORAL
Qty: 30 CAPSULE | Refills: 0 | Status: SHIPPED | OUTPATIENT
Start: 2023-03-14 | End: 2023-04-11

## 2023-03-14 NOTE — TELEPHONE ENCOUNTER
Contacted patient regarding lunch and learn event on 3/30/23 at 11am.  Left message for patient to contact back if interested in attending luncheon.

## 2023-03-14 NOTE — TELEPHONE ENCOUNTER
Pt. Contacted with update for rx Nitroglycerin rx was e-scribed to [facility] 3/7/23. Per pharmacist Katrina Fontenot, rx was not received. I then called rx in over phone. Patient notified.       Instructed to follow-up with pharmacist for pick-up.    -------------------------------------------------------------    Refill NTG S/L #25 to [facility] in [facility], pt states pharmacy did not receive refill from last week

## 2023-03-16 NOTE — ROUTINE PROCESS
1850 Pt arrive to unit from cath lab, alert and oriented x4, denies pain or discomfort at this time. Pt resting comfortably. 1917 Bedside shift change report given to Adriana (oncoming nurse) by Casi Wilde (offgoing nurse). Report included the following information SBAR, Kardex, MAR and Recent Results. Physical Therapy Visit    Visit Type: Daily Treatment Note  Visit: 6  Referring Provider: Cristal Christopher MD  Medical Diagnosis (from order): Diagnosis Information    Diagnosis  726.10 (ICD-9-CM) - M75.101 (ICD-10-CM) - Rotator cuff syndrome of right shoulder         SUBJECTIVE                                                                                                               Patient states was sick Tuesday and missed his appointment. Patient states lifted a 25# box up to forehead level yesterday at work and felt soreness an hour later in his right shoulder.     Pain / Symptoms  - Pain rating (out of 10): Current: 5       OBJECTIVE                                                                                                                     Range of Motion (ROM)   (degrees unless noted; active unless noted; norms in ( ); negative=lacking to 0, positive=beyond 0)  Shoulder:    - Flexion (180):        • Right:   Passive: 175    - Abduction (180):        • Right:   Passive: 175    - Internal Rotation:        - at 90° (70-90):            • Right:   Passive: 90    - External Rotation:       - at 90° (90):            • Right:   Passive: 90         Palpation  Increase soft tissue restriction noted right anterior deltoid and right long head of the bicep area.                 Treatment     Therapeutic Exercise  Passive range of motion with end range stretch right shoulder    Standing Shoulder Internal Rotation Stretch with Towel - 1 set times 10 reps     Pectoralis door stretch 30 seconds hold times 2 reps  Arm bike    Seat  9  Level 3   3 minute forward and 3 minute backward     Standing Bicep Stretch at Wall 30 seconds times 2 reps      Manual Therapy   Right shoulder grade III mobilization AP and inferior glide in supine.    Neuromuscular Re-Education     Seated Scapular Retraction - 1 set times 15 reps*   Shoulder External Rotation and Scapular Retraction with BLUE Resistance - 1 set times 15  reps*     Standing Shoulder Row with BLUE Resistance -1 set times 15 reps*   Shoulder Extension with BLUE Resistance - 1 set times 15 reps*   Shoulder Internal Rotation with BLUE Resistance - 1 set times 15 reps*   Shoulder External Rotation with BLUE Resistance 1 set times 15 reps*    Ball up the wall with end range stretch times 10 reps    Speed pulleys 5 plates (20#) shoulder rows 1 set times 20 reps  Speed pulleys 5 plates (20#) shoulder extension  1 set times 20 reps  Speed pulleys 5 plates (20#) bilateral tricep press 1 set times 20 reps  Speed pulleys  5 plates (20#) bilateral bicep curls 1 set times 20 reps  Speed pulleys  4 plates (8#) shoulder internal rotation single pulley 1 set times 20 reps  Speed pulleys  2 plates (4#) shoulder external rotation single pulley 1 set times 20 reps  Speed pulleys 3 plates (12#) punch forward 2 set times 10 reps    Prone Y   1 set times 20 reps  Prone horizontal abduction with thumb up 1 set times 20 reps  Prone extension  1 set times 20 reps    push up on the wall 2 set times 10 reps  Plank with shoulder taps on 24 inch plinth-patient has difficulty with right scapular stabilization*    wall walks 5 feet down and back BLUE band 3 laps    Box lifts 25# floor to plinth with 90 degree turn 5 reps left and 5 reps right*  Shelf lift 59 inch to and from 70 inch 15# times 10 reps*    Supine Scapular Protraction in Flexion with Dumbbells  7# 2 sets times 10 reps   Yellow body blade punch down, punch forward and side to side 30 seconds times 2 rounds*    Patient needs cueing for proper positioning and muscle engagement to perform correctly.  *deferred this visit        Skilled input: verbal instruction/cues and tactile instruction/cues    Writer verbally educated and received verbal consent for hand placement, positioning of patient, and techniques to be performed today from patient for clothing adjustments for techniques, hand placement and palpation for techniques and therapist  position for techniques as described above and how they are pertinent to the patient's plan of care.    Home Exercise Program  Access Code: [email or web address]  URL: [email or web address]  Date: 03/08/2023  Prepared by: Adriana Gaspar    Exercises  ? Supine Shoulder Press with Dowel - 1-2 x daily - 7 x weekly - 1-3 sets - 10 reps  ? Supine Shoulder Flexion Extension AAROM with Dowel - 1-2 x daily - 7 x weekly - 1-3 sets - 10 reps  ? Supine Shoulder External Rotation with Dowel - 1-2 x daily - 7 x weekly - 1-3 sets - 10 reps  ? Seated Scapular Retraction - 1-2 x daily - 7 x weekly - 1-3 sets - 10 reps  ? Shoulder External Rotation and Scapular Retraction with Resistance - 1-2 x daily - 7 x weekly - 1-3 sets - 10 reps  ? Standing Shoulder Extension with Dowel - 1-2 x daily - 7 x weekly - 1-3 sets - 10 reps  ? Standing Shoulder Internal Rotation Stretch with Towel - 3 x daily - 7 x weekly - 1 sets - 10 reps - 10 seconds hold  ? Standing Shoulder Row with Anchored Resistance - 1-2 x daily - 7 x weekly - 1-3 sets - 10 reps  ? Shoulder Extension with Resistance - 1-2 x daily - 7 x weekly - 1-3 sets - 10 reps  ? Shoulder Internal Rotation with Resistance - 1-2 x daily - 7 x weekly - 1-3 sets - 10 reps  ? Shoulder External Rotation with Anchored Resistance - 1-2 x daily - 7 x weekly - 1-3 sets - 10 reps  ? Doorway Pec Stretch at 90 Degrees Abduction - 3 x daily - 7 x weekly - 3-5 reps - 15-30 hold  ? Prone Single Arm Shoulder Y - 1-2 x daily - 7 x weekly - 1-3 sets - 10 reps  ? Prone Shoulder Horizontal Abduction - 1-2 x daily - 7 x weekly - 1-3 sets - 10 reps  ? Prone Shoulder Extension - Single Arm - 1-2 x daily - 7 x weekly - 1-3 sets - 10 reps  ? Wall Push Up - 1-2 x daily - 7 x weekly - 1-3 sets - 10 reps  ? Horizontal Wall Walk with Resistance - 1-2 x daily - 7 x weekly - 1-3 sets - 10 reps  ? Supine Scapular Protraction in Flexion with Dumbbells - 1-2 x daily - 7 x weekly - 1-3 sets - 10 reps  ? Standing  Bicep Stretch at Wall - 1-2 x daily - 7 x weekly - 3 reps - 15-30 sconds hold  ? Shoulder Taps on Table - 1-2 x daily - 7 x weekly - 1-3 sets - 10 reps           ASSESSMENT                                                                                                            Exercises modified due to patient \"tweaking\" right shoulder at work when lifting a box up to forehead level and feeling pain.  Needs cueing for right scapular stabilization. Instruct to use a cold pack at home if he is sore later today or tomorrow.  Plan to continue joint mobilization, therapeutic exercises, therapeutic activities and neuromuscular reeducation working towards patient goal of reaching overhead, dressing and lifting with greater ease.     Education:   - Results of above outlined education: Verbalizes understanding and Demonstrates understanding    PLAN                                                                                                                           Suggestions for next session as indicated: Progress per plan of care       Therapy procedure time and total treatment time can be found documented on the Time Entry flowsheet

## 2023-03-20 DIAGNOSIS — D50.8 OTHER IRON DEFICIENCY ANEMIAS: Primary | ICD-10-CM

## 2023-03-20 DIAGNOSIS — D50.9 IRON DEFICIENCY ANEMIA, UNSPECIFIED IRON DEFICIENCY ANEMIA TYPE: ICD-10-CM

## 2023-03-23 ENCOUNTER — HOSPITAL ENCOUNTER (OUTPATIENT)
Facility: HOSPITAL | Age: 73
Setting detail: INFUSION SERIES
End: 2023-03-23
Payer: MEDICARE

## 2023-03-23 ENCOUNTER — HOSPITAL ENCOUNTER (OUTPATIENT)
Facility: HOSPITAL | Age: 73
Setting detail: SPECIMEN
Discharge: HOME | End: 2023-03-23
Payer: MEDICARE

## 2023-03-23 VITALS
DIASTOLIC BLOOD PRESSURE: 79 MMHG | RESPIRATION RATE: 16 BRPM | SYSTOLIC BLOOD PRESSURE: 144 MMHG | OXYGEN SATURATION: 96 % | TEMPERATURE: 97.6 F | HEART RATE: 75 BPM

## 2023-03-23 DIAGNOSIS — D50.8 IRON DEFICIENCY ANEMIA SECONDARY TO INADEQUATE DIETARY IRON INTAKE: ICD-10-CM

## 2023-03-23 DIAGNOSIS — D50.9 IRON DEFICIENCY ANEMIA, UNSPECIFIED IRON DEFICIENCY ANEMIA TYPE: ICD-10-CM

## 2023-03-23 DIAGNOSIS — D50.8 OTHER IRON DEFICIENCY ANEMIAS: ICD-10-CM

## 2023-03-23 DIAGNOSIS — E53.8 B12 DEFICIENCY: Primary | ICD-10-CM

## 2023-03-23 DIAGNOSIS — E53.8 B12 DEFICIENCY: ICD-10-CM

## 2023-03-23 LAB
ALBUMIN SERPL-MCNC: 3.7 G/DL (ref 3.4–5)
ALBUMIN/GLOB SERPL: 1.2 (ref 0.8–1.7)
ALP SERPL-CCNC: 57 U/L (ref 45–117)
ALT SERPL-CCNC: 24 U/L (ref 16–61)
ANION GAP SERPL CALC-SCNC: 8 MMOL/L (ref 3–18)
AST SERPL-CCNC: 12 U/L (ref 10–38)
BASOPHILS # BLD: 0.1 K/UL (ref 0–0.1)
BASOPHILS NFR BLD: 1 % (ref 0–2)
BILIRUB SERPL-MCNC: 0.3 MG/DL (ref 0.2–1)
BUN SERPL-MCNC: 17 MG/DL (ref 7–18)
BUN/CREAT SERPL: 18 (ref 12–20)
CALCIUM SERPL-MCNC: 9.1 MG/DL (ref 8.5–10.1)
CHLORIDE SERPL-SCNC: 109 MMOL/L (ref 100–111)
CO2 SERPL-SCNC: 23 MMOL/L (ref 21–32)
CREAT SERPL-MCNC: 0.94 MG/DL (ref 0.6–1.3)
DIFFERENTIAL METHOD BLD: ABNORMAL
EOSINOPHIL # BLD: 0.1 K/UL (ref 0–0.4)
EOSINOPHIL NFR BLD: 2 % (ref 0–5)
ERYTHROCYTE [DISTWIDTH] IN BLOOD BY AUTOMATED COUNT: 14.2 % (ref 11.6–14.5)
FERRITIN SERPL-MCNC: 31 NG/ML (ref 8–388)
FOLATE SERPL-MCNC: 7 NG/ML (ref 3.1–17.5)
GLOBULIN SER CALC-MCNC: 3.1 G/DL (ref 2–4)
GLUCOSE SERPL-MCNC: 114 MG/DL (ref 74–99)
HCT VFR BLD AUTO: 38.7 % (ref 36–48)
HGB BLD-MCNC: 13.2 G/DL (ref 13–16)
IMM GRANULOCYTES # BLD AUTO: 0 K/UL (ref 0–0.04)
IMM GRANULOCYTES NFR BLD AUTO: 0 % (ref 0–0.5)
IRON SATN MFR SERPL: 30 % (ref 20–50)
IRON SERPL-MCNC: 88 UG/DL (ref 50–175)
LYMPHOCYTES # BLD: 2.2 K/UL (ref 0.9–3.6)
LYMPHOCYTES NFR BLD: 29 % (ref 21–52)
MCH RBC QN AUTO: 31.4 PG (ref 24–34)
MCHC RBC AUTO-ENTMCNC: 34.1 G/DL (ref 31–37)
MCV RBC AUTO: 91.9 FL (ref 78–100)
MONOCYTES # BLD: 0.7 K/UL (ref 0.05–1.2)
MONOCYTES NFR BLD: 9 % (ref 3–10)
NEUTS SEG # BLD: 4.4 K/UL (ref 1.8–8)
NEUTS SEG NFR BLD: 59 % (ref 40–73)
NRBC # BLD: 0 K/UL (ref 0–0.01)
NRBC BLD-RTO: 0 PER 100 WBC
PLATELET # BLD AUTO: 159 K/UL (ref 135–420)
PMV BLD AUTO: 10.5 FL (ref 9.2–11.8)
POTASSIUM SERPL-SCNC: 4.1 MMOL/L (ref 3.5–5.5)
PROT SERPL-MCNC: 6.8 G/DL (ref 6.4–8.2)
RBC # BLD AUTO: 4.21 M/UL (ref 4.35–5.65)
SODIUM SERPL-SCNC: 140 MMOL/L (ref 136–145)
TIBC SERPL-MCNC: 291 UG/DL (ref 250–450)
VIT B12 SERPL-MCNC: >2000 PG/ML (ref 211–911)
WBC # BLD AUTO: 7.4 K/UL (ref 4.6–13.2)

## 2023-03-23 PROCEDURE — 96372 THER/PROPH/DIAG INJ SC/IM: CPT

## 2023-03-23 PROCEDURE — 6360000002 HC RX W HCPCS: Performed by: NURSE PRACTITIONER

## 2023-03-23 PROCEDURE — 80053 COMPREHEN METABOLIC PANEL: CPT

## 2023-03-23 PROCEDURE — 82728 ASSAY OF FERRITIN: CPT

## 2023-03-23 PROCEDURE — 36415 COLL VENOUS BLD VENIPUNCTURE: CPT

## 2023-03-23 PROCEDURE — 83540 ASSAY OF IRON: CPT

## 2023-03-23 PROCEDURE — 85025 COMPLETE CBC W/AUTO DIFF WBC: CPT

## 2023-03-23 PROCEDURE — 82607 VITAMIN B-12: CPT

## 2023-03-23 RX ORDER — ONDANSETRON 2 MG/ML
8 INJECTION INTRAMUSCULAR; INTRAVENOUS
Status: CANCELLED | OUTPATIENT
Start: 2023-04-20

## 2023-03-23 RX ORDER — SODIUM CHLORIDE 9 MG/ML
INJECTION, SOLUTION INTRAVENOUS CONTINUOUS
Status: CANCELLED | OUTPATIENT
Start: 2023-04-20

## 2023-03-23 RX ORDER — ACETAMINOPHEN 325 MG/1
325 TABLET ORAL
Status: CANCELLED | OUTPATIENT
Start: 2023-04-20

## 2023-03-23 RX ORDER — ACETAMINOPHEN 325 MG/1
650 TABLET ORAL
Status: CANCELLED | OUTPATIENT
Start: 2023-04-20

## 2023-03-23 RX ORDER — CYANOCOBALAMIN 1000 UG/ML
1000 INJECTION, SOLUTION INTRAMUSCULAR; SUBCUTANEOUS ONCE
Status: COMPLETED
Start: 2023-03-23 | End: 2023-03-23

## 2023-03-23 RX ORDER — CYANOCOBALAMIN 1000 UG/ML
1000 INJECTION, SOLUTION INTRAMUSCULAR; SUBCUTANEOUS ONCE
Status: CANCELLED
Start: 2023-04-20

## 2023-03-23 RX ORDER — ALBUTEROL SULFATE 90 UG/1
4 AEROSOL, METERED RESPIRATORY (INHALATION) PRN
Status: CANCELLED | OUTPATIENT
Start: 2023-04-20

## 2023-03-23 RX ORDER — EPINEPHRINE 1 MG/ML
0.3 INJECTION, SOLUTION, CONCENTRATE INTRAVENOUS PRN
Status: CANCELLED | OUTPATIENT
Start: 2023-04-20

## 2023-03-23 RX ORDER — DIPHENHYDRAMINE HYDROCHLORIDE 50 MG/ML
50 INJECTION INTRAMUSCULAR; INTRAVENOUS
Status: CANCELLED | OUTPATIENT
Start: 2023-04-20

## 2023-03-23 RX ADMIN — CYANOCOBALAMIN 1000 MCG: 1000 INJECTION, SOLUTION INTRAMUSCULAR at 13:20

## 2023-03-23 ASSESSMENT — PAIN - FUNCTIONAL ASSESSMENT: PAIN_FUNCTIONAL_ASSESSMENT: NONE - DENIES PAIN

## 2023-03-23 NOTE — PROGRESS NOTES
[facility] Progress Note    Date: 2023    Name: Kalpana Bright    MRN: [id number]         : 1950    Mr. Nic Valentin arrived in the [address] [place] [address] today at , in stable condition, here for his B-12 INJECTION (EVERY 4 WEEKS). He was assessed and education was provided. Mr. Jayesh Coffey vitals were reviewed. Vitals:    23 1305   BP: (!) 144/79   Pulse: 75   Resp: 16   Temp: 97.6 °F (36.4 °C)   SpO2: 96%              Cyanocobalamin (B-12) 1,000 mcg, was administered IM in his RIGHT DELTOID at 1320, per order and without incident. Mr. Nic Valentin tolerated well, and voiced no complaints. Mr. Nic Valentin was discharged from [place] [address] in stable condition at 1325. Sha Bassett He is to return in 4 weeks, on Thursday, 23 at 1130, for his next appointment, for his next B-12 Injection.           Felice Izquierdo RN  2023  1:12 PM

## 2023-03-28 NOTE — PROGRESS NOTES
1. \"Have you been to the ER, urgent care clinic since your last visit? Hospitalized since your last visit? \" Radha Ma 3/12/2021    2. \"Have you seen or consulted any other health care providers outside of the [address] [place] [address] since your last visit? \" No     3. For patients aged 39-70: Has the patient had a colonoscopy? Yes, HM satisfied with blue hyperlink  11/02/2018 f/u in 3 yrs. Patient has an upcoming appointment with Dr. Jolanta Ng on 11/02/21. [facility] LOV: 10/01/2021 B12 injection.     Chief Complaint   Patient presents with    Diabetes    Cholesterol Problem    Hypertension    Coronary Artery Disease You can access the FollowMyHealth Patient Portal offered by [facility] by registering at the following website: [email or web address]. By joining [employer]’s FollowMyHealth portal, you will also be able to view your health information using other applications (apps) compatible with our system.

## 2023-04-03 ENCOUNTER — OFFICE VISIT (OUTPATIENT)
Age: 73
End: 2023-04-03
Payer: MEDICARE

## 2023-04-03 VITALS
WEIGHT: 190 LBS | BODY MASS INDEX: 25.73 KG/M2 | SYSTOLIC BLOOD PRESSURE: 120 MMHG | HEIGHT: 72 IN | HEART RATE: 70 BPM | OXYGEN SATURATION: 99 % | RESPIRATION RATE: 18 BRPM | DIASTOLIC BLOOD PRESSURE: 75 MMHG

## 2023-04-03 DIAGNOSIS — D50.8 OTHER IRON DEFICIENCY ANEMIAS: Primary | ICD-10-CM

## 2023-04-03 DIAGNOSIS — D50.8 IRON DEFICIENCY ANEMIA SECONDARY TO INADEQUATE DIETARY IRON INTAKE: ICD-10-CM

## 2023-04-03 DIAGNOSIS — E53.8 DEFICIENCY OF OTHER SPECIFIED B GROUP VITAMINS: ICD-10-CM

## 2023-04-03 PROCEDURE — 99212 OFFICE O/P EST SF 10 MIN: CPT | Performed by: NURSE PRACTITIONER

## 2023-04-03 PROCEDURE — 1123F ACP DISCUSS/DSCN MKR DOCD: CPT | Performed by: NURSE PRACTITIONER

## 2023-04-03 PROCEDURE — 3078F DIAST BP <80 MM HG: CPT | Performed by: NURSE PRACTITIONER

## 2023-04-03 PROCEDURE — 1036F TOBACCO NON-USER: CPT | Performed by: NURSE PRACTITIONER

## 2023-04-03 PROCEDURE — 99213 OFFICE O/P EST LOW 20 MIN: CPT | Performed by: NURSE PRACTITIONER

## 2023-04-03 PROCEDURE — 3017F COLORECTAL CA SCREEN DOC REV: CPT | Performed by: NURSE PRACTITIONER

## 2023-04-03 PROCEDURE — G8417 CALC BMI ABV UP PARAM F/U: HCPCS | Performed by: NURSE PRACTITIONER

## 2023-04-03 PROCEDURE — 3074F SYST BP LT 130 MM HG: CPT | Performed by: NURSE PRACTITIONER

## 2023-04-03 PROCEDURE — G8427 DOCREV CUR MEDS BY ELIG CLIN: HCPCS | Performed by: NURSE PRACTITIONER

## 2023-04-03 ASSESSMENT — ENCOUNTER SYMPTOMS
RESPIRATORY NEGATIVE: 1
ALLERGIC/IMMUNOLOGIC NEGATIVE: 1
EYES NEGATIVE: 1
GASTROINTESTINAL NEGATIVE: 1

## 2023-04-03 NOTE — PROGRESS NOTES
MG capsule TAKE 1 CAPSULE BY MOUTH EVERY DAY      rosuvastatin (CRESTOR) 40 MG tablet TAKE 1 TABLET BY MOUTH NIGHTLY      SITagliptin (JANUVIA) 100 MG tablet Take 100 mg by mouth daily      solifenacin (VESICARE) 5 MG tablet Take 5 mg by mouth daily      tamsulosin (FLOMAX) 0.4 MG capsule Take 0.4 mg by mouth       No current facility-administered medications for this visit. Review of Systems   Constitutional:  Positive for fatigue. HENT: Negative. Eyes: Negative. Respiratory: Negative. Cardiovascular: Negative. Gastrointestinal: Negative. Endocrine: Negative. Genitourinary: Negative. Musculoskeletal: Negative. Allergic/Immunologic: Negative. Neurological: Negative. Psychiatric/Behavioral: Negative. Objective:   /75   Pulse 70   Resp 18   Ht 6' (1.829 m)   Wt 190 lb (86.2 kg)   SpO2 99%   BMI 25.77 kg/m²   ECOG PS0  Physical Exam  Cardiovascular:      Rate and Rhythm: Normal rate. Pulmonary:      Breath sounds: Normal breath sounds. Musculoskeletal:         General: Normal range of motion. Skin:     General: Skin is warm. Neurological:      Mental Status: He is alert and oriented to person, place, and time. Psychiatric:         Mood and Affect: Mood normal.         Behavior: Behavior normal.         Thought Content:  Thought content normal.         Judgment: Judgment normal.        Lab data:      Lab Results   Component Value Date    WBC 7.4 03/23/2023    HGB 13.2 03/23/2023    HCT 38.7 03/23/2023    MCV 91.9 03/23/2023     03/23/2023     Lab Results   Component Value Date     03/23/2023    K 4.1 03/23/2023     03/23/2023    CO2 23 03/23/2023    BUN 17 03/23/2023    CREATININE 0.94 03/23/2023    GLUCOSE 114 (H) 03/23/2023    CALCIUM 9.1 03/23/2023    PROT 6.8 03/23/2023    LABALBU 3.7 03/23/2023    BILITOT 0.3 03/23/2023    ALKPHOS 57 03/23/2023    AST 12 03/23/2023    ALT 24 03/23/2023    LABGLOM >60 03/23/2023    GFRAA >60

## 2023-04-05 RX ORDER — RAMIPRIL 10 MG/1
CAPSULE ORAL
Qty: 90 CAPSULE | Refills: 3 | Status: SHIPPED | OUTPATIENT
Start: 2023-04-05

## 2023-04-11 DIAGNOSIS — K21.9 GASTRO-ESOPHAGEAL REFLUX DISEASE WITHOUT ESOPHAGITIS: ICD-10-CM

## 2023-04-11 RX ORDER — OMEPRAZOLE 20 MG/1
CAPSULE, DELAYED RELEASE ORAL
Qty: 90 CAPSULE | Refills: 0 | Status: SHIPPED | OUTPATIENT
Start: 2023-04-11 | End: 2023-06-08

## 2023-04-14 DIAGNOSIS — E11.9 TYPE 2 DIABETES MELLITUS WITHOUT COMPLICATIONS (HCC): ICD-10-CM

## 2023-04-17 RX ORDER — SITAGLIPTIN 100 MG/1
TABLET, FILM COATED ORAL
Qty: 90 TABLET | Refills: 0 | Status: SHIPPED | OUTPATIENT
Start: 2023-04-17

## 2023-04-26 DIAGNOSIS — E11.9 TYPE 2 DIABETES MELLITUS WITHOUT COMPLICATIONS (HCC): ICD-10-CM

## 2023-04-26 DIAGNOSIS — E11.65 TYPE 2 DIABETES MELLITUS WITH HYPERGLYCEMIA (HCC): ICD-10-CM

## 2023-04-26 RX ORDER — GLIPIZIDE 5 MG/1
TABLET, FILM COATED, EXTENDED RELEASE ORAL
Qty: 90 TABLET | Refills: 0 | Status: SHIPPED | OUTPATIENT
Start: 2023-04-26

## 2023-05-09 ENCOUNTER — OFFICE VISIT (OUTPATIENT)
Age: 73
End: 2023-05-09
Payer: MEDICARE

## 2023-05-09 VITALS
DIASTOLIC BLOOD PRESSURE: 45 MMHG | HEIGHT: 72 IN | SYSTOLIC BLOOD PRESSURE: 133 MMHG | BODY MASS INDEX: 25.73 KG/M2 | HEART RATE: 68 BPM | OXYGEN SATURATION: 97 % | WEIGHT: 190 LBS

## 2023-05-09 DIAGNOSIS — E78.2 MIXED HYPERLIPIDEMIA: ICD-10-CM

## 2023-05-09 DIAGNOSIS — I25.118 ATHEROSCLEROTIC HEART DISEASE OF NATIVE CORONARY ARTERY WITH OTHER FORMS OF ANGINA PECTORIS (HCC): Primary | ICD-10-CM

## 2023-05-09 DIAGNOSIS — Z95.5 HISTORY OF CORONARY ARTERY STENT PLACEMENT: ICD-10-CM

## 2023-05-09 DIAGNOSIS — I10 ESSENTIAL (PRIMARY) HYPERTENSION: ICD-10-CM

## 2023-05-09 PROCEDURE — 3017F COLORECTAL CA SCREEN DOC REV: CPT | Performed by: INTERNAL MEDICINE

## 2023-05-09 PROCEDURE — 3078F DIAST BP <80 MM HG: CPT | Performed by: INTERNAL MEDICINE

## 2023-05-09 PROCEDURE — 99214 OFFICE O/P EST MOD 30 MIN: CPT | Performed by: INTERNAL MEDICINE

## 2023-05-09 PROCEDURE — 1123F ACP DISCUSS/DSCN MKR DOCD: CPT | Performed by: INTERNAL MEDICINE

## 2023-05-09 PROCEDURE — 3075F SYST BP GE 130 - 139MM HG: CPT | Performed by: INTERNAL MEDICINE

## 2023-05-09 PROCEDURE — G8417 CALC BMI ABV UP PARAM F/U: HCPCS | Performed by: INTERNAL MEDICINE

## 2023-05-09 PROCEDURE — 1036F TOBACCO NON-USER: CPT | Performed by: INTERNAL MEDICINE

## 2023-05-09 PROCEDURE — G8427 DOCREV CUR MEDS BY ELIG CLIN: HCPCS | Performed by: INTERNAL MEDICINE

## 2023-05-09 NOTE — PROGRESS NOTES
1. Have you been to the ER, urgent care clinic since your last visit? Hospitalized since your last visit? No    2. Have you seen or consulted any other health care providers outside of the [address] [place] [address] since your last visit? Include any pap smears or colon screening. No     3. Do you need any refills today?    no
successful. The guidewire crossed the lesion. Device was deployed. The pre-interventional distal flow is decreased (MILLI 2). Post-intervention MILLI flow is 3. Lesion had 15 mm of its length treated. There were no complications. There is a 0% residual stenosis post intervention. Interpretation Summary 12/09/2020    Contrast used: DEFINITY. LV: Calculated LVEF is 55%. Visually measured ejection fraction. Normal cavity size and systolic function (ejection fraction normal). Mild concentric hypertrophy. Age-appropriate left ventricular diastolic function. PA: Pulmonary arterial systolic pressure is 26 mmHg. Aorta: Dilated aortic root; diameter is 3.7 cm. Reading Providers     Reading Role Read Date   Barbara Arenas MD Test Supervisor, ECG [place], SPECT [place] 6/9/2021   Procedure Conclusion    Nuclear Stress Test    Nuclear Cardiac Spect Rest then Gated Stress with tomographic imaging/tomography utilized for the tomographic myocardical perfusion imaging performed. Melly Heir was used as the stressing method and agent. (Melly Heir given via a 10 - 20 sec injection.)   One day myocardial perfusion study. Date: 6/2/2021. Left ventricular perfusion is normal. Myocardial perfusion imaging supports a low risk stress test.   There is no prior study available for comparison. . This Single Photon Emission Computer Tomograph (SPECT) study utilized tomographic imaging/tomography for the tomographic myocardial perfusion imaging performed during this study. Interpretation Summary 6/2021    LV: Estimated LVEF is 55 - 60%. Normal cavity size and systolic function (ejection fraction normal). Mild concentric hypertrophy. Wall motion: normal. Mild (grade 1) left ventricular diastolic dysfunction. MV: Mild mitral annular calcification. Mild mitral valve regurgitation is present. TV: Right Ventricular Arterial Pressure (RVSP) is 30 mmHg. Pulmonary hypertension not suggested by Doppler findings.         I Have

## 2023-05-11 ENCOUNTER — HOSPITAL ENCOUNTER (OUTPATIENT)
Facility: HOSPITAL | Age: 73
Setting detail: INFUSION SERIES
End: 2023-05-11
Payer: MEDICARE

## 2023-05-11 VITALS
RESPIRATION RATE: 16 BRPM | SYSTOLIC BLOOD PRESSURE: 151 MMHG | OXYGEN SATURATION: 96 % | TEMPERATURE: 97.7 F | HEART RATE: 71 BPM | DIASTOLIC BLOOD PRESSURE: 74 MMHG

## 2023-05-11 DIAGNOSIS — E53.8 B12 DEFICIENCY: Primary | ICD-10-CM

## 2023-05-11 PROCEDURE — 96372 THER/PROPH/DIAG INJ SC/IM: CPT

## 2023-05-11 PROCEDURE — 6360000002 HC RX W HCPCS: Performed by: NURSE PRACTITIONER

## 2023-05-11 RX ORDER — ONDANSETRON 2 MG/ML
8 INJECTION INTRAMUSCULAR; INTRAVENOUS
Status: CANCELLED | OUTPATIENT
Start: 2023-06-08

## 2023-05-11 RX ORDER — ACETAMINOPHEN 325 MG/1
650 TABLET ORAL
Status: CANCELLED | OUTPATIENT
Start: 2023-06-08

## 2023-05-11 RX ORDER — EPINEPHRINE 1 MG/ML
0.3 INJECTION, SOLUTION, CONCENTRATE INTRAVENOUS PRN
Status: CANCELLED | OUTPATIENT
Start: 2023-06-08

## 2023-05-11 RX ORDER — SODIUM CHLORIDE 9 MG/ML
INJECTION, SOLUTION INTRAVENOUS CONTINUOUS
Status: CANCELLED | OUTPATIENT
Start: 2023-06-08

## 2023-05-11 RX ORDER — ALBUTEROL SULFATE 90 UG/1
4 AEROSOL, METERED RESPIRATORY (INHALATION) PRN
Status: CANCELLED | OUTPATIENT
Start: 2023-06-08

## 2023-05-11 RX ORDER — CYANOCOBALAMIN 1000 UG/ML
1000 INJECTION, SOLUTION INTRAMUSCULAR; SUBCUTANEOUS ONCE
Status: CANCELLED
Start: 2023-06-08

## 2023-05-11 RX ORDER — DIPHENHYDRAMINE HYDROCHLORIDE 50 MG/ML
50 INJECTION INTRAMUSCULAR; INTRAVENOUS
Status: CANCELLED | OUTPATIENT
Start: 2023-06-08

## 2023-05-11 RX ORDER — ACETAMINOPHEN 325 MG/1
325 TABLET ORAL
Status: CANCELLED | OUTPATIENT
Start: 2023-06-08

## 2023-05-11 RX ORDER — CYANOCOBALAMIN 1000 UG/ML
1000 INJECTION, SOLUTION INTRAMUSCULAR; SUBCUTANEOUS ONCE
Status: COMPLETED
Start: 2023-05-11 | End: 2023-05-11

## 2023-05-11 RX ADMIN — CYANOCOBALAMIN 1000 MCG: 1000 INJECTION INTRAMUSCULAR; SUBCUTANEOUS at 14:43

## 2023-05-11 NOTE — PROGRESS NOTES
[facility] Progress Note    Date: May 11, 2023    Name: John Basilio    MRN: [id number]         : 1950     Cyanocobalamin (B-12) Injection (Every 4 weeks)    Mr. Cali Zavala to [facility], ambulatory at 1440. Pt was assessed and education was provided. Mr. Luis Alberto Felix vitals were reviewed and patient was observed for 5 minutes prior to treatment. Vitals:    23 1440   BP: (!) 151/74   Pulse: 71   Resp: 16   Temp: 97.7 °F (36.5 °C)   SpO2: 96%       Cyanocobalamin (B-12) 1,000 mcg was administered IM in his Right deltoid. Band-aid to site. Mr. Cali Zavala tolerated well, and had no complaints. Patient armband removed and shredded. Mr. Cali Zavala was discharged from [place] [address] in stable condition at 1450. He is to return on 23 at 1500 for his next B12 Injection appointment.       Corinne Schulz RN  May 11, 2023  3:21 PM

## 2023-05-17 ENCOUNTER — ANESTHESIA EVENT (OUTPATIENT)
Facility: HOSPITAL | Age: 73
End: 2023-05-17
Payer: MEDICARE

## 2023-05-18 ENCOUNTER — APPOINTMENT (OUTPATIENT)
Facility: HOSPITAL | Age: 73
End: 2023-05-18
Payer: MEDICARE

## 2023-05-18 ENCOUNTER — ANESTHESIA (OUTPATIENT)
Facility: HOSPITAL | Age: 73
End: 2023-05-18
Payer: MEDICARE

## 2023-05-18 ENCOUNTER — HOSPITAL ENCOUNTER (OUTPATIENT)
Facility: HOSPITAL | Age: 73
Setting detail: OUTPATIENT SURGERY
Discharge: HOME | End: 2023-05-18
Attending: INTERNAL MEDICINE | Admitting: INTERNAL MEDICINE
Payer: MEDICARE

## 2023-05-18 VITALS
WEIGHT: 183.5 LBS | OXYGEN SATURATION: 100 % | DIASTOLIC BLOOD PRESSURE: 64 MMHG | TEMPERATURE: 97.8 F | HEART RATE: 72 BPM | HEIGHT: 72 IN | BODY MASS INDEX: 24.85 KG/M2 | SYSTOLIC BLOOD PRESSURE: 117 MMHG | RESPIRATION RATE: 12 BRPM

## 2023-05-18 LAB
GLUCOSE BLD STRIP.AUTO-MCNC: 117 MG/DL (ref 70–110)
GLUCOSE BLD STRIP.AUTO-MCNC: 180 MG/DL (ref 70–110)

## 2023-05-18 PROCEDURE — 6360000002 HC RX W HCPCS: Performed by: NURSE ANESTHETIST, CERTIFIED REGISTERED

## 2023-05-18 PROCEDURE — 3700000000 HC ANESTHESIA ATTENDED CARE: Performed by: INTERNAL MEDICINE

## 2023-05-18 PROCEDURE — 2580000003 HC RX 258: Performed by: NURSE ANESTHETIST, CERTIFIED REGISTERED

## 2023-05-18 PROCEDURE — 88305 TISSUE EXAM BY PATHOLOGIST: CPT

## 2023-05-18 PROCEDURE — 3600007503: Performed by: INTERNAL MEDICINE

## 2023-05-18 PROCEDURE — 7100000011 HC PHASE II RECOVERY - ADDTL 15 MIN: Performed by: INTERNAL MEDICINE

## 2023-05-18 PROCEDURE — 3700000001 HC ADD 15 MINUTES (ANESTHESIA): Performed by: INTERNAL MEDICINE

## 2023-05-18 PROCEDURE — 3600007513: Performed by: INTERNAL MEDICINE

## 2023-05-18 PROCEDURE — 7100000010 HC PHASE II RECOVERY - FIRST 15 MIN: Performed by: INTERNAL MEDICINE

## 2023-05-18 PROCEDURE — 2500000003 HC RX 250 WO HCPCS: Performed by: NURSE ANESTHETIST, CERTIFIED REGISTERED

## 2023-05-18 PROCEDURE — A4216 STERILE WATER/SALINE, 10 ML: HCPCS | Performed by: NURSE ANESTHETIST, CERTIFIED REGISTERED

## 2023-05-18 PROCEDURE — 82962 GLUCOSE BLOOD TEST: CPT

## 2023-05-18 PROCEDURE — 7100000000 HC PACU RECOVERY - FIRST 15 MIN: Performed by: INTERNAL MEDICINE

## 2023-05-18 PROCEDURE — 2709999900 HC NON-CHARGEABLE SUPPLY: Performed by: INTERNAL MEDICINE

## 2023-05-18 RX ORDER — SODIUM CHLORIDE, SODIUM LACTATE, POTASSIUM CHLORIDE, CALCIUM CHLORIDE 600; 310; 30; 20 MG/100ML; MG/100ML; MG/100ML; MG/100ML
INJECTION, SOLUTION INTRAVENOUS CONTINUOUS
Status: CANCELLED | OUTPATIENT
Start: 2023-05-18

## 2023-05-18 RX ORDER — GLUCAGON 1 MG
1 KIT INJECTION PRN
Status: DISCONTINUED | OUTPATIENT
Start: 2023-05-18 | End: 2023-05-18 | Stop reason: HOSPADM

## 2023-05-18 RX ORDER — INSULIN LISPRO 100 [IU]/ML
0-15 INJECTION, SOLUTION INTRAVENOUS; SUBCUTANEOUS ONCE
Status: DISCONTINUED | OUTPATIENT
Start: 2023-05-18 | End: 2023-05-18 | Stop reason: HOSPADM

## 2023-05-18 RX ORDER — LIDOCAINE HYDROCHLORIDE 10 MG/ML
1 INJECTION, SOLUTION EPIDURAL; INFILTRATION; INTRACAUDAL; PERINEURAL
Status: DISCONTINUED | OUTPATIENT
Start: 2023-05-18 | End: 2023-05-18 | Stop reason: HOSPADM

## 2023-05-18 RX ORDER — ONDANSETRON 2 MG/ML
4 INJECTION INTRAMUSCULAR; INTRAVENOUS
Status: CANCELLED | OUTPATIENT
Start: 2023-05-18 | End: 2023-05-19

## 2023-05-18 RX ORDER — PROPOFOL 10 MG/ML
INJECTION, EMULSION INTRAVENOUS PRN
Status: DISCONTINUED | OUTPATIENT
Start: 2023-05-18 | End: 2023-05-18 | Stop reason: SDUPTHER

## 2023-05-18 RX ORDER — SODIUM CHLORIDE, SODIUM LACTATE, POTASSIUM CHLORIDE, CALCIUM CHLORIDE 600; 310; 30; 20 MG/100ML; MG/100ML; MG/100ML; MG/100ML
INJECTION, SOLUTION INTRAVENOUS CONTINUOUS
Status: DISCONTINUED | OUTPATIENT
Start: 2023-05-18 | End: 2023-05-18 | Stop reason: HOSPADM

## 2023-05-18 RX ORDER — DEXTROSE MONOHYDRATE 100 MG/ML
INJECTION, SOLUTION INTRAVENOUS CONTINUOUS PRN
Status: DISCONTINUED | OUTPATIENT
Start: 2023-05-18 | End: 2023-05-18 | Stop reason: HOSPADM

## 2023-05-18 RX ORDER — LIDOCAINE HYDROCHLORIDE 20 MG/ML
INJECTION, SOLUTION EPIDURAL; INFILTRATION; INTRACAUDAL; PERINEURAL PRN
Status: DISCONTINUED | OUTPATIENT
Start: 2023-05-18 | End: 2023-05-18 | Stop reason: SDUPTHER

## 2023-05-18 RX ORDER — PROCHLORPERAZINE EDISYLATE 5 MG/ML
5 INJECTION INTRAMUSCULAR; INTRAVENOUS
Status: CANCELLED | OUTPATIENT
Start: 2023-05-18 | End: 2023-05-19

## 2023-05-18 RX ADMIN — PROPOFOL 20 MG: 10 INJECTION, EMULSION INTRAVENOUS at 08:31

## 2023-05-18 RX ADMIN — SODIUM CHLORIDE, POTASSIUM CHLORIDE, SODIUM LACTATE AND CALCIUM CHLORIDE: 600; 310; 30; 20 INJECTION, SOLUTION INTRAVENOUS at 07:37

## 2023-05-18 RX ADMIN — PROPOFOL 25 MG: 10 INJECTION, EMULSION INTRAVENOUS at 08:43

## 2023-05-18 RX ADMIN — PROPOFOL 25 MG: 10 INJECTION, EMULSION INTRAVENOUS at 08:34

## 2023-05-18 RX ADMIN — PROPOFOL 25 MG: 10 INJECTION, EMULSION INTRAVENOUS at 08:49

## 2023-05-18 RX ADMIN — PROPOFOL 25 MG: 10 INJECTION, EMULSION INTRAVENOUS at 08:38

## 2023-05-18 RX ADMIN — PROPOFOL 25 MG: 10 INJECTION, EMULSION INTRAVENOUS at 08:41

## 2023-05-18 RX ADMIN — LIDOCAINE HYDROCHLORIDE 20 MG: 20 INJECTION, SOLUTION EPIDURAL; INFILTRATION; INTRACAUDAL; PERINEURAL at 08:29

## 2023-05-18 RX ADMIN — FAMOTIDINE 20 MG: 10 INJECTION, SOLUTION INTRAVENOUS at 07:41

## 2023-05-18 RX ADMIN — PROPOFOL 120 MG: 10 INJECTION, EMULSION INTRAVENOUS at 08:29

## 2023-05-18 RX ADMIN — PROPOFOL 25 MG: 10 INJECTION, EMULSION INTRAVENOUS at 08:47

## 2023-05-18 RX ADMIN — PROPOFOL 25 MG: 10 INJECTION, EMULSION INTRAVENOUS at 08:37

## 2023-05-18 RX ADMIN — PROPOFOL 25 MG: 10 INJECTION, EMULSION INTRAVENOUS at 08:52

## 2023-05-18 ASSESSMENT — PAIN - FUNCTIONAL ASSESSMENT: PAIN_FUNCTIONAL_ASSESSMENT: 0-10

## 2023-05-18 NOTE — H&P
[phone]    GASTROENTEROLOGY Pre-Procedure H and P      Impression/Plan:   1.  This patient is consented for an EGD and colonoscopy for Dysphagia, Personal history of colon polyps       Chief Complaint: Dysphagia, Personal history of colon polyps     HPI:  Fito Olsen is a 68 y.o. male who is having an EGD and colonoscopy for Dysphagia, Personal history of colon polyps   PMH:   Past Medical History:   Diagnosis Date    Arthritis     1999 vicodin    Diabetes ([facility] [address] 1998 metformin    GERD (gastroesophageal reflux disease)     Hypercholesterolemia     Hypertension 1996 norvasc    Kidney stones     MI (myocardial infarction) ([facility] [address] 12/12/2020       PSH:   Past Surgical History:   Procedure Laterality Date    ANGIOPLASTY  12/2020    stented    CHOLECYSTECTOMY      COLONOSCOPY N/A 11/2/2018    COLONOSCOPY with Polypectomies performed by Cleo Lucio MD at [facility]    COLONOSCOPY N/A 4/5/2022    COLONOSCOPY w polypectomies performed by Cleo Lucio MD at [address]  12/2017    stents       Social HX:   Social History     Socioeconomic History    Marital status:      Spouse name: Not on file    Number of children: Not on file    Years of education: Not on file    Highest education level: Not on file   Occupational History    Not on file   Tobacco Use    Smoking status: Never    Smokeless tobacco: Never   Vaping Use    Vaping Use: Never used   Substance and Sexual Activity    Alcohol use: No    Drug use: Never    Sexual activity: Not on file   Other Topics Concern    Not on file   Social History Narrative    Not on file     Social Determinants of Health     Financial Resource Strain: Not on file   Food Insecurity: Not on file   Transportation Needs: Not on file   Physical Activity: Not on file   Stress: Not on file   Social Connections: Not on file   Intimate Partner Violence: Not on file   Housing Stability: Not on file       FHX:   Family History

## 2023-05-18 NOTE — ANESTHESIA PRE PROCEDURE
Department of Anesthesiology  Preprocedure Note       Name:  Robles Barone   Age:  68 y.o.  :  1950                                          MRN:  [id number]         Date:  2023      Surgeon: Red Mendoza):  Natalia Smalls MD    Procedure: Procedure(s):  COLONOSCOPY  EGD ESOPHAGOGASTRODUODENOSCOPY WITH DILATION    Medications prior to admission:   Prior to Admission medications    Medication Sig Start Date End Date Taking? Authorizing Provider   solifenacin (VESICARE) 5 MG tablet TAKE ONE TABLET BY MOUTH DAILY 23   ANDREW Painter NP   glipiZIDE (GLUCOTROL XL) 5 MG extended release tablet TAKE 1 TABLET BY MOUTH EVERY DAY 23   Darwin Lyons PA-C   metFORMIN (GLUCOPHAGE) 1000 MG tablet TAKE 1 TABLET BY MOUTH TWICE A DAY WITH MEALS 23   Sonali Gamez PA-C   JANUVIA 100 MG tablet TAKE 1 TABLET BY MOUTH EVERY DAY 23   Gabriel Syed MD   omeprazole (PRILOSEC) 20 MG delayed release capsule TAKE 1 CAPSULE BY MOUTH EVERY DAY 23   Gabriel Syed MD   ramipril (ALTACE) 10 MG capsule TAKE 1 CAPSULE BY MOUTH EVERY DAY 23   ANDREW Aleman NP   nitroGLYCERIN (NITROSTAT) 0.4 MG SL tablet Place 1 tablet under the tongue every 5 minutes as needed for Chest pain up to max of 3 total doses. If no relief after 1 dose, call 911. 3/7/23   ANDREW Aleman NP   Cyanocobalamin 1000 MCG/ML KIT by Injection route.     Historical Provider, MD   Blood Pressure Monitor KIT Check once a day 20   Ar Automatic Reconciliation   amLODIPine (NORVASC) 5 MG tablet Take 1 tablet by mouth daily 22   Ar Automatic Reconciliation   carvedilol (COREG) 25 MG tablet TAKE 1 TABLET BY MOUTH TWICE A DAY WITH FOOD 10/29/21   Ar Automatic Reconciliation   cetirizine (ZYRTEC) 10 MG tablet Take 1 tablet by mouth daily as needed    Ar Automatic Reconciliation   clopidogrel (PLAVIX) 75 MG tablet TAKE 1 TABLET BY MOUTH EVERY DAY 22   Ar Automatic Reconciliation   rosuvastatin (CRESTOR)

## 2023-05-18 NOTE — BRIEF OP NOTE
[facility]  [facility], [address]      Brief Procedure Note    Gsu Cabrera  1950  [id number]    Date of Procedure: 5/18/2023     Preoperative diagnosis: BMI 26.0-26.9,adult [Z68.26]  Dysphagia, unspecified type [R13.10]  Nausea and vomiting, unspecified vomiting type [R11.2]  Abdominal pain, epigastric [R10.13]  Unintentional weight loss [R63.4]  Gastroesophageal reflux disease, unspecified whether esophagitis present [K21.9]    Postoperative diagnosis: BMI 26.0-26.9,adult [Z68.26]  Dysphagia, unspecified type [R13.10]  Nausea and vomiting, unspecified vomiting type [R11.2]  Abdominal pain, epigastric [R10.13]  Unintentional weight loss [R63.4]  Gastroesophageal reflux disease, unspecified whether esophagitis present [K21.9]    Type of Anesthesia: MAC (Monitored anesthesia care)    Description of findings: same as post op dx    Procedure: Procedure(s):  COLONOSCOPY with polypectomies  EGD ESOPHAGOGASTRODUODENOSCOPY WITH 50 Fr DILATION and bxs    :  Dr. Jessie Meadows MD    Assistant(s): Circulator: Madeline Chino RN; Aries Oneal RN; Ellwood Pallas ANN    Devices/implants/grafts/tissues/prosthesis: None    EBL:None    Specimens:   ID Type Source Tests Collected by Time Destination   1 : gastric bxs Tissue Gastric SURGICAL PATHOLOGY Jessie Meadows MD 5/18/2023 0831    2 : esophageal bxs Tissue Esophagus SURGICAL PATHOLOGY Jessie Meadows MD 5/18/2023 3527    3 : descending polyps Tissue Colon-Descending SURGICAL PATHOLOGY Jessie Meadows MD 5/18/2023 1031    4 : ascending polyps Tissue Colon-Ascending SURGICAL PATHOLOGY Jessie Meadows MD 5/18/2023 0845    5 : hepatic flexure polyps Tissue Hepatic Flexure SURGICAL PATHOLOGY Jessie Meadows MD 5/18/2023 0847    6 : rectal polyp Tissue Rectum SURGICAL PATHOLOGY Jessie Meadows MD 5/18/2023 5716        Findings: See printed and scanned procedure note    Complications: None    Dr. Jessie Meadows

## 2023-05-18 NOTE — DISCHARGE INSTRUCTIONS
Upper GI Endoscopy: What to Expect at [facility] had an upper GI endoscopy. Your doctor used a thin, lighted tube that bends to look at the inside of your esophagus, your stomach, and the first part of the small intestine, called the duodenum. After you have an endoscopy, you will stay at the hospital or clinic for 1 to 2 hours. This will allow the medicine to wear off. You will be able to go home after your doctor or nurse checks to make sure that you're not having any problems. You may have to stay overnight if you had treatment during the test. You may have a sore throat for a day or two after the test.  This care sheet gives you a general idea about what to expect after the test.  How can you care for yourself at home? Activity   Rest as much as you need to after you go home. You should be able to go back to your usual activities the day after the test.  Diet   Follow your doctor's directions for eating after the test.  Drink plenty of fluids (unless your doctor has told you not to). Medications   If you have a sore throat the day after the test, use an over-the-counter spray to numb your throat. Follow-up care is a key part of your treatment and safety. Be sure to make and go to all appointments, and call your doctor if you are having problems. It's also a good idea to know your test results and keep a list of the medicines you take. Colonoscopy: What to Expect at [address] [place] [address]  After a colonoscopy, you'll stay at the clinic until you wake up. Then you can go home. But you'll need to arrange for a ride. Your doctor will tell you when you can eat and do your other usual activities. Your doctor will talk to you about when you'll need your next colonoscopy. Your doctor can help you decide how often you need to be checked. This will depend on the results of your test and your risk for colorectal cancer. After the test, you may be bloated or have gas pains.  You may need

## 2023-05-18 NOTE — Clinical Note
Single view of the left ventricle obtained using hand injection.  Measurements obtained [facility]

## 2023-05-18 NOTE — ANESTHESIA POSTPROCEDURE EVALUATION
Department of Anesthesiology  Postprocedure Note    Patient: Geovany White  MRN: [id number]  YOB: 1950  Date of evaluation: 5/18/2023      Procedure Summary     Date: 05/18/23 Room / Location: [facility] 03 / [facility]    Anesthesia Start: 0824 Anesthesia Stop: 1712    Procedures:       COLONOSCOPY with polypectomies (Abdomen)      EGD ESOPHAGOGASTRODUODENOSCOPY WITH 50 Fr DILATION and bxs (Upper GI Region) Diagnosis:       Personal history of colonic polyps      Unintentional weight loss      Gastroesophageal reflux disease, unspecified whether esophagitis present      Abdominal pain, epigastric      Nausea and vomiting, unspecified vomiting type      Dysphagia, unspecified type      BMI 26.0-26.9,adult      (BMI 26.0-26.9,adult [Z68.26])      (Dysphagia, unspecified type [R13.10])      (Nausea and vomiting, unspecified vomiting type [R11.2])      (Abdominal pain, epigastric [R10.13])      (Unintentional weight loss [R63.4])      (Gastroesophageal reflux disease, unspecified whether esophagitis present [K21.9])    Surgeons: Jean Carlos Montanez MD Responsible Provider: Mati Guillermo MD    Anesthesia Type: MAC ASA Status: 3          Anesthesia Type: MAC    Fernando Phase I: Fernando Score: 10    Fernando Phase II: Fernando Score: 10      Anesthesia Post Evaluation    Patient location during evaluation: bedside  Patient participation: complete - patient participated  Level of consciousness: responsive to verbal stimuli  Airway patency: patent  Nausea & Vomiting: no nausea  Complications: no  Respiratory status: acceptable  Hydration status: euvolemic

## 2023-06-08 ENCOUNTER — HOSPITAL ENCOUNTER (OUTPATIENT)
Facility: HOSPITAL | Age: 73
Setting detail: INFUSION SERIES
End: 2023-06-08
Payer: MEDICARE

## 2023-06-08 VITALS
OXYGEN SATURATION: 100 % | TEMPERATURE: 97.6 F | DIASTOLIC BLOOD PRESSURE: 65 MMHG | SYSTOLIC BLOOD PRESSURE: 122 MMHG | RESPIRATION RATE: 16 BRPM | HEART RATE: 64 BPM

## 2023-06-08 DIAGNOSIS — E53.8 B12 DEFICIENCY: Primary | ICD-10-CM

## 2023-06-08 PROCEDURE — 6360000002 HC RX W HCPCS: Performed by: NURSE PRACTITIONER

## 2023-06-08 PROCEDURE — 96372 THER/PROPH/DIAG INJ SC/IM: CPT

## 2023-06-08 RX ORDER — ACETAMINOPHEN 325 MG/1
650 TABLET ORAL
Status: CANCELLED | OUTPATIENT
Start: 2023-07-06

## 2023-06-08 RX ORDER — ALBUTEROL SULFATE 90 UG/1
4 AEROSOL, METERED RESPIRATORY (INHALATION) PRN
Status: CANCELLED | OUTPATIENT
Start: 2023-07-06

## 2023-06-08 RX ORDER — DIPHENHYDRAMINE HYDROCHLORIDE 50 MG/ML
50 INJECTION INTRAMUSCULAR; INTRAVENOUS
Status: CANCELLED | OUTPATIENT
Start: 2023-07-06

## 2023-06-08 RX ORDER — CYANOCOBALAMIN 1000 UG/ML
1000 INJECTION, SOLUTION INTRAMUSCULAR; SUBCUTANEOUS ONCE
Status: CANCELLED
Start: 2023-07-06

## 2023-06-08 RX ORDER — ACETAMINOPHEN 325 MG/1
325 TABLET ORAL
Status: CANCELLED | OUTPATIENT
Start: 2023-07-06

## 2023-06-08 RX ORDER — EPINEPHRINE 1 MG/ML
0.3 INJECTION, SOLUTION, CONCENTRATE INTRAVENOUS PRN
Status: CANCELLED | OUTPATIENT
Start: 2023-07-06

## 2023-06-08 RX ORDER — SODIUM CHLORIDE 9 MG/ML
INJECTION, SOLUTION INTRAVENOUS CONTINUOUS
Status: CANCELLED | OUTPATIENT
Start: 2023-07-06

## 2023-06-08 RX ORDER — OMEPRAZOLE 40 MG/1
1 CAPSULE, DELAYED RELEASE ORAL DAILY
COMMUNITY
Start: 2023-05-18

## 2023-06-08 RX ORDER — ONDANSETRON 4 MG/1
4 TABLET, ORALLY DISINTEGRATING ORAL 3 TIMES DAILY PRN
COMMUNITY
Start: 2023-05-18

## 2023-06-08 RX ORDER — CYANOCOBALAMIN 1000 UG/ML
1000 INJECTION, SOLUTION INTRAMUSCULAR; SUBCUTANEOUS ONCE
Status: COMPLETED
Start: 2023-06-08 | End: 2023-06-08

## 2023-06-08 RX ORDER — ONDANSETRON 2 MG/ML
8 INJECTION INTRAMUSCULAR; INTRAVENOUS
Status: CANCELLED | OUTPATIENT
Start: 2023-07-06

## 2023-06-08 RX ADMIN — CYANOCOBALAMIN 1000 MCG: 1000 INJECTION, SOLUTION INTRAMUSCULAR at 15:10

## 2023-06-08 NOTE — PROGRESS NOTES
[facility] Progress Note    Date: 2023    Name: Viridiana Daniels    MRN: [id number]         : 1950     Cyanocobalamin (B-12) Injection- Every 4 weeks    Mr. Shreyas Cabrera to [facility], ambulatory at 1500. Pt was assessed and education was provided. Mr. Lynn Us vitals were reviewed and patient was observed for 5 minutes prior to treatment. Vitals:    23 1503   BP: 122/65   Pulse: 64   Resp: 16   Temp: 97.6 °F (36.4 °C)   SpO2: 100%       Cyanocobalamin (B-12) 1,000 mcg was administered IM in his LEFT deltoid. Band-aid to site. Mr. Shreyas Cabrera tolerated well, and had no complaints. Patient armband removed and shredded. Mr. Shreyas Cabrera was discharged from [place] [address] in stable condition at 1515. He is to return on 23 at  for his next B12 Injection appointment.       Francie Thao RN  2023  3:23 PM

## 2023-06-19 RX ORDER — CLOPIDOGREL BISULFATE 75 MG/1
TABLET ORAL
Qty: 90 TABLET | Refills: 3 | Status: SHIPPED | OUTPATIENT
Start: 2023-06-19

## 2023-07-06 ENCOUNTER — HOSPITAL ENCOUNTER (OUTPATIENT)
Facility: HOSPITAL | Age: 73
Setting detail: INFUSION SERIES
End: 2023-07-06
Payer: MEDICARE

## 2023-07-06 VITALS
TEMPERATURE: 97.1 F | SYSTOLIC BLOOD PRESSURE: 107 MMHG | HEART RATE: 75 BPM | OXYGEN SATURATION: 96 % | RESPIRATION RATE: 18 BRPM | DIASTOLIC BLOOD PRESSURE: 64 MMHG

## 2023-07-06 DIAGNOSIS — E53.8 B12 DEFICIENCY: Primary | ICD-10-CM

## 2023-07-06 PROCEDURE — 96372 THER/PROPH/DIAG INJ SC/IM: CPT

## 2023-07-06 PROCEDURE — 6360000002 HC RX W HCPCS: Performed by: NURSE PRACTITIONER

## 2023-07-06 RX ORDER — ACETAMINOPHEN 325 MG/1
325 TABLET ORAL
Status: CANCELLED | OUTPATIENT
Start: 2023-08-03

## 2023-07-06 RX ORDER — ONDANSETRON 2 MG/ML
8 INJECTION INTRAMUSCULAR; INTRAVENOUS
Status: CANCELLED | OUTPATIENT
Start: 2023-08-03

## 2023-07-06 RX ORDER — CYANOCOBALAMIN 1000 UG/ML
1000 INJECTION, SOLUTION INTRAMUSCULAR; SUBCUTANEOUS ONCE
Status: CANCELLED
Start: 2023-08-03

## 2023-07-06 RX ORDER — ALBUTEROL SULFATE 90 UG/1
4 AEROSOL, METERED RESPIRATORY (INHALATION) PRN
Status: CANCELLED | OUTPATIENT
Start: 2023-08-03

## 2023-07-06 RX ORDER — EPINEPHRINE 1 MG/ML
0.3 INJECTION, SOLUTION, CONCENTRATE INTRAVENOUS PRN
Status: CANCELLED | OUTPATIENT
Start: 2023-08-03

## 2023-07-06 RX ORDER — CYANOCOBALAMIN 1000 UG/ML
1000 INJECTION, SOLUTION INTRAMUSCULAR; SUBCUTANEOUS ONCE
Status: COMPLETED
Start: 2023-07-06 | End: 2023-07-06

## 2023-07-06 RX ORDER — ACETAMINOPHEN 325 MG/1
650 TABLET ORAL
Status: CANCELLED | OUTPATIENT
Start: 2023-08-03

## 2023-07-06 RX ORDER — DIPHENHYDRAMINE HYDROCHLORIDE 50 MG/ML
50 INJECTION INTRAMUSCULAR; INTRAVENOUS
Status: CANCELLED | OUTPATIENT
Start: 2023-08-03

## 2023-07-06 RX ORDER — SODIUM CHLORIDE 9 MG/ML
INJECTION, SOLUTION INTRAVENOUS CONTINUOUS
Status: CANCELLED | OUTPATIENT
Start: 2023-08-03

## 2023-07-06 RX ADMIN — CYANOCOBALAMIN 1000 MCG: 1000 INJECTION, SOLUTION INTRAMUSCULAR at 15:24

## 2023-07-12 SDOH — ECONOMIC STABILITY: FOOD INSECURITY: WITHIN THE PAST 12 MONTHS, THE FOOD YOU BOUGHT JUST DIDN'T LAST AND YOU DIDN'T HAVE MONEY TO GET MORE.: NEVER TRUE

## 2023-07-12 SDOH — ECONOMIC STABILITY: FOOD INSECURITY: WITHIN THE PAST 12 MONTHS, YOU WORRIED THAT YOUR FOOD WOULD RUN OUT BEFORE YOU GOT MONEY TO BUY MORE.: NEVER TRUE

## 2023-07-12 SDOH — ECONOMIC STABILITY: TRANSPORTATION INSECURITY
IN THE PAST 12 MONTHS, HAS LACK OF TRANSPORTATION KEPT YOU FROM MEETINGS, WORK, OR FROM GETTING THINGS NEEDED FOR DAILY LIVING?: NO

## 2023-07-12 SDOH — ECONOMIC STABILITY: INCOME INSECURITY: HOW HARD IS IT FOR YOU TO PAY FOR THE VERY BASICS LIKE FOOD, HOUSING, MEDICAL CARE, AND HEATING?: NOT HARD AT ALL

## 2023-07-12 SDOH — ECONOMIC STABILITY: HOUSING INSECURITY
IN THE LAST 12 MONTHS, WAS THERE A TIME WHEN YOU DID NOT HAVE A STEADY PLACE TO SLEEP OR SLEPT IN A SHELTER (INCLUDING NOW)?: NO

## 2023-07-13 ENCOUNTER — TELEMEDICINE (OUTPATIENT)
Age: 73
End: 2023-07-13
Payer: MEDICARE

## 2023-07-13 DIAGNOSIS — N40.0 BENIGN PROSTATIC HYPERPLASIA, UNSPECIFIED WHETHER LOWER URINARY TRACT SYMPTOMS PRESENT: ICD-10-CM

## 2023-07-13 DIAGNOSIS — E11.9 WELL CONTROLLED DIABETES MELLITUS (HCC): ICD-10-CM

## 2023-07-13 DIAGNOSIS — E04.9 THYROID GOITER: ICD-10-CM

## 2023-07-13 DIAGNOSIS — K21.00 GASTROESOPHAGEAL REFLUX DISEASE WITH ESOPHAGITIS, UNSPECIFIED WHETHER HEMORRHAGE: ICD-10-CM

## 2023-07-13 DIAGNOSIS — Z86.010 PERSONAL HISTORY OF COLONIC POLYPS: ICD-10-CM

## 2023-07-13 DIAGNOSIS — E78.00 PURE HYPERCHOLESTEROLEMIA: ICD-10-CM

## 2023-07-13 DIAGNOSIS — I25.110 CORONARY ARTERY DISEASE INVOLVING NATIVE CORONARY ARTERY OF NATIVE HEART WITH UNSTABLE ANGINA PECTORIS (HCC): Primary | ICD-10-CM

## 2023-07-13 DIAGNOSIS — I10 ESSENTIAL HYPERTENSION: ICD-10-CM

## 2023-07-13 DIAGNOSIS — E04.1 THYROID NODULE: ICD-10-CM

## 2023-07-13 PROBLEM — Z86.0100 PERSONAL HISTORY OF COLONIC POLYPS: Status: ACTIVE | Noted: 2023-07-13

## 2023-07-13 PROCEDURE — G8428 CUR MEDS NOT DOCUMENT: HCPCS | Performed by: FAMILY MEDICINE

## 2023-07-13 PROCEDURE — 1123F ACP DISCUSS/DSCN MKR DOCD: CPT | Performed by: FAMILY MEDICINE

## 2023-07-13 PROCEDURE — 3017F COLORECTAL CA SCREEN DOC REV: CPT | Performed by: FAMILY MEDICINE

## 2023-07-13 PROCEDURE — 99214 OFFICE O/P EST MOD 30 MIN: CPT | Performed by: FAMILY MEDICINE

## 2023-07-13 PROCEDURE — 3046F HEMOGLOBIN A1C LEVEL >9.0%: CPT | Performed by: FAMILY MEDICINE

## 2023-07-13 PROCEDURE — 2022F DILAT RTA XM EVC RTNOPTHY: CPT | Performed by: FAMILY MEDICINE

## 2023-07-13 RX ORDER — GLIPIZIDE 5 MG/1
5 TABLET, FILM COATED, EXTENDED RELEASE ORAL DAILY
Qty: 90 TABLET | Refills: 1 | Status: SHIPPED | OUTPATIENT
Start: 2023-07-13

## 2023-07-13 ASSESSMENT — PATIENT HEALTH QUESTIONNAIRE - PHQ9
SUM OF ALL RESPONSES TO PHQ QUESTIONS 1-9: 0
1. LITTLE INTEREST OR PLEASURE IN DOING THINGS: 0
SUM OF ALL RESPONSES TO PHQ QUESTIONS 1-9: 0
2. FEELING DOWN, DEPRESSED OR HOPELESS: 0
SUM OF ALL RESPONSES TO PHQ9 QUESTIONS 1 & 2: 0

## 2023-07-13 NOTE — PROGRESS NOTES
1. \"Have you been to the ER, urgent care clinic since your last visit? Hospitalized since your last visit? \" Yes When: [facility] 5/18/23 for colonoscopy - Dr. Annie Austin. 2. \"Have you seen or consulted any other health care providers outside of the [address] [place] [address] Avenue since your last visit? \" [facility] Avril Ross [facility],[address]: 6/26/23    3. For patients aged 43-73: Has the patient had a colonoscopy / FIT/ Cologuard?  Yes - no Care Gap present 5/18/23 colonoscopy    Chief Complaint   Patient presents with    Diabetes    Hypertension    Cholesterol Problem    Goiter    Gastroesophageal Reflux    Other     Vitamin B12 deficiency
4/5/23  Yes David FABIOLA KAREN SalehN - NP   nitroGLYCERIN (NITROSTAT) 0.4 MG SL tablet Place 1 tablet under the tongue every 5 minutes as needed for Chest pain up to max of 3 total doses. If no relief after 1 dose, call 911. 3/7/23  Yes David Saleh APRN - NP   Cyanocobalamin 1000 MCG/ML KIT by Injection route.    Yes Historical Provider, MD   Blood Pressure Monitor KIT Check once a day 5/12/20  Yes Ar Automatic Reconciliation   amLODIPine (NORVASC) 5 MG tablet Take 1 tablet by mouth daily 11/8/22  Yes Ar Automatic Reconciliation   carvedilol (COREG) 25 MG tablet TAKE 1 TABLET BY MOUTH TWICE A DAY WITH FOOD 10/29/21  Yes Ar Automatic Reconciliation   cetirizine (ZYRTEC) 10 MG tablet Take 1 tablet by mouth daily as needed   Yes Ar Automatic Reconciliation   rosuvastatin (CRESTOR) 40 MG tablet TAKE 1 TABLET BY MOUTH NIGHTLY 1/11/22  Yes Ar Automatic Reconciliation     No Known Allergies    Patient Active Problem List    Diagnosis Date Noted    Coronary artery disease of native artery of native heart with stable angina pectoris ([address]) 12/01/2017    Benign prostatic hyperplasia 07/13/2023    Gastroesophageal reflux disease with esophagitis 07/13/2023    Personal history of colonic polyps 07/13/2023    Anemia 04/16/2021    B12 deficiency 04/16/2021    ACS (acute coronary syndrome) ([address]) 12/08/2020    Tubular adenoma of colon 09/26/2019    GERD (gastroesophageal reflux disease) 09/26/2019    Cervical disc disease 09/26/2019    Cannabis use, uncomplicated 35/78/0902    Controlled type 2 diabetes mellitus without complication, without long-term current use of insulin ([address]) 11/29/2018    Insomnia 11/29/2018    S/P coronary artery stent placement 01/25/2018    Hyperlipidemia 01/27/2016    Coronary artery disease involving native coronary artery with unstable angina pectoris ([address]) 01/27/2016    Thyroid goiter 01/27/2016    Essential hypertension 01/27/2016    Unstable angina pectoris ([address]) 01/17/2016    Chronic pain syndrome

## 2023-08-08 ENCOUNTER — HOSPITAL ENCOUNTER (OUTPATIENT)
Facility: HOSPITAL | Age: 73
Discharge: HOME | End: 2023-08-11
Payer: MEDICARE

## 2023-08-08 DIAGNOSIS — E04.9 THYROID GOITER: ICD-10-CM

## 2023-08-08 DIAGNOSIS — E11.9 WELL CONTROLLED DIABETES MELLITUS (HCC): ICD-10-CM

## 2023-08-08 DIAGNOSIS — E78.00 PURE HYPERCHOLESTEROLEMIA: ICD-10-CM

## 2023-08-08 LAB
ALBUMIN SERPL-MCNC: 3.6 G/DL (ref 3.4–5)
ALBUMIN/GLOB SERPL: 1.1 (ref 0.8–1.7)
ALP SERPL-CCNC: 64 U/L (ref 45–117)
ALT SERPL-CCNC: 17 U/L (ref 16–61)
ANION GAP SERPL CALC-SCNC: 4 MMOL/L (ref 3–18)
AST SERPL-CCNC: 12 U/L (ref 10–38)
BILIRUB SERPL-MCNC: 0.3 MG/DL (ref 0.2–1)
BUN SERPL-MCNC: 18 MG/DL (ref 7–18)
BUN/CREAT SERPL: 13 (ref 12–20)
CALCIUM SERPL-MCNC: 9.2 MG/DL (ref 8.5–10.1)
CHLORIDE SERPL-SCNC: 108 MMOL/L (ref 100–111)
CHOLEST SERPL-MCNC: 95 MG/DL
CO2 SERPL-SCNC: 26 MMOL/L (ref 21–32)
CREAT SERPL-MCNC: 1.44 MG/DL (ref 0.6–1.3)
CREAT UR-MCNC: 233 MG/DL (ref 30–125)
EST. AVERAGE GLUCOSE BLD GHB EST-MCNC: 97 MG/DL
GLOBULIN SER CALC-MCNC: 3.3 G/DL (ref 2–4)
GLUCOSE SERPL-MCNC: 132 MG/DL (ref 74–99)
HBA1C MFR BLD: 5 % (ref 4.2–5.6)
HDLC SERPL-MCNC: 33 MG/DL (ref 40–60)
HDLC SERPL: 2.9 (ref 0–5)
LDLC SERPL CALC-MCNC: 14 MG/DL (ref 0–100)
LIPID PANEL: ABNORMAL
MICROALBUMIN UR-MCNC: 15.9 MG/DL (ref 0–3)
MICROALBUMIN/CREAT UR-RTO: 68 MG/G (ref 0–30)
POTASSIUM SERPL-SCNC: 3.9 MMOL/L (ref 3.5–5.5)
PROT SERPL-MCNC: 6.9 G/DL (ref 6.4–8.2)
SODIUM SERPL-SCNC: 138 MMOL/L (ref 136–145)
TRIGL SERPL-MCNC: 240 MG/DL
TSH SERPL DL<=0.05 MIU/L-ACNC: 1.73 UIU/ML (ref 0.36–3.74)
VLDLC SERPL CALC-MCNC: 48 MG/DL

## 2023-08-08 PROCEDURE — 84443 ASSAY THYROID STIM HORMONE: CPT

## 2023-08-08 PROCEDURE — 83036 HEMOGLOBIN GLYCOSYLATED A1C: CPT

## 2023-08-08 PROCEDURE — 82043 UR ALBUMIN QUANTITATIVE: CPT

## 2023-08-08 PROCEDURE — 80053 COMPREHEN METABOLIC PANEL: CPT

## 2023-08-08 PROCEDURE — 80061 LIPID PANEL: CPT

## 2023-08-08 PROCEDURE — 82570 ASSAY OF URINE CREATININE: CPT

## 2023-08-08 PROCEDURE — 36415 COLL VENOUS BLD VENIPUNCTURE: CPT

## 2023-08-09 DIAGNOSIS — N28.9 RENAL INSUFFICIENCY: ICD-10-CM

## 2023-08-09 DIAGNOSIS — R80.9 MICROALBUMINURIA: Primary | ICD-10-CM

## 2023-08-10 ENCOUNTER — HOSPITAL ENCOUNTER (OUTPATIENT)
Facility: HOSPITAL | Age: 73
Setting detail: INFUSION SERIES
End: 2023-08-10
Payer: MEDICARE

## 2023-08-10 VITALS
TEMPERATURE: 97.9 F | HEART RATE: 74 BPM | DIASTOLIC BLOOD PRESSURE: 75 MMHG | RESPIRATION RATE: 18 BRPM | SYSTOLIC BLOOD PRESSURE: 129 MMHG | OXYGEN SATURATION: 96 %

## 2023-08-10 DIAGNOSIS — E53.8 B12 DEFICIENCY: Primary | ICD-10-CM

## 2023-08-10 PROCEDURE — 96372 THER/PROPH/DIAG INJ SC/IM: CPT

## 2023-08-10 PROCEDURE — 6360000002 HC RX W HCPCS: Performed by: FAMILY MEDICINE

## 2023-08-10 RX ORDER — ALBUTEROL SULFATE 90 UG/1
4 AEROSOL, METERED RESPIRATORY (INHALATION) PRN
Status: CANCELLED | OUTPATIENT
Start: 2023-08-10

## 2023-08-10 RX ORDER — EPINEPHRINE 1 MG/ML
0.3 INJECTION, SOLUTION, CONCENTRATE INTRAVENOUS PRN
Status: CANCELLED | OUTPATIENT
Start: 2023-09-07

## 2023-08-10 RX ORDER — DIPHENHYDRAMINE HYDROCHLORIDE 50 MG/ML
50 INJECTION INTRAMUSCULAR; INTRAVENOUS
Status: CANCELLED | OUTPATIENT
Start: 2023-08-10

## 2023-08-10 RX ORDER — CYANOCOBALAMIN 1000 UG/ML
1000 INJECTION, SOLUTION INTRAMUSCULAR; SUBCUTANEOUS ONCE
Status: COMPLETED | OUTPATIENT
Start: 2023-08-10 | End: 2023-08-10

## 2023-08-10 RX ORDER — ONDANSETRON 2 MG/ML
8 INJECTION INTRAMUSCULAR; INTRAVENOUS
Status: CANCELLED | OUTPATIENT
Start: 2023-09-07

## 2023-08-10 RX ORDER — SODIUM CHLORIDE 9 MG/ML
INJECTION, SOLUTION INTRAVENOUS CONTINUOUS
Status: CANCELLED | OUTPATIENT
Start: 2023-09-07

## 2023-08-10 RX ORDER — ACETAMINOPHEN 325 MG/1
650 TABLET ORAL
Status: CANCELLED | OUTPATIENT
Start: 2023-09-07

## 2023-08-10 RX ORDER — DIPHENHYDRAMINE HYDROCHLORIDE 50 MG/ML
50 INJECTION INTRAMUSCULAR; INTRAVENOUS
Status: CANCELLED | OUTPATIENT
Start: 2023-09-07

## 2023-08-10 RX ORDER — ONDANSETRON 2 MG/ML
8 INJECTION INTRAMUSCULAR; INTRAVENOUS
Status: CANCELLED | OUTPATIENT
Start: 2023-08-10

## 2023-08-10 RX ORDER — ALBUTEROL SULFATE 90 UG/1
4 AEROSOL, METERED RESPIRATORY (INHALATION) PRN
Status: CANCELLED | OUTPATIENT
Start: 2023-09-07

## 2023-08-10 RX ORDER — ACETAMINOPHEN 325 MG/1
650 TABLET ORAL
Status: CANCELLED | OUTPATIENT
Start: 2023-08-10

## 2023-08-10 RX ORDER — EPINEPHRINE 1 MG/ML
0.3 INJECTION, SOLUTION, CONCENTRATE INTRAVENOUS PRN
Status: CANCELLED | OUTPATIENT
Start: 2023-08-10

## 2023-08-10 RX ORDER — CYANOCOBALAMIN 1000 UG/ML
1000 INJECTION, SOLUTION INTRAMUSCULAR; SUBCUTANEOUS ONCE
Status: CANCELLED | OUTPATIENT
Start: 2023-09-07 | End: 2023-09-07

## 2023-08-10 RX ORDER — SODIUM CHLORIDE 9 MG/ML
INJECTION, SOLUTION INTRAVENOUS CONTINUOUS
Status: CANCELLED | OUTPATIENT
Start: 2023-08-10

## 2023-08-10 RX ADMIN — CYANOCOBALAMIN 1000 MCG: 1000 INJECTION, SOLUTION INTRAMUSCULAR at 15:19

## 2023-09-07 ENCOUNTER — HOSPITAL ENCOUNTER (OUTPATIENT)
Facility: HOSPITAL | Age: 73
Setting detail: INFUSION SERIES
End: 2023-09-07
Payer: MEDICARE

## 2023-09-07 VITALS
RESPIRATION RATE: 16 BRPM | SYSTOLIC BLOOD PRESSURE: 111 MMHG | TEMPERATURE: 97.4 F | DIASTOLIC BLOOD PRESSURE: 65 MMHG | HEART RATE: 77 BPM | OXYGEN SATURATION: 95 %

## 2023-09-07 DIAGNOSIS — E53.8 B12 DEFICIENCY: Primary | ICD-10-CM

## 2023-09-07 PROCEDURE — 96372 THER/PROPH/DIAG INJ SC/IM: CPT

## 2023-09-07 PROCEDURE — 6360000002 HC RX W HCPCS: Performed by: FAMILY MEDICINE

## 2023-09-07 RX ORDER — EPINEPHRINE 1 MG/ML
0.3 INJECTION, SOLUTION, CONCENTRATE INTRAVENOUS PRN
Status: CANCELLED | OUTPATIENT
Start: 2023-10-05

## 2023-09-07 RX ORDER — ALBUTEROL SULFATE 90 UG/1
4 AEROSOL, METERED RESPIRATORY (INHALATION) PRN
Status: CANCELLED | OUTPATIENT
Start: 2023-10-05

## 2023-09-07 RX ORDER — CYANOCOBALAMIN 1000 UG/ML
1000 INJECTION, SOLUTION INTRAMUSCULAR; SUBCUTANEOUS ONCE
Status: COMPLETED | OUTPATIENT
Start: 2023-09-07 | End: 2023-09-07

## 2023-09-07 RX ORDER — ACETAMINOPHEN 325 MG/1
650 TABLET ORAL
Status: CANCELLED | OUTPATIENT
Start: 2023-10-05

## 2023-09-07 RX ORDER — SODIUM CHLORIDE 9 MG/ML
INJECTION, SOLUTION INTRAVENOUS CONTINUOUS
Status: CANCELLED | OUTPATIENT
Start: 2023-10-05

## 2023-09-07 RX ORDER — DIPHENHYDRAMINE HYDROCHLORIDE 50 MG/ML
50 INJECTION INTRAMUSCULAR; INTRAVENOUS
Status: CANCELLED | OUTPATIENT
Start: 2023-10-05

## 2023-09-07 RX ORDER — CYANOCOBALAMIN 1000 UG/ML
1000 INJECTION, SOLUTION INTRAMUSCULAR; SUBCUTANEOUS ONCE
Status: CANCELLED | OUTPATIENT
Start: 2023-10-05 | End: 2023-10-05

## 2023-09-07 RX ORDER — ONDANSETRON 2 MG/ML
8 INJECTION INTRAMUSCULAR; INTRAVENOUS
Status: CANCELLED | OUTPATIENT
Start: 2023-10-05

## 2023-09-07 RX ADMIN — CYANOCOBALAMIN 1000 MCG: 1000 INJECTION, SOLUTION INTRAMUSCULAR at 15:25

## 2023-09-07 ASSESSMENT — PAIN - FUNCTIONAL ASSESSMENT: PAIN_FUNCTIONAL_ASSESSMENT: NONE - DENIES PAIN

## 2023-09-27 DIAGNOSIS — E11.9 TYPE 2 DIABETES MELLITUS WITHOUT COMPLICATIONS (HCC): ICD-10-CM

## 2023-09-27 RX ORDER — SITAGLIPTIN 100 MG/1
TABLET, FILM COATED ORAL
Qty: 90 TABLET | Refills: 0 | OUTPATIENT
Start: 2023-09-27

## 2023-10-04 RX ORDER — SODIUM CHLORIDE 9 MG/ML
INJECTION, SOLUTION INTRAVENOUS CONTINUOUS
OUTPATIENT
Start: 2023-10-04

## 2023-10-04 RX ORDER — CYANOCOBALAMIN 1000 UG/ML
1000 INJECTION, SOLUTION INTRAMUSCULAR; SUBCUTANEOUS ONCE
Status: CANCELLED | OUTPATIENT
Start: 2023-10-04 | End: 2023-10-04

## 2023-10-04 RX ORDER — DIPHENHYDRAMINE HYDROCHLORIDE 50 MG/ML
50 INJECTION INTRAMUSCULAR; INTRAVENOUS
OUTPATIENT
Start: 2023-10-04

## 2023-10-04 RX ORDER — EPINEPHRINE 1 MG/ML
0.3 INJECTION, SOLUTION, CONCENTRATE INTRAVENOUS PRN
OUTPATIENT
Start: 2023-10-04

## 2023-10-04 RX ORDER — ONDANSETRON 2 MG/ML
8 INJECTION INTRAMUSCULAR; INTRAVENOUS
OUTPATIENT
Start: 2023-10-04

## 2023-10-04 RX ORDER — ACETAMINOPHEN 325 MG/1
650 TABLET ORAL
OUTPATIENT
Start: 2023-10-04

## 2023-10-04 RX ORDER — ALBUTEROL SULFATE 90 UG/1
4 AEROSOL, METERED RESPIRATORY (INHALATION) PRN
OUTPATIENT
Start: 2023-10-04

## 2023-10-05 ENCOUNTER — HOSPITAL ENCOUNTER (OUTPATIENT)
Facility: HOSPITAL | Age: 73
Setting detail: INFUSION SERIES
End: 2023-10-05
Payer: MEDICARE

## 2023-10-05 VITALS
DIASTOLIC BLOOD PRESSURE: 69 MMHG | TEMPERATURE: 98.4 F | OXYGEN SATURATION: 95 % | HEART RATE: 76 BPM | RESPIRATION RATE: 16 BRPM | SYSTOLIC BLOOD PRESSURE: 132 MMHG

## 2023-10-05 DIAGNOSIS — E53.8 B12 DEFICIENCY: Primary | ICD-10-CM

## 2023-10-05 PROCEDURE — 96372 THER/PROPH/DIAG INJ SC/IM: CPT

## 2023-10-05 PROCEDURE — 6360000002 HC RX W HCPCS: Performed by: FAMILY MEDICINE

## 2023-10-05 RX ORDER — CYANOCOBALAMIN 1000 UG/ML
1000 INJECTION, SOLUTION INTRAMUSCULAR; SUBCUTANEOUS ONCE
Status: COMPLETED | OUTPATIENT
Start: 2023-10-05 | End: 2023-10-05

## 2023-10-05 RX ADMIN — CYANOCOBALAMIN 1000 MCG: 1000 INJECTION, SOLUTION INTRAMUSCULAR at 15:48

## 2023-10-14 SDOH — HEALTH STABILITY: PHYSICAL HEALTH: ON AVERAGE, HOW MANY DAYS PER WEEK DO YOU ENGAGE IN MODERATE TO STRENUOUS EXERCISE (LIKE A BRISK WALK)?: 3 DAYS

## 2023-10-14 SDOH — HEALTH STABILITY: PHYSICAL HEALTH: ON AVERAGE, HOW MANY MINUTES DO YOU ENGAGE IN EXERCISE AT THIS LEVEL?: 140 MIN

## 2023-10-14 ASSESSMENT — LIFESTYLE VARIABLES
HOW MANY STANDARD DRINKS CONTAINING ALCOHOL DO YOU HAVE ON A TYPICAL DAY: 0
HOW OFTEN DO YOU HAVE A DRINK CONTAINING ALCOHOL: NEVER
HOW MANY STANDARD DRINKS CONTAINING ALCOHOL DO YOU HAVE ON A TYPICAL DAY: PATIENT DOES NOT DRINK
HOW OFTEN DO YOU HAVE A DRINK CONTAINING ALCOHOL: 1
HOW OFTEN DO YOU HAVE SIX OR MORE DRINKS ON ONE OCCASION: 1

## 2023-10-14 ASSESSMENT — PATIENT HEALTH QUESTIONNAIRE - PHQ9
SUM OF ALL RESPONSES TO PHQ9 QUESTIONS 1 & 2: 0
SUM OF ALL RESPONSES TO PHQ QUESTIONS 1-9: 0
1. LITTLE INTEREST OR PLEASURE IN DOING THINGS: 0
SUM OF ALL RESPONSES TO PHQ QUESTIONS 1-9: 0
SUM OF ALL RESPONSES TO PHQ QUESTIONS 1-9: 0
2. FEELING DOWN, DEPRESSED OR HOPELESS: 0
SUM OF ALL RESPONSES TO PHQ QUESTIONS 1-9: 0

## 2023-10-16 ENCOUNTER — TELEMEDICINE (OUTPATIENT)
Age: 73
End: 2023-10-16
Payer: MEDICARE

## 2023-10-16 DIAGNOSIS — E11.8 TYPE 2 DIABETES MELLITUS WITH UNSPECIFIED COMPLICATIONS (HCC): ICD-10-CM

## 2023-10-16 DIAGNOSIS — I25.118 CORONARY ARTERY DISEASE OF NATIVE ARTERY OF NATIVE HEART WITH STABLE ANGINA PECTORIS (HCC): ICD-10-CM

## 2023-10-16 DIAGNOSIS — R11.0 CHRONIC NAUSEA: ICD-10-CM

## 2023-10-16 DIAGNOSIS — I10 ESSENTIAL HYPERTENSION: ICD-10-CM

## 2023-10-16 DIAGNOSIS — E61.1 IRON DEFICIENCY: ICD-10-CM

## 2023-10-16 DIAGNOSIS — Z00.00 MEDICARE ANNUAL WELLNESS VISIT, SUBSEQUENT: Primary | ICD-10-CM

## 2023-10-16 DIAGNOSIS — N40.0 BENIGN PROSTATIC HYPERPLASIA, UNSPECIFIED WHETHER LOWER URINARY TRACT SYMPTOMS PRESENT: ICD-10-CM

## 2023-10-16 DIAGNOSIS — E04.9 THYROID GOITER: ICD-10-CM

## 2023-10-16 DIAGNOSIS — E78.00 PURE HYPERCHOLESTEROLEMIA: ICD-10-CM

## 2023-10-16 DIAGNOSIS — K21.9 GASTROESOPHAGEAL REFLUX DISEASE WITHOUT ESOPHAGITIS: ICD-10-CM

## 2023-10-16 DIAGNOSIS — R63.4 WEIGHT LOSS: ICD-10-CM

## 2023-10-16 DIAGNOSIS — E53.8 B12 DEFICIENCY: ICD-10-CM

## 2023-10-16 PROCEDURE — 1036F TOBACCO NON-USER: CPT | Performed by: FAMILY MEDICINE

## 2023-10-16 PROCEDURE — G0439 PPPS, SUBSEQ VISIT: HCPCS | Performed by: FAMILY MEDICINE

## 2023-10-16 PROCEDURE — G8484 FLU IMMUNIZE NO ADMIN: HCPCS | Performed by: FAMILY MEDICINE

## 2023-10-16 PROCEDURE — 2022F DILAT RTA XM EVC RTNOPTHY: CPT | Performed by: FAMILY MEDICINE

## 2023-10-16 PROCEDURE — 99214 OFFICE O/P EST MOD 30 MIN: CPT | Performed by: FAMILY MEDICINE

## 2023-10-16 PROCEDURE — G8428 CUR MEDS NOT DOCUMENT: HCPCS | Performed by: FAMILY MEDICINE

## 2023-10-16 PROCEDURE — 1123F ACP DISCUSS/DSCN MKR DOCD: CPT | Performed by: FAMILY MEDICINE

## 2023-10-16 PROCEDURE — 3044F HG A1C LEVEL LT 7.0%: CPT | Performed by: FAMILY MEDICINE

## 2023-10-16 PROCEDURE — G8417 CALC BMI ABV UP PARAM F/U: HCPCS | Performed by: FAMILY MEDICINE

## 2023-10-16 PROCEDURE — 3017F COLORECTAL CA SCREEN DOC REV: CPT | Performed by: FAMILY MEDICINE

## 2023-10-16 NOTE — PROGRESS NOTES
1. \"Have you been to the ER, urgent care clinic since your last visit? Hospitalized since your last visit? \" No    2. \"Have you seen or consulted any other health care providers outside of the [address] [place] [address] since your last visit? \" No     3. For patients aged 43-73: Has the patient had a colonoscopy / FIT/ Cologuard?  Yes - no Care Gap present    Chief Complaint   Patient presents with    Medicare AWV    Referral - General     hematology
Consent: Gypsy Hardy, who was seen by synchronous (real-time) audio-video technology, and/or his healthcare decision maker, is aware that this patient-initiated, Telehealth encounter on 10/16/2023 is a billable service, with coverage as determined by his insurance carrier. He is aware that he may receive a bill and has provided verbal consent to proceed: Yes      Assessment & Plan:   Kenia Brantley was seen today for medicare awv and referral - general.    Diagnoses and all orders for this visit:    Coronary artery disease of native artery of native heart with stable angina pectoris [facility]): On present management. Taking statin. Lately blood pressure is already low. Contacted cardiology and was advised to decrease dose of amlodipine. Still on and off with certain activity blood pressure was low. Advised to hold amlodipine after discussing it with cardiology. Blood pressure running systolic in 01F and diastolic in 86X or 05P. During visit sitting comfortable and asymptomatic. Type 2 diabetes mellitus with unspecified complications ([address]): Last A1c within normal limit. We will continue current plan. Recheck labs prior next visit. Continue diet and lifestyle modification. Consider stopping glipizide as his fasting sugar is between  and A1C in normal range. No hypoglycemic episode. -     Hemoglobin A1C; Future  -     Comprehensive Metabolic Panel; Future    Benign prostatic hyperplasia, unspecified whether lower urinary tract symptoms present: Advised to continue follow-up with urology. Asymptomatic. Essential hypertension: Blood pressure running low. See above. Following cardiology and the recommendation. Recently amlodipine dose was reduced    Pure hypercholesterolemia: Taking statin no side effects. Diet modification    Thyroid goiter: Asymptomatic. Supposed to go for thyroid nodule biopsy. Noncompliant. Again we done a referral and thyroid ultrasound but has not completed since July.
Automatic Reconciliation, Ar   carvedilol (COREG) 25 MG tablet TAKE 1 TABLET BY MOUTH TWICE A DAY WITH FOOD Yes Automatic Reconciliation, Ar   cetirizine (ZYRTEC) 10 MG tablet Take 1 tablet by mouth daily as needed Yes Automatic Reconciliation, Ar   rosuvastatin (CRESTOR) 40 MG tablet TAKE 1 TABLET BY MOUTH NIGHTLY Yes Automatic Reconciliation, Ar       CareTeam (Including outside providers/suppliers regularly involved in providing care):   Patient Care Team:  Mitzi Lindsey MD as PCP - Gregor Fontana MD as PCP - Empaneled Provider  Leonardo Lewis MD as Physician  Hermann Baldwin MD as Physician     Reviewed and updated this visit:  Allergies          Aj Palomino, was evaluated through a synchronous (real-time) audio-video encounter. The patient (or guardian if applicable) is aware that this is a billable service, which includes applicable co-pays. This Virtual Visit was conducted with patient's (and/or legal guardian's) consent. Patient identification was verified, and a caregiver was present when appropriate. The patient was located at Home: [address] [facility]  [address]  Provider was located at [address]): [address]  [place],  [address] [facility]    Please note that this dictation was completed with [employer], the computer voice recognition software. Quite often unanticipated grammatical, syntax, homophones, and other interpretive errors are inadvertently transcribed by the computer software. Please disregard these errors. Please excuse any errors that have escaped final proofreading.

## 2023-10-16 NOTE — PATIENT INSTRUCTIONS
health care agent? Choose your health care agent carefully. This person may or may not be a family member. Talk to the person before you make your final decision. Make sure this person is comfortable with this responsibility. It's a good idea to choose someone who:  Is at least 25years old. Knows you well and understands what makes life meaningful for you. Understands your Jehovah's witness and moral values. Will do what you want, not what that person wants. Will be able to make difficult choices at a stressful time. Will be able to refuse or stop treatment, if that is what you would want, even if you could die. Will be firm and confident with health professionals if needed. Will ask questions to get needed information. Lives near you or agrees to travel to you if needed. Your family may help you make medical decisions while you can still be part of that process. But it's important to choose one person to be your health care agent in case you aren't able to make decisions for yourself. If you don't fill out the legal form and name a health care agent, the decisions your family can make may be limited. A health care agent may be called something else in your state. Who will make decisions for you if you don't have a health care agent? If you don't have a health care agent or a living will, you may not get the care you want. Decisions may be made by family members who disagree about your medical care. Or decisions may be made by a medical professional who doesn't know you well. In some cases, a  makes the decisions. When you name a health care agent, it is very clear who has the power to make health decisions for you. How do you name a health care agent? You name your health care agent on a legal form. This form is usually called a medical power of . Ask your hospital, state bar association, or office on aging where to find these forms.   You must sign the form to make it legal. Some states

## 2023-10-18 ENCOUNTER — HOSPITAL ENCOUNTER (OUTPATIENT)
Facility: HOSPITAL | Age: 73
Discharge: HOME | End: 2023-10-21
Attending: FAMILY MEDICINE
Payer: MEDICARE

## 2023-10-18 DIAGNOSIS — E04.9 THYROID GOITER: ICD-10-CM

## 2023-10-18 PROCEDURE — 76536 US EXAM OF HEAD AND NECK: CPT

## 2023-11-02 ENCOUNTER — HOSPITAL ENCOUNTER (OUTPATIENT)
Facility: HOSPITAL | Age: 73
Setting detail: INFUSION SERIES
End: 2023-11-02
Payer: MEDICARE

## 2023-11-02 VITALS
SYSTOLIC BLOOD PRESSURE: 104 MMHG | TEMPERATURE: 96.7 F | RESPIRATION RATE: 16 BRPM | HEART RATE: 68 BPM | OXYGEN SATURATION: 96 % | DIASTOLIC BLOOD PRESSURE: 62 MMHG

## 2023-11-02 DIAGNOSIS — E53.8 B12 DEFICIENCY: Primary | ICD-10-CM

## 2023-11-02 PROCEDURE — 6360000002 HC RX W HCPCS: Performed by: FAMILY MEDICINE

## 2023-11-02 PROCEDURE — 96372 THER/PROPH/DIAG INJ SC/IM: CPT

## 2023-11-02 RX ORDER — ACETAMINOPHEN 325 MG/1
650 TABLET ORAL
OUTPATIENT
Start: 2023-11-30

## 2023-11-02 RX ORDER — CYANOCOBALAMIN 1000 UG/ML
1000 INJECTION, SOLUTION INTRAMUSCULAR; SUBCUTANEOUS ONCE
Status: COMPLETED | OUTPATIENT
Start: 2023-11-02 | End: 2023-11-02

## 2023-11-02 RX ORDER — ALBUTEROL SULFATE 90 UG/1
4 AEROSOL, METERED RESPIRATORY (INHALATION) PRN
OUTPATIENT
Start: 2023-11-30

## 2023-11-02 RX ORDER — ONDANSETRON 2 MG/ML
8 INJECTION INTRAMUSCULAR; INTRAVENOUS
OUTPATIENT
Start: 2023-11-30

## 2023-11-02 RX ORDER — EPINEPHRINE 1 MG/ML
0.3 INJECTION, SOLUTION, CONCENTRATE INTRAVENOUS PRN
OUTPATIENT
Start: 2023-11-30

## 2023-11-02 RX ORDER — SODIUM CHLORIDE 9 MG/ML
INJECTION, SOLUTION INTRAVENOUS CONTINUOUS
OUTPATIENT
Start: 2023-11-30

## 2023-11-02 RX ORDER — CYANOCOBALAMIN 1000 UG/ML
1000 INJECTION, SOLUTION INTRAMUSCULAR; SUBCUTANEOUS ONCE
Start: 2023-11-30 | End: 2023-11-30

## 2023-11-02 RX ORDER — DIPHENHYDRAMINE HYDROCHLORIDE 50 MG/ML
50 INJECTION INTRAMUSCULAR; INTRAVENOUS
OUTPATIENT
Start: 2023-11-30

## 2023-11-02 RX ADMIN — CYANOCOBALAMIN 1000 MCG: 1000 INJECTION, SOLUTION INTRAMUSCULAR at 15:56

## 2023-11-24 ENCOUNTER — APPOINTMENT (OUTPATIENT)
Facility: HOSPITAL | Age: 73
End: 2023-11-24
Payer: MEDICARE

## 2023-11-24 ENCOUNTER — HOSPITAL ENCOUNTER (EMERGENCY)
Facility: HOSPITAL | Age: 73
Discharge: HOME | End: 2023-11-24
Attending: EMERGENCY MEDICINE
Payer: MEDICARE

## 2023-11-24 VITALS
HEART RATE: 92 BPM | HEIGHT: 72 IN | RESPIRATION RATE: 19 BRPM | TEMPERATURE: 97.8 F | WEIGHT: 181 LBS | SYSTOLIC BLOOD PRESSURE: 134 MMHG | DIASTOLIC BLOOD PRESSURE: 80 MMHG | BODY MASS INDEX: 24.52 KG/M2 | OXYGEN SATURATION: 97 %

## 2023-11-24 DIAGNOSIS — K44.9 HIATAL HERNIA WITH GASTROESOPHAGEAL REFLUX: Primary | ICD-10-CM

## 2023-11-24 DIAGNOSIS — K21.9 HIATAL HERNIA WITH GASTROESOPHAGEAL REFLUX: Primary | ICD-10-CM

## 2023-11-24 LAB
ALBUMIN SERPL-MCNC: 3.7 G/DL (ref 3.4–5)
ALBUMIN/GLOB SERPL: 0.9 (ref 0.8–1.7)
ALP SERPL-CCNC: 75 U/L (ref 45–117)
ALT SERPL-CCNC: 20 U/L (ref 16–61)
ANION GAP SERPL CALC-SCNC: 14 MMOL/L (ref 3–18)
AST SERPL-CCNC: 13 U/L (ref 10–38)
BASOPHILS # BLD: 0.1 K/UL (ref 0–0.1)
BASOPHILS NFR BLD: 1 % (ref 0–2)
BILIRUB SERPL-MCNC: 0.5 MG/DL (ref 0.2–1)
BUN SERPL-MCNC: 18 MG/DL (ref 7–18)
BUN/CREAT SERPL: 14 (ref 12–20)
CALCIUM SERPL-MCNC: 9.1 MG/DL (ref 8.5–10.1)
CHLORIDE SERPL-SCNC: 109 MMOL/L (ref 100–111)
CO2 SERPL-SCNC: 21 MMOL/L (ref 21–32)
CREAT SERPL-MCNC: 1.25 MG/DL (ref 0.6–1.3)
DIFFERENTIAL METHOD BLD: ABNORMAL
EOSINOPHIL # BLD: 0.1 K/UL (ref 0–0.4)
EOSINOPHIL NFR BLD: 1 % (ref 0–5)
ERYTHROCYTE [DISTWIDTH] IN BLOOD BY AUTOMATED COUNT: 13.3 % (ref 11.6–14.5)
GLOBULIN SER CALC-MCNC: 3.9 G/DL (ref 2–4)
GLUCOSE SERPL-MCNC: 128 MG/DL (ref 74–99)
HCT VFR BLD AUTO: 40 % (ref 36–48)
HGB BLD-MCNC: 14.1 G/DL (ref 13–16)
IMM GRANULOCYTES # BLD AUTO: 0.1 K/UL (ref 0–0.04)
IMM GRANULOCYTES NFR BLD AUTO: 1 % (ref 0–0.5)
LIPASE SERPL-CCNC: 36 U/L (ref 13–75)
LYMPHOCYTES # BLD: 1.4 K/UL (ref 0.9–3.6)
LYMPHOCYTES NFR BLD: 12 % (ref 21–52)
MCH RBC QN AUTO: 31.3 PG (ref 24–34)
MCHC RBC AUTO-ENTMCNC: 35.3 G/DL (ref 31–37)
MCV RBC AUTO: 88.9 FL (ref 78–100)
MONOCYTES # BLD: 1 K/UL (ref 0.05–1.2)
MONOCYTES NFR BLD: 9 % (ref 3–10)
NEUTS SEG # BLD: 9.3 K/UL (ref 1.8–8)
NEUTS SEG NFR BLD: 78 % (ref 40–73)
NRBC # BLD: 0 K/UL (ref 0–0.01)
NRBC BLD-RTO: 0 PER 100 WBC
PLATELET # BLD AUTO: 192 K/UL (ref 135–420)
PMV BLD AUTO: 9.9 FL (ref 9.2–11.8)
POTASSIUM SERPL-SCNC: 4.1 MMOL/L (ref 3.5–5.5)
PROT SERPL-MCNC: 7.6 G/DL (ref 6.4–8.2)
RBC # BLD AUTO: 4.5 M/UL (ref 4.35–5.65)
SODIUM SERPL-SCNC: 144 MMOL/L (ref 136–145)
TROPONIN I SERPL HS-MCNC: 11 NG/L (ref 0–78)
TROPONIN I SERPL HS-MCNC: 11 NG/L (ref 0–78)
WBC # BLD AUTO: 12 K/UL (ref 4.6–13.2)

## 2023-11-24 PROCEDURE — 6360000004 HC RX CONTRAST MEDICATION: Performed by: EMERGENCY MEDICINE

## 2023-11-24 PROCEDURE — 71045 X-RAY EXAM CHEST 1 VIEW: CPT

## 2023-11-24 PROCEDURE — 2580000003 HC RX 258: Performed by: EMERGENCY MEDICINE

## 2023-11-24 PROCEDURE — 93005 ELECTROCARDIOGRAM TRACING: CPT | Performed by: EMERGENCY MEDICINE

## 2023-11-24 PROCEDURE — 74177 CT ABD & PELVIS W/CONTRAST: CPT

## 2023-11-24 PROCEDURE — 80053 COMPREHEN METABOLIC PANEL: CPT

## 2023-11-24 PROCEDURE — 85025 COMPLETE CBC W/AUTO DIFF WBC: CPT

## 2023-11-24 PROCEDURE — C9113 INJ PANTOPRAZOLE SODIUM, VIA: HCPCS | Performed by: EMERGENCY MEDICINE

## 2023-11-24 PROCEDURE — 84484 ASSAY OF TROPONIN QUANT: CPT

## 2023-11-24 PROCEDURE — 83690 ASSAY OF LIPASE: CPT

## 2023-11-24 PROCEDURE — A4216 STERILE WATER/SALINE, 10 ML: HCPCS | Performed by: EMERGENCY MEDICINE

## 2023-11-24 PROCEDURE — 96375 TX/PRO/DX INJ NEW DRUG ADDON: CPT

## 2023-11-24 PROCEDURE — 6360000002 HC RX W HCPCS: Performed by: EMERGENCY MEDICINE

## 2023-11-24 PROCEDURE — 99285 EMERGENCY DEPT VISIT HI MDM: CPT

## 2023-11-24 PROCEDURE — 96374 THER/PROPH/DIAG INJ IV PUSH: CPT

## 2023-11-24 RX ORDER — PANTOPRAZOLE SODIUM 40 MG/1
40 TABLET, DELAYED RELEASE ORAL DAILY
Qty: 30 TABLET | Refills: 0 | Status: SHIPPED | OUTPATIENT
Start: 2023-11-24

## 2023-11-24 RX ORDER — SODIUM CHLORIDE 9 MG/ML
INJECTION, SOLUTION INTRAVENOUS CONTINUOUS
Status: DISCONTINUED | OUTPATIENT
Start: 2023-11-24 | End: 2023-11-25 | Stop reason: HOSPADM

## 2023-11-24 RX ORDER — METOCLOPRAMIDE HYDROCHLORIDE 5 MG/ML
10 INJECTION INTRAMUSCULAR; INTRAVENOUS
Status: COMPLETED | OUTPATIENT
Start: 2023-11-24 | End: 2023-11-24

## 2023-11-24 RX ORDER — PROMETHAZINE HYDROCHLORIDE 25 MG/1
25 TABLET ORAL 4 TIMES DAILY PRN
Qty: 20 TABLET | Refills: 0 | Status: SHIPPED | OUTPATIENT
Start: 2023-11-24 | End: 2023-12-01

## 2023-11-24 RX ADMIN — SODIUM CHLORIDE: 9 INJECTION, SOLUTION INTRAVENOUS at 20:18

## 2023-11-24 RX ADMIN — SODIUM CHLORIDE 40 MG: 9 INJECTION INTRAMUSCULAR; INTRAVENOUS; SUBCUTANEOUS at 20:18

## 2023-11-24 RX ADMIN — METOCLOPRAMIDE 10 MG: 5 INJECTION, SOLUTION INTRAMUSCULAR; INTRAVENOUS at 20:18

## 2023-11-24 RX ADMIN — IOPAMIDOL 100 ML: 612 INJECTION, SOLUTION INTRAVENOUS at 20:14

## 2023-11-24 ASSESSMENT — ENCOUNTER SYMPTOMS
NAUSEA: 1
ABDOMINAL PAIN: 1
VOMITING: 1
WHEEZING: 0
SHORTNESS OF BREATH: 0
SORE THROAT: 0

## 2023-11-24 ASSESSMENT — PAIN DESCRIPTION - PAIN TYPE: TYPE: ACUTE PAIN

## 2023-11-24 ASSESSMENT — PAIN DESCRIPTION - DESCRIPTORS: DESCRIPTORS: STABBING

## 2023-11-24 ASSESSMENT — PAIN DESCRIPTION - ORIENTATION: ORIENTATION: MID

## 2023-11-24 ASSESSMENT — PAIN DESCRIPTION - LOCATION: LOCATION: CHEST

## 2023-11-24 ASSESSMENT — PAIN - FUNCTIONAL ASSESSMENT: PAIN_FUNCTIONAL_ASSESSMENT: 0-10

## 2023-11-24 ASSESSMENT — PAIN DESCRIPTION - FREQUENCY: FREQUENCY: INTERMITTENT

## 2023-11-25 LAB
EKG ATRIAL RATE: 91 BPM
EKG DIAGNOSIS: NORMAL
EKG P AXIS: 20 DEGREES
EKG P-R INTERVAL: 144 MS
EKG Q-T INTERVAL: 400 MS
EKG QRS DURATION: 128 MS
EKG QTC CALCULATION (BAZETT): 492 MS
EKG R AXIS: 70 DEGREES
EKG T AXIS: 120 DEGREES
EKG VENTRICULAR RATE: 91 BPM

## 2023-11-25 NOTE — ED NOTES
Discharge instructions reviewed with patient. Patient verbalized understanding. Patient advised to follow up as directed on discharge instructions. Patient denies questions, needs or concerns at this time. Patient verbalized understanding. No s/sx of distress noted.        Cristino Boykin RN  11/24/23 [id number]

## 2023-11-25 NOTE — ED PROVIDER NOTES
`[facility] EMERGENCY DEPT  eMERGENCY dEPARTMENT eNCOUnter      Pt Name: Toby Lyle  MRN: [id number]  [address] [facility] 1950 [facility] evaluation: 11/24/2023  Provider:Josh Holm MD    CHIEF COMPLAINT       HPI    Toby Lyle is a 68 y.o. male  with q a 5 days hx of chronic epigastric pain with no etiology. He states over the past 5 days his epigastic pain, nausea and vomiting was worse then normal.  He states she had an endoscopy recently that showed infromation in his stomach. ROS    Review of Systems   Constitutional:  Positive for appetite change. Negative for fever. HENT:  Negative for congestion and sore throat. Respiratory:  Negative for shortness of breath and wheezing. Cardiovascular:  Negative for chest pain and palpitations. Gastrointestinal:  Positive for abdominal pain (epigastric pain that is worse then normal), nausea and vomiting. Musculoskeletal:  Negative for myalgias. Neurological:  Negative for dizziness. All other systems reviewed and are negative. Except as noted above the remainder of the review of systems was reviewed and negative.        PAST MEDICAL HISTORY     Past Medical History:   Diagnosis Date    Arthritis     1999 vicodin    Diabetes ([address] ) 1998 metformin    GERD (gastroesophageal reflux disease)     Hypercholesterolemia     Hypertension 1996 norvasc    Kidney stones     MI (myocardial infarction) ([address] [facility]) 12/12/2020    Vitamin B 12 deficiency          SURGICAL HISTORY       Past Surgical History:   Procedure Laterality Date    ANGIOPLASTY  12/2020    stented    CHOLECYSTECTOMY      COLONOSCOPY N/A 11/2/2018    COLONOSCOPY with Polypectomies performed by Bradley Morales MD at [facility]    COLONOSCOPY N/A 4/5/2022    COLONOSCOPY w polypectomies performed by Bradley Morales MD at [address] [place] [address] N/A 5/18/2023    COLONOSCOPY with polypectomies performed by Bobo Kim MD at [facility]  12/2017    stents

## 2023-11-30 ENCOUNTER — HOSPITAL ENCOUNTER (OUTPATIENT)
Facility: HOSPITAL | Age: 73
Setting detail: INFUSION SERIES
End: 2023-11-30
Payer: MEDICARE

## 2023-11-30 VITALS
OXYGEN SATURATION: 96 % | DIASTOLIC BLOOD PRESSURE: 66 MMHG | TEMPERATURE: 98.2 F | RESPIRATION RATE: 16 BRPM | SYSTOLIC BLOOD PRESSURE: 106 MMHG | HEART RATE: 72 BPM

## 2023-11-30 DIAGNOSIS — E53.8 B12 DEFICIENCY: Primary | ICD-10-CM

## 2023-11-30 PROCEDURE — 6360000002 HC RX W HCPCS: Performed by: FAMILY MEDICINE

## 2023-11-30 PROCEDURE — 96372 THER/PROPH/DIAG INJ SC/IM: CPT

## 2023-11-30 RX ORDER — SODIUM CHLORIDE 9 MG/ML
INJECTION, SOLUTION INTRAVENOUS CONTINUOUS
OUTPATIENT
Start: 2023-12-28

## 2023-11-30 RX ORDER — CYANOCOBALAMIN 1000 UG/ML
1000 INJECTION, SOLUTION INTRAMUSCULAR; SUBCUTANEOUS ONCE
Status: COMPLETED | OUTPATIENT
Start: 2023-11-30 | End: 2023-11-30

## 2023-11-30 RX ORDER — EPINEPHRINE 1 MG/ML
0.3 INJECTION, SOLUTION, CONCENTRATE INTRAVENOUS PRN
OUTPATIENT
Start: 2023-12-28

## 2023-11-30 RX ORDER — DIPHENHYDRAMINE HYDROCHLORIDE 50 MG/ML
50 INJECTION INTRAMUSCULAR; INTRAVENOUS
OUTPATIENT
Start: 2023-12-28

## 2023-11-30 RX ORDER — ONDANSETRON 2 MG/ML
8 INJECTION INTRAMUSCULAR; INTRAVENOUS
OUTPATIENT
Start: 2023-12-28

## 2023-11-30 RX ORDER — ACETAMINOPHEN 325 MG/1
650 TABLET ORAL
OUTPATIENT
Start: 2023-12-28

## 2023-11-30 RX ORDER — ALBUTEROL SULFATE 90 UG/1
4 AEROSOL, METERED RESPIRATORY (INHALATION) PRN
OUTPATIENT
Start: 2023-12-28

## 2023-11-30 RX ORDER — CYANOCOBALAMIN 1000 UG/ML
1000 INJECTION, SOLUTION INTRAMUSCULAR; SUBCUTANEOUS ONCE
Start: 2023-12-28 | End: 2023-12-28

## 2023-11-30 RX ADMIN — CYANOCOBALAMIN 1000 MCG: 1000 INJECTION INTRAMUSCULAR; SUBCUTANEOUS at 15:50

## 2023-11-30 ASSESSMENT — PAIN - FUNCTIONAL ASSESSMENT: PAIN_FUNCTIONAL_ASSESSMENT: NONE - DENIES PAIN

## 2023-12-28 ENCOUNTER — HOSPITAL ENCOUNTER (OUTPATIENT)
Facility: HOSPITAL | Age: 73
Setting detail: INFUSION SERIES
End: 2023-12-28
Payer: MEDICARE

## 2023-12-28 VITALS
HEART RATE: 68 BPM | RESPIRATION RATE: 16 BRPM | DIASTOLIC BLOOD PRESSURE: 66 MMHG | TEMPERATURE: 97.4 F | OXYGEN SATURATION: 96 % | SYSTOLIC BLOOD PRESSURE: 103 MMHG

## 2023-12-28 DIAGNOSIS — E53.8 B12 DEFICIENCY: Primary | ICD-10-CM

## 2023-12-28 PROCEDURE — 6360000002 HC RX W HCPCS: Performed by: FAMILY MEDICINE

## 2023-12-28 PROCEDURE — 96372 THER/PROPH/DIAG INJ SC/IM: CPT

## 2023-12-28 RX ORDER — ONDANSETRON 2 MG/ML
8 INJECTION INTRAMUSCULAR; INTRAVENOUS
OUTPATIENT
Start: 2023-12-28

## 2023-12-28 RX ORDER — EPINEPHRINE 1 MG/ML
0.3 INJECTION, SOLUTION, CONCENTRATE INTRAVENOUS PRN
OUTPATIENT
Start: 2023-12-28

## 2023-12-28 RX ORDER — CYANOCOBALAMIN 1000 UG/ML
1000 INJECTION, SOLUTION INTRAMUSCULAR; SUBCUTANEOUS ONCE
Status: CANCELLED
Start: 2023-12-28 | End: 2023-12-28

## 2023-12-28 RX ORDER — AMLODIPINE BESYLATE 5 MG/1
5 TABLET ORAL DAILY
Qty: 90 TABLET | Refills: 1 | Status: SHIPPED | OUTPATIENT
Start: 2023-12-28

## 2023-12-28 RX ORDER — ALBUTEROL SULFATE 90 UG/1
4 AEROSOL, METERED RESPIRATORY (INHALATION) PRN
OUTPATIENT
Start: 2023-12-28

## 2023-12-28 RX ORDER — DIPHENHYDRAMINE HYDROCHLORIDE 50 MG/ML
50 INJECTION INTRAMUSCULAR; INTRAVENOUS
OUTPATIENT
Start: 2023-12-28

## 2023-12-28 RX ORDER — ACETAMINOPHEN 325 MG/1
650 TABLET ORAL
OUTPATIENT
Start: 2023-12-28

## 2023-12-28 RX ORDER — ROSUVASTATIN CALCIUM 40 MG/1
40 TABLET, COATED ORAL NIGHTLY
Qty: 90 TABLET | Refills: 1 | Status: SHIPPED | OUTPATIENT
Start: 2023-12-28

## 2023-12-28 RX ORDER — CYANOCOBALAMIN 1000 UG/ML
1000 INJECTION, SOLUTION INTRAMUSCULAR; SUBCUTANEOUS ONCE
Status: COMPLETED | OUTPATIENT
Start: 2023-12-28 | End: 2023-12-28

## 2023-12-28 RX ORDER — SODIUM CHLORIDE 9 MG/ML
INJECTION, SOLUTION INTRAVENOUS CONTINUOUS
OUTPATIENT
Start: 2023-12-28

## 2023-12-28 RX ADMIN — CYANOCOBALAMIN 1000 MCG: 1000 INJECTION INTRAMUSCULAR; SUBCUTANEOUS at 15:17

## 2024-01-05 DIAGNOSIS — E11.65 TYPE 2 DIABETES MELLITUS WITH HYPERGLYCEMIA (HCC): ICD-10-CM

## 2024-01-17 NOTE — PROGRESS NOTES
1. \"Have you been to the ER, urgent care clinic since your last visit?  Hospitalized since your last visit?\" Yes When: [facility] LOV: 11/24/23 for hiatal hernia with GERD.    2. \"Have you seen or consulted any other health care providers outside of the [facility] since your last visit?\" No       Chief Complaint   Patient presents with    Diabetes    Hypertension    Cholesterol Problem    Medication Check     Would like to discuss using pantoprazole vs. Omeprazole

## 2024-01-19 ENCOUNTER — TELEMEDICINE (OUTPATIENT)
Age: 74
End: 2024-01-19
Payer: MEDICARE

## 2024-01-19 DIAGNOSIS — R11.0 NAUSEA: ICD-10-CM

## 2024-01-19 DIAGNOSIS — E53.8 B12 DEFICIENCY: ICD-10-CM

## 2024-01-19 DIAGNOSIS — R53.83 OTHER FATIGUE: ICD-10-CM

## 2024-01-19 DIAGNOSIS — R63.4 WEIGHT LOSS: ICD-10-CM

## 2024-01-19 DIAGNOSIS — I25.110 CORONARY ARTERY DISEASE INVOLVING NATIVE CORONARY ARTERY OF NATIVE HEART WITH UNSTABLE ANGINA PECTORIS (HCC): ICD-10-CM

## 2024-01-19 DIAGNOSIS — I10 ESSENTIAL HYPERTENSION: ICD-10-CM

## 2024-01-19 DIAGNOSIS — R07.89 CHEST DISCOMFORT: ICD-10-CM

## 2024-01-19 DIAGNOSIS — I25.118 CORONARY ARTERY DISEASE OF NATIVE ARTERY OF NATIVE HEART WITH STABLE ANGINA PECTORIS (HCC): Primary | ICD-10-CM

## 2024-01-19 DIAGNOSIS — K21.9 GASTROESOPHAGEAL REFLUX DISEASE WITHOUT ESOPHAGITIS: ICD-10-CM

## 2024-01-19 DIAGNOSIS — E04.9 THYROID GOITER: ICD-10-CM

## 2024-01-19 DIAGNOSIS — E11.8 TYPE 2 DIABETES MELLITUS WITH UNSPECIFIED COMPLICATIONS (HCC): ICD-10-CM

## 2024-01-19 DIAGNOSIS — R10.13 EPIGASTRIC DISCOMFORT: ICD-10-CM

## 2024-01-19 DIAGNOSIS — Z87.19 H/O HIATAL HERNIA: ICD-10-CM

## 2024-01-19 PROCEDURE — 1123F ACP DISCUSS/DSCN MKR DOCD: CPT | Performed by: FAMILY MEDICINE

## 2024-01-19 PROCEDURE — 2022F DILAT RTA XM EVC RTNOPTHY: CPT | Performed by: FAMILY MEDICINE

## 2024-01-19 PROCEDURE — 3046F HEMOGLOBIN A1C LEVEL >9.0%: CPT | Performed by: FAMILY MEDICINE

## 2024-01-19 PROCEDURE — 3017F COLORECTAL CA SCREEN DOC REV: CPT | Performed by: FAMILY MEDICINE

## 2024-01-19 PROCEDURE — 99214 OFFICE O/P EST MOD 30 MIN: CPT | Performed by: FAMILY MEDICINE

## 2024-01-19 PROCEDURE — G8427 DOCREV CUR MEDS BY ELIG CLIN: HCPCS | Performed by: FAMILY MEDICINE

## 2024-01-19 RX ORDER — PANTOPRAZOLE SODIUM 40 MG/1
40 TABLET, DELAYED RELEASE ORAL DAILY
Qty: 30 TABLET | Refills: 0 | Status: SHIPPED | OUTPATIENT
Start: 2024-01-19

## 2024-01-19 NOTE — PROGRESS NOTES
Consent: Bhaskar Wing, who was seen by synchronous (real-time) audio-video technology, and/or his healthcare decision maker, is aware that this patient-initiated, Telehealth encounter on 1/19/2024 is a billable service, with coverage as determined by his insurance carrier. He is aware that he may receive a bill and has provided verbal consent to proceed: Yes      Assessment & Plan:   Bhaskar was seen today for diabetes, hypertension, cholesterol problem and medication check.    Diagnoses and all orders for this visit:    Coronary artery disease of native artery of native heart with stable angina pectoris (HCC): Currently on and off chest discomfort mainly at the mid chest after eating food.  Lately feeling fatigue.  Advised to contact cardiology office regarding symptoms.  He has tried nitroglycerin 1 time and it did not relieve the pain.  Also taking pantoprazole that helped in the past but just restarted taking 2 days ago unable to see the difference.  Symptoms of chest discomfort mainly after food.  Recent ER visit given pantoprazole which he started taking.  Blood pressure at home is 122/70.  No bloating but feeling nausea on and off.  No diarrhea.  No abdominal pain.  During visit sitting comfortable without any acute distress.  Able to provide full history without any discomfort.  No diaphoresis.  No bowel complaint.  No urinary complaint.    Type 2 diabetes mellitus with unspecified complications (HCC): Fasting sugar under 120.  Advised to keep the log.  Follow-up next visit and recheck labs.  Compliance with diet and lifestyle modification  -     Hemoglobin A1C; Future  -     Lipid Panel; Future  -     TSH; Future    Coronary artery disease involving native coronary artery of native heart with unstable angina pectoris (HCC): See above    Gastroesophageal reflux disease without esophagitis: Has been feeling mid chest discomfort on and off after eating food.  Started taking pantoprazole.  Discussed light

## 2024-01-19 NOTE — PATIENT INSTRUCTIONS
Team Member Role and Specialty Contact Info Address Start End Comments   Eliezer Miguel MD Follow Up Physician (Endocrinology) Phone: [phone]  Fax: [phone] - 10/8/2019 - -

## 2024-01-25 ENCOUNTER — OFFICE VISIT (OUTPATIENT)
Age: 74
End: 2024-01-25

## 2024-01-25 ENCOUNTER — HOSPITAL ENCOUNTER (OUTPATIENT)
Facility: HOSPITAL | Age: 74
Setting detail: INFUSION SERIES
End: 2024-01-25

## 2024-01-25 VITALS
OXYGEN SATURATION: 97 % | WEIGHT: 177 LBS | SYSTOLIC BLOOD PRESSURE: 117 MMHG | DIASTOLIC BLOOD PRESSURE: 62 MMHG | BODY MASS INDEX: 23.98 KG/M2 | HEART RATE: 73 BPM | HEIGHT: 72 IN

## 2024-01-25 DIAGNOSIS — I10 ESSENTIAL HYPERTENSION: ICD-10-CM

## 2024-01-25 DIAGNOSIS — I20.0 UNSTABLE ANGINA PECTORIS (HCC): ICD-10-CM

## 2024-01-25 DIAGNOSIS — R53.83 OTHER FATIGUE: ICD-10-CM

## 2024-01-25 DIAGNOSIS — I25.118 CORONARY ARTERY DISEASE OF NATIVE ARTERY OF NATIVE HEART WITH STABLE ANGINA PECTORIS (HCC): Primary | ICD-10-CM

## 2024-01-25 DIAGNOSIS — E78.2 MIXED HYPERLIPIDEMIA: ICD-10-CM

## 2024-01-25 DIAGNOSIS — Z95.5 HISTORY OF CORONARY ARTERY STENT PLACEMENT: ICD-10-CM

## 2024-01-25 ASSESSMENT — PATIENT HEALTH QUESTIONNAIRE - PHQ9
SUM OF ALL RESPONSES TO PHQ QUESTIONS 1-9: 0
DEPRESSION UNABLE TO ASSESS: FUNCTIONAL CAPACITY MOTIVATION LIMITS ACCURACY
SUM OF ALL RESPONSES TO PHQ9 QUESTIONS 1 & 2: 0
2. FEELING DOWN, DEPRESSED OR HOPELESS: 0
1. LITTLE INTEREST OR PLEASURE IN DOING THINGS: 0

## 2024-01-25 NOTE — PROGRESS NOTES
1. Have you been to the ER, urgent care clinic since your last visit?  Hospitalized since your last visit?     No    2. Have you seen or consulted any other health care providers outside of the [facility] since your last visit?  Include any pap smears or colon screening.      No      
labile but mostly under control.  1/2024  Patient with h/o CAD seen for c/o fatigue and weakness.   Patient with unexplained weight loss of 50 lbs.  He reports this will be evaluated by PCP.  Will check echo to follow up LV function.  B/P controlled, continue current medications.

## 2024-01-26 ENCOUNTER — HOSPITAL ENCOUNTER (OUTPATIENT)
Facility: HOSPITAL | Age: 74
Setting detail: INFUSION SERIES
End: 2024-01-26

## 2024-02-06 RX ORDER — ONDANSETRON 2 MG/ML
8 INJECTION INTRAMUSCULAR; INTRAVENOUS
Status: CANCELLED | OUTPATIENT
Start: 2024-02-15

## 2024-02-06 RX ORDER — ALBUTEROL SULFATE 90 UG/1
4 AEROSOL, METERED RESPIRATORY (INHALATION) PRN
Status: CANCELLED | OUTPATIENT
Start: 2024-02-15

## 2024-02-06 RX ORDER — DIPHENHYDRAMINE HYDROCHLORIDE 50 MG/ML
50 INJECTION INTRAMUSCULAR; INTRAVENOUS
Status: CANCELLED | OUTPATIENT
Start: 2024-02-15

## 2024-02-06 RX ORDER — ACETAMINOPHEN 325 MG/1
650 TABLET ORAL
Status: CANCELLED | OUTPATIENT
Start: 2024-02-15

## 2024-02-06 RX ORDER — SODIUM CHLORIDE 9 MG/ML
INJECTION, SOLUTION INTRAVENOUS CONTINUOUS
Status: CANCELLED | OUTPATIENT
Start: 2024-02-15

## 2024-02-06 RX ORDER — EPINEPHRINE 1 MG/ML
0.3 INJECTION, SOLUTION, CONCENTRATE INTRAVENOUS PRN
Status: CANCELLED | OUTPATIENT
Start: 2024-02-15

## 2024-02-15 ENCOUNTER — HOSPITAL ENCOUNTER (OUTPATIENT)
Facility: HOSPITAL | Age: 74
Setting detail: INFUSION SERIES
End: 2024-02-15
Payer: MEDICARE

## 2024-02-15 VITALS
OXYGEN SATURATION: 95 % | TEMPERATURE: 97.5 F | DIASTOLIC BLOOD PRESSURE: 75 MMHG | SYSTOLIC BLOOD PRESSURE: 124 MMHG | HEART RATE: 67 BPM | RESPIRATION RATE: 16 BRPM

## 2024-02-15 DIAGNOSIS — E53.8 B12 DEFICIENCY: Primary | ICD-10-CM

## 2024-02-15 PROCEDURE — 6360000002 HC RX W HCPCS: Performed by: FAMILY MEDICINE

## 2024-02-15 PROCEDURE — 96372 THER/PROPH/DIAG INJ SC/IM: CPT

## 2024-02-15 RX ORDER — SODIUM CHLORIDE 9 MG/ML
INJECTION, SOLUTION INTRAVENOUS CONTINUOUS
OUTPATIENT
Start: 2024-03-14

## 2024-02-15 RX ORDER — CYANOCOBALAMIN 1000 UG/ML
1000 INJECTION, SOLUTION INTRAMUSCULAR; SUBCUTANEOUS ONCE
Status: COMPLETED | OUTPATIENT
Start: 2024-02-15 | End: 2024-02-15

## 2024-02-15 RX ORDER — DIPHENHYDRAMINE HYDROCHLORIDE 50 MG/ML
50 INJECTION INTRAMUSCULAR; INTRAVENOUS
OUTPATIENT
Start: 2024-03-14

## 2024-02-15 RX ORDER — ACETAMINOPHEN 325 MG/1
650 TABLET ORAL
OUTPATIENT
Start: 2024-03-14

## 2024-02-15 RX ORDER — CYANOCOBALAMIN 1000 UG/ML
1000 INJECTION, SOLUTION INTRAMUSCULAR; SUBCUTANEOUS ONCE
OUTPATIENT
Start: 2024-03-14 | End: 2024-03-14

## 2024-02-15 RX ORDER — ALBUTEROL SULFATE 90 UG/1
4 AEROSOL, METERED RESPIRATORY (INHALATION) PRN
OUTPATIENT
Start: 2024-03-14

## 2024-02-15 RX ORDER — EPINEPHRINE 1 MG/ML
0.3 INJECTION, SOLUTION, CONCENTRATE INTRAVENOUS PRN
OUTPATIENT
Start: 2024-03-14

## 2024-02-15 RX ORDER — PANTOPRAZOLE SODIUM 40 MG/1
40 TABLET, DELAYED RELEASE ORAL DAILY
Qty: 90 TABLET | Refills: 0 | Status: SHIPPED | OUTPATIENT
Start: 2024-02-15

## 2024-02-15 RX ORDER — ONDANSETRON 2 MG/ML
8 INJECTION INTRAMUSCULAR; INTRAVENOUS
OUTPATIENT
Start: 2024-03-14

## 2024-02-15 RX ADMIN — CYANOCOBALAMIN 1000 MCG: 1000 INJECTION, SOLUTION INTRAMUSCULAR at 14:55

## 2024-02-15 ASSESSMENT — PAIN - FUNCTIONAL ASSESSMENT: PAIN_FUNCTIONAL_ASSESSMENT: NONE - DENIES PAIN

## 2024-02-15 NOTE — PROGRESS NOTES
[facility] Progress Note    Date: February 15, 2024    Name: Bhaskar Wing    MRN: [id number]         : 1950    Mr. Wing arrived in the [facility] today at 1445, in stable condition, here for his B-12 INJECTION (EVERY 4 WEEKS). He was assessed and education was provided.     Mr. Wing's vitals were reviewed.  Vitals:    02/15/24 1445   BP: 124/75   Pulse: 67   Resp: 16   Temp: 97.5 °F (36.4 °C)   SpO2: 95%            Cyanocobalamin (B-12) 1,000 mcg, was administered IM in his RIGHT DELTOID at 1455, per order and without incident.            Mr. Wing tolerated well, and voiced no complaints.         [facility] condition at 1500..     He is to return in 4 weeks, on Thursday, 3-14-24 at 1500, for his next appointment, for his next B-12 Injection.          Alli Lynch RN  February 15, 2024  2:52 PM

## 2024-02-15 NOTE — TELEPHONE ENCOUNTER
This pharmacy faxed over request for the following prescriptions to be filled:    Medication requested :   pantoprazole (PROTONIX) 40 MG tablet  QTY 90  PCP: Jolie  Pharmacy or Print: [facility]  Mail order or Local pharmacy [address] [facility]     Scheduled appointment if not seen by current providers in office: LOV 1/19/2024 FU 2/22/2024

## 2024-02-21 ENCOUNTER — HOSPITAL ENCOUNTER (OUTPATIENT)
Facility: HOSPITAL | Age: 74
Discharge: HOME | End: 2024-02-24
Payer: MEDICARE

## 2024-02-21 DIAGNOSIS — E11.8 TYPE 2 DIABETES MELLITUS WITH UNSPECIFIED COMPLICATIONS (HCC): ICD-10-CM

## 2024-02-21 DIAGNOSIS — E53.8 B12 DEFICIENCY: ICD-10-CM

## 2024-02-21 LAB
ALBUMIN SERPL-MCNC: 3.6 G/DL (ref 3.4–5)
ALBUMIN/GLOB SERPL: 1.2 (ref 0.8–1.7)
ALP SERPL-CCNC: 63 U/L (ref 45–117)
ALT SERPL-CCNC: 30 U/L (ref 16–61)
ANION GAP SERPL CALC-SCNC: 3 MMOL/L (ref 3–18)
AST SERPL-CCNC: 15 U/L (ref 10–38)
BASOPHILS # BLD: 0 K/UL (ref 0–0.1)
BASOPHILS NFR BLD: 1 % (ref 0–2)
BILIRUB SERPL-MCNC: 0.4 MG/DL (ref 0.2–1)
BUN SERPL-MCNC: 14 MG/DL (ref 7–18)
BUN/CREAT SERPL: 14 (ref 12–20)
CALCIUM SERPL-MCNC: 8.9 MG/DL (ref 8.5–10.1)
CHLORIDE SERPL-SCNC: 109 MMOL/L (ref 100–111)
CHOLEST SERPL-MCNC: 129 MG/DL
CO2 SERPL-SCNC: 26 MMOL/L (ref 21–32)
CREAT SERPL-MCNC: 1.02 MG/DL (ref 0.6–1.3)
DIFFERENTIAL METHOD BLD: ABNORMAL
EOSINOPHIL # BLD: 0.1 K/UL (ref 0–0.4)
EOSINOPHIL NFR BLD: 2 % (ref 0–5)
ERYTHROCYTE [DISTWIDTH] IN BLOOD BY AUTOMATED COUNT: 14 % (ref 11.6–14.5)
EST. AVERAGE GLUCOSE BLD GHB EST-MCNC: 108 MG/DL
GLOBULIN SER CALC-MCNC: 3.1 G/DL (ref 2–4)
GLUCOSE SERPL-MCNC: 111 MG/DL (ref 74–99)
HBA1C MFR BLD: 5.4 % (ref 4.2–5.6)
HCT VFR BLD AUTO: 39.4 % (ref 36–48)
HDLC SERPL-MCNC: 37 MG/DL (ref 40–60)
HDLC SERPL: 3.5 (ref 0–5)
HGB BLD-MCNC: 13.2 G/DL (ref 13–16)
IMM GRANULOCYTES # BLD AUTO: 0 K/UL (ref 0–0.04)
IMM GRANULOCYTES NFR BLD AUTO: 0 % (ref 0–0.5)
LDLC SERPL CALC-MCNC: 51.8 MG/DL (ref 0–100)
LIPID PANEL: ABNORMAL
LYMPHOCYTES # BLD: 1.7 K/UL (ref 0.9–3.6)
LYMPHOCYTES NFR BLD: 25 % (ref 21–52)
MCH RBC QN AUTO: 30.6 PG (ref 24–34)
MCHC RBC AUTO-ENTMCNC: 33.5 G/DL (ref 31–37)
MCV RBC AUTO: 91.4 FL (ref 78–100)
MONOCYTES # BLD: 0.5 K/UL (ref 0.05–1.2)
MONOCYTES NFR BLD: 7 % (ref 3–10)
NEUTS SEG # BLD: 4.6 K/UL (ref 1.8–8)
NEUTS SEG NFR BLD: 65 % (ref 40–73)
NRBC # BLD: 0 K/UL (ref 0–0.01)
NRBC BLD-RTO: 0 PER 100 WBC
PLATELET # BLD AUTO: 165 K/UL (ref 135–420)
PMV BLD AUTO: 10.2 FL (ref 9.2–11.8)
POTASSIUM SERPL-SCNC: 4.3 MMOL/L (ref 3.5–5.5)
PROT SERPL-MCNC: 6.7 G/DL (ref 6.4–8.2)
RBC # BLD AUTO: 4.31 M/UL (ref 4.35–5.65)
SODIUM SERPL-SCNC: 138 MMOL/L (ref 136–145)
TRIGL SERPL-MCNC: 201 MG/DL
TSH SERPL DL<=0.05 MIU/L-ACNC: 3.23 UIU/ML (ref 0.36–3.74)
VIT B12 SERPL-MCNC: 710 PG/ML (ref 211–911)
VLDLC SERPL CALC-MCNC: 40.2 MG/DL
WBC # BLD AUTO: 7 K/UL (ref 4.6–13.2)

## 2024-02-21 PROCEDURE — 83036 HEMOGLOBIN GLYCOSYLATED A1C: CPT

## 2024-02-21 PROCEDURE — 36415 COLL VENOUS BLD VENIPUNCTURE: CPT

## 2024-02-21 PROCEDURE — 84443 ASSAY THYROID STIM HORMONE: CPT

## 2024-02-21 PROCEDURE — 80061 LIPID PANEL: CPT

## 2024-02-21 PROCEDURE — 80053 COMPREHEN METABOLIC PANEL: CPT

## 2024-02-21 PROCEDURE — 85025 COMPLETE CBC W/AUTO DIFF WBC: CPT

## 2024-02-21 PROCEDURE — 82607 VITAMIN B-12: CPT

## 2024-02-22 ENCOUNTER — OFFICE VISIT (OUTPATIENT)
Age: 74
End: 2024-02-22
Payer: MEDICARE

## 2024-02-22 VITALS
HEART RATE: 75 BPM | DIASTOLIC BLOOD PRESSURE: 76 MMHG | HEIGHT: 72 IN | WEIGHT: 181 LBS | BODY MASS INDEX: 24.52 KG/M2 | SYSTOLIC BLOOD PRESSURE: 120 MMHG | OXYGEN SATURATION: 98 % | TEMPERATURE: 98.1 F | RESPIRATION RATE: 16 BRPM

## 2024-02-22 DIAGNOSIS — K21.00 GASTROESOPHAGEAL REFLUX DISEASE WITH ESOPHAGITIS WITHOUT HEMORRHAGE: ICD-10-CM

## 2024-02-22 DIAGNOSIS — N40.0 BENIGN PROSTATIC HYPERPLASIA, UNSPECIFIED WHETHER LOWER URINARY TRACT SYMPTOMS PRESENT: ICD-10-CM

## 2024-02-22 DIAGNOSIS — I25.118 CORONARY ARTERY DISEASE OF NATIVE ARTERY OF NATIVE HEART WITH STABLE ANGINA PECTORIS (HCC): Primary | ICD-10-CM

## 2024-02-22 DIAGNOSIS — E11.9 CONTROLLED TYPE 2 DIABETES MELLITUS WITHOUT COMPLICATION, WITHOUT LONG-TERM CURRENT USE OF INSULIN (HCC): ICD-10-CM

## 2024-02-22 DIAGNOSIS — R63.4 WEIGHT LOSS: ICD-10-CM

## 2024-02-22 DIAGNOSIS — I77.810 DILATED AORTIC ROOT (HCC): ICD-10-CM

## 2024-02-22 DIAGNOSIS — E04.9 THYROID GOITER: ICD-10-CM

## 2024-02-22 DIAGNOSIS — E53.8 B12 DEFICIENCY: ICD-10-CM

## 2024-02-22 DIAGNOSIS — E78.00 PURE HYPERCHOLESTEROLEMIA: ICD-10-CM

## 2024-02-22 DIAGNOSIS — I10 ESSENTIAL HYPERTENSION: ICD-10-CM

## 2024-02-22 PROBLEM — F12.90 CANNABIS USE, UNCOMPLICATED: Status: RESOLVED | Noted: 2019-09-26 | Resolved: 2024-02-22

## 2024-02-22 PROCEDURE — 3078F DIAST BP <80 MM HG: CPT | Performed by: FAMILY MEDICINE

## 2024-02-22 PROCEDURE — 3074F SYST BP LT 130 MM HG: CPT | Performed by: FAMILY MEDICINE

## 2024-02-22 PROCEDURE — 99214 OFFICE O/P EST MOD 30 MIN: CPT | Performed by: FAMILY MEDICINE

## 2024-02-22 PROCEDURE — G8420 CALC BMI NORM PARAMETERS: HCPCS | Performed by: FAMILY MEDICINE

## 2024-02-22 PROCEDURE — G8427 DOCREV CUR MEDS BY ELIG CLIN: HCPCS | Performed by: FAMILY MEDICINE

## 2024-02-22 PROCEDURE — 1036F TOBACCO NON-USER: CPT | Performed by: FAMILY MEDICINE

## 2024-02-22 PROCEDURE — 3044F HG A1C LEVEL LT 7.0%: CPT | Performed by: FAMILY MEDICINE

## 2024-02-22 PROCEDURE — 2022F DILAT RTA XM EVC RTNOPTHY: CPT | Performed by: FAMILY MEDICINE

## 2024-02-22 PROCEDURE — 3017F COLORECTAL CA SCREEN DOC REV: CPT | Performed by: FAMILY MEDICINE

## 2024-02-22 PROCEDURE — G8484 FLU IMMUNIZE NO ADMIN: HCPCS | Performed by: FAMILY MEDICINE

## 2024-02-22 PROCEDURE — 1123F ACP DISCUSS/DSCN MKR DOCD: CPT | Performed by: FAMILY MEDICINE

## 2024-02-22 ASSESSMENT — PATIENT HEALTH QUESTIONNAIRE - PHQ9
SUM OF ALL RESPONSES TO PHQ QUESTIONS 1-9: 0
1. LITTLE INTEREST OR PLEASURE IN DOING THINGS: 0
SUM OF ALL RESPONSES TO PHQ QUESTIONS 1-9: 0
2. FEELING DOWN, DEPRESSED OR HOPELESS: 0
SUM OF ALL RESPONSES TO PHQ QUESTIONS 1-9: 0
SUM OF ALL RESPONSES TO PHQ9 QUESTIONS 1 & 2: 0
SUM OF ALL RESPONSES TO PHQ QUESTIONS 1-9: 0

## 2024-02-22 NOTE — PROGRESS NOTES
Chief Complaint   Patient presents with    Coronary Artery Disease    Gastroesophageal Reflux    Diabetes    Thyroid Goiter    Hypertension    Vitamin B-12 Def.     Results     Review of lab results       \"Have you been to the ER, urgent care clinic since your last visit?  Hospitalized since your last visit?\"    NO    “Have you seen or consulted any other health care providers outside of [facility] since your last visit?”    NO       
90 tablet 1    tamsulosin (FLOMAX) 0.4 MG capsule Take 1 capsule by mouth daily 90 capsule 3    solifenacin (VESICARE) 5 MG tablet Take 1 tablet by mouth daily 90 tablet 3    clopidogrel (PLAVIX) 75 MG tablet TAKE 1 TABLET BY MOUTH EVERY DAY 90 tablet 3    ramipril (ALTACE) 10 MG capsule TAKE 1 CAPSULE BY MOUTH EVERY DAY 90 capsule 3    carvedilol (COREG) 25 MG tablet TAKE 1 TABLET BY MOUTH TWICE A DAY WITH FOOD      cetirizine (ZYRTEC) 10 MG tablet Take 1 tablet by mouth daily as needed      nitroGLYCERIN (NITROSTAT) 0.4 MG SL tablet Place 1 tablet under the tongue every 5 minutes as needed for Chest pain up to max of 3 total doses. If no relief after 1 dose, call 911. 25 tablet 1    Blood Pressure Monitor KIT Check once a day       No current facility-administered medications for this visit.            OBJECTIVE:  /76 (Site: Left Upper Arm, Position: Sitting, Cuff Size: Large Adult)   Pulse 75   Temp 98.1 °F (36.7 °C) (Temporal)   Resp 16   Ht 1.829 m (6')   Wt 82.1 kg (181 lb)   SpO2 98%   BMI 24.55 kg/m²       Physical Exam  Cardiovascular:      Rate and Rhythm: Normal rate.   Pulmonary:      Effort: Pulmonary effort is normal. No respiratory distress.      Breath sounds: No wheezing.   Abdominal:      Palpations: Abdomen is soft.      Tenderness: There is no abdominal tenderness.   Musculoskeletal:         General: No swelling.      Cervical back: Neck supple.   Neurological:      Mental Status: He is alert and oriented to person, place, and time.   Psychiatric:         Behavior: Behavior normal.            ASSESSMENT/ PLAN:    Diagnoses and all orders for this visit:  Coronary artery disease of native artery of native heart with stable angina pectoris (HCC): Following cardiology and the recommendation.  On risk factor management.  On statin.  No side effects.  No anginal symptoms.  Recent echo showed dilated aortic root.  Will follow cardiology and the recommendation.  Also will follow the

## 2024-02-22 NOTE — PATIENT INSTRUCTIONS
Please contact the following specialist offices to schedule an appointment:    [facility]  [address]  [place] [address] [phone]    Urology of [address]  [facility] [address] [phone]

## 2024-02-23 ENCOUNTER — OFFICE VISIT (OUTPATIENT)
Age: 74
End: 2024-02-23

## 2024-02-23 VITALS
OXYGEN SATURATION: 98 % | BODY MASS INDEX: 24.38 KG/M2 | HEART RATE: 67 BPM | SYSTOLIC BLOOD PRESSURE: 123 MMHG | WEIGHT: 180 LBS | DIASTOLIC BLOOD PRESSURE: 72 MMHG | HEIGHT: 72 IN

## 2024-02-23 DIAGNOSIS — I25.118 CORONARY ARTERY DISEASE OF NATIVE ARTERY OF NATIVE HEART WITH STABLE ANGINA PECTORIS (HCC): Primary | ICD-10-CM

## 2024-02-23 DIAGNOSIS — I20.0 UNSTABLE ANGINA PECTORIS (HCC): ICD-10-CM

## 2024-02-23 DIAGNOSIS — Z95.5 HISTORY OF CORONARY ARTERY STENT PLACEMENT: ICD-10-CM

## 2024-02-23 DIAGNOSIS — E78.2 MIXED HYPERLIPIDEMIA: ICD-10-CM

## 2024-02-23 DIAGNOSIS — I10 ESSENTIAL HYPERTENSION: ICD-10-CM

## 2024-02-23 RX ORDER — ASPIRIN 81 MG/1
81 TABLET ORAL DAILY
Qty: 90 TABLET | Refills: 0
Start: 2024-02-23

## 2024-02-23 ASSESSMENT — PATIENT HEALTH QUESTIONNAIRE - PHQ9
2. FEELING DOWN, DEPRESSED OR HOPELESS: 0
1. LITTLE INTEREST OR PLEASURE IN DOING THINGS: 0
SUM OF ALL RESPONSES TO PHQ QUESTIONS 1-9: 0
DEPRESSION UNABLE TO ASSESS: FUNCTIONAL CAPACITY MOTIVATION LIMITS ACCURACY
SUM OF ALL RESPONSES TO PHQ QUESTIONS 1-9: 0
SUM OF ALL RESPONSES TO PHQ QUESTIONS 1-9: 0
SUM OF ALL RESPONSES TO PHQ9 QUESTIONS 1 & 2: 0
SUM OF ALL RESPONSES TO PHQ QUESTIONS 1-9: 0

## 2024-02-23 NOTE — PROGRESS NOTES
1. Have you been to the ER, urgent care clinic since your last visit?  Hospitalized since your last visit?     No    2. Have you seen or consulted any other health care providers outside of the [facility] since your last visit?  Include any pap smears or colon screening.      No

## 2024-02-23 NOTE — PROGRESS NOTES
HISTORY OF PRESENT ILLNESS  Bhaskar Wing is a 72 y.o. male.    Patient with cad,htn,hyperlipidemia,dm.On follow up patient denies any chest pains,sob, palpitation or other significant symptoms.  12/2017  Admitted with unstable angina-had pci  7/2018.  Patient was in emergency room with atypical chest pain.  No acute EKG changes cardiac enzymes negative CTA and chest x-ray reports reviewed.  Labs are negative for cardiac workup  5/2019.  Recent ER visit with fall and concussion.  Records reviewed.  6/2020  Patient seen for follow-up.  He has rare occasion of chest pain he has used 1 nitroglycerin since last evaluation.  No other complaints  1/2021  Patient is here for follow-up he was in hospital with  1. Unstable angina- s/p PCI of the LAD.  Status post mechanical ventilation. continue DAPT brilinta and asa.  Chest pain has resolved.  2. Acute respiratory failure- extubated   3. Hypotension-resolved.  Off pressors.   4. Hypertension: Blood pressure increasing gradually as expected.  Continue BID amlodipine. Elevated this afternoon - likely due to pain.  5. CAD-cardiac cath 12/2017 showed  Triple-vessel coronary artery disease - For CTS evaluation  6. Hyperlipidemia- LDL 74 11/20 Continue with Crestor  20 mg.  7. Diabetes- well controlled with A1c 5.9   8. LBBB- new since 12/20   9. Hemoptysis- traumatic ETT tube echange and pulmonary edema- better   10. Hematuria / flank pain - hx of renal stones - work up per primary team.  Initial plans were for CABG.  Patient had recurrent unstable angina requiring emergency PCI of LAD.  Patient is here for follow-up.  He is feeling better  He still has occasional episode of nausea and vomiting that are sudden.  He had one episode of chest pain that relieved with nitroglycerin.  The progression of chest pain.  He is here hemoptysis has resolved.  Hematuria is significantly improved he has urology follow-up pending this week      Follow-up  Pertinent negatives include no chest

## 2024-03-14 ENCOUNTER — HOSPITAL ENCOUNTER (OUTPATIENT)
Facility: HOSPITAL | Age: 74
Setting detail: INFUSION SERIES
End: 2024-03-14
Payer: MEDICARE

## 2024-03-14 VITALS
OXYGEN SATURATION: 95 % | HEART RATE: 89 BPM | TEMPERATURE: 98 F | RESPIRATION RATE: 16 BRPM | DIASTOLIC BLOOD PRESSURE: 73 MMHG | SYSTOLIC BLOOD PRESSURE: 125 MMHG

## 2024-03-14 DIAGNOSIS — E53.8 B12 DEFICIENCY: Primary | ICD-10-CM

## 2024-03-14 PROCEDURE — 6360000002 HC RX W HCPCS: Performed by: FAMILY MEDICINE

## 2024-03-14 PROCEDURE — 96372 THER/PROPH/DIAG INJ SC/IM: CPT

## 2024-03-14 RX ORDER — ONDANSETRON 2 MG/ML
8 INJECTION INTRAMUSCULAR; INTRAVENOUS
OUTPATIENT
Start: 2024-04-11

## 2024-03-14 RX ORDER — SODIUM CHLORIDE 9 MG/ML
INJECTION, SOLUTION INTRAVENOUS CONTINUOUS
OUTPATIENT
Start: 2024-04-11

## 2024-03-14 RX ORDER — ALBUTEROL SULFATE 90 UG/1
4 AEROSOL, METERED RESPIRATORY (INHALATION) PRN
OUTPATIENT
Start: 2024-04-11

## 2024-03-14 RX ORDER — ACETAMINOPHEN 325 MG/1
650 TABLET ORAL
OUTPATIENT
Start: 2024-04-11

## 2024-03-14 RX ORDER — CYANOCOBALAMIN 1000 UG/ML
1000 INJECTION, SOLUTION INTRAMUSCULAR; SUBCUTANEOUS ONCE
Status: COMPLETED | OUTPATIENT
Start: 2024-03-14 | End: 2024-03-14

## 2024-03-14 RX ORDER — CYANOCOBALAMIN 1000 UG/ML
1000 INJECTION, SOLUTION INTRAMUSCULAR; SUBCUTANEOUS ONCE
OUTPATIENT
Start: 2024-04-11 | End: 2024-04-11

## 2024-03-14 RX ORDER — DIPHENHYDRAMINE HYDROCHLORIDE 50 MG/ML
50 INJECTION INTRAMUSCULAR; INTRAVENOUS
OUTPATIENT
Start: 2024-04-11

## 2024-03-14 RX ORDER — EPINEPHRINE 1 MG/ML
0.3 INJECTION, SOLUTION, CONCENTRATE INTRAVENOUS PRN
OUTPATIENT
Start: 2024-04-11

## 2024-03-14 RX ADMIN — CYANOCOBALAMIN 1000 MCG: 1000 INJECTION, SOLUTION INTRAMUSCULAR at 15:04

## 2024-03-14 NOTE — PROGRESS NOTES
[facility] Progress Note    Date: 2024    Name: Bhaskar Wing    MRN: [id number]         : 1950    Mr. Wing arrived in the [facility] today at 1450, in stable condition, here for his B-12 INJECTION (EVERY 4 WEEKS). He was assessed and education was provided.     Mr. Wing's vitals were reviewed.  Vitals:    24 1450   BP: 125/73   Pulse: 89   Resp: 16   Temp: 98 °F (36.7 °C)   SpO2: 95%          Cyanocobalamin (B-12) 1,000 mcg, was administered IM in his LEFT DELTOID at 1504, per order and without incident.            Mr. Wing tolerated well, and voiced no complaints.         [facility] in stable condition at 1510.    Patient armband removed and shredded.      He is to return in 4 weeks, on 24 at 1500, for his next appointment, for his next B-12 Injection.          Kristine Villatoro RN  2024  3:10 PM

## 2024-03-28 RX ORDER — RAMIPRIL 10 MG/1
10 CAPSULE ORAL DAILY
Qty: 90 CAPSULE | Refills: 1 | Status: SHIPPED | OUTPATIENT
Start: 2024-03-28

## 2024-04-11 ENCOUNTER — HOSPITAL ENCOUNTER (OUTPATIENT)
Facility: HOSPITAL | Age: 74
Setting detail: INFUSION SERIES
End: 2024-04-11
Payer: MEDICARE

## 2024-04-11 VITALS
TEMPERATURE: 98.2 F | HEART RATE: 74 BPM | DIASTOLIC BLOOD PRESSURE: 70 MMHG | SYSTOLIC BLOOD PRESSURE: 121 MMHG | RESPIRATION RATE: 18 BRPM | OXYGEN SATURATION: 97 %

## 2024-04-11 DIAGNOSIS — E53.8 B12 DEFICIENCY: Primary | ICD-10-CM

## 2024-04-11 PROCEDURE — 6360000002 HC RX W HCPCS: Performed by: FAMILY MEDICINE

## 2024-04-11 PROCEDURE — 96372 THER/PROPH/DIAG INJ SC/IM: CPT

## 2024-04-11 RX ORDER — CYANOCOBALAMIN 1000 UG/ML
1000 INJECTION, SOLUTION INTRAMUSCULAR; SUBCUTANEOUS ONCE
Status: CANCELLED | OUTPATIENT
Start: 2024-04-11 | End: 2024-04-11

## 2024-04-11 RX ORDER — ACETAMINOPHEN 325 MG/1
650 TABLET ORAL
OUTPATIENT
Start: 2024-04-11

## 2024-04-11 RX ORDER — CYANOCOBALAMIN 1000 UG/ML
1000 INJECTION, SOLUTION INTRAMUSCULAR; SUBCUTANEOUS ONCE
Status: COMPLETED | OUTPATIENT
Start: 2024-04-11 | End: 2024-04-11

## 2024-04-11 RX ORDER — SODIUM CHLORIDE 9 MG/ML
INJECTION, SOLUTION INTRAVENOUS CONTINUOUS
OUTPATIENT
Start: 2024-04-11

## 2024-04-11 RX ORDER — DIPHENHYDRAMINE HYDROCHLORIDE 50 MG/ML
50 INJECTION INTRAMUSCULAR; INTRAVENOUS
OUTPATIENT
Start: 2024-04-11

## 2024-04-11 RX ORDER — ONDANSETRON 2 MG/ML
8 INJECTION INTRAMUSCULAR; INTRAVENOUS
OUTPATIENT
Start: 2024-04-11

## 2024-04-11 RX ORDER — EPINEPHRINE 1 MG/ML
0.3 INJECTION, SOLUTION, CONCENTRATE INTRAVENOUS PRN
OUTPATIENT
Start: 2024-04-11

## 2024-04-11 RX ORDER — ALBUTEROL SULFATE 90 UG/1
4 AEROSOL, METERED RESPIRATORY (INHALATION) PRN
OUTPATIENT
Start: 2024-04-11

## 2024-04-11 RX ADMIN — CYANOCOBALAMIN 1000 MCG: 1000 INJECTION INTRAMUSCULAR; SUBCUTANEOUS at 15:00

## 2024-04-12 NOTE — PROGRESS NOTES
hospitals Progress Note    Date: 2024    Name: Bhaskar Wing    MRN: [id number]         : 1950    Mr. Wing arrived in the hospitals today at 1445, in stable condition, here for his B-12 INJECTION (EVERY 4 WEEKS). He was assessed and education was provided.     Mr. Wing's vitals were reviewed.  Vitals:    24 1445   BP: 121/70   Pulse: 74   Resp: 18   Temp: 98.2 °F (36.8 °C)   SpO2: 97%            Cyanocobalamin (B-12) 1,000 mcg, was administered IM in his RIGHT DELTOID at 1500, per order and without incident.            Mr. Wing tolerated the injection well, and voiced no complaints.     Discharge instructions reviewed with patient, and he expressed understanding. Patient's armband was removed and shredded.      [facility]. Wing [facility], ambulatory and in stable condition at 1505.     He is to return in 4 weeks, on Thursday, 24 at 1500, for his next B-12 Injection.          Yun Mcduffie RN  2024  3:05 PM

## 2024-04-13 DIAGNOSIS — E11.65 TYPE 2 DIABETES MELLITUS WITH HYPERGLYCEMIA (HCC): ICD-10-CM

## 2024-05-02 NOTE — PERIOP NOTE
Instructions for your procedure at [facility]      Today's Date: 5/2/2024      Patient's Name: Bhaskar Wing      Procedure Date: 5/15/24        Please enter the main entrance of the hospital and check-in at the  located in the lobby.      Do NOT eat or drink anything, including candy, gum, or ice chips after midnight prior to your procedure, unless it is part of your prep.  Brush your teeth before coming to the hospital.You may swish with water, but do not swallow.  No smoking/Vaping/E-Cigarettes 24 hours prior to the day of procedure.  No alcohol 24 hours prior to the day of procedure.  No recreational drugs for one week prior to the day of procedure.  Bring Photo ID, Insurance information, and Co-pay if required on day of procedure.  Bring in pertinent legal documents, such as, Medical Power of , DNR, Advance Directive, etc.  Leave all other valuables, including money/purse, at home.  Remove jewelry, including ALL body piercings, nail polish, acrylic nails, and makeup (including mascara); no lotions, powders, deodorant, and/or perfume/cologne/after shave on the skin.  Glasses and dentures may be worn to the hospital.  They must be removed prior to procedure. Please bring case/container for glasses or dentures.  11. Contacts should not be worn on day of procedure.   12. Call the office ([phone]) if you have symptoms of a cold or illness within 24-48 hours prior to your procedure.   13. AN ADULT (relative or friend 18 years or older) MUST DRIVE YOU HOME AFTER YOUR PROCEDURE.   14. Please make arrangements for a responsible adult (18 years or older) to be with you for 24 hours after your procedure.   15. ONE VISITOR will be allowed in the waiting area during your procedure.       Special Instructions:      Bring list of CURRENT medications.  Follow instructions from the office regarding Bowel Prep, Vitamins, Iron, Blood Thinners, Insulin, Seizure, and Blood

## 2024-05-09 ENCOUNTER — HOSPITAL ENCOUNTER (OUTPATIENT)
Facility: HOSPITAL | Age: 74
Setting detail: INFUSION SERIES
End: 2024-05-09

## 2024-05-14 ENCOUNTER — ANESTHESIA EVENT (OUTPATIENT)
Facility: HOSPITAL | Age: 74
End: 2024-05-14
Payer: MEDICARE

## 2024-05-15 ENCOUNTER — ANESTHESIA (OUTPATIENT)
Facility: HOSPITAL | Age: 74
End: 2024-05-15
Payer: MEDICARE

## 2024-05-15 ENCOUNTER — HOSPITAL ENCOUNTER (OUTPATIENT)
Facility: HOSPITAL | Age: 74
Setting detail: OUTPATIENT SURGERY
Discharge: HOME | End: 2024-05-15
Attending: INTERNAL MEDICINE | Admitting: INTERNAL MEDICINE
Payer: MEDICARE

## 2024-05-15 VITALS
WEIGHT: 176 LBS | OXYGEN SATURATION: 100 % | RESPIRATION RATE: 20 BRPM | BODY MASS INDEX: 23.84 KG/M2 | TEMPERATURE: 96.5 F | HEART RATE: 86 BPM | SYSTOLIC BLOOD PRESSURE: 148 MMHG | DIASTOLIC BLOOD PRESSURE: 68 MMHG | HEIGHT: 72 IN

## 2024-05-15 LAB
GLUCOSE BLD STRIP.AUTO-MCNC: 132 MG/DL (ref 70–110)
GLUCOSE BLD STRIP.AUTO-MCNC: 97 MG/DL (ref 70–110)

## 2024-05-15 PROCEDURE — 7100000000 HC PACU RECOVERY - FIRST 15 MIN: Performed by: INTERNAL MEDICINE

## 2024-05-15 PROCEDURE — 3700000001 HC ADD 15 MINUTES (ANESTHESIA): Performed by: INTERNAL MEDICINE

## 2024-05-15 PROCEDURE — 7100000010 HC PHASE II RECOVERY - FIRST 15 MIN: Performed by: INTERNAL MEDICINE

## 2024-05-15 PROCEDURE — 2500000003 HC RX 250 WO HCPCS: Performed by: NURSE ANESTHETIST, CERTIFIED REGISTERED

## 2024-05-15 PROCEDURE — 2709999900 HC NON-CHARGEABLE SUPPLY: Performed by: INTERNAL MEDICINE

## 2024-05-15 PROCEDURE — 2580000003 HC RX 258: Performed by: NURSE ANESTHETIST, CERTIFIED REGISTERED

## 2024-05-15 PROCEDURE — 6360000002 HC RX W HCPCS: Performed by: NURSE ANESTHETIST, CERTIFIED REGISTERED

## 2024-05-15 PROCEDURE — 3700000000 HC ANESTHESIA ATTENDED CARE: Performed by: INTERNAL MEDICINE

## 2024-05-15 PROCEDURE — 82962 GLUCOSE BLOOD TEST: CPT

## 2024-05-15 PROCEDURE — 3600007513: Performed by: INTERNAL MEDICINE

## 2024-05-15 PROCEDURE — 88305 TISSUE EXAM BY PATHOLOGIST: CPT

## 2024-05-15 PROCEDURE — 3600007503: Performed by: INTERNAL MEDICINE

## 2024-05-15 RX ORDER — SODIUM CHLORIDE 0.9 % (FLUSH) 0.9 %
5-40 SYRINGE (ML) INJECTION PRN
Status: DISCONTINUED | OUTPATIENT
Start: 2024-05-15 | End: 2024-05-15 | Stop reason: HOSPADM

## 2024-05-15 RX ORDER — LIDOCAINE HYDROCHLORIDE 20 MG/ML
INJECTION, SOLUTION EPIDURAL; INFILTRATION; INTRACAUDAL; PERINEURAL PRN
Status: DISCONTINUED | OUTPATIENT
Start: 2024-05-15 | End: 2024-05-15 | Stop reason: SDUPTHER

## 2024-05-15 RX ORDER — INSULIN LISPRO 100 [IU]/ML
0-15 INJECTION, SOLUTION INTRAVENOUS; SUBCUTANEOUS ONCE
Status: DISCONTINUED | OUTPATIENT
Start: 2024-05-15 | End: 2024-05-15 | Stop reason: HOSPADM

## 2024-05-15 RX ORDER — SODIUM CHLORIDE, SODIUM LACTATE, POTASSIUM CHLORIDE, CALCIUM CHLORIDE 600; 310; 30; 20 MG/100ML; MG/100ML; MG/100ML; MG/100ML
INJECTION, SOLUTION INTRAVENOUS CONTINUOUS
Status: DISCONTINUED | OUTPATIENT
Start: 2024-05-15 | End: 2024-05-15 | Stop reason: HOSPADM

## 2024-05-15 RX ORDER — DEXTROSE MONOHYDRATE 100 MG/ML
INJECTION, SOLUTION INTRAVENOUS CONTINUOUS PRN
Status: DISCONTINUED | OUTPATIENT
Start: 2024-05-15 | End: 2024-05-15 | Stop reason: HOSPADM

## 2024-05-15 RX ORDER — PROPOFOL 10 MG/ML
INJECTION, EMULSION INTRAVENOUS PRN
Status: DISCONTINUED | OUTPATIENT
Start: 2024-05-15 | End: 2024-05-15 | Stop reason: SDUPTHER

## 2024-05-15 RX ORDER — LIDOCAINE HYDROCHLORIDE 10 MG/ML
1 INJECTION, SOLUTION EPIDURAL; INFILTRATION; INTRACAUDAL; PERINEURAL
Status: DISCONTINUED | OUTPATIENT
Start: 2024-05-15 | End: 2024-05-15 | Stop reason: HOSPADM

## 2024-05-15 RX ADMIN — LIDOCAINE HYDROCHLORIDE 50 MG: 20 INJECTION, SOLUTION EPIDURAL; INFILTRATION; INTRACAUDAL; PERINEURAL at 10:59

## 2024-05-15 RX ADMIN — PROPOFOL 100 MG: 10 INJECTION, EMULSION INTRAVENOUS at 10:59

## 2024-05-15 RX ADMIN — PROPOFOL 50 MG: 10 INJECTION, EMULSION INTRAVENOUS at 11:05

## 2024-05-15 RX ADMIN — PROPOFOL 30 MG: 10 INJECTION, EMULSION INTRAVENOUS at 11:32

## 2024-05-15 RX ADMIN — PROPOFOL 50 MG: 10 INJECTION, EMULSION INTRAVENOUS at 11:17

## 2024-05-15 RX ADMIN — SODIUM CHLORIDE, SODIUM LACTATE, POTASSIUM CHLORIDE, AND CALCIUM CHLORIDE: 600; 310; 30; 20 INJECTION, SOLUTION INTRAVENOUS at 09:55

## 2024-05-15 RX ADMIN — PROPOFOL 50 MG: 10 INJECTION, EMULSION INTRAVENOUS at 11:24

## 2024-05-15 RX ADMIN — PROPOFOL 50 MG: 10 INJECTION, EMULSION INTRAVENOUS at 11:28

## 2024-05-15 RX ADMIN — PROPOFOL 50 MG: 10 INJECTION, EMULSION INTRAVENOUS at 11:20

## 2024-05-15 ASSESSMENT — PAIN - FUNCTIONAL ASSESSMENT: PAIN_FUNCTIONAL_ASSESSMENT: NONE - DENIES PAIN

## 2024-05-15 NOTE — DISCHARGE INSTRUCTIONS
references and/or instructions provided during this visit included:    See Attached      APPOINTMENTS:    Per MD Instruction    Discharge information has been reviewed with the patient.  The patient verbalized understanding.

## 2024-05-15 NOTE — ANESTHESIA PRE PROCEDURE
reviewed  Airway: Mallampati: II          Dental: normal exam         Pulmonary:normal exam                               Cardiovascular:    (+) hypertension:, past MI: > 6 months, CAD:        Rhythm: regular  Rate: normal                    Neuro/Psych:               GI/Hepatic/Renal:   (+) GERD:          Endo/Other:    (+) Diabetes.                 Abdominal:             Vascular:          Other Findings:       Anesthesia Plan      MAC     ASA 3       Induction: intravenous.      Anesthetic plan and risks discussed with patient.        Attending anesthesiologist reviewed and agrees with Preprocedure content            Henry Hua MD   5/15/2024

## 2024-05-15 NOTE — ANESTHESIA POSTPROCEDURE EVALUATION
Department of Anesthesiology  Postprocedure Note    Patient: Bhaskar Wing  MRN: [id number]  YOB: 1950  Date of evaluation: 5/15/2024    Procedure Summary       Date: 05/15/24 Room / Location: [facility] ENDO 02 / [facility] ENDOSCOPY    Anesthesia Start: 1058 Anesthesia Stop: 1139    Procedures:       COLONOSCOPY with polypectomies (Abdomen)      ESOPHAGOGASTRODUODENOSCOPY WITH DILATION 54fr (Upper GI Region) Diagnosis:       Lower abdominal pain      Diarrhea, unspecified type      Gastroesophageal reflux disease, unspecified whether esophagitis present      Dysphagia, unspecified type      Personal history of colonic polyps      Abnormal CT scan, gastrointestinal tract      BMI 25.0-25.9,adult      (Lower abdominal pain [R10.30])      (Diarrhea, unspecified type [R19.7])      (Gastroesophageal reflux disease, unspecified whether esophagitis present [K21.9])      (Dysphagia, unspecified type [R13.10])      (Personal history of colonic polyps [Z86.010])      (Abnormal CT scan, gastrointestinal tract [R93.3])      (BMI 25.0-25.9,adult [Z68.25])    Surgeons: Maykel Bhatt MD Responsible Provider: Henry Hua MD    Anesthesia Type: MAC ASA Status: 3            Anesthesia Type: No value filed.    Fernando Phase I: Fernando Score: 10    Fernando Phase II: Fernando Score: 10    Anesthesia Post Evaluation    Patient location during evaluation: bedside  Patient participation: complete - patient participated  Level of consciousness: awake  Pain score: 0  Airway patency: patent  Nausea & Vomiting: no nausea  Cardiovascular status: hemodynamically stable  Respiratory status: acceptable  Hydration status: euvolemic  Pain management: satisfactory to patient        No notable events documented.

## 2024-05-17 RX ORDER — PANTOPRAZOLE SODIUM 40 MG/1
40 TABLET, DELAYED RELEASE ORAL DAILY
Qty: 90 TABLET | Refills: 0 | Status: SHIPPED | OUTPATIENT
Start: 2024-05-17

## 2024-06-03 RX ORDER — CYANOCOBALAMIN 1000 UG/ML
1000 INJECTION, SOLUTION INTRAMUSCULAR; SUBCUTANEOUS ONCE
Status: CANCELLED | OUTPATIENT
Start: 2024-06-11 | End: 2024-06-11

## 2024-06-11 ENCOUNTER — HOSPITAL ENCOUNTER (OUTPATIENT)
Facility: HOSPITAL | Age: 74
Setting detail: INFUSION SERIES
End: 2024-06-11
Payer: MEDICARE

## 2024-06-12 ENCOUNTER — HOSPITAL ENCOUNTER (OUTPATIENT)
Facility: HOSPITAL | Age: 74
Setting detail: INFUSION SERIES
Discharge: HOME | End: 2024-06-15
Payer: MEDICARE

## 2024-06-12 VITALS
SYSTOLIC BLOOD PRESSURE: 138 MMHG | DIASTOLIC BLOOD PRESSURE: 76 MMHG | RESPIRATION RATE: 20 BRPM | OXYGEN SATURATION: 98 % | TEMPERATURE: 98.2 F | HEART RATE: 64 BPM

## 2024-06-12 DIAGNOSIS — E53.8 B12 DEFICIENCY: Primary | ICD-10-CM

## 2024-06-12 PROCEDURE — 96372 THER/PROPH/DIAG INJ SC/IM: CPT

## 2024-06-12 PROCEDURE — 6360000002 HC RX W HCPCS: Performed by: FAMILY MEDICINE

## 2024-06-12 RX ORDER — CYANOCOBALAMIN 1000 UG/ML
1000 INJECTION, SOLUTION INTRAMUSCULAR; SUBCUTANEOUS ONCE
OUTPATIENT
Start: 2024-08-07 | End: 2024-08-07

## 2024-06-12 RX ORDER — ACETAMINOPHEN 325 MG/1
650 TABLET ORAL
OUTPATIENT
Start: 2024-08-07

## 2024-06-12 RX ORDER — EPINEPHRINE 1 MG/ML
0.3 INJECTION, SOLUTION, CONCENTRATE INTRAVENOUS PRN
OUTPATIENT
Start: 2024-08-07

## 2024-06-12 RX ORDER — SODIUM CHLORIDE 9 MG/ML
INJECTION, SOLUTION INTRAVENOUS CONTINUOUS
OUTPATIENT
Start: 2024-08-07

## 2024-06-12 RX ORDER — CYANOCOBALAMIN 1000 UG/ML
1000 INJECTION, SOLUTION INTRAMUSCULAR; SUBCUTANEOUS ONCE
Status: COMPLETED | OUTPATIENT
Start: 2024-06-12 | End: 2024-06-12

## 2024-06-12 RX ORDER — DIPHENHYDRAMINE HYDROCHLORIDE 50 MG/ML
50 INJECTION INTRAMUSCULAR; INTRAVENOUS
OUTPATIENT
Start: 2024-08-07

## 2024-06-12 RX ORDER — ALBUTEROL SULFATE 90 UG/1
4 AEROSOL, METERED RESPIRATORY (INHALATION) PRN
OUTPATIENT
Start: 2024-08-07

## 2024-06-12 RX ORDER — ONDANSETRON 2 MG/ML
8 INJECTION INTRAMUSCULAR; INTRAVENOUS
OUTPATIENT
Start: 2024-08-07

## 2024-06-12 RX ADMIN — CYANOCOBALAMIN 1000 MCG: 1000 INJECTION INTRAMUSCULAR; SUBCUTANEOUS at 15:31

## 2024-06-12 NOTE — PROGRESS NOTES
[facility] Progress Note    Date: 2024    Name: Bhaskar Wing    MRN: [id number]         : 1950    Mr. Wing arrived in the [facility] today at 1525, in stable condition, here for his B-12 INJECTION (EVERY 60 days). He was assessed and education was provided.     Mr. Wing's vitals were reviewed.  Vitals:    24 1529   BP: 138/76   Pulse: 64   Resp: 20   Temp: 98.2 °F (36.8 °C)   SpO2: 98%     Cyanocobalamin (B-12) 1,000 mcg was administered IM in his LEFT DELTOID, no bandaid per pt preference.      Mr. Wing tolerated the injection well, and voiced no complaints.     Discharge instructions reviewed with patient, and he expressed understanding. Patient's armband was removed and shredded.      [facility], ambulatory and in stable condition at 1535.    His next appointment is 24 at 1500, which is not 60 days as per his order. He is going to call his provider for clarification and let us know.       Martha Gibbs RN  2024  3:05 PM

## 2024-06-20 RX ORDER — AMLODIPINE BESYLATE 5 MG/1
5 TABLET ORAL DAILY
Qty: 90 TABLET | Refills: 1 | Status: SHIPPED | OUTPATIENT
Start: 2024-06-20

## 2024-06-20 RX ORDER — ROSUVASTATIN CALCIUM 40 MG/1
40 TABLET, COATED ORAL NIGHTLY
Qty: 90 TABLET | Refills: 1 | Status: SHIPPED | OUTPATIENT
Start: 2024-06-20

## 2024-06-21 SDOH — HEALTH STABILITY: PHYSICAL HEALTH: ON AVERAGE, HOW MANY MINUTES DO YOU ENGAGE IN EXERCISE AT THIS LEVEL?: 120 MIN

## 2024-06-21 SDOH — HEALTH STABILITY: PHYSICAL HEALTH: ON AVERAGE, HOW MANY DAYS PER WEEK DO YOU ENGAGE IN MODERATE TO STRENUOUS EXERCISE (LIKE A BRISK WALK)?: 4 DAYS

## 2024-06-21 ASSESSMENT — PATIENT HEALTH QUESTIONNAIRE - PHQ9
SUM OF ALL RESPONSES TO PHQ9 QUESTIONS 1 & 2: 0
SUM OF ALL RESPONSES TO PHQ QUESTIONS 1-9: 0
2. FEELING DOWN, DEPRESSED OR HOPELESS: NOT AT ALL
1. LITTLE INTEREST OR PLEASURE IN DOING THINGS: NOT AT ALL

## 2024-06-21 ASSESSMENT — LIFESTYLE VARIABLES
HOW MANY STANDARD DRINKS CONTAINING ALCOHOL DO YOU HAVE ON A TYPICAL DAY: 0
HOW MANY STANDARD DRINKS CONTAINING ALCOHOL DO YOU HAVE ON A TYPICAL DAY: PATIENT DOES NOT DRINK
HOW OFTEN DO YOU HAVE A DRINK CONTAINING ALCOHOL: NEVER
HOW OFTEN DO YOU HAVE SIX OR MORE DRINKS ON ONE OCCASION: 1
HOW OFTEN DO YOU HAVE A DRINK CONTAINING ALCOHOL: 1

## 2024-06-24 ENCOUNTER — OFFICE VISIT (OUTPATIENT)
Facility: CLINIC | Age: 74
End: 2024-06-24
Payer: MEDICARE

## 2024-06-24 VITALS
DIASTOLIC BLOOD PRESSURE: 74 MMHG | WEIGHT: 186 LBS | HEART RATE: 74 BPM | SYSTOLIC BLOOD PRESSURE: 160 MMHG | HEIGHT: 72 IN | RESPIRATION RATE: 16 BRPM | OXYGEN SATURATION: 95 % | BODY MASS INDEX: 25.19 KG/M2 | TEMPERATURE: 97.4 F

## 2024-06-24 DIAGNOSIS — E04.9 THYROID GOITER: ICD-10-CM

## 2024-06-24 DIAGNOSIS — K21.9 GASTROESOPHAGEAL REFLUX DISEASE WITHOUT ESOPHAGITIS: ICD-10-CM

## 2024-06-24 DIAGNOSIS — R07.89 OTHER CHEST PAIN: ICD-10-CM

## 2024-06-24 DIAGNOSIS — I10 ESSENTIAL HYPERTENSION: ICD-10-CM

## 2024-06-24 DIAGNOSIS — N40.0 BENIGN PROSTATIC HYPERPLASIA, UNSPECIFIED WHETHER LOWER URINARY TRACT SYMPTOMS PRESENT: ICD-10-CM

## 2024-06-24 DIAGNOSIS — E11.9 CONTROLLED TYPE 2 DIABETES MELLITUS WITHOUT COMPLICATION, WITHOUT LONG-TERM CURRENT USE OF INSULIN (HCC): ICD-10-CM

## 2024-06-24 DIAGNOSIS — Z00.00 MEDICARE ANNUAL WELLNESS VISIT, SUBSEQUENT: Primary | ICD-10-CM

## 2024-06-24 DIAGNOSIS — I25.110 CORONARY ARTERY DISEASE INVOLVING NATIVE CORONARY ARTERY OF NATIVE HEART WITH UNSTABLE ANGINA PECTORIS (HCC): ICD-10-CM

## 2024-06-24 PROCEDURE — G8417 CALC BMI ABV UP PARAM F/U: HCPCS | Performed by: FAMILY MEDICINE

## 2024-06-24 PROCEDURE — 2022F DILAT RTA XM EVC RTNOPTHY: CPT | Performed by: FAMILY MEDICINE

## 2024-06-24 PROCEDURE — G0439 PPPS, SUBSEQ VISIT: HCPCS | Performed by: FAMILY MEDICINE

## 2024-06-24 PROCEDURE — 99214 OFFICE O/P EST MOD 30 MIN: CPT | Performed by: FAMILY MEDICINE

## 2024-06-24 PROCEDURE — G8427 DOCREV CUR MEDS BY ELIG CLIN: HCPCS | Performed by: FAMILY MEDICINE

## 2024-06-24 PROCEDURE — 3078F DIAST BP <80 MM HG: CPT | Performed by: FAMILY MEDICINE

## 2024-06-24 PROCEDURE — 1123F ACP DISCUSS/DSCN MKR DOCD: CPT | Performed by: FAMILY MEDICINE

## 2024-06-24 PROCEDURE — 3017F COLORECTAL CA SCREEN DOC REV: CPT | Performed by: FAMILY MEDICINE

## 2024-06-24 PROCEDURE — 3077F SYST BP >= 140 MM HG: CPT | Performed by: FAMILY MEDICINE

## 2024-06-24 PROCEDURE — 3044F HG A1C LEVEL LT 7.0%: CPT | Performed by: FAMILY MEDICINE

## 2024-06-24 PROCEDURE — 1036F TOBACCO NON-USER: CPT | Performed by: FAMILY MEDICINE

## 2024-06-24 RX ORDER — ONDANSETRON 4 MG/1
TABLET, ORALLY DISINTEGRATING ORAL
COMMUNITY
Start: 2024-04-23

## 2024-06-24 RX ORDER — OMEPRAZOLE 40 MG/1
CAPSULE, DELAYED RELEASE ORAL
COMMUNITY
Start: 2024-05-16

## 2024-06-24 SDOH — ECONOMIC STABILITY: FOOD INSECURITY: WITHIN THE PAST 12 MONTHS, YOU WORRIED THAT YOUR FOOD WOULD RUN OUT BEFORE YOU GOT MONEY TO BUY MORE.: NEVER TRUE

## 2024-06-24 SDOH — ECONOMIC STABILITY: FOOD INSECURITY: WITHIN THE PAST 12 MONTHS, THE FOOD YOU BOUGHT JUST DIDN'T LAST AND YOU DIDN'T HAVE MONEY TO GET MORE.: NEVER TRUE

## 2024-06-24 SDOH — ECONOMIC STABILITY: INCOME INSECURITY: HOW HARD IS IT FOR YOU TO PAY FOR THE VERY BASICS LIKE FOOD, HOUSING, MEDICAL CARE, AND HEATING?: NOT HARD AT ALL

## 2024-06-24 NOTE — PROGRESS NOTES
\"Have you been to the ER, urgent care clinic since your last visit?  Hospitalized since your last visit?\"    [facility] LOV: 5/15/24 for colonoscopy with polypectomies - Dr. Bhatt.    “Have you seen or consulted any other health care providers outside of [facility] since your last visit?”    NO      Last dm eye exam -  cataract left eye [facility] LOV: 3/2024    Last dm foot exam - None; does not see a podiatrist.    Chief Complaint   Patient presents with    Medicare AWV    Thyroid Problem    Diabetes    Benign Prostatic Hypertrophy    Gastroesophageal Reflux    Vitamin B12 deficiency         Click Here for Release of Records Request

## 2024-06-24 NOTE — PROGRESS NOTES
Bhaskar Wing (:  1950) is a 74 y.o. male, Established patient, here for evaluation of the following chief complaint(s):  Medicare AWV, Thyroid Problem, Diabetes, Benign Prostatic Hypertrophy, Gastroesophageal Reflux, and Vitamin B12 deficiency         Assessment & Plan  1. Medicare wellness.  The patient's acid reflux condition has remained stable. The patient was counseled on the importance of dental visits to a minimum of one or two times.    2. Coronary artery disease.  The patient was advised to establish care with a new cardiologist and to ensure his frequent use of nitroglycerin is confirmed. A follow-up appointment with his cardiologist was recommended.    3. Hypertension.   The patient's blood pressure is mildly elevated. The patient will maintain his current medication regimen. A repeat of his blood pressure will be conducted before he departs. Keep blood pressure log at home. F/u in a month for bp check with nurse visit.     4. Benign prostatic hyperplasia.  The patient will continue his follow-up appointments with his urologist.    5. Thyroid nodules.  The patient has multiple nodules. The patient was provided with contact information for endocrinology and advised to schedule a follow-up appointment.    6. Diabetes.  The patient's diabetes is well-managed. His last A1c was within normal limits prior to the appointment. Fasting labs will be conducted today. The patient was advised to continue his healthy lifestyle, including a low-fat, low-carb diet and maintaining physical activity.    Results    1. Medicare annual wellness visit, subsequent  2. Coronary artery disease involving native coronary artery of native heart with unstable angina pectoris (HCC)  3. Essential hypertension  4. Gastroesophageal reflux disease without esophagitis  5. Other chest pain  6. Thyroid goiter  Comments:  need thyroid nodule biopsy?? done referral in 2024. now providing info to schedule an appt  7. Benign

## 2024-06-24 NOTE — ACP (ADVANCE CARE PLANNING)
Advance Care Planning     General Advance Care Planning (ACP) Conversation    Date of Conversation: 6/24/2024  Conducted with: Patient with Decision Making Capacity  Other persons present: None    Healthcare Decision Maker:   Primary Decision Maker: Karyn Wing - Spouse - [phone]  Click here to complete Healthcare Decision Makers including selection of the Healthcare Decision Maker Relationship (ie \"Primary\").   Today we dicussed advance directive. Wants to be a full code. Wants his wife to decide regarding prolong life support.  Also provided form to complete for advance directive.     Content/Action Overview: see above   See above   Reviewed DNR/DNI and patient elects Full Code (Attempt Resuscitation)  treatment goals, benefit/burden of treatment options, artificial nutrition, ventilation preferences, hospitalization preferences, resuscitation preferences, and end of life care preferences (vegetative state/imminent death)      Length of Voluntary ACP Conversation in minutes:  <16 minutes (Non-Billable)    Annetta Dave MD

## 2024-06-24 NOTE — PROGRESS NOTES
Medicare Annual Wellness Visit    Bhaskar Wing is here for Medicare AWV, Thyroid Problem, Diabetes, Benign Prostatic Hypertrophy, Gastroesophageal Reflux, and Vitamin B12 deficiency    Assessment & Plan   Medicare annual wellness visit, subsequent  Recommendations for Preventive Services Due: see orders and patient instructions/AVS.  Recommended screening schedule for the next 5-10 years is provided to the patient in written form: see Patient Instructions/AVS.     No follow-ups on file.     Subjective       Patient's complete Health Risk Assessment and screening values have been reviewed and are found in Flowsheets. The following problems were reviewed today and where indicated follow up appointments were made and/or referrals ordered.    Positive Risk Factor Screenings with Interventions:                Activity, Diet, and Weight:  On average, how many days per week do you engage in moderate to strenuous exercise (like a brisk walk)?: 4 days  On average, how many minutes do you engage in exercise at this level?: 120 min    Do you eat balanced/healthy meals regularly?: (!) No    Body mass index is 25.23 kg/m².    Do you eat balanced/healthy meals regularly Interventions:  Discussed low fat, low carb diet. Stay physically active.         Dentist Screen:  Have you seen the dentist within the past year?: (!) No    Intervention:  Advised to schedule with their dentist  Not able to afford it. Discussed the importance of having dental exam.       Safety:  Do you have non-slip mats or non-slip surfaces or shower bars or grab bars in your shower or bathtub?: (!) No  Interventions:  Does not want any intervention. Feels safe to take shower and does not worry about fall or any unsteady gait.      Advanced Directives:  Do you have a Living Will?: (!) No    Intervention:  see ACP note          Objective   Vitals:    06/24/24 1403   BP: (!) 144/60   Site: Left Upper Arm   Position: Sitting   Cuff Size: Large Adult   Pulse: 74

## 2024-06-24 NOTE — PATIENT INSTRUCTIONS
exactly as prescribed. Call your doctor if you think you are having a problem with your medicine.     If your doctor recommends aspirin, take the amount directed each day. Make sure you take aspirin and not another kind of pain reliever, such as acetaminophen (Tylenol).   When should you call for help?   Call 911 if you have symptoms of a heart attack. These may include:    Chest pain or pressure, or a strange feeling in the chest.     Sweating.     Shortness of breath.     Pain, pressure, or a strange feeling in the back, neck, jaw, or upper belly or in one or both shoulders or arms.     Lightheadedness or sudden weakness.     A fast or irregular heartbeat.   After you call 911, the  may tell you to chew 1 adult-strength or 2 to 4 low-dose aspirin. Wait for an ambulance. Do not try to drive yourself.  Watch closely for changes in your health, and be sure to contact your doctor if you have any problems.  Where can you learn more?  Go to https://www.[employer].net/patientEd and enter F075 to learn more about \"A Healthy Heart: Care Instructions.\"  Current as of: June 24, 2023  Content Version: 14.1  © 3709-7413 [employer].   Care instructions adapted under license by [employer]. If you have questions about a medical condition or this instruction, always ask your healthcare professional. [employer] disclaims any warranty or liability for your use of this information.      Personalized Preventive Plan for Bhaskar Wing - 6/24/2024  Medicare offers a range of preventive health benefits. Some of the tests and screenings are paid in full while other may be subject to a deductible, co-insurance, and/or copay.    Some of these benefits include a comprehensive review of your medical history including lifestyle, illnesses that may run in your family, and various assessments and screenings as appropriate.    After reviewing your medical record and screening and assessments performed today

## 2024-07-09 ENCOUNTER — HOSPITAL ENCOUNTER (OUTPATIENT)
Facility: HOSPITAL | Age: 74
Setting detail: INFUSION SERIES
End: 2024-07-09

## 2024-07-24 ENCOUNTER — OFFICE VISIT (OUTPATIENT)
Facility: CLINIC | Age: 74
End: 2024-07-24

## 2024-07-24 VITALS
SYSTOLIC BLOOD PRESSURE: 128 MMHG | HEART RATE: 74 BPM | TEMPERATURE: 97.7 F | OXYGEN SATURATION: 95 % | DIASTOLIC BLOOD PRESSURE: 70 MMHG | RESPIRATION RATE: 16 BRPM

## 2024-07-24 DIAGNOSIS — I10 ESSENTIAL HYPERTENSION: Primary | ICD-10-CM

## 2024-07-24 NOTE — PROGRESS NOTES
Patient presents for BP check.    Any recent (exertional) chest pain? Yes - chest pain last week \"had to take a couple of nitroglycerin pills and then it eased up. No chest pain today.    SOB? No  Palpitations? No  LE edema? No  Side effects of medication? No    Plan:     Patient presents for blood pressure check. He is taking his blood pressure medication. Patient is okay to be discharged, per Dr. Dave. Patient was instructed to contact his cardiologist today to get their recommendations as he had low blood pressure readings, chest pain and had to use nitroglycerin last week. He verbalized understanding; stated he will call cardiology office. Per chart review, patient has a cardiology appointment on 8/07/24. Keep follow-up appointment with Dr. Dave on 10/24/24, as well. Patient verbalized understanding.

## 2024-07-25 ENCOUNTER — TELEPHONE (OUTPATIENT)
Age: 74
End: 2024-07-25

## 2024-07-25 NOTE — TELEPHONE ENCOUNTER
Patient called and stated his blood pressure was low he saw Dr. Dave and she advise him to call office. Patient states he will Chart BP/HR and bring in office when he come on 8/7/24

## 2024-08-13 ENCOUNTER — HOSPITAL ENCOUNTER (OUTPATIENT)
Facility: HOSPITAL | Age: 74
Setting detail: INFUSION SERIES
Discharge: HOME | End: 2024-08-16
Payer: MEDICARE

## 2024-08-13 VITALS
HEART RATE: 76 BPM | RESPIRATION RATE: 16 BRPM | OXYGEN SATURATION: 97 % | DIASTOLIC BLOOD PRESSURE: 74 MMHG | SYSTOLIC BLOOD PRESSURE: 127 MMHG | TEMPERATURE: 98.3 F

## 2024-08-13 DIAGNOSIS — E53.8 B12 DEFICIENCY: Primary | ICD-10-CM

## 2024-08-13 PROCEDURE — 96372 THER/PROPH/DIAG INJ SC/IM: CPT

## 2024-08-13 PROCEDURE — 6360000002 HC RX W HCPCS: Performed by: FAMILY MEDICINE

## 2024-08-13 RX ORDER — CYANOCOBALAMIN 1000 UG/ML
1000 INJECTION, SOLUTION INTRAMUSCULAR; SUBCUTANEOUS ONCE
Status: COMPLETED | OUTPATIENT
Start: 2024-08-13 | End: 2024-08-13

## 2024-08-13 RX ORDER — ALBUTEROL SULFATE 90 UG/1
4 AEROSOL, METERED RESPIRATORY (INHALATION) PRN
OUTPATIENT
Start: 2024-10-02

## 2024-08-13 RX ORDER — CYANOCOBALAMIN 1000 UG/ML
1000 INJECTION, SOLUTION INTRAMUSCULAR; SUBCUTANEOUS ONCE
OUTPATIENT
Start: 2024-10-02 | End: 2024-10-02

## 2024-08-13 RX ORDER — SODIUM CHLORIDE 9 MG/ML
INJECTION, SOLUTION INTRAVENOUS CONTINUOUS
OUTPATIENT
Start: 2024-10-02

## 2024-08-13 RX ORDER — DIPHENHYDRAMINE HYDROCHLORIDE 50 MG/ML
50 INJECTION INTRAMUSCULAR; INTRAVENOUS
OUTPATIENT
Start: 2024-10-02

## 2024-08-13 RX ORDER — ONDANSETRON 2 MG/ML
8 INJECTION INTRAMUSCULAR; INTRAVENOUS
OUTPATIENT
Start: 2024-10-02

## 2024-08-13 RX ORDER — EPINEPHRINE 1 MG/ML
0.3 INJECTION, SOLUTION, CONCENTRATE INTRAVENOUS PRN
OUTPATIENT
Start: 2024-10-02

## 2024-08-13 RX ORDER — ACETAMINOPHEN 325 MG/1
650 TABLET ORAL
OUTPATIENT
Start: 2024-10-02

## 2024-08-13 RX ADMIN — CYANOCOBALAMIN 1000 MCG: 1000 INJECTION INTRAMUSCULAR; SUBCUTANEOUS at 15:02

## 2024-08-13 ASSESSMENT — PAIN - FUNCTIONAL ASSESSMENT: PAIN_FUNCTIONAL_ASSESSMENT: NONE - DENIES PAIN

## 2024-08-13 NOTE — PROGRESS NOTES
[facility] Progress Note    Date: 2024    Name: Bhaskar Wing    MRN: [id number]         : 1950    Mr. Wing arrived in the [facility] today at 1450, in stable condition, here for his B-12 INJECTION (EVERY 60 days). He was assessed and education was provided.     Mr. Wing's vitals were reviewed.  Vitals:    24 1452   BP: 127/74   Pulse: 76   Resp: 16   Temp: 98.3 °F (36.8 °C)   SpO2: 97%     Patient reports increase in energy and reduction in mental fog since starting B-12 shots. Denies any side effects.    Cyanocobalamin (B-12) 1,000 mcg was administered IM in his RIGHT DELTOID.      Mr. Wing tolerated the injection well, and voiced no complaints.     Discharge instructions reviewed with patient, and he expressed understanding. Patient's armband was removed and shredded.      [facility], ambulatory and in stable condition at 1510.    His next appointment is 10/15/24 at 1500 for his next B-12 shot (dose 3 of 3 per current order).       Gwen Zimmerman RN  2024  3:05 PM

## 2024-09-10 ENCOUNTER — APPOINTMENT (OUTPATIENT)
Facility: HOSPITAL | Age: 74
End: 2024-09-10
Payer: MEDICARE

## 2024-09-26 RX ORDER — AMLODIPINE BESYLATE 5 MG/1
5 TABLET ORAL DAILY
Qty: 90 TABLET | Refills: 1 | Status: SHIPPED | OUTPATIENT
Start: 2024-09-26

## 2024-09-26 RX ORDER — ROSUVASTATIN CALCIUM 40 MG/1
40 TABLET, COATED ORAL NIGHTLY
Qty: 90 TABLET | Refills: 1 | Status: SHIPPED | OUTPATIENT
Start: 2024-09-26

## 2024-10-04 RX ORDER — RAMIPRIL 10 MG/1
CAPSULE ORAL DAILY
Qty: 90 CAPSULE | Refills: 1 | Status: SHIPPED | OUTPATIENT
Start: 2024-10-04

## 2024-10-11 DIAGNOSIS — E11.65 TYPE 2 DIABETES MELLITUS WITH HYPERGLYCEMIA (HCC): ICD-10-CM

## 2024-10-15 ENCOUNTER — HOSPITAL ENCOUNTER (OUTPATIENT)
Facility: HOSPITAL | Age: 74
Setting detail: INFUSION SERIES
Discharge: HOME | End: 2024-10-18
Payer: MEDICARE

## 2024-10-15 VITALS
RESPIRATION RATE: 16 BRPM | DIASTOLIC BLOOD PRESSURE: 72 MMHG | OXYGEN SATURATION: 97 % | SYSTOLIC BLOOD PRESSURE: 156 MMHG | TEMPERATURE: 97.7 F | HEART RATE: 82 BPM

## 2024-10-15 DIAGNOSIS — E53.8 B12 DEFICIENCY: Primary | ICD-10-CM

## 2024-10-15 PROCEDURE — 6360000002 HC RX W HCPCS: Performed by: FAMILY MEDICINE

## 2024-10-15 PROCEDURE — 96372 THER/PROPH/DIAG INJ SC/IM: CPT

## 2024-10-15 RX ORDER — CYANOCOBALAMIN 1000 UG/ML
1000 INJECTION, SOLUTION INTRAMUSCULAR; SUBCUTANEOUS ONCE
Status: CANCELLED | OUTPATIENT
Start: 2024-10-15 | End: 2024-10-15

## 2024-10-15 RX ORDER — DIPHENHYDRAMINE HYDROCHLORIDE 50 MG/ML
50 INJECTION INTRAMUSCULAR; INTRAVENOUS
OUTPATIENT
Start: 2024-10-15

## 2024-10-15 RX ORDER — CYANOCOBALAMIN 1000 UG/ML
1000 INJECTION, SOLUTION INTRAMUSCULAR; SUBCUTANEOUS ONCE
Status: COMPLETED | OUTPATIENT
Start: 2024-10-15 | End: 2024-10-15

## 2024-10-15 RX ORDER — ACETAMINOPHEN 325 MG/1
650 TABLET ORAL
OUTPATIENT
Start: 2024-10-15

## 2024-10-15 RX ORDER — ONDANSETRON 2 MG/ML
8 INJECTION INTRAMUSCULAR; INTRAVENOUS
OUTPATIENT
Start: 2024-10-15

## 2024-10-15 RX ORDER — EPINEPHRINE 1 MG/ML
0.3 INJECTION, SOLUTION, CONCENTRATE INTRAVENOUS PRN
OUTPATIENT
Start: 2024-10-15

## 2024-10-15 RX ORDER — SODIUM CHLORIDE 9 MG/ML
INJECTION, SOLUTION INTRAVENOUS CONTINUOUS
OUTPATIENT
Start: 2024-10-15

## 2024-10-15 RX ORDER — ALBUTEROL SULFATE 90 UG/1
4 INHALANT RESPIRATORY (INHALATION) PRN
OUTPATIENT
Start: 2024-10-15

## 2024-10-15 RX ADMIN — CYANOCOBALAMIN 1000 MCG: 1000 INJECTION INTRAMUSCULAR; SUBCUTANEOUS at 15:02

## 2024-10-15 ASSESSMENT — PAIN - FUNCTIONAL ASSESSMENT: PAIN_FUNCTIONAL_ASSESSMENT: NONE - DENIES PAIN

## 2024-10-15 NOTE — PROGRESS NOTES
[facility] Progress Note    Date: October 15, 2024    Name: Bhaskar Wing    MRN: [id number]         : 1950    Mr. Wing arrived in the [facility] today at 1450, in stable condition, here for his B-12 INJECTION (EVERY 60 DAYS) (APPROXIMATELY EVERY 8-9 WEEKS). He was assessed and education was provided.     Mr. Wing's vitals were reviewed.  Vitals:    10/15/24 1450   BP: (!) 156/72   Pulse: 82   Resp: 16   Temp: 97.7 °F (36.5 °C)   SpO2: 97%            Cyanocobalamin (B-12) 1,000 mcg, was administered IM in his LEFT DELTOID at 1502, per order and without incident.            Mr. Wing tolerated well, and voiced no complaints.         [facility] condition at 1505..     He is to return in about 9 weeks, on Monday, 24 at 1500, for his next appointment, for his next B-12 Injection, pending new orders from his referring provider.          Alli Lynch RN  October 15, 2024  2:59 PM

## 2024-10-24 ENCOUNTER — OFFICE VISIT (OUTPATIENT)
Facility: CLINIC | Age: 74
End: 2024-10-24

## 2024-10-24 VITALS
TEMPERATURE: 97.4 F | DIASTOLIC BLOOD PRESSURE: 64 MMHG | RESPIRATION RATE: 16 BRPM | HEIGHT: 72 IN | OXYGEN SATURATION: 95 % | BODY MASS INDEX: 25.41 KG/M2 | SYSTOLIC BLOOD PRESSURE: 130 MMHG | HEART RATE: 91 BPM | WEIGHT: 187.6 LBS

## 2024-10-24 DIAGNOSIS — N40.0 BENIGN PROSTATIC HYPERPLASIA WITHOUT LOWER URINARY TRACT SYMPTOMS: ICD-10-CM

## 2024-10-24 DIAGNOSIS — E11.9 CONTROLLED TYPE 2 DIABETES MELLITUS WITHOUT COMPLICATION, WITHOUT LONG-TERM CURRENT USE OF INSULIN (HCC): ICD-10-CM

## 2024-10-24 DIAGNOSIS — I25.118 CORONARY ARTERY DISEASE OF NATIVE ARTERY OF NATIVE HEART WITH STABLE ANGINA PECTORIS (HCC): Primary | ICD-10-CM

## 2024-10-24 DIAGNOSIS — B35.1 ONYCHOMYCOSIS: ICD-10-CM

## 2024-10-24 DIAGNOSIS — K21.00 GASTROESOPHAGEAL REFLUX DISEASE WITH ESOPHAGITIS WITHOUT HEMORRHAGE: ICD-10-CM

## 2024-10-24 DIAGNOSIS — E78.00 PURE HYPERCHOLESTEROLEMIA: ICD-10-CM

## 2024-10-24 DIAGNOSIS — I10 ESSENTIAL HYPERTENSION: ICD-10-CM

## 2024-10-24 RX ORDER — CLOTRIMAZOLE 1 %
CREAM (GRAM) TOPICAL
Qty: 113 G | Refills: 0 | Status: SHIPPED | OUTPATIENT
Start: 2024-10-24 | End: 2024-10-31

## 2024-10-24 NOTE — PROGRESS NOTES
\"Have you been to the ER, urgent care clinic since your last visit?  Hospitalized since your last visit?\"    NO    “Have you seen or consulted any other health care providers outside our system since your last visit?”    NO    “Have you had a diabetic eye exam?”    NO     No diabetic eye exam on file     Last dm eye exam - [facility] for eye glasses (inside [facility]) in 3/2024. No record on file for patient at [facility] for an eye exam, per their . Patient stated he will schedule appointment for an eye exam. He will choose the provider and schedule exam.    Last dm foot exam -  No Socks and shoes have been removed for exam.     Chief Complaint   Patient presents with    Hypertension    Diabetes    Goiter     Thyroid goiter    Gastroesophageal Reflux    Chest Pain     Follow-up    Coronary Artery Disease    Benign Prostatic Hypertrophy

## 2024-10-24 NOTE — PROGRESS NOTES
Bhaskar Wing (:  1950) is a 74 y.o. male, Established patient, here for evaluation of the following chief complaint(s):  Hypertension, Diabetes, Goiter (Thyroid goiter), Gastroesophageal Reflux, Chest Pain (Follow-up), Coronary Artery Disease, and Benign Prostatic Hypertrophy         Assessment & Plan  1. Coronary Artery Disease.  Absence of anginal or chest pain noted. Risk factor management will be continued. A low salt, low fat diet is recommended, along with physical activity. Regular follow-ups with cardiology are advised, and the current plan will be maintained.    2. Hypertension.  Blood pressure is well controlled. Current medication regimen including amlodipine, carvedilol, and ramipril will be continued. A low salt diet and physical activity are recommended.    3. Diabetes Mellitus.  Diabetes is well controlled. Labs will be rechecked before the next visit. Diet and lifestyle modifications will be continued. Further plans will be discussed once lab results are available.    4. Hypercholesterolemia.  Statin therapy will be continued for prevention of coronary artery disease. A low fat, low carb diet is recommended, along with physical activity.    5. Acid Reflux.  Condition is stable. Avoidance of fried, fatty, and sour foods is recommended. Medication will be taken as needed.    6. Enlarged Prostate.  No symptoms reported. Urology follow-ups will be continued. Current medication regimen including Flomax will be maintained. No urinary complaints reported.    7. Onychomycosis.  A referral to a podiatrist will be made. Lamisil will be applied once a day at nighttime.    Follow-up  Return in 6 months for follow up.    Results  Laboratory Studies  Blood sugar 115. A1c was within normal limit in February.  1. Coronary artery disease of native artery of native heart with stable angina pectoris (HCC)  2. Essential hypertension  3. Controlled type 2 diabetes mellitus without complication, without

## 2024-10-29 RX ORDER — PANTOPRAZOLE SODIUM 40 MG/1
40 TABLET, DELAYED RELEASE ORAL DAILY
Qty: 90 TABLET | Refills: 1 | Status: SHIPPED | OUTPATIENT
Start: 2024-10-29

## 2024-11-01 ENCOUNTER — ANTI-COAG VISIT (OUTPATIENT)
Age: 74
End: 2024-11-01

## 2024-11-01 RX ORDER — NITROGLYCERIN 0.4 MG/1
0.4 TABLET SUBLINGUAL EVERY 5 MIN PRN
Qty: 25 TABLET | Refills: 1 | Status: SHIPPED | OUTPATIENT
Start: 2024-11-01

## 2024-12-16 ENCOUNTER — HOSPITAL ENCOUNTER (OUTPATIENT)
Facility: HOSPITAL | Age: 74
Setting detail: INFUSION SERIES
Discharge: HOME | End: 2024-12-19
Payer: MEDICARE

## 2024-12-16 VITALS
DIASTOLIC BLOOD PRESSURE: 93 MMHG | OXYGEN SATURATION: 96 % | HEART RATE: 72 BPM | SYSTOLIC BLOOD PRESSURE: 174 MMHG | RESPIRATION RATE: 18 BRPM | TEMPERATURE: 97.9 F

## 2024-12-16 DIAGNOSIS — E53.8 B12 DEFICIENCY: Primary | ICD-10-CM

## 2024-12-16 PROCEDURE — 6360000002 HC RX W HCPCS: Performed by: FAMILY MEDICINE

## 2024-12-16 PROCEDURE — 96372 THER/PROPH/DIAG INJ SC/IM: CPT

## 2024-12-16 RX ORDER — CYANOCOBALAMIN 1000 UG/ML
1000 INJECTION, SOLUTION INTRAMUSCULAR; SUBCUTANEOUS ONCE
Status: COMPLETED | OUTPATIENT
Start: 2024-12-16 | End: 2024-12-16

## 2024-12-16 RX ORDER — HYDROCORTISONE SODIUM SUCCINATE 100 MG/2ML
100 INJECTION INTRAMUSCULAR; INTRAVENOUS
OUTPATIENT
Start: 2025-01-13

## 2024-12-16 RX ORDER — CYANOCOBALAMIN 1000 UG/ML
1000 INJECTION, SOLUTION INTRAMUSCULAR; SUBCUTANEOUS ONCE
OUTPATIENT
Start: 2025-01-13 | End: 2025-01-13

## 2024-12-16 RX ORDER — SODIUM CHLORIDE 9 MG/ML
INJECTION, SOLUTION INTRAVENOUS CONTINUOUS
OUTPATIENT
Start: 2025-01-13

## 2024-12-16 RX ORDER — ONDANSETRON 2 MG/ML
8 INJECTION INTRAMUSCULAR; INTRAVENOUS
OUTPATIENT
Start: 2025-01-13

## 2024-12-16 RX ORDER — ALBUTEROL SULFATE 90 UG/1
4 INHALANT RESPIRATORY (INHALATION) PRN
OUTPATIENT
Start: 2025-01-13

## 2024-12-16 RX ORDER — EPINEPHRINE 1 MG/ML
0.3 INJECTION, SOLUTION, CONCENTRATE INTRAVENOUS PRN
OUTPATIENT
Start: 2025-01-13

## 2024-12-16 RX ORDER — ACETAMINOPHEN 325 MG/1
650 TABLET ORAL
OUTPATIENT
Start: 2025-01-13

## 2024-12-16 RX ORDER — DIPHENHYDRAMINE HYDROCHLORIDE 50 MG/ML
50 INJECTION INTRAMUSCULAR; INTRAVENOUS
OUTPATIENT
Start: 2025-01-13

## 2024-12-16 RX ADMIN — CYANOCOBALAMIN 1000 MCG: 1000 INJECTION INTRAMUSCULAR; SUBCUTANEOUS at 15:08

## 2024-12-16 NOTE — PROGRESS NOTES
[facility] Progress Note    Date: 2024    Name: Bhaskar Wing    MRN: [id number]         : 1950    Mr. Wing arrived in the [facility] today at 1455, in stable condition, here for his B-12 INJECTION EVERY 28 DAYS for 3 doses (today is dose 1/3). He was assessed and education was provided.     Mr. Wing's vitals were reviewed.  Vitals:    24 1459   BP: (!) 174/93   Pulse: 72   Resp: 18   Temp: 97.9 °F (36.6 °C)   SpO2: 96%     BP noted to be elevated, pt reports he is very stressed right now and thinks that is why BP is elevated. But he also reports he is seeing his cardiologist this Thursday and will speak to her about the elevated readings.        Cyanocobalamin (B-12) 1,000 mcg, was administered IM in his LEFT DELTOID, per order and without incident.            Mr. Wing tolerated well, and voiced no complaints.      Discharge instructions reviewed with patient and he verbalized understanding. Armband removed and shredded.     [facility] in [facility] condition at 1510.     He is to return in 28 days, on 25 at 1500, for his next B-12 Injection appointment.        Calli Pleitez RN  2024  3:19 PM

## 2024-12-19 ENCOUNTER — OFFICE VISIT (OUTPATIENT)
Age: 74
End: 2024-12-19
Payer: MEDICARE

## 2024-12-19 VITALS
BODY MASS INDEX: 26.01 KG/M2 | HEIGHT: 72 IN | DIASTOLIC BLOOD PRESSURE: 92 MMHG | WEIGHT: 192 LBS | SYSTOLIC BLOOD PRESSURE: 161 MMHG | HEART RATE: 83 BPM | OXYGEN SATURATION: 96 %

## 2024-12-19 DIAGNOSIS — I25.118 CORONARY ARTERY DISEASE OF NATIVE ARTERY OF NATIVE HEART WITH STABLE ANGINA PECTORIS (HCC): Primary | ICD-10-CM

## 2024-12-19 DIAGNOSIS — I10 ESSENTIAL HYPERTENSION: ICD-10-CM

## 2024-12-19 DIAGNOSIS — E78.2 MIXED HYPERLIPIDEMIA: ICD-10-CM

## 2024-12-19 DIAGNOSIS — I20.0 UNSTABLE ANGINA PECTORIS (HCC): ICD-10-CM

## 2024-12-19 DIAGNOSIS — Z95.5 HISTORY OF CORONARY ARTERY STENT PLACEMENT: ICD-10-CM

## 2024-12-19 PROCEDURE — 3080F DIAST BP >= 90 MM HG: CPT | Performed by: NURSE PRACTITIONER

## 2024-12-19 PROCEDURE — G8417 CALC BMI ABV UP PARAM F/U: HCPCS | Performed by: NURSE PRACTITIONER

## 2024-12-19 PROCEDURE — 3017F COLORECTAL CA SCREEN DOC REV: CPT | Performed by: NURSE PRACTITIONER

## 2024-12-19 PROCEDURE — 99214 OFFICE O/P EST MOD 30 MIN: CPT | Performed by: NURSE PRACTITIONER

## 2024-12-19 PROCEDURE — 1123F ACP DISCUSS/DSCN MKR DOCD: CPT | Performed by: NURSE PRACTITIONER

## 2024-12-19 PROCEDURE — G8428 CUR MEDS NOT DOCUMENT: HCPCS | Performed by: NURSE PRACTITIONER

## 2024-12-19 PROCEDURE — 1036F TOBACCO NON-USER: CPT | Performed by: NURSE PRACTITIONER

## 2024-12-19 PROCEDURE — 93000 ELECTROCARDIOGRAM COMPLETE: CPT | Performed by: NURSE PRACTITIONER

## 2024-12-19 PROCEDURE — 3077F SYST BP >= 140 MM HG: CPT | Performed by: NURSE PRACTITIONER

## 2024-12-19 PROCEDURE — G8484 FLU IMMUNIZE NO ADMIN: HCPCS | Performed by: NURSE PRACTITIONER

## 2024-12-19 RX ORDER — ISOSORBIDE MONONITRATE 30 MG/1
30 TABLET, EXTENDED RELEASE ORAL DAILY
Qty: 30 TABLET | Refills: 3 | Status: SHIPPED | OUTPATIENT
Start: 2024-12-19

## 2024-12-19 NOTE — PROGRESS NOTES
normal.  Other labs unremarkable.  We will continue to monitor continue dual antiplatelet therapy.  Monitor closely  7/2021  Cardiac status stable.  Still feels fatigue and tired.  Now gets iron infusion.  Stress test negative echo with normal ejection fraction.  We will discontinue Brilinta and change to Plavix to see if that has any role in patient's fatigue and tiredness.  Patient is about 7-month out from his last stent  10/2021  Stable cardiac status continue current medical management.  Will discontinue aspirin and continue Plavix    5/2022  Stable cardiac status.  Episode of hypotension once likely orthostatic.  Will reduce Norvasc to 5 mg at bedtime if blood pressure remains controlled.  He will monitor it.  Labs reviewed.  11/2022  Stable cardiac status continue current medical management monitor.  Blood pressure elevated.  Will restart with amlodipine 5 mg a day which she had discontinued.  Continue monitoring.  5/2023  Stable cardiac status continue current medical management.  Blood pressure labile.  Home readings are labile but mostly under control.  1/2024  Patient with h/o CAD seen for c/o fatigue and weakness.   Patient with unexplained weight loss of 50 lbs.  He reports this will be evaluated by PCP.  Will check echo to follow up LV function.  B/P controlled, continue current medications.  2/2024  Patient seen in f/u for echo results.  Normal LV function.  Blood pressure is controlled we will continue current medications.  He is following with GI regarding weight loss.  12/19/2024  Complaining of recent increase in exertional chest pain resolved with sublingual nitroglycerin.  In office EKG sinus rhythm with left bundle branch block unchanged from will begin antianginal with Imdur 30 mg/day and obtain nuclear stress test to rule out ischemia blood pressure mildly elevated in office today addition of Imdur should aid in blood pressure management advised to go to ER for new or worsening symptoms or

## 2024-12-31 ENCOUNTER — HOSPITAL ENCOUNTER (OUTPATIENT)
Facility: HOSPITAL | Age: 74
Discharge: HOME | End: 2025-01-02
Payer: MEDICARE

## 2024-12-31 ENCOUNTER — HOSPITAL ENCOUNTER (OUTPATIENT)
Facility: HOSPITAL | Age: 74
Discharge: HOME | End: 2025-01-03
Payer: MEDICARE

## 2024-12-31 VITALS
WEIGHT: 192 LBS | DIASTOLIC BLOOD PRESSURE: 72 MMHG | HEIGHT: 72 IN | SYSTOLIC BLOOD PRESSURE: 142 MMHG | BODY MASS INDEX: 26.01 KG/M2

## 2024-12-31 DIAGNOSIS — I25.118 CORONARY ARTERY DISEASE OF NATIVE ARTERY OF NATIVE HEART WITH STABLE ANGINA PECTORIS (HCC): ICD-10-CM

## 2024-12-31 DIAGNOSIS — I20.0 UNSTABLE ANGINA PECTORIS (HCC): ICD-10-CM

## 2024-12-31 LAB
ECHO BSA: 2.1 M2
NUC STRESS EJECTION FRACTION: 56 %
STRESS BASELINE DIAS BP: 72 MMHG
STRESS BASELINE HR: 71 BPM
STRESS BASELINE SYS BP: 142 MMHG
STRESS ESTIMATED WORKLOAD: 1 METS
STRESS PEAK DIAS BP: 71 MMHG
STRESS PEAK SYS BP: 151 MMHG
STRESS PERCENT HR ACHIEVED: 71 %
STRESS POST PEAK HR: 104 BPM
STRESS RATE PRESSURE PRODUCT: NORMAL BPM*MMHG
STRESS TARGET HR: 146 BPM
TID: 1

## 2024-12-31 PROCEDURE — 3430000000 HC RX DIAGNOSTIC RADIOPHARMACEUTICAL: Performed by: NURSE PRACTITIONER

## 2024-12-31 PROCEDURE — 6360000002 HC RX W HCPCS: Performed by: NURSE PRACTITIONER

## 2024-12-31 PROCEDURE — 93018 CV STRESS TEST I&R ONLY: CPT | Performed by: INTERNAL MEDICINE

## 2024-12-31 PROCEDURE — 78452 HT MUSCLE IMAGE SPECT MULT: CPT | Performed by: INTERNAL MEDICINE

## 2024-12-31 PROCEDURE — 93017 CV STRESS TEST TRACING ONLY: CPT

## 2024-12-31 PROCEDURE — 93016 CV STRESS TEST SUPVJ ONLY: CPT | Performed by: INTERNAL MEDICINE

## 2024-12-31 PROCEDURE — A9502 TC99M TETROFOSMIN: HCPCS | Performed by: NURSE PRACTITIONER

## 2024-12-31 RX ORDER — REGADENOSON 0.08 MG/ML
0.4 INJECTION, SOLUTION INTRAVENOUS
Status: COMPLETED | OUTPATIENT
Start: 2024-12-31 | End: 2024-12-31

## 2024-12-31 RX ADMIN — TETROFOSMIN 11 MILLICURIE: 1.38 INJECTION, POWDER, LYOPHILIZED, FOR SOLUTION INTRAVENOUS at 09:00

## 2024-12-31 RX ADMIN — TETROFOSMIN 33 MILLICURIE: 1.38 INJECTION, POWDER, LYOPHILIZED, FOR SOLUTION INTRAVENOUS at 10:27

## 2024-12-31 RX ADMIN — REGADENOSON 0.4 MG: 0.08 INJECTION, SOLUTION INTRAVENOUS at 10:27

## 2025-01-02 ENCOUNTER — TELEPHONE (OUTPATIENT)
Age: 75
End: 2025-01-02

## 2025-01-02 NOTE — RESULT ENCOUNTER NOTE
Please call patient, advise labs are normal.   ·  Stress Combined Conclusion: Findings suggest a low risk of cardiac events.     Follow up as scheduled

## 2025-01-02 NOTE — TELEPHONE ENCOUNTER
Patient made aware of message below.     David Saleh, ANDREW - NP    Please call patient, advise labs are normal.    Stress Combined Conclusion: Findings suggest a low risk of cardiac events.

## 2025-01-13 ENCOUNTER — HOSPITAL ENCOUNTER (OUTPATIENT)
Facility: HOSPITAL | Age: 75
Setting detail: INFUSION SERIES
Discharge: HOME | End: 2025-01-16
Payer: MEDICARE

## 2025-01-13 VITALS
DIASTOLIC BLOOD PRESSURE: 84 MMHG | OXYGEN SATURATION: 97 % | TEMPERATURE: 97.5 F | HEART RATE: 92 BPM | SYSTOLIC BLOOD PRESSURE: 132 MMHG | RESPIRATION RATE: 16 BRPM

## 2025-01-13 DIAGNOSIS — E53.8 B12 DEFICIENCY: Primary | ICD-10-CM

## 2025-01-13 PROCEDURE — 96372 THER/PROPH/DIAG INJ SC/IM: CPT

## 2025-01-13 PROCEDURE — 6360000002 HC RX W HCPCS: Performed by: FAMILY MEDICINE

## 2025-01-13 RX ORDER — ACETAMINOPHEN 325 MG/1
650 TABLET ORAL
OUTPATIENT
Start: 2025-02-10

## 2025-01-13 RX ORDER — CYANOCOBALAMIN 1000 UG/ML
1000 INJECTION, SOLUTION INTRAMUSCULAR; SUBCUTANEOUS ONCE
OUTPATIENT
Start: 2025-02-10 | End: 2025-02-10

## 2025-01-13 RX ORDER — ONDANSETRON 2 MG/ML
8 INJECTION INTRAMUSCULAR; INTRAVENOUS
OUTPATIENT
Start: 2025-02-10

## 2025-01-13 RX ORDER — DIPHENHYDRAMINE HYDROCHLORIDE 50 MG/ML
50 INJECTION INTRAMUSCULAR; INTRAVENOUS
OUTPATIENT
Start: 2025-02-10

## 2025-01-13 RX ORDER — HYDROCORTISONE SODIUM SUCCINATE 100 MG/2ML
100 INJECTION INTRAMUSCULAR; INTRAVENOUS
OUTPATIENT
Start: 2025-02-10

## 2025-01-13 RX ORDER — SODIUM CHLORIDE 9 MG/ML
INJECTION, SOLUTION INTRAVENOUS CONTINUOUS
OUTPATIENT
Start: 2025-02-10

## 2025-01-13 RX ORDER — EPINEPHRINE 1 MG/ML
0.3 INJECTION, SOLUTION, CONCENTRATE INTRAVENOUS PRN
OUTPATIENT
Start: 2025-02-10

## 2025-01-13 RX ORDER — ALBUTEROL SULFATE 90 UG/1
4 INHALANT RESPIRATORY (INHALATION) PRN
OUTPATIENT
Start: 2025-02-10

## 2025-01-13 RX ORDER — CYANOCOBALAMIN 1000 UG/ML
1000 INJECTION, SOLUTION INTRAMUSCULAR; SUBCUTANEOUS ONCE
Status: COMPLETED | OUTPATIENT
Start: 2025-01-13 | End: 2025-01-13

## 2025-01-13 RX ADMIN — CYANOCOBALAMIN 1000 MCG: 1000 INJECTION, SOLUTION INTRAMUSCULAR at 15:30

## 2025-01-13 NOTE — PROGRESS NOTES
[facility] [place] [facility] Progress Note    Date: 2025    Name: Bhaskar Wing    MRN: [id number]         : 1950    Mr. Wing arrived in the [facility] today at 1520, in stable condition, here for his B-12 INJECTION EVERY 28 DAYS for 3 doses (today is dose 2/3).     He was assessed and education was provided. States he is tolerating his B-12 injections without adverse effects.    Mr. Wing's vitals were reviewed.  Vitals:    25 1520   BP: 132/84   Pulse: 92   Resp: 16   Temp: 97.5 °F (36.4 °C)   SpO2: 97%       Cyanocobalamin (B-12) 1,000 mcg, was administered IM in his LEFT DELTOID. No irritation or bleeding at site. Declined bandaid.        Mr. Wing tolerated well, and voiced no complaints.      Discharge instructions reviewed with patient and he verbalized understanding.     Armband removed and shredded.     [facility] condition at 1532.     He is to return in 28 days, on 25 at 1500, for his next B-12 Injection appointment.        KRISTINE GLEZ RN  2025  3:40 PM

## 2025-02-05 DIAGNOSIS — I25.118 CORONARY ARTERY DISEASE OF NATIVE ARTERY OF NATIVE HEART WITH STABLE ANGINA PECTORIS (HCC): ICD-10-CM

## 2025-02-05 DIAGNOSIS — I20.0 UNSTABLE ANGINA PECTORIS (HCC): ICD-10-CM

## 2025-02-06 RX ORDER — ISOSORBIDE MONONITRATE 30 MG/1
30 TABLET, EXTENDED RELEASE ORAL DAILY
Qty: 90 TABLET | Refills: 1 | Status: SHIPPED | OUTPATIENT
Start: 2025-02-06

## 2025-02-10 ENCOUNTER — HOSPITAL ENCOUNTER (OUTPATIENT)
Facility: HOSPITAL | Age: 75
Setting detail: INFUSION SERIES
Discharge: HOME | End: 2025-02-13
Payer: MEDICARE

## 2025-02-10 VITALS
HEART RATE: 96 BPM | OXYGEN SATURATION: 96 % | SYSTOLIC BLOOD PRESSURE: 117 MMHG | DIASTOLIC BLOOD PRESSURE: 73 MMHG | TEMPERATURE: 97.6 F | RESPIRATION RATE: 16 BRPM

## 2025-02-10 DIAGNOSIS — E53.8 B12 DEFICIENCY: Primary | ICD-10-CM

## 2025-02-10 PROCEDURE — 96372 THER/PROPH/DIAG INJ SC/IM: CPT

## 2025-02-10 PROCEDURE — 6360000002 HC RX W HCPCS: Performed by: FAMILY MEDICINE

## 2025-02-10 RX ORDER — CYANOCOBALAMIN 1000 UG/ML
1000 INJECTION, SOLUTION INTRAMUSCULAR; SUBCUTANEOUS ONCE
Status: COMPLETED | OUTPATIENT
Start: 2025-02-10 | End: 2025-02-10

## 2025-02-10 RX ORDER — CYANOCOBALAMIN 1000 UG/ML
1000 INJECTION, SOLUTION INTRAMUSCULAR; SUBCUTANEOUS ONCE
Status: CANCELLED | OUTPATIENT
Start: 2025-02-10 | End: 2025-02-10

## 2025-02-10 RX ADMIN — CYANOCOBALAMIN 1000 MCG: 1000 INJECTION INTRAMUSCULAR; SUBCUTANEOUS at 15:22

## 2025-02-10 NOTE — PROGRESS NOTES
[facility] Progress Note    Date: February 10, 2025    Name: Bhaskar Wing    MRN: [id number]         : 1950      B12 INJECTION Q28 DAYS  DOSE 3 OF 3      Mr. Wing was roomed for his [facility] appt at 1510. Pt ambulatory without assist.    Mr. Wing was assessed and education was provided.     Mr. Wing's vitals were reviewed.  Vitals:    02/10/25 1510   BP: 117/73   Pulse: 96   Resp: 16   Temp: 97.6 °F (36.4 °C)   SpO2: 96%       B12 1000mcg was administered as ordered IM in patient's L deltoid muscle. Band-aid/ gauze applied to site.     Mr. Wing tolerated well without complaints.    Discharge/ follow-up instructions discussed w/ pt. Pt verbalized understanding.    Pt armband removed & shredded.    [facility] in [facility] condition at 1525. He is tentatively scheduled to return on 3/10/25 at 1500 for his next appointment.    Diana Ferrara RN  February 10, 2025

## 2025-02-14 ENCOUNTER — APPOINTMENT (OUTPATIENT)
Facility: HOSPITAL | Age: 75
End: 2025-02-14
Payer: MEDICARE

## 2025-02-18 DIAGNOSIS — E11.65 TYPE 2 DIABETES MELLITUS WITH HYPERGLYCEMIA (HCC): ICD-10-CM

## 2025-02-19 RX ORDER — PANTOPRAZOLE SODIUM 40 MG/1
40 TABLET, DELAYED RELEASE ORAL DAILY
Qty: 90 TABLET | Refills: 1 | Status: SHIPPED | OUTPATIENT
Start: 2025-02-19

## 2025-02-21 RX ORDER — RAMIPRIL 10 MG/1
CAPSULE ORAL DAILY
Qty: 90 CAPSULE | Refills: 1 | Status: SHIPPED | OUTPATIENT
Start: 2025-02-21

## 2025-02-27 ENCOUNTER — HOSPITAL ENCOUNTER (OUTPATIENT)
Facility: HOSPITAL | Age: 75
Discharge: HOME | End: 2025-02-27
Payer: MEDICARE

## 2025-02-27 ENCOUNTER — OFFICE VISIT (OUTPATIENT)
Facility: CLINIC | Age: 75
End: 2025-02-27

## 2025-02-27 VITALS
OXYGEN SATURATION: 95 % | RESPIRATION RATE: 16 BRPM | DIASTOLIC BLOOD PRESSURE: 72 MMHG | HEART RATE: 80 BPM | HEIGHT: 72 IN | BODY MASS INDEX: 26.63 KG/M2 | WEIGHT: 196.6 LBS | TEMPERATURE: 97.5 F | SYSTOLIC BLOOD PRESSURE: 156 MMHG

## 2025-02-27 DIAGNOSIS — Z65.9 OTHER SOCIAL STRESSOR: ICD-10-CM

## 2025-02-27 DIAGNOSIS — I10 ESSENTIAL HYPERTENSION: ICD-10-CM

## 2025-02-27 DIAGNOSIS — N40.0 BENIGN PROSTATIC HYPERPLASIA WITHOUT LOWER URINARY TRACT SYMPTOMS: ICD-10-CM

## 2025-02-27 DIAGNOSIS — K21.9 GASTROESOPHAGEAL REFLUX DISEASE WITHOUT ESOPHAGITIS: ICD-10-CM

## 2025-02-27 DIAGNOSIS — E11.9 CONTROLLED TYPE 2 DIABETES MELLITUS WITHOUT COMPLICATION, WITHOUT LONG-TERM CURRENT USE OF INSULIN (HCC): ICD-10-CM

## 2025-02-27 DIAGNOSIS — Z01.818 PRE-OPERATIVE CLEARANCE: Primary | ICD-10-CM

## 2025-02-27 DIAGNOSIS — R03.0 ELEVATED BLOOD PRESSURE READING: ICD-10-CM

## 2025-02-27 DIAGNOSIS — I25.110 CORONARY ARTERY DISEASE INVOLVING NATIVE CORONARY ARTERY OF NATIVE HEART WITH UNSTABLE ANGINA PECTORIS (HCC): ICD-10-CM

## 2025-02-27 LAB
ALBUMIN SERPL-MCNC: 3.8 G/DL (ref 3.4–5)
ALBUMIN/GLOB SERPL: 1.2 (ref 0.8–1.7)
ALP SERPL-CCNC: 65 U/L (ref 45–117)
ALT SERPL-CCNC: 20 U/L (ref 16–61)
ANION GAP SERPL CALC-SCNC: 7 MMOL/L (ref 3–18)
AST SERPL-CCNC: 11 U/L (ref 10–38)
BILIRUB SERPL-MCNC: 0.4 MG/DL (ref 0.2–1)
BUN SERPL-MCNC: 17 MG/DL (ref 7–18)
BUN/CREAT SERPL: 15 (ref 12–20)
CALCIUM SERPL-MCNC: 8.7 MG/DL (ref 8.5–10.1)
CHLORIDE SERPL-SCNC: 110 MMOL/L (ref 100–111)
CHOLEST SERPL-MCNC: 122 MG/DL
CO2 SERPL-SCNC: 22 MMOL/L (ref 21–32)
CREAT SERPL-MCNC: 1.16 MG/DL (ref 0.6–1.3)
CREAT UR-MCNC: 126 MG/DL (ref 30–125)
ERYTHROCYTE [DISTWIDTH] IN BLOOD BY AUTOMATED COUNT: 14.6 % (ref 11.6–14.5)
EST. AVERAGE GLUCOSE BLD GHB EST-MCNC: 123 MG/DL
GLOBULIN SER CALC-MCNC: 3.3 G/DL (ref 2–4)
GLUCOSE SERPL-MCNC: 85 MG/DL (ref 74–99)
HBA1C MFR BLD: 5.9 % (ref 4.2–5.6)
HCT VFR BLD AUTO: 39.4 % (ref 36–48)
HDLC SERPL-MCNC: 41 MG/DL (ref 40–60)
HDLC SERPL: 3 (ref 0–5)
HGB BLD-MCNC: 12.8 G/DL (ref 13–16)
LDLC SERPL CALC-MCNC: 45.8 MG/DL (ref 0–100)
LIPID PANEL: ABNORMAL
MCH RBC QN AUTO: 29.8 PG (ref 24–34)
MCHC RBC AUTO-ENTMCNC: 32.5 G/DL (ref 31–37)
MCV RBC AUTO: 91.6 FL (ref 78–100)
MICROALBUMIN UR-MCNC: 48.7 MG/DL (ref 0–3)
MICROALBUMIN/CREAT UR-RTO: 387 MG/G (ref 0–30)
NRBC # BLD: 0 K/UL (ref 0–0.01)
NRBC BLD-RTO: 0 PER 100 WBC
PLATELET # BLD AUTO: 177 K/UL (ref 135–420)
PMV BLD AUTO: 10.9 FL (ref 9.2–11.8)
POTASSIUM SERPL-SCNC: 4.1 MMOL/L (ref 3.5–5.5)
PROT SERPL-MCNC: 7.1 G/DL (ref 6.4–8.2)
RBC # BLD AUTO: 4.3 M/UL (ref 4.35–5.65)
SODIUM SERPL-SCNC: 139 MMOL/L (ref 136–145)
TRIGL SERPL-MCNC: 176 MG/DL
TSH SERPL DL<=0.05 MIU/L-ACNC: 1.68 UIU/ML (ref 0.36–3.74)
VLDLC SERPL CALC-MCNC: 35.2 MG/DL
WBC # BLD AUTO: 8.2 K/UL (ref 4.6–13.2)

## 2025-02-27 PROCEDURE — 80053 COMPREHEN METABOLIC PANEL: CPT

## 2025-02-27 PROCEDURE — 82570 ASSAY OF URINE CREATININE: CPT

## 2025-02-27 PROCEDURE — 82043 UR ALBUMIN QUANTITATIVE: CPT

## 2025-02-27 PROCEDURE — 80061 LIPID PANEL: CPT

## 2025-02-27 PROCEDURE — 85027 COMPLETE CBC AUTOMATED: CPT

## 2025-02-27 PROCEDURE — 84443 ASSAY THYROID STIM HORMONE: CPT

## 2025-02-27 PROCEDURE — 36415 COLL VENOUS BLD VENIPUNCTURE: CPT

## 2025-02-27 PROCEDURE — 83036 HEMOGLOBIN GLYCOSYLATED A1C: CPT

## 2025-02-27 RX ORDER — AMLODIPINE BESYLATE 10 MG/1
10 TABLET ORAL DAILY
Qty: 90 TABLET | Refills: 1 | Status: SHIPPED | OUTPATIENT
Start: 2025-02-27

## 2025-02-27 SDOH — ECONOMIC STABILITY: FOOD INSECURITY: WITHIN THE PAST 12 MONTHS, YOU WORRIED THAT YOUR FOOD WOULD RUN OUT BEFORE YOU GOT MONEY TO BUY MORE.: NEVER TRUE

## 2025-02-27 SDOH — ECONOMIC STABILITY: INCOME INSECURITY: IN THE LAST 12 MONTHS, WAS THERE A TIME WHEN YOU WERE NOT ABLE TO PAY THE MORTGAGE OR RENT ON TIME?: NO

## 2025-02-27 SDOH — ECONOMIC STABILITY: FOOD INSECURITY: WITHIN THE PAST 12 MONTHS, THE FOOD YOU BOUGHT JUST DIDN'T LAST AND YOU DIDN'T HAVE MONEY TO GET MORE.: NEVER TRUE

## 2025-02-27 SDOH — ECONOMIC STABILITY: TRANSPORTATION INSECURITY
IN THE PAST 12 MONTHS, HAS THE LACK OF TRANSPORTATION KEPT YOU FROM MEDICAL APPOINTMENTS OR FROM GETTING MEDICATIONS?: NO

## 2025-02-27 ASSESSMENT — PATIENT HEALTH QUESTIONNAIRE - PHQ9
SUM OF ALL RESPONSES TO PHQ QUESTIONS 1-9: 0
2. FEELING DOWN, DEPRESSED OR HOPELESS: NOT AT ALL
1. LITTLE INTEREST OR PLEASURE IN DOING THINGS: NOT AT ALL
SUM OF ALL RESPONSES TO PHQ9 QUESTIONS 1 & 2: 0
SUM OF ALL RESPONSES TO PHQ QUESTIONS 1-9: 0

## 2025-02-27 NOTE — PROGRESS NOTES
Bhaskar Wing (:  1950) is a 75 y.o. male, Established patient, here for evaluation of the following chief complaint(s):  Pre-op Exam (Cataract surgery on 3/12/25 and 3/19/25 - Dr. Sharpe)         Assessment & Plan  1. Preoperative evaluation for cataract surgery.  A consultation with his cardiologist will be arranged to discuss the potential cessation of aspirin therapy due to recurrent chest pain episodes. A urine test and blood work will be conducted today.for now increased amlodipine dose as elevated blood pressure. Repeat blood pressure was elevated as well. Having on and off chest pain. On imdure. Will ask cardiology for the recommendations on stopping aspirin and clearance.     2. Hypertension.  The dosage of amlodipine will be increased from 5 mg to 10 mg to better manage his blood pressure. A recheck of his blood pressure will be done before he leaves today. A nurse visit for a blood pressure check will be scheduled prior to his surgery on 2025.    3. Chest pain.  He reports intermittent chest pain that resolved without the need for nitroglycerin. He is currently on time-release nitroglycerin (Imdur). He will be advised to keep nitroglycerin tablets on hand for emergency use.    4. Gastroesophageal reflux disease (GERD).  His acid reflux has been stable. No changes in medication are necessary at this time.    5. Benign prostatic hyperplasia (BPH).  He reports a slight increase in urinary frequency but it does not bother him. He will continue to follow up with his prostate doctor.    6. Diabetes mellitus.  His last A1c was within normal limits. He will continue taking metformin as prescribed. Blood work will be done today to monitor his condition.    7. Health maintenance.  Blood work and a urine test will be conducted today as part of his routine health maintenance.    Pt understood and agree with the plan     Follow-up  The patient is scheduled for a follow-up visit in 2025 for

## 2025-02-27 NOTE — PROGRESS NOTES
\"Have you been to the ER, urgent care clinic since your last visit?  Hospitalized since your last visit?\"    NO    “Have you seen or consulted any other health care providers outside our system since your last visit?”    Dr. Sharpe LOV: 2/26/25      “Have you had a diabetic eye exam?”    Last dm eye exam was in 2/2024. Record has been requested from Dr. Sharpe.    No diabetic eye exam on file     Chief Complaint   Patient presents with    Pre-op Exam     Cataract surgery on 3/12/25 and 3/19/25 - Dr. Sharpe

## 2025-03-10 ENCOUNTER — HOSPITAL ENCOUNTER (OUTPATIENT)
Facility: HOSPITAL | Age: 75
Setting detail: INFUSION SERIES
End: 2025-03-10

## 2025-03-10 ENCOUNTER — OFFICE VISIT (OUTPATIENT)
Facility: CLINIC | Age: 75
End: 2025-03-10

## 2025-03-10 VITALS
RESPIRATION RATE: 16 BRPM | HEART RATE: 79 BPM | SYSTOLIC BLOOD PRESSURE: 170 MMHG | DIASTOLIC BLOOD PRESSURE: 78 MMHG | OXYGEN SATURATION: 96 %

## 2025-03-10 DIAGNOSIS — Z01.30 BP CHECK: Primary | ICD-10-CM

## 2025-03-10 NOTE — PROGRESS NOTES
Elevated blood pressure.  Has not taken his medication.  Last visit increase the dose of amlodipine to 10 mg.  He has been taking it.  Last week also send the staff message to the cardiology regarding preop clearance and recommendation to hold the aspirin.  He has been having on and off anginal pain.  Due to with social stress as daughter is on hospice.  That might lead to elevated blood pressure.  At this time he has a surgery on Wednesday and he has not stent stop the aspirin we will leave it up to surgeon to continue his procedure on aspirin.  He has an appointment tomorrow to recheck on blood pressure again urgent staff message is sent to the cardiology and it is a low risk procedure.  Patient did not contact cardiology office as well for the recommendation

## 2025-03-10 NOTE — PROGRESS NOTES
Patient presents for BP check.    Any recent (exertional) chest pain? No  SOB? No  Palpitations? No  LE edema? No  Side effects of medication? No    Plan:  Patient presents for blood pressure check. He has not taken any medications today prior to visit.

## 2025-03-11 ENCOUNTER — OFFICE VISIT (OUTPATIENT)
Facility: CLINIC | Age: 75
End: 2025-03-11

## 2025-03-11 VITALS
HEART RATE: 75 BPM | SYSTOLIC BLOOD PRESSURE: 140 MMHG | RESPIRATION RATE: 16 BRPM | DIASTOLIC BLOOD PRESSURE: 70 MMHG | OXYGEN SATURATION: 95 %

## 2025-03-11 DIAGNOSIS — Z01.30 BP CHECK: Primary | ICD-10-CM

## 2025-03-11 NOTE — PROGRESS NOTES
Patient has taken the blood pressure medicine today morning and blood pressure is improved compared to yesterday visit.  Asymptomatic but has on and off chest pain fully controlled blood pressure he has an appointment to be seen urgently tomorrow with the cardiologist.  At this time he has not stopped the aspirin as waiting for cardiology recommendations.  We will hold off on cataract surgery and it will be rescheduled as it is not urgent procedure.  Patient understood it well.  Send the form to surgeon's office.

## 2025-03-11 NOTE — PROGRESS NOTES
Patient presents for BP check.    Any recent (exertional) chest pain? No  SOB? No  Palpitations? No  LE edema? No  Side effects of medication? No    Plan:

## 2025-03-12 ENCOUNTER — OFFICE VISIT (OUTPATIENT)
Age: 75
End: 2025-03-12
Payer: MEDICARE

## 2025-03-12 VITALS
DIASTOLIC BLOOD PRESSURE: 78 MMHG | OXYGEN SATURATION: 96 % | HEART RATE: 77 BPM | SYSTOLIC BLOOD PRESSURE: 137 MMHG | HEIGHT: 72 IN | WEIGHT: 198 LBS | BODY MASS INDEX: 26.82 KG/M2

## 2025-03-12 DIAGNOSIS — I20.0 UNSTABLE ANGINA PECTORIS (HCC): ICD-10-CM

## 2025-03-12 DIAGNOSIS — I25.118 CORONARY ARTERY DISEASE OF NATIVE ARTERY OF NATIVE HEART WITH STABLE ANGINA PECTORIS: ICD-10-CM

## 2025-03-12 DIAGNOSIS — I10 ESSENTIAL HYPERTENSION: Primary | ICD-10-CM

## 2025-03-12 DIAGNOSIS — Z95.5 HISTORY OF CORONARY ARTERY STENT PLACEMENT: ICD-10-CM

## 2025-03-12 DIAGNOSIS — E78.2 MIXED HYPERLIPIDEMIA: ICD-10-CM

## 2025-03-12 PROCEDURE — G8417 CALC BMI ABV UP PARAM F/U: HCPCS | Performed by: NURSE PRACTITIONER

## 2025-03-12 PROCEDURE — 1160F RVW MEDS BY RX/DR IN RCRD: CPT | Performed by: NURSE PRACTITIONER

## 2025-03-12 PROCEDURE — 1036F TOBACCO NON-USER: CPT | Performed by: NURSE PRACTITIONER

## 2025-03-12 PROCEDURE — 1126F AMNT PAIN NOTED NONE PRSNT: CPT | Performed by: NURSE PRACTITIONER

## 2025-03-12 PROCEDURE — 3075F SYST BP GE 130 - 139MM HG: CPT | Performed by: NURSE PRACTITIONER

## 2025-03-12 PROCEDURE — G8427 DOCREV CUR MEDS BY ELIG CLIN: HCPCS | Performed by: NURSE PRACTITIONER

## 2025-03-12 PROCEDURE — 93000 ELECTROCARDIOGRAM COMPLETE: CPT | Performed by: NURSE PRACTITIONER

## 2025-03-12 PROCEDURE — 1159F MED LIST DOCD IN RCRD: CPT | Performed by: NURSE PRACTITIONER

## 2025-03-12 PROCEDURE — 3078F DIAST BP <80 MM HG: CPT | Performed by: NURSE PRACTITIONER

## 2025-03-12 PROCEDURE — 99214 OFFICE O/P EST MOD 30 MIN: CPT | Performed by: NURSE PRACTITIONER

## 2025-03-12 PROCEDURE — 3017F COLORECTAL CA SCREEN DOC REV: CPT | Performed by: NURSE PRACTITIONER

## 2025-03-12 PROCEDURE — 1123F ACP DISCUSS/DSCN MKR DOCD: CPT | Performed by: NURSE PRACTITIONER

## 2025-03-12 ASSESSMENT — PATIENT HEALTH QUESTIONNAIRE - PHQ9
SUM OF ALL RESPONSES TO PHQ QUESTIONS 1-9: 0
DEPRESSION UNABLE TO ASSESS: FUNCTIONAL CAPACITY MOTIVATION LIMITS ACCURACY
SUM OF ALL RESPONSES TO PHQ QUESTIONS 1-9: 0
SUM OF ALL RESPONSES TO PHQ QUESTIONS 1-9: 0
2. FEELING DOWN, DEPRESSED OR HOPELESS: NOT AT ALL
1. LITTLE INTEREST OR PLEASURE IN DOING THINGS: NOT AT ALL
SUM OF ALL RESPONSES TO PHQ QUESTIONS 1-9: 0

## 2025-03-12 NOTE — PROGRESS NOTES
1. Have you been to the ER, urgent care clinic since your last visit?  Hospitalized since your last visit?     No    2. Have you seen or consulted any other health care providers outside of the [facility] since your last visit?  Include any pap smears or colon screening.      No

## 2025-03-12 NOTE — PROGRESS NOTES
HISTORY OF PRESENT ILLNESS  Bhaskar Wign is a 72 y.o. male.    Patient with cad,htn,hyperlipidemia,dm.On follow up patient denies any chest pains,sob, palpitation or other significant symptoms.  12/2017  Admitted with unstable angina-had pci  7/2018.  Patient was in emergency room with atypical chest pain.  No acute EKG changes cardiac enzymes negative CTA and chest x-ray reports reviewed.  Labs are negative for cardiac workup  5/2019.  Recent ER visit with fall and concussion.  Records reviewed.  6/2020  Patient seen for follow-up.  He has rare occasion of chest pain he has used 1 nitroglycerin since last evaluation.  No other complaints  1/2021  Patient is here for follow-up he was in hospital with  1. Unstable angina- s/p PCI of the LAD.  Status post mechanical ventilation. continue DAPT brilinta and asa.  Chest pain has resolved.  2. Acute respiratory failure- extubated   3. Hypotension-resolved.  Off pressors.   4. Hypertension: Blood pressure increasing gradually as expected.  Continue BID amlodipine. Elevated this afternoon - likely due to pain.  5. CAD-cardiac cath 12/2017 showed  Triple-vessel coronary artery disease - For CTS evaluation  6. Hyperlipidemia- LDL 74 11/20 Continue with Crestor  20 mg.  7. Diabetes- well controlled with A1c 5.9   8. LBBB- new since 12/20   9. Hemoptysis- traumatic ETT tube echange and pulmonary edema- better   10. Hematuria / flank pain - hx of renal stones - work up per primary team.  Initial plans were for CABG.  Patient had recurrent unstable angina requiring emergency PCI of LAD.  Patient is here for follow-up.  He is feeling better  He still has occasional episode of nausea and vomiting that are sudden.  He had one episode of chest pain kelley he denies palpitations or edema t relieved with nitroglycerin.  The progression of chest pain.  He is here hemoptysis has resolved.  Hematuria is significantly improved he has urology follow-up pending this week  12/19/2024  Patient

## 2025-03-21 ENCOUNTER — TELEPHONE (OUTPATIENT)
Facility: CLINIC | Age: 75
End: 2025-03-21

## 2025-03-21 NOTE — TELEPHONE ENCOUNTER
Elissa from [facility] called stating patient rescheduled cataract surgery for 03/26/2025. Patient told them he was cleared by both cardiology and primary care. The cardiology note is in the system for your review. If you are willing to clear him please fax medical clearance.     Phone # [phone]  Fax # [phone]

## 2025-03-25 ENCOUNTER — TELEPHONE (OUTPATIENT)
Facility: CLINIC | Age: 75
End: 2025-03-25

## 2025-03-25 NOTE — TELEPHONE ENCOUNTER
Incoming call received from Mr. Wing said that [facility] needs [facility] faxed over to them about his pre op appointment that was done by Dr. Dave on February 27 th. Phone # [phone]

## 2025-04-01 ENCOUNTER — RESULTS FOLLOW-UP (OUTPATIENT)
Age: 75
End: 2025-04-01

## 2025-04-24 ENCOUNTER — OFFICE VISIT (OUTPATIENT)
Facility: CLINIC | Age: 75
End: 2025-04-24

## 2025-04-24 VITALS
SYSTOLIC BLOOD PRESSURE: 136 MMHG | HEIGHT: 72 IN | OXYGEN SATURATION: 95 % | WEIGHT: 192.6 LBS | HEART RATE: 83 BPM | BODY MASS INDEX: 26.09 KG/M2 | TEMPERATURE: 97.3 F | RESPIRATION RATE: 16 BRPM | DIASTOLIC BLOOD PRESSURE: 60 MMHG

## 2025-04-24 DIAGNOSIS — R80.9 MICROALBUMINURIA: ICD-10-CM

## 2025-04-24 DIAGNOSIS — F41.9 ANXIETY: ICD-10-CM

## 2025-04-24 DIAGNOSIS — I10 ESSENTIAL HYPERTENSION: Primary | ICD-10-CM

## 2025-04-24 DIAGNOSIS — E11.9 CONTROLLED TYPE 2 DIABETES MELLITUS WITHOUT COMPLICATION, WITHOUT LONG-TERM CURRENT USE OF INSULIN (HCC): ICD-10-CM

## 2025-04-24 RX ORDER — BUSPIRONE HYDROCHLORIDE 5 MG/1
5 TABLET ORAL 2 TIMES DAILY
Qty: 60 TABLET | Refills: 0 | Status: SHIPPED | OUTPATIENT
Start: 2025-04-24 | End: 2025-05-24

## 2025-04-24 NOTE — PROGRESS NOTES
Have you been to the ER, urgent care clinic since your last visit?  Hospitalized since your last visit?   NO    Have you seen or consulted any other health care providers outside our system since your last visit?   Cataract surgery  LOV: 3/12/25 & 3/22/25 [facility] Dr. Sharpe.           Chief Complaint   Patient presents with    Hypertension    Cholesterol Problem    Diabetes    Coronary Artery Disease    Gastroesophageal Reflux    Benign Prostatic Hypertrophy

## 2025-04-24 NOTE — PROGRESS NOTES
Bhaskar Wing (:  1950) is a 75 y.o. male, Established patient, here for evaluation of the following chief complaint(s):  Hypertension, Cholesterol Problem, Diabetes, Coronary Artery Disease, Gastroesophageal Reflux, and Benign Prostatic Hypertrophy         Assessment & Plan  1. Anxiety.  - Reports dizziness and an internal sensation of shaking, likely due to anxiety from social stress.  - Physical exam reveals some anxiousness; behavior fair, neurologically awake, alert, and oriented.  - Discussed starting BuSpar to be taken twice daily, with the option to adjust the dose and taper off as needed.  - Prescription for BuSpar provided, advised to use it for a short duration and communicate any discomfort or concerns.    2. Diarrhea.  - Experienced loose stools for the past 2 to 3 weeks.  - Physical exam shows abdomen nontender, no blood in the stool reported.  - Advised to take probiotics or consume yogurt to manage symptoms.  - Took Imodium once, which was effective.    3. Blood pressure management.  - Blood pressure and heart rate currently stable, likely due to the recent increase in amlodipine dosage to 10 mg.  - No reports of ankle swelling, headaches, nausea, or vomiting.  - Physical exam shows lungs clear, heart regular, extremities without edema.  - Monitoring effectiveness of the increased amlodipine dosage.    4. Cholesterol management.  - Triglyceride levels slightly elevated.  - Advised to follow a low-fat diet and avoid fried and fatty foods.  - Liver and kidney functions, electrolytes were normal in 2025.  - Emphasized the importance of dietary modifications to manage cholesterol levels.    5. Prediabetes.  - A1c in the prediabetic range.  - Advised to maintain a healthy diet and lifestyle.  - Hemoglobin borderline low; encouraged to continue a healthy diet.  - Monitoring A1c levels and dietary adherence.    6. Proteinuria.  - Protein detected in urine, possibly due to diabetes and

## 2025-04-28 ENCOUNTER — HOSPITAL ENCOUNTER (OUTPATIENT)
Facility: HOSPITAL | Age: 75
Setting detail: INFUSION SERIES
Discharge: HOME | End: 2025-05-01
Payer: MEDICARE

## 2025-04-28 VITALS
DIASTOLIC BLOOD PRESSURE: 71 MMHG | HEART RATE: 84 BPM | SYSTOLIC BLOOD PRESSURE: 139 MMHG | RESPIRATION RATE: 16 BRPM | TEMPERATURE: 97.6 F | OXYGEN SATURATION: 95 %

## 2025-04-28 DIAGNOSIS — E53.8 B12 DEFICIENCY: Primary | ICD-10-CM

## 2025-04-28 PROCEDURE — 6360000002 HC RX W HCPCS: Performed by: FAMILY MEDICINE

## 2025-04-28 PROCEDURE — 96372 THER/PROPH/DIAG INJ SC/IM: CPT

## 2025-04-28 RX ORDER — ONDANSETRON 2 MG/ML
8 INJECTION INTRAMUSCULAR; INTRAVENOUS
OUTPATIENT
Start: 2025-05-26

## 2025-04-28 RX ORDER — HYDROCORTISONE SODIUM SUCCINATE 100 MG/2ML
100 INJECTION INTRAMUSCULAR; INTRAVENOUS
OUTPATIENT
Start: 2025-05-26

## 2025-04-28 RX ORDER — CYANOCOBALAMIN 1000 UG/ML
1000 INJECTION, SOLUTION INTRAMUSCULAR; SUBCUTANEOUS ONCE
OUTPATIENT
Start: 2025-05-26 | End: 2025-05-26

## 2025-04-28 RX ORDER — CYANOCOBALAMIN 1000 UG/ML
1000 INJECTION, SOLUTION INTRAMUSCULAR; SUBCUTANEOUS ONCE
Status: COMPLETED | OUTPATIENT
Start: 2025-04-28 | End: 2025-04-28

## 2025-04-28 RX ORDER — SODIUM CHLORIDE 9 MG/ML
INJECTION, SOLUTION INTRAVENOUS CONTINUOUS
OUTPATIENT
Start: 2025-05-26

## 2025-04-28 RX ORDER — ALBUTEROL SULFATE 90 UG/1
4 INHALANT RESPIRATORY (INHALATION) PRN
OUTPATIENT
Start: 2025-05-26

## 2025-04-28 RX ORDER — HYDROCORTISONE SODIUM SUCCINATE 100 MG/2ML
100 INJECTION INTRAMUSCULAR; INTRAVENOUS
Status: CANCELLED | OUTPATIENT
Start: 2025-04-28

## 2025-04-28 RX ORDER — DIPHENHYDRAMINE HYDROCHLORIDE 50 MG/ML
50 INJECTION, SOLUTION INTRAMUSCULAR; INTRAVENOUS
Status: CANCELLED | OUTPATIENT
Start: 2025-04-28

## 2025-04-28 RX ORDER — EPINEPHRINE 1 MG/ML
0.3 INJECTION, SOLUTION, CONCENTRATE INTRAVENOUS PRN
OUTPATIENT
Start: 2025-05-26

## 2025-04-28 RX ORDER — ONDANSETRON 2 MG/ML
8 INJECTION INTRAMUSCULAR; INTRAVENOUS
Status: CANCELLED | OUTPATIENT
Start: 2025-04-28

## 2025-04-28 RX ORDER — ACETAMINOPHEN 325 MG/1
650 TABLET ORAL
Status: CANCELLED | OUTPATIENT
Start: 2025-04-28

## 2025-04-28 RX ORDER — SODIUM CHLORIDE 9 MG/ML
INJECTION, SOLUTION INTRAVENOUS CONTINUOUS
Status: CANCELLED | OUTPATIENT
Start: 2025-04-28

## 2025-04-28 RX ORDER — ALBUTEROL SULFATE 90 UG/1
4 INHALANT RESPIRATORY (INHALATION) PRN
Status: CANCELLED | OUTPATIENT
Start: 2025-04-28

## 2025-04-28 RX ORDER — ACETAMINOPHEN 325 MG/1
650 TABLET ORAL
OUTPATIENT
Start: 2025-05-26

## 2025-04-28 RX ORDER — EPINEPHRINE 1 MG/ML
0.3 INJECTION, SOLUTION, CONCENTRATE INTRAVENOUS PRN
Status: CANCELLED | OUTPATIENT
Start: 2025-04-28

## 2025-04-28 RX ORDER — DIPHENHYDRAMINE HYDROCHLORIDE 50 MG/ML
50 INJECTION, SOLUTION INTRAMUSCULAR; INTRAVENOUS
OUTPATIENT
Start: 2025-05-26

## 2025-04-28 RX ADMIN — CYANOCOBALAMIN 1000 MCG: 1000 INJECTION INTRAMUSCULAR; SUBCUTANEOUS at 15:40

## 2025-04-28 ASSESSMENT — PAIN - FUNCTIONAL ASSESSMENT: PAIN_FUNCTIONAL_ASSESSMENT: NONE - DENIES PAIN

## 2025-04-28 NOTE — PROGRESS NOTES
[facility] Progress Note    Date: 2025    Name: Bhaskar Wing    MRN: [id number]         : 1950    Mr. Wing arrived in the [facility] today at 1525, ambulatory and in stable condition, here for his B-12 INJECTION (EVERY 28 DAYS) (EVERY 4 WEEKS). He was assessed and education was provided.         DOSE # 1 OF 3 TODAY.      B-12 INJECTIONS HAVE BEEN ORDERED TO BE ADMINISTERED EVERY 28 DAYS X 3 TOTAL DOSES, BASED ON HIS CURRENT WRITTEN ORDER DATED 3-11-25).      Mr. Wing's vitals were reviewed.  Vitals:    25 1525   BP: 139/71   Pulse: 84   Resp: 16   Temp: 97.6 °F (36.4 °C)   SpO2: 95%     Mr. Wing presented to the infusion center today stating that he was doing fairly well and voicing no major complaints or concerns.            Cyanocobalamin (B-12) 1,000 mcg, was administered IM in his RIGHT DELTOID at 1540, per order and without incident.            Mr. Wing tolerated well, and voiced no complaints.         [facility] in stable condition at 1545..     He is to return in 4 weeks, on 25 at 1530, for his next appointment, for his next B-12 Injection. .          Alli Lynch RN  2025  3:38 PM

## 2025-05-02 RX ORDER — NITROGLYCERIN 0.4 MG/1
0.4 TABLET SUBLINGUAL EVERY 5 MIN PRN
Qty: 25 TABLET | Refills: 0 | Status: SHIPPED | OUTPATIENT
Start: 2025-05-02

## 2025-05-16 DIAGNOSIS — F41.9 ANXIETY: ICD-10-CM

## 2025-05-19 RX ORDER — BUSPIRONE HYDROCHLORIDE 5 MG/1
5 TABLET ORAL 2 TIMES DAILY
Qty: 180 TABLET | Refills: 1 | Status: SHIPPED | OUTPATIENT
Start: 2025-05-19

## 2025-05-27 ENCOUNTER — HOSPITAL ENCOUNTER (OUTPATIENT)
Facility: HOSPITAL | Age: 75
Setting detail: INFUSION SERIES
Discharge: HOME | End: 2025-05-30
Payer: MEDICARE

## 2025-05-27 VITALS
DIASTOLIC BLOOD PRESSURE: 75 MMHG | SYSTOLIC BLOOD PRESSURE: 140 MMHG | RESPIRATION RATE: 18 BRPM | OXYGEN SATURATION: 98 % | TEMPERATURE: 97 F | HEART RATE: 73 BPM

## 2025-05-27 DIAGNOSIS — E53.8 B12 DEFICIENCY: Primary | ICD-10-CM

## 2025-05-27 PROCEDURE — 6360000002 HC RX W HCPCS: Performed by: FAMILY MEDICINE

## 2025-05-27 PROCEDURE — 96372 THER/PROPH/DIAG INJ SC/IM: CPT

## 2025-05-27 RX ORDER — CYANOCOBALAMIN 1000 UG/ML
1000 INJECTION, SOLUTION INTRAMUSCULAR; SUBCUTANEOUS ONCE
OUTPATIENT
Start: 2025-06-23 | End: 2025-06-23

## 2025-05-27 RX ORDER — SODIUM CHLORIDE 9 MG/ML
INJECTION, SOLUTION INTRAVENOUS CONTINUOUS
OUTPATIENT
Start: 2025-06-23

## 2025-05-27 RX ORDER — ACETAMINOPHEN 325 MG/1
650 TABLET ORAL
OUTPATIENT
Start: 2025-06-23

## 2025-05-27 RX ORDER — DIPHENHYDRAMINE HYDROCHLORIDE 50 MG/ML
50 INJECTION, SOLUTION INTRAMUSCULAR; INTRAVENOUS
OUTPATIENT
Start: 2025-06-23

## 2025-05-27 RX ORDER — EPINEPHRINE 1 MG/ML
0.3 INJECTION, SOLUTION, CONCENTRATE INTRAVENOUS PRN
OUTPATIENT
Start: 2025-06-23

## 2025-05-27 RX ORDER — ALBUTEROL SULFATE 90 UG/1
4 INHALANT RESPIRATORY (INHALATION) PRN
OUTPATIENT
Start: 2025-06-23

## 2025-05-27 RX ORDER — ONDANSETRON 2 MG/ML
8 INJECTION INTRAMUSCULAR; INTRAVENOUS
OUTPATIENT
Start: 2025-06-23

## 2025-05-27 RX ORDER — CYANOCOBALAMIN 1000 UG/ML
1000 INJECTION, SOLUTION INTRAMUSCULAR; SUBCUTANEOUS ONCE
Status: COMPLETED | OUTPATIENT
Start: 2025-05-27 | End: 2025-05-27

## 2025-05-27 RX ORDER — HYDROCORTISONE SODIUM SUCCINATE 100 MG/2ML
100 INJECTION INTRAMUSCULAR; INTRAVENOUS
OUTPATIENT
Start: 2025-06-23

## 2025-05-27 RX ADMIN — CYANOCOBALAMIN 1000 MCG: 1000 INJECTION, SOLUTION INTRAMUSCULAR; SUBCUTANEOUS at 15:43

## 2025-05-27 NOTE — PROGRESS NOTES
[facility] Progress Note    Date: May 27, 2025    Name: Bhaskar Wing    MRN: [id number]         : 1950    Mr. Wing arrived in the [facility] today at 1535, ambulatory and in stable condition, here for his B-12 INJECTION IM (EVERY 28 DAYS) (EVERY 4 WEEKS). He was assessed and education was provided.     DOSE # 2 OF 3 TODAY.      B-12 INJECTIONS HAVE BEEN ORDERED TO BE ADMINISTERED EVERY 28 DAYS X 3 TOTAL DOSES, BASED ON HIS CURRENT WRITTEN ORDER DATED 3-11-25).      Mr. Wing's vitals were reviewed.  Vitals:    25 1541   BP: (!) 140/75   Pulse: 73   Resp: 18   Temp: 97 °F (36.1 °C)   SpO2: 98%     Mr. Wing presented to the infusion center today stating that he was doing fairly well and voicing no major complaints or concerns.       Cyanocobalamin (B-12) 1,000 mcg, was administered IM in his RIGHT DELTOID per order and without incident.     Mr. Wing tolerated well, and voiced no complaints.      Mr. Wing was [facility] in stable condition at 1550.    He is to return in 4 weeks, on Tuesday, 25 at 1530, for his next B-12 Injection appointment.         LILLIAM BLANCHARD RN  May 27, 2025  3:55 PM

## 2025-06-24 ENCOUNTER — HOSPITAL ENCOUNTER (OUTPATIENT)
Facility: HOSPITAL | Age: 75
Setting detail: INFUSION SERIES
Discharge: HOME | End: 2025-06-27
Payer: MEDICARE

## 2025-06-24 VITALS
OXYGEN SATURATION: 95 % | RESPIRATION RATE: 16 BRPM | TEMPERATURE: 98.3 F | DIASTOLIC BLOOD PRESSURE: 76 MMHG | SYSTOLIC BLOOD PRESSURE: 148 MMHG | HEART RATE: 84 BPM

## 2025-06-24 DIAGNOSIS — E53.8 B12 DEFICIENCY: Primary | ICD-10-CM

## 2025-06-24 PROCEDURE — 96372 THER/PROPH/DIAG INJ SC/IM: CPT

## 2025-06-24 PROCEDURE — 6360000002 HC RX W HCPCS: Performed by: FAMILY MEDICINE

## 2025-06-24 RX ORDER — ALBUTEROL SULFATE 90 UG/1
4 INHALANT RESPIRATORY (INHALATION) PRN
OUTPATIENT
Start: 2025-06-24

## 2025-06-24 RX ORDER — ACETAMINOPHEN 325 MG/1
650 TABLET ORAL
OUTPATIENT
Start: 2025-06-24

## 2025-06-24 RX ORDER — HYDROCORTISONE SODIUM SUCCINATE 100 MG/2ML
100 INJECTION INTRAMUSCULAR; INTRAVENOUS
OUTPATIENT
Start: 2025-06-24

## 2025-06-24 RX ORDER — CYANOCOBALAMIN 1000 UG/ML
1000 INJECTION, SOLUTION INTRAMUSCULAR; SUBCUTANEOUS ONCE
Status: CANCELLED | OUTPATIENT
Start: 2025-06-24 | End: 2025-06-24

## 2025-06-24 RX ORDER — ONDANSETRON 2 MG/ML
8 INJECTION INTRAMUSCULAR; INTRAVENOUS
OUTPATIENT
Start: 2025-06-24

## 2025-06-24 RX ORDER — SODIUM CHLORIDE 9 MG/ML
INJECTION, SOLUTION INTRAVENOUS CONTINUOUS
OUTPATIENT
Start: 2025-06-24

## 2025-06-24 RX ORDER — CYANOCOBALAMIN 1000 UG/ML
1000 INJECTION, SOLUTION INTRAMUSCULAR; SUBCUTANEOUS ONCE
Status: COMPLETED | OUTPATIENT
Start: 2025-06-24 | End: 2025-06-24

## 2025-06-24 RX ORDER — DIPHENHYDRAMINE HYDROCHLORIDE 50 MG/ML
50 INJECTION, SOLUTION INTRAMUSCULAR; INTRAVENOUS
OUTPATIENT
Start: 2025-06-24

## 2025-06-24 RX ORDER — EPINEPHRINE 1 MG/ML
0.3 INJECTION, SOLUTION, CONCENTRATE INTRAVENOUS PRN
OUTPATIENT
Start: 2025-06-24

## 2025-06-24 RX ADMIN — CYANOCOBALAMIN 1000 MCG: 1000 INJECTION INTRAMUSCULAR; SUBCUTANEOUS at 15:40

## 2025-06-24 ASSESSMENT — PAIN - FUNCTIONAL ASSESSMENT: PAIN_FUNCTIONAL_ASSESSMENT: NONE - DENIES PAIN

## 2025-06-24 NOTE — PROGRESS NOTES
[facility] Progress Note    Date: 2025    Name: Bhaskar Wing    MRN: [id number]         : 1950    Mr. Wing arrived in the [facility] today at 1525, ambulatory and in stable condition, here for his B-12 INJECTION (EVERY 28 DAYS) (EVERY 4 WEEKS). He was assessed and education was provided.         DOSE # 3 OF 3 TODAY.      B-12 INJECTIONS HAVE BEEN ORDERED TO BE ADMINISTERED EVERY 28 DAYS X 3 TOTAL DOSES, BASED ON HIS CURRENT WRITTEN ORDER DATED 3-11-25).      Mr. Wing's vitals were reviewed.  Vitals:    25 1525   BP: (!) 148/76   Pulse: 84   Resp: 16   Temp: 98.3 °F (36.8 °C)   SpO2: 95%       Mr. Wing presented to the infusion center today stating that he was doing fairly well and voicing no major complaints or concerns.            Cyanocobalamin (B-12) 1,000 mcg, was administered IM in his LEFT DELTOID at 1540, per order and without incident.            Mr. Wing tolerated well, and voiced no complaints.         [facility]. Wing was [facility] in stable condition at 1545..     He is to return in 4 weeks, on Tuesday, 25 at 1530, for his next appointment, for his next B-12 Injection. .(Pending the receipt of new orders from his referring provider.)          Alli Lynch RN  2025  3:39 PM

## 2025-06-29 SDOH — HEALTH STABILITY: PHYSICAL HEALTH: ON AVERAGE, HOW MANY DAYS PER WEEK DO YOU ENGAGE IN MODERATE TO STRENUOUS EXERCISE (LIKE A BRISK WALK)?: 3 DAYS

## 2025-06-29 SDOH — HEALTH STABILITY: PHYSICAL HEALTH: ON AVERAGE, HOW MANY MINUTES DO YOU ENGAGE IN EXERCISE AT THIS LEVEL?: 20 MIN

## 2025-06-29 ASSESSMENT — PATIENT HEALTH QUESTIONNAIRE - PHQ9
SUM OF ALL RESPONSES TO PHQ QUESTIONS 1-9: 0
SUM OF ALL RESPONSES TO PHQ QUESTIONS 1-9: 0
1. LITTLE INTEREST OR PLEASURE IN DOING THINGS: NOT AT ALL
2. FEELING DOWN, DEPRESSED OR HOPELESS: NOT AT ALL
SUM OF ALL RESPONSES TO PHQ QUESTIONS 1-9: 0
SUM OF ALL RESPONSES TO PHQ QUESTIONS 1-9: 0

## 2025-06-29 ASSESSMENT — LIFESTYLE VARIABLES
HOW OFTEN DO YOU HAVE A DRINK CONTAINING ALCOHOL: NEVER
HOW MANY STANDARD DRINKS CONTAINING ALCOHOL DO YOU HAVE ON A TYPICAL DAY: 0
HOW MANY STANDARD DRINKS CONTAINING ALCOHOL DO YOU HAVE ON A TYPICAL DAY: PATIENT DOES NOT DRINK
HOW OFTEN DO YOU HAVE A DRINK CONTAINING ALCOHOL: 1
HOW OFTEN DO YOU HAVE SIX OR MORE DRINKS ON ONE OCCASION: 1

## 2025-06-30 ENCOUNTER — OFFICE VISIT (OUTPATIENT)
Facility: CLINIC | Age: 75
End: 2025-06-30

## 2025-06-30 VITALS
TEMPERATURE: 97.5 F | RESPIRATION RATE: 16 BRPM | BODY MASS INDEX: 25.95 KG/M2 | WEIGHT: 191.6 LBS | HEIGHT: 72 IN | SYSTOLIC BLOOD PRESSURE: 130 MMHG | DIASTOLIC BLOOD PRESSURE: 68 MMHG | OXYGEN SATURATION: 96 % | HEART RATE: 75 BPM

## 2025-06-30 DIAGNOSIS — F41.9 ANXIETY: ICD-10-CM

## 2025-06-30 DIAGNOSIS — I10 ESSENTIAL HYPERTENSION: ICD-10-CM

## 2025-06-30 DIAGNOSIS — Z00.00 MEDICARE ANNUAL WELLNESS VISIT, SUBSEQUENT: Primary | ICD-10-CM

## 2025-06-30 DIAGNOSIS — F43.21 GRIEF: ICD-10-CM

## 2025-06-30 DIAGNOSIS — R11.2 NAUSEA AND VOMITING, UNSPECIFIED VOMITING TYPE: ICD-10-CM

## 2025-06-30 DIAGNOSIS — I25.118 CORONARY ARTERY DISEASE OF NATIVE ARTERY OF NATIVE HEART WITH STABLE ANGINA PECTORIS: ICD-10-CM

## 2025-06-30 DIAGNOSIS — E11.36 TYPE 2 DIABETES MELLITUS WITH DIABETIC CATARACT, WITHOUT LONG-TERM CURRENT USE OF INSULIN (HCC): ICD-10-CM

## 2025-06-30 DIAGNOSIS — E11.9 CONTROLLED TYPE 2 DIABETES MELLITUS WITHOUT COMPLICATION, WITHOUT LONG-TERM CURRENT USE OF INSULIN (HCC): ICD-10-CM

## 2025-06-30 NOTE — ACP (ADVANCE CARE PLANNING)
Advance Care Planning     General Advance Care Planning (ACP) Conversation    Date of Conversation: 6/30/2025  Conducted with: Patient with Decision Making Capacity  Other persons present: None    Healthcare Decision Maker:   Primary Decision Maker: Karyn Wing - Spouse - [phone]     Today we discussed advance directive. Wants initial resuscitation and decide his wife to make a prolong life support.   Content/Action Overview:  See above   Reviewed DNR/DNI and patient elects Full Code (Attempt Resuscitation)  treatment goals, benefit/burden of treatment options, artificial nutrition, ventilation preferences, hospitalization preferences, resuscitation preferences, end of life care preferences (vegetative state/imminent death), and hospice care      Length of Voluntary ACP Conversation in minutes:  <16 minutes (Non-Billable)    Annetta Dave MD

## 2025-06-30 NOTE — PROGRESS NOTES
Medicare Annual Wellness Visit    Bhaskar Wing is here for Medicare AWV, Hypertension, and Diabetes    Assessment & Plan  1. Anxiety.  - Reports improvement in anxiety and panic attacks with BuSpar 10 mg twice daily.  - No need to increase the dose.  - Continue current dose of BuSpar.  - Inform the clinic if symptoms worsen or if discontinuation of medication is considered.    2. Nausea and vomiting.  - Experiencing nausea and vomiting almost daily for the past two weeks, occurring at any time of the day.  - No associated stomach pain, diarrhea, constipation, headache, dizziness, chest pain, or palpitations.  - Monitor symptoms, eat on time, and drink plenty of water.  - Take metformin with breakfast and dinner to avoid an empty stomach.  - Inform the clinic if symptoms persist.    3. Health Maintenance.  - Last colonoscopy performed in 05/2024, repeat recommended in three years.  - Due for COVID-19 vaccine, tetanus vaccine, and RSV vaccine, which can be administered at the pharmacy.  - Blood test to be repeated in six months prior to next visit.    4. Medicare wellness visit.  - Passed the clock test and three-word recall test, indicating good cognitive function.  - Initial blood pressure was high but improved upon recheck; heart rate within normal limits.  - Continue follow-up with urologist for enlarged prostate and adhere to their recommendations.    5. Hyperlipidemia.  - Triglycerides slightly elevated during last check in 02/2025.  - Needs refill of rosuvastatin.  - Advised to call cardiologist's office again to renew the prescription.    6. Diabetes Mellitus.  - Diabetes well-controlled with A1c of 5.9.  - Continue taking metformin with meals to avoid gastrointestinal upset.    7. Medication Management.  - Taking Flomax for enlarged prostate, no changes in urine stream.  - Taking Coreg, amlodipine, aspirin, Imdur, ramipril, and rosuvastatin for cardiac conditions.  - Has not needed to take nitroglycerin

## 2025-06-30 NOTE — PATIENT INSTRUCTIONS

## 2025-06-30 NOTE — PROGRESS NOTES
Have you been to the ER, urgent care clinic since your last visit?  Hospitalized since your last visit?   NO    Have you seen or consulted any other health care providers outside our system since your last visit?   Dr. Sharpe LOV: 3/2025 for cataract procedure.      Chief Complaint   Patient presents with    Medicare AWV    Hypertension    Diabetes

## 2025-07-21 ENCOUNTER — TELEPHONE (OUTPATIENT)
Facility: CLINIC | Age: 75
End: 2025-07-21

## 2025-07-21 NOTE — TELEPHONE ENCOUNTER
New order for b12 injections infusion center   Fax number [phone]      Orders has been faxed over twice   Patient is supposed to be seen tomorrow

## 2025-07-29 ENCOUNTER — HOSPITAL ENCOUNTER (OUTPATIENT)
Facility: HOSPITAL | Age: 75
Setting detail: INFUSION SERIES
Discharge: HOME | End: 2025-08-01
Payer: MEDICARE

## 2025-07-29 VITALS
OXYGEN SATURATION: 96 % | TEMPERATURE: 97 F | DIASTOLIC BLOOD PRESSURE: 77 MMHG | HEART RATE: 82 BPM | RESPIRATION RATE: 16 BRPM | SYSTOLIC BLOOD PRESSURE: 170 MMHG

## 2025-07-29 DIAGNOSIS — E53.8 B12 DEFICIENCY: Primary | ICD-10-CM

## 2025-07-29 PROCEDURE — 6360000002 HC RX W HCPCS: Performed by: FAMILY MEDICINE

## 2025-07-29 PROCEDURE — 96372 THER/PROPH/DIAG INJ SC/IM: CPT

## 2025-07-29 RX ORDER — CYANOCOBALAMIN 1000 UG/ML
1000 INJECTION, SOLUTION INTRAMUSCULAR; SUBCUTANEOUS ONCE
Status: COMPLETED | OUTPATIENT
Start: 2025-07-29 | End: 2025-07-29

## 2025-07-29 RX ORDER — DIPHENHYDRAMINE HYDROCHLORIDE 50 MG/ML
50 INJECTION, SOLUTION INTRAMUSCULAR; INTRAVENOUS
OUTPATIENT
Start: 2025-08-26

## 2025-07-29 RX ORDER — ALBUTEROL SULFATE 90 UG/1
4 INHALANT RESPIRATORY (INHALATION) PRN
OUTPATIENT
Start: 2025-08-26

## 2025-07-29 RX ORDER — ONDANSETRON 2 MG/ML
8 INJECTION INTRAMUSCULAR; INTRAVENOUS
OUTPATIENT
Start: 2025-08-26

## 2025-07-29 RX ORDER — SODIUM CHLORIDE 9 MG/ML
INJECTION, SOLUTION INTRAVENOUS CONTINUOUS
OUTPATIENT
Start: 2025-08-26

## 2025-07-29 RX ORDER — EPINEPHRINE 1 MG/ML
0.3 INJECTION, SOLUTION, CONCENTRATE INTRAVENOUS PRN
OUTPATIENT
Start: 2025-08-26

## 2025-07-29 RX ORDER — CYANOCOBALAMIN 1000 UG/ML
1000 INJECTION, SOLUTION INTRAMUSCULAR; SUBCUTANEOUS ONCE
OUTPATIENT
Start: 2025-08-26 | End: 2025-08-26

## 2025-07-29 RX ORDER — HYDROCORTISONE SODIUM SUCCINATE 100 MG/2ML
100 INJECTION INTRAMUSCULAR; INTRAVENOUS
OUTPATIENT
Start: 2025-08-26

## 2025-07-29 RX ORDER — ACETAMINOPHEN 325 MG/1
650 TABLET ORAL
OUTPATIENT
Start: 2025-08-26

## 2025-07-29 RX ADMIN — CYANOCOBALAMIN 1000 MCG: 1000 INJECTION, SOLUTION INTRAMUSCULAR at 15:28

## 2025-07-29 ASSESSMENT — PAIN - FUNCTIONAL ASSESSMENT: PAIN_FUNCTIONAL_ASSESSMENT: NONE - DENIES PAIN

## 2025-07-29 NOTE — PROGRESS NOTES
\[facility]\"" Progress Note    Date: 2025    Name: Bhaskar Wing    MRN: [id number]         : 1950    Mr. Wing arrived in the \[facility]"" today at 1515, ambulatory and in stable condition, here for his B-12 INJECTION (EVERY 28 DAYS) (EVERY 4 WEEKS). He was assessed and education was provided.         DOSE # 1 OF 3 TODAY.      B-12 INJECTIONS HAVE BEEN ORDERED TO BE ADMINISTERED EVERY 28 DAYS X 3 TOTAL DOSES, BASED ON HIS CURRENT WRITTEN ORDER DATED 25).      Mr. Wing's vitals were reviewed.  Vitals:    25 1515   BP: (!) 170/77   Pulse: 82   Resp: 16   Temp: 97 °F (36.1 °C)   SpO2: 96%       Mr. Wing presented to the infusion center today stating that he was doing fairly well and voicing no major complaints or concerns. However, Mr. Wing's b/p is elevated today.  He verbalizes taking his b/p meds today and denies headache or dizziness.  Pt to monitor.  Pt verbalized he had been outside doing a lot of yard work.     Cyanocobalamin (B-12) 1,000 mcg, was administered IM in his RIGHT DELTOID.  Declined bandaid.         Mr. Wing tolerated well, and voiced no complaints.     [facility]. [facility] was [facility] in stable condition at 1530.  He is to return in 4 weeks, on 25 at 1530, for his next appointment, for his next B-12 Injection #2 of 3,         Bhavna Caicedo RN  2025  4:15 PM

## 2025-08-10 ENCOUNTER — APPOINTMENT (OUTPATIENT)
Age: 75
End: 2025-08-10
Payer: MEDICARE

## 2025-08-10 ENCOUNTER — HOSPITAL ENCOUNTER (EMERGENCY)
Age: 75
LOS: 1 days | Discharge: ANOTHER HOSPITAL | End: 2025-08-11
Attending: EMERGENCY MEDICINE | Admitting: INTERNAL MEDICINE
Payer: MEDICARE

## 2025-08-10 DIAGNOSIS — N30.00 ACUTE CYSTITIS WITHOUT HEMATURIA: ICD-10-CM

## 2025-08-10 DIAGNOSIS — R07.9 CHEST PAIN, UNSPECIFIED TYPE: Primary | ICD-10-CM

## 2025-08-10 DIAGNOSIS — R06.02 SHORTNESS OF BREATH: ICD-10-CM

## 2025-08-10 DIAGNOSIS — A41.9 SEPTICEMIA (HCC): ICD-10-CM

## 2025-08-10 LAB
ALBUMIN SERPL-MCNC: 3.5 G/DL (ref 3.4–5)
ALBUMIN/GLOB SERPL: 1 (ref 1–1.9)
ALP SERPL-CCNC: 93 U/L (ref 45–117)
ALT SERPL-CCNC: 11 U/L (ref 10–50)
ANION GAP SERPL CALC-SCNC: 20 MMOL/L (ref 7–16)
APPEARANCE UR: ABNORMAL
AST SERPL-CCNC: 16 U/L (ref 10–38)
BACTERIA URNS QL MICRO: ABNORMAL /HPF
BASOPHILS # BLD: 0 K/UL (ref 0–0.1)
BASOPHILS NFR BLD: 0 % (ref 0–2)
BILIRUB SERPL-MCNC: 1.1 MG/DL (ref 0.2–1)
BILIRUB UR QL: ABNORMAL
BUN SERPL-MCNC: 15 MG/DL (ref 6–23)
BUN/CREAT SERPL: 11
CALCIUM SERPL-MCNC: 9.3 MG/DL (ref 8.5–10.1)
CHLORIDE SERPL-SCNC: 97 MMOL/L (ref 98–107)
CO2 SERPL-SCNC: 18 MMOL/L (ref 21–32)
COLOR UR: ABNORMAL
CREAT SERPL-MCNC: 1.37 MG/DL (ref 0.6–1.3)
DIFFERENTIAL METHOD BLD: ABNORMAL
EOSINOPHIL # BLD: 0 K/UL (ref 0–0.4)
EOSINOPHIL NFR BLD: 0 % (ref 0–5)
EPITH CASTS URNS QL MICRO: ABNORMAL /LPF (ref 0–5)
ERYTHROCYTE [DISTWIDTH] IN BLOOD BY AUTOMATED COUNT: 14.6 % (ref 11.6–14.5)
FLUAV RNA SPEC QL NAA+PROBE: NOT DETECTED
FLUBV RNA SPEC QL NAA+PROBE: NOT DETECTED
GLOBULIN SER CALC-MCNC: 3.7 G/DL (ref 2.3–3.5)
GLUCOSE BLD STRIP.AUTO-MCNC: 156 MG/DL (ref 70–110)
GLUCOSE BLD STRIP.AUTO-MCNC: 160 MG/DL (ref 70–110)
GLUCOSE SERPL-MCNC: 158 MG/DL (ref 74–108)
GLUCOSE UR STRIP.AUTO-MCNC: 100 MG/DL
HCT VFR BLD AUTO: 38.4 % (ref 36–48)
HGB BLD-MCNC: 13 G/DL (ref 13–16)
HGB UR QL STRIP: ABNORMAL
IMM GRANULOCYTES # BLD AUTO: 0 K/UL (ref 0–0.04)
IMM GRANULOCYTES NFR BLD AUTO: 0 % (ref 0–0.5)
KETONES UR QL STRIP.AUTO: 40 MG/DL
LACTATE BLD-SCNC: 1.88 MMOL/L (ref 0.4–2)
LEUKOCYTE ESTERASE UR QL STRIP.AUTO: ABNORMAL
LYMPHOCYTES # BLD: 1.7 K/UL (ref 0.9–3.6)
LYMPHOCYTES NFR BLD: 6 % (ref 21–52)
MCH RBC QN AUTO: 30.7 PG (ref 24–34)
MCHC RBC AUTO-ENTMCNC: 33.9 G/DL (ref 31–37)
MCV RBC AUTO: 90.8 FL (ref 78–100)
MONOCYTES # BLD: 1.98 K/UL (ref 0.05–1.2)
MONOCYTES NFR BLD: 7 % (ref 3–10)
NEUTS BAND NFR BLD MANUAL: 2 %
NEUTS SEG # BLD: 24.62 K/UL (ref 1.8–8)
NEUTS SEG NFR BLD: 85 % (ref 40–73)
NITRITE UR QL STRIP.AUTO: NEGATIVE
NRBC # BLD: 0 K/UL (ref 0–0.01)
NRBC BLD-RTO: 0 PER 100 WBC
PH UR STRIP: 6 (ref 5–8)
PLATELET # BLD AUTO: 202 K/UL (ref 135–420)
PLATELET COMMENT: ABNORMAL
PMV BLD AUTO: 10.2 FL (ref 9.2–11.8)
POTASSIUM SERPL-SCNC: 3.8 MMOL/L (ref 3.5–5.5)
PROCALCITONIN SERPL-MCNC: 0.88 NG/ML
PROT SERPL-MCNC: 7.2 G/DL (ref 6.4–8.2)
PROT UR STRIP-MCNC: 300 MG/DL
RBC # BLD AUTO: 4.23 M/UL (ref 4.35–5.65)
RBC #/AREA URNS HPF: ABNORMAL /HPF (ref 0–5)
RBC MORPH BLD: ABNORMAL
SARS-COV-2 RNA RESP QL NAA+PROBE: NOT DETECTED
SODIUM SERPL-SCNC: 135 MMOL/L (ref 136–145)
SOURCE: NORMAL
SP GR UR REFRACTOMETRY: 1.03 (ref 1–1.03)
TROPONIN T SERPL HS-MCNC: 32.8 NG/L (ref 0–22)
TROPONIN T SERPL HS-MCNC: 34 NG/L (ref 0–22)
UROBILINOGEN UR QL STRIP.AUTO: 1 EU/DL (ref 0.2–1)
WBC # BLD AUTO: 28.3 K/UL (ref 4.6–13.2)
WBC URNS QL MICRO: ABNORMAL /HPF (ref 0–4)

## 2025-08-10 PROCEDURE — 83605 ASSAY OF LACTIC ACID: CPT

## 2025-08-10 PROCEDURE — 6360000002 HC RX W HCPCS: Performed by: INTERNAL MEDICINE

## 2025-08-10 PROCEDURE — 96375 TX/PRO/DX INJ NEW DRUG ADDON: CPT

## 2025-08-10 PROCEDURE — 96372 THER/PROPH/DIAG INJ SC/IM: CPT

## 2025-08-10 PROCEDURE — 84145 PROCALCITONIN (PCT): CPT

## 2025-08-10 PROCEDURE — 87636 SARSCOV2 & INF A&B AMP PRB: CPT

## 2025-08-10 PROCEDURE — 74176 CT ABD & PELVIS W/O CONTRAST: CPT

## 2025-08-10 PROCEDURE — 6370000000 HC RX 637 (ALT 250 FOR IP): Performed by: INTERNAL MEDICINE

## 2025-08-10 PROCEDURE — 6360000002 HC RX W HCPCS: Performed by: EMERGENCY MEDICINE

## 2025-08-10 PROCEDURE — 96374 THER/PROPH/DIAG INJ IV PUSH: CPT

## 2025-08-10 PROCEDURE — 2580000003 HC RX 258: Performed by: EMERGENCY MEDICINE

## 2025-08-10 PROCEDURE — 82962 GLUCOSE BLOOD TEST: CPT

## 2025-08-10 PROCEDURE — 87186 SC STD MICRODIL/AGAR DIL: CPT

## 2025-08-10 PROCEDURE — 2700000000 HC OXYGEN THERAPY PER DAY

## 2025-08-10 PROCEDURE — 2500000003 HC RX 250 WO HCPCS: Performed by: EMERGENCY MEDICINE

## 2025-08-10 PROCEDURE — 81003 URINALYSIS AUTO W/O SCOPE: CPT

## 2025-08-10 PROCEDURE — 96366 THER/PROPH/DIAG IV INF ADDON: CPT

## 2025-08-10 PROCEDURE — 87086 URINE CULTURE/COLONY COUNT: CPT

## 2025-08-10 PROCEDURE — 96365 THER/PROPH/DIAG IV INF INIT: CPT

## 2025-08-10 PROCEDURE — 2580000003 HC RX 258: Performed by: INTERNAL MEDICINE

## 2025-08-10 PROCEDURE — 99285 EMERGENCY DEPT VISIT HI MDM: CPT

## 2025-08-10 PROCEDURE — 1100000000 HC RM PRIVATE

## 2025-08-10 PROCEDURE — 71045 X-RAY EXAM CHEST 1 VIEW: CPT

## 2025-08-10 PROCEDURE — 81001 URINALYSIS AUTO W/SCOPE: CPT

## 2025-08-10 PROCEDURE — 84484 ASSAY OF TROPONIN QUANT: CPT

## 2025-08-10 PROCEDURE — 85025 COMPLETE CBC W/AUTO DIFF WBC: CPT

## 2025-08-10 PROCEDURE — 93005 ELECTROCARDIOGRAM TRACING: CPT

## 2025-08-10 PROCEDURE — 87040 BLOOD CULTURE FOR BACTERIA: CPT

## 2025-08-10 PROCEDURE — 80053 COMPREHEN METABOLIC PANEL: CPT

## 2025-08-10 PROCEDURE — 87088 URINE BACTERIA CULTURE: CPT

## 2025-08-10 RX ORDER — ALPRAZOLAM 0.25 MG
0.25 TABLET ORAL 2 TIMES DAILY PRN
Status: DISCONTINUED | OUTPATIENT
Start: 2025-08-10 | End: 2025-08-11 | Stop reason: HOSPADM

## 2025-08-10 RX ORDER — ISOSORBIDE MONONITRATE 30 MG/1
30 TABLET, EXTENDED RELEASE ORAL DAILY
Status: DISCONTINUED | OUTPATIENT
Start: 2025-08-11 | End: 2025-08-11 | Stop reason: HOSPADM

## 2025-08-10 RX ORDER — VANCOMYCIN 1.25 G/250ML
1750 INJECTION, SOLUTION INTRAVENOUS ONCE
Status: COMPLETED | OUTPATIENT
Start: 2025-08-10 | End: 2025-08-10

## 2025-08-10 RX ORDER — ISOSORBIDE MONONITRATE 30 MG/1
30 TABLET, EXTENDED RELEASE ORAL DAILY
Status: DISCONTINUED | OUTPATIENT
Start: 2025-08-10 | End: 2025-08-10

## 2025-08-10 RX ORDER — NITROGLYCERIN 0.4 MG/1
0.4 TABLET SUBLINGUAL EVERY 5 MIN PRN
Status: DISCONTINUED | OUTPATIENT
Start: 2025-08-10 | End: 2025-08-11 | Stop reason: HOSPADM

## 2025-08-10 RX ORDER — ONDANSETRON 2 MG/ML
4 INJECTION INTRAMUSCULAR; INTRAVENOUS EVERY 6 HOURS PRN
Status: DISCONTINUED | OUTPATIENT
Start: 2025-08-10 | End: 2025-08-11 | Stop reason: HOSPADM

## 2025-08-10 RX ORDER — ACETAMINOPHEN 325 MG/1
650 TABLET ORAL EVERY 4 HOURS PRN
Status: DISCONTINUED | OUTPATIENT
Start: 2025-08-10 | End: 2025-08-11 | Stop reason: HOSPADM

## 2025-08-10 RX ORDER — ASPIRIN 81 MG/1
81 TABLET, CHEWABLE ORAL DAILY
Status: DISCONTINUED | OUTPATIENT
Start: 2025-08-11 | End: 2025-08-11 | Stop reason: HOSPADM

## 2025-08-10 RX ORDER — ASPIRIN 81 MG/1
81 TABLET, CHEWABLE ORAL DAILY
Status: DISCONTINUED | OUTPATIENT
Start: 2025-08-10 | End: 2025-08-10

## 2025-08-10 RX ORDER — IPRATROPIUM BROMIDE AND ALBUTEROL SULFATE 2.5; .5 MG/3ML; MG/3ML
1 SOLUTION RESPIRATORY (INHALATION) EVERY 4 HOURS PRN
Status: DISCONTINUED | OUTPATIENT
Start: 2025-08-10 | End: 2025-08-11 | Stop reason: HOSPADM

## 2025-08-10 RX ORDER — METOPROLOL TARTRATE 1 MG/ML
5 INJECTION, SOLUTION INTRAVENOUS ONCE
Status: DISCONTINUED | OUTPATIENT
Start: 2025-08-10 | End: 2025-08-10

## 2025-08-10 RX ORDER — 0.9 % SODIUM CHLORIDE 0.9 %
30 INTRAVENOUS SOLUTION INTRAVENOUS ONCE
Status: COMPLETED | OUTPATIENT
Start: 2025-08-10 | End: 2025-08-10

## 2025-08-10 RX ORDER — ROSUVASTATIN CALCIUM 20 MG/1
40 TABLET, COATED ORAL NIGHTLY
Status: DISCONTINUED | OUTPATIENT
Start: 2025-08-10 | End: 2025-08-11 | Stop reason: HOSPADM

## 2025-08-10 RX ORDER — HEPARIN SODIUM 5000 [USP'U]/ML
5000 INJECTION, SOLUTION INTRAVENOUS; SUBCUTANEOUS EVERY 8 HOURS SCHEDULED
Status: DISCONTINUED | OUTPATIENT
Start: 2025-08-10 | End: 2025-08-11 | Stop reason: HOSPADM

## 2025-08-10 RX ORDER — LORAZEPAM 2 MG/ML
0.5 INJECTION INTRAMUSCULAR ONCE
Status: DISCONTINUED | OUTPATIENT
Start: 2025-08-10 | End: 2025-08-11 | Stop reason: HOSPADM

## 2025-08-10 RX ORDER — METOPROLOL TARTRATE 1 MG/ML
2.5 INJECTION, SOLUTION INTRAVENOUS EVERY 6 HOURS PRN
Status: DISCONTINUED | OUTPATIENT
Start: 2025-08-10 | End: 2025-08-11 | Stop reason: HOSPADM

## 2025-08-10 RX ORDER — CARVEDILOL 6.25 MG/1
12.5 TABLET ORAL 2 TIMES DAILY WITH MEALS
Status: DISCONTINUED | OUTPATIENT
Start: 2025-08-10 | End: 2025-08-11 | Stop reason: HOSPADM

## 2025-08-10 RX ORDER — INSULIN LISPRO 100 [IU]/ML
0-4 INJECTION, SOLUTION INTRAVENOUS; SUBCUTANEOUS
Status: DISCONTINUED | OUTPATIENT
Start: 2025-08-10 | End: 2025-08-11 | Stop reason: HOSPADM

## 2025-08-10 RX ORDER — DEXTROSE MONOHYDRATE 100 MG/ML
INJECTION, SOLUTION INTRAVENOUS CONTINUOUS PRN
Status: DISCONTINUED | OUTPATIENT
Start: 2025-08-10 | End: 2025-08-11 | Stop reason: HOSPADM

## 2025-08-10 RX ORDER — GLUCAGON 1 MG
1 KIT INJECTION PRN
Status: DISCONTINUED | OUTPATIENT
Start: 2025-08-10 | End: 2025-08-11 | Stop reason: HOSPADM

## 2025-08-10 RX ORDER — ACETAMINOPHEN 650 MG/1
650 SUPPOSITORY RECTAL EVERY 4 HOURS PRN
Status: DISCONTINUED | OUTPATIENT
Start: 2025-08-10 | End: 2025-08-11 | Stop reason: HOSPADM

## 2025-08-10 RX ORDER — BUSPIRONE HYDROCHLORIDE 5 MG/1
5 TABLET ORAL 2 TIMES DAILY
Status: DISCONTINUED | OUTPATIENT
Start: 2025-08-11 | End: 2025-08-11 | Stop reason: HOSPADM

## 2025-08-10 RX ORDER — BISACODYL 5 MG/1
5 TABLET, DELAYED RELEASE ORAL DAILY PRN
Status: DISCONTINUED | OUTPATIENT
Start: 2025-08-10 | End: 2025-08-11 | Stop reason: HOSPADM

## 2025-08-10 RX ORDER — CARVEDILOL 6.25 MG/1
12.5 TABLET ORAL 2 TIMES DAILY WITH MEALS
Status: DISCONTINUED | OUTPATIENT
Start: 2025-08-10 | End: 2025-08-10

## 2025-08-10 RX ORDER — TAMSULOSIN HYDROCHLORIDE 0.4 MG/1
0.4 CAPSULE ORAL DAILY
Status: DISCONTINUED | OUTPATIENT
Start: 2025-08-10 | End: 2025-08-11 | Stop reason: HOSPADM

## 2025-08-10 RX ORDER — SODIUM CHLORIDE 9 MG/ML
INJECTION, SOLUTION INTRAVENOUS CONTINUOUS
Status: DISCONTINUED | OUTPATIENT
Start: 2025-08-10 | End: 2025-08-11 | Stop reason: HOSPADM

## 2025-08-10 RX ADMIN — HEPARIN SODIUM 5000 UNITS: 5000 INJECTION INTRAVENOUS; SUBCUTANEOUS at 22:17

## 2025-08-10 RX ADMIN — VANCOMYCIN 1750 MG: 1.25 INJECTION, SOLUTION INTRAVENOUS at 18:47

## 2025-08-10 RX ADMIN — SODIUM CHLORIDE: 0.9 INJECTION, SOLUTION INTRAVENOUS at 18:33

## 2025-08-10 RX ADMIN — SODIUM CHLORIDE 2613 ML: 0.9 INJECTION, SOLUTION INTRAVENOUS at 13:45

## 2025-08-10 RX ADMIN — METOPROLOL TARTRATE 2.5 MG: 5 INJECTION INTRAVENOUS at 18:41

## 2025-08-10 RX ADMIN — ONDANSETRON 4 MG: 2 INJECTION, SOLUTION INTRAMUSCULAR; INTRAVENOUS at 22:17

## 2025-08-10 RX ADMIN — CEFTRIAXONE 1000 MG: 1 INJECTION, POWDER, FOR SOLUTION INTRAMUSCULAR; INTRAVENOUS at 13:47

## 2025-08-10 RX ADMIN — CARVEDILOL 12.5 MG: 12.5 TABLET, FILM COATED ORAL at 20:04

## 2025-08-10 RX ADMIN — ACETAMINOPHEN 650 MG: 325 TABLET ORAL at 22:17

## 2025-08-10 RX ADMIN — CEFEPIME 2000 MG: 2 INJECTION, POWDER, FOR SOLUTION INTRAVENOUS at 19:02

## 2025-08-10 RX ADMIN — TAMSULOSIN HYDROCHLORIDE 0.4 MG: 0.4 CAPSULE ORAL at 19:02

## 2025-08-10 RX ADMIN — ROSUVASTATIN 40 MG: 20 TABLET, FILM COATED ORAL at 20:04

## 2025-08-10 ASSESSMENT — PAIN DESCRIPTION - DESCRIPTORS
DESCRIPTORS: ACHING
DESCRIPTORS: BURNING

## 2025-08-10 ASSESSMENT — LIFESTYLE VARIABLES
HOW MANY STANDARD DRINKS CONTAINING ALCOHOL DO YOU HAVE ON A TYPICAL DAY: PATIENT DOES NOT DRINK
HOW OFTEN DO YOU HAVE A DRINK CONTAINING ALCOHOL: NEVER

## 2025-08-10 ASSESSMENT — PAIN DESCRIPTION - LOCATION
LOCATION: PELVIS;PENIS
LOCATION: HEAD
LOCATION: PELVIS;PENIS

## 2025-08-10 ASSESSMENT — PAIN DESCRIPTION - PAIN TYPE: TYPE: ACUTE PAIN

## 2025-08-10 ASSESSMENT — PAIN DESCRIPTION - ORIENTATION
ORIENTATION: LOWER
ORIENTATION: MID

## 2025-08-10 ASSESSMENT — PAIN SCALES - GENERAL
PAINLEVEL_OUTOF10: 6
PAINLEVEL_OUTOF10: 6
PAINLEVEL_OUTOF10: 0
PAINLEVEL_OUTOF10: 4

## 2025-08-10 ASSESSMENT — PAIN - FUNCTIONAL ASSESSMENT
PAIN_FUNCTIONAL_ASSESSMENT: 0-10
PAIN_FUNCTIONAL_ASSESSMENT: ACTIVITIES ARE NOT PREVENTED
PAIN_FUNCTIONAL_ASSESSMENT: 0-10

## 2025-08-11 ENCOUNTER — HOSPITAL ENCOUNTER (INPATIENT)
Facility: HOSPITAL | Age: 75
LOS: 4 days | Discharge: HOME | End: 2025-08-15
Attending: FAMILY MEDICINE | Admitting: STUDENT IN AN ORGANIZED HEALTH CARE EDUCATION/TRAINING PROGRAM
Payer: MEDICARE

## 2025-08-11 ENCOUNTER — APPOINTMENT (OUTPATIENT)
Facility: HOSPITAL | Age: 75
End: 2025-08-11
Attending: INTERNAL MEDICINE
Payer: MEDICARE

## 2025-08-11 ENCOUNTER — APPOINTMENT (OUTPATIENT)
Age: 75
End: 2025-08-11
Attending: INTERNAL MEDICINE
Payer: MEDICARE

## 2025-08-11 ENCOUNTER — APPOINTMENT (OUTPATIENT)
Age: 75
End: 2025-08-11
Payer: MEDICARE

## 2025-08-11 ENCOUNTER — APPOINTMENT (OUTPATIENT)
Facility: HOSPITAL | Age: 75
End: 2025-08-11
Attending: FAMILY MEDICINE
Payer: MEDICARE

## 2025-08-11 VITALS
SYSTOLIC BLOOD PRESSURE: 131 MMHG | TEMPERATURE: 99.2 F | OXYGEN SATURATION: 99 % | RESPIRATION RATE: 29 BRPM | BODY MASS INDEX: 26.01 KG/M2 | DIASTOLIC BLOOD PRESSURE: 93 MMHG | HEART RATE: 107 BPM | WEIGHT: 192 LBS | HEIGHT: 72 IN

## 2025-08-11 PROBLEM — J81.0 ACUTE PULMONARY EDEMA (HCC): Status: ACTIVE | Noted: 2025-08-11

## 2025-08-11 PROBLEM — R07.9 CHEST PAIN: Status: ACTIVE | Noted: 2025-08-11

## 2025-08-11 LAB
ALBUMIN SERPL-MCNC: 2.6 G/DL (ref 3.4–5)
ALBUMIN/GLOB SERPL: 0.7 (ref 1–1.9)
ALP SERPL-CCNC: 85 U/L (ref 45–117)
ALT SERPL-CCNC: 10 U/L (ref 10–50)
ANION GAP SERPL CALC-SCNC: 22 MMOL/L (ref 7–16)
APTT PPP: 30.1 SEC (ref 21.7–34.2)
AST SERPL-CCNC: 20 U/L (ref 10–38)
B PERT DNA SPEC QL NAA+PROBE: NOT DETECTED
BASE DEFICIT BLD-SCNC: 8.6 MMOL/L
BASE DEFICIT BLD-SCNC: 8.6 MMOL/L
BASOPHILS # BLD: 0 K/UL (ref 0–0.1)
BASOPHILS NFR BLD: 0 % (ref 0–2)
BILIRUB SERPL-MCNC: 0.6 MG/DL (ref 0.2–1)
BORDETELLA PARAPERTUSSIS BY PCR: NOT DETECTED
BUN SERPL-MCNC: 21 MG/DL (ref 6–23)
BUN/CREAT SERPL: 17
C PNEUM DNA SPEC QL NAA+PROBE: NOT DETECTED
CALCIUM SERPL-MCNC: 8.2 MG/DL (ref 8.5–10.1)
CHLORIDE SERPL-SCNC: 98 MMOL/L (ref 98–107)
CO2 SERPL-SCNC: 13 MMOL/L (ref 21–32)
CREAT SERPL-MCNC: 1.24 MG/DL (ref 0.6–1.3)
D DIMER PPP FEU-MCNC: 2.94 UG/ML(FEU)
DIFFERENTIAL METHOD BLD: ABNORMAL
ECHO AO ASC DIAM: 3.1 CM
ECHO AO ASCENDING AORTA INDEX: 1.48 CM/M2
ECHO AO ROOT DIAM: 3.5 CM
ECHO AO ROOT INDEX: 1.67 CM/M2
ECHO AV AREA PEAK VELOCITY: 2.1 CM2
ECHO AV AREA VTI: 2.1 CM2
ECHO AV AREA/BSA PEAK VELOCITY: 1 CM2/M2
ECHO AV AREA/BSA VTI: 1 CM2/M2
ECHO AV MEAN GRADIENT: 4 MMHG
ECHO AV MEAN VELOCITY: 1 M/S
ECHO AV PEAK GRADIENT: 6 MMHG
ECHO AV PEAK VELOCITY: 1.3 M/S
ECHO AV VELOCITY RATIO: 0.77
ECHO AV VTI: 22 CM
ECHO BSA: 2.1 M2
ECHO EST RA PRESSURE: 3 MMHG
ECHO LA VOL A-L A2C: 31 ML (ref 18–58)
ECHO LA VOL A-L A4C: 23 ML (ref 18–58)
ECHO LA VOL BP: 26 ML (ref 18–58)
ECHO LA VOL MOD A2C: 29 ML (ref 18–58)
ECHO LA VOL MOD A4C: 22 ML (ref 18–58)
ECHO LA VOL/BSA BIPLANE: 12 ML/M2 (ref 16–34)
ECHO LA VOLUME AREA LENGTH: 28 ML
ECHO LA VOLUME INDEX A-L A2C: 15 ML/M2 (ref 16–34)
ECHO LA VOLUME INDEX A-L A4C: 11 ML/M2 (ref 16–34)
ECHO LA VOLUME INDEX AREA LENGTH: 13 ML/M2 (ref 16–34)
ECHO LA VOLUME INDEX MOD A2C: 14 ML/M2 (ref 16–34)
ECHO LA VOLUME INDEX MOD A4C: 11 ML/M2 (ref 16–34)
ECHO LV E' LATERAL VELOCITY: 7.46 CM/S
ECHO LV E' SEPTAL VELOCITY: 6.32 CM/S
ECHO LV EDV A2C: 115 ML
ECHO LV EDV A4C: 96 ML
ECHO LV EDV BP: 106 ML (ref 67–155)
ECHO LV EDV INDEX A4C: 46 ML/M2
ECHO LV EDV INDEX BP: 51 ML/M2
ECHO LV EDV NDEX A2C: 55 ML/M2
ECHO LV EF PHYSICIAN: 45 %
ECHO LV EJECTION FRACTION A2C: 43 %
ECHO LV EJECTION FRACTION A4C: 51 %
ECHO LV EJECTION FRACTION BIPLANE: 48 % (ref 55–100)
ECHO LV ESV A2C: 65 ML
ECHO LV ESV A4C: 47 ML
ECHO LV ESV BP: 55 ML (ref 22–58)
ECHO LV ESV INDEX A2C: 31 ML/M2
ECHO LV ESV INDEX A4C: 22 ML/M2
ECHO LV ESV INDEX BP: 26 ML/M2
ECHO LV FRACTIONAL SHORTENING: 25 % (ref 28–44)
ECHO LV INTERNAL DIMENSION DIASTOLE INDEX: 2.44 CM/M2
ECHO LV INTERNAL DIMENSION DIASTOLIC: 5.1 CM (ref 4.2–5.9)
ECHO LV INTERNAL DIMENSION SYSTOLIC INDEX: 1.82 CM/M2
ECHO LV INTERNAL DIMENSION SYSTOLIC: 3.8 CM
ECHO LV IVSD: 0.9 CM (ref 0.6–1)
ECHO LV MASS 2D: 140.5 G (ref 88–224)
ECHO LV MASS INDEX 2D: 67.2 G/M2 (ref 49–115)
ECHO LV POSTERIOR WALL DIASTOLIC: 0.7 CM (ref 0.6–1)
ECHO LV RELATIVE WALL THICKNESS RATIO: 0.27
ECHO LVOT AREA: 2.8 CM2
ECHO LVOT AV VTI INDEX: 0.76
ECHO LVOT DIAM: 1.9 CM
ECHO LVOT MEAN GRADIENT: 2 MMHG
ECHO LVOT PEAK GRADIENT: 4 MMHG
ECHO LVOT PEAK VELOCITY: 1 M/S
ECHO LVOT STROKE VOLUME INDEX: 22.8 ML/M2
ECHO LVOT SV: 47.6 ML
ECHO LVOT VTI: 16.8 CM
ECHO MV AREA VTI: 3.2 CM2
ECHO MV LVOT VTI INDEX: 0.89
ECHO MV MAX VELOCITY: 1.1 M/S
ECHO MV MEAN GRADIENT: 2 MMHG
ECHO MV MEAN VELOCITY: 0.6 M/S
ECHO MV PEAK GRADIENT: 5 MMHG
ECHO MV VTI: 15 CM
ECHO PV MAX VELOCITY: 1.3 M/S
ECHO PV PEAK GRADIENT: 7 MMHG
ECHO RA AREA 4C: 11.5 CM2
ECHO RA END SYSTOLIC VOLUME APICAL 4 CHAMBER INDEX BSA: 12 ML/M2
ECHO RA VOLUME AREA LENGTH APICAL 4 CHAMBER: 26 ML
ECHO RA VOLUME: 25 ML
ECHO RIGHT VENTRICULAR SYSTOLIC PRESSURE (RVSP): 26 MMHG
ECHO RV BASAL DIMENSION: 3.6 CM
ECHO RV FREE WALL PEAK S': 12.9 CM/S
ECHO RV TAPSE: 1.9 CM (ref 1.7–?)
ECHO RVOT PEAK GRADIENT: 6 MMHG
ECHO RVOT PEAK VELOCITY: 1.3 M/S
ECHO TV REGURGITANT MAX VELOCITY: 2.41 M/S
ECHO TV REGURGITANT PEAK GRADIENT: 23 MMHG
EKG ATRIAL RATE: 101 BPM
EKG ATRIAL RATE: 108 BPM
EKG ATRIAL RATE: 119 BPM
EKG DIAGNOSIS: NORMAL
EKG P AXIS: 30 DEGREES
EKG P AXIS: 33 DEGREES
EKG P AXIS: 35 DEGREES
EKG P-R INTERVAL: 128 MS
EKG P-R INTERVAL: 140 MS
EKG P-R INTERVAL: 140 MS
EKG Q-T INTERVAL: 354 MS
EKG Q-T INTERVAL: 384 MS
EKG Q-T INTERVAL: 400 MS
EKG QRS DURATION: 124 MS
EKG QRS DURATION: 126 MS
EKG QRS DURATION: 130 MS
EKG QTC CALCULATION (BAZETT): 497 MS
EKG QTC CALCULATION (BAZETT): 514 MS
EKG QTC CALCULATION (BAZETT): 518 MS
EKG R AXIS: 31 DEGREES
EKG R AXIS: 43 DEGREES
EKG R AXIS: 50 DEGREES
EKG T AXIS: 136 DEGREES
EKG T AXIS: 165 DEGREES
EKG T AXIS: 93 DEGREES
EKG VENTRICULAR RATE: 101 BPM
EKG VENTRICULAR RATE: 108 BPM
EKG VENTRICULAR RATE: 119 BPM
EOSINOPHIL # BLD: 0 K/UL (ref 0–0.4)
EOSINOPHIL NFR BLD: 0 % (ref 0–5)
ERYTHROCYTE [DISTWIDTH] IN BLOOD BY AUTOMATED COUNT: 14.7 % (ref 11.6–14.5)
FLUAV SUBTYP SPEC NAA+PROBE: NOT DETECTED
FLUBV RNA SPEC QL NAA+PROBE: NOT DETECTED
GLOBULIN SER CALC-MCNC: 3.5 G/DL (ref 2.3–3.5)
GLUCOSE BLD STRIP.AUTO-MCNC: 116 MG/DL (ref 70–110)
GLUCOSE BLD STRIP.AUTO-MCNC: 117 MG/DL (ref 70–110)
GLUCOSE BLD STRIP.AUTO-MCNC: 138 MG/DL (ref 70–110)
GLUCOSE SERPL-MCNC: 196 MG/DL (ref 74–108)
HADV DNA SPEC QL NAA+PROBE: NOT DETECTED
HCO3 BLD-SCNC: 15.2 MMOL/L (ref 21–28)
HCO3 BLD-SCNC: 19.1 MMOL/L (ref 21–28)
HCOV 229E RNA SPEC QL NAA+PROBE: NOT DETECTED
HCOV HKU1 RNA SPEC QL NAA+PROBE: NOT DETECTED
HCOV NL63 RNA SPEC QL NAA+PROBE: NOT DETECTED
HCOV OC43 RNA SPEC QL NAA+PROBE: NOT DETECTED
HCT VFR BLD AUTO: 38 % (ref 36–48)
HGB BLD-MCNC: 12.9 G/DL (ref 13–16)
HMPV RNA SPEC QL NAA+PROBE: NOT DETECTED
HPIV1 RNA SPEC QL NAA+PROBE: NOT DETECTED
HPIV2 RNA SPEC QL NAA+PROBE: NOT DETECTED
HPIV3 RNA SPEC QL NAA+PROBE: NOT DETECTED
HPIV4 RNA SPEC QL NAA+PROBE: NOT DETECTED
IMM GRANULOCYTES # BLD AUTO: 0 K/UL
IMM GRANULOCYTES NFR BLD AUTO: 0 %
INR PPP: 1.3 (ref 0.9–1.2)
L PNEUMO AG UR QL: NEGATIVE
LACTATE BLD-SCNC: 1.58 MMOL/L (ref 0.4–2)
LYMPHOCYTES # BLD: 1.68 K/UL (ref 0.9–3.6)
LYMPHOCYTES NFR BLD: 6 % (ref 21–52)
M PNEUMO DNA SPEC QL NAA+PROBE: NOT DETECTED
MCH RBC QN AUTO: 30.6 PG (ref 24–34)
MCHC RBC AUTO-ENTMCNC: 33.9 G/DL (ref 31–37)
MCV RBC AUTO: 90.3 FL (ref 78–100)
MONOCYTES # BLD: 1.4 K/UL (ref 0.05–1.2)
MONOCYTES NFR BLD: 5 % (ref 3–10)
NEGATIVE CONTROL: NEGATIVE
NEUTS SEG # BLD: 24.92 K/UL (ref 1.8–8)
NEUTS SEG NFR BLD: 89 % (ref 40–73)
NRBC # BLD: 0 K/UL (ref 0–0.01)
NRBC BLD-RTO: 0 PER 100 WBC
NT PRO BNP: 5280 PG/ML (ref 36–1800)
PCO2 BLD: 27 MMHG (ref 35–48)
PCO2 BLD: 46.1 MMHG (ref 35–48)
PH BLD: 7.23 (ref 7.35–7.45)
PH BLD: 7.36 (ref 7.35–7.45)
PH, URINE: 4 (ref 4.6–8)
PLATELET # BLD AUTO: 175 K/UL (ref 135–420)
PLATELET COMMENT: ABNORMAL
PMV BLD AUTO: 9.9 FL (ref 9.2–11.8)
PO2 BLD: 295 MMHG (ref 83–108)
PO2 BLD: 95 MMHG (ref 83–108)
POSITIVE CONTROL: POSITIVE
POTASSIUM SERPL-SCNC: 4 MMOL/L (ref 3.5–5.5)
PROT SERPL-MCNC: 6.1 G/DL (ref 6.4–8.2)
PROTHROMBIN TIME: 16.2 SEC (ref 12–15.1)
RBC # BLD AUTO: 4.21 M/UL (ref 4.35–5.65)
RBC MORPH BLD: ABNORMAL
RSV RNA SPEC QL NAA+PROBE: NOT DETECTED
RV+EV RNA SPEC QL NAA+PROBE: NOT DETECTED
S PNEUM AG UR QL IA.RAPID: NEGATIVE
SAO2 % BLD: 95.7 % (ref 92–97)
SAO2 % BLD: 99.9 % (ref 92–97)
SARS-COV-2 RNA RESP QL NAA+PROBE: NOT DETECTED
SERVICE CMNT-IMP: ABNORMAL
SERVICE CMNT-IMP: ABNORMAL
SODIUM SERPL-SCNC: 134 MMOL/L (ref 136–145)
SPECIMEN TYPE: ABNORMAL
SPECIMEN TYPE: ABNORMAL
TROPONIN T SERPL HS-MCNC: 179 NG/L (ref 0–22)
TROPONIN T SERPL HS-MCNC: 231 NG/L (ref 0–22)
VANCOMYCIN SERPL-MCNC: 11.3 UG/ML (ref 5–40)
WBC # BLD AUTO: 28 K/UL (ref 4.6–13.2)

## 2025-08-11 PROCEDURE — 6370000000 HC RX 637 (ALT 250 FOR IP): Performed by: NURSE PRACTITIONER

## 2025-08-11 PROCEDURE — 99223 1ST HOSP IP/OBS HIGH 75: CPT | Performed by: NURSE PRACTITIONER

## 2025-08-11 PROCEDURE — 84484 ASSAY OF TROPONIN QUANT: CPT

## 2025-08-11 PROCEDURE — 2580000003 HC RX 258: Performed by: NURSE PRACTITIONER

## 2025-08-11 PROCEDURE — 2580000003 HC RX 258: Performed by: EMERGENCY MEDICINE

## 2025-08-11 PROCEDURE — 96375 TX/PRO/DX INJ NEW DRUG ADDON: CPT

## 2025-08-11 PROCEDURE — 94761 N-INVAS EAR/PLS OXIMETRY MLT: CPT

## 2025-08-11 PROCEDURE — 87899 AGENT NOS ASSAY W/OPTIC: CPT

## 2025-08-11 PROCEDURE — 6360000002 HC RX W HCPCS: Performed by: NURSE PRACTITIONER

## 2025-08-11 PROCEDURE — 2700000000 HC OXYGEN THERAPY PER DAY

## 2025-08-11 PROCEDURE — 6360000002 HC RX W HCPCS

## 2025-08-11 PROCEDURE — 6360000002 HC RX W HCPCS: Performed by: INTERNAL MEDICINE

## 2025-08-11 PROCEDURE — 6360000004 HC RX CONTRAST MEDICATION: Performed by: NURSE PRACTITIONER

## 2025-08-11 PROCEDURE — 2500000003 HC RX 250 WO HCPCS: Performed by: INTERNAL MEDICINE

## 2025-08-11 PROCEDURE — 6360000004 HC RX CONTRAST MEDICATION: Performed by: STUDENT IN AN ORGANIZED HEALTH CARE EDUCATION/TRAINING PROGRAM

## 2025-08-11 PROCEDURE — 0202U NFCT DS 22 TRGT SARS-COV-2: CPT

## 2025-08-11 PROCEDURE — 76857 US EXAM PELVIC LIMITED: CPT

## 2025-08-11 PROCEDURE — 36600 WITHDRAWAL OF ARTERIAL BLOOD: CPT

## 2025-08-11 PROCEDURE — 6360000002 HC RX W HCPCS: Performed by: EMERGENCY MEDICINE

## 2025-08-11 PROCEDURE — 85610 PROTHROMBIN TIME: CPT

## 2025-08-11 PROCEDURE — 83605 ASSAY OF LACTIC ACID: CPT

## 2025-08-11 PROCEDURE — 85730 THROMBOPLASTIN TIME PARTIAL: CPT

## 2025-08-11 PROCEDURE — 85379 FIBRIN DEGRADATION QUANT: CPT

## 2025-08-11 PROCEDURE — 6360000002 HC RX W HCPCS: Performed by: STUDENT IN AN ORGANIZED HEALTH CARE EDUCATION/TRAINING PROGRAM

## 2025-08-11 PROCEDURE — C8929 TTE W OR WO FOL WCON,DOPPLER: HCPCS

## 2025-08-11 PROCEDURE — 36415 COLL VENOUS BLD VENIPUNCTURE: CPT

## 2025-08-11 PROCEDURE — 71275 CT ANGIOGRAPHY CHEST: CPT

## 2025-08-11 PROCEDURE — 94660 CPAP INITIATION&MGMT: CPT

## 2025-08-11 PROCEDURE — 85025 COMPLETE CBC W/AUTO DIFF WBC: CPT

## 2025-08-11 PROCEDURE — 83880 ASSAY OF NATRIURETIC PEPTIDE: CPT

## 2025-08-11 PROCEDURE — 93306 TTE W/DOPPLER COMPLETE: CPT | Performed by: INTERNAL MEDICINE

## 2025-08-11 PROCEDURE — 82962 GLUCOSE BLOOD TEST: CPT

## 2025-08-11 PROCEDURE — 71045 X-RAY EXAM CHEST 1 VIEW: CPT

## 2025-08-11 PROCEDURE — 1100000003 HC PRIVATE W/ TELEMETRY

## 2025-08-11 PROCEDURE — 2500000003 HC RX 250 WO HCPCS: Performed by: NURSE PRACTITIONER

## 2025-08-11 PROCEDURE — 80202 ASSAY OF VANCOMYCIN: CPT

## 2025-08-11 PROCEDURE — 94640 AIRWAY INHALATION TREATMENT: CPT

## 2025-08-11 PROCEDURE — 80053 COMPREHEN METABOLIC PANEL: CPT

## 2025-08-11 RX ORDER — ACETAMINOPHEN 325 MG/1
650 TABLET ORAL EVERY 6 HOURS PRN
Status: DISCONTINUED | OUTPATIENT
Start: 2025-08-11 | End: 2025-08-15 | Stop reason: HOSPADM

## 2025-08-11 RX ORDER — AMLODIPINE BESYLATE 10 MG/1
10 TABLET ORAL DAILY
Status: DISCONTINUED | OUTPATIENT
Start: 2025-08-11 | End: 2025-08-13

## 2025-08-11 RX ORDER — ACETAMINOPHEN 650 MG/1
650 SUPPOSITORY RECTAL EVERY 6 HOURS PRN
Status: DISCONTINUED | OUTPATIENT
Start: 2025-08-11 | End: 2025-08-15 | Stop reason: HOSPADM

## 2025-08-11 RX ORDER — FUROSEMIDE 10 MG/ML
INJECTION INTRAMUSCULAR; INTRAVENOUS
Status: COMPLETED
Start: 2025-08-11 | End: 2025-08-11

## 2025-08-11 RX ORDER — ISOSORBIDE MONONITRATE 30 MG/1
30 TABLET, EXTENDED RELEASE ORAL DAILY
Status: DISCONTINUED | OUTPATIENT
Start: 2025-08-11 | End: 2025-08-12

## 2025-08-11 RX ORDER — PANTOPRAZOLE SODIUM 40 MG/1
40 TABLET, DELAYED RELEASE ORAL
Status: DISCONTINUED | OUTPATIENT
Start: 2025-08-11 | End: 2025-08-11

## 2025-08-11 RX ORDER — ONDANSETRON 4 MG/1
4 TABLET, ORALLY DISINTEGRATING ORAL EVERY 8 HOURS PRN
Status: DISCONTINUED | OUTPATIENT
Start: 2025-08-11 | End: 2025-08-15 | Stop reason: HOSPADM

## 2025-08-11 RX ORDER — SODIUM CHLORIDE 9 MG/ML
INJECTION, SOLUTION INTRAVENOUS PRN
Status: DISCONTINUED | OUTPATIENT
Start: 2025-08-11 | End: 2025-08-15 | Stop reason: HOSPADM

## 2025-08-11 RX ORDER — POTASSIUM CHLORIDE 7.45 MG/ML
10 INJECTION INTRAVENOUS PRN
Status: DISCONTINUED | OUTPATIENT
Start: 2025-08-11 | End: 2025-08-11 | Stop reason: SDUPTHER

## 2025-08-11 RX ORDER — INSULIN LISPRO 100 [IU]/ML
0-8 INJECTION, SOLUTION INTRAVENOUS; SUBCUTANEOUS
Status: DISCONTINUED | OUTPATIENT
Start: 2025-08-11 | End: 2025-08-15 | Stop reason: HOSPADM

## 2025-08-11 RX ORDER — SODIUM CHLORIDE 0.9 % (FLUSH) 0.9 %
5-40 SYRINGE (ML) INJECTION EVERY 12 HOURS SCHEDULED
Status: DISCONTINUED | OUTPATIENT
Start: 2025-08-11 | End: 2025-08-15 | Stop reason: HOSPADM

## 2025-08-11 RX ORDER — MAGNESIUM SULFATE IN WATER 40 MG/ML
2000 INJECTION, SOLUTION INTRAVENOUS PRN
Status: DISCONTINUED | OUTPATIENT
Start: 2025-08-11 | End: 2025-08-15 | Stop reason: HOSPADM

## 2025-08-11 RX ORDER — ENOXAPARIN SODIUM 100 MG/ML
40 INJECTION SUBCUTANEOUS DAILY
Status: DISCONTINUED | OUTPATIENT
Start: 2025-08-11 | End: 2025-08-13

## 2025-08-11 RX ORDER — POTASSIUM CHLORIDE 7.45 MG/ML
10 INJECTION INTRAVENOUS PRN
Status: DISCONTINUED | OUTPATIENT
Start: 2025-08-11 | End: 2025-08-15 | Stop reason: HOSPADM

## 2025-08-11 RX ORDER — POLYETHYLENE GLYCOL 3350 17 G/17G
17 POWDER, FOR SOLUTION ORAL DAILY PRN
Status: DISCONTINUED | OUTPATIENT
Start: 2025-08-11 | End: 2025-08-15 | Stop reason: HOSPADM

## 2025-08-11 RX ORDER — POTASSIUM CHLORIDE 1500 MG/1
40 TABLET, EXTENDED RELEASE ORAL PRN
Status: DISCONTINUED | OUTPATIENT
Start: 2025-08-11 | End: 2025-08-15 | Stop reason: HOSPADM

## 2025-08-11 RX ORDER — TAMSULOSIN HYDROCHLORIDE 0.4 MG/1
0.4 CAPSULE ORAL DAILY
Status: DISCONTINUED | OUTPATIENT
Start: 2025-08-11 | End: 2025-08-15 | Stop reason: HOSPADM

## 2025-08-11 RX ORDER — DEXTROSE MONOHYDRATE 100 MG/ML
INJECTION, SOLUTION INTRAVENOUS CONTINUOUS PRN
Status: DISCONTINUED | OUTPATIENT
Start: 2025-08-11 | End: 2025-08-15 | Stop reason: HOSPADM

## 2025-08-11 RX ORDER — SODIUM CHLORIDE 0.9 % (FLUSH) 0.9 %
5-40 SYRINGE (ML) INJECTION PRN
Status: DISCONTINUED | OUTPATIENT
Start: 2025-08-11 | End: 2025-08-15 | Stop reason: HOSPADM

## 2025-08-11 RX ORDER — ONDANSETRON 2 MG/ML
4 INJECTION INTRAMUSCULAR; INTRAVENOUS EVERY 6 HOURS PRN
Status: DISCONTINUED | OUTPATIENT
Start: 2025-08-11 | End: 2025-08-15 | Stop reason: HOSPADM

## 2025-08-11 RX ORDER — IOPAMIDOL 755 MG/ML
100 INJECTION, SOLUTION INTRAVASCULAR
Status: COMPLETED | OUTPATIENT
Start: 2025-08-11 | End: 2025-08-11

## 2025-08-11 RX ORDER — VANCOMYCIN 1.5 G/300ML
1500 INJECTION, SOLUTION INTRAVENOUS EVERY 24 HOURS
Status: DISCONTINUED | OUTPATIENT
Start: 2025-08-11 | End: 2025-08-12

## 2025-08-11 RX ORDER — FUROSEMIDE 10 MG/ML
40 INJECTION INTRAMUSCULAR; INTRAVENOUS ONCE
Status: COMPLETED | OUTPATIENT
Start: 2025-08-11 | End: 2025-08-11

## 2025-08-11 RX ORDER — ASPIRIN 81 MG/1
81 TABLET ORAL DAILY
Status: DISCONTINUED | OUTPATIENT
Start: 2025-08-11 | End: 2025-08-15 | Stop reason: HOSPADM

## 2025-08-11 RX ORDER — ALBUTEROL SULFATE 0.83 MG/ML
2.5 SOLUTION RESPIRATORY (INHALATION)
Status: COMPLETED | OUTPATIENT
Start: 2025-08-11 | End: 2025-08-11

## 2025-08-11 RX ORDER — BUSPIRONE HYDROCHLORIDE 5 MG/1
5 TABLET ORAL 2 TIMES DAILY
Status: DISCONTINUED | OUTPATIENT
Start: 2025-08-11 | End: 2025-08-15 | Stop reason: HOSPADM

## 2025-08-11 RX ORDER — CARVEDILOL 6.25 MG/1
12.5 TABLET ORAL 2 TIMES DAILY WITH MEALS
Status: DISCONTINUED | OUTPATIENT
Start: 2025-08-11 | End: 2025-08-12

## 2025-08-11 RX ORDER — ROSUVASTATIN CALCIUM 20 MG/1
40 TABLET, COATED ORAL NIGHTLY
Status: DISCONTINUED | OUTPATIENT
Start: 2025-08-11 | End: 2025-08-15 | Stop reason: HOSPADM

## 2025-08-11 RX ORDER — PANTOPRAZOLE SODIUM 40 MG/1
40 TABLET, DELAYED RELEASE ORAL
Status: DISCONTINUED | OUTPATIENT
Start: 2025-08-12 | End: 2025-08-15 | Stop reason: HOSPADM

## 2025-08-11 RX ADMIN — SODIUM CHLORIDE, PRESERVATIVE FREE 40 MG: 5 INJECTION INTRAVENOUS at 05:55

## 2025-08-11 RX ADMIN — SULFUR HEXAFLUORIDE 2 ML: KIT at 11:18

## 2025-08-11 RX ADMIN — IOPAMIDOL 100 ML: 755 INJECTION, SOLUTION INTRAVENOUS at 05:17

## 2025-08-11 RX ADMIN — BUSPIRONE HYDROCHLORIDE 5 MG: 5 TABLET ORAL at 11:44

## 2025-08-11 RX ADMIN — FUROSEMIDE 40 MG: 10 INJECTION, SOLUTION INTRAMUSCULAR; INTRAVENOUS at 17:45

## 2025-08-11 RX ADMIN — ENOXAPARIN SODIUM 40 MG: 100 INJECTION SUBCUTANEOUS at 11:44

## 2025-08-11 RX ADMIN — CEFEPIME 2000 MG: 2 INJECTION, POWDER, FOR SOLUTION INTRAVENOUS at 06:02

## 2025-08-11 RX ADMIN — CEFEPIME 2000 MG: 2 INJECTION, POWDER, FOR SOLUTION INTRAVENOUS at 11:43

## 2025-08-11 RX ADMIN — ASPIRIN 81 MG: 81 TABLET, COATED ORAL at 11:44

## 2025-08-11 RX ADMIN — ONDANSETRON 4 MG: 2 INJECTION, SOLUTION INTRAMUSCULAR; INTRAVENOUS at 05:45

## 2025-08-11 RX ADMIN — SODIUM CHLORIDE, PRESERVATIVE FREE 10 ML: 5 INJECTION INTRAVENOUS at 11:43

## 2025-08-11 RX ADMIN — CEFEPIME 2000 MG: 2 INJECTION, POWDER, FOR SOLUTION INTRAVENOUS at 22:51

## 2025-08-11 RX ADMIN — FUROSEMIDE 40 MG: 10 INJECTION INTRAMUSCULAR; INTRAVENOUS at 04:28

## 2025-08-11 RX ADMIN — TAMSULOSIN HYDROCHLORIDE 0.4 MG: 0.4 CAPSULE ORAL at 11:44

## 2025-08-11 RX ADMIN — CARVEDILOL 12.5 MG: 6.25 TABLET, FILM COATED ORAL at 17:43

## 2025-08-11 RX ADMIN — CARVEDILOL 12.5 MG: 6.25 TABLET, FILM COATED ORAL at 11:44

## 2025-08-11 RX ADMIN — FUROSEMIDE 40 MG: 10 INJECTION INTRAMUSCULAR; INTRAVENOUS at 04:08

## 2025-08-11 RX ADMIN — ACETAMINOPHEN 650 MG: 325 TABLET ORAL at 17:44

## 2025-08-11 RX ADMIN — VANCOMYCIN 1500 MG: 1.5 INJECTION, SOLUTION INTRAVENOUS at 17:54

## 2025-08-11 RX ADMIN — ALBUTEROL SULFATE 2.5 MG: 2.5 SOLUTION RESPIRATORY (INHALATION) at 05:34

## 2025-08-11 RX ADMIN — NITROGLYCERIN 1 INCH: 20 OINTMENT TOPICAL at 05:55

## 2025-08-11 RX ADMIN — ONDANSETRON 4 MG: 2 INJECTION, SOLUTION INTRAMUSCULAR; INTRAVENOUS at 17:45

## 2025-08-11 RX ADMIN — SODIUM CHLORIDE, PRESERVATIVE FREE 10 ML: 5 INJECTION INTRAVENOUS at 20:34

## 2025-08-11 RX ADMIN — FUROSEMIDE 40 MG: 10 INJECTION, SOLUTION INTRAMUSCULAR; INTRAVENOUS at 04:08

## 2025-08-11 RX ADMIN — FUROSEMIDE 40 MG: 10 INJECTION, SOLUTION INTRAMUSCULAR; INTRAVENOUS at 04:28

## 2025-08-11 RX ADMIN — ROSUVASTATIN CALCIUM 40 MG: 20 TABLET, FILM COATED ORAL at 20:34

## 2025-08-11 RX ADMIN — HEPARIN SODIUM 5000 UNITS: 5000 INJECTION INTRAVENOUS; SUBCUTANEOUS at 05:55

## 2025-08-11 RX ADMIN — ONDANSETRON 4 MG: 2 INJECTION, SOLUTION INTRAMUSCULAR; INTRAVENOUS at 12:02

## 2025-08-11 RX ADMIN — BUSPIRONE HYDROCHLORIDE 5 MG: 5 TABLET ORAL at 20:34

## 2025-08-11 RX ADMIN — AMLODIPINE BESYLATE 10 MG: 10 TABLET ORAL at 11:44

## 2025-08-11 RX ADMIN — ISOSORBIDE MONONITRATE 30 MG: 30 TABLET, EXTENDED RELEASE ORAL at 11:44

## 2025-08-11 ASSESSMENT — PAIN SCALES - GENERAL
PAINLEVEL_OUTOF10: 0

## 2025-08-11 ASSESSMENT — PAIN - FUNCTIONAL ASSESSMENT: PAIN_FUNCTIONAL_ASSESSMENT: 0-10

## 2025-08-12 LAB
ANION GAP SERPL CALC-SCNC: 14 MMOL/L (ref 3–18)
BASOPHILS # BLD: 0.03 K/UL (ref 0–0.1)
BASOPHILS NFR BLD: 0.1 % (ref 0–2)
BUN SERPL-MCNC: 27 MG/DL (ref 6–23)
BUN/CREAT SERPL: 19 (ref 12–20)
CALCIUM SERPL-MCNC: 8.8 MG/DL (ref 8.5–10.1)
CHLORIDE SERPL-SCNC: 101 MMOL/L (ref 98–107)
CO2 SERPL-SCNC: 18 MMOL/L (ref 21–32)
CREAT SERPL-MCNC: 1.38 MG/DL (ref 0.6–1.3)
DIFFERENTIAL METHOD BLD: ABNORMAL
EOSINOPHIL # BLD: 0.04 K/UL (ref 0–0.4)
EOSINOPHIL NFR BLD: 0.2 % (ref 0–5)
ERYTHROCYTE [DISTWIDTH] IN BLOOD BY AUTOMATED COUNT: 14.6 % (ref 11.6–14.5)
EST. AVERAGE GLUCOSE BLD GHB EST-MCNC: 135 MG/DL
GLUCOSE BLD STRIP.AUTO-MCNC: 117 MG/DL (ref 70–110)
GLUCOSE BLD STRIP.AUTO-MCNC: 122 MG/DL (ref 70–110)
GLUCOSE BLD STRIP.AUTO-MCNC: 139 MG/DL (ref 70–110)
GLUCOSE BLD STRIP.AUTO-MCNC: 155 MG/DL (ref 70–110)
GLUCOSE SERPL-MCNC: 104 MG/DL (ref 74–108)
HBA1C MFR BLD: 6.3 % (ref 4.2–5.6)
HCT VFR BLD AUTO: 30.5 % (ref 36–48)
HGB BLD-MCNC: 10.6 G/DL (ref 13–16)
IMM GRANULOCYTES # BLD AUTO: 0.79 K/UL (ref 0–0.04)
IMM GRANULOCYTES NFR BLD AUTO: 3.8 % (ref 0–0.5)
LYMPHOCYTES # BLD: 0.87 K/UL (ref 0.9–3.6)
LYMPHOCYTES NFR BLD: 4.2 % (ref 21–52)
MCH RBC QN AUTO: 31.2 PG (ref 24–34)
MCHC RBC AUTO-ENTMCNC: 34.8 G/DL (ref 31–37)
MCV RBC AUTO: 89.7 FL (ref 78–100)
MONOCYTES # BLD: 1.31 K/UL (ref 0.05–1.2)
MONOCYTES NFR BLD: 6.3 % (ref 3–10)
NEUTS SEG # BLD: 17.82 K/UL (ref 1.8–8)
NEUTS SEG NFR BLD: 85.4 % (ref 40–73)
NRBC # BLD: 0 K/UL (ref 0–0.01)
NRBC BLD-RTO: 0 PER 100 WBC
PLATELET # BLD AUTO: 173 K/UL (ref 135–420)
PMV BLD AUTO: 10.4 FL (ref 9.2–11.8)
POTASSIUM SERPL-SCNC: 3.3 MMOL/L (ref 3.5–5.5)
PROCALCITONIN SERPL-MCNC: 6.26 NG/ML
RBC # BLD AUTO: 3.4 M/UL (ref 4.35–5.65)
SODIUM SERPL-SCNC: 132 MMOL/L (ref 136–145)
TROPONIN T SERPL HS-MCNC: 208 NG/L (ref 0–22)
TROPONIN T SERPL HS-MCNC: 276 NG/L (ref 0–22)
WBC # BLD AUTO: 20.9 K/UL (ref 4.6–13.2)

## 2025-08-12 PROCEDURE — 97535 SELF CARE MNGMENT TRAINING: CPT

## 2025-08-12 PROCEDURE — 80048 BASIC METABOLIC PNL TOTAL CA: CPT

## 2025-08-12 PROCEDURE — 2580000003 HC RX 258: Performed by: NURSE PRACTITIONER

## 2025-08-12 PROCEDURE — 84153 ASSAY OF PSA TOTAL: CPT

## 2025-08-12 PROCEDURE — 36415 COLL VENOUS BLD VENIPUNCTURE: CPT

## 2025-08-12 PROCEDURE — 84145 PROCALCITONIN (PCT): CPT

## 2025-08-12 PROCEDURE — 85025 COMPLETE CBC W/AUTO DIFF WBC: CPT

## 2025-08-12 PROCEDURE — 82962 GLUCOSE BLOOD TEST: CPT

## 2025-08-12 PROCEDURE — 2500000003 HC RX 250 WO HCPCS: Performed by: NURSE PRACTITIONER

## 2025-08-12 PROCEDURE — 97162 PT EVAL MOD COMPLEX 30 MIN: CPT

## 2025-08-12 PROCEDURE — 83036 HEMOGLOBIN GLYCOSYLATED A1C: CPT

## 2025-08-12 PROCEDURE — 84154 ASSAY OF PSA FREE: CPT

## 2025-08-12 PROCEDURE — 6370000000 HC RX 637 (ALT 250 FOR IP)

## 2025-08-12 PROCEDURE — 6360000002 HC RX W HCPCS: Performed by: NURSE PRACTITIONER

## 2025-08-12 PROCEDURE — 99232 SBSQ HOSP IP/OBS MODERATE 35: CPT | Performed by: NURSE PRACTITIONER

## 2025-08-12 PROCEDURE — 94660 CPAP INITIATION&MGMT: CPT

## 2025-08-12 PROCEDURE — 6370000000 HC RX 637 (ALT 250 FOR IP): Performed by: STUDENT IN AN ORGANIZED HEALTH CARE EDUCATION/TRAINING PROGRAM

## 2025-08-12 PROCEDURE — 84484 ASSAY OF TROPONIN QUANT: CPT

## 2025-08-12 PROCEDURE — 97165 OT EVAL LOW COMPLEX 30 MIN: CPT

## 2025-08-12 PROCEDURE — 1100000003 HC PRIVATE W/ TELEMETRY

## 2025-08-12 PROCEDURE — 99223 1ST HOSP IP/OBS HIGH 75: CPT | Performed by: INTERNAL MEDICINE

## 2025-08-12 PROCEDURE — 6370000000 HC RX 637 (ALT 250 FOR IP): Performed by: NURSE PRACTITIONER

## 2025-08-12 RX ORDER — LIDOCAINE 4 G/G
1 PATCH TOPICAL DAILY
Status: DISCONTINUED | OUTPATIENT
Start: 2025-08-12 | End: 2025-08-15 | Stop reason: HOSPADM

## 2025-08-12 RX ORDER — LOSARTAN POTASSIUM 25 MG/1
25 TABLET ORAL DAILY
Status: DISCONTINUED | OUTPATIENT
Start: 2025-08-12 | End: 2025-08-15 | Stop reason: HOSPADM

## 2025-08-12 RX ORDER — BENZONATATE 100 MG/1
200 CAPSULE ORAL 3 TIMES DAILY PRN
Status: DISCONTINUED | OUTPATIENT
Start: 2025-08-12 | End: 2025-08-15 | Stop reason: HOSPADM

## 2025-08-12 RX ORDER — HYDRALAZINE HYDROCHLORIDE 20 MG/ML
10 INJECTION INTRAMUSCULAR; INTRAVENOUS EVERY 6 HOURS PRN
Status: DISCONTINUED | OUTPATIENT
Start: 2025-08-12 | End: 2025-08-15 | Stop reason: HOSPADM

## 2025-08-12 RX ORDER — METOPROLOL SUCCINATE 25 MG/1
25 TABLET, EXTENDED RELEASE ORAL 2 TIMES DAILY
Status: DISCONTINUED | OUTPATIENT
Start: 2025-08-12 | End: 2025-08-13

## 2025-08-12 RX ORDER — FUROSEMIDE 20 MG/1
20 TABLET ORAL 2 TIMES DAILY
Status: DISCONTINUED | OUTPATIENT
Start: 2025-08-12 | End: 2025-08-13

## 2025-08-12 RX ADMIN — CEFEPIME 2000 MG: 2 INJECTION, POWDER, FOR SOLUTION INTRAVENOUS at 22:04

## 2025-08-12 RX ADMIN — POTASSIUM CHLORIDE 40 MEQ: 1500 TABLET, EXTENDED RELEASE ORAL at 05:55

## 2025-08-12 RX ADMIN — ACETAMINOPHEN 650 MG: 325 TABLET ORAL at 14:26

## 2025-08-12 RX ADMIN — FUROSEMIDE 20 MG: 20 TABLET ORAL at 18:01

## 2025-08-12 RX ADMIN — AMLODIPINE BESYLATE 10 MG: 10 TABLET ORAL at 08:34

## 2025-08-12 RX ADMIN — CARVEDILOL 12.5 MG: 6.25 TABLET, FILM COATED ORAL at 08:34

## 2025-08-12 RX ADMIN — ROSUVASTATIN CALCIUM 40 MG: 20 TABLET, FILM COATED ORAL at 21:12

## 2025-08-12 RX ADMIN — TAMSULOSIN HYDROCHLORIDE 0.4 MG: 0.4 CAPSULE ORAL at 08:35

## 2025-08-12 RX ADMIN — ONDANSETRON 4 MG: 2 INJECTION, SOLUTION INTRAMUSCULAR; INTRAVENOUS at 21:16

## 2025-08-12 RX ADMIN — PANTOPRAZOLE SODIUM 40 MG: 40 TABLET, DELAYED RELEASE ORAL at 05:33

## 2025-08-12 RX ADMIN — ENOXAPARIN SODIUM 40 MG: 100 INJECTION SUBCUTANEOUS at 08:36

## 2025-08-12 RX ADMIN — SODIUM CHLORIDE, PRESERVATIVE FREE 10 ML: 5 INJECTION INTRAVENOUS at 08:36

## 2025-08-12 RX ADMIN — ONDANSETRON 4 MG: 2 INJECTION, SOLUTION INTRAMUSCULAR; INTRAVENOUS at 07:46

## 2025-08-12 RX ADMIN — ACETAMINOPHEN 650 MG: 325 TABLET ORAL at 21:12

## 2025-08-12 RX ADMIN — BUSPIRONE HYDROCHLORIDE 5 MG: 5 TABLET ORAL at 21:12

## 2025-08-12 RX ADMIN — METOPROLOL SUCCINATE 25 MG: 25 TABLET, EXTENDED RELEASE ORAL at 21:12

## 2025-08-12 RX ADMIN — BUSPIRONE HYDROCHLORIDE 5 MG: 5 TABLET ORAL at 08:35

## 2025-08-12 RX ADMIN — ACETAMINOPHEN 650 MG: 325 TABLET ORAL at 05:30

## 2025-08-12 RX ADMIN — ISOSORBIDE MONONITRATE 30 MG: 30 TABLET, EXTENDED RELEASE ORAL at 08:35

## 2025-08-12 RX ADMIN — SODIUM CHLORIDE, PRESERVATIVE FREE 10 ML: 5 INJECTION INTRAVENOUS at 21:16

## 2025-08-12 RX ADMIN — LOSARTAN POTASSIUM 25 MG: 25 TABLET, FILM COATED ORAL at 15:28

## 2025-08-12 RX ADMIN — CEFEPIME 2000 MG: 2 INJECTION, POWDER, FOR SOLUTION INTRAVENOUS at 10:37

## 2025-08-12 RX ADMIN — ASPIRIN 81 MG: 81 TABLET, COATED ORAL at 08:35

## 2025-08-12 ASSESSMENT — PAIN SCALES - GENERAL
PAINLEVEL_OUTOF10: 0
PAINLEVEL_OUTOF10: 0
PAINLEVEL_OUTOF10: 5
PAINLEVEL_OUTOF10: 5
PAINLEVEL_OUTOF10: 2
PAINLEVEL_OUTOF10: 0
PAINLEVEL_OUTOF10: 3

## 2025-08-12 ASSESSMENT — PAIN DESCRIPTION - DESCRIPTORS
DESCRIPTORS: DISCOMFORT
DESCRIPTORS: ACHING;DULL
DESCRIPTORS: DISCOMFORT

## 2025-08-12 ASSESSMENT — PAIN - FUNCTIONAL ASSESSMENT
PAIN_FUNCTIONAL_ASSESSMENT: ACTIVITIES ARE NOT PREVENTED
PAIN_FUNCTIONAL_ASSESSMENT: 0-10
PAIN_FUNCTIONAL_ASSESSMENT: ACTIVITIES ARE NOT PREVENTED
PAIN_FUNCTIONAL_ASSESSMENT: ACTIVITIES ARE NOT PREVENTED
PAIN_FUNCTIONAL_ASSESSMENT: 0-10
PAIN_FUNCTIONAL_ASSESSMENT: 0-10

## 2025-08-12 ASSESSMENT — PAIN DESCRIPTION - ORIENTATION
ORIENTATION: ANTERIOR

## 2025-08-12 ASSESSMENT — PAIN DESCRIPTION - FREQUENCY
FREQUENCY: INTERMITTENT
FREQUENCY: INTERMITTENT

## 2025-08-12 ASSESSMENT — PAIN DESCRIPTION - PAIN TYPE
TYPE: ACUTE PAIN
TYPE: ACUTE PAIN

## 2025-08-12 ASSESSMENT — PAIN DESCRIPTION - LOCATION
LOCATION: HEAD

## 2025-08-12 ASSESSMENT — PAIN DESCRIPTION - ONSET
ONSET: SUDDEN
ONSET: SUDDEN

## 2025-08-13 PROBLEM — I21.4 NSTEMI (NON-ST ELEVATED MYOCARDIAL INFARCTION) (HCC): Status: ACTIVE | Noted: 2025-08-13

## 2025-08-13 PROBLEM — I25.10 CORONARY ARTERY DISEASE DUE TO LIPID RICH PLAQUE: Status: ACTIVE | Noted: 2017-12-01

## 2025-08-13 PROBLEM — I25.83 CORONARY ARTERY DISEASE DUE TO LIPID RICH PLAQUE: Status: ACTIVE | Noted: 2017-12-01

## 2025-08-13 PROBLEM — I50.31 ACUTE HEART FAILURE WITH PRESERVED EJECTION FRACTION (HCC): Status: ACTIVE | Noted: 2025-08-13

## 2025-08-13 LAB
ANION GAP SERPL CALC-SCNC: 17 MMOL/L (ref 3–18)
APTT PPP: 34.2 SEC (ref 21.7–34.2)
APTT PPP: 55.3 SEC (ref 21.7–34.2)
APTT PPP: 70 SEC (ref 21.7–34.2)
BACTERIA SPEC CULT: ABNORMAL
BACTERIA SPEC CULT: ABNORMAL
BACTERIA SPEC CULT: NORMAL
BACTERIA SPEC CULT: NORMAL
BASOPHILS # BLD: 0.04 K/UL (ref 0–0.1)
BASOPHILS NFR BLD: 0.3 % (ref 0–2)
BUN SERPL-MCNC: 24 MG/DL (ref 6–23)
BUN/CREAT SERPL: 22 (ref 12–20)
CALCIUM SERPL-MCNC: 8.8 MG/DL (ref 8.5–10.1)
CC UR VC: ABNORMAL
CHLORIDE SERPL-SCNC: 104 MMOL/L (ref 98–107)
CO2 SERPL-SCNC: 17 MMOL/L (ref 21–32)
CREAT SERPL-MCNC: 1.1 MG/DL (ref 0.6–1.3)
DIFFERENTIAL METHOD BLD: ABNORMAL
EOSINOPHIL # BLD: 0.07 K/UL (ref 0–0.4)
EOSINOPHIL NFR BLD: 0.5 % (ref 0–5)
ERYTHROCYTE [DISTWIDTH] IN BLOOD BY AUTOMATED COUNT: 14.7 % (ref 11.6–14.5)
ERYTHROCYTE [DISTWIDTH] IN BLOOD BY AUTOMATED COUNT: 14.7 % (ref 11.6–14.5)
GLUCOSE BLD STRIP.AUTO-MCNC: 117 MG/DL (ref 70–110)
GLUCOSE BLD STRIP.AUTO-MCNC: 119 MG/DL (ref 70–110)
GLUCOSE BLD STRIP.AUTO-MCNC: 127 MG/DL (ref 70–110)
GLUCOSE BLD STRIP.AUTO-MCNC: 162 MG/DL (ref 70–110)
GLUCOSE SERPL-MCNC: 115 MG/DL (ref 74–108)
HCT VFR BLD AUTO: 33.8 % (ref 36–48)
HCT VFR BLD AUTO: 34.8 % (ref 36–48)
HGB BLD-MCNC: 11.1 G/DL (ref 13–16)
HGB BLD-MCNC: 11.8 G/DL (ref 13–16)
IMM GRANULOCYTES # BLD AUTO: 0.1 K/UL (ref 0–0.04)
IMM GRANULOCYTES NFR BLD AUTO: 0.7 % (ref 0–0.5)
LYMPHOCYTES # BLD: 0.75 K/UL (ref 0.9–3.6)
LYMPHOCYTES NFR BLD: 5 % (ref 21–52)
MCH RBC QN AUTO: 29.8 PG (ref 24–34)
MCH RBC QN AUTO: 30.6 PG (ref 24–34)
MCHC RBC AUTO-ENTMCNC: 32.8 G/DL (ref 31–37)
MCHC RBC AUTO-ENTMCNC: 33.9 G/DL (ref 31–37)
MCV RBC AUTO: 90.2 FL (ref 78–100)
MCV RBC AUTO: 90.6 FL (ref 78–100)
MONOCYTES # BLD: 1.01 K/UL (ref 0.05–1.2)
MONOCYTES NFR BLD: 6.7 % (ref 3–10)
NEUTS SEG # BLD: 13.06 K/UL (ref 1.8–8)
NEUTS SEG NFR BLD: 86.8 % (ref 40–73)
NRBC # BLD: 0 K/UL (ref 0–0.01)
NRBC # BLD: 0 K/UL (ref 0–0.01)
NRBC BLD-RTO: 0 PER 100 WBC
NRBC BLD-RTO: 0 PER 100 WBC
PLATELET # BLD AUTO: 191 K/UL (ref 135–420)
PLATELET # BLD AUTO: 204 K/UL (ref 135–420)
PMV BLD AUTO: 10 FL (ref 9.2–11.8)
PMV BLD AUTO: 10.3 FL (ref 9.2–11.8)
POTASSIUM SERPL-SCNC: 3.5 MMOL/L (ref 3.5–5.5)
PROCALCITONIN SERPL-MCNC: 2.73 NG/ML
PSA FREE MFR SERPL: 26.7 %
PSA FREE SERPL-MCNC: 10.4 NG/ML
PSA SERPL-MCNC: 38.9 NG/ML (ref 0–4)
RBC # BLD AUTO: 3.73 M/UL (ref 4.35–5.65)
RBC # BLD AUTO: 3.86 M/UL (ref 4.35–5.65)
SERVICE CMNT-IMP: ABNORMAL
SERVICE CMNT-IMP: NORMAL
SODIUM SERPL-SCNC: 138 MMOL/L (ref 136–145)
WBC # BLD AUTO: 13.9 K/UL (ref 4.6–13.2)
WBC # BLD AUTO: 15 K/UL (ref 4.6–13.2)

## 2025-08-13 PROCEDURE — 85730 THROMBOPLASTIN TIME PARTIAL: CPT

## 2025-08-13 PROCEDURE — 82962 GLUCOSE BLOOD TEST: CPT

## 2025-08-13 PROCEDURE — 36415 COLL VENOUS BLD VENIPUNCTURE: CPT

## 2025-08-13 PROCEDURE — 6360000002 HC RX W HCPCS: Performed by: STUDENT IN AN ORGANIZED HEALTH CARE EDUCATION/TRAINING PROGRAM

## 2025-08-13 PROCEDURE — 2500000003 HC RX 250 WO HCPCS: Performed by: NURSE PRACTITIONER

## 2025-08-13 PROCEDURE — 84145 PROCALCITONIN (PCT): CPT

## 2025-08-13 PROCEDURE — 6370000000 HC RX 637 (ALT 250 FOR IP): Performed by: INTERNAL MEDICINE

## 2025-08-13 PROCEDURE — 94761 N-INVAS EAR/PLS OXIMETRY MLT: CPT

## 2025-08-13 PROCEDURE — 80048 BASIC METABOLIC PNL TOTAL CA: CPT

## 2025-08-13 PROCEDURE — 85025 COMPLETE CBC W/AUTO DIFF WBC: CPT

## 2025-08-13 PROCEDURE — 6370000000 HC RX 637 (ALT 250 FOR IP)

## 2025-08-13 PROCEDURE — 6360000002 HC RX W HCPCS: Performed by: NURSE PRACTITIONER

## 2025-08-13 PROCEDURE — 6370000000 HC RX 637 (ALT 250 FOR IP): Performed by: STUDENT IN AN ORGANIZED HEALTH CARE EDUCATION/TRAINING PROGRAM

## 2025-08-13 PROCEDURE — 1100000003 HC PRIVATE W/ TELEMETRY

## 2025-08-13 PROCEDURE — 2580000003 HC RX 258: Performed by: NURSE PRACTITIONER

## 2025-08-13 PROCEDURE — 6370000000 HC RX 637 (ALT 250 FOR IP): Performed by: NURSE PRACTITIONER

## 2025-08-13 PROCEDURE — 85027 COMPLETE CBC AUTOMATED: CPT

## 2025-08-13 PROCEDURE — 6370000000 HC RX 637 (ALT 250 FOR IP): Performed by: PHYSICIAN ASSISTANT

## 2025-08-13 PROCEDURE — 2580000003 HC RX 258: Performed by: STUDENT IN AN ORGANIZED HEALTH CARE EDUCATION/TRAINING PROGRAM

## 2025-08-13 PROCEDURE — 6360000002 HC RX W HCPCS: Performed by: PHYSICIAN ASSISTANT

## 2025-08-13 PROCEDURE — 99233 SBSQ HOSP IP/OBS HIGH 50: CPT | Performed by: NURSE PRACTITIONER

## 2025-08-13 PROCEDURE — 99233 SBSQ HOSP IP/OBS HIGH 50: CPT | Performed by: INTERNAL MEDICINE

## 2025-08-13 RX ORDER — SPIRONOLACTONE 25 MG/1
25 TABLET ORAL DAILY
Status: DISCONTINUED | OUTPATIENT
Start: 2025-08-13 | End: 2025-08-15 | Stop reason: HOSPADM

## 2025-08-13 RX ORDER — ISOSORBIDE MONONITRATE 30 MG/1
30 TABLET, EXTENDED RELEASE ORAL DAILY
Status: DISCONTINUED | OUTPATIENT
Start: 2025-08-13 | End: 2025-08-15 | Stop reason: HOSPADM

## 2025-08-13 RX ORDER — HEPARIN SODIUM 1000 [USP'U]/ML
4000 INJECTION, SOLUTION INTRAVENOUS; SUBCUTANEOUS PRN
Status: DISCONTINUED | OUTPATIENT
Start: 2025-08-13 | End: 2025-08-15

## 2025-08-13 RX ORDER — METOPROLOL SUCCINATE 25 MG/1
25 TABLET, EXTENDED RELEASE ORAL DAILY
Status: DISCONTINUED | OUTPATIENT
Start: 2025-08-13 | End: 2025-08-15

## 2025-08-13 RX ORDER — FUROSEMIDE 10 MG/ML
20 INJECTION INTRAMUSCULAR; INTRAVENOUS 2 TIMES DAILY
Status: DISCONTINUED | OUTPATIENT
Start: 2025-08-13 | End: 2025-08-15

## 2025-08-13 RX ORDER — HEPARIN SODIUM 10000 [USP'U]/100ML
5-30 INJECTION, SOLUTION INTRAVENOUS CONTINUOUS
Status: DISCONTINUED | OUTPATIENT
Start: 2025-08-13 | End: 2025-08-15

## 2025-08-13 RX ORDER — HEPARIN SODIUM 1000 [USP'U]/ML
2000 INJECTION, SOLUTION INTRAVENOUS; SUBCUTANEOUS PRN
Status: DISCONTINUED | OUTPATIENT
Start: 2025-08-13 | End: 2025-08-15

## 2025-08-13 RX ADMIN — LOSARTAN POTASSIUM 25 MG: 25 TABLET, FILM COATED ORAL at 09:58

## 2025-08-13 RX ADMIN — FUROSEMIDE 20 MG: 20 TABLET ORAL at 09:58

## 2025-08-13 RX ADMIN — BUSPIRONE HYDROCHLORIDE 5 MG: 5 TABLET ORAL at 21:25

## 2025-08-13 RX ADMIN — HEPARIN SODIUM AND DEXTROSE 11 UNITS/KG/HR: 10000; 5 INJECTION INTRAVENOUS at 12:31

## 2025-08-13 RX ADMIN — ASPIRIN 81 MG: 81 TABLET, COATED ORAL at 09:58

## 2025-08-13 RX ADMIN — ROSUVASTATIN CALCIUM 40 MG: 20 TABLET, FILM COATED ORAL at 21:25

## 2025-08-13 RX ADMIN — PANTOPRAZOLE SODIUM 40 MG: 40 TABLET, DELAYED RELEASE ORAL at 09:58

## 2025-08-13 RX ADMIN — TAMSULOSIN HYDROCHLORIDE 0.4 MG: 0.4 CAPSULE ORAL at 09:58

## 2025-08-13 RX ADMIN — FUROSEMIDE 20 MG: 10 INJECTION, SOLUTION INTRAMUSCULAR; INTRAVENOUS at 12:37

## 2025-08-13 RX ADMIN — CEFEPIME 2000 MG: 2 INJECTION, POWDER, FOR SOLUTION INTRAVENOUS at 10:15

## 2025-08-13 RX ADMIN — ACETAMINOPHEN 650 MG: 325 TABLET ORAL at 16:36

## 2025-08-13 RX ADMIN — SODIUM CHLORIDE, PRESERVATIVE FREE 10 ML: 5 INJECTION INTRAVENOUS at 21:26

## 2025-08-13 RX ADMIN — ONDANSETRON 4 MG: 2 INJECTION, SOLUTION INTRAMUSCULAR; INTRAVENOUS at 08:47

## 2025-08-13 RX ADMIN — BUSPIRONE HYDROCHLORIDE 5 MG: 5 TABLET ORAL at 09:58

## 2025-08-13 RX ADMIN — HEPARIN SODIUM 4000 UNITS: 1000 INJECTION, SOLUTION INTRAVENOUS; SUBCUTANEOUS at 13:09

## 2025-08-13 RX ADMIN — SODIUM CHLORIDE, PRESERVATIVE FREE 10 ML: 5 INJECTION INTRAVENOUS at 09:57

## 2025-08-13 RX ADMIN — METOPROLOL SUCCINATE 25 MG: 25 TABLET, FILM COATED, EXTENDED RELEASE ORAL at 09:58

## 2025-08-13 RX ADMIN — ENOXAPARIN SODIUM 40 MG: 100 INJECTION SUBCUTANEOUS at 08:46

## 2025-08-13 RX ADMIN — SPIRONOLACTONE 25 MG: 25 TABLET ORAL at 09:58

## 2025-08-13 RX ADMIN — FUROSEMIDE 20 MG: 10 INJECTION, SOLUTION INTRAMUSCULAR; INTRAVENOUS at 18:33

## 2025-08-13 RX ADMIN — CEFEPIME 2000 MG: 2 INJECTION, POWDER, FOR SOLUTION INTRAVENOUS at 18:32

## 2025-08-13 RX ADMIN — HEPARIN SODIUM 2000 UNITS: 1000 INJECTION, SOLUTION INTRAVENOUS; SUBCUTANEOUS at 22:01

## 2025-08-13 RX ADMIN — ISOSORBIDE MONONITRATE 30 MG: 30 TABLET, EXTENDED RELEASE ORAL at 12:36

## 2025-08-13 ASSESSMENT — PAIN SCALES - GENERAL: PAINLEVEL_OUTOF10: 0

## 2025-08-14 ENCOUNTER — APPOINTMENT (OUTPATIENT)
Facility: HOSPITAL | Age: 75
End: 2025-08-14
Attending: FAMILY MEDICINE
Payer: MEDICARE

## 2025-08-14 PROBLEM — N41.9 PROSTATITIS: Status: ACTIVE | Noted: 2025-08-14

## 2025-08-14 LAB
ANION GAP SERPL CALC-SCNC: 15 MMOL/L (ref 3–18)
APTT PPP: 68 SEC (ref 21.7–34.2)
APTT PPP: 87.6 SEC (ref 21.7–34.2)
APTT PPP: 96.9 SEC (ref 21.7–34.2)
BASOPHILS # BLD: 0.04 K/UL (ref 0–0.1)
BASOPHILS NFR BLD: 0.4 % (ref 0–2)
BUN SERPL-MCNC: 22 MG/DL (ref 6–23)
BUN/CREAT SERPL: 21 (ref 12–20)
CALCIUM SERPL-MCNC: 9 MG/DL (ref 8.5–10.1)
CHLORIDE SERPL-SCNC: 100 MMOL/L (ref 98–107)
CO2 SERPL-SCNC: 22 MMOL/L (ref 21–32)
CREAT SERPL-MCNC: 1.05 MG/DL (ref 0.6–1.3)
DIFFERENTIAL METHOD BLD: ABNORMAL
EOSINOPHIL # BLD: 0.13 K/UL (ref 0–0.4)
EOSINOPHIL NFR BLD: 1.4 % (ref 0–5)
ERYTHROCYTE [DISTWIDTH] IN BLOOD BY AUTOMATED COUNT: 14.5 % (ref 11.6–14.5)
GLUCOSE BLD STRIP.AUTO-MCNC: 129 MG/DL (ref 70–110)
GLUCOSE BLD STRIP.AUTO-MCNC: 131 MG/DL (ref 70–110)
GLUCOSE BLD STRIP.AUTO-MCNC: 170 MG/DL (ref 70–110)
GLUCOSE BLD STRIP.AUTO-MCNC: 179 MG/DL (ref 70–110)
GLUCOSE SERPL-MCNC: 127 MG/DL (ref 74–108)
HCT VFR BLD AUTO: 34.2 % (ref 36–48)
HGB BLD-MCNC: 11.7 G/DL (ref 13–16)
IMM GRANULOCYTES # BLD AUTO: 0.12 K/UL (ref 0–0.04)
IMM GRANULOCYTES NFR BLD AUTO: 1.3 % (ref 0–0.5)
LYMPHOCYTES # BLD: 1 K/UL (ref 0.9–3.6)
LYMPHOCYTES NFR BLD: 11 % (ref 21–52)
MCH RBC QN AUTO: 30 PG (ref 24–34)
MCHC RBC AUTO-ENTMCNC: 34.2 G/DL (ref 31–37)
MCV RBC AUTO: 87.7 FL (ref 78–100)
MONOCYTES # BLD: 0.9 K/UL (ref 0.05–1.2)
MONOCYTES NFR BLD: 9.9 % (ref 3–10)
NEUTS SEG # BLD: 6.89 K/UL (ref 1.8–8)
NEUTS SEG NFR BLD: 76 % (ref 40–73)
NRBC # BLD: 0 K/UL (ref 0–0.01)
NRBC BLD-RTO: 0 PER 100 WBC
PLATELET # BLD AUTO: 191 K/UL (ref 135–420)
PMV BLD AUTO: 9.7 FL (ref 9.2–11.8)
POTASSIUM SERPL-SCNC: 3.1 MMOL/L (ref 3.5–5.5)
RBC # BLD AUTO: 3.9 M/UL (ref 4.35–5.65)
SODIUM SERPL-SCNC: 136 MMOL/L (ref 136–145)
WBC # BLD AUTO: 9.1 K/UL (ref 4.6–13.2)

## 2025-08-14 PROCEDURE — 99232 SBSQ HOSP IP/OBS MODERATE 35: CPT | Performed by: INTERNAL MEDICINE

## 2025-08-14 PROCEDURE — 2700000000 HC OXYGEN THERAPY PER DAY

## 2025-08-14 PROCEDURE — 80048 BASIC METABOLIC PNL TOTAL CA: CPT

## 2025-08-14 PROCEDURE — 6370000000 HC RX 637 (ALT 250 FOR IP): Performed by: NURSE PRACTITIONER

## 2025-08-14 PROCEDURE — 6370000000 HC RX 637 (ALT 250 FOR IP): Performed by: INTERNAL MEDICINE

## 2025-08-14 PROCEDURE — 6360000002 HC RX W HCPCS: Performed by: NURSE PRACTITIONER

## 2025-08-14 PROCEDURE — 36415 COLL VENOUS BLD VENIPUNCTURE: CPT

## 2025-08-14 PROCEDURE — 2580000003 HC RX 258: Performed by: STUDENT IN AN ORGANIZED HEALTH CARE EDUCATION/TRAINING PROGRAM

## 2025-08-14 PROCEDURE — 2500000003 HC RX 250 WO HCPCS: Performed by: NURSE PRACTITIONER

## 2025-08-14 PROCEDURE — 85025 COMPLETE CBC W/AUTO DIFF WBC: CPT

## 2025-08-14 PROCEDURE — 6360000002 HC RX W HCPCS: Performed by: PHYSICIAN ASSISTANT

## 2025-08-14 PROCEDURE — 71045 X-RAY EXAM CHEST 1 VIEW: CPT

## 2025-08-14 PROCEDURE — 82962 GLUCOSE BLOOD TEST: CPT

## 2025-08-14 PROCEDURE — 94761 N-INVAS EAR/PLS OXIMETRY MLT: CPT

## 2025-08-14 PROCEDURE — 1100000003 HC PRIVATE W/ TELEMETRY

## 2025-08-14 PROCEDURE — 6360000002 HC RX W HCPCS: Performed by: STUDENT IN AN ORGANIZED HEALTH CARE EDUCATION/TRAINING PROGRAM

## 2025-08-14 PROCEDURE — 85730 THROMBOPLASTIN TIME PARTIAL: CPT

## 2025-08-14 PROCEDURE — 6370000000 HC RX 637 (ALT 250 FOR IP): Performed by: PHYSICIAN ASSISTANT

## 2025-08-14 PROCEDURE — 6370000000 HC RX 637 (ALT 250 FOR IP): Performed by: STUDENT IN AN ORGANIZED HEALTH CARE EDUCATION/TRAINING PROGRAM

## 2025-08-14 RX ADMIN — SPIRONOLACTONE 25 MG: 25 TABLET ORAL at 08:57

## 2025-08-14 RX ADMIN — BUSPIRONE HYDROCHLORIDE 5 MG: 5 TABLET ORAL at 08:56

## 2025-08-14 RX ADMIN — SODIUM CHLORIDE, PRESERVATIVE FREE 10 ML: 5 INJECTION INTRAVENOUS at 20:06

## 2025-08-14 RX ADMIN — SODIUM CHLORIDE, PRESERVATIVE FREE 10 ML: 5 INJECTION INTRAVENOUS at 09:05

## 2025-08-14 RX ADMIN — POTASSIUM BICARBONATE 50 MEQ: 978 TABLET, EFFERVESCENT ORAL at 20:05

## 2025-08-14 RX ADMIN — BUSPIRONE HYDROCHLORIDE 5 MG: 5 TABLET ORAL at 20:05

## 2025-08-14 RX ADMIN — LOSARTAN POTASSIUM 25 MG: 25 TABLET, FILM COATED ORAL at 08:56

## 2025-08-14 RX ADMIN — FUROSEMIDE 20 MG: 10 INJECTION, SOLUTION INTRAMUSCULAR; INTRAVENOUS at 08:57

## 2025-08-14 RX ADMIN — HEPARIN SODIUM 2000 UNITS: 1000 INJECTION, SOLUTION INTRAVENOUS; SUBCUTANEOUS at 10:58

## 2025-08-14 RX ADMIN — METOPROLOL SUCCINATE 25 MG: 25 TABLET, FILM COATED, EXTENDED RELEASE ORAL at 08:55

## 2025-08-14 RX ADMIN — POTASSIUM CHLORIDE 40 MEQ: 1500 TABLET, EXTENDED RELEASE ORAL at 06:23

## 2025-08-14 RX ADMIN — FUROSEMIDE 20 MG: 10 INJECTION, SOLUTION INTRAMUSCULAR; INTRAVENOUS at 18:29

## 2025-08-14 RX ADMIN — ROSUVASTATIN CALCIUM 40 MG: 20 TABLET, FILM COATED ORAL at 20:05

## 2025-08-14 RX ADMIN — ASPIRIN 81 MG: 81 TABLET, COATED ORAL at 08:55

## 2025-08-14 RX ADMIN — CEFEPIME 2000 MG: 2 INJECTION, POWDER, FOR SOLUTION INTRAVENOUS at 18:37

## 2025-08-14 RX ADMIN — CEFEPIME 2000 MG: 2 INJECTION, POWDER, FOR SOLUTION INTRAVENOUS at 10:32

## 2025-08-14 RX ADMIN — PANTOPRAZOLE SODIUM 40 MG: 40 TABLET, DELAYED RELEASE ORAL at 06:23

## 2025-08-14 RX ADMIN — TAMSULOSIN HYDROCHLORIDE 0.4 MG: 0.4 CAPSULE ORAL at 08:57

## 2025-08-14 RX ADMIN — ISOSORBIDE MONONITRATE 30 MG: 30 TABLET, EXTENDED RELEASE ORAL at 12:19

## 2025-08-14 RX ADMIN — ACETAMINOPHEN 650 MG: 325 TABLET ORAL at 15:41

## 2025-08-14 RX ADMIN — HEPARIN SODIUM AND DEXTROSE 17 UNITS/KG/HR: 10000; 5 INJECTION INTRAVENOUS at 05:30

## 2025-08-14 RX ADMIN — CEFEPIME 2000 MG: 2 INJECTION, POWDER, FOR SOLUTION INTRAVENOUS at 03:48

## 2025-08-14 ASSESSMENT — PAIN DESCRIPTION - ORIENTATION
ORIENTATION: DISTAL
ORIENTATION: DISTAL

## 2025-08-14 ASSESSMENT — PAIN DESCRIPTION - LOCATION
LOCATION: GROIN

## 2025-08-14 ASSESSMENT — PAIN SCALES - GENERAL
PAINLEVEL_OUTOF10: 0
PAINLEVEL_OUTOF10: 3
PAINLEVEL_OUTOF10: 0
PAINLEVEL_OUTOF10: 3
PAINLEVEL_OUTOF10: 0
PAINLEVEL_OUTOF10: 0

## 2025-08-14 ASSESSMENT — PAIN DESCRIPTION - DESCRIPTORS
DESCRIPTORS: BURNING

## 2025-08-14 ASSESSMENT — PAIN - FUNCTIONAL ASSESSMENT
PAIN_FUNCTIONAL_ASSESSMENT: ACTIVITIES ARE NOT PREVENTED
PAIN_FUNCTIONAL_ASSESSMENT: 0-10

## 2025-08-14 ASSESSMENT — PAIN DESCRIPTION - PAIN TYPE: TYPE: ACUTE PAIN

## 2025-08-15 VITALS
HEIGHT: 72 IN | SYSTOLIC BLOOD PRESSURE: 154 MMHG | WEIGHT: 197.53 LBS | RESPIRATION RATE: 18 BRPM | OXYGEN SATURATION: 97 % | TEMPERATURE: 98.7 F | BODY MASS INDEX: 26.76 KG/M2 | HEART RATE: 100 BPM | DIASTOLIC BLOOD PRESSURE: 80 MMHG

## 2025-08-15 LAB
ALBUMIN SERPL-MCNC: 2.7 G/DL (ref 3.4–5)
ALBUMIN/GLOB SERPL: 0.6 (ref 0.8–1.7)
ALP SERPL-CCNC: 88 U/L (ref 45–117)
ALT SERPL-CCNC: 62 U/L (ref 10–50)
ANION GAP SERPL CALC-SCNC: 15 MMOL/L (ref 3–18)
APTT PPP: 117.1 SEC (ref 21.7–34.2)
APTT PPP: 64.7 SEC (ref 21.7–34.2)
AST SERPL-CCNC: 51 U/L (ref 10–38)
BASOPHILS # BLD: 0.07 K/UL (ref 0–0.1)
BASOPHILS NFR BLD: 0.8 % (ref 0–2)
BILIRUB SERPL-MCNC: 0.2 MG/DL (ref 0.2–1)
BUN SERPL-MCNC: 23 MG/DL (ref 6–23)
BUN/CREAT SERPL: 22 (ref 12–20)
CALCIUM SERPL-MCNC: 9.8 MG/DL (ref 8.5–10.1)
CHLORIDE SERPL-SCNC: 97 MMOL/L (ref 98–107)
CO2 SERPL-SCNC: 25 MMOL/L (ref 21–32)
CREAT SERPL-MCNC: 1.06 MG/DL (ref 0.6–1.3)
DIFFERENTIAL METHOD BLD: ABNORMAL
EOSINOPHIL # BLD: 0.14 K/UL (ref 0–0.4)
EOSINOPHIL NFR BLD: 1.5 % (ref 0–5)
ERYTHROCYTE [DISTWIDTH] IN BLOOD BY AUTOMATED COUNT: 14.4 % (ref 11.6–14.5)
GLOBULIN SER CALC-MCNC: 4.4 G/DL (ref 2–4)
GLUCOSE BLD STRIP.AUTO-MCNC: 152 MG/DL (ref 70–110)
GLUCOSE BLD STRIP.AUTO-MCNC: 187 MG/DL (ref 70–110)
GLUCOSE SERPL-MCNC: 125 MG/DL (ref 74–108)
HCT VFR BLD AUTO: 35.1 % (ref 36–48)
HGB BLD-MCNC: 11.8 G/DL (ref 13–16)
IMM GRANULOCYTES # BLD AUTO: 0.2 K/UL (ref 0–0.04)
IMM GRANULOCYTES NFR BLD AUTO: 2.2 % (ref 0–0.5)
LYMPHOCYTES # BLD: 1.25 K/UL (ref 0.9–3.6)
LYMPHOCYTES NFR BLD: 13.8 % (ref 21–52)
MCH RBC QN AUTO: 29.2 PG (ref 24–34)
MCHC RBC AUTO-ENTMCNC: 33.6 G/DL (ref 31–37)
MCV RBC AUTO: 86.9 FL (ref 78–100)
MONOCYTES # BLD: 1.08 K/UL (ref 0.05–1.2)
MONOCYTES NFR BLD: 11.9 % (ref 3–10)
NEUTS SEG # BLD: 6.3 K/UL (ref 1.8–8)
NEUTS SEG NFR BLD: 69.8 % (ref 40–73)
NRBC # BLD: 0 K/UL (ref 0–0.01)
NRBC BLD-RTO: 0 PER 100 WBC
PLATELET # BLD AUTO: 255 K/UL (ref 135–420)
PMV BLD AUTO: 10.9 FL (ref 9.2–11.8)
POTASSIUM SERPL-SCNC: 3.9 MMOL/L (ref 3.5–5.5)
PROT SERPL-MCNC: 7.1 G/DL (ref 6.4–8.2)
RBC # BLD AUTO: 4.04 M/UL (ref 4.35–5.65)
SODIUM SERPL-SCNC: 136 MMOL/L (ref 136–145)
WBC # BLD AUTO: 9 K/UL (ref 4.6–13.2)

## 2025-08-15 PROCEDURE — 6370000000 HC RX 637 (ALT 250 FOR IP): Performed by: INTERNAL MEDICINE

## 2025-08-15 PROCEDURE — 2500000003 HC RX 250 WO HCPCS: Performed by: NURSE PRACTITIONER

## 2025-08-15 PROCEDURE — 6360000002 HC RX W HCPCS: Performed by: STUDENT IN AN ORGANIZED HEALTH CARE EDUCATION/TRAINING PROGRAM

## 2025-08-15 PROCEDURE — 94761 N-INVAS EAR/PLS OXIMETRY MLT: CPT

## 2025-08-15 PROCEDURE — 85025 COMPLETE CBC W/AUTO DIFF WBC: CPT

## 2025-08-15 PROCEDURE — 6360000002 HC RX W HCPCS: Performed by: PHYSICIAN ASSISTANT

## 2025-08-15 PROCEDURE — 82962 GLUCOSE BLOOD TEST: CPT

## 2025-08-15 PROCEDURE — 85730 THROMBOPLASTIN TIME PARTIAL: CPT

## 2025-08-15 PROCEDURE — 2580000003 HC RX 258: Performed by: STUDENT IN AN ORGANIZED HEALTH CARE EDUCATION/TRAINING PROGRAM

## 2025-08-15 PROCEDURE — 36415 COLL VENOUS BLD VENIPUNCTURE: CPT

## 2025-08-15 PROCEDURE — 6370000000 HC RX 637 (ALT 250 FOR IP): Performed by: NURSE PRACTITIONER

## 2025-08-15 PROCEDURE — 6370000000 HC RX 637 (ALT 250 FOR IP): Performed by: STUDENT IN AN ORGANIZED HEALTH CARE EDUCATION/TRAINING PROGRAM

## 2025-08-15 PROCEDURE — 80053 COMPREHEN METABOLIC PANEL: CPT

## 2025-08-15 PROCEDURE — 6360000002 HC RX W HCPCS: Performed by: NURSE PRACTITIONER

## 2025-08-15 PROCEDURE — 99239 HOSP IP/OBS DSCHRG MGMT >30: CPT | Performed by: INTERNAL MEDICINE

## 2025-08-15 PROCEDURE — 99232 SBSQ HOSP IP/OBS MODERATE 35: CPT | Performed by: INTERNAL MEDICINE

## 2025-08-15 RX ORDER — SPIRONOLACTONE 25 MG/1
25 TABLET ORAL DAILY
Qty: 30 TABLET | Refills: 3 | Status: SHIPPED | OUTPATIENT
Start: 2025-08-16

## 2025-08-15 RX ORDER — METOPROLOL SUCCINATE 50 MG/1
50 TABLET, EXTENDED RELEASE ORAL DAILY
Qty: 30 TABLET | Refills: 3 | Status: SHIPPED | OUTPATIENT
Start: 2025-08-16

## 2025-08-15 RX ORDER — FUROSEMIDE 20 MG/1
20 TABLET ORAL DAILY
Qty: 60 TABLET | Refills: 3 | Status: SHIPPED | OUTPATIENT
Start: 2025-08-16

## 2025-08-15 RX ORDER — LOSARTAN POTASSIUM 25 MG/1
25 TABLET ORAL DAILY
Qty: 30 TABLET | Refills: 3 | Status: SHIPPED | OUTPATIENT
Start: 2025-08-16

## 2025-08-15 RX ORDER — BENZONATATE 200 MG/1
200 CAPSULE ORAL 3 TIMES DAILY PRN
Qty: 90 CAPSULE | Refills: 0 | Status: SHIPPED | OUTPATIENT
Start: 2025-08-15 | End: 2025-09-14

## 2025-08-15 RX ORDER — METOPROLOL SUCCINATE 50 MG/1
50 TABLET, EXTENDED RELEASE ORAL DAILY
Status: DISCONTINUED | OUTPATIENT
Start: 2025-08-16 | End: 2025-08-15 | Stop reason: HOSPADM

## 2025-08-15 RX ORDER — FUROSEMIDE 20 MG/1
20 TABLET ORAL DAILY
Status: DISCONTINUED | OUTPATIENT
Start: 2025-08-16 | End: 2025-08-15 | Stop reason: HOSPADM

## 2025-08-15 RX ORDER — LEVOFLOXACIN 500 MG/1
500 TABLET, FILM COATED ORAL DAILY
Qty: 16 TABLET | Refills: 0 | Status: SHIPPED | OUTPATIENT
Start: 2025-08-15 | End: 2025-08-31

## 2025-08-15 RX ADMIN — TAMSULOSIN HYDROCHLORIDE 0.4 MG: 0.4 CAPSULE ORAL at 09:34

## 2025-08-15 RX ADMIN — LOSARTAN POTASSIUM 25 MG: 25 TABLET, FILM COATED ORAL at 09:33

## 2025-08-15 RX ADMIN — BUSPIRONE HYDROCHLORIDE 5 MG: 5 TABLET ORAL at 09:34

## 2025-08-15 RX ADMIN — PANTOPRAZOLE SODIUM 40 MG: 40 TABLET, DELAYED RELEASE ORAL at 07:16

## 2025-08-15 RX ADMIN — HEPARIN SODIUM 2000 UNITS: 1000 INJECTION, SOLUTION INTRAVENOUS; SUBCUTANEOUS at 00:42

## 2025-08-15 RX ADMIN — SPIRONOLACTONE 25 MG: 25 TABLET ORAL at 09:33

## 2025-08-15 RX ADMIN — CEFEPIME 2000 MG: 2 INJECTION, POWDER, FOR SOLUTION INTRAVENOUS at 03:14

## 2025-08-15 RX ADMIN — ASPIRIN 81 MG: 81 TABLET, COATED ORAL at 09:32

## 2025-08-15 RX ADMIN — CEFEPIME 2000 MG: 2 INJECTION, POWDER, FOR SOLUTION INTRAVENOUS at 09:45

## 2025-08-15 RX ADMIN — METOPROLOL SUCCINATE 25 MG: 25 TABLET, FILM COATED, EXTENDED RELEASE ORAL at 09:32

## 2025-08-15 RX ADMIN — FUROSEMIDE 20 MG: 10 INJECTION, SOLUTION INTRAMUSCULAR; INTRAVENOUS at 09:34

## 2025-08-15 RX ADMIN — SODIUM CHLORIDE, PRESERVATIVE FREE 10 ML: 5 INJECTION INTRAVENOUS at 09:50

## 2025-08-15 ASSESSMENT — PAIN SCALES - GENERAL
PAINLEVEL_OUTOF10: 0

## 2025-08-16 LAB
BACTERIA SPEC CULT: NORMAL
BACTERIA SPEC CULT: NORMAL
SERVICE CMNT-IMP: NORMAL
SERVICE CMNT-IMP: NORMAL

## 2025-08-19 ENCOUNTER — TELEMEDICINE (OUTPATIENT)
Age: 75
End: 2025-08-19

## 2025-08-19 ENCOUNTER — APPOINTMENT (OUTPATIENT)
Facility: HOSPITAL | Age: 75
End: 2025-08-19
Payer: MEDICARE

## 2025-08-19 DIAGNOSIS — R11.2 NAUSEA AND VOMITING, UNSPECIFIED VOMITING TYPE: Primary | ICD-10-CM

## 2025-08-19 RX ORDER — PROMETHAZINE HYDROCHLORIDE 25 MG/1
25 TABLET ORAL 4 TIMES DAILY PRN
Qty: 20 TABLET | Refills: 0 | Status: SHIPPED | OUTPATIENT
Start: 2025-08-19 | End: 2025-08-26

## 2025-08-19 ASSESSMENT — ENCOUNTER SYMPTOMS
NAUSEA: 1
SHORTNESS OF BREATH: 0
CONSTIPATION: 0
ABDOMINAL PAIN: 0
VOMITING: 1
DIARRHEA: 0

## 2025-08-20 ENCOUNTER — OFFICE VISIT (OUTPATIENT)
Facility: CLINIC | Age: 75
End: 2025-08-20

## 2025-08-20 VITALS
BODY MASS INDEX: 24.11 KG/M2 | HEIGHT: 72 IN | SYSTOLIC BLOOD PRESSURE: 142 MMHG | TEMPERATURE: 98.2 F | RESPIRATION RATE: 16 BRPM | WEIGHT: 178 LBS | DIASTOLIC BLOOD PRESSURE: 88 MMHG | HEART RATE: 72 BPM

## 2025-08-20 DIAGNOSIS — I50.20 HFREF (HEART FAILURE WITH REDUCED EJECTION FRACTION) (HCC): ICD-10-CM

## 2025-08-20 DIAGNOSIS — I25.10 CORONARY ARTERY DISEASE DUE TO LIPID RICH PLAQUE: ICD-10-CM

## 2025-08-20 DIAGNOSIS — I10 ESSENTIAL HYPERTENSION: ICD-10-CM

## 2025-08-20 DIAGNOSIS — R11.2 NAUSEA AND VOMITING, UNSPECIFIED VOMITING TYPE: ICD-10-CM

## 2025-08-20 DIAGNOSIS — N41.0 ACUTE PROSTATITIS: ICD-10-CM

## 2025-08-20 DIAGNOSIS — Z09 HOSPITAL DISCHARGE FOLLOW-UP: Primary | ICD-10-CM

## 2025-08-20 DIAGNOSIS — I25.83 CORONARY ARTERY DISEASE DUE TO LIPID RICH PLAQUE: ICD-10-CM

## 2025-08-26 ENCOUNTER — HOSPITAL ENCOUNTER (OUTPATIENT)
Facility: HOSPITAL | Age: 75
Setting detail: INFUSION SERIES
Discharge: HOME | End: 2025-08-29
Payer: MEDICARE

## 2025-08-26 VITALS
HEART RATE: 83 BPM | RESPIRATION RATE: 16 BRPM | DIASTOLIC BLOOD PRESSURE: 70 MMHG | SYSTOLIC BLOOD PRESSURE: 142 MMHG | TEMPERATURE: 97.1 F

## 2025-08-26 DIAGNOSIS — E53.8 B12 DEFICIENCY: Primary | ICD-10-CM

## 2025-08-26 PROCEDURE — 96372 THER/PROPH/DIAG INJ SC/IM: CPT

## 2025-08-26 PROCEDURE — 6360000002 HC RX W HCPCS: Performed by: FAMILY MEDICINE

## 2025-08-26 RX ORDER — ONDANSETRON 2 MG/ML
8 INJECTION INTRAMUSCULAR; INTRAVENOUS
OUTPATIENT
Start: 2025-09-23

## 2025-08-26 RX ORDER — CYANOCOBALAMIN 1000 UG/ML
1000 INJECTION, SOLUTION INTRAMUSCULAR; SUBCUTANEOUS ONCE
OUTPATIENT
Start: 2025-09-23 | End: 2025-09-23

## 2025-08-26 RX ORDER — ACETAMINOPHEN 325 MG/1
650 TABLET ORAL
OUTPATIENT
Start: 2025-09-23

## 2025-08-26 RX ORDER — ALBUTEROL SULFATE 90 UG/1
4 INHALANT RESPIRATORY (INHALATION) PRN
OUTPATIENT
Start: 2025-09-23

## 2025-08-26 RX ORDER — HYDROCORTISONE SODIUM SUCCINATE 100 MG/2ML
100 INJECTION INTRAMUSCULAR; INTRAVENOUS
OUTPATIENT
Start: 2025-09-23

## 2025-08-26 RX ORDER — EPINEPHRINE 1 MG/ML
0.3 INJECTION, SOLUTION, CONCENTRATE INTRAVENOUS PRN
OUTPATIENT
Start: 2025-09-23

## 2025-08-26 RX ORDER — CYANOCOBALAMIN 1000 UG/ML
1000 INJECTION, SOLUTION INTRAMUSCULAR; SUBCUTANEOUS ONCE
Status: COMPLETED | OUTPATIENT
Start: 2025-08-26 | End: 2025-08-26

## 2025-08-26 RX ORDER — SODIUM CHLORIDE 9 MG/ML
INJECTION, SOLUTION INTRAVENOUS CONTINUOUS
OUTPATIENT
Start: 2025-09-23

## 2025-08-26 RX ORDER — DIPHENHYDRAMINE HYDROCHLORIDE 50 MG/ML
50 INJECTION, SOLUTION INTRAMUSCULAR; INTRAVENOUS
OUTPATIENT
Start: 2025-09-23

## 2025-08-26 RX ADMIN — CYANOCOBALAMIN 1000 MCG: 1000 INJECTION INTRAMUSCULAR; SUBCUTANEOUS at 14:02

## 2025-08-26 ASSESSMENT — PAIN - FUNCTIONAL ASSESSMENT: PAIN_FUNCTIONAL_ASSESSMENT: 0-10

## 2025-09-02 ENCOUNTER — APPOINTMENT (OUTPATIENT)
Age: 75
End: 2025-09-02
Payer: MEDICARE

## 2025-09-02 ENCOUNTER — APPOINTMENT (OUTPATIENT)
Age: 75
End: 2025-09-02
Attending: EMERGENCY MEDICINE
Payer: MEDICARE

## 2025-09-02 ENCOUNTER — HOSPITAL ENCOUNTER (EMERGENCY)
Age: 75
Discharge: ANOTHER HOSPITAL | End: 2025-09-02
Attending: EMERGENCY MEDICINE
Payer: MEDICARE

## 2025-09-02 VITALS
HEART RATE: 81 BPM | TEMPERATURE: 97.5 F | OXYGEN SATURATION: 97 % | SYSTOLIC BLOOD PRESSURE: 109 MMHG | BODY MASS INDEX: 24.11 KG/M2 | WEIGHT: 178 LBS | DIASTOLIC BLOOD PRESSURE: 52 MMHG | RESPIRATION RATE: 19 BRPM | HEIGHT: 72 IN

## 2025-09-02 DIAGNOSIS — I95.9 HYPOTENSION, UNSPECIFIED HYPOTENSION TYPE: ICD-10-CM

## 2025-09-02 DIAGNOSIS — I20.0 UNSTABLE ANGINA (HCC): Primary | ICD-10-CM

## 2025-09-02 DIAGNOSIS — E86.0 DEHYDRATION: ICD-10-CM

## 2025-09-02 DIAGNOSIS — E87.20 LACTIC ACID ACIDOSIS: ICD-10-CM

## 2025-09-02 PROBLEM — J96.00 ACUTE RESPIRATORY FAILURE (HCC): Status: ACTIVE | Noted: 2025-09-02

## 2025-09-02 LAB
ALBUMIN SERPL-MCNC: 3.5 G/DL (ref 3.4–5)
ALBUMIN/GLOB SERPL: 1.1 (ref 1–1.9)
ALP SERPL-CCNC: 78 U/L (ref 45–117)
ALT SERPL-CCNC: 12 U/L (ref 10–50)
ANION GAP SERPL CALC-SCNC: 15 MMOL/L (ref 7–16)
APPEARANCE UR: CLEAR
AST SERPL-CCNC: 15 U/L (ref 10–38)
BACTERIA URNS QL MICRO: NEGATIVE /HPF
BASOPHILS # BLD: 0.05 K/UL (ref 0–0.1)
BASOPHILS NFR BLD: 0.5 % (ref 0–2)
BILIRUB SERPL-MCNC: 0.3 MG/DL (ref 0.2–1)
BILIRUB UR QL: NEGATIVE
BUN SERPL-MCNC: 19 MG/DL (ref 6–23)
BUN/CREAT SERPL: 13
CALCIUM SERPL-MCNC: 9.7 MG/DL (ref 8.5–10.1)
CHLORIDE SERPL-SCNC: 104 MMOL/L (ref 98–107)
CO2 SERPL-SCNC: 22 MMOL/L (ref 21–32)
COLOR UR: YELLOW
CREAT SERPL-MCNC: 1.42 MG/DL (ref 0.6–1.3)
DIFFERENTIAL METHOD BLD: ABNORMAL
EOSINOPHIL # BLD: 0.13 K/UL (ref 0–0.4)
EOSINOPHIL NFR BLD: 1.3 % (ref 0–5)
ERYTHROCYTE [DISTWIDTH] IN BLOOD BY AUTOMATED COUNT: 14.9 % (ref 11.6–14.5)
GLOBULIN SER CALC-MCNC: 3.1 G/DL (ref 2.3–3.5)
GLUCOSE BLD STRIP.AUTO-MCNC: 107 MG/DL (ref 70–110)
GLUCOSE SERPL-MCNC: 122 MG/DL (ref 74–108)
GLUCOSE UR STRIP.AUTO-MCNC: NEGATIVE MG/DL
HCT VFR BLD AUTO: 35.5 % (ref 36–48)
HGB BLD-MCNC: 12.1 G/DL (ref 13–16)
HGB UR QL STRIP: NEGATIVE
IMM GRANULOCYTES # BLD AUTO: 0.05 K/UL (ref 0–0.04)
IMM GRANULOCYTES NFR BLD AUTO: 0.5 % (ref 0–0.5)
KETONES UR QL STRIP.AUTO: NEGATIVE MG/DL
LACTATE BLD-SCNC: 2.47 MMOL/L (ref 0.4–2)
LACTATE BLD-SCNC: 3.34 MMOL/L (ref 0.4–2)
LEUKOCYTE ESTERASE UR QL STRIP.AUTO: ABNORMAL
LYMPHOCYTES # BLD: 2.27 K/UL (ref 0.9–3.6)
LYMPHOCYTES NFR BLD: 22.1 % (ref 21–52)
MCH RBC QN AUTO: 30.5 PG (ref 24–34)
MCHC RBC AUTO-ENTMCNC: 34.1 G/DL (ref 31–37)
MCV RBC AUTO: 89.4 FL (ref 78–100)
MONOCYTES # BLD: 0.83 K/UL (ref 0.05–1.2)
MONOCYTES NFR BLD: 8.1 % (ref 3–10)
NEUTS SEG # BLD: 6.92 K/UL (ref 1.8–8)
NEUTS SEG NFR BLD: 67.5 % (ref 40–73)
NITRITE UR QL STRIP.AUTO: NEGATIVE
NRBC # BLD: 0 K/UL (ref 0–0.01)
NRBC BLD-RTO: 0 PER 100 WBC
NT PRO BNP: 926 PG/ML (ref 36–1800)
PH UR STRIP: 5 (ref 5–8)
PLATELET # BLD AUTO: 172 K/UL (ref 135–420)
PMV BLD AUTO: 10.1 FL (ref 9.2–11.8)
POTASSIUM SERPL-SCNC: 4.8 MMOL/L (ref 3.5–5.5)
PROT SERPL-MCNC: 6.6 G/DL (ref 6.4–8.2)
PROT UR STRIP-MCNC: NEGATIVE MG/DL
RBC # BLD AUTO: 3.97 M/UL (ref 4.35–5.65)
RBC #/AREA URNS HPF: NEGATIVE /HPF (ref 0–5)
SODIUM SERPL-SCNC: 141 MMOL/L (ref 136–145)
SP GR UR REFRACTOMETRY: 1.02 (ref 1–1.03)
TROPONIN T SERPL HS-MCNC: 22.8 NG/L (ref 0–22)
TROPONIN T SERPL HS-MCNC: 26 NG/L (ref 0–22)
UROBILINOGEN UR QL STRIP.AUTO: 1 EU/DL (ref 0.2–1)
WBC # BLD AUTO: 10.3 K/UL (ref 4.6–13.2)
WBC URNS QL MICRO: NORMAL /HPF (ref 0–4)

## 2025-09-02 PROCEDURE — 96361 HYDRATE IV INFUSION ADD-ON: CPT

## 2025-09-02 PROCEDURE — 85025 COMPLETE CBC W/AUTO DIFF WBC: CPT

## 2025-09-02 PROCEDURE — 71045 X-RAY EXAM CHEST 1 VIEW: CPT

## 2025-09-02 PROCEDURE — 81001 URINALYSIS AUTO W/SCOPE: CPT

## 2025-09-02 PROCEDURE — 83605 ASSAY OF LACTIC ACID: CPT

## 2025-09-02 PROCEDURE — 80053 COMPREHEN METABOLIC PANEL: CPT

## 2025-09-02 PROCEDURE — 2580000003 HC RX 258: Performed by: EMERGENCY MEDICINE

## 2025-09-02 PROCEDURE — 96365 THER/PROPH/DIAG IV INF INIT: CPT

## 2025-09-02 PROCEDURE — 6360000002 HC RX W HCPCS: Performed by: EMERGENCY MEDICINE

## 2025-09-02 PROCEDURE — 84484 ASSAY OF TROPONIN QUANT: CPT

## 2025-09-02 PROCEDURE — 99285 EMERGENCY DEPT VISIT HI MDM: CPT

## 2025-09-02 PROCEDURE — 82962 GLUCOSE BLOOD TEST: CPT

## 2025-09-02 PROCEDURE — 83880 ASSAY OF NATRIURETIC PEPTIDE: CPT

## 2025-09-02 PROCEDURE — 6370000000 HC RX 637 (ALT 250 FOR IP): Performed by: EMERGENCY MEDICINE

## 2025-09-02 PROCEDURE — 81003 URINALYSIS AUTO W/O SCOPE: CPT

## 2025-09-02 PROCEDURE — 87040 BLOOD CULTURE FOR BACTERIA: CPT

## 2025-09-02 RX ORDER — PROMETHAZINE HYDROCHLORIDE 25 MG/1
12.5 TABLET ORAL EVERY 6 HOURS PRN
Status: ON HOLD | COMMUNITY

## 2025-09-02 RX ORDER — SODIUM CHLORIDE 9 MG/ML
INJECTION, SOLUTION INTRAVENOUS CONTINUOUS
Status: DISCONTINUED | OUTPATIENT
Start: 2025-09-02 | End: 2025-09-02 | Stop reason: HOSPADM

## 2025-09-02 RX ORDER — ASPIRIN 81 MG/1
324 TABLET, CHEWABLE ORAL
Status: COMPLETED | OUTPATIENT
Start: 2025-09-02 | End: 2025-09-02

## 2025-09-02 RX ORDER — 0.9 % SODIUM CHLORIDE 0.9 %
500 INTRAVENOUS SOLUTION INTRAVENOUS ONCE
Status: COMPLETED | OUTPATIENT
Start: 2025-09-02 | End: 2025-09-02

## 2025-09-02 RX ADMIN — CEFEPIME 2000 MG: 2 INJECTION, POWDER, FOR SOLUTION INTRAVENOUS at 18:30

## 2025-09-02 RX ADMIN — SODIUM CHLORIDE 500 ML: 0.9 INJECTION, SOLUTION INTRAVENOUS at 18:51

## 2025-09-02 RX ADMIN — ASPIRIN 324 MG: 81 TABLET, CHEWABLE ORAL at 19:43

## 2025-09-02 ASSESSMENT — LIFESTYLE VARIABLES
HOW OFTEN DO YOU HAVE A DRINK CONTAINING ALCOHOL: NEVER
HOW MANY STANDARD DRINKS CONTAINING ALCOHOL DO YOU HAVE ON A TYPICAL DAY: PATIENT DOES NOT DRINK

## 2025-09-02 ASSESSMENT — PAIN - FUNCTIONAL ASSESSMENT: PAIN_FUNCTIONAL_ASSESSMENT: 0-10

## 2025-09-02 ASSESSMENT — PAIN SCALES - GENERAL: PAINLEVEL_OUTOF10: 4

## 2025-09-03 LAB
EKG ATRIAL RATE: 94 BPM
EKG DIAGNOSIS: NORMAL
EKG P AXIS: 23 DEGREES
EKG P-R INTERVAL: 154 MS
EKG Q-T INTERVAL: 414 MS
EKG QRS DURATION: 124 MS
EKG QTC CALCULATION (BAZETT): 517 MS
EKG R AXIS: 19 DEGREES
EKG T AXIS: 95 DEGREES
EKG VENTRICULAR RATE: 94 BPM

## 2025-09-04 ENCOUNTER — ANESTHESIA EVENT (OUTPATIENT)
Dept: CARDIOTHORACIC SURGERY | Facility: HOSPITAL | Age: 75
End: 2025-09-04
Payer: MEDICARE

## 2025-09-05 ENCOUNTER — ANESTHESIA (OUTPATIENT)
Dept: CARDIOTHORACIC SURGERY | Facility: HOSPITAL | Age: 75
End: 2025-09-05
Payer: MEDICARE

## 2025-09-05 PROCEDURE — 2500000003 HC RX 250 WO HCPCS: Performed by: ANESTHESIOLOGY

## 2025-09-05 PROCEDURE — 2580000003 HC RX 258: Performed by: STUDENT IN AN ORGANIZED HEALTH CARE EDUCATION/TRAINING PROGRAM

## 2025-09-05 PROCEDURE — 6360000002 HC RX W HCPCS: Performed by: PHYSICIAN ASSISTANT

## 2025-09-05 PROCEDURE — 6370000000 HC RX 637 (ALT 250 FOR IP): Performed by: ANESTHESIOLOGY

## 2025-09-05 PROCEDURE — 2580000003 HC RX 258: Performed by: ANESTHESIOLOGY

## 2025-09-05 PROCEDURE — 6360000002 HC RX W HCPCS: Performed by: ANESTHESIOLOGY

## 2025-09-05 PROCEDURE — 2500000003 HC RX 250 WO HCPCS: Performed by: PHYSICIAN ASSISTANT

## 2025-09-05 RX ORDER — AMINOCAPROIC ACID 250 MG/ML
INJECTION, SOLUTION INTRAVENOUS
Status: DISCONTINUED | OUTPATIENT
Start: 2025-09-05 | End: 2025-09-05 | Stop reason: SDUPTHER

## 2025-09-05 RX ORDER — SUCCINYLCHOLINE/SOD CL,ISO/PF 100 MG/5ML
SYRINGE (ML) INTRAVENOUS
Status: DISCONTINUED | OUTPATIENT
Start: 2025-09-05 | End: 2025-09-05 | Stop reason: SDUPTHER

## 2025-09-05 RX ORDER — HEPARIN SODIUM 1000 [USP'U]/ML
INJECTION, SOLUTION INTRAVENOUS; SUBCUTANEOUS
Status: DISCONTINUED | OUTPATIENT
Start: 2025-09-05 | End: 2025-09-05 | Stop reason: SDUPTHER

## 2025-09-05 RX ORDER — DEXAMETHASONE SODIUM PHOSPHATE 4 MG/ML
INJECTION, SOLUTION INTRA-ARTICULAR; INTRALESIONAL; INTRAMUSCULAR; INTRAVENOUS; SOFT TISSUE
Status: DISCONTINUED | OUTPATIENT
Start: 2025-09-05 | End: 2025-09-05 | Stop reason: SDUPTHER

## 2025-09-05 RX ORDER — FENTANYL CITRATE 50 UG/ML
INJECTION, SOLUTION INTRAMUSCULAR; INTRAVENOUS
Status: DISCONTINUED | OUTPATIENT
Start: 2025-09-05 | End: 2025-09-05 | Stop reason: SDUPTHER

## 2025-09-05 RX ORDER — INDOMETHACIN 25 MG/1
CAPSULE ORAL
Status: DISCONTINUED | OUTPATIENT
Start: 2025-09-05 | End: 2025-09-05 | Stop reason: SDUPTHER

## 2025-09-05 RX ORDER — PROPOFOL 10 MG/ML
INJECTION, EMULSION INTRAVENOUS
Status: DISCONTINUED | OUTPATIENT
Start: 2025-09-05 | End: 2025-09-05 | Stop reason: SDUPTHER

## 2025-09-05 RX ORDER — ROCURONIUM BROMIDE 10 MG/ML
INJECTION, SOLUTION INTRAVENOUS
Status: DISCONTINUED | OUTPATIENT
Start: 2025-09-05 | End: 2025-09-05 | Stop reason: SDUPTHER

## 2025-09-05 RX ORDER — NOREPINEPHRINE BITARTRATE 0.03 MG/ML
INJECTION, SOLUTION INTRAVENOUS
Status: DISCONTINUED | OUTPATIENT
Start: 2025-09-05 | End: 2025-09-05 | Stop reason: SDUPTHER

## 2025-09-05 RX ORDER — PROTAMINE SULFATE 10 MG/ML
INJECTION, SOLUTION INTRAVENOUS
Status: DISCONTINUED | OUTPATIENT
Start: 2025-09-05 | End: 2025-09-05 | Stop reason: SDUPTHER

## 2025-09-05 RX ORDER — CALCIUM CHLORIDE 100 MG/ML
INJECTION INTRAVENOUS; INTRAVENTRICULAR
Status: DISCONTINUED | OUTPATIENT
Start: 2025-09-05 | End: 2025-09-05 | Stop reason: SDUPTHER

## 2025-09-05 RX ORDER — MILRINONE LACTATE 0.2 MG/ML
INJECTION, SOLUTION INTRAVENOUS
Status: DISCONTINUED | OUTPATIENT
Start: 2025-09-05 | End: 2025-09-05 | Stop reason: SDUPTHER

## 2025-09-05 RX ORDER — VECURONIUM BROMIDE 1 MG/ML
INJECTION, POWDER, LYOPHILIZED, FOR SOLUTION INTRAVENOUS
Status: DISCONTINUED | OUTPATIENT
Start: 2025-09-05 | End: 2025-09-05 | Stop reason: SDUPTHER

## 2025-09-05 RX ORDER — NITROGLYCERIN 40 MG/100ML
INJECTION INTRAVENOUS
Status: DISCONTINUED | OUTPATIENT
Start: 2025-09-05 | End: 2025-09-05 | Stop reason: SDUPTHER

## 2025-09-05 RX ORDER — VASOPRESSIN 20 [USP'U]/ML
INJECTION, SOLUTION INTRAVENOUS
Status: DISCONTINUED | OUTPATIENT
Start: 2025-09-05 | End: 2025-09-05 | Stop reason: SDUPTHER

## 2025-09-05 RX ORDER — MIDAZOLAM HYDROCHLORIDE 1 MG/ML
INJECTION, SOLUTION INTRAMUSCULAR; INTRAVENOUS
Status: DISCONTINUED | OUTPATIENT
Start: 2025-09-05 | End: 2025-09-05 | Stop reason: SDUPTHER

## 2025-09-05 RX ADMIN — PHENYLEPHRINE HYDROCHLORIDE 100 MCG: 10 INJECTION INTRAVENOUS at 11:10

## 2025-09-05 RX ADMIN — FENTANYL CITRATE 50 MCG: 50 INJECTION INTRAMUSCULAR; INTRAVENOUS at 15:15

## 2025-09-05 RX ADMIN — EPINEPHRINE 2 MCG/MIN: 1 INJECTION INTRAMUSCULAR; INTRAVENOUS; SUBCUTANEOUS at 13:01

## 2025-09-05 RX ADMIN — FENTANYL CITRATE 50 MCG: 50 INJECTION INTRAMUSCULAR; INTRAVENOUS at 10:39

## 2025-09-05 RX ADMIN — WATER 2000 MG: 1 INJECTION INTRAMUSCULAR; INTRAVENOUS; SUBCUTANEOUS at 14:30

## 2025-09-05 RX ADMIN — PHENYLEPHRINE HYDROCHLORIDE 100 MCG: 10 INJECTION INTRAVENOUS at 12:01

## 2025-09-05 RX ADMIN — FENTANYL CITRATE 50 MCG: 50 INJECTION INTRAMUSCULAR; INTRAVENOUS at 11:49

## 2025-09-05 RX ADMIN — PHENYLEPHRINE HYDROCHLORIDE 200 MCG: 10 INJECTION INTRAVENOUS at 11:57

## 2025-09-05 RX ADMIN — FENTANYL CITRATE 100 MCG: 50 INJECTION INTRAMUSCULAR; INTRAVENOUS at 10:15

## 2025-09-05 RX ADMIN — PROPOFOL 50 MCG/KG/MIN: 10 INJECTION, EMULSION INTRAVENOUS at 15:13

## 2025-09-05 RX ADMIN — SODIUM BICARBONATE 50 MEQ: 84 INJECTION, SOLUTION INTRAVENOUS at 15:24

## 2025-09-05 RX ADMIN — MILRINONE LACTATE 0.5 MCG/KG/MIN: 0.2 INJECTION, SOLUTION INTRAVENOUS at 13:14

## 2025-09-05 RX ADMIN — HEPARIN SODIUM 30000 UNITS: 1000 INJECTION, SOLUTION INTRAVENOUS; SUBCUTANEOUS at 13:51

## 2025-09-05 RX ADMIN — NOREPINEPHRINE BITARTRATE 3 MCG/MIN: 0.03 INJECTION, SOLUTION INTRAVENOUS at 14:46

## 2025-09-05 RX ADMIN — SODIUM CHLORIDE 1 UNITS/HR: 9 INJECTION, SOLUTION INTRAVENOUS at 11:12

## 2025-09-05 RX ADMIN — NOREPINEPHRINE BITARTRATE 10 MCG/MIN: 0.03 INJECTION, SOLUTION INTRAVENOUS at 14:15

## 2025-09-05 RX ADMIN — DEXAMETHASONE SODIUM PHOSPHATE 4 MG: 4 INJECTION, SOLUTION INTRAMUSCULAR; INTRAVENOUS at 10:47

## 2025-09-05 RX ADMIN — PHENYLEPHRINE HYDROCHLORIDE 100 MCG: 10 INJECTION INTRAVENOUS at 11:59

## 2025-09-05 RX ADMIN — FENTANYL CITRATE 50 MCG: 50 INJECTION INTRAMUSCULAR; INTRAVENOUS at 11:20

## 2025-09-05 RX ADMIN — CALCIUM CHLORIDE INJECTION 0.5 G: 100 INJECTION, SOLUTION INTRAVENOUS at 15:24

## 2025-09-05 RX ADMIN — VECURONIUM BROMIDE 2 MG: 1 INJECTION, POWDER, LYOPHILIZED, FOR SOLUTION INTRAVENOUS at 12:06

## 2025-09-05 RX ADMIN — CALCIUM CHLORIDE INJECTION 0.5 G: 100 INJECTION, SOLUTION INTRAVENOUS at 15:12

## 2025-09-05 RX ADMIN — FENTANYL CITRATE 100 MCG: 50 INJECTION INTRAMUSCULAR; INTRAVENOUS at 11:17

## 2025-09-05 RX ADMIN — VECURONIUM BROMIDE 4 MG: 1 INJECTION, POWDER, LYOPHILIZED, FOR SOLUTION INTRAVENOUS at 10:47

## 2025-09-05 RX ADMIN — FENTANYL CITRATE 50 MCG: 50 INJECTION INTRAMUSCULAR; INTRAVENOUS at 09:58

## 2025-09-05 RX ADMIN — MIDAZOLAM 2 MG: 1 INJECTION, SOLUTION INTRAMUSCULAR; INTRAVENOUS at 09:50

## 2025-09-05 RX ADMIN — VASOPRESSIN 0.5 UNITS/MIN: 20 INJECTION, SOLUTION INTRAMUSCULAR; SUBCUTANEOUS at 13:45

## 2025-09-05 RX ADMIN — PHENYLEPHRINE HYDROCHLORIDE 30 MCG/MIN: 10 INJECTION INTRAVENOUS at 10:30

## 2025-09-05 RX ADMIN — AMINOCAPROIC ACID 1 G/HR: 250 INJECTION, SOLUTION INTRAVENOUS at 10:01

## 2025-09-05 RX ADMIN — PROTAMINE SULFATE 250 MG: 10 INJECTION, SOLUTION INTRAVENOUS at 14:27

## 2025-09-05 RX ADMIN — SODIUM CHLORIDE: 9 INJECTION, SOLUTION INTRAVENOUS at 09:50

## 2025-09-05 RX ADMIN — VECURONIUM BROMIDE 2 MG: 1 INJECTION, POWDER, LYOPHILIZED, FOR SOLUTION INTRAVENOUS at 11:43

## 2025-09-05 RX ADMIN — HEPARIN SODIUM 30000 UNITS: 1000 INJECTION, SOLUTION INTRAVENOUS; SUBCUTANEOUS at 11:40

## 2025-09-05 RX ADMIN — PROPOFOL 100 MG: 10 INJECTION, EMULSION INTRAVENOUS at 09:58

## 2025-09-05 RX ADMIN — NITROGLYCERIN 50 MCG: 40 INJECTION INTRAVENOUS at 11:50

## 2025-09-05 RX ADMIN — VASOPRESSIN 2 UNITS: 20 INJECTION INTRAVENOUS at 13:40

## 2025-09-05 RX ADMIN — PHENYLEPHRINE HYDROCHLORIDE 200 MCG: 10 INJECTION INTRAVENOUS at 11:41

## 2025-09-05 RX ADMIN — VECURONIUM BROMIDE 2 MG: 1 INJECTION, POWDER, LYOPHILIZED, FOR SOLUTION INTRAVENOUS at 13:59

## 2025-09-05 RX ADMIN — VASOPRESSIN 2 UNITS: 20 INJECTION INTRAVENOUS at 13:45

## 2025-09-05 RX ADMIN — WATER 2000 MG: 1 INJECTION INTRAMUSCULAR; INTRAVENOUS; SUBCUTANEOUS at 10:30

## 2025-09-05 RX ADMIN — AMINOCAPROIC ACID 5000 MG: 250 INJECTION, SOLUTION INTRAVENOUS at 11:40

## 2025-09-05 RX ADMIN — Medication 100 MG: at 09:58

## 2025-09-05 RX ADMIN — FENTANYL CITRATE 50 MCG: 50 INJECTION INTRAMUSCULAR; INTRAVENOUS at 15:46

## 2025-09-05 RX ADMIN — FENTANYL CITRATE 50 MCG: 50 INJECTION INTRAMUSCULAR; INTRAVENOUS at 10:47

## 2025-09-05 RX ADMIN — PHENYLEPHRINE HYDROCHLORIDE 100 MCG: 10 INJECTION INTRAVENOUS at 12:03

## 2025-09-05 RX ADMIN — ROCURONIUM BROMIDE 50 MG: 50 INJECTION INTRAVENOUS at 10:01

## 2025-09-07 LAB
BACTERIA SPEC CULT: NORMAL
BACTERIA SPEC CULT: NORMAL
SERVICE CMNT-IMP: NORMAL
SERVICE CMNT-IMP: NORMAL

## (undated) DEVICE — PROCEDURE KIT FLUID MGMT 10 FR CUST MAINFOLD

## (undated) DEVICE — MEDI-VAC NON-CONDUCTIVE SUCTION TUBING: Brand: CARDINAL HEALTH

## (undated) DEVICE — SYRINGE MED 20ML STD CLR PLAS LUERLOCK TIP N CTRL DISP

## (undated) DEVICE — ENDOSCOPY PUMP TUBING/ CAP SET: Brand: ERBE

## (undated) DEVICE — GUIDEWIRE VASC L260CM DIA0035IN TIP L3MM PTFE J STD TAPR FIX

## (undated) DEVICE — SYRINGE MED 10ML LUERLOCK TIP W/O SFTY DISP

## (undated) DEVICE — TRAP ENDOSCP POLYP 2 CHMBR DRAWER TYP

## (undated) DEVICE — STERILE POLYISOPRENE POWDER-FREE SURGICAL GLOVES: Brand: PROTEXIS

## (undated) DEVICE — SOLUTION IRRIG 1000ML H2O STRL BLT

## (undated) DEVICE — SNARE POLYP M W27MMXL240CM OVL STIFF DISP CAPTIVATOR

## (undated) DEVICE — SYRINGE MED 25GA 3ML L5/8IN SUBQ PLAS W/ DETACH NDL SFTY

## (undated) DEVICE — SNARE ENDO 2.4MMX230CM -- COLD EXACTO

## (undated) DEVICE — GOWN ISOL IMPERV UNIV, DISP, OPEN BACK, BLUE --

## (undated) DEVICE — GAUZE,SPONGE,4"X4",16PLY,STRL,LF,10/TRAY: Brand: MEDLINE

## (undated) DEVICE — RUNTHROUGH NS EXTRA FLOPPY PTCA GUIDEWIRE: Brand: RUNTHROUGH

## (undated) DEVICE — SNARE VASC L240CM LOOP W10MM SHTH DIA2.4MM RND STIFF CLD

## (undated) DEVICE — PACK PROCEDURE SURG VASC CATH 161 MMC LF

## (undated) DEVICE — FORCEPS BX L240CM JAW DIA2.8MM L CAP W/ NDL MIC MESH TOOTH

## (undated) DEVICE — FLEX ADVANTAGE 3000CC: Brand: FLEX ADVANTAGE

## (undated) DEVICE — CATHETER SUCT TR FL TIP 14FR W/ O CTRL

## (undated) DEVICE — CANNULA ORIG TL CLR W FOAM CUSHIONS AND 14FT SUPL TB 3 CHN

## (undated) DEVICE — CATHETER ANGIO 6FR L110CM 145DEG VENT POLYUR PGTL 6 SIDE H

## (undated) DEVICE — SYRINGE MED 50ML LUERSLIP TIP

## (undated) DEVICE — LINER SUCT CANSTR 3000CC PLAS SFT PRE ASSEMB W/OUT TBNG W/

## (undated) DEVICE — SYR 50ML SLIP TIP NSAF LF STRL --

## (undated) DEVICE — UNDERPAD INCONT W23XL36IN STD BLU POLYPR BK FLUF SFT

## (undated) DEVICE — TRAP SPEC POLYP REM STRNR CLN DSGN MAGNIFYING WIND DISP

## (undated) DEVICE — GOWN PLASTIC FILM THMBHKS UNIV BLUE: Brand: CARDINAL HEALTH

## (undated) DEVICE — CATHETER ANGIO 5FR L100CM GRY S STL NYL JL3.5 3 SEG BRAID L

## (undated) DEVICE — SET FLD ADMIN 3 W STPCOCK FIX FEM L BOR 1IN

## (undated) DEVICE — CANNULA NSL AD TBNG L14FT STD PVC O2 CRV CONN NONFLARED NSL

## (undated) DEVICE — YANKAUER,SMOOTH HANDLE,HIGH CAPACITY: Brand: MEDLINE INDUSTRIES, INC.

## (undated) DEVICE — COVER US PRB W15XL120CM W/ GEL RUBBERBAND TAPE STRP FLD GEN

## (undated) DEVICE — ANGIOGRAPHY KIT CUST VASC

## (undated) DEVICE — FORCEPS BX L240CM JAW DIA2.4MM ORNG L CAP W/ NDL DISP RAD

## (undated) DEVICE — MEDI-VAC SUCTION HIGH CAPACITY: Brand: CARDINAL HEALTH

## (undated) DEVICE — TRAP SPEC COLL POLYP POLYSTYR --

## (undated) DEVICE — SYR 10ML LUER LOK 1/5ML GRAD --

## (undated) DEVICE — GAUZE SPONGES,16 PLY: Brand: CURITY

## (undated) DEVICE — INTRODUCER SHTH 6FR CANN L11CM DIL TIP 35MM GRN TUNGSTEN

## (undated) DEVICE — BASIN EMSIS 16OZ GRAPHITE PLAS KID SHP MOLD GRAD FOR ORAL

## (undated) DEVICE — PRESSURE MONITORING SET: Brand: TRUWAVE

## (undated) DEVICE — SYR 20ML LL STRL LF --

## (undated) DEVICE — Device

## (undated) DEVICE — MINI TREK CORONARY DILATATION CATHETER 2.0 MM X 12 MM / RAPID-EXCHANGE: Brand: MINI TREK

## (undated) DEVICE — CATHETER GUID 6FR L100CM PTFE XBLAD4 TRUELUMEN HYBRID BRAID

## (undated) DEVICE — BITE BLOCK ENDOSCP UNIV AD 6 TO 9.4 MM

## (undated) DEVICE — SYRINGE 20ML LL S/C 50

## (undated) DEVICE — DEVICE INFL L13IN 40ATM 30ML BASIXTOUCH

## (undated) DEVICE — NC TREK CORONARY DILATATION CATHETER 2.5 MM X 8 MM / RAPID-EXCHANGE: Brand: NC TREK

## (undated) DEVICE — DRAPE,ANGIO,BRACH,STERILE,38X44: Brand: MEDLINE

## (undated) DEVICE — AIRLIFE™ NASAL OXYGEN CANNULA CURVED, NONFLARED TIP WITH 14 FOOT (4.3 M) CRUSH-RESISTANT TUBING, OVER-THE-EAR STYLE: Brand: AIRLIFE™

## (undated) DEVICE — GLIDESHEATH SLENDER STAINLESS STEEL KIT: Brand: GLIDESHEATH SLENDER

## (undated) DEVICE — TREK CORONARY DILATATION CATHETER 2.50 MM X 12 MM / RAPID-EXCHANGE: Brand: TREK

## (undated) DEVICE — RADIFOCUS OPTITORQUE ANGIOGRAPHIC CATHETER: Brand: OPTITORQUE

## (undated) DEVICE — FLUFF AND POLYMER UNDERPAD,EXTRA HEAVY: Brand: WINGS

## (undated) DEVICE — BAND HEMOSTAT DRY D-STAT RAD --

## (undated) DEVICE — (D)GLOVE EXAM LG NITRL NS -- DISC BY MFR NO SUB